# Patient Record
Sex: FEMALE | Race: WHITE | NOT HISPANIC OR LATINO | Employment: OTHER | URBAN - METROPOLITAN AREA
[De-identification: names, ages, dates, MRNs, and addresses within clinical notes are randomized per-mention and may not be internally consistent; named-entity substitution may affect disease eponyms.]

---

## 2017-01-04 ENCOUNTER — GENERIC CONVERSION - ENCOUNTER (OUTPATIENT)
Dept: OTHER | Facility: OTHER | Age: 82
End: 2017-01-04

## 2017-02-27 ENCOUNTER — TRANSCRIBE ORDERS (OUTPATIENT)
Dept: LAB | Facility: CLINIC | Age: 82
End: 2017-02-27

## 2017-02-27 ENCOUNTER — APPOINTMENT (OUTPATIENT)
Dept: LAB | Facility: CLINIC | Age: 82
End: 2017-02-27
Payer: MEDICARE

## 2017-02-27 DIAGNOSIS — D47.2 MONOCLONAL PARAPROTEINEMIA: Primary | ICD-10-CM

## 2017-02-27 LAB
ALBUMIN SERPL BCP-MCNC: 3.4 G/DL (ref 3.5–5)
ALP SERPL-CCNC: 64 U/L (ref 46–116)
ALT SERPL W P-5'-P-CCNC: 24 U/L (ref 12–78)
ANION GAP SERPL CALCULATED.3IONS-SCNC: 3 MMOL/L (ref 4–13)
AST SERPL W P-5'-P-CCNC: 20 U/L (ref 5–45)
BASOPHILS # BLD AUTO: 0.01 THOUSANDS/ΜL (ref 0–0.1)
BASOPHILS NFR BLD AUTO: 0 % (ref 0–1)
BILIRUB SERPL-MCNC: 0.27 MG/DL (ref 0.2–1)
BUN SERPL-MCNC: 44 MG/DL (ref 5–25)
CALCIUM SERPL-MCNC: 8.9 MG/DL (ref 8.3–10.1)
CHLORIDE SERPL-SCNC: 109 MMOL/L (ref 100–108)
CO2 SERPL-SCNC: 29 MMOL/L (ref 21–32)
CREAT SERPL-MCNC: 1.86 MG/DL (ref 0.6–1.3)
EOSINOPHIL # BLD AUTO: 0.14 THOUSAND/ΜL (ref 0–0.61)
EOSINOPHIL NFR BLD AUTO: 2 % (ref 0–6)
ERYTHROCYTE [DISTWIDTH] IN BLOOD BY AUTOMATED COUNT: 13.7 % (ref 11.6–15.1)
GFR SERPL CREATININE-BSD FRML MDRD: 25.6 ML/MIN/1.73SQ M
GLUCOSE SERPL-MCNC: 103 MG/DL (ref 65–140)
HCT VFR BLD AUTO: 42.3 % (ref 34.8–46.1)
HGB BLD-MCNC: 13 G/DL (ref 11.5–15.4)
LYMPHOCYTES # BLD AUTO: 1.21 THOUSANDS/ΜL (ref 0.6–4.47)
LYMPHOCYTES NFR BLD AUTO: 20 % (ref 14–44)
MCH RBC QN AUTO: 30.3 PG (ref 26.8–34.3)
MCHC RBC AUTO-ENTMCNC: 30.7 G/DL (ref 31.4–37.4)
MCV RBC AUTO: 99 FL (ref 82–98)
MONOCYTES # BLD AUTO: 0.42 THOUSAND/ΜL (ref 0.17–1.22)
MONOCYTES NFR BLD AUTO: 7 % (ref 4–12)
NEUTROPHILS # BLD AUTO: 4.24 THOUSANDS/ΜL (ref 1.85–7.62)
NEUTS SEG NFR BLD AUTO: 71 % (ref 43–75)
NRBC BLD AUTO-RTO: 4 /100 WBCS
PLATELET # BLD AUTO: 178 THOUSANDS/UL (ref 149–390)
PMV BLD AUTO: 11.8 FL (ref 8.9–12.7)
POTASSIUM SERPL-SCNC: 4.3 MMOL/L (ref 3.5–5.3)
PROT SERPL-MCNC: 8.4 G/DL (ref 6.4–8.2)
RBC # BLD AUTO: 4.29 MILLION/UL (ref 3.81–5.12)
SODIUM SERPL-SCNC: 141 MMOL/L (ref 136–145)
WBC # BLD AUTO: 6.03 THOUSAND/UL (ref 4.31–10.16)

## 2017-02-27 PROCEDURE — 85025 COMPLETE CBC W/AUTO DIFF WBC: CPT

## 2017-02-27 PROCEDURE — 83883 ASSAY NEPHELOMETRY NOT SPEC: CPT

## 2017-02-27 PROCEDURE — 36415 COLL VENOUS BLD VENIPUNCTURE: CPT

## 2017-02-27 PROCEDURE — 80053 COMPREHEN METABOLIC PANEL: CPT

## 2017-02-28 LAB — TOTAL IGE SMQN RAST: 9.98 KU/L (ref 0–113)

## 2017-03-01 LAB
KAPPA LC FREE SER-MCNC: 26.55 MG/L (ref 3.3–19.4)
KAPPA LC FREE/LAMBDA FREE SER: 0.43 {RATIO} (ref 0.26–1.65)
LAMBDA LC FREE SERPL-MCNC: 61.55 MG/L (ref 5.71–26.3)

## 2017-03-23 ENCOUNTER — LAB (OUTPATIENT)
Dept: LAB | Facility: CLINIC | Age: 82
End: 2017-03-23
Payer: MEDICARE

## 2017-03-23 DIAGNOSIS — Z86.718 PERSONAL HISTORY OF OTHER VENOUS THROMBOSIS AND EMBOLISM: ICD-10-CM

## 2017-03-23 DIAGNOSIS — I10 ESSENTIAL (PRIMARY) HYPERTENSION: ICD-10-CM

## 2017-03-23 LAB
CHOLEST SERPL-MCNC: 167 MG/DL (ref 50–200)
HDLC SERPL-MCNC: 67 MG/DL (ref 40–60)
LDLC SERPL CALC-MCNC: 77 MG/DL (ref 0–100)
TRIGL SERPL-MCNC: 116 MG/DL

## 2017-03-23 PROCEDURE — 36415 COLL VENOUS BLD VENIPUNCTURE: CPT

## 2017-03-23 PROCEDURE — 82784 ASSAY IGA/IGD/IGG/IGM EACH: CPT

## 2017-03-23 PROCEDURE — 80061 LIPID PANEL: CPT

## 2017-03-24 LAB
IGA SERPL-MCNC: 82 MG/DL (ref 70–400)
IGG SERPL-MCNC: 2870 MG/DL (ref 700–1600)
IGM SERPL-MCNC: 20 MG/DL (ref 40–230)

## 2017-04-07 ENCOUNTER — ALLSCRIPTS OFFICE VISIT (OUTPATIENT)
Dept: OTHER | Facility: OTHER | Age: 82
End: 2017-04-07

## 2017-04-24 DIAGNOSIS — N18.30 CHRONIC KIDNEY DISEASE, STAGE III (MODERATE) (HCC): ICD-10-CM

## 2017-04-24 DIAGNOSIS — I12.9 HYPERTENSIVE CHRONIC KIDNEY DISEASE WITH STAGE 1 THROUGH STAGE 4 CHRONIC KIDNEY DISEASE, OR UNSPECIFIED CHRONIC KIDNEY DISEASE: ICD-10-CM

## 2017-04-24 DIAGNOSIS — R80.9 PROTEINURIA: ICD-10-CM

## 2017-04-24 DIAGNOSIS — E87.1 HYPO-OSMOLALITY AND HYPONATREMIA: ICD-10-CM

## 2017-04-24 DIAGNOSIS — E55.9 VITAMIN D DEFICIENCY: ICD-10-CM

## 2017-04-28 ENCOUNTER — ALLSCRIPTS OFFICE VISIT (OUTPATIENT)
Dept: OTHER | Facility: OTHER | Age: 82
End: 2017-04-28

## 2017-05-09 ENCOUNTER — HOSPITAL ENCOUNTER (OUTPATIENT)
Facility: HOSPITAL | Age: 82
Setting detail: OBSERVATION
Discharge: HOME | End: 2017-05-10
Attending: EMERGENCY MEDICINE | Admitting: INTERNAL MEDICINE
Payer: MEDICARE

## 2017-05-09 ENCOUNTER — APPOINTMENT (OUTPATIENT)
Dept: RADIOLOGY | Facility: HOSPITAL | Age: 82
End: 2017-05-09
Payer: MEDICARE

## 2017-05-09 ENCOUNTER — GENERIC CONVERSION - ENCOUNTER (OUTPATIENT)
Dept: OTHER | Facility: OTHER | Age: 82
End: 2017-05-09

## 2017-05-09 ENCOUNTER — APPOINTMENT (EMERGENCY)
Dept: RADIOLOGY | Facility: HOSPITAL | Age: 82
End: 2017-05-09
Payer: MEDICARE

## 2017-05-09 DIAGNOSIS — I16.0 HYPERTENSIVE URGENCY: ICD-10-CM

## 2017-05-09 DIAGNOSIS — S42.292A: ICD-10-CM

## 2017-05-09 DIAGNOSIS — G45.9 TIA (TRANSIENT ISCHEMIC ATTACK): Primary | ICD-10-CM

## 2017-05-09 DIAGNOSIS — R20.2 PARESTHESIA: ICD-10-CM

## 2017-05-09 DIAGNOSIS — Z86.73 HISTORY OF CVA (CEREBROVASCULAR ACCIDENT): ICD-10-CM

## 2017-05-09 DIAGNOSIS — R20.0 NUMBNESS AND TINGLING IN LEFT ARM: ICD-10-CM

## 2017-05-09 DIAGNOSIS — R20.2 NUMBNESS AND TINGLING IN LEFT ARM: ICD-10-CM

## 2017-05-09 DIAGNOSIS — I10 ESSENTIAL HYPERTENSION: ICD-10-CM

## 2017-05-09 PROBLEM — R82.71 BACTERIURIA: Status: ACTIVE | Noted: 2017-05-09

## 2017-05-09 PROBLEM — E66.9 OBESITY: Status: ACTIVE | Noted: 2017-05-09

## 2017-05-09 PROBLEM — N18.30 CKD (CHRONIC KIDNEY DISEASE) STAGE 3, GFR 30-59 ML/MIN (HCC): Status: ACTIVE | Noted: 2017-05-09

## 2017-05-09 LAB
ALBUMIN SERPL BCP-MCNC: 3.1 G/DL (ref 3.5–5)
ALP SERPL-CCNC: 53 U/L (ref 46–116)
ALT SERPL W P-5'-P-CCNC: 21 U/L (ref 12–78)
ANION GAP SERPL CALCULATED.3IONS-SCNC: 7 MMOL/L (ref 4–13)
APTT PPP: 23 SECONDS (ref 23–35)
AST SERPL W P-5'-P-CCNC: 22 U/L (ref 5–45)
BACTERIA UR QL AUTO: ABNORMAL /HPF
BASOPHILS # BLD AUTO: 0 THOUSANDS/ΜL (ref 0–0.1)
BASOPHILS NFR BLD AUTO: 1 % (ref 0–1)
BILIRUB SERPL-MCNC: 0.4 MG/DL (ref 0.2–1)
BILIRUB UR QL STRIP: NEGATIVE
BUN SERPL-MCNC: 39 MG/DL (ref 5–25)
CALCIUM SERPL-MCNC: 8.7 MG/DL (ref 8.3–10.1)
CHLORIDE SERPL-SCNC: 107 MMOL/L (ref 100–108)
CHOLEST SERPL-MCNC: 162 MG/DL (ref 50–200)
CK MB SERPL-MCNC: 2.1 % (ref 0–2.5)
CK MB SERPL-MCNC: 3.9 NG/ML (ref 0–5)
CK SERPL-CCNC: 190 U/L (ref 26–192)
CLARITY UR: ABNORMAL
CO2 SERPL-SCNC: 26 MMOL/L (ref 21–32)
COLOR UR: ABNORMAL
CREAT SERPL-MCNC: 1.54 MG/DL (ref 0.6–1.3)
EOSINOPHIL # BLD AUTO: 0.2 THOUSAND/ΜL (ref 0–0.61)
EOSINOPHIL NFR BLD AUTO: 4 % (ref 0–6)
ERYTHROCYTE [DISTWIDTH] IN BLOOD BY AUTOMATED COUNT: 13.9 % (ref 11.6–15.1)
GFR SERPL CREATININE-BSD FRML MDRD: 31.8 ML/MIN/1.73SQ M
GLUCOSE SERPL-MCNC: 103 MG/DL (ref 65–140)
GLUCOSE UR STRIP-MCNC: NEGATIVE MG/DL
HCT VFR BLD AUTO: 40.5 % (ref 37–47)
HDLC SERPL-MCNC: 67 MG/DL (ref 40–60)
HGB BLD-MCNC: 13.1 G/DL (ref 12–16)
HGB UR QL STRIP.AUTO: ABNORMAL
INR PPP: 1.04 (ref 0.86–1.16)
KETONES UR STRIP-MCNC: NEGATIVE MG/DL
LDLC SERPL CALC-MCNC: 74 MG/DL (ref 0–100)
LEUKOCYTE ESTERASE UR QL STRIP: ABNORMAL
LYMPHOCYTES # BLD AUTO: 1.2 THOUSANDS/ΜL (ref 0.6–4.47)
LYMPHOCYTES NFR BLD AUTO: 22 % (ref 14–44)
MAGNESIUM SERPL-MCNC: 2 MG/DL (ref 1.6–2.6)
MCH RBC QN AUTO: 29.6 PG (ref 27–31)
MCHC RBC AUTO-ENTMCNC: 32.4 G/DL (ref 31.4–37.4)
MCV RBC AUTO: 91 FL (ref 82–98)
MONOCYTES # BLD AUTO: 0.4 THOUSAND/ΜL (ref 0.17–1.22)
MONOCYTES NFR BLD AUTO: 8 % (ref 4–12)
NEUTROPHILS # BLD AUTO: 3.6 THOUSANDS/ΜL (ref 1.85–7.62)
NEUTS SEG NFR BLD AUTO: 66 % (ref 43–75)
NITRITE UR QL STRIP: NEGATIVE
NON-SQ EPI CELLS URNS QL MICRO: ABNORMAL /HPF
NRBC BLD AUTO-RTO: 0 /100 WBCS
NT-PROBNP SERPL-MCNC: 961 PG/ML
PH UR STRIP.AUTO: 6 [PH] (ref 5–9)
PLATELET # BLD AUTO: 162 THOUSANDS/UL (ref 130–400)
PMV BLD AUTO: 9.1 FL (ref 8.9–12.7)
POTASSIUM SERPL-SCNC: 4.1 MMOL/L (ref 3.5–5.3)
PROT SERPL-MCNC: 7.6 G/DL (ref 6.4–8.2)
PROT UR STRIP-MCNC: ABNORMAL MG/DL
PROTHROMBIN TIME: 10.9 SECONDS (ref 9.4–11.7)
RBC # BLD AUTO: 4.43 MILLION/UL (ref 4.2–5.4)
RBC #/AREA URNS AUTO: ABNORMAL /HPF
SODIUM SERPL-SCNC: 140 MMOL/L (ref 136–145)
SP GR UR STRIP.AUTO: 1.01 (ref 1–1.03)
TRIGL SERPL-MCNC: 105 MG/DL
TROPONIN I SERPL-MCNC: <0.02 NG/ML
TROPONIN I SERPL-MCNC: <0.02 NG/ML
TSH SERPL DL<=0.05 MIU/L-ACNC: 2.7 UIU/ML (ref 0.36–3.74)
UROBILINOGEN UR QL STRIP.AUTO: 0.2 E.U./DL
WBC # BLD AUTO: 5.4 THOUSAND/UL (ref 4.8–10.8)
WBC #/AREA URNS AUTO: ABNORMAL /HPF

## 2017-05-09 PROCEDURE — 97166 OT EVAL MOD COMPLEX 45 MIN: CPT

## 2017-05-09 PROCEDURE — 85025 COMPLETE CBC W/AUTO DIFF WBC: CPT | Performed by: EMERGENCY MEDICINE

## 2017-05-09 PROCEDURE — 81001 URINALYSIS AUTO W/SCOPE: CPT | Performed by: EMERGENCY MEDICINE

## 2017-05-09 PROCEDURE — 83735 ASSAY OF MAGNESIUM: CPT | Performed by: NURSE PRACTITIONER

## 2017-05-09 PROCEDURE — 70450 CT HEAD/BRAIN W/O DYE: CPT

## 2017-05-09 PROCEDURE — G8979 MOBILITY GOAL STATUS: HCPCS

## 2017-05-09 PROCEDURE — 87081 CULTURE SCREEN ONLY: CPT | Performed by: FAMILY MEDICINE

## 2017-05-09 PROCEDURE — 94760 N-INVAS EAR/PLS OXIMETRY 1: CPT

## 2017-05-09 PROCEDURE — 96374 THER/PROPH/DIAG INJ IV PUSH: CPT

## 2017-05-09 PROCEDURE — 99285 EMERGENCY DEPT VISIT HI MDM: CPT

## 2017-05-09 PROCEDURE — 82553 CREATINE MB FRACTION: CPT | Performed by: EMERGENCY MEDICINE

## 2017-05-09 PROCEDURE — 84443 ASSAY THYROID STIM HORMONE: CPT | Performed by: EMERGENCY MEDICINE

## 2017-05-09 PROCEDURE — G8978 MOBILITY CURRENT STATUS: HCPCS

## 2017-05-09 PROCEDURE — 84484 ASSAY OF TROPONIN QUANT: CPT | Performed by: NURSE PRACTITIONER

## 2017-05-09 PROCEDURE — 97110 THERAPEUTIC EXERCISES: CPT

## 2017-05-09 PROCEDURE — 71020 HB CHEST X-RAY 2VW FRONTAL&LATL: CPT

## 2017-05-09 PROCEDURE — 80061 LIPID PANEL: CPT | Performed by: NURSE PRACTITIONER

## 2017-05-09 PROCEDURE — 93005 ELECTROCARDIOGRAM TRACING: CPT | Performed by: EMERGENCY MEDICINE

## 2017-05-09 PROCEDURE — 97163 PT EVAL HIGH COMPLEX 45 MIN: CPT

## 2017-05-09 PROCEDURE — 73030 X-RAY EXAM OF SHOULDER: CPT

## 2017-05-09 PROCEDURE — 36415 COLL VENOUS BLD VENIPUNCTURE: CPT | Performed by: EMERGENCY MEDICINE

## 2017-05-09 PROCEDURE — 85730 THROMBOPLASTIN TIME PARTIAL: CPT | Performed by: EMERGENCY MEDICINE

## 2017-05-09 PROCEDURE — 82550 ASSAY OF CK (CPK): CPT | Performed by: EMERGENCY MEDICINE

## 2017-05-09 PROCEDURE — G8988 SELF CARE GOAL STATUS: HCPCS

## 2017-05-09 PROCEDURE — 96361 HYDRATE IV INFUSION ADD-ON: CPT

## 2017-05-09 PROCEDURE — 85610 PROTHROMBIN TIME: CPT | Performed by: EMERGENCY MEDICINE

## 2017-05-09 PROCEDURE — 83880 ASSAY OF NATRIURETIC PEPTIDE: CPT | Performed by: EMERGENCY MEDICINE

## 2017-05-09 PROCEDURE — G8987 SELF CARE CURRENT STATUS: HCPCS

## 2017-05-09 PROCEDURE — 80053 COMPREHEN METABOLIC PANEL: CPT | Performed by: EMERGENCY MEDICINE

## 2017-05-09 PROCEDURE — 87086 URINE CULTURE/COLONY COUNT: CPT | Performed by: EMERGENCY MEDICINE

## 2017-05-09 PROCEDURE — 93880 EXTRACRANIAL BILAT STUDY: CPT

## 2017-05-09 PROCEDURE — 84484 ASSAY OF TROPONIN QUANT: CPT | Performed by: EMERGENCY MEDICINE

## 2017-05-09 RX ORDER — ACETAMINOPHEN 325 MG/1
650 TABLET ORAL EVERY 6 HOURS PRN
Status: DISCONTINUED | OUTPATIENT
Start: 2017-05-09 | End: 2017-05-10 | Stop reason: HOSPADM

## 2017-05-09 RX ORDER — ACETAMINOPHEN 325 MG/1
650 TABLET ORAL ONCE
Status: COMPLETED | OUTPATIENT
Start: 2017-05-09 | End: 2017-05-09

## 2017-05-09 RX ORDER — CHOLESTYRAMINE LIGHT 4 G/5.7G
4 POWDER, FOR SUSPENSION ORAL 2 TIMES DAILY
Status: DISCONTINUED | OUTPATIENT
Start: 2017-05-09 | End: 2017-05-09

## 2017-05-09 RX ORDER — NEBIVOLOL 10 MG/1
10 TABLET ORAL DAILY
COMMUNITY
End: 2017-09-02 | Stop reason: HOSPADM

## 2017-05-09 RX ORDER — CLOPIDOGREL BISULFATE 75 MG/1
75 TABLET ORAL DAILY
Status: DISCONTINUED | OUTPATIENT
Start: 2017-05-09 | End: 2017-05-10 | Stop reason: HOSPADM

## 2017-05-09 RX ORDER — CHOLECALCIFEROL (VITAMIN D3) 125 MCG
100 CAPSULE ORAL DAILY
Status: DISCONTINUED | OUTPATIENT
Start: 2017-05-09 | End: 2017-05-10 | Stop reason: HOSPADM

## 2017-05-09 RX ORDER — AMLODIPINE BESYLATE 2.5 MG/1
2.5 TABLET ORAL DAILY
COMMUNITY
End: 2017-05-10 | Stop reason: HOSPADM

## 2017-05-09 RX ORDER — AMLODIPINE BESYLATE 2.5 MG/1
2.5 TABLET ORAL 2 TIMES DAILY
Status: DISCONTINUED | OUTPATIENT
Start: 2017-05-09 | End: 2017-05-10

## 2017-05-09 RX ORDER — TORSEMIDE 5 MG/1
5 TABLET ORAL DAILY
COMMUNITY
End: 2017-09-02 | Stop reason: HOSPADM

## 2017-05-09 RX ORDER — CLOPIDOGREL BISULFATE 75 MG/1
75 TABLET ORAL DAILY
COMMUNITY
End: 2018-05-17 | Stop reason: ALTCHOICE

## 2017-05-09 RX ORDER — AMLODIPINE BESYLATE 2.5 MG/1
2.5 TABLET ORAL DAILY
Status: DISCONTINUED | OUTPATIENT
Start: 2017-05-09 | End: 2017-05-09

## 2017-05-09 RX ORDER — COLESEVELAM 180 1/1
1875 TABLET ORAL 2 TIMES DAILY
Status: DISCONTINUED | OUTPATIENT
Start: 2017-05-09 | End: 2017-05-10 | Stop reason: HOSPADM

## 2017-05-09 RX ORDER — PANTOPRAZOLE SODIUM 40 MG/1
40 TABLET, DELAYED RELEASE ORAL
Status: DISCONTINUED | OUTPATIENT
Start: 2017-05-09 | End: 2017-05-10 | Stop reason: HOSPADM

## 2017-05-09 RX ORDER — ONDANSETRON 2 MG/ML
4 INJECTION INTRAMUSCULAR; INTRAVENOUS EVERY 6 HOURS PRN
Status: DISCONTINUED | OUTPATIENT
Start: 2017-05-09 | End: 2017-05-10 | Stop reason: HOSPADM

## 2017-05-09 RX ORDER — TORSEMIDE 10 MG/1
5 TABLET ORAL DAILY
Status: DISCONTINUED | OUTPATIENT
Start: 2017-05-09 | End: 2017-05-10 | Stop reason: HOSPADM

## 2017-05-09 RX ORDER — PANTOPRAZOLE SODIUM 40 MG/1
40 TABLET, DELAYED RELEASE ORAL DAILY
COMMUNITY
End: 2018-03-06

## 2017-05-09 RX ORDER — SODIUM CHLORIDE 9 MG/ML
125 INJECTION, SOLUTION INTRAVENOUS CONTINUOUS
Status: DISCONTINUED | OUTPATIENT
Start: 2017-05-09 | End: 2017-05-09

## 2017-05-09 RX ORDER — NEBIVOLOL 10 MG/1
10 TABLET ORAL DAILY
Status: DISCONTINUED | OUTPATIENT
Start: 2017-05-09 | End: 2017-05-10 | Stop reason: HOSPADM

## 2017-05-09 RX ORDER — LABETALOL HYDROCHLORIDE 5 MG/ML
10 INJECTION, SOLUTION INTRAVENOUS ONCE
Status: COMPLETED | OUTPATIENT
Start: 2017-05-09 | End: 2017-05-09

## 2017-05-09 RX ORDER — B-COMPLEX WITH VITAMIN C
1 TABLET ORAL 2 TIMES DAILY
Status: DISCONTINUED | OUTPATIENT
Start: 2017-05-09 | End: 2017-05-10 | Stop reason: HOSPADM

## 2017-05-09 RX ORDER — IPRATROPIUM BROMIDE AND ALBUTEROL SULFATE 2.5; .5 MG/3ML; MG/3ML
3 SOLUTION RESPIRATORY (INHALATION) EVERY 6 HOURS PRN
Status: DISCONTINUED | OUTPATIENT
Start: 2017-05-09 | End: 2017-05-10 | Stop reason: HOSPADM

## 2017-05-09 RX ADMIN — TORSEMIDE 5 MG: 10 TABLET ORAL at 11:32

## 2017-05-09 RX ADMIN — CALCIUM CARBONATE 500 MG (1,250 MG)-VITAMIN D3 200 UNIT TABLET 1 TABLET: at 11:32

## 2017-05-09 RX ADMIN — ACETAMINOPHEN 650 MG: 325 TABLET ORAL at 04:20

## 2017-05-09 RX ADMIN — PANTOPRAZOLE SODIUM 40 MG: 40 TABLET, DELAYED RELEASE ORAL at 11:50

## 2017-05-09 RX ADMIN — CEFEPIME HYDROCHLORIDE 2000 MG: 2 INJECTION, POWDER, FOR SOLUTION INTRAVENOUS at 11:48

## 2017-05-09 RX ADMIN — AMLODIPINE BESYLATE 2.5 MG: 2.5 TABLET ORAL at 11:32

## 2017-05-09 RX ADMIN — SODIUM CHLORIDE 125 ML/HR: 0.9 INJECTION, SOLUTION INTRAVENOUS at 04:22

## 2017-05-09 RX ADMIN — AMLODIPINE BESYLATE 2.5 MG: 2.5 TABLET ORAL at 17:28

## 2017-05-09 RX ADMIN — Medication 100 MG: at 11:48

## 2017-05-09 RX ADMIN — CALCIUM CARBONATE 500 MG (1,250 MG)-VITAMIN D3 200 UNIT TABLET 1 TABLET: at 17:28

## 2017-05-09 RX ADMIN — Medication 1 TABLET: at 11:32

## 2017-05-09 RX ADMIN — ENOXAPARIN SODIUM 30 MG: 30 INJECTION SUBCUTANEOUS at 11:31

## 2017-05-09 RX ADMIN — NEBIVOLOL HYDROCHLORIDE 10 MG: 10 TABLET ORAL at 11:48

## 2017-05-09 RX ADMIN — CLOPIDOGREL BISULFATE 75 MG: 75 TABLET, FILM COATED ORAL at 11:32

## 2017-05-09 RX ADMIN — LABETALOL HYDROCHLORIDE 10 MG: 5 INJECTION, SOLUTION INTRAVENOUS at 04:23

## 2017-05-09 RX ADMIN — COLESEVELAM 1875 MG: 180 TABLET ORAL at 17:28

## 2017-05-10 ENCOUNTER — GENERIC CONVERSION - ENCOUNTER (OUTPATIENT)
Dept: OTHER | Facility: OTHER | Age: 82
End: 2017-05-10

## 2017-05-10 ENCOUNTER — APPOINTMENT (OUTPATIENT)
Dept: NON INVASIVE DIAGNOSTICS | Facility: HOSPITAL | Age: 82
End: 2017-05-10
Payer: MEDICARE

## 2017-05-10 VITALS
SYSTOLIC BLOOD PRESSURE: 148 MMHG | DIASTOLIC BLOOD PRESSURE: 67 MMHG | TEMPERATURE: 98.1 F | BODY MASS INDEX: 29.58 KG/M2 | HEIGHT: 64 IN | WEIGHT: 173.28 LBS | OXYGEN SATURATION: 92 % | HEART RATE: 65 BPM | RESPIRATION RATE: 20 BRPM

## 2017-05-10 PROBLEM — R82.71 BACTERIURIA: Status: RESOLVED | Noted: 2017-05-09 | Resolved: 2017-05-10

## 2017-05-10 LAB
ANION GAP SERPL CALCULATED.3IONS-SCNC: 10 MMOL/L (ref 4–13)
ATRIAL RATE: 63 BPM
BACTERIA UR CULT: NORMAL
BUN SERPL-MCNC: 39 MG/DL (ref 5–25)
CALCIUM SERPL-MCNC: 9 MG/DL (ref 8.3–10.1)
CHLORIDE SERPL-SCNC: 107 MMOL/L (ref 100–108)
CHOLEST SERPL-MCNC: 150 MG/DL (ref 50–200)
CO2 SERPL-SCNC: 24 MMOL/L (ref 21–32)
CREAT SERPL-MCNC: 1.61 MG/DL (ref 0.6–1.3)
ERYTHROCYTE [DISTWIDTH] IN BLOOD BY AUTOMATED COUNT: 13.9 % (ref 11.6–15.1)
EST. AVERAGE GLUCOSE BLD GHB EST-MCNC: 126 MG/DL
GFR SERPL CREATININE-BSD FRML MDRD: 30.2 ML/MIN/1.73SQ M
GLUCOSE P FAST SERPL-MCNC: 101 MG/DL (ref 65–99)
GLUCOSE SERPL-MCNC: 101 MG/DL (ref 65–140)
HBA1C MFR BLD: 6 % (ref 4.2–6.3)
HCT VFR BLD AUTO: 37.4 % (ref 37–47)
HDLC SERPL-MCNC: 67 MG/DL (ref 40–60)
HGB BLD-MCNC: 12 G/DL (ref 12–16)
LDLC SERPL CALC-MCNC: 67 MG/DL (ref 0–100)
MCH RBC QN AUTO: 29.7 PG (ref 27–31)
MCHC RBC AUTO-ENTMCNC: 32.2 G/DL (ref 31.4–37.4)
MCV RBC AUTO: 92 FL (ref 82–98)
MRSA NOSE QL CULT: NORMAL
P AXIS: 62 DEGREES
PLATELET # BLD AUTO: 161 THOUSANDS/UL (ref 130–400)
PMV BLD AUTO: 9.2 FL (ref 8.9–12.7)
POTASSIUM SERPL-SCNC: 4 MMOL/L (ref 3.5–5.3)
PR INTERVAL: 168 MS
QRS AXIS: -13 DEGREES
QRSD INTERVAL: 140 MS
QT INTERVAL: 464 MS
QTC INTERVAL: 474 MS
RBC # BLD AUTO: 4.05 MILLION/UL (ref 4.2–5.4)
SODIUM SERPL-SCNC: 141 MMOL/L (ref 136–145)
T WAVE AXIS: -17 DEGREES
TRIGL SERPL-MCNC: 78 MG/DL
VENTRICULAR RATE: 63 BPM
WBC # BLD AUTO: 5.4 THOUSAND/UL (ref 4.8–10.8)

## 2017-05-10 PROCEDURE — 97110 THERAPEUTIC EXERCISES: CPT

## 2017-05-10 PROCEDURE — 94760 N-INVAS EAR/PLS OXIMETRY 1: CPT

## 2017-05-10 PROCEDURE — 83036 HEMOGLOBIN GLYCOSYLATED A1C: CPT | Performed by: NURSE PRACTITIONER

## 2017-05-10 PROCEDURE — 93306 TTE W/DOPPLER COMPLETE: CPT

## 2017-05-10 PROCEDURE — 80048 BASIC METABOLIC PNL TOTAL CA: CPT | Performed by: NURSE PRACTITIONER

## 2017-05-10 PROCEDURE — 80061 LIPID PANEL: CPT | Performed by: FAMILY MEDICINE

## 2017-05-10 PROCEDURE — 85027 COMPLETE CBC AUTOMATED: CPT | Performed by: NURSE PRACTITIONER

## 2017-05-10 RX ORDER — AMLODIPINE BESYLATE 5 MG/1
5 TABLET ORAL 2 TIMES DAILY
Qty: 60 TABLET | Refills: 0 | Status: SHIPPED | OUTPATIENT
Start: 2017-05-11 | End: 2017-07-15

## 2017-05-10 RX ORDER — AMLODIPINE BESYLATE 5 MG/1
5 TABLET ORAL 2 TIMES DAILY
Status: DISCONTINUED | OUTPATIENT
Start: 2017-05-11 | End: 2017-05-10 | Stop reason: HOSPADM

## 2017-05-10 RX ORDER — AMLODIPINE BESYLATE 10 MG/1
10 TABLET ORAL DAILY
Status: DISCONTINUED | OUTPATIENT
Start: 2017-05-10 | End: 2017-05-10

## 2017-05-10 RX ADMIN — CLOPIDOGREL BISULFATE 75 MG: 75 TABLET, FILM COATED ORAL at 09:06

## 2017-05-10 RX ADMIN — COLESEVELAM 1875 MG: 180 TABLET ORAL at 09:06

## 2017-05-10 RX ADMIN — ACETAMINOPHEN 650 MG: 325 TABLET ORAL at 17:09

## 2017-05-10 RX ADMIN — CALCIUM CARBONATE 500 MG (1,250 MG)-VITAMIN D3 200 UNIT TABLET 1 TABLET: at 17:09

## 2017-05-10 RX ADMIN — Medication 1 TABLET: at 09:06

## 2017-05-10 RX ADMIN — CALCIUM CARBONATE 500 MG (1,250 MG)-VITAMIN D3 200 UNIT TABLET 1 TABLET: at 09:06

## 2017-05-10 RX ADMIN — COLESEVELAM 1875 MG: 180 TABLET ORAL at 17:09

## 2017-05-10 RX ADMIN — AMLODIPINE BESYLATE 10 MG: 10 TABLET ORAL at 09:08

## 2017-05-10 RX ADMIN — TORSEMIDE 5 MG: 10 TABLET ORAL at 09:06

## 2017-05-10 RX ADMIN — Medication 100 MG: at 09:07

## 2017-05-10 RX ADMIN — ENOXAPARIN SODIUM 30 MG: 30 INJECTION SUBCUTANEOUS at 09:06

## 2017-05-10 RX ADMIN — PANTOPRAZOLE SODIUM 40 MG: 40 TABLET, DELAYED RELEASE ORAL at 05:16

## 2017-05-10 RX ADMIN — NEBIVOLOL HYDROCHLORIDE 10 MG: 10 TABLET ORAL at 09:08

## 2017-06-06 ENCOUNTER — ALLSCRIPTS OFFICE VISIT (OUTPATIENT)
Dept: OTHER | Facility: OTHER | Age: 82
End: 2017-06-06

## 2017-07-15 ENCOUNTER — HOSPITAL ENCOUNTER (EMERGENCY)
Facility: HOSPITAL | Age: 82
Discharge: HOME | End: 2017-07-15
Attending: EMERGENCY MEDICINE | Admitting: EMERGENCY MEDICINE
Payer: MEDICARE

## 2017-07-15 VITALS
SYSTOLIC BLOOD PRESSURE: 175 MMHG | OXYGEN SATURATION: 93 % | HEART RATE: 72 BPM | TEMPERATURE: 97.7 F | RESPIRATION RATE: 28 BRPM | DIASTOLIC BLOOD PRESSURE: 79 MMHG

## 2017-07-15 DIAGNOSIS — R04.0 BLEEDING FROM THE NOSE: Primary | ICD-10-CM

## 2017-07-15 PROCEDURE — 99283 EMERGENCY DEPT VISIT LOW MDM: CPT

## 2017-07-15 RX ORDER — AMLODIPINE BESYLATE 2.5 MG/1
5 TABLET ORAL DAILY
COMMUNITY
End: 2018-08-30

## 2017-07-25 ENCOUNTER — APPOINTMENT (OUTPATIENT)
Dept: LAB | Facility: CLINIC | Age: 82
End: 2017-07-25
Payer: MEDICARE

## 2017-07-25 ENCOUNTER — TRANSCRIBE ORDERS (OUTPATIENT)
Dept: LAB | Facility: CLINIC | Age: 82
End: 2017-07-25

## 2017-07-25 DIAGNOSIS — D47.2 MONOCLONAL PARAPROTEINEMIA: Primary | ICD-10-CM

## 2017-07-25 LAB
ALBUMIN SERPL BCP-MCNC: 3.4 G/DL (ref 3.5–5)
ALP SERPL-CCNC: 69 U/L (ref 46–116)
ALT SERPL W P-5'-P-CCNC: 25 U/L (ref 12–78)
ANION GAP SERPL CALCULATED.3IONS-SCNC: 3 MMOL/L (ref 4–13)
AST SERPL W P-5'-P-CCNC: 34 U/L (ref 5–45)
BASOPHILS # BLD AUTO: 0.01 THOUSANDS/ΜL (ref 0–0.1)
BASOPHILS NFR BLD AUTO: 0 % (ref 0–1)
BILIRUB SERPL-MCNC: 0.51 MG/DL (ref 0.2–1)
BUN SERPL-MCNC: 39 MG/DL (ref 5–25)
CALCIUM SERPL-MCNC: 9.5 MG/DL (ref 8.3–10.1)
CHLORIDE SERPL-SCNC: 106 MMOL/L (ref 100–108)
CO2 SERPL-SCNC: 28 MMOL/L (ref 21–32)
CREAT SERPL-MCNC: 1.66 MG/DL (ref 0.6–1.3)
EOSINOPHIL # BLD AUTO: 0.16 THOUSAND/ΜL (ref 0–0.61)
EOSINOPHIL NFR BLD AUTO: 3 % (ref 0–6)
ERYTHROCYTE [DISTWIDTH] IN BLOOD BY AUTOMATED COUNT: 13.3 % (ref 11.6–15.1)
GFR SERPL CREATININE-BSD FRML MDRD: 27 ML/MIN/1.73SQ M
GLUCOSE P FAST SERPL-MCNC: 99 MG/DL (ref 65–99)
HCT VFR BLD AUTO: 41.8 % (ref 34.8–46.1)
HGB BLD-MCNC: 13 G/DL (ref 11.5–15.4)
IGG SERPL-MCNC: 2780 MG/DL (ref 700–1600)
LYMPHOCYTES # BLD AUTO: 1.3 THOUSANDS/ΜL (ref 0.6–4.47)
LYMPHOCYTES NFR BLD AUTO: 21 % (ref 14–44)
MCH RBC QN AUTO: 30.1 PG (ref 26.8–34.3)
MCHC RBC AUTO-ENTMCNC: 31.1 G/DL (ref 31.4–37.4)
MCV RBC AUTO: 97 FL (ref 82–98)
MONOCYTES # BLD AUTO: 0.44 THOUSAND/ΜL (ref 0.17–1.22)
MONOCYTES NFR BLD AUTO: 7 % (ref 4–12)
NEUTROPHILS # BLD AUTO: 4.17 THOUSANDS/ΜL (ref 1.85–7.62)
NEUTS SEG NFR BLD AUTO: 69 % (ref 43–75)
NRBC BLD AUTO-RTO: 0 /100 WBCS
PLATELET # BLD AUTO: 213 THOUSANDS/UL (ref 149–390)
PMV BLD AUTO: 11.4 FL (ref 8.9–12.7)
POTASSIUM SERPL-SCNC: 5 MMOL/L (ref 3.5–5.3)
PROT SERPL-MCNC: 9.4 G/DL (ref 6.4–8.2)
RBC # BLD AUTO: 4.32 MILLION/UL (ref 3.81–5.12)
SODIUM SERPL-SCNC: 137 MMOL/L (ref 136–145)
WBC # BLD AUTO: 6.1 THOUSAND/UL (ref 4.31–10.16)

## 2017-07-25 PROCEDURE — 80053 COMPREHEN METABOLIC PANEL: CPT

## 2017-07-25 PROCEDURE — 83883 ASSAY NEPHELOMETRY NOT SPEC: CPT

## 2017-07-25 PROCEDURE — 85025 COMPLETE CBC W/AUTO DIFF WBC: CPT

## 2017-07-25 PROCEDURE — 36415 COLL VENOUS BLD VENIPUNCTURE: CPT

## 2017-07-25 PROCEDURE — 82784 ASSAY IGA/IGD/IGG/IGM EACH: CPT

## 2017-07-26 LAB
KAPPA LC FREE SER-MCNC: 22.9 MG/L (ref 3.3–19.4)
KAPPA LC FREE/LAMBDA FREE SER: 0.35 {RATIO} (ref 0.26–1.65)
LAMBDA LC FREE SERPL-MCNC: 65 MG/L (ref 5.7–26.3)

## 2017-08-01 ENCOUNTER — APPOINTMENT (OUTPATIENT)
Dept: LAB | Facility: CLINIC | Age: 82
End: 2017-08-01
Payer: MEDICARE

## 2017-08-01 ENCOUNTER — TRANSCRIBE ORDERS (OUTPATIENT)
Dept: LAB | Facility: CLINIC | Age: 82
End: 2017-08-01

## 2017-08-01 DIAGNOSIS — E87.1 HYPO-OSMOLALITY AND HYPONATREMIA: ICD-10-CM

## 2017-08-01 DIAGNOSIS — R80.9 PROTEINURIA: ICD-10-CM

## 2017-08-01 DIAGNOSIS — E78.5 HYPERLIPIDEMIA: ICD-10-CM

## 2017-08-01 DIAGNOSIS — E55.9 VITAMIN D DEFICIENCY: ICD-10-CM

## 2017-08-01 DIAGNOSIS — N18.30 CHRONIC KIDNEY DISEASE, STAGE III (MODERATE) (HCC): ICD-10-CM

## 2017-08-01 DIAGNOSIS — I12.9 HYPERTENSIVE CHRONIC KIDNEY DISEASE WITH STAGE 1 THROUGH STAGE 4 CHRONIC KIDNEY DISEASE, OR UNSPECIFIED CHRONIC KIDNEY DISEASE: ICD-10-CM

## 2017-08-01 LAB
25(OH)D3 SERPL-MCNC: 54.9 NG/ML (ref 30–100)
ALBUMIN SERPL BCP-MCNC: 3.4 G/DL (ref 3.5–5)
ALP SERPL-CCNC: 64 U/L (ref 46–116)
ALT SERPL W P-5'-P-CCNC: 22 U/L (ref 12–78)
ANION GAP SERPL CALCULATED.3IONS-SCNC: 3 MMOL/L (ref 4–13)
AST SERPL W P-5'-P-CCNC: 20 U/L (ref 5–45)
BASOPHILS # BLD AUTO: 0.01 THOUSANDS/ΜL (ref 0–0.1)
BASOPHILS NFR BLD AUTO: 0 % (ref 0–1)
BILIRUB SERPL-MCNC: 0.54 MG/DL (ref 0.2–1)
BUN SERPL-MCNC: 39 MG/DL (ref 5–25)
CALCIUM SERPL-MCNC: 9.4 MG/DL (ref 8.3–10.1)
CHLORIDE SERPL-SCNC: 106 MMOL/L (ref 100–108)
CO2 SERPL-SCNC: 29 MMOL/L (ref 21–32)
CREAT SERPL-MCNC: 1.91 MG/DL (ref 0.6–1.3)
CREAT UR-MCNC: 111 MG/DL
EOSINOPHIL # BLD AUTO: 0.12 THOUSAND/ΜL (ref 0–0.61)
EOSINOPHIL NFR BLD AUTO: 2 % (ref 0–6)
ERYTHROCYTE [DISTWIDTH] IN BLOOD BY AUTOMATED COUNT: 13.2 % (ref 11.6–15.1)
GFR SERPL CREATININE-BSD FRML MDRD: 23 ML/MIN/1.73SQ M
GLUCOSE P FAST SERPL-MCNC: 97 MG/DL (ref 65–99)
HCT VFR BLD AUTO: 41.9 % (ref 34.8–46.1)
HGB BLD-MCNC: 12.8 G/DL (ref 11.5–15.4)
LYMPHOCYTES # BLD AUTO: 0.93 THOUSANDS/ΜL (ref 0.6–4.47)
LYMPHOCYTES NFR BLD AUTO: 18 % (ref 14–44)
MAGNESIUM SERPL-MCNC: 2.3 MG/DL (ref 1.6–2.6)
MCH RBC QN AUTO: 29.4 PG (ref 26.8–34.3)
MCHC RBC AUTO-ENTMCNC: 30.5 G/DL (ref 31.4–37.4)
MCV RBC AUTO: 96 FL (ref 82–98)
MONOCYTES # BLD AUTO: 0.43 THOUSAND/ΜL (ref 0.17–1.22)
MONOCYTES NFR BLD AUTO: 8 % (ref 4–12)
NEUTROPHILS # BLD AUTO: 3.75 THOUSANDS/ΜL (ref 1.85–7.62)
NEUTS SEG NFR BLD AUTO: 72 % (ref 43–75)
NRBC BLD AUTO-RTO: 0 /100 WBCS
PHOSPHATE SERPL-MCNC: 3.8 MG/DL (ref 2.3–4.1)
PLATELET # BLD AUTO: 204 THOUSANDS/UL (ref 149–390)
PMV BLD AUTO: 10.8 FL (ref 8.9–12.7)
POTASSIUM SERPL-SCNC: 4.4 MMOL/L (ref 3.5–5.3)
PROT SERPL-MCNC: 9.1 G/DL (ref 6.4–8.2)
PROT UR-MCNC: 112 MG/DL
PROT/CREAT UR: 1.01 MG/G{CREAT} (ref 0–0.1)
PTH-INTACT SERPL-MCNC: 69.9 PG/ML (ref 14–72)
RBC # BLD AUTO: 4.35 MILLION/UL (ref 3.81–5.12)
SODIUM SERPL-SCNC: 138 MMOL/L (ref 136–145)
WBC # BLD AUTO: 5.25 THOUSAND/UL (ref 4.31–10.16)

## 2017-08-01 PROCEDURE — 83735 ASSAY OF MAGNESIUM: CPT

## 2017-08-01 PROCEDURE — 84100 ASSAY OF PHOSPHORUS: CPT

## 2017-08-01 PROCEDURE — 85025 COMPLETE CBC W/AUTO DIFF WBC: CPT

## 2017-08-01 PROCEDURE — 82306 VITAMIN D 25 HYDROXY: CPT

## 2017-08-01 PROCEDURE — 80053 COMPREHEN METABOLIC PANEL: CPT

## 2017-08-01 PROCEDURE — 83970 ASSAY OF PARATHORMONE: CPT

## 2017-08-01 PROCEDURE — 36415 COLL VENOUS BLD VENIPUNCTURE: CPT

## 2017-08-01 PROCEDURE — 84156 ASSAY OF PROTEIN URINE: CPT

## 2017-08-01 PROCEDURE — 82570 ASSAY OF URINE CREATININE: CPT

## 2017-08-07 ENCOUNTER — ALLSCRIPTS OFFICE VISIT (OUTPATIENT)
Dept: OTHER | Facility: OTHER | Age: 82
End: 2017-08-07

## 2017-08-27 ENCOUNTER — HOSPITAL ENCOUNTER (INPATIENT)
Facility: HOSPITAL | Age: 82
LOS: 6 days | Discharge: HOME | DRG: 291 | End: 2017-09-02
Attending: EMERGENCY MEDICINE | Admitting: ANESTHESIOLOGY
Payer: MEDICARE

## 2017-08-27 ENCOUNTER — APPOINTMENT (EMERGENCY)
Dept: RADIOLOGY | Facility: HOSPITAL | Age: 82
DRG: 291 | End: 2017-08-27
Payer: MEDICARE

## 2017-08-27 DIAGNOSIS — I82.419: ICD-10-CM

## 2017-08-27 DIAGNOSIS — I50.9 ACUTE CONGESTIVE HEART FAILURE, UNSPECIFIED CONGESTIVE HEART FAILURE TYPE: ICD-10-CM

## 2017-08-27 DIAGNOSIS — I50.9 CHF, ACUTE (HCC): Primary | ICD-10-CM

## 2017-08-27 DIAGNOSIS — I63.9 CEREBROVASCULAR ACCIDENT (CVA) (HCC): ICD-10-CM

## 2017-08-27 DIAGNOSIS — N17.9 AKI (ACUTE KIDNEY INJURY) (HCC): ICD-10-CM

## 2017-08-27 DIAGNOSIS — M10.9 GOUTY ARTHROPATHY: ICD-10-CM

## 2017-08-27 DIAGNOSIS — S81.802A LEG WOUND, LEFT: ICD-10-CM

## 2017-08-27 DIAGNOSIS — N18.30 CKD (CHRONIC KIDNEY DISEASE) STAGE 3, GFR 30-59 ML/MIN (HCC): Chronic | ICD-10-CM

## 2017-08-27 PROBLEM — E66.9 OBESITY: Chronic | Status: ACTIVE | Noted: 2017-05-09

## 2017-08-27 PROBLEM — I50.32 DIASTOLIC CHF, CHRONIC (HCC): Chronic | Status: ACTIVE | Noted: 2017-08-27

## 2017-08-27 PROBLEM — I16.0 HYPERTENSIVE URGENCY: Status: RESOLVED | Noted: 2017-05-09 | Resolved: 2017-08-27

## 2017-08-27 PROBLEM — Z86.73 HISTORY OF CVA (CEREBROVASCULAR ACCIDENT): Chronic | Status: ACTIVE | Noted: 2017-05-09

## 2017-08-27 PROBLEM — I50.31 ACUTE DIASTOLIC CHF (CONGESTIVE HEART FAILURE) (HCC): Status: ACTIVE | Noted: 2017-08-27

## 2017-08-27 PROBLEM — E55.9 VITAMIN D DEFICIENCY: Chronic | Status: ACTIVE | Noted: 2017-08-27

## 2017-08-27 PROBLEM — L03.116 CELLULITIS OF LEFT ANTERIOR LOWER LEG: Status: ACTIVE | Noted: 2017-08-27

## 2017-08-27 PROBLEM — E78.5 DYSLIPIDEMIA: Status: ACTIVE | Noted: 2017-08-27

## 2017-08-27 PROBLEM — E55.9 VITAMIN D DEFICIENCY: Status: ACTIVE | Noted: 2017-08-27

## 2017-08-27 LAB
ALBUMIN SERPL BCP-MCNC: 3.1 G/DL (ref 3.5–5)
ALP SERPL-CCNC: 70 U/L (ref 46–116)
ALT SERPL W P-5'-P-CCNC: 25 U/L (ref 12–78)
AMORPH URATE CRY URNS QL MICRO: ABNORMAL /HPF
ANION GAP SERPL CALCULATED.3IONS-SCNC: 6 MMOL/L (ref 4–13)
APTT PPP: 23 SECONDS (ref 24–33)
ARTERIAL PATENCY WRIST A: YES
AST SERPL W P-5'-P-CCNC: 32 U/L (ref 5–45)
BACTERIA UR QL AUTO: ABNORMAL /HPF
BASE EXCESS BLDA CALC-SCNC: -2.3 MMOL/L
BASOPHILS # BLD AUTO: 0 THOUSANDS/ΜL (ref 0–0.1)
BASOPHILS NFR BLD AUTO: 0 % (ref 0–1)
BILIRUB SERPL-MCNC: 0.7 MG/DL (ref 0.2–1)
BILIRUB UR QL STRIP: ABNORMAL
BODY TEMPERATURE: 95.6 DEGREES FEHRENHEIT
BUN SERPL-MCNC: 39 MG/DL (ref 5–25)
CALCIUM SERPL-MCNC: 9.1 MG/DL (ref 8.3–10.1)
CHLORIDE SERPL-SCNC: 100 MMOL/L (ref 100–108)
CLARITY UR: ABNORMAL
CO2 SERPL-SCNC: 28 MMOL/L (ref 21–32)
COLOR UR: YELLOW
CREAT SERPL-MCNC: 1.65 MG/DL (ref 0.6–1.3)
EOSINOPHIL # BLD AUTO: 0 THOUSAND/ΜL (ref 0–0.61)
EOSINOPHIL NFR BLD AUTO: 0 % (ref 0–6)
ERYTHROCYTE [DISTWIDTH] IN BLOOD BY AUTOMATED COUNT: 14.4 % (ref 11.6–15.1)
GFR SERPL CREATININE-BSD FRML MDRD: 27 ML/MIN/1.73SQ M
GLUCOSE SERPL-MCNC: 123 MG/DL (ref 65–140)
GLUCOSE UR STRIP-MCNC: NEGATIVE MG/DL
HCO3 BLDA-SCNC: 25.3 MMOL/L (ref 22–28)
HCT VFR BLD AUTO: 41.8 % (ref 37–47)
HGB BLD-MCNC: 13.5 G/DL (ref 12–16)
HGB UR QL STRIP.AUTO: ABNORMAL
INR PPP: 1.1 (ref 0.86–1.16)
IPAP: 12
KETONES UR STRIP-MCNC: NEGATIVE MG/DL
LACTATE SERPL-SCNC: 0.9 MMOL/L (ref 0.5–2)
LEUKOCYTE ESTERASE UR QL STRIP: NEGATIVE
LYMPHOCYTES # BLD AUTO: 0.6 THOUSANDS/ΜL (ref 0.6–4.47)
LYMPHOCYTES NFR BLD AUTO: 8 % (ref 14–44)
MAGNESIUM SERPL-MCNC: 2.2 MG/DL (ref 1.6–2.6)
MCH RBC QN AUTO: 30 PG (ref 27–31)
MCHC RBC AUTO-ENTMCNC: 32.3 G/DL (ref 31.4–37.4)
MCV RBC AUTO: 93 FL (ref 82–98)
MONOCYTES # BLD AUTO: 0.5 THOUSAND/ΜL (ref 0.17–1.22)
MONOCYTES NFR BLD AUTO: 6 % (ref 4–12)
NEUTROPHILS # BLD AUTO: 6.9 THOUSANDS/ΜL (ref 1.85–7.62)
NEUTS SEG NFR BLD AUTO: 86 % (ref 43–75)
NITRITE UR QL STRIP: NEGATIVE
NON VENT- BIPAP: ABNORMAL
NON-SQ EPI CELLS URNS QL MICRO: ABNORMAL /HPF
NRBC BLD AUTO-RTO: 0 /100 WBCS
NT-PROBNP SERPL-MCNC: 3571 PG/ML
O2 CT BLDA-SCNC: 17.3 ML/DL (ref 16–23)
OXYHGB MFR BLDA: 92.9 % (ref 94–97)
PCO2 BLDA: 55.5 MM HG (ref 36–44)
PCO2 TEMP ADJ BLDA: 51.5 MM HG (ref 36–44)
PEEP MAX SETTING VENT: 5 CM[H2O]
PH BLD: 7.3 [PH] (ref 7.35–7.45)
PH BLDA: 7.28 [PH] (ref 7.35–7.45)
PH UR STRIP.AUTO: 6 [PH] (ref 5–9)
PLATELET # BLD AUTO: 179 THOUSANDS/UL (ref 130–400)
PLATELET BLD QL SMEAR: ADEQUATE
PMV BLD AUTO: 9.3 FL (ref 8.9–12.7)
PO2 BLD: 73.2 MM HG (ref 75–129)
PO2 BLDA: 81.8 MM HG (ref 75–129)
POTASSIUM SERPL-SCNC: 5.1 MMOL/L (ref 3.5–5.3)
PROT SERPL-MCNC: 9.5 G/DL (ref 6.4–8.2)
PROT UR STRIP-MCNC: >=300 MG/DL
PROTHROMBIN TIME: 11.6 SECONDS (ref 9.4–11.7)
RBC # BLD AUTO: 4.5 MILLION/UL (ref 4.2–5.4)
RBC #/AREA URNS AUTO: ABNORMAL /HPF
SODIUM SERPL-SCNC: 134 MMOL/L (ref 136–145)
SP GR UR STRIP.AUTO: 1.02 (ref 1–1.03)
SPECIMEN SOURCE: ABNORMAL
TROPONIN I SERPL-MCNC: 0.02 NG/ML
TROPONIN I SERPL-MCNC: <0.02 NG/ML
UROBILINOGEN UR QL STRIP.AUTO: 0.2 E.U./DL
VENT BIPAP FIO2: 40 %
WBC # BLD AUTO: 8.1 THOUSAND/UL (ref 4.8–10.8)
WBC #/AREA URNS AUTO: ABNORMAL /HPF

## 2017-08-27 PROCEDURE — 85025 COMPLETE CBC W/AUTO DIFF WBC: CPT | Performed by: EMERGENCY MEDICINE

## 2017-08-27 PROCEDURE — 81001 URINALYSIS AUTO W/SCOPE: CPT | Performed by: EMERGENCY MEDICINE

## 2017-08-27 PROCEDURE — 83735 ASSAY OF MAGNESIUM: CPT | Performed by: EMERGENCY MEDICINE

## 2017-08-27 PROCEDURE — 94660 CPAP INITIATION&MGMT: CPT

## 2017-08-27 PROCEDURE — 84484 ASSAY OF TROPONIN QUANT: CPT | Performed by: EMERGENCY MEDICINE

## 2017-08-27 PROCEDURE — 06HM33Z INSERTION OF INFUSION DEVICE INTO RIGHT FEMORAL VEIN, PERCUTANEOUS APPROACH: ICD-10-PCS | Performed by: EMERGENCY MEDICINE

## 2017-08-27 PROCEDURE — 87081 CULTURE SCREEN ONLY: CPT | Performed by: INTERNAL MEDICINE

## 2017-08-27 PROCEDURE — 85610 PROTHROMBIN TIME: CPT | Performed by: NURSE PRACTITIONER

## 2017-08-27 PROCEDURE — 71010 HB CHEST X-RAY 1 VIEW FRONTAL (PORTABLE): CPT

## 2017-08-27 PROCEDURE — 94760 N-INVAS EAR/PLS OXIMETRY 1: CPT

## 2017-08-27 PROCEDURE — 36415 COLL VENOUS BLD VENIPUNCTURE: CPT | Performed by: EMERGENCY MEDICINE

## 2017-08-27 PROCEDURE — 80053 COMPREHEN METABOLIC PANEL: CPT | Performed by: EMERGENCY MEDICINE

## 2017-08-27 PROCEDURE — 83605 ASSAY OF LACTIC ACID: CPT | Performed by: EMERGENCY MEDICINE

## 2017-08-27 PROCEDURE — 85730 THROMBOPLASTIN TIME PARTIAL: CPT | Performed by: NURSE PRACTITIONER

## 2017-08-27 PROCEDURE — 36600 WITHDRAWAL OF ARTERIAL BLOOD: CPT

## 2017-08-27 PROCEDURE — 87040 BLOOD CULTURE FOR BACTERIA: CPT | Performed by: EMERGENCY MEDICINE

## 2017-08-27 PROCEDURE — 83880 ASSAY OF NATRIURETIC PEPTIDE: CPT | Performed by: EMERGENCY MEDICINE

## 2017-08-27 PROCEDURE — 99285 EMERGENCY DEPT VISIT HI MDM: CPT

## 2017-08-27 PROCEDURE — 84484 ASSAY OF TROPONIN QUANT: CPT | Performed by: NURSE PRACTITIONER

## 2017-08-27 PROCEDURE — 82805 BLOOD GASES W/O2 SATURATION: CPT | Performed by: NURSE PRACTITIONER

## 2017-08-27 PROCEDURE — 93005 ELECTROCARDIOGRAM TRACING: CPT | Performed by: EMERGENCY MEDICINE

## 2017-08-27 RX ORDER — LIDOCAINE HYDROCHLORIDE AND EPINEPHRINE 10; 10 MG/ML; UG/ML
INJECTION, SOLUTION INFILTRATION; PERINEURAL
Status: COMPLETED
Start: 2017-08-27 | End: 2017-08-27

## 2017-08-27 RX ORDER — METHYLPREDNISOLONE SODIUM SUCCINATE 125 MG/2ML
125 INJECTION, POWDER, LYOPHILIZED, FOR SOLUTION INTRAMUSCULAR; INTRAVENOUS ONCE
Status: COMPLETED | OUTPATIENT
Start: 2017-08-27 | End: 2017-08-27

## 2017-08-27 RX ORDER — NITROGLYCERIN 0.4 MG/1
0.4 TABLET SUBLINGUAL ONCE
Status: DISCONTINUED | OUTPATIENT
Start: 2017-08-27 | End: 2017-08-27

## 2017-08-27 RX ORDER — B-COMPLEX WITH VITAMIN C
1 TABLET ORAL 2 TIMES DAILY
Status: DISCONTINUED | OUTPATIENT
Start: 2017-08-27 | End: 2017-09-02 | Stop reason: HOSPADM

## 2017-08-27 RX ORDER — HEPARIN SODIUM 1000 [USP'U]/ML
6400 INJECTION, SOLUTION INTRAVENOUS; SUBCUTANEOUS ONCE
Status: COMPLETED | OUTPATIENT
Start: 2017-08-27 | End: 2017-08-27

## 2017-08-27 RX ORDER — AMLODIPINE BESYLATE 5 MG/1
5 TABLET ORAL 2 TIMES DAILY
Status: DISCONTINUED | OUTPATIENT
Start: 2017-08-27 | End: 2017-09-02 | Stop reason: HOSPADM

## 2017-08-27 RX ORDER — NEBIVOLOL 10 MG/1
10 TABLET ORAL DAILY
Status: DISCONTINUED | OUTPATIENT
Start: 2017-08-28 | End: 2017-08-29

## 2017-08-27 RX ORDER — TORSEMIDE 10 MG/1
5 TABLET ORAL DAILY
Status: DISCONTINUED | OUTPATIENT
Start: 2017-08-28 | End: 2017-08-30

## 2017-08-27 RX ORDER — MELATONIN
2000 DAILY
Status: DISCONTINUED | OUTPATIENT
Start: 2017-08-28 | End: 2017-09-02 | Stop reason: HOSPADM

## 2017-08-27 RX ORDER — DOCUSATE SODIUM 100 MG/1
100 CAPSULE, LIQUID FILLED ORAL 2 TIMES DAILY
COMMUNITY
End: 2020-09-09

## 2017-08-27 RX ORDER — HEPARIN SODIUM 10000 [USP'U]/100ML
3-30 INJECTION, SOLUTION INTRAVENOUS
Status: DISCONTINUED | OUTPATIENT
Start: 2017-08-27 | End: 2017-08-28

## 2017-08-27 RX ORDER — FUROSEMIDE 10 MG/ML
40 INJECTION INTRAMUSCULAR; INTRAVENOUS ONCE
Status: COMPLETED | OUTPATIENT
Start: 2017-08-27 | End: 2017-08-27

## 2017-08-27 RX ORDER — CLOPIDOGREL BISULFATE 75 MG/1
75 TABLET ORAL DAILY
Status: DISCONTINUED | OUTPATIENT
Start: 2017-08-28 | End: 2017-09-02 | Stop reason: HOSPADM

## 2017-08-27 RX ORDER — POLYETHYLENE GLYCOL 3350 17 G/17G
17 POWDER, FOR SOLUTION ORAL DAILY
Status: DISCONTINUED | OUTPATIENT
Start: 2017-08-28 | End: 2017-09-02 | Stop reason: HOSPADM

## 2017-08-27 RX ORDER — PANTOPRAZOLE SODIUM 40 MG/1
40 TABLET, DELAYED RELEASE ORAL DAILY
Status: DISCONTINUED | OUTPATIENT
Start: 2017-08-28 | End: 2017-09-02 | Stop reason: HOSPADM

## 2017-08-27 RX ORDER — LIDOCAINE HYDROCHLORIDE AND EPINEPHRINE 10; 10 MG/ML; UG/ML
1 INJECTION, SOLUTION INFILTRATION; PERINEURAL ONCE
Status: COMPLETED | OUTPATIENT
Start: 2017-08-27 | End: 2017-08-27

## 2017-08-27 RX ORDER — CHOLECALCIFEROL (VITAMIN D3) 125 MCG
200 CAPSULE ORAL DAILY
Status: DISCONTINUED | OUTPATIENT
Start: 2017-08-28 | End: 2017-09-02 | Stop reason: HOSPADM

## 2017-08-27 RX ADMIN — METHYLPREDNISOLONE SODIUM SUCCINATE 125 MG: 125 INJECTION, POWDER, FOR SOLUTION INTRAMUSCULAR; INTRAVENOUS at 18:46

## 2017-08-27 RX ADMIN — LIDOCAINE HYDROCHLORIDE,EPINEPHRINE BITARTRATE 1 ML: 10; .01 INJECTION, SOLUTION INFILTRATION; PERINEURAL at 18:02

## 2017-08-27 RX ADMIN — CEFTRIAXONE 1000 MG: 1 INJECTION, SOLUTION INTRAVENOUS at 18:55

## 2017-08-27 RX ADMIN — AMLODIPINE BESYLATE 5 MG: 5 TABLET ORAL at 20:50

## 2017-08-27 RX ADMIN — CALCIUM CARBONATE 500 MG (1,250 MG)-VITAMIN D3 200 UNIT TABLET 1 TABLET: at 20:50

## 2017-08-27 RX ADMIN — HEPARIN SODIUM AND DEXTROSE 18 UNITS/KG/HR: 10000; 5 INJECTION INTRAVENOUS at 20:56

## 2017-08-27 RX ADMIN — HEPARIN SODIUM 6400 UNITS: 1000 INJECTION, SOLUTION INTRAVENOUS; SUBCUTANEOUS at 20:50

## 2017-08-27 RX ADMIN — FUROSEMIDE 40 MG: 10 INJECTION, SOLUTION INTRAMUSCULAR; INTRAVENOUS at 18:50

## 2017-08-27 RX ADMIN — LIDOCAINE HYDROCHLORIDE AND EPINEPHRINE 1 ML: 10; 10 INJECTION, SOLUTION INFILTRATION; PERINEURAL at 18:02

## 2017-08-28 ENCOUNTER — APPOINTMENT (INPATIENT)
Dept: RADIOLOGY | Facility: HOSPITAL | Age: 82
DRG: 291 | End: 2017-08-28
Payer: MEDICARE

## 2017-08-28 ENCOUNTER — APPOINTMENT (INPATIENT)
Dept: NON INVASIVE DIAGNOSTICS | Facility: HOSPITAL | Age: 82
DRG: 291 | End: 2017-08-28
Payer: MEDICARE

## 2017-08-28 LAB
ANION GAP SERPL CALCULATED.3IONS-SCNC: 5 MMOL/L (ref 4–13)
ANION GAP SERPL CALCULATED.3IONS-SCNC: 7 MMOL/L (ref 4–13)
APTT PPP: 136 SECONDS (ref 24–33)
APTT PPP: 146 SECONDS (ref 23–35)
ARTERIAL PATENCY WRIST A: YES
BACTERIA UR QL AUTO: ABNORMAL /HPF
BASE EXCESS BLDA CALC-SCNC: -2.1 MMOL/L
BASE EXCESS BLDA CALC-SCNC: -2.6 MMOL/L
BASE EXCESS BLDA CALC-SCNC: -4.8 MMOL/L
BASOPHILS # BLD AUTO: 0 THOUSANDS/ΜL (ref 0–0.1)
BASOPHILS NFR BLD AUTO: 0 % (ref 0–1)
BILIRUB UR QL STRIP: NEGATIVE
BODY TEMPERATURE: 97.3 DEGREES FEHRENHEIT
BODY TEMPERATURE: 97.7 DEGREES FEHRENHEIT
BODY TEMPERATURE: 97.8 DEGREES FEHRENHEIT
BUN SERPL-MCNC: 44 MG/DL (ref 5–25)
BUN SERPL-MCNC: 50 MG/DL (ref 5–25)
CALCIUM SERPL-MCNC: 8.1 MG/DL (ref 8.3–10.1)
CALCIUM SERPL-MCNC: 8.4 MG/DL (ref 8.3–10.1)
CHLORIDE SERPL-SCNC: 103 MMOL/L (ref 100–108)
CHLORIDE SERPL-SCNC: 105 MMOL/L (ref 100–108)
CLARITY UR: CLEAR
CO2 SERPL-SCNC: 28 MMOL/L (ref 21–32)
CO2 SERPL-SCNC: 28 MMOL/L (ref 21–32)
COLOR UR: YELLOW
CREAT SERPL-MCNC: 2.25 MG/DL (ref 0.6–1.3)
CREAT SERPL-MCNC: 2.34 MG/DL (ref 0.6–1.3)
CREAT UR-MCNC: 127 MG/DL
EOSINOPHIL # BLD AUTO: 0 THOUSAND/ΜL (ref 0–0.61)
EOSINOPHIL NFR BLD AUTO: 0 % (ref 0–6)
ERYTHROCYTE [DISTWIDTH] IN BLOOD BY AUTOMATED COUNT: 14.6 % (ref 11.6–15.1)
GFR SERPL CREATININE-BSD FRML MDRD: 18 ML/MIN/1.73SQ M
GFR SERPL CREATININE-BSD FRML MDRD: 19 ML/MIN/1.73SQ M
GLUCOSE SERPL-MCNC: 112 MG/DL (ref 65–140)
GLUCOSE SERPL-MCNC: 116 MG/DL (ref 65–140)
GLUCOSE SERPL-MCNC: 126 MG/DL (ref 65–140)
GLUCOSE SERPL-MCNC: 147 MG/DL (ref 65–140)
GLUCOSE SERPL-MCNC: 208 MG/DL (ref 65–140)
GLUCOSE SERPL-MCNC: 214 MG/DL (ref 65–140)
GLUCOSE UR STRIP-MCNC: NEGATIVE MG/DL
HCO3 BLDA-SCNC: 24.1 MMOL/L (ref 22–28)
HCO3 BLDA-SCNC: 24.5 MMOL/L (ref 22–28)
HCO3 BLDA-SCNC: 26.3 MMOL/L (ref 22–28)
HCT VFR BLD AUTO: 38.4 % (ref 37–47)
HGB BLD-MCNC: 12.1 G/DL (ref 12–16)
HGB UR QL STRIP.AUTO: ABNORMAL
HYALINE CASTS #/AREA URNS LPF: ABNORMAL /LPF
IPAP: 14
KETONES UR STRIP-MCNC: NEGATIVE MG/DL
LEUKOCYTE ESTERASE UR QL STRIP: NEGATIVE
LG PLATELETS BLD QL SMEAR: PRESENT
LYMPHOCYTES # BLD AUTO: 0.3 THOUSANDS/ΜL (ref 0.6–4.47)
LYMPHOCYTES NFR BLD AUTO: 5 % (ref 14–44)
MAGNESIUM SERPL-MCNC: 2 MG/DL (ref 1.6–2.6)
MAGNESIUM SERPL-MCNC: 2.1 MG/DL (ref 1.6–2.6)
MCH RBC QN AUTO: 30 PG (ref 27–31)
MCHC RBC AUTO-ENTMCNC: 31.6 G/DL (ref 31.4–37.4)
MCV RBC AUTO: 95 FL (ref 82–98)
MONOCYTES # BLD AUTO: 0.1 THOUSAND/ΜL (ref 0.17–1.22)
MONOCYTES NFR BLD AUTO: 1 % (ref 4–12)
NEUTROPHILS # BLD AUTO: 6.4 THOUSANDS/ΜL (ref 1.85–7.62)
NEUTS SEG NFR BLD AUTO: 94 % (ref 43–75)
NITRITE UR QL STRIP: NEGATIVE
NON VENT- BIPAP: ABNORMAL
NON-SQ EPI CELLS URNS QL MICRO: ABNORMAL /HPF
NRBC BLD AUTO-RTO: 0 /100 WBCS
O2 CT BLDA-SCNC: 16.6 ML/DL (ref 16–23)
O2 CT BLDA-SCNC: 16.8 ML/DL (ref 16–23)
O2 CT BLDA-SCNC: 17.6 ML/DL (ref 16–23)
OXYHGB MFR BLDA: 93.2 % (ref 94–97)
OXYHGB MFR BLDA: 94.7 % (ref 94–97)
OXYHGB MFR BLDA: 95.2 % (ref 94–97)
PCO2 BLDA: 52.3 MM HG (ref 36–44)
PCO2 BLDA: 62 MM HG (ref 36–44)
PCO2 BLDA: 62.9 MM HG (ref 36–44)
PCO2 TEMP ADJ BLDA: 51.4 MM HG (ref 36–44)
PCO2 TEMP ADJ BLDA: 60.7 MM HG (ref 36–44)
PCO2 TEMP ADJ BLDA: 61 MM HG (ref 36–44)
PEEP MAX SETTING VENT: 6 CM[H2O]
PH BLD: 7.21 [PH] (ref 7.35–7.45)
PH BLD: 7.25 [PH] (ref 7.35–7.45)
PH BLD: 7.29 [PH] (ref 7.35–7.45)
PH BLDA: 7.2 [PH] (ref 7.35–7.45)
PH BLDA: 7.25 [PH] (ref 7.35–7.45)
PH BLDA: 7.29 [PH] (ref 7.35–7.45)
PH UR STRIP.AUTO: 5 [PH] (ref 5–9)
PHOSPHATE SERPL-MCNC: 6.4 MG/DL (ref 2.3–4.1)
PHOSPHATE SERPL-MCNC: 6.7 MG/DL (ref 2.3–4.1)
PLATELET # BLD AUTO: 147 THOUSANDS/UL (ref 130–400)
PLATELET BLD QL SMEAR: ADEQUATE
PMV BLD AUTO: 9.1 FL (ref 8.9–12.7)
PO2 BLD: 72.4 MM HG (ref 75–129)
PO2 BLD: 92.7 MM HG (ref 75–129)
PO2 BLD: 95 MM HG (ref 75–129)
PO2 BLDA: 74.4 MM HG (ref 75–129)
PO2 BLDA: 95.6 MM HG (ref 75–129)
PO2 BLDA: 99.1 MM HG (ref 75–129)
POTASSIUM SERPL-SCNC: 4 MMOL/L (ref 3.5–5.3)
POTASSIUM SERPL-SCNC: 4.8 MMOL/L (ref 3.5–5.3)
PROT UR STRIP-MCNC: ABNORMAL MG/DL
RBC # BLD AUTO: 4.05 MILLION/UL (ref 4.2–5.4)
RBC #/AREA URNS AUTO: ABNORMAL /HPF
SODIUM 24H UR-SCNC: 42 MOL/L
SODIUM SERPL-SCNC: 136 MMOL/L (ref 136–145)
SODIUM SERPL-SCNC: 140 MMOL/L (ref 136–145)
SP GR UR STRIP.AUTO: 1.02 (ref 1–1.03)
SPECIMEN SOURCE: ABNORMAL
UROBILINOGEN UR QL STRIP.AUTO: 0.2 E.U./DL
UUN 24H UR-MCNC: 322 MG/DL
VENT BIPAP FIO2: 40 %
WBC # BLD AUTO: 6.8 THOUSAND/UL (ref 4.8–10.8)
WBC #/AREA URNS AUTO: ABNORMAL /HPF

## 2017-08-28 PROCEDURE — 80048 BASIC METABOLIC PNL TOTAL CA: CPT | Performed by: PHYSICIAN ASSISTANT

## 2017-08-28 PROCEDURE — A9539 TC99M PENTETATE: HCPCS

## 2017-08-28 PROCEDURE — A9540 TC99M MAA: HCPCS

## 2017-08-28 PROCEDURE — 85025 COMPLETE CBC W/AUTO DIFF WBC: CPT | Performed by: NURSE PRACTITIONER

## 2017-08-28 PROCEDURE — 84100 ASSAY OF PHOSPHORUS: CPT | Performed by: PHYSICIAN ASSISTANT

## 2017-08-28 PROCEDURE — 82948 REAGENT STRIP/BLOOD GLUCOSE: CPT

## 2017-08-28 PROCEDURE — 84100 ASSAY OF PHOSPHORUS: CPT | Performed by: NURSE PRACTITIONER

## 2017-08-28 PROCEDURE — 94760 N-INVAS EAR/PLS OXIMETRY 1: CPT

## 2017-08-28 PROCEDURE — 93970 EXTREMITY STUDY: CPT

## 2017-08-28 PROCEDURE — 83735 ASSAY OF MAGNESIUM: CPT | Performed by: NURSE PRACTITIONER

## 2017-08-28 PROCEDURE — 84300 ASSAY OF URINE SODIUM: CPT | Performed by: PHYSICIAN ASSISTANT

## 2017-08-28 PROCEDURE — 82570 ASSAY OF URINE CREATININE: CPT | Performed by: PHYSICIAN ASSISTANT

## 2017-08-28 PROCEDURE — 36600 WITHDRAWAL OF ARTERIAL BLOOD: CPT

## 2017-08-28 PROCEDURE — 76770 US EXAM ABDO BACK WALL COMP: CPT

## 2017-08-28 PROCEDURE — 82805 BLOOD GASES W/O2 SATURATION: CPT | Performed by: PHYSICIAN ASSISTANT

## 2017-08-28 PROCEDURE — 78582 LUNG VENTILAT&PERFUS IMAGING: CPT

## 2017-08-28 PROCEDURE — 85730 THROMBOPLASTIN TIME PARTIAL: CPT | Performed by: INTERNAL MEDICINE

## 2017-08-28 PROCEDURE — 82805 BLOOD GASES W/O2 SATURATION: CPT | Performed by: FAMILY MEDICINE

## 2017-08-28 PROCEDURE — 83735 ASSAY OF MAGNESIUM: CPT | Performed by: PHYSICIAN ASSISTANT

## 2017-08-28 PROCEDURE — 80048 BASIC METABOLIC PNL TOTAL CA: CPT | Performed by: NURSE PRACTITIONER

## 2017-08-28 PROCEDURE — 81001 URINALYSIS AUTO W/SCOPE: CPT | Performed by: PHYSICIAN ASSISTANT

## 2017-08-28 PROCEDURE — 84540 ASSAY OF URINE/UREA-N: CPT | Performed by: PHYSICIAN ASSISTANT

## 2017-08-28 PROCEDURE — 71010 HB CHEST X-RAY 1 VIEW FRONTAL (PORTABLE): CPT

## 2017-08-28 PROCEDURE — 94660 CPAP INITIATION&MGMT: CPT

## 2017-08-28 PROCEDURE — 94640 AIRWAY INHALATION TREATMENT: CPT

## 2017-08-28 PROCEDURE — 93306 TTE W/DOPPLER COMPLETE: CPT

## 2017-08-28 RX ORDER — FUROSEMIDE 10 MG/ML
60 INJECTION INTRAMUSCULAR; INTRAVENOUS ONCE
Status: COMPLETED | OUTPATIENT
Start: 2017-08-28 | End: 2017-08-28

## 2017-08-28 RX ORDER — NYSTATIN 100000 [USP'U]/G
1 POWDER TOPICAL 2 TIMES DAILY
Status: DISCONTINUED | OUTPATIENT
Start: 2017-08-28 | End: 2017-09-02 | Stop reason: HOSPADM

## 2017-08-28 RX ORDER — METOLAZONE 5 MG/1
2.5 TABLET ORAL DAILY
Status: DISCONTINUED | OUTPATIENT
Start: 2017-08-28 | End: 2017-08-29

## 2017-08-28 RX ORDER — ALBUTEROL SULFATE 2.5 MG/3ML
2.5 SOLUTION RESPIRATORY (INHALATION) EVERY 4 HOURS PRN
Status: DISCONTINUED | OUTPATIENT
Start: 2017-08-28 | End: 2017-09-02 | Stop reason: HOSPADM

## 2017-08-28 RX ORDER — FUROSEMIDE 10 MG/ML
10 SYRINGE (ML) INJECTION CONTINUOUS
Status: DISCONTINUED | OUTPATIENT
Start: 2017-08-28 | End: 2017-08-29

## 2017-08-28 RX ORDER — HEPARIN SODIUM 5000 [USP'U]/ML
5000 INJECTION, SOLUTION INTRAVENOUS; SUBCUTANEOUS EVERY 8 HOURS SCHEDULED
Status: DISCONTINUED | OUTPATIENT
Start: 2017-08-28 | End: 2017-09-02 | Stop reason: HOSPADM

## 2017-08-28 RX ORDER — FUROSEMIDE 10 MG/ML
10 SYRINGE (ML) INJECTION CONTINUOUS
Status: DISCONTINUED | OUTPATIENT
Start: 2017-08-28 | End: 2017-08-28

## 2017-08-28 RX ADMIN — ALBUTEROL SULFATE 2.5 MG: 2.5 SOLUTION RESPIRATORY (INHALATION) at 09:34

## 2017-08-28 RX ADMIN — CEFAZOLIN SODIUM 1000 MG: 1 SOLUTION INTRAVENOUS at 14:03

## 2017-08-28 RX ADMIN — NYSTATIN 1 APPLICATION: 100000 POWDER TOPICAL at 23:00

## 2017-08-28 RX ADMIN — FUROSEMIDE 60 MG: 10 INJECTION, SOLUTION INTRAMUSCULAR; INTRAVENOUS at 05:08

## 2017-08-28 RX ADMIN — CEFAZOLIN SODIUM 1000 MG: 1 SOLUTION INTRAVENOUS at 23:44

## 2017-08-28 RX ADMIN — Medication 10 MG/HR: at 11:17

## 2017-08-28 RX ADMIN — CEFAZOLIN SODIUM 1000 MG: 1 SOLUTION INTRAVENOUS at 00:04

## 2017-08-28 RX ADMIN — HEPARIN SODIUM AND DEXTROSE 16 UNITS/KG/HR: 10000; 5 INJECTION INTRAVENOUS at 15:55

## 2017-08-28 RX ADMIN — HEPARIN SODIUM AND DEXTROSE 19 UNITS/KG/HR: 10000; 5 INJECTION INTRAVENOUS at 11:31

## 2017-08-28 RX ADMIN — HEPARIN SODIUM 5000 UNITS: 5000 INJECTION, SOLUTION INTRAVENOUS; SUBCUTANEOUS at 21:36

## 2017-08-29 ENCOUNTER — APPOINTMENT (INPATIENT)
Dept: RADIOLOGY | Facility: HOSPITAL | Age: 82
DRG: 291 | End: 2017-08-29
Payer: MEDICARE

## 2017-08-29 LAB
ANION GAP SERPL CALCULATED.3IONS-SCNC: 7 MMOL/L (ref 4–13)
ARTERIAL PATENCY WRIST A: YES
ATRIAL RATE: 59 BPM
BASE EXCESS BLDA CALC-SCNC: -1.5 MMOL/L
BASOPHILS # BLD AUTO: 0 THOUSANDS/ΜL (ref 0–0.1)
BASOPHILS NFR BLD AUTO: 0 % (ref 0–1)
BODY TEMPERATURE: 98.5 DEGREES FEHRENHEIT
BUN SERPL-MCNC: 58 MG/DL (ref 5–25)
CALCIUM SERPL-MCNC: 7.9 MG/DL (ref 8.3–10.1)
CHLORIDE SERPL-SCNC: 104 MMOL/L (ref 100–108)
CO2 SERPL-SCNC: 29 MMOL/L (ref 21–32)
CREAT SERPL-MCNC: 2.69 MG/DL (ref 0.6–1.3)
EOSINOPHIL # BLD AUTO: 0 THOUSAND/ΜL (ref 0–0.61)
EOSINOPHIL NFR BLD AUTO: 0 % (ref 0–6)
ERYTHROCYTE [DISTWIDTH] IN BLOOD BY AUTOMATED COUNT: 14.3 % (ref 11.6–15.1)
GFR SERPL CREATININE-BSD FRML MDRD: 15 ML/MIN/1.73SQ M
GLUCOSE SERPL-MCNC: 92 MG/DL (ref 65–140)
HCO3 BLDA-SCNC: 24 MMOL/L (ref 22–28)
HCT VFR BLD AUTO: 34.5 % (ref 37–47)
HGB BLD-MCNC: 11 G/DL (ref 12–16)
IPAP: 14
LYMPHOCYTES # BLD AUTO: 0.6 THOUSANDS/ΜL (ref 0.6–4.47)
LYMPHOCYTES NFR BLD AUTO: 9 % (ref 14–44)
MAGNESIUM SERPL-MCNC: 2.1 MG/DL (ref 1.6–2.6)
MCH RBC QN AUTO: 30 PG (ref 27–31)
MCHC RBC AUTO-ENTMCNC: 31.9 G/DL (ref 31.4–37.4)
MCV RBC AUTO: 94 FL (ref 82–98)
MONOCYTES # BLD AUTO: 0.7 THOUSAND/ΜL (ref 0.17–1.22)
MONOCYTES NFR BLD AUTO: 10 % (ref 4–12)
MRSA NOSE QL CULT: NORMAL
NEUTROPHILS # BLD AUTO: 5.4 THOUSANDS/ΜL (ref 1.85–7.62)
NEUTS SEG NFR BLD AUTO: 81 % (ref 43–75)
NON VENT- BIPAP: NORMAL
O2 CT BLDA-SCNC: 16 ML/DL (ref 16–23)
OXYHGB MFR BLDA: 96.9 % (ref 94–97)
P AXIS: 24 DEGREES
PCO2 BLDA: 43.8 MM HG (ref 36–44)
PCO2 TEMP ADJ BLDA: 43.6 MM HG (ref 36–44)
PEEP MAX SETTING VENT: 6 CM[H2O]
PH BLD: 7.36 [PH] (ref 7.35–7.45)
PH BLDA: 7.36 [PH] (ref 7.35–7.45)
PHOSPHATE SERPL-MCNC: 6.4 MG/DL (ref 2.3–4.1)
PLATELET # BLD AUTO: 155 THOUSANDS/UL (ref 130–400)
PMV BLD AUTO: 9.3 FL (ref 8.9–12.7)
PO2 BLD: 110.3 MM HG (ref 75–129)
PO2 BLDA: 110.9 MM HG (ref 75–129)
POTASSIUM SERPL-SCNC: 4 MMOL/L (ref 3.5–5.3)
PR INTERVAL: 160 MS
QRS AXIS: -17 DEGREES
QRSD INTERVAL: 142 MS
QT INTERVAL: 504 MS
QTC INTERVAL: 498 MS
RBC # BLD AUTO: 3.67 MILLION/UL (ref 4.2–5.4)
SODIUM SERPL-SCNC: 140 MMOL/L (ref 136–145)
SPECIMEN SOURCE: NORMAL
T WAVE AXIS: -17 DEGREES
VENT BIPAP FIO2: 40 %
VENTRICULAR RATE: 59 BPM
WBC # BLD AUTO: 6.7 THOUSAND/UL (ref 4.8–10.8)

## 2017-08-29 PROCEDURE — 84100 ASSAY OF PHOSPHORUS: CPT | Performed by: PHYSICIAN ASSISTANT

## 2017-08-29 PROCEDURE — 94760 N-INVAS EAR/PLS OXIMETRY 1: CPT

## 2017-08-29 PROCEDURE — 36600 WITHDRAWAL OF ARTERIAL BLOOD: CPT

## 2017-08-29 PROCEDURE — 85025 COMPLETE CBC W/AUTO DIFF WBC: CPT | Performed by: PHYSICIAN ASSISTANT

## 2017-08-29 PROCEDURE — 82805 BLOOD GASES W/O2 SATURATION: CPT | Performed by: PHYSICIAN ASSISTANT

## 2017-08-29 PROCEDURE — 83735 ASSAY OF MAGNESIUM: CPT | Performed by: PHYSICIAN ASSISTANT

## 2017-08-29 PROCEDURE — 71010 HB CHEST X-RAY 1 VIEW FRONTAL (PORTABLE): CPT

## 2017-08-29 PROCEDURE — 80048 BASIC METABOLIC PNL TOTAL CA: CPT | Performed by: PHYSICIAN ASSISTANT

## 2017-08-29 RX ORDER — NEBIVOLOL 5 MG/1
5 TABLET ORAL DAILY
Status: DISCONTINUED | OUTPATIENT
Start: 2017-08-30 | End: 2017-09-02 | Stop reason: HOSPADM

## 2017-08-29 RX ORDER — CEPHALEXIN 250 MG/1
500 CAPSULE ORAL EVERY 6 HOURS SCHEDULED
Status: DISCONTINUED | OUTPATIENT
Start: 2017-08-29 | End: 2017-08-29 | Stop reason: DRUGHIGH

## 2017-08-29 RX ORDER — CEPHALEXIN 250 MG/1
250 CAPSULE ORAL EVERY 24 HOURS
Status: DISCONTINUED | OUTPATIENT
Start: 2017-08-29 | End: 2017-09-01

## 2017-08-29 RX ADMIN — HEPARIN SODIUM 5000 UNITS: 5000 INJECTION, SOLUTION INTRAVENOUS; SUBCUTANEOUS at 21:07

## 2017-08-29 RX ADMIN — NEBIVOLOL HYDROCHLORIDE 10 MG: 10 TABLET ORAL at 08:40

## 2017-08-29 RX ADMIN — NYSTATIN 1 APPLICATION: 100000 POWDER TOPICAL at 08:41

## 2017-08-29 RX ADMIN — POLYETHYLENE GLYCOL 3350 17 G: 17 POWDER, FOR SOLUTION ORAL at 08:40

## 2017-08-29 RX ADMIN — CALCIUM CARBONATE 500 MG (1,250 MG)-VITAMIN D3 200 UNIT TABLET 1 TABLET: at 08:39

## 2017-08-29 RX ADMIN — NYSTATIN 1 APPLICATION: 100000 POWDER TOPICAL at 21:06

## 2017-08-29 RX ADMIN — CALCIUM CARBONATE 500 MG (1,250 MG)-VITAMIN D3 200 UNIT TABLET 1 TABLET: at 18:00

## 2017-08-29 RX ADMIN — CLOPIDOGREL BISULFATE 75 MG: 75 TABLET ORAL at 08:38

## 2017-08-29 RX ADMIN — HEPARIN SODIUM 5000 UNITS: 5000 INJECTION, SOLUTION INTRAVENOUS; SUBCUTANEOUS at 14:45

## 2017-08-29 RX ADMIN — Medication 200 MG: at 08:40

## 2017-08-29 RX ADMIN — CEPHALEXIN 250 MG: 250 CAPSULE ORAL at 14:00

## 2017-08-29 RX ADMIN — AMLODIPINE BESYLATE 5 MG: 5 TABLET ORAL at 18:00

## 2017-08-29 RX ADMIN — Medication 10 MG/HR: at 05:23

## 2017-08-29 RX ADMIN — TORSEMIDE 5 MG: 10 TABLET ORAL at 08:38

## 2017-08-29 RX ADMIN — METOLAZONE 2.5 MG: 5 TABLET ORAL at 08:39

## 2017-08-29 RX ADMIN — CHOLECALCIFEROL TAB 25 MCG (1000 UNIT) 2000 UNITS: 25 TAB at 08:39

## 2017-08-29 RX ADMIN — HEPARIN SODIUM 5000 UNITS: 5000 INJECTION, SOLUTION INTRAVENOUS; SUBCUTANEOUS at 05:21

## 2017-08-29 RX ADMIN — PANTOPRAZOLE SODIUM 40 MG: 40 TABLET, DELAYED RELEASE ORAL at 08:39

## 2017-08-29 RX ADMIN — AMLODIPINE BESYLATE 5 MG: 5 TABLET ORAL at 08:38

## 2017-08-30 ENCOUNTER — APPOINTMENT (INPATIENT)
Dept: RADIOLOGY | Facility: HOSPITAL | Age: 82
DRG: 291 | End: 2017-08-30
Payer: MEDICARE

## 2017-08-30 LAB
ANION GAP SERPL CALCULATED.3IONS-SCNC: 5 MMOL/L (ref 4–13)
BASOPHILS # BLD AUTO: 0 THOUSANDS/ΜL (ref 0–0.1)
BASOPHILS NFR BLD AUTO: 0 % (ref 0–1)
BUN SERPL-MCNC: 70 MG/DL (ref 5–25)
CALCIUM SERPL-MCNC: 7.7 MG/DL (ref 8.3–10.1)
CHLORIDE SERPL-SCNC: 101 MMOL/L (ref 100–108)
CO2 SERPL-SCNC: 33 MMOL/L (ref 21–32)
CREAT SERPL-MCNC: 2.46 MG/DL (ref 0.6–1.3)
EOSINOPHIL # BLD AUTO: 0.1 THOUSAND/ΜL (ref 0–0.61)
EOSINOPHIL NFR BLD AUTO: 2 % (ref 0–6)
ERYTHROCYTE [DISTWIDTH] IN BLOOD BY AUTOMATED COUNT: 14.1 % (ref 11.6–15.1)
GFR SERPL CREATININE-BSD FRML MDRD: 17 ML/MIN/1.73SQ M
GLUCOSE SERPL-MCNC: 115 MG/DL (ref 65–140)
HCT VFR BLD AUTO: 34.2 % (ref 37–47)
HGB BLD-MCNC: 10.9 G/DL (ref 12–16)
LYMPHOCYTES # BLD AUTO: 0.8 THOUSANDS/ΜL (ref 0.6–4.47)
LYMPHOCYTES NFR BLD AUTO: 15 % (ref 14–44)
MAGNESIUM SERPL-MCNC: 2.2 MG/DL (ref 1.6–2.6)
MCH RBC QN AUTO: 29.9 PG (ref 27–31)
MCHC RBC AUTO-ENTMCNC: 32 G/DL (ref 31.4–37.4)
MCV RBC AUTO: 93 FL (ref 82–98)
MONOCYTES # BLD AUTO: 0.5 THOUSAND/ΜL (ref 0.17–1.22)
MONOCYTES NFR BLD AUTO: 10 % (ref 4–12)
NEUTROPHILS # BLD AUTO: 4 THOUSANDS/ΜL (ref 1.85–7.62)
NEUTS SEG NFR BLD AUTO: 73 % (ref 43–75)
NRBC BLD AUTO-RTO: 0 /100 WBCS
PHOSPHATE SERPL-MCNC: 5.8 MG/DL (ref 2.3–4.1)
PLATELET # BLD AUTO: 149 THOUSANDS/UL (ref 130–400)
PMV BLD AUTO: 9.3 FL (ref 8.9–12.7)
POTASSIUM SERPL-SCNC: 3.7 MMOL/L (ref 3.5–5.3)
RBC # BLD AUTO: 3.67 MILLION/UL (ref 4.2–5.4)
SODIUM SERPL-SCNC: 139 MMOL/L (ref 136–145)
WBC # BLD AUTO: 5.5 THOUSAND/UL (ref 4.8–10.8)

## 2017-08-30 PROCEDURE — 80048 BASIC METABOLIC PNL TOTAL CA: CPT | Performed by: PHYSICIAN ASSISTANT

## 2017-08-30 PROCEDURE — 94660 CPAP INITIATION&MGMT: CPT

## 2017-08-30 PROCEDURE — G8979 MOBILITY GOAL STATUS: HCPCS

## 2017-08-30 PROCEDURE — 71010 HB CHEST X-RAY 1 VIEW FRONTAL (PORTABLE): CPT

## 2017-08-30 PROCEDURE — 94760 N-INVAS EAR/PLS OXIMETRY 1: CPT

## 2017-08-30 PROCEDURE — 84100 ASSAY OF PHOSPHORUS: CPT | Performed by: PHYSICIAN ASSISTANT

## 2017-08-30 PROCEDURE — 83735 ASSAY OF MAGNESIUM: CPT | Performed by: PHYSICIAN ASSISTANT

## 2017-08-30 PROCEDURE — 85025 COMPLETE CBC W/AUTO DIFF WBC: CPT | Performed by: PHYSICIAN ASSISTANT

## 2017-08-30 PROCEDURE — 97163 PT EVAL HIGH COMPLEX 45 MIN: CPT

## 2017-08-30 PROCEDURE — G8978 MOBILITY CURRENT STATUS: HCPCS

## 2017-08-30 RX ORDER — TORSEMIDE 10 MG/1
10 TABLET ORAL DAILY
Status: DISCONTINUED | OUTPATIENT
Start: 2017-08-30 | End: 2017-09-02 | Stop reason: HOSPADM

## 2017-08-30 RX ADMIN — CLOPIDOGREL BISULFATE 75 MG: 75 TABLET ORAL at 09:12

## 2017-08-30 RX ADMIN — HEPARIN SODIUM 5000 UNITS: 5000 INJECTION, SOLUTION INTRAVENOUS; SUBCUTANEOUS at 14:03

## 2017-08-30 RX ADMIN — PANTOPRAZOLE SODIUM 40 MG: 40 TABLET, DELAYED RELEASE ORAL at 09:12

## 2017-08-30 RX ADMIN — NYSTATIN 1 APPLICATION: 100000 POWDER TOPICAL at 09:14

## 2017-08-30 RX ADMIN — TORSEMIDE 10 MG: 10 TABLET ORAL at 11:07

## 2017-08-30 RX ADMIN — CHOLECALCIFEROL TAB 25 MCG (1000 UNIT) 2000 UNITS: 25 TAB at 09:14

## 2017-08-30 RX ADMIN — Medication 200 MG: at 09:13

## 2017-08-30 RX ADMIN — NEBIVOLOL HYDROCHLORIDE 5 MG: 5 TABLET ORAL at 09:13

## 2017-08-30 RX ADMIN — AMLODIPINE BESYLATE 5 MG: 5 TABLET ORAL at 09:13

## 2017-08-30 RX ADMIN — HEPARIN SODIUM 5000 UNITS: 5000 INJECTION, SOLUTION INTRAVENOUS; SUBCUTANEOUS at 21:40

## 2017-08-30 RX ADMIN — CEPHALEXIN 250 MG: 250 CAPSULE ORAL at 14:00

## 2017-08-30 RX ADMIN — CALCIUM CARBONATE 500 MG (1,250 MG)-VITAMIN D3 200 UNIT TABLET 1 TABLET: at 09:12

## 2017-08-30 RX ADMIN — POLYETHYLENE GLYCOL 3350 17 G: 17 POWDER, FOR SOLUTION ORAL at 09:15

## 2017-08-30 RX ADMIN — CALCIUM CARBONATE 500 MG (1,250 MG)-VITAMIN D3 200 UNIT TABLET 1 TABLET: at 18:47

## 2017-08-30 RX ADMIN — AMLODIPINE BESYLATE 5 MG: 5 TABLET ORAL at 18:47

## 2017-08-30 RX ADMIN — HEPARIN SODIUM 5000 UNITS: 5000 INJECTION, SOLUTION INTRAVENOUS; SUBCUTANEOUS at 05:07

## 2017-08-31 ENCOUNTER — APPOINTMENT (INPATIENT)
Dept: RADIOLOGY | Facility: HOSPITAL | Age: 82
DRG: 291 | End: 2017-08-31
Payer: MEDICARE

## 2017-08-31 LAB
ANION GAP SERPL CALCULATED.3IONS-SCNC: 5 MMOL/L (ref 4–13)
BASOPHILS # BLD AUTO: 0 THOUSANDS/ΜL (ref 0–0.1)
BASOPHILS NFR BLD AUTO: 0 % (ref 0–1)
BUN SERPL-MCNC: 76 MG/DL (ref 5–25)
CALCIUM SERPL-MCNC: 8.8 MG/DL (ref 8.3–10.1)
CHLORIDE SERPL-SCNC: 101 MMOL/L (ref 100–108)
CO2 SERPL-SCNC: 36 MMOL/L (ref 21–32)
CREAT SERPL-MCNC: 2.3 MG/DL (ref 0.6–1.3)
EOSINOPHIL # BLD AUTO: 0.1 THOUSAND/ΜL (ref 0–0.61)
EOSINOPHIL NFR BLD AUTO: 2 % (ref 0–6)
ERYTHROCYTE [DISTWIDTH] IN BLOOD BY AUTOMATED COUNT: 14.1 % (ref 11.6–15.1)
GFR SERPL CREATININE-BSD FRML MDRD: 18 ML/MIN/1.73SQ M
GLUCOSE SERPL-MCNC: 125 MG/DL (ref 65–140)
HCT VFR BLD AUTO: 38.9 % (ref 37–47)
HGB BLD-MCNC: 12.8 G/DL (ref 12–16)
LYMPHOCYTES # BLD AUTO: 0.9 THOUSANDS/ΜL (ref 0.6–4.47)
LYMPHOCYTES NFR BLD AUTO: 15 % (ref 14–44)
MAGNESIUM SERPL-MCNC: 2.4 MG/DL (ref 1.6–2.6)
MCH RBC QN AUTO: 30.4 PG (ref 27–31)
MCHC RBC AUTO-ENTMCNC: 32.8 G/DL (ref 31.4–37.4)
MCV RBC AUTO: 93 FL (ref 82–98)
MONOCYTES # BLD AUTO: 0.6 THOUSAND/ΜL (ref 0.17–1.22)
MONOCYTES NFR BLD AUTO: 10 % (ref 4–12)
NEUTROPHILS # BLD AUTO: 4.3 THOUSANDS/ΜL (ref 1.85–7.62)
NEUTS SEG NFR BLD AUTO: 73 % (ref 43–75)
NRBC BLD AUTO-RTO: 0 /100 WBCS
PHOSPHATE SERPL-MCNC: 4.5 MG/DL (ref 2.3–4.1)
PLATELET # BLD AUTO: 163 THOUSANDS/UL (ref 130–400)
PMV BLD AUTO: 9.1 FL (ref 8.9–12.7)
POTASSIUM SERPL-SCNC: 3.6 MMOL/L (ref 3.5–5.3)
RBC # BLD AUTO: 4.2 MILLION/UL (ref 4.2–5.4)
SODIUM SERPL-SCNC: 142 MMOL/L (ref 136–145)
WBC # BLD AUTO: 6 THOUSAND/UL (ref 4.8–10.8)

## 2017-08-31 PROCEDURE — 84100 ASSAY OF PHOSPHORUS: CPT | Performed by: PHYSICIAN ASSISTANT

## 2017-08-31 PROCEDURE — 80048 BASIC METABOLIC PNL TOTAL CA: CPT | Performed by: PHYSICIAN ASSISTANT

## 2017-08-31 PROCEDURE — 71010 HB CHEST X-RAY 1 VIEW FRONTAL (PORTABLE): CPT

## 2017-08-31 PROCEDURE — 94760 N-INVAS EAR/PLS OXIMETRY 1: CPT

## 2017-08-31 PROCEDURE — 83735 ASSAY OF MAGNESIUM: CPT | Performed by: PHYSICIAN ASSISTANT

## 2017-08-31 PROCEDURE — 85025 COMPLETE CBC W/AUTO DIFF WBC: CPT | Performed by: PHYSICIAN ASSISTANT

## 2017-08-31 PROCEDURE — 94660 CPAP INITIATION&MGMT: CPT

## 2017-08-31 RX ADMIN — CEPHALEXIN 250 MG: 250 CAPSULE ORAL at 14:49

## 2017-08-31 RX ADMIN — TORSEMIDE 10 MG: 10 TABLET ORAL at 08:49

## 2017-08-31 RX ADMIN — CLOPIDOGREL BISULFATE 75 MG: 75 TABLET ORAL at 08:49

## 2017-08-31 RX ADMIN — CALCIUM CARBONATE 500 MG (1,250 MG)-VITAMIN D3 200 UNIT TABLET 1 TABLET: at 08:49

## 2017-08-31 RX ADMIN — NYSTATIN 1 APPLICATION: 100000 POWDER TOPICAL at 22:24

## 2017-08-31 RX ADMIN — Medication 200 MG: at 08:50

## 2017-08-31 RX ADMIN — PANTOPRAZOLE SODIUM 40 MG: 40 TABLET, DELAYED RELEASE ORAL at 05:46

## 2017-08-31 RX ADMIN — AMLODIPINE BESYLATE 5 MG: 5 TABLET ORAL at 18:36

## 2017-08-31 RX ADMIN — NEBIVOLOL HYDROCHLORIDE 5 MG: 5 TABLET ORAL at 08:50

## 2017-08-31 RX ADMIN — HEPARIN SODIUM 5000 UNITS: 5000 INJECTION, SOLUTION INTRAVENOUS; SUBCUTANEOUS at 14:49

## 2017-08-31 RX ADMIN — CHOLECALCIFEROL TAB 25 MCG (1000 UNIT) 2000 UNITS: 25 TAB at 08:49

## 2017-08-31 RX ADMIN — HEPARIN SODIUM 5000 UNITS: 5000 INJECTION, SOLUTION INTRAVENOUS; SUBCUTANEOUS at 05:46

## 2017-08-31 RX ADMIN — AMLODIPINE BESYLATE 5 MG: 5 TABLET ORAL at 08:49

## 2017-08-31 RX ADMIN — NYSTATIN 1 APPLICATION: 100000 POWDER TOPICAL at 08:50

## 2017-08-31 RX ADMIN — HEPARIN SODIUM 5000 UNITS: 5000 INJECTION, SOLUTION INTRAVENOUS; SUBCUTANEOUS at 22:24

## 2017-08-31 RX ADMIN — CALCIUM CARBONATE 500 MG (1,250 MG)-VITAMIN D3 200 UNIT TABLET 1 TABLET: at 18:37

## 2017-09-01 ENCOUNTER — APPOINTMENT (INPATIENT)
Dept: OCCUPATIONAL THERAPY | Facility: HOSPITAL | Age: 82
DRG: 291 | End: 2017-09-01
Payer: MEDICARE

## 2017-09-01 PROBLEM — E83.39 HYPERPHOSPHATEMIA: Status: ACTIVE | Noted: 2017-09-01

## 2017-09-01 PROBLEM — E87.2 RESPIRATORY ACIDOSIS: Status: ACTIVE | Noted: 2017-09-01

## 2017-09-01 LAB
ALBUMIN SERPL BCP-MCNC: 2.6 G/DL (ref 3.5–5)
ALP SERPL-CCNC: 56 U/L (ref 46–116)
ALT SERPL W P-5'-P-CCNC: 15 U/L (ref 12–78)
ANION GAP SERPL CALCULATED.3IONS-SCNC: 2 MMOL/L (ref 4–13)
AST SERPL W P-5'-P-CCNC: 21 U/L (ref 5–45)
BACTERIA BLD CULT: NORMAL
BACTERIA BLD CULT: NORMAL
BASOPHILS # BLD AUTO: 0 THOUSANDS/ΜL (ref 0–0.1)
BASOPHILS NFR BLD AUTO: 0 % (ref 0–1)
BILIRUB SERPL-MCNC: 0.3 MG/DL (ref 0.2–1)
BUN SERPL-MCNC: 75 MG/DL (ref 5–25)
CALCIUM SERPL-MCNC: 8.7 MG/DL (ref 8.3–10.1)
CHLORIDE SERPL-SCNC: 101 MMOL/L (ref 100–108)
CO2 SERPL-SCNC: 39 MMOL/L (ref 21–32)
CREAT SERPL-MCNC: 2.07 MG/DL (ref 0.6–1.3)
CREAT UR-MCNC: 64.7 MG/DL
EOSINOPHIL # BLD AUTO: 0.1 THOUSAND/ΜL (ref 0–0.61)
EOSINOPHIL NFR BLD AUTO: 3 % (ref 0–6)
ERYTHROCYTE [DISTWIDTH] IN BLOOD BY AUTOMATED COUNT: 13.6 % (ref 11.6–15.1)
GFR SERPL CREATININE-BSD FRML MDRD: 21 ML/MIN/1.73SQ M
GLUCOSE SERPL-MCNC: 114 MG/DL (ref 65–140)
HCT VFR BLD AUTO: 36.6 % (ref 37–47)
HGB BLD-MCNC: 12 G/DL (ref 12–16)
LYMPHOCYTES # BLD AUTO: 0.8 THOUSANDS/ΜL (ref 0.6–4.47)
LYMPHOCYTES NFR BLD AUTO: 17 % (ref 14–44)
MAGNESIUM SERPL-MCNC: 2.3 MG/DL (ref 1.6–2.6)
MCH RBC QN AUTO: 30.6 PG (ref 27–31)
MCHC RBC AUTO-ENTMCNC: 32.7 G/DL (ref 31.4–37.4)
MCV RBC AUTO: 94 FL (ref 82–98)
MONOCYTES # BLD AUTO: 0.5 THOUSAND/ΜL (ref 0.17–1.22)
MONOCYTES NFR BLD AUTO: 9 % (ref 4–12)
NEUTROPHILS # BLD AUTO: 3.5 THOUSANDS/ΜL (ref 1.85–7.62)
NEUTS SEG NFR BLD AUTO: 71 % (ref 43–75)
NRBC BLD AUTO-RTO: 0 /100 WBCS
PLATELET # BLD AUTO: 144 THOUSANDS/UL (ref 130–400)
PMV BLD AUTO: 9.2 FL (ref 8.9–12.7)
POTASSIUM SERPL-SCNC: 3.4 MMOL/L (ref 3.5–5.3)
PROT SERPL-MCNC: 7.4 G/DL (ref 6.4–8.2)
PROT UR-MCNC: 25 MG/DL
PROT/CREAT UR: 0.39 MG/G{CREAT} (ref 0–0.1)
RBC # BLD AUTO: 3.91 MILLION/UL (ref 4.2–5.4)
SODIUM SERPL-SCNC: 142 MMOL/L (ref 136–145)
WBC # BLD AUTO: 5 THOUSAND/UL (ref 4.8–10.8)

## 2017-09-01 PROCEDURE — G8988 SELF CARE GOAL STATUS: HCPCS

## 2017-09-01 PROCEDURE — 80053 COMPREHEN METABOLIC PANEL: CPT | Performed by: INTERNAL MEDICINE

## 2017-09-01 PROCEDURE — 84156 ASSAY OF PROTEIN URINE: CPT | Performed by: INTERNAL MEDICINE

## 2017-09-01 PROCEDURE — 85025 COMPLETE CBC W/AUTO DIFF WBC: CPT | Performed by: INTERNAL MEDICINE

## 2017-09-01 PROCEDURE — 94660 CPAP INITIATION&MGMT: CPT

## 2017-09-01 PROCEDURE — 97167 OT EVAL HIGH COMPLEX 60 MIN: CPT

## 2017-09-01 PROCEDURE — G8987 SELF CARE CURRENT STATUS: HCPCS

## 2017-09-01 PROCEDURE — 94760 N-INVAS EAR/PLS OXIMETRY 1: CPT

## 2017-09-01 PROCEDURE — 83735 ASSAY OF MAGNESIUM: CPT | Performed by: INTERNAL MEDICINE

## 2017-09-01 PROCEDURE — 82570 ASSAY OF URINE CREATININE: CPT | Performed by: INTERNAL MEDICINE

## 2017-09-01 RX ORDER — POTASSIUM CHLORIDE 20 MEQ/1
40 TABLET, EXTENDED RELEASE ORAL EVERY 8 HOURS
Status: COMPLETED | OUTPATIENT
Start: 2017-09-01 | End: 2017-09-01

## 2017-09-01 RX ORDER — POTASSIUM CHLORIDE 20 MEQ/1
20 TABLET, EXTENDED RELEASE ORAL ONCE
Status: DISCONTINUED | OUTPATIENT
Start: 2017-09-01 | End: 2017-09-01

## 2017-09-01 RX ORDER — CEPHALEXIN 250 MG/1
250 CAPSULE ORAL EVERY 12 HOURS SCHEDULED
Status: DISCONTINUED | OUTPATIENT
Start: 2017-09-02 | End: 2017-09-02 | Stop reason: HOSPADM

## 2017-09-01 RX ADMIN — HEPARIN SODIUM 5000 UNITS: 5000 INJECTION, SOLUTION INTRAVENOUS; SUBCUTANEOUS at 21:41

## 2017-09-01 RX ADMIN — POLYETHYLENE GLYCOL 3350 17 G: 17 POWDER, FOR SOLUTION ORAL at 10:33

## 2017-09-01 RX ADMIN — NYSTATIN 1 APPLICATION: 100000 POWDER TOPICAL at 10:32

## 2017-09-01 RX ADMIN — NEBIVOLOL HYDROCHLORIDE 5 MG: 5 TABLET ORAL at 10:30

## 2017-09-01 RX ADMIN — TORSEMIDE 10 MG: 10 TABLET ORAL at 10:33

## 2017-09-01 RX ADMIN — HEPARIN SODIUM 5000 UNITS: 5000 INJECTION, SOLUTION INTRAVENOUS; SUBCUTANEOUS at 06:25

## 2017-09-01 RX ADMIN — CEPHALEXIN 250 MG: 250 CAPSULE ORAL at 15:28

## 2017-09-01 RX ADMIN — Medication 200 MG: at 10:30

## 2017-09-01 RX ADMIN — AMLODIPINE BESYLATE 5 MG: 5 TABLET ORAL at 10:27

## 2017-09-01 RX ADMIN — NYSTATIN 1 APPLICATION: 100000 POWDER TOPICAL at 21:04

## 2017-09-01 RX ADMIN — PANTOPRAZOLE SODIUM 40 MG: 40 TABLET, DELAYED RELEASE ORAL at 06:25

## 2017-09-01 RX ADMIN — CALCIUM CARBONATE 500 MG (1,250 MG)-VITAMIN D3 200 UNIT TABLET 1 TABLET: at 10:28

## 2017-09-01 RX ADMIN — AMLODIPINE BESYLATE 5 MG: 5 TABLET ORAL at 17:58

## 2017-09-01 RX ADMIN — HEPARIN SODIUM 5000 UNITS: 5000 INJECTION, SOLUTION INTRAVENOUS; SUBCUTANEOUS at 15:29

## 2017-09-01 RX ADMIN — CLOPIDOGREL BISULFATE 75 MG: 75 TABLET ORAL at 10:30

## 2017-09-01 RX ADMIN — POTASSIUM CHLORIDE 40 MEQ: 1500 TABLET, EXTENDED RELEASE ORAL at 15:28

## 2017-09-01 RX ADMIN — CALCIUM CARBONATE 500 MG (1,250 MG)-VITAMIN D3 200 UNIT TABLET 1 TABLET: at 17:57

## 2017-09-01 RX ADMIN — CHOLECALCIFEROL TAB 25 MCG (1000 UNIT) 2000 UNITS: 25 TAB at 10:28

## 2017-09-01 RX ADMIN — POTASSIUM CHLORIDE 40 MEQ: 1500 TABLET, EXTENDED RELEASE ORAL at 21:41

## 2017-09-02 VITALS
HEART RATE: 69 BPM | RESPIRATION RATE: 20 BRPM | DIASTOLIC BLOOD PRESSURE: 73 MMHG | BODY MASS INDEX: 30.73 KG/M2 | WEIGHT: 156.53 LBS | TEMPERATURE: 95.6 F | HEIGHT: 60 IN | SYSTOLIC BLOOD PRESSURE: 151 MMHG | OXYGEN SATURATION: 96 %

## 2017-09-02 LAB
ANION GAP SERPL CALCULATED.3IONS-SCNC: 2 MMOL/L (ref 4–13)
BUN SERPL-MCNC: 80 MG/DL (ref 5–25)
CALCIUM SERPL-MCNC: 9.2 MG/DL (ref 8.3–10.1)
CHLORIDE SERPL-SCNC: 102 MMOL/L (ref 100–108)
CO2 SERPL-SCNC: 34 MMOL/L (ref 21–32)
CREAT SERPL-MCNC: 2.13 MG/DL (ref 0.6–1.3)
GFR SERPL CREATININE-BSD FRML MDRD: 20 ML/MIN/1.73SQ M
GLUCOSE SERPL-MCNC: 126 MG/DL (ref 65–140)
MAGNESIUM SERPL-MCNC: 2.6 MG/DL (ref 1.6–2.6)
PHOSPHATE SERPL-MCNC: 4.1 MG/DL (ref 2.3–4.1)
POTASSIUM SERPL-SCNC: 5.2 MMOL/L (ref 3.5–5.3)
SODIUM SERPL-SCNC: 138 MMOL/L (ref 136–145)

## 2017-09-02 PROCEDURE — 80048 BASIC METABOLIC PNL TOTAL CA: CPT | Performed by: INTERNAL MEDICINE

## 2017-09-02 PROCEDURE — G0009 ADMIN PNEUMOCOCCAL VACCINE: HCPCS | Performed by: INTERNAL MEDICINE

## 2017-09-02 PROCEDURE — 94760 N-INVAS EAR/PLS OXIMETRY 1: CPT

## 2017-09-02 PROCEDURE — 94762 N-INVAS EAR/PLS OXIMTRY CONT: CPT

## 2017-09-02 PROCEDURE — 83735 ASSAY OF MAGNESIUM: CPT | Performed by: INTERNAL MEDICINE

## 2017-09-02 PROCEDURE — 90670 PCV13 VACCINE IM: CPT | Performed by: INTERNAL MEDICINE

## 2017-09-02 PROCEDURE — 84100 ASSAY OF PHOSPHORUS: CPT | Performed by: INTERNAL MEDICINE

## 2017-09-02 RX ORDER — TORSEMIDE 10 MG/1
10 TABLET ORAL DAILY
Qty: 14 TABLET | Refills: 0 | Status: SHIPPED | OUTPATIENT
Start: 2017-09-02 | End: 2018-03-06

## 2017-09-02 RX ORDER — NEBIVOLOL 5 MG/1
5 TABLET ORAL DAILY
Qty: 30 TABLET | Refills: 0 | Status: SHIPPED | OUTPATIENT
Start: 2017-09-02 | End: 2018-03-06

## 2017-09-02 RX ADMIN — CEPHALEXIN 250 MG: 250 CAPSULE ORAL at 02:44

## 2017-09-02 RX ADMIN — AMLODIPINE BESYLATE 5 MG: 5 TABLET ORAL at 09:14

## 2017-09-02 RX ADMIN — CALCIUM CARBONATE 500 MG (1,250 MG)-VITAMIN D3 200 UNIT TABLET 1 TABLET: at 09:14

## 2017-09-02 RX ADMIN — PNEUMOCOCCAL 13-VALENT CONJUGATE VACCINE 0.5 ML: 2.2; 2.2; 2.2; 2.2; 2.2; 4.4; 2.2; 2.2; 2.2; 2.2; 2.2; 2.2; 2.2 INJECTION, SUSPENSION INTRAMUSCULAR at 15:16

## 2017-09-02 RX ADMIN — CLOPIDOGREL BISULFATE 75 MG: 75 TABLET ORAL at 09:15

## 2017-09-02 RX ADMIN — NEBIVOLOL HYDROCHLORIDE 5 MG: 5 TABLET ORAL at 09:15

## 2017-09-02 RX ADMIN — NYSTATIN 1 APPLICATION: 100000 POWDER TOPICAL at 09:15

## 2017-09-02 RX ADMIN — HEPARIN SODIUM 5000 UNITS: 5000 INJECTION, SOLUTION INTRAVENOUS; SUBCUTANEOUS at 06:56

## 2017-09-02 RX ADMIN — CHOLECALCIFEROL TAB 25 MCG (1000 UNIT) 2000 UNITS: 25 TAB at 09:14

## 2017-09-02 RX ADMIN — TORSEMIDE 10 MG: 10 TABLET ORAL at 09:14

## 2017-09-02 RX ADMIN — PANTOPRAZOLE SODIUM 40 MG: 40 TABLET, DELAYED RELEASE ORAL at 06:56

## 2017-09-02 RX ADMIN — Medication 200 MG: at 09:15

## 2017-09-11 ENCOUNTER — APPOINTMENT (OUTPATIENT)
Dept: LAB | Facility: CLINIC | Age: 82
End: 2017-09-11
Payer: MEDICARE

## 2017-09-11 DIAGNOSIS — N18.30 CHRONIC KIDNEY DISEASE, STAGE III (MODERATE) (HCC): ICD-10-CM

## 2017-09-11 DIAGNOSIS — I12.9 HYPERTENSIVE CHRONIC KIDNEY DISEASE WITH STAGE 1 THROUGH STAGE 4 CHRONIC KIDNEY DISEASE, OR UNSPECIFIED CHRONIC KIDNEY DISEASE: ICD-10-CM

## 2017-09-11 DIAGNOSIS — R80.9 PROTEINURIA: ICD-10-CM

## 2017-09-11 DIAGNOSIS — E78.5 HYPERLIPIDEMIA: ICD-10-CM

## 2017-09-11 DIAGNOSIS — E55.9 VITAMIN D DEFICIENCY: ICD-10-CM

## 2017-09-11 LAB
ANION GAP SERPL CALCULATED.3IONS-SCNC: 4 MMOL/L (ref 4–13)
BUN SERPL-MCNC: 41 MG/DL (ref 5–25)
CALCIUM SERPL-MCNC: 9 MG/DL (ref 8.3–10.1)
CHLORIDE SERPL-SCNC: 102 MMOL/L (ref 100–108)
CO2 SERPL-SCNC: 32 MMOL/L (ref 21–32)
CREAT SERPL-MCNC: 2.43 MG/DL (ref 0.6–1.3)
CREAT UR-MCNC: 42.3 MG/DL
GFR SERPL CREATININE-BSD FRML MDRD: 17 ML/MIN/1.73SQ M
GLUCOSE SERPL-MCNC: 106 MG/DL (ref 65–140)
POTASSIUM SERPL-SCNC: 4.6 MMOL/L (ref 3.5–5.3)
PROT UR-MCNC: 21 MG/DL
PROT/CREAT UR: 0.5 MG/G{CREAT} (ref 0–0.1)
SODIUM SERPL-SCNC: 138 MMOL/L (ref 136–145)

## 2017-09-11 PROCEDURE — 80048 BASIC METABOLIC PNL TOTAL CA: CPT

## 2017-09-11 PROCEDURE — 84156 ASSAY OF PROTEIN URINE: CPT

## 2017-09-11 PROCEDURE — 36415 COLL VENOUS BLD VENIPUNCTURE: CPT

## 2017-09-11 PROCEDURE — 82570 ASSAY OF URINE CREATININE: CPT

## 2017-09-15 ENCOUNTER — ALLSCRIPTS OFFICE VISIT (OUTPATIENT)
Dept: OTHER | Facility: OTHER | Age: 82
End: 2017-09-15

## 2017-09-25 DIAGNOSIS — I12.9 HYPERTENSIVE CHRONIC KIDNEY DISEASE WITH STAGE 1 THROUGH STAGE 4 CHRONIC KIDNEY DISEASE, OR UNSPECIFIED CHRONIC KIDNEY DISEASE: ICD-10-CM

## 2017-09-26 ENCOUNTER — APPOINTMENT (OUTPATIENT)
Dept: LAB | Facility: CLINIC | Age: 82
End: 2017-09-26
Payer: MEDICARE

## 2017-09-26 DIAGNOSIS — I12.9 HYPERTENSIVE CHRONIC KIDNEY DISEASE WITH STAGE 1 THROUGH STAGE 4 CHRONIC KIDNEY DISEASE, OR UNSPECIFIED CHRONIC KIDNEY DISEASE: ICD-10-CM

## 2017-09-26 LAB
ANION GAP SERPL CALCULATED.3IONS-SCNC: 5 MMOL/L (ref 4–13)
BUN SERPL-MCNC: 41 MG/DL (ref 5–25)
CALCIUM SERPL-MCNC: 9.4 MG/DL (ref 8.3–10.1)
CHLORIDE SERPL-SCNC: 106 MMOL/L (ref 100–108)
CO2 SERPL-SCNC: 29 MMOL/L (ref 21–32)
CREAT SERPL-MCNC: 1.84 MG/DL (ref 0.6–1.3)
GFR SERPL CREATININE-BSD FRML MDRD: 24 ML/MIN/1.73SQ M
GLUCOSE P FAST SERPL-MCNC: 96 MG/DL (ref 65–99)
POTASSIUM SERPL-SCNC: 4.2 MMOL/L (ref 3.5–5.3)
SODIUM SERPL-SCNC: 140 MMOL/L (ref 136–145)

## 2017-09-26 PROCEDURE — 80048 BASIC METABOLIC PNL TOTAL CA: CPT

## 2017-09-26 PROCEDURE — 36415 COLL VENOUS BLD VENIPUNCTURE: CPT

## 2017-10-26 NOTE — PROGRESS NOTES
Assessment  1  Benign hypertension with chronic kidney disease, stage III (403 10,585 3) (I12 9,N18 3)   2  Chronic kidney disease, stage 3 (585 3) (N18 3)   3  Volume overload (276 69) (E87 70)    Plan  Benign hypertension with chronic kidney disease, stage III    · Torsemide 10 MG Oral Tablet; TAKE 1 TABLET DAILY   Rx By: Colton Grissom; Dispense: 30 Days ; #:30 Tablet; Refill: 3;For: Benign hypertension with chronic kidney disease, stage III; ELÍAS = N; Sent To: [address] [place] [address]; Msg to Pharmacy: Please deliver to patient   · (1Fany Aleman; Status:Active; Requested AXK:[id number];    Perform:[facility]; Due:38Ehw7217; Ordered; For:Benign hypertension with chronic kidney disease, stage III; Ordered By:Zulema Rome;   · Restrict the salt in your diet by avoiding highly salted foods ; Status:Complete;   Done:  [id number]   Ordered; For:Benign hypertension with chronic kidney disease, stage III; Ordered By:Zulema Rome; Chronic kidney disease, stage 3, Hypertension    · Torsemide 5 MG Oral Tablet   Rx By: Caleb Treadwell; Dispense: 0 Days ; #: Sufficient Tablet; Refill: 0;For: Chronic kidney disease, stage 3, Hypertension; ELÍAS = N; Record; Last Updated By: Colton Grissom; 9/15/2017 1:22:00 PM    Discussion/Summary    Chronic Kidney Disease stage III- Baseline Creatinine 1 6-1 9 prior to hospitalization but we may need to tolerate a higher baseline for euvolemia  creatinine- Her creatinine is elevated to 2 43  This may be due to diuresis  Recheck BMP in a couple weeks  status- She is now taking torsemide 10mg daily  antihypertensive regimen includes amlodipine 5mg twice a day, bystolic 84JX daily, and torsemide 10mg daily  Please eat a low salt diet  Avoid NSAIDs (Advil, Aleve, Ibuprofen, Motrin, Celebrex, Naproxen, Indomethacin)  Mineral Disorder- Will check studies before your next appointment  up with Dr Claudia Martinez in December   Please call the office with any questions or concerns between now and then  The patient was counseled regarding diagnostic results,-- instructions for management  Patient is able to Self-Care  Possible side effects of new medications were reviewed with the patient/guardian today  The treatment plan was reviewed with the patient/guardian  The patient/guardian understands and agrees with the treatment plan      Reason For Visit  follow up      History of Present Illness  Delfina Brizuela presents to the office for follow up after hospitalization at [address] [place] [address] for worsening shortness of breath and lower extremity swelling  She was diuresed with a lasix drip and then transitioned back to oral torsemide, a higher dose than her home dose previously  is feeling much better  She is not eating a lot currently  Her breathing is improved as well as her lower extremity edema  Her weight is improved  Review of Systems    Constitutional: anorexia-- and-- recent weight loss, but-- no fever,-- no chills,-- no fatigue-- and-- no recent weight gain  Integumentary: no rashes  Gastrointestinal: no abdominal pain,-- no nausea,-- no diarrhea-- and-- no vomiting  Respiratory: no shortness of breath  Cardiovascular: no chest pain-- and-- no lower extremity edema  Musculoskeletal: no joint pain-- and-- no joint swelling  Neurological: no headache,-- no lightheadedness-- and-- no dizziness  Genitourinary: no dysuria,-- no hematuria,-- no incomplete emptying of bladder-- and-- no foamy urine  Eyes: no eyesight problems  ENT: no hearing loss  Past Medical History    The active problems and past medical history were reviewed and updated today  Surgical History    The surgical history was reviewed and updated today  Family History    The family history was reviewed and updated today  Social History  The social history was reviewed and updated today  Current Meds   1   AmLODIPine Besylate 5 MG Oral Tablet; TAKE 1 TABLET TWICE DAILY; Therapy: 77Uec3060 to (Renew:08Jan2018)  Requested for: 56Vxp1590; Last   Rx:[id number] Ordered   2  Bystolic 10 MG Oral Tablet; Take 1 tablet daily; Therapy: 94Kjw7485 to (Evaluate:24Nov2017)  Requested for: 36NFF8655; Last   Rx:29Nov2016 Ordered   3  CoQ10 200 MG Oral Capsule; TAKE 1 CAPSULE Daily Recorded   4  Ocuvite TABS; Take 1 tablet daily Recorded   5  Oscal 500/200 D-3 TABS; TAKE 2 TABLETS DAILY Recorded   6  Pantoprazole Sodium 40 MG Oral Tablet Delayed Release; TAKE 1 TABLET DAILY; Therapy: (Recorded:19Pgr0900) to Recorded   7  Plavix 75 MG Oral Tablet; TAKE 1 TABLET DAILY; Therapy: (Recorded:23Uhc8395) to Recorded   8  Stool Softener TABS; Therapy: (Recorded:06Iau8684) to Recorded   9  Torsemide 5 MG Oral Tablet; TAKE 1 TABLET ONCE DAILY; Therapy: 48TCV6480 to  Requested for: 90Sik0331 Recorded   10  Welchol 625 MG Oral Tablet; TAKE 3 TABLETS TWICE DAILY  Requested for: 38XNC4885;    Last Rx:91Por5920 Ordered    Allergies  1  Lyrica SOLN    Vitals  Vital Signs    Recorded: 15Sep2017 01:17PM Recorded: 15Sep2017 01:08PM   Heart Rate 68    Systolic 932, RUE, Sitting    Diastolic 68, RUE, Sitting    Height 5 ft 4 in 5 ft 4 in   Weight 165 lb  165 lb    BMI Calculated 28 32 28 32   BSA Calculated 1 8 1 8     Physical Exam    Constitutional: General appearance: No acute distress, well appearing and well nourished  ENT: External ears and nose appear normal      Eyes: Anicteric sclerae  Neck: No bruit heard over either carotid  JVD:  No JVD present  Pulmonary: Respiratory effort: No increased work of breathing or signs of respiratory distress  -- Auscultation of lungs: Clear to auscultation  Cardiovascular: Auscultation of heart: Normal rate and rhythm, normal S1 and S2, without murmurs  Abdomen: Non-tender, no masses  Extremities:  LLE edema  Rash: No rash present     Neurologic: Non Focal      Psychiatric: Orientation to person, place, and time: Normal  -- and-- Mood and affect: Normal        Results/Data  (1) BASIC METABOLIC PROFILE [id number] 01:50PM Malissa Collet Order Number: [id number]     Test Name Result Flag Reference   GLUCOSE,RANDM 106 mg/dL     If the patient is fasting, the ADA then defines impaired fasting glucose as > 100 mg/dL and diabetes as > or equal to 123 mg/dL  Specimen collection should occur prior to Sulfasalazine administration due to the potential for falsely depressed results  Specimen collection should occur prior to Sulfapyridine administration due to the potential for falsely elevated results  SODIUM 138 mmol/L  136-145   POTASSIUM 4 6 mmol/L  3 5-5 3   CHLORIDE 102 mmol/L  100-108   CARBON DIOXIDE 32 mmol/L  21-32   ANION GAP (CALC) 4 mmol/L  4-13   BLOOD UREA NITROGEN 41 mg/dL H 5-25   CREATININE 2 43 mg/dL H 0 60-1 30   Standardized to IDMS reference method   CALCIUM 9 0 mg/dL  8 3-10 1   eGFR 17 ml/min/1 73sq [facility] Disease Education Program recommendations are as follows:  GFR calculation is accurate only with a steady state creatinine  Chronic Kidney disease less than 60 ml/min/1 73 sq  meters  Kidney failure less than 15 ml/min/1 73 sq  meters  (1) URINE PROTEIN CREATININE RATIO 30Jpx3113 01:50PM Malissa Collet Order Number: [id number]     Test Name Result Flag Reference   CREATININE URINE 42 3 mg/dL     URINE PROTEIN:CREATININE RATIO 0 50 H 0 00-0 10   URINE PROTEIN 21 mg/dL       Nephrology Flowsheet 74Nzs6284 10:34AM Lonza Barbara     Test Name Result Flag Reference   WBC 5 00     Hemoglobin 12 0     Hematocrit 36 6     Platelets 878     Sodium 138     Potassium 5 2     Chloride 102     Carbon Dioxide 34     Calcium 9 2     GLUCOSE 126     BUN 80     Serum Creatinine 2 13     GFR, NON-AFRICAN AMERICAN 20     Magnesium 2 6     Phosphorus 4 1     Urine Protein Creatinine Ratio 0 39         Thank you very much for allowing me to participate in the care of this patient   If you have any questions, please do not hesitate to contact me        Signatures   Electronically signed by : Lobo Zhou, [facility]; Sep 15 2017  1:25PM EST                       (Author)    Electronically signed by : Barbra Rodrigues MD; Sep 18 2017 11:02AM EST

## 2017-11-02 ENCOUNTER — APPOINTMENT (OUTPATIENT)
Dept: AUDIOLOGY | Facility: CLINIC | Age: 82
End: 2017-11-02
Payer: MEDICARE

## 2017-11-02 PROCEDURE — V5265 EAR MOLD/INSERT, DISP: HCPCS | Performed by: AUDIOLOGIST

## 2017-12-01 DIAGNOSIS — R06.2 WHEEZING: ICD-10-CM

## 2017-12-01 DIAGNOSIS — N18.30 CHRONIC KIDNEY DISEASE, STAGE III (MODERATE) (HCC): ICD-10-CM

## 2017-12-01 DIAGNOSIS — E55.9 VITAMIN D DEFICIENCY: ICD-10-CM

## 2017-12-01 DIAGNOSIS — R80.9 PROTEINURIA: ICD-10-CM

## 2017-12-01 DIAGNOSIS — I12.9 HYPERTENSIVE CHRONIC KIDNEY DISEASE WITH STAGE 1 THROUGH STAGE 4 CHRONIC KIDNEY DISEASE, OR UNSPECIFIED CHRONIC KIDNEY DISEASE: ICD-10-CM

## 2017-12-01 DIAGNOSIS — E78.5 HYPERLIPIDEMIA: ICD-10-CM

## 2017-12-04 ENCOUNTER — APPOINTMENT (OUTPATIENT)
Dept: LAB | Facility: CLINIC | Age: 82
End: 2017-12-04
Payer: MEDICARE

## 2017-12-04 DIAGNOSIS — R80.9 PROTEINURIA: ICD-10-CM

## 2017-12-04 DIAGNOSIS — E55.9 VITAMIN D DEFICIENCY: ICD-10-CM

## 2017-12-04 DIAGNOSIS — N18.30 CHRONIC KIDNEY DISEASE, STAGE III (MODERATE) (HCC): ICD-10-CM

## 2017-12-04 DIAGNOSIS — I12.9 HYPERTENSIVE CHRONIC KIDNEY DISEASE WITH STAGE 1 THROUGH STAGE 4 CHRONIC KIDNEY DISEASE, OR UNSPECIFIED CHRONIC KIDNEY DISEASE: ICD-10-CM

## 2017-12-04 DIAGNOSIS — E78.5 HYPERLIPIDEMIA: ICD-10-CM

## 2017-12-04 LAB
ALBUMIN SERPL BCP-MCNC: 3.2 G/DL (ref 3.5–5)
ALP SERPL-CCNC: 62 U/L (ref 46–116)
ALT SERPL W P-5'-P-CCNC: 18 U/L (ref 12–78)
ANION GAP SERPL CALCULATED.3IONS-SCNC: 0 MMOL/L (ref 4–13)
AST SERPL W P-5'-P-CCNC: 16 U/L (ref 5–45)
BASOPHILS # BLD AUTO: 0.01 THOUSANDS/ΜL (ref 0–0.1)
BASOPHILS NFR BLD AUTO: 0 % (ref 0–1)
BILIRUB SERPL-MCNC: 0.44 MG/DL (ref 0.2–1)
BUN SERPL-MCNC: 45 MG/DL (ref 5–25)
CALCIUM SERPL-MCNC: 9.3 MG/DL (ref 8.3–10.1)
CHLORIDE SERPL-SCNC: 108 MMOL/L (ref 100–108)
CHOLEST SERPL-MCNC: 167 MG/DL (ref 50–200)
CO2 SERPL-SCNC: 34 MMOL/L (ref 21–32)
CREAT SERPL-MCNC: 1.78 MG/DL (ref 0.6–1.3)
CREAT UR-MCNC: 114 MG/DL
EOSINOPHIL # BLD AUTO: 0.16 THOUSAND/ΜL (ref 0–0.61)
EOSINOPHIL NFR BLD AUTO: 3 % (ref 0–6)
ERYTHROCYTE [DISTWIDTH] IN BLOOD BY AUTOMATED COUNT: 14.2 % (ref 11.6–15.1)
GFR SERPL CREATININE-BSD FRML MDRD: 25 ML/MIN/1.73SQ M
GLUCOSE P FAST SERPL-MCNC: 108 MG/DL (ref 65–99)
HCT VFR BLD AUTO: 45.1 % (ref 34.8–46.1)
HDLC SERPL-MCNC: 76 MG/DL (ref 40–60)
HGB BLD-MCNC: 14 G/DL (ref 11.5–15.4)
LDLC SERPL CALC-MCNC: 72 MG/DL (ref 0–100)
LYMPHOCYTES # BLD AUTO: 1.5 THOUSANDS/ΜL (ref 0.6–4.47)
LYMPHOCYTES NFR BLD AUTO: 23 % (ref 14–44)
MAGNESIUM SERPL-MCNC: 2.5 MG/DL (ref 1.6–2.6)
MCH RBC QN AUTO: 30.2 PG (ref 26.8–34.3)
MCHC RBC AUTO-ENTMCNC: 31 G/DL (ref 31.4–37.4)
MCV RBC AUTO: 97 FL (ref 82–98)
MONOCYTES # BLD AUTO: 0.43 THOUSAND/ΜL (ref 0.17–1.22)
MONOCYTES NFR BLD AUTO: 7 % (ref 4–12)
NEUTROPHILS # BLD AUTO: 4.34 THOUSANDS/ΜL (ref 1.85–7.62)
NEUTS SEG NFR BLD AUTO: 67 % (ref 43–75)
NRBC BLD AUTO-RTO: 0 /100 WBCS
PHOSPHATE SERPL-MCNC: 4.1 MG/DL (ref 2.3–4.1)
PLATELET # BLD AUTO: 183 THOUSANDS/UL (ref 149–390)
PMV BLD AUTO: 10.8 FL (ref 8.9–12.7)
POTASSIUM SERPL-SCNC: 4.4 MMOL/L (ref 3.5–5.3)
PROT SERPL-MCNC: 9 G/DL (ref 6.4–8.2)
PROT UR-MCNC: 99 MG/DL
PROT/CREAT UR: 0.87 MG/G{CREAT} (ref 0–0.1)
PTH-INTACT SERPL-MCNC: 53.4 PG/ML (ref 14–72)
RBC # BLD AUTO: 4.63 MILLION/UL (ref 3.81–5.12)
SODIUM SERPL-SCNC: 142 MMOL/L (ref 136–145)
TRIGL SERPL-MCNC: 94 MG/DL
WBC # BLD AUTO: 6.45 THOUSAND/UL (ref 4.31–10.16)

## 2017-12-04 PROCEDURE — 84156 ASSAY OF PROTEIN URINE: CPT

## 2017-12-04 PROCEDURE — 84100 ASSAY OF PHOSPHORUS: CPT

## 2017-12-04 PROCEDURE — 80053 COMPREHEN METABOLIC PANEL: CPT

## 2017-12-04 PROCEDURE — 36415 COLL VENOUS BLD VENIPUNCTURE: CPT

## 2017-12-04 PROCEDURE — 82570 ASSAY OF URINE CREATININE: CPT

## 2017-12-04 PROCEDURE — 85025 COMPLETE CBC W/AUTO DIFF WBC: CPT

## 2017-12-04 PROCEDURE — 83970 ASSAY OF PARATHORMONE: CPT

## 2017-12-04 PROCEDURE — 83735 ASSAY OF MAGNESIUM: CPT

## 2017-12-04 PROCEDURE — 80061 LIPID PANEL: CPT

## 2017-12-06 ENCOUNTER — APPOINTMENT (OUTPATIENT)
Dept: LAB | Facility: CLINIC | Age: 82
End: 2017-12-06
Payer: MEDICARE

## 2017-12-06 ENCOUNTER — TRANSCRIBE ORDERS (OUTPATIENT)
Dept: LAB | Facility: CLINIC | Age: 82
End: 2017-12-06

## 2017-12-06 DIAGNOSIS — I10 ESSENTIAL HYPERTENSION, BENIGN: ICD-10-CM

## 2017-12-06 DIAGNOSIS — N18.30 CHRONIC KIDNEY DISEASE, STAGE III (MODERATE) (HCC): Primary | ICD-10-CM

## 2017-12-06 DIAGNOSIS — E78.5 HYPERLIPIDEMIA, UNSPECIFIED HYPERLIPIDEMIA TYPE: ICD-10-CM

## 2017-12-06 LAB
ALBUMIN SERPL BCP-MCNC: 3.3 G/DL (ref 3.5–5)
ALP SERPL-CCNC: 65 U/L (ref 46–116)
ALT SERPL W P-5'-P-CCNC: 18 U/L (ref 12–78)
ANION GAP SERPL CALCULATED.3IONS-SCNC: 0 MMOL/L (ref 4–13)
AST SERPL W P-5'-P-CCNC: 16 U/L (ref 5–45)
BILIRUB SERPL-MCNC: 0.48 MG/DL (ref 0.2–1)
BUN SERPL-MCNC: 45 MG/DL (ref 5–25)
CALCIUM SERPL-MCNC: 9 MG/DL (ref 8.3–10.1)
CHLORIDE SERPL-SCNC: 107 MMOL/L (ref 100–108)
CHOLEST SERPL-MCNC: 151 MG/DL (ref 50–200)
CO2 SERPL-SCNC: 33 MMOL/L (ref 21–32)
CREAT SERPL-MCNC: 1.75 MG/DL (ref 0.6–1.3)
GFR SERPL CREATININE-BSD FRML MDRD: 25 ML/MIN/1.73SQ M
GLUCOSE P FAST SERPL-MCNC: 96 MG/DL (ref 65–99)
HDLC SERPL-MCNC: 69 MG/DL (ref 40–60)
LDLC SERPL CALC-MCNC: 62 MG/DL (ref 0–100)
POTASSIUM SERPL-SCNC: 4 MMOL/L (ref 3.5–5.3)
PROT SERPL-MCNC: 8.8 G/DL (ref 6.4–8.2)
SODIUM SERPL-SCNC: 140 MMOL/L (ref 136–145)
TRIGL SERPL-MCNC: 102 MG/DL

## 2017-12-06 PROCEDURE — 36415 COLL VENOUS BLD VENIPUNCTURE: CPT

## 2017-12-06 PROCEDURE — 80061 LIPID PANEL: CPT

## 2017-12-06 PROCEDURE — 80053 COMPREHEN METABOLIC PANEL: CPT

## 2017-12-07 ENCOUNTER — ALLSCRIPTS OFFICE VISIT (OUTPATIENT)
Dept: OTHER | Facility: OTHER | Age: 82
End: 2017-12-07

## 2017-12-08 NOTE — PROGRESS NOTES
Assessment    1  Chronic kidney disease, stage 3 (585 3) (N18 3)   2  Benign hypertension with chronic kidney disease, stage III (403 10,585 3) (I12 9,N18 3)   3  Anemia (285 9) (D64 9)   4  Proteinuria (791 0) (R80 9)   5  Vitamin D deficiency (268 9) (E55 9)   6  Dyslipidemia (272 4) (E78 5)   7  Volume overload (276 69) (E87 70)    Plan  Anemia, Benign hypertension with chronic kidney disease, stage III, Chronic kidneydisease, stage 3, Dyslipidemia, Proteinuria, Vitamin D deficiency, Volume overload    · (1) CBC/PLT/DIFF; Status:Active; Requested FQS:[id number]; Perform:[facility]; CXX:[id number];QRPCKZY; For:Anemia, Benign hypertension with chronic kidney disease, stage III, Chronic kidney disease, stage 3, Dyslipidemia, Proteinuria, Vitamin D deficiency, Volume overload; Ordered By:Hayden Dawn;   · (1) COMPREHENSIVE METABOLIC PANEL; Status:Active; Requested NEN:84KSE5256; Perform:[facility]; RLD:[id number];BYSCROH; For:Anemia, Benign hypertension with chronic kidney disease, stage III, Chronic kidney disease, stage 3, Dyslipidemia, Proteinuria, Vitamin D deficiency, Volume overload; Ordered By:Hayden Dawn;   · (1) LIPID PANEL, FASTING; Status:Active; Requested TOE:97TPG1082; Perform:[facility]; CMK:[id number];RZRDHEQ; For:Anemia, Benign hypertension with chronic kidney disease, stage III, Chronic kidney disease, stage 3, Dyslipidemia, Proteinuria, Vitamin D deficiency, Volume overload; Ordered By:Hayden Dawn;   · (1) PHOSPHORUS; Status:Active; Requested AHP:[id number]; Perform:[facility]; YOS:81PNH0663;PFKXBYE; For:Anemia, Benign hypertension with chronic kidney disease, stage III, Chronic kidney disease, stage 3, Dyslipidemia, Proteinuria, Vitamin D deficiency, Volume overload; Ordered By:Hayden Dawn;   · (1) PTH N-TERMINAL (INTACT); Status:Active; Requested EEH:54POD0514; Perform:[facility]; AVW:[id number];LNROJRM; For:Anemia, Benign hypertension with chronic kidney disease, stage III, Chronic kidney disease, stage 3, Dyslipidemia, Proteinuria, Vitamin D deficiency, Volume overload; Ordered By:Hayden Dawn;   · (1) URINE PROTEIN CREATININE RATIO; Status:Active; Requested OOD:24HQW6708; Perform:[facility]; HEQ:82YZC3564;TXYCJIF; For:Anemia, Benign hypertension with chronic kidney disease, stage III, Chronic kidney disease, stage 3, Dyslipidemia, Proteinuria, Vitamin D deficiency, Volume overload; Ordered By:Hayden Dawn;   · (1) VITAMIN D 25-HYDROXY; Status:Active; Requested REP:[id number]; Perform:[facility]; WOM:72BBT2327;WGSXHLS;UHYSYC hypertension with chronic kidney disease, stage III, Chronic kidney disease, stage 3, Dyslipidemia, Proteinuria, Vitamin D deficiency, Volume overload; Ordered By:Hayden Dawn;   · Follow-up visit in 3 months Evaluation and Treatment  Follow-up  Status: Hold For -Scheduling  Requested for: 34DAM8547   Ordered; Anemia, Benign hypertension with chronic kidney disease, stage III, Chronic kidney disease, stage 3, Dyslipidemia, Proteinuria, Vitamin D deficiency, Volume overload; Ordered By: David Montoya Performed:  Due: 07PGK8357   · 1 - John FROST, Scooby Desouza (Nurse Practitioner) Co-Management  * BLOOD PRESSURE LOWPLEASE REASSESSPATIENT IS GOING TO BE SEEING CARDIOLOGY FOR IRREGULAR RHYTHM  Status:Hold For - Scheduling  Requested for: 96DKI5622   Ordered; Anemia, Benign hypertension with chronic kidney disease, stage III, Chronic kidney disease, stage 3, Dyslipidemia, Proteinuria, Vitamin D deficiency, Volume overload; Ordered By: David Montoya Performed:  Due: 12MUP5222  Care Summary provided  : Yes  Anemia, Chronic kidney disease, stage 3, Dyslipidemia, Hyponatremia, Proteinuria,Vitamin D deficiency    · From  AmLODIPine Besylate 5 MG Oral Tablet TAKE 1 TABLET TWICE DAILYTo AmLODIPine Besylate 5 MG Oral Tablet TAKE 1 TABLET ONCE  DAILY    IN [facility]   Rx By: David Montoya; at 10 milligrams daily  Again, as always avoid salt keep legs elevated stableHypertension/orthostasis: Her blood pressure is always higher in the evening and low in the morning  Today she is very low  She is asymptomatic however  I would make the following recommendations:amlodipine to 5 milligrams in the morning onlyblood pressures at home in the next 1-2 weeks bring in the blood pressure machine for correlation with the office machine along with her new readings  Avoid hypoperfusion given age  is less than 135/85 but not much lower than 120/70 saltElectrolytes are normal Mineral bone disease: All within normal limitsAnemia: Hemoglobin is normal Status post recent CVA  History of MGUS: She follows with the hematologist Dr Art heart rhythm slightly higher heart rate: Concerned about possible atrial fibrillation or another arrhythmia  I did discuss this with the cardiologist Yenni Cortés who will see the patient in the next couple of days  He did not want urgent EKG, and did not want to adjust any medications at this time  The patient and her daughter are aware of the appointment in the next couple of days  spent an additional 20 minutes plus usual 20 minutes appointment time reviewing and coordinating care for the regular heart rhythm, discussing with the patient and her daughter regarding fluid overload and had a prevented and what to watch for; and also answering any questions  The patient, patient's family was counseled regarding diagnostic results,-- instructions for management,-- risk factor reductions,-- patient and family education,-- impressions,-- importance of compliance with treatment  total time of encounter was 40 minutes-- and-- 20 minutes was spent counseling  The patient has the current Goals: Monitor blood pressure closely as outlined and instructions after adjusting medicationtorsemide and monitor leg swelling  Avoid salt  weights, swelling, shortness of Breathfor chest x-rayup with Cardiology  None  Patient is able to Self-Care  Patient agrees and allows to involve family/caregiver in development of care plan:   Educational resources provided:   Possible side effects of new medications were reviewed with the patient/guardian today  The treatment plan was reviewed with the patient/guardian  The patient/guardian understands and agrees with the treatment plan   CKD Teaching includes hypertension management, Avoid nephrotoxic medication and sodium restriction  Reason For Visit  ckd3      History of Present Illness  Patient was in the hospital from August to early September with diastolic congestive heart failure  She was diuresed at that time developed TIANA in top of typical chronic CKD  Her creatinine had increased to as high believe is 2 43 milligrams/deciliter even as an outpatient  She had received a furosemide drip in the hospital  Eventually she was placed back on torsemide  She also had cellulitis of her left lower extremity treated with Ancef and switched over to Keflex  also had bradycardia and her nebivolol dose was adjusted  Feeling much better on the current dose of torsemide 10 milligrams versus the 5 milligram she had been on the past  No shortness of breath  Leg swelling is stable in minimal  No chest pain  fevers chills cough or colds  Reasonably good appetite energy  active urinary symptomsGI symptomsheadaches, dizziness or lightheadednesspressure medications:  Bystolic 5 milligrams daily5 milligrams twice a day10 milligrams dailypressures at home:118/48 without orthostatic iystxle771/69 without orthostatic changesrate 50-60 now and mostly in the 60s      Review of Systems  Please see HPI, otherwise the review of systems is completely reviewed the patient are negative      ROS reviewed  Past Medical History    The active problems and past medical history were reviewed and updated today  Surgical History    The surgical history was reviewed and updated today  Family History    The family history was reviewed and updated today  Social History  The social history was reviewed and is unchanged  Current Meds   1  AmLODIPine Besylate 5 MG Oral Tablet; TAKE 1 TABLET TWICE DAILY; Therapy: 67Wir1014 to (Renew:2018)  Requested for: 97Ozb9285; Last Rx:29Way4895 Ordered   2  Bystolic 5 MG Oral Tablet; TAKE 1 TABLET DAILY; Therapy: 40Uun2469 to (Evaluate:98Bad6673)  Requested for: 2017; Last Rx:2017 Ordered   3  CoQ10 200 MG Oral Capsule; TAKE 1 CAPSULE Daily Recorded   4  Ocuvite TABS; Take 1 tablet daily Recorded   5  Oscal 500/200 D-3 TABS; TAKE 2 TABLETS DAILY Recorded   6  Pantoprazole Sodium 40 MG Oral Tablet Delayed Release; TAKE 1 TABLET DAILY; Therapy: (Recorded:14Sps0863) to Recorded   7  Plavix 75 MG Oral Tablet; TAKE 1 TABLET DAILY; Therapy: (Recorded:13Ypq3718) to Recorded   8  Stool Softener TABS; Therapy: (Recorded:31Ool9829) to Recorded   9  Torsemide 10 MG Oral Tablet; TAKE 1 TABLET DAILY; Therapy: 79Oys5598 to (Evaluate:2018)  Requested for: 44Auz0244; Last Rx:48Twm7180 Ordered   10  Welchol 625 MG Oral Tablet; TAKE 3 TABLETS TWICE DAILY  Requested for: 2017;  Last Rx:2017 Ordered    The medication list was reviewed and updated today  Allergies  1  Lyrica SOLN    Vitals  Vital Signs    Recorded: [id number] 02:33PM Recorded: 44NSL2267 02:15PM   Heart Rate 96    Pulse Quality Irregular    Systolic 933, RUE, Sitting    Diastolic 64, RUE, Sitting    BP Comments Blood pressure standin/54 with a heart rate of ninety-six regular    Height  5 ft 4 in   Weight  169 lb    BMI Calculated  29 01   BSA Calculated  1 82       Physical Exam   Constitutional: General appearance: No acute distress, well appearing and well nourished  -- nad  ENT: External ears and nose appear normal     Eyes: Anicteric sclerae  Neck: No bruit heard over either carotid  JVD:  No JVD present    Pulmonary: Respiratory effort: No increased work of breathing or signs of respiratory distress  -- Auscultation of lungs: Abnormal  -- Very minimal and expiratory wheezes but no rales or dullness to percussion  Cardiovascular: Auscultation of heart: Abnormal  -- Slightly irregular rhythm, no rub or gallops appreciated  Abdomen: Non-tender, no masses  -- Soft and nontender with normal bowel sounds  Extremities: Extremities are abnormal--   Trace-1+ lower extremity edema of the left lower extremity and only trace edema on the right lower extremity  Rash: No rash present  Neurologic: Non Focal     Psychiatric: Orientation to person, place, and time: Normal  -- and-- Mood and affect: Normal    Back: No CVA tenderness  Results/Data  (1) COMPREHENSIVE METABOLIC PANEL 69ELF8793 16:96MI Herbert Walden     Test Name Result Flag Reference   SODIUM 140 mmol/L  136-145   POTASSIUM 4 0 mmol/L  3 5-5 3   CHLORIDE 107 mmol/L  100-108   CARBON DIOXIDE 33 mmol/L H 21-32   ANION GAP (CALC) 0 mmol/L L 4-13   BLOOD UREA NITROGEN 45 mg/dL H 5-25   CREATININE 1 75 mg/dL H 0 60-1 30   Standardized to IDMS reference method   CALCIUM 9 0 mg/dL  8 3-10 1   BILI, TOTAL 0 48 mg/dL  0 20-1 00   ALK PHOSPHATAS 65 U/L     ALT (SGPT) 18 U/L  12-78   Specimen collection should occur prior to Sulfasalazine and/or Sulfapyridine administration due to the potential for falsely depressed results  AST(SGOT) 16 U/L  5-45   Specimen collection should occur prior to Sulfasalazine administration due to the potential for falsely depressed results  ALBUMIN 3 3 g/dL L 3 5-5 0   TOTAL PROTEIN 8 8 g/dL H 6 4-8 2   eGFR 25 ml/min/1 73sq m       This is a patient instruction: Patient fasting for 8 hours or longer recommended  National Kidney Disease Education Program recommendations are as follows: GFR calculation is accurate only with a steady state creatinine Chronic Kidney disease less than 60 ml/min/1 73 sq  meters Kidney failure less than 15 ml/min/1 73 sq  meters     GLUCOSE FASTING 96 mg/dL  65-99   Specimen collection should occur prior to Sulfasalazine administration due to the potential for falsely depressed results  Specimen collection should occur prior to Sulfapyridine administration due to the potential for falsely elevated results  (1) LIPID PANEL, FASTING 02KZK1434 07:52AM Joie Villalba     Test Name Result Flag Reference   CHOLESTEROL 151 mg/dL     HDL,DIRECT 69 mg/dL H 40-60   Specimen collection should occur prior to Metamizole administration due to the potential for falsley depressed results  LDL CHOLESTEROL CALCULATED 62 mg/dL  0-100     This is a patient instruction: This test requires patient fasting for 10-12 hours or longer  Drinking of black coffee or black tea is acceptable  Triglyceride:       Normal <150 mg/dl  Borderline High 150-199 mg/dl  High 200-499 mg/dl  Very High >499 mg/dl   Cholesterol:      Desirable <200 mg/dl   Borderline High 200-239 mg/dl   High >239 mg/dl   HDL Cholesterol:      High>59 mg/dL   Low <41 mg/dL   This screening LDL is a calculated result  It does not have the accuracy of the Direct Measured LDL in the monitoring of patients with hyperlipidemia and/or statin therapy  Direct Measure LDL (AWS079) must be ordered separately in these patients  TRIGLYCERIDES 102 mg/dL  <=150   Specimen collection should occur prior to N-Acetylcysteine or Metamizole administration due to the potential for falsely depressed results  (1) CBC/PLT/DIFF 99OXH0241 08:06AM [facility] Order Number: [id number]     Test Name Result Flag Reference   WBC COUNT 6 45 Thousand/uL  4 31-10 16   RBC COUNT 4 63 Million/uL  3 81-5 12   HEMOGLOBIN 14 0 g/dL  11 5-15 4   HEMATOCRIT 45 1 %  34 8-46  1   MCV 97 fL  82-98   MCH 30 2 pg  26 8-34 3   MCHC 31 0 g/dL L 31 4-37 4   RDW 14 2 %  11 6-15 1   MPV 10 8 fL  8 9-12 7   PLATELET COUNT 313 Thousands/uL  149-390   nRBC AUTOMATED 0 /100 WBCs     NEUTROPHILS RELATIVE PERCENT 67 %  43-75   LYMPHOCYTES RELATIVE PERCENT 23 %  14-44   MONOCYTES RELATIVE PERCENT 7 %  4-12   EOSINOPHILS RELATIVE PERCENT 3 %  0-6   BASOPHILS RELATIVE PERCENT 0 %  0-1   NEUTROPHILS ABSOLUTE COUNT 4 34 Thousands/? ??L  1 85-7 62   LYMPHOCYTES ABSOLUTE COUNT 1 50 Thousands/? ??L  0 60-4 47   MONOCYTES ABSOLUTE COUNT 0 43 Thousand/? ??L  0 17-1 22   EOSINOPHILS ABSOLUTE COUNT 0 16 Thousand/? ??L  0 00-0 61   BASOPHILS ABSOLUTE COUNT 0 01 Thousands/? ??L  0 00-0 10     (1) COMPREHENSIVE METABOLIC PANEL [id number] 80:71DN [facility] Number: [id number]     Test Name Result Flag Reference   SODIUM 142 mmol/L  136-145   POTASSIUM 4 4 mmol/L  3 5-5 3   CHLORIDE 108 mmol/L  100-108   CARBON DIOXIDE 34 mmol/L H 21-32   ANION GAP (CALC) 0 mmol/L L 4-13   BLOOD UREA NITROGEN 45 mg/dL H 5-25   CREATININE 1 78 mg/dL H 0 60-1 30   Standardized to IDMS reference method   CALCIUM 9 3 mg/dL  8 3-10 1   BILI, TOTAL 0 44 mg/dL  0 20-1 00   ALK PHOSPHATAS 62 U/L     ALT (SGPT) 18 U/L  12-78   Specimen collection should occur prior to Sulfasalazine and/or Sulfapyridine administration due to the potential for falsely depressed results  AST(SGOT) 16 U/L  5-45   Specimen collection should occur prior to Sulfasalazine administration due to the potential for falsely depressed results  ALBUMIN 3 2 g/dL L 3 5-5 0   TOTAL PROTEIN 9 0 g/dL H 6 4-8 2   eGFR 25 ml/min/1 73sq [facility] Disease Education Program recommendations are as follows: GFR calculation is accurate only with a steady state creatinine Chronic Kidney disease less than 60 ml/min/1 73 sq  meters Kidney failure less than 15 ml/min/1 73 sq  meters  GLUCOSE FASTING 108 mg/dL H 65-99   Specimen collection should occur prior to Sulfasalazine administration due to the potential for falsely depressed results  Specimen collection should occur prior to Sulfapyridine administration due to the potential for falsely elevated results       (1) MAGNESIUM [id number] 08:06AM [facility] Order Number: [id number]     Test Name Result Flag Reference   MAGNESIUM 2 5 mg/dL  1 6-2 6     (1) LIPID PANEL, FASTING 53MBH8026 08:06AM [facility] Order Number: [id number]     Test Name Result Flag Reference   CHOLESTEROL 167 mg/dL     HDL,DIRECT 76 mg/dL H 40-60   Specimen collection should occur prior to Metamizole administration due to the potential for falsley depressed results  LDL CHOLESTEROL CALCULATED 72 mg/dL  0-100     Triglyceride:       Normal <150 mg/dl  Borderline High 150-199 mg/dl  High 200-499 mg/dl  Very High >499 mg/dl   Cholesterol:      Desirable <200 mg/dl   Borderline High 200-239 mg/dl   High >239 mg/dl   HDL Cholesterol:      High>59 mg/dL   Low <41 mg/dL   This screening LDL is a calculated result  It does not have the accuracy of the Direct Measured LDL in the monitoring of patients with hyperlipidemia and/or statin therapy  Direct Measure LDL (NOT076) must be ordered separately in these patients  TRIGLYCERIDES 94 mg/dL  <=150   Specimen collection should occur prior to N-Acetylcysteine or Metamizole administration due to the potential for falsely depressed results       (1) PHOSPHORUS [id number] 08:06AM [facility] Order Number: [id number]     Test Name Result Flag Reference   PHOSPHORUS 4 1 mg/dL  2 3-4 1     (1) PTH N-TERMINAL (INTACT) [id number] 08:06AM [facility] Order Number: [id number]     Test Name Result Flag Reference   PARATHYROID HORMONE INTACT 53 4 pg/mL  14 0-72 0     (1) URINE PROTEIN CREATININE RATIO [id number] 08:06AM [facility] Order Number: [id number]     Test Name Result Flag Reference   CREATININE URINE 114 0 mg/dL     URINE PROTEIN:CREATININE RATIO 0 87 H 0 00-0 10   URINE PROTEIN 99 mg/dL       (1) COMPREHENSIVE METABOLIC PANEL [id number] 56:78ZJ Jeffery Da Silva     Test Name Result Flag Reference   SODIUM 140 mmol/L  136-145       Thank you very much for allowing me to participate in the care of this patient  If you have any questions, please do not hesitate to contact me  Future Appointments    Date/Time Provider Specialty Site   12/21/2017 01:20 PM NICOL Hicks   Cardiology [address]       Signatures   Electronically signed by : NICOL Moran ; Dec  7 2017  3:01PM EST                       (Author)

## 2017-12-11 ENCOUNTER — HOSPITAL ENCOUNTER (OUTPATIENT)
Dept: RADIOLOGY | Facility: HOSPITAL | Age: 82
Discharge: HOME | End: 2017-12-11
Attending: INTERNAL MEDICINE
Payer: MEDICARE

## 2017-12-11 ENCOUNTER — GENERIC CONVERSION - ENCOUNTER (OUTPATIENT)
Dept: OTHER | Facility: OTHER | Age: 82
End: 2017-12-11

## 2017-12-11 ENCOUNTER — TRANSCRIBE ORDERS (OUTPATIENT)
Dept: ADMINISTRATIVE | Facility: HOSPITAL | Age: 82
End: 2017-12-11

## 2017-12-11 DIAGNOSIS — R06.2 WHEEZING: Primary | ICD-10-CM

## 2017-12-11 PROCEDURE — 71020 HB CHEST X-RAY 2VW FRONTAL&LATL: CPT

## 2017-12-12 ENCOUNTER — ALLSCRIPTS OFFICE VISIT (OUTPATIENT)
Dept: OTHER | Facility: OTHER | Age: 82
End: 2017-12-12

## 2017-12-28 ENCOUNTER — ALLSCRIPTS OFFICE VISIT (OUTPATIENT)
Dept: OTHER | Facility: OTHER | Age: 82
End: 2017-12-28

## 2018-01-02 NOTE — PROGRESS NOTES
Assessment   1  Benign hypertension with chronic kidney disease, stage III (403 10,585 3) (I12 9,N18 3)   2  Orthostatic hypotension (458 0) (I95 1)    Plan   Continue home blood pressure monitoring  Position cuff as instructed to obtain accurate readings  The cuff needs to be positioned slightly differently when used on the right arm  Bring in readings to next appointment with Dr Jenni Crum     Please call if any change in condition such as dizziness or lightheadedness with standing      Discussion/Summary      #1  Chronic kidney disease stage III: Renal function stable  Baseline creatinine appears to be between 1 7-1 9  Creatinine remains at baseline excretion well-controlled  UPCR 0 87   Hypertension: Home device correlated fairly well  Systolic reading was 8 points lower than office reading but diastolic reading correlated well  instructed the patient to make sure she positions the cuff properly for use on the right arm reviewed the proper technique of obtaining home blood pressure readings blood pressure readings were averaged: A m  sitting 136/71, a m  standing 123/65  P m  sitting 139/73, p m  131/71 pressure adequately controlled  Mild, asymptomatic orthostasis  No changes made to medications  Irregular heart rhythm, moderate aortic stenosis, history of diastolic CHF: status adequate  diuretic dose appears adequate seen by cardiology  Irregular heart rhythm due to sinus arrhythmia, PACs further cardiac workup indicated   Electrolytes: No abnormalities noted on recent labs  Potassium 4 4-4   CVAâatheroembolic/hypertension, MGUSâfollows with hematology Dr Too Abel, history of DVT lower extremity + gastrocnemius vein  The patient, patient's family was counseled regarding instructions for management,-- risk factor reductions,-- patient and family education,-- importance of compliance with treatment  total time of encounter was 25 minutes-- and-- 10 minutes was spent counseling      The patient has the current Goals: Remain independent  The patent has the current Barriers: Advanced age, multiple comorbidities  Patient is unable to Self-Care: Patient agrees and allows to involve family/caregiver in development of care plan:    Educational resources provided: Proper method of obtaining blood pressure readings  Possible side effects of new medications were reviewed with the patient/guardian today  The treatment plan was reviewed with the patient/guardian  The patient/guardian understands and agrees with the treatment plan    She has no barriers to learning  CKD Teaching includes hypertension management, Avoid nephrotoxic medication and sodium restriction  Current Patient Status: no worsening of renal function  Self Referrals: Yes Primary care physician, cardiology, ophthalmology      Reason For Visit   Follow-up      History of Present Illness   The patient is an 70-year-old female who is followed by Dr Luis Zepeda for management of chronic kidney disease and hypertension  her last office visit in December Dr Jh Couch decreased amlodipine to 5 mg daily  She was referred to cardiology for irregular heart rhythm  She had follow-up with Dr Trice Mccollum who did not feel any further cardiac workup was necessary  He noted that the irregular heart rhythm was related to sinus arrhythmia and PACs  Wheezing was chronic with no clinical significance  patient was seen with her daughter in attendance  She brought her home device in for correlation  I did not detect any wheezing either audibly or with the stethoscope on physical exam  She has persistent left lower extremity edema which she states is chronic  She has had no recent chest pain or unusual shortness of breath  Her only complaint was eye floaters which were evaluated by her eye doctor-according to Enzo Brunson he found no abnormality  She denies dizziness or lightheadedness with standing   is looking forward to going to [place] for five weeks to spend time with her other daughter  Review of Systems        Constitutional: No complaints of fever, no chills, no anorexia, no tiredness, no recent weight gain or weight loss  Integumentary: No complaints of skin rash  Gastrointestinal: No complains of abdominal pain, no constipation or diarrhea, no nausea or vomiting  Respiratory: No complaints of shortness of breath, no cough, no productive sputum  Cardiovascular: lower extremity edema, but-- no orthopnea,-- as noted in HPI,-- no chest pain-- and-- no palpitations  Musculoskeletal: joint pain, but-- no joint swelling  Neurological: No complaints of headache, no lightheadedness or dizziness  Genitourinary: No dysuria, no hematuria, no nocturia, no urinary frequency, no incomplete emptying of bladder, no foamy urine  Eyes: as noted in HPI       ENT: no complaints of hearing loss, no nasal discharge  Psychiatric: Not suicidal, no sleep disturbance, no anxiety or depression, no change in personality, no emotional problems  Past Medical History      The active problems and past medical history were reviewed and updated today  Surgical History      The surgical history was reviewed and updated today  Family History      The family history was reviewed and updated today  Social History   The social history was reviewed and updated today  The social history was reviewed and is unchanged  Current Meds    1  AmLODIPine Besylate 5 MG Oral Tablet; TAKE 1 TABLET ONCE  DAILY  IN THE     MORNING; Therapy: 67Ksc7791 to ([facility])  Requested for: 29VCQ7830; Last     Rx:23Mvj3212 Ordered   2  Bystolic 5 MG Oral Tablet; TAKE 1 TABLET DAILY; Therapy: 67Bsc7088 to (Evaluate:73Hjz8762)  Requested for: 85Jlg5241; Last     Rx:52Mom0856 Ordered   3  CoQ10 200 MG Oral Capsule; TAKE 1 CAPSULE Daily Recorded   4  Ocuvite TABS; Take 1 tablet daily Recorded   5  Oscal 500/200 D-3 TABS; TAKE 2 TABLETS DAILY Recorded   6  Pantoprazole Sodium 40 MG Oral Tablet Delayed Release; TAKE 1 TABLET DAILY; Therapy: (Recorded:40Vhb0998) to Recorded   7  Plavix 75 MG Oral Tablet; TAKE 1 TABLET DAILY; Therapy: (Recorded:01Zmo9615) to Recorded   8  Stool Softener TABS; Therapy: (Recorded:08Tss4989) to Recorded   9  Torsemide 10 MG Oral Tablet; TAKE 1 TABLET DAILY; Therapy: 49Ucd2989 to (Evaluate:13Jan2018)  Requested for: 56Yhd8377; Last     Rx:66Qlc4681 Ordered   10  Welchol 625 MG Oral Tablet; TAKE 3 TABLETS TWICE DAILY  Requested for: 61Yjl4395;      Last Rx:17Xls5143 Ordered     The medication list was reviewed and updated today  Allergies   1  Lyrica SOLN    Vitals   Vital Signs    ** Printed in Appendix #1 below  Physical Exam        Constitutional: General appearance: No acute distress, well appearing and well nourished  ENT: External ears and nose appear normal          Eyes: Anicteric sclerae  Neck: No bruit heard over either carotid  JVD:  No JVD present  Pulmonary: Respiratory effort: No increased work of breathing or signs of respiratory distress  -- Auscultation of lungs: Clear to auscultation  Cardiovascular: Auscultation of heart: Abnormal   The heart rate was normal  The rhythm was regular with premature beats  Heart sounds: normal S1,-- normal S2-- and-- no S3  Abdomen: Non-tender, no masses  Extremities: Extremities are abnormal      Extremities: right extremity has trace pitting edema-- and-- left extremity has 1+ pitting edema  Pulses: Dorsalis Pedis and Posterior Tibial pulses normal       Rash: No rash present  Neurologic: Non Focal          Psychiatric: Orientation to person, place, and time: Normal  -- and-- Mood and affect: Normal        Back: No CVA tenderness        Signatures    Electronically signed by : Jose Carlos Miranda 28 2017  4:56PM EST                       (Author)     Electronically signed by : NICOL Robles ; Jan 1 2018  1:16PM EST                           Appendix #1          Vital Signs   Patient: Lorine Osler; : 1928; MRN: [id number]           Recorded: 65OXK7573 04:29PM Recorded: 34JAS5561 04:27PM Recorded: 44KZI8642 04:01PM   Heart Rate  62, Apical    Respiration Quality  Normal    Systolic 393, RUE, Standing 138, RUE, Sitting    Diastolic 64, RUE, Standing 62, RUE, Sitting    BP Comments  Home device 130/69    Height   5 ft 4 in   Weight   164 lb    BMI Calculated   28 15   BSA Calculated   1 8

## 2018-01-10 NOTE — RESULT NOTES
Message   Lung scan was low likelihood for blood clots  Notify patient  Verified Results  NM LUNG VENTILATION / PERFUSION 24Xrf2481 03:04PM Renato Valerio Order Number: [id number]    - Patient Instructions: To schedule this appointment, please contact Central Scheduling at [phone]  Test Name Result Flag Reference   NM PULMONARY VENTILATION / PERFUSION (Report)     LUNG SCAN (AEROSOL, VENTILATION AND PERFUSION)     INDICATION: Shortness of breath     COMPARISON: 10/20/2008     TECHNIQUE:  The aerosol study was performed with the patient breathing 30 4 mCi of nebulized Tc-99m DTPA with deposition of less than 1 mCi of activity within the lungs  The perfusion study was next performed following intravenous administration of 4 1   mCi of Tc-99m MAA  FINDINGS: Ventilation images demonstrate markedly heterogeneous ventilation, with multiple large defects bilaterally  Central deposition of the radiotracer is also noted  Perfusion images demonstrate markedly heterogeneous perfusion bilaterally as    well, matching the pattern of ventilation  The recent chest radiograph is unremarkable  IMPRESSION:   1  Low probability for pulmonary emboli  However given the markedly heterogeneous radiotracer uptake, if there is a strong clinical concern for pulmonary emboli, CTA chest evaluation may be more sensitive         Workstation performed: [id number]     Signed by:   Malika Rock MD   12/30/16

## 2018-01-13 VITALS
HEART RATE: 68 BPM | BODY MASS INDEX: 28.17 KG/M2 | DIASTOLIC BLOOD PRESSURE: 68 MMHG | SYSTOLIC BLOOD PRESSURE: 132 MMHG | HEIGHT: 64 IN | WEIGHT: 165 LBS

## 2018-01-13 VITALS
HEIGHT: 64 IN | DIASTOLIC BLOOD PRESSURE: 70 MMHG | WEIGHT: 174 LBS | HEART RATE: 64 BPM | SYSTOLIC BLOOD PRESSURE: 140 MMHG | BODY MASS INDEX: 29.71 KG/M2

## 2018-01-13 NOTE — MISCELLANEOUS
Message   Recorded as Task   Date: 08/16/2016 02:16 PM, Created By: Alcon Fitzgerald   Task Name: Follow Up   Assigned To: Patrick Yuan   Regarding Patient: Sara Canales, Status: Active   CommentLarissa Connersville - 16 Aug 2016 2:16 PM     TASK CREATED  increase the amlodipine to 5mg am and 2 5mg pm  bp for 1 week am and pm and sitting and standing in 2 weeks   Spoke to pt and informed about the above  PL      Active Problems    1  Anemia (285 9) (D64 9)   2  Arthritis, gouty (274 00) (M10 00)   3  Chronic kidney disease, stage 3 (585 3) (N18 3)   4  Dyslipidemia (272 4) (E78 5)   5  Hypertension (401 9) (I10)   6  Hyponatremia (276 1) (E87 1)   7  Proteinuria (791 0) (R80 9)   8  Vitamin D deficiency (268 9) (E55 9)    Current Meds   1  Acetaminophen 500 MG Oral Tablet; TAKE 1 TABLET EVERY 4 TO 6 HOURS AS   NEEDED Recorded   2  AmLODIPine Besylate 2 5 MG Oral Tablet; TAKE 1 TABLET TWICE DAILY; Therapy: 05Sfd4179 to (Evaluate:29Jan2017)  Requested for: 50Jka1422; Last   Rx:02Aug2016 Ordered   3  Bystolic 10 MG Oral Tablet; Take 1 tablet daily; Therapy: 14Zcl5450 to (Evaluate:29Jan2017); Last Rx:95Rmf3047 Ordered   4  CoQ10 200 MG Oral Capsule; TAKE 1 CAPSULE Daily Recorded   5  Ecotrin Low Strength 81 MG Oral Tablet Delayed Release; Take 1 tablet daily Recorded   6  Ocuvite TABS; Take 1 tablet daily Recorded   7  Oscal 500/200 D-3 TABS; TAKE 2 TABLETS DAILY Recorded   8  Torsemide 5 MG Oral Tablet; Take 1 tablet daily; Therapy: 15ZQQ2661 to (CRKAEFBV:53JRF3946)  Requested for: 27WOY8175; Last   Rx:19Jan2016 Ordered   9  Vitamin D3 2000 UNIT Oral Capsule; Therapy: (Recorded:07Oxs0355) to Recorded   10  Welchol 625 MG Oral Tablet; TAKE 3 TABLETS TWICE DAILY  Requested for:    79TQN5285; Last Rx:14Jan2016 Ordered    Allergies    1   Lyrica SOLN    Signatures   Electronically signed by : NICOL Jasmine ; Aug 16 2016  2:23PM EST

## 2018-01-14 VITALS
BODY MASS INDEX: 29.55 KG/M2 | WEIGHT: 173.1 LBS | OXYGEN SATURATION: 93 % | HEART RATE: 60 BPM | SYSTOLIC BLOOD PRESSURE: 140 MMHG | DIASTOLIC BLOOD PRESSURE: 80 MMHG | HEIGHT: 64 IN

## 2018-01-14 VITALS
HEIGHT: 64 IN | BODY MASS INDEX: 29.41 KG/M2 | DIASTOLIC BLOOD PRESSURE: 90 MMHG | HEART RATE: 60 BPM | WEIGHT: 172.25 LBS | SYSTOLIC BLOOD PRESSURE: 200 MMHG

## 2018-01-15 VITALS
WEIGHT: 174 LBS | BODY MASS INDEX: 29.71 KG/M2 | HEART RATE: 64 BPM | HEIGHT: 64 IN | SYSTOLIC BLOOD PRESSURE: 156 MMHG | DIASTOLIC BLOOD PRESSURE: 87 MMHG

## 2018-01-15 NOTE — MISCELLANEOUS
Message   Recorded as Task   Date: 10/10/2016 09:20 AM, Created By: Enrique Back   Task Name: Follow Up   Assigned To: Enrique Back   Regarding Patient: Oneyda Ferro, Status: Active   Comment:    Enriquevamsi Back - 10 Oct 2016 9:20 AM     TASK CREATED  Pt called with her BP readings: (All AM readings)  Sittin/47   P62  Standin/48     P67  Sittin/59   P57  Sittin/60  P63  Standin/58  P65  Sittin/51  P66  Standin/56  P69    AVG: Sittin/54         Standin/54   Hayden Dawn - 11 Oct 2016 10:39 AM     TASK REPLIED TO: Previously Assigned To Hayden Dawn  Reduce the amlodipine at 2 5 mg in the evening only  Repeat blood pressures sitting and standing in about 4 weeks   I spoke to pt and informed about the above  PL      Active Problems    1  Anemia (285 9) (D64 9)   2  Arthritis, gouty (274 00) (M10 9)   3  Chronic kidney disease, stage 3 (585 3) (N18 3)   4  Dyslipidemia (272 4) (E78 5)   5  Hypertension (401 9) (I10)   6  Hyponatremia (276 1) (E87 1)   7  Proteinuria (791 0) (R80 9)   8  Vitamin D deficiency (268 9) (E55 9)    Current Meds   1  Acetaminophen 500 MG Oral Tablet; TAKE 1 TABLET EVERY 4 TO 6 HOURS AS   NEEDED Recorded   2  AmLODIPine Besylate 2 5 MG Oral Tablet; Take 1 Tablet in the Evening; Therapy: 27Vuz2092 to (Evaluate:2017)  Requested for: 93TTM0020 Recorded   3  Bystolic 10 MG Oral Tablet; Take 1 tablet daily; Therapy: 78Qvz0537 to (Evaluate:30Bfw9493)  Requested for: 33Kga0769; Last   Rx:74Cgj4908 Ordered   4  CoQ10 200 MG Oral Capsule; TAKE 1 CAPSULE Daily Recorded   5  Ecotrin Low Strength 81 MG Oral Tablet Delayed Release; Take 1 tablet daily Recorded   6  Ocuvite TABS; Take 1 tablet daily Recorded   7  Oscal 500/200 D-3 TABS; TAKE 2 TABLETS DAILY Recorded   8  Torsemide 5 MG Oral Tablet; Take 1 tablet daily;    Therapy: 23UNZ2665 to (DPKEJSBV:35TUV3568)  Requested for: 66PJW8868; Last Rx:19Jan2016 Ordered   9  Vitamin D3 2000 UNIT Oral Capsule; Therapy: (Recorded:23Xps0475) to Recorded   10  Welchol 625 MG Oral Tablet; TAKE 3 TABLETS TWICE DAILY  Requested for:    73GNT5830; Last Rx:14Jan2016 Ordered    Allergies    1   Lyrica SOLN    Signatures   Electronically signed by : NICOL oRjo ; Oct 12 2016  2:44PM EST

## 2018-01-22 VITALS
BODY MASS INDEX: 28.17 KG/M2 | OXYGEN SATURATION: 97 % | DIASTOLIC BLOOD PRESSURE: 79 MMHG | HEART RATE: 58 BPM | WEIGHT: 165 LBS | HEIGHT: 64 IN | SYSTOLIC BLOOD PRESSURE: 110 MMHG

## 2018-01-23 VITALS
BODY MASS INDEX: 28.85 KG/M2 | WEIGHT: 169 LBS | HEART RATE: 96 BPM | SYSTOLIC BLOOD PRESSURE: 102 MMHG | HEIGHT: 64 IN | DIASTOLIC BLOOD PRESSURE: 64 MMHG

## 2018-01-23 VITALS
SYSTOLIC BLOOD PRESSURE: 118 MMHG | DIASTOLIC BLOOD PRESSURE: 64 MMHG | HEIGHT: 64 IN | WEIGHT: 164 LBS | HEART RATE: 62 BPM | BODY MASS INDEX: 28 KG/M2

## 2018-01-29 ENCOUNTER — TELEPHONE (OUTPATIENT)
Dept: NEPHROLOGY | Facility: CLINIC | Age: 83
End: 2018-01-29

## 2018-01-29 NOTE — TELEPHONE ENCOUNTER
Enzo Brunson called complaining of her cholesterol medication binding her up and wants to know if she can take miralax? Please call her back

## 2018-03-01 DIAGNOSIS — D64.9 ANEMIA: ICD-10-CM

## 2018-03-01 DIAGNOSIS — R80.9 PROTEINURIA: ICD-10-CM

## 2018-03-01 DIAGNOSIS — I12.9 HYPERTENSIVE CHRONIC KIDNEY DISEASE WITH STAGE 1 THROUGH STAGE 4 CHRONIC KIDNEY DISEASE, OR UNSPECIFIED CHRONIC KIDNEY DISEASE: ICD-10-CM

## 2018-03-01 DIAGNOSIS — N18.30 CHRONIC KIDNEY DISEASE, STAGE III (MODERATE) (HCC): ICD-10-CM

## 2018-03-01 DIAGNOSIS — E87.70 FLUID OVERLOAD: ICD-10-CM

## 2018-03-01 DIAGNOSIS — E55.9 VITAMIN D DEFICIENCY: ICD-10-CM

## 2018-03-01 DIAGNOSIS — E78.5 HYPERLIPIDEMIA: ICD-10-CM

## 2018-03-06 ENCOUNTER — APPOINTMENT (INPATIENT)
Dept: RADIOLOGY | Facility: HOSPITAL | Age: 83
DRG: 208 | End: 2018-03-06
Payer: MEDICARE

## 2018-03-06 ENCOUNTER — HOSPITAL ENCOUNTER (INPATIENT)
Facility: HOSPITAL | Age: 83
LOS: 6 days | Discharge: NURSING, REHAB OR LONG-TERM CARE | DRG: 208 | End: 2018-03-12
Attending: EMERGENCY MEDICINE | Admitting: INTERNAL MEDICINE
Payer: MEDICARE

## 2018-03-06 ENCOUNTER — APPOINTMENT (EMERGENCY)
Dept: RADIOLOGY | Facility: HOSPITAL | Age: 83
DRG: 208 | End: 2018-03-06
Payer: MEDICARE

## 2018-03-06 DIAGNOSIS — J96.90 RESPIRATORY FAILURE (HCC): ICD-10-CM

## 2018-03-06 DIAGNOSIS — I50.31 ACUTE DIASTOLIC CHF (CONGESTIVE HEART FAILURE) (HCC): ICD-10-CM

## 2018-03-06 DIAGNOSIS — I50.9 CONGESTIVE HEART FAILURE, UNSPECIFIED CONGESTIVE HEART FAILURE CHRONICITY, UNSPECIFIED CONGESTIVE HEART FAILURE TYPE: ICD-10-CM

## 2018-03-06 DIAGNOSIS — N17.9 AKI (ACUTE KIDNEY INJURY) (HCC): ICD-10-CM

## 2018-03-06 DIAGNOSIS — I50.9 CONGESTIVE HEART FAILURE (CHF) (HCC): Primary | ICD-10-CM

## 2018-03-06 DIAGNOSIS — I50.33 ACUTE ON CHRONIC DIASTOLIC CHF (CONGESTIVE HEART FAILURE) (HCC): ICD-10-CM

## 2018-03-06 DIAGNOSIS — K59.00 CONSTIPATION: ICD-10-CM

## 2018-03-06 LAB
ALBUMIN SERPL BCP-MCNC: 2.6 G/DL (ref 3.5–5)
ALP SERPL-CCNC: 56 U/L (ref 46–116)
ALT SERPL W P-5'-P-CCNC: 18 U/L (ref 12–78)
ANION GAP SERPL CALCULATED.3IONS-SCNC: 5 MMOL/L (ref 4–13)
APTT PPP: 22 SECONDS (ref 23–35)
AST SERPL W P-5'-P-CCNC: 14 U/L (ref 5–45)
BILIRUB SERPL-MCNC: 0.3 MG/DL (ref 0.2–1)
BILIRUB UR QL STRIP: NEGATIVE
BUN SERPL-MCNC: 58 MG/DL (ref 5–25)
CALCIUM SERPL-MCNC: 9.3 MG/DL (ref 8.3–10.1)
CHLORIDE SERPL-SCNC: 108 MMOL/L (ref 100–108)
CLARITY UR: CLEAR
CO2 SERPL-SCNC: 29 MMOL/L (ref 21–32)
COLOR UR: NORMAL
CREAT SERPL-MCNC: 2.1 MG/DL (ref 0.6–1.3)
GFR SERPL CREATININE-BSD FRML MDRD: 20 ML/MIN/1.73SQ M
GLUCOSE SERPL-MCNC: 139 MG/DL (ref 65–140)
GLUCOSE SERPL-MCNC: 152 MG/DL (ref 65–140)
GLUCOSE UR STRIP-MCNC: NEGATIVE MG/DL
HGB UR QL STRIP.AUTO: NEGATIVE
INR PPP: 1.1 (ref 0.86–1.16)
KETONES UR STRIP-MCNC: NEGATIVE MG/DL
LEUKOCYTE ESTERASE UR QL STRIP: NEGATIVE
NITRITE UR QL STRIP: NEGATIVE
NT-PROBNP SERPL-MCNC: 2867 PG/ML
PH UR STRIP.AUTO: 5.5 [PH] (ref 5–9)
POTASSIUM SERPL-SCNC: 4.6 MMOL/L (ref 3.5–5.3)
PROT SERPL-MCNC: 7.1 G/DL (ref 6.4–8.2)
PROT UR STRIP-MCNC: NEGATIVE MG/DL
PROTHROMBIN TIME: 11.6 SECONDS (ref 9.4–11.7)
SODIUM SERPL-SCNC: 142 MMOL/L (ref 136–145)
SP GR UR STRIP.AUTO: 1.01 (ref 1–1.03)
TROPONIN I SERPL-MCNC: <0.02 NG/ML
UROBILINOGEN UR QL STRIP.AUTO: 0.2 E.U./DL

## 2018-03-06 PROCEDURE — 85730 THROMBOPLASTIN TIME PARTIAL: CPT | Performed by: EMERGENCY MEDICINE

## 2018-03-06 PROCEDURE — 93005 ELECTROCARDIOGRAM TRACING: CPT

## 2018-03-06 PROCEDURE — 71045 X-RAY EXAM CHEST 1 VIEW: CPT

## 2018-03-06 PROCEDURE — 36415 COLL VENOUS BLD VENIPUNCTURE: CPT | Performed by: EMERGENCY MEDICINE

## 2018-03-06 PROCEDURE — 70450 CT HEAD/BRAIN W/O DYE: CPT

## 2018-03-06 PROCEDURE — 82805 BLOOD GASES W/O2 SATURATION: CPT | Performed by: EMERGENCY MEDICINE

## 2018-03-06 PROCEDURE — 85610 PROTHROMBIN TIME: CPT | Performed by: EMERGENCY MEDICINE

## 2018-03-06 PROCEDURE — 80053 COMPREHEN METABOLIC PANEL: CPT | Performed by: EMERGENCY MEDICINE

## 2018-03-06 PROCEDURE — 96374 THER/PROPH/DIAG INJ IV PUSH: CPT

## 2018-03-06 PROCEDURE — 94760 N-INVAS EAR/PLS OXIMETRY 1: CPT

## 2018-03-06 PROCEDURE — 99292 CRITICAL CARE ADDL 30 MIN: CPT

## 2018-03-06 PROCEDURE — 83880 ASSAY OF NATRIURETIC PEPTIDE: CPT | Performed by: EMERGENCY MEDICINE

## 2018-03-06 PROCEDURE — 84484 ASSAY OF TROPONIN QUANT: CPT | Performed by: EMERGENCY MEDICINE

## 2018-03-06 PROCEDURE — 82948 REAGENT STRIP/BLOOD GLUCOSE: CPT

## 2018-03-06 PROCEDURE — 81003 URINALYSIS AUTO W/O SCOPE: CPT | Performed by: EMERGENCY MEDICINE

## 2018-03-06 PROCEDURE — 94002 VENT MGMT INPAT INIT DAY: CPT

## 2018-03-06 RX ORDER — ETOMIDATE 2 MG/ML
20 INJECTION INTRAVENOUS ONCE
Status: COMPLETED | OUTPATIENT
Start: 2018-03-06 | End: 2018-03-06

## 2018-03-06 RX ORDER — CARVEDILOL 25 MG/1
25 TABLET ORAL 2 TIMES DAILY WITH MEALS
COMMUNITY
End: 2018-04-04 | Stop reason: HOSPADM

## 2018-03-06 RX ORDER — SUCCINYLCHOLINE CHLORIDE 20 MG/ML
100 INJECTION INTRAMUSCULAR; INTRAVENOUS ONCE
Status: COMPLETED | OUTPATIENT
Start: 2018-03-06 | End: 2018-03-06

## 2018-03-06 RX ORDER — BUDESONIDE AND FORMOTEROL FUMARATE DIHYDRATE 160; 4.5 UG/1; UG/1
2 AEROSOL RESPIRATORY (INHALATION) 2 TIMES DAILY
COMMUNITY
End: 2018-05-17 | Stop reason: ALTCHOICE

## 2018-03-06 RX ORDER — FUROSEMIDE 10 MG/ML
40 INJECTION INTRAMUSCULAR; INTRAVENOUS ONCE
Status: COMPLETED | OUTPATIENT
Start: 2018-03-06 | End: 2018-03-06

## 2018-03-06 RX ORDER — PROPOFOL 10 MG/ML
20 INJECTION, EMULSION INTRAVENOUS ONCE
Status: COMPLETED | OUTPATIENT
Start: 2018-03-07 | End: 2018-03-07

## 2018-03-06 RX ORDER — PROPOFOL 10 MG/ML
INJECTION, EMULSION INTRAVENOUS
Status: COMPLETED
Start: 2018-03-06 | End: 2018-03-06

## 2018-03-06 RX ORDER — PROPOFOL 10 MG/ML
5-50 INJECTION, EMULSION INTRAVENOUS
Status: DISCONTINUED | OUTPATIENT
Start: 2018-03-06 | End: 2018-03-07

## 2018-03-06 RX ORDER — TORSEMIDE 10 MG/1
10 TABLET ORAL
COMMUNITY
End: 2018-03-12 | Stop reason: HOSPADM

## 2018-03-06 RX ADMIN — Medication 100 MG: at 23:33

## 2018-03-06 RX ADMIN — PROPOFOL 5 MCG/KG/MIN: 10 INJECTION, EMULSION INTRAVENOUS at 23:40

## 2018-03-06 RX ADMIN — FUROSEMIDE 40 MG: 10 INJECTION, SOLUTION INTRAMUSCULAR; INTRAVENOUS at 21:44

## 2018-03-06 RX ADMIN — ETOMIDATE 20 MG: 20 INJECTION, SOLUTION INTRAVENOUS at 23:33

## 2018-03-07 PROBLEM — J96.22 ACUTE ON CHRONIC RESPIRATORY FAILURE WITH HYPOXIA AND HYPERCAPNIA (HCC): Status: ACTIVE | Noted: 2018-03-07

## 2018-03-07 PROBLEM — I82.402 ACUTE DEEP VEIN THROMBOSIS (DVT) OF LEFT LOWER EXTREMITY (HCC): Status: ACTIVE | Noted: 2018-03-07

## 2018-03-07 PROBLEM — J96.21 ACUTE ON CHRONIC RESPIRATORY FAILURE WITH HYPOXIA AND HYPERCAPNIA (HCC): Status: ACTIVE | Noted: 2018-03-07

## 2018-03-07 PROBLEM — J96.02 ACUTE RESPIRATORY FAILURE WITH HYPOXIA AND HYPERCAPNIA (HCC): Status: ACTIVE | Noted: 2018-03-07

## 2018-03-07 PROBLEM — L03.116 CELLULITIS OF LEFT ANTERIOR LOWER LEG: Status: RESOLVED | Noted: 2017-08-27 | Resolved: 2018-03-07

## 2018-03-07 PROBLEM — G92.8 TOXIC METABOLIC ENCEPHALOPATHY: Status: ACTIVE | Noted: 2018-03-07

## 2018-03-07 PROBLEM — I50.33 ACUTE ON CHRONIC DIASTOLIC CHF (CONGESTIVE HEART FAILURE) (HCC): Status: ACTIVE | Noted: 2018-03-07

## 2018-03-07 PROBLEM — J96.01 ACUTE RESPIRATORY FAILURE WITH HYPOXIA AND HYPERCAPNIA (HCC): Status: ACTIVE | Noted: 2018-03-07

## 2018-03-07 LAB
ANION GAP SERPL CALCULATED.3IONS-SCNC: 6 MMOL/L (ref 4–13)
ANION GAP SERPL CALCULATED.3IONS-SCNC: 7 MMOL/L (ref 4–13)
ATRIAL RATE: 67 BPM
BASE EXCESS BLDA CALC-SCNC: -0.7 MMOL/L
BASOPHILS # BLD AUTO: 0 THOUSANDS/ΜL (ref 0–0.1)
BASOPHILS # BLD AUTO: 0 THOUSANDS/ΜL (ref 0–0.1)
BASOPHILS NFR BLD AUTO: 0 % (ref 0–1)
BASOPHILS NFR BLD AUTO: 1 % (ref 0–1)
BODY TEMPERATURE: 98 DEGREES FEHRENHEIT
BUN SERPL-MCNC: 52 MG/DL (ref 5–25)
BUN SERPL-MCNC: 54 MG/DL (ref 5–25)
CALCIUM SERPL-MCNC: 8.4 MG/DL (ref 8.3–10.1)
CALCIUM SERPL-MCNC: 8.5 MG/DL (ref 8.3–10.1)
CHLORIDE SERPL-SCNC: 106 MMOL/L (ref 100–108)
CHLORIDE SERPL-SCNC: 108 MMOL/L (ref 100–108)
CO2 SERPL-SCNC: 25 MMOL/L (ref 21–32)
CO2 SERPL-SCNC: 31 MMOL/L (ref 21–32)
CREAT SERPL-MCNC: 1.95 MG/DL (ref 0.6–1.3)
CREAT SERPL-MCNC: 1.97 MG/DL (ref 0.6–1.3)
EOSINOPHIL # BLD AUTO: 0.1 THOUSAND/ΜL (ref 0–0.61)
EOSINOPHIL # BLD AUTO: 0.1 THOUSAND/ΜL (ref 0–0.61)
EOSINOPHIL NFR BLD AUTO: 2 % (ref 0–6)
EOSINOPHIL NFR BLD AUTO: 3 % (ref 0–6)
ERYTHROCYTE [DISTWIDTH] IN BLOOD BY AUTOMATED COUNT: 14.4 % (ref 11.6–15.1)
ERYTHROCYTE [DISTWIDTH] IN BLOOD BY AUTOMATED COUNT: 14.9 % (ref 11.6–15.1)
GFR SERPL CREATININE-BSD FRML MDRD: 22 ML/MIN/1.73SQ M
GFR SERPL CREATININE-BSD FRML MDRD: 22 ML/MIN/1.73SQ M
GLUCOSE SERPL-MCNC: 128 MG/DL (ref 65–140)
GLUCOSE SERPL-MCNC: 163 MG/DL (ref 65–140)
HCO3 BLDA-SCNC: 28.3 MMOL/L (ref 22–28)
HCT VFR BLD AUTO: 35.6 % (ref 37–47)
HCT VFR BLD AUTO: 36.5 % (ref 37–47)
HGB BLD-MCNC: 11.5 G/DL (ref 12–16)
HGB BLD-MCNC: 11.6 G/DL (ref 12–16)
LG PLATELETS BLD QL SMEAR: PRESENT
LYMPHOCYTES # BLD AUTO: 0.7 THOUSANDS/ΜL (ref 0.6–4.47)
LYMPHOCYTES # BLD AUTO: 0.9 THOUSANDS/ΜL (ref 0.6–4.47)
LYMPHOCYTES NFR BLD AUTO: 13 % (ref 14–44)
LYMPHOCYTES NFR BLD AUTO: 20 % (ref 14–44)
MAGNESIUM SERPL-MCNC: 2.2 MG/DL (ref 1.6–2.6)
MAGNESIUM SERPL-MCNC: 2.3 MG/DL (ref 1.6–2.6)
MCH RBC QN AUTO: 30 PG (ref 27–31)
MCH RBC QN AUTO: 30.7 PG (ref 27–31)
MCHC RBC AUTO-ENTMCNC: 31.6 G/DL (ref 31.4–37.4)
MCHC RBC AUTO-ENTMCNC: 32.6 G/DL (ref 31.4–37.4)
MCV RBC AUTO: 94 FL (ref 82–98)
MCV RBC AUTO: 95 FL (ref 82–98)
MONOCYTES # BLD AUTO: 0.4 THOUSAND/ΜL (ref 0.17–1.22)
MONOCYTES # BLD AUTO: 0.4 THOUSAND/ΜL (ref 0.17–1.22)
MONOCYTES NFR BLD AUTO: 6 % (ref 4–12)
MONOCYTES NFR BLD AUTO: 9 % (ref 4–12)
NEUTROPHILS # BLD AUTO: 3.1 THOUSANDS/ΜL (ref 1.85–7.62)
NEUTROPHILS # BLD AUTO: 4.6 THOUSANDS/ΜL (ref 1.85–7.62)
NEUTS SEG NFR BLD AUTO: 68 % (ref 43–75)
NEUTS SEG NFR BLD AUTO: 79 % (ref 43–75)
NRBC BLD AUTO-RTO: 0 /100 WBCS
O2 CT BLDA-SCNC: 17.5 ML/DL (ref 16–23)
OXYHGB MFR BLDA: 98.4 % (ref 94–97)
P AXIS: 31 DEGREES
PCO2 BLDA: 69.5 MM HG (ref 36–44)
PCO2 TEMP ADJ BLDA: 68.6 MM HG (ref 36–44)
PH BLD: 7.23 [PH] (ref 7.35–7.45)
PH BLDA: 7.23 [PH] (ref 7.35–7.45)
PHOSPHATE SERPL-MCNC: 3.7 MG/DL (ref 2.3–4.1)
PLATELET # BLD AUTO: 114 THOUSANDS/UL (ref 130–400)
PLATELET # BLD AUTO: 90 THOUSANDS/UL (ref 130–400)
PLATELET BLD QL SMEAR: ABNORMAL
PMV BLD AUTO: 9.4 FL (ref 8.9–12.7)
PMV BLD AUTO: 9.8 FL (ref 8.9–12.7)
PO2 BLD: 263.8 MM HG (ref 75–129)
PO2 BLDA: 265.2 MM HG (ref 75–129)
POTASSIUM SERPL-SCNC: 3.7 MMOL/L (ref 3.5–5.3)
POTASSIUM SERPL-SCNC: 3.7 MMOL/L (ref 3.5–5.3)
PR INTERVAL: 166 MS
QRS AXIS: -19 DEGREES
QRSD INTERVAL: 146 MS
QT INTERVAL: 424 MS
QTC INTERVAL: 448 MS
RBC # BLD AUTO: 3.78 MILLION/UL (ref 4.2–5.4)
RBC # BLD AUTO: 3.85 MILLION/UL (ref 4.2–5.4)
SMUDGE CELLS BLD QL SMEAR: PRESENT
SODIUM SERPL-SCNC: 138 MMOL/L (ref 136–145)
SODIUM SERPL-SCNC: 145 MMOL/L (ref 136–145)
SPECIMEN SOURCE: ABNORMAL
T WAVE AXIS: -19 DEGREES
VENTRICULAR RATE: 67 BPM
WBC # BLD AUTO: 4.5 THOUSAND/UL (ref 4.8–10.8)
WBC # BLD AUTO: 5.8 THOUSAND/UL (ref 4.8–10.8)

## 2018-03-07 PROCEDURE — 80048 BASIC METABOLIC PNL TOTAL CA: CPT | Performed by: PHYSICIAN ASSISTANT

## 2018-03-07 PROCEDURE — 99291 CRITICAL CARE FIRST HOUR: CPT | Performed by: INTERNAL MEDICINE

## 2018-03-07 PROCEDURE — 0BH18EZ INSERTION OF ENDOTRACHEAL AIRWAY INTO TRACHEA, VIA NATURAL OR ARTIFICIAL OPENING ENDOSCOPIC: ICD-10-PCS | Performed by: INTERNAL MEDICINE

## 2018-03-07 PROCEDURE — 94640 AIRWAY INHALATION TREATMENT: CPT

## 2018-03-07 PROCEDURE — 85025 COMPLETE CBC W/AUTO DIFF WBC: CPT | Performed by: PHYSICIAN ASSISTANT

## 2018-03-07 PROCEDURE — 36415 COLL VENOUS BLD VENIPUNCTURE: CPT | Performed by: PHYSICIAN ASSISTANT

## 2018-03-07 PROCEDURE — 84100 ASSAY OF PHOSPHORUS: CPT | Performed by: PHYSICIAN ASSISTANT

## 2018-03-07 PROCEDURE — 83735 ASSAY OF MAGNESIUM: CPT | Performed by: PHYSICIAN ASSISTANT

## 2018-03-07 PROCEDURE — 5A1935Z RESPIRATORY VENTILATION, LESS THAN 24 CONSECUTIVE HOURS: ICD-10-PCS | Performed by: INTERNAL MEDICINE

## 2018-03-07 PROCEDURE — 93010 ELECTROCARDIOGRAM REPORT: CPT | Performed by: INTERNAL MEDICINE

## 2018-03-07 PROCEDURE — 87081 CULTURE SCREEN ONLY: CPT | Performed by: INTERNAL MEDICINE

## 2018-03-07 PROCEDURE — 94760 N-INVAS EAR/PLS OXIMETRY 1: CPT

## 2018-03-07 PROCEDURE — 99291 CRITICAL CARE FIRST HOUR: CPT

## 2018-03-07 RX ORDER — IPRATROPIUM BROMIDE AND ALBUTEROL SULFATE 2.5; .5 MG/3ML; MG/3ML
3 SOLUTION RESPIRATORY (INHALATION)
Status: DISCONTINUED | OUTPATIENT
Start: 2018-03-07 | End: 2018-03-12 | Stop reason: HOSPADM

## 2018-03-07 RX ORDER — VECURONIUM BROMIDE 1 MG/ML
7 INJECTION, POWDER, LYOPHILIZED, FOR SOLUTION INTRAVENOUS ONCE
Status: COMPLETED | OUTPATIENT
Start: 2018-03-07 | End: 2018-03-07

## 2018-03-07 RX ORDER — FUROSEMIDE 10 MG/ML
40 INJECTION INTRAMUSCULAR; INTRAVENOUS ONCE
Status: COMPLETED | OUTPATIENT
Start: 2018-03-07 | End: 2018-03-07

## 2018-03-07 RX ORDER — CHLORHEXIDINE GLUCONATE 0.12 MG/ML
15 RINSE ORAL EVERY 12 HOURS SCHEDULED
Status: DISCONTINUED | OUTPATIENT
Start: 2018-03-07 | End: 2018-03-08

## 2018-03-07 RX ORDER — CLOPIDOGREL BISULFATE 75 MG/1
75 TABLET ORAL DAILY
Status: DISCONTINUED | OUTPATIENT
Start: 2018-03-07 | End: 2018-03-12 | Stop reason: HOSPADM

## 2018-03-07 RX ORDER — POTASSIUM CHLORIDE 20 MEQ/1
40 TABLET, EXTENDED RELEASE ORAL ONCE
Status: COMPLETED | OUTPATIENT
Start: 2018-03-07 | End: 2018-03-07

## 2018-03-07 RX ADMIN — CHLORHEXIDINE GLUCONATE 15 ML: 1.2 RINSE ORAL at 20:45

## 2018-03-07 RX ADMIN — Medication 72 MG: at 00:34

## 2018-03-07 RX ADMIN — POTASSIUM CHLORIDE 40 MEQ: 1500 TABLET, EXTENDED RELEASE ORAL at 20:45

## 2018-03-07 RX ADMIN — Medication 20 MG: at 08:24

## 2018-03-07 RX ADMIN — CHLORHEXIDINE GLUCONATE 15 ML: 1.2 RINSE ORAL at 02:26

## 2018-03-07 RX ADMIN — CHLORHEXIDINE GLUCONATE 15 ML: 1.2 RINSE ORAL at 08:21

## 2018-03-07 RX ADMIN — VECURONIUM BROMIDE 7 MG: 1 INJECTION, POWDER, LYOPHILIZED, FOR SOLUTION INTRAVENOUS at 00:54

## 2018-03-07 RX ADMIN — APIXABAN 5 MG: 5 TABLET, FILM COATED ORAL at 17:23

## 2018-03-07 RX ADMIN — PROPOFOL 20 MG: 10 INJECTION, EMULSION INTRAVENOUS at 00:00

## 2018-03-07 RX ADMIN — APIXABAN 5 MG: 5 TABLET, FILM COATED ORAL at 08:21

## 2018-03-07 RX ADMIN — IPRATROPIUM BROMIDE AND ALBUTEROL SULFATE 3 ML: .5; 3 SOLUTION RESPIRATORY (INHALATION) at 13:28

## 2018-03-07 RX ADMIN — FUROSEMIDE 40 MG: 10 INJECTION, SOLUTION INTRAMUSCULAR; INTRAVENOUS at 14:36

## 2018-03-07 RX ADMIN — SODIUM CHLORIDE 500 ML: 0.9 INJECTION, SOLUTION INTRAVENOUS at 00:30

## 2018-03-07 RX ADMIN — CLOPIDOGREL BISULFATE 75 MG: 75 TABLET ORAL at 08:21

## 2018-03-07 RX ADMIN — SODIUM CHLORIDE 1000 ML: 0.9 INJECTION, SOLUTION INTRAVENOUS at 01:01

## 2018-03-07 RX ADMIN — IPRATROPIUM BROMIDE AND ALBUTEROL SULFATE 3 ML: .5; 3 SOLUTION RESPIRATORY (INHALATION) at 21:11

## 2018-03-07 RX ADMIN — FENTANYL CITRATE 50 MCG/HR: at 00:39

## 2018-03-07 NOTE — ED NOTES
Patient is tolerating the ET tube  Patient no longer biting the tube  Vitals are stable  Family at bedside  Will continue to monitor        Danilo Funez RN  03/07/18 [id number]

## 2018-03-07 NOTE — ASSESSMENT & PLAN NOTE
Hx of chronic left DVT however reportedly diagnosed during admission in [place] with acute DVT and started on Eliquis, continue for now

## 2018-03-07 NOTE — ED NOTES
ICU admitting PA-C in room assessing patient and talking with patient's daughter at this time        Libra Velez RN  03/07/18 1793

## 2018-03-07 NOTE — ASSESSMENT & PLAN NOTE
Normally not on BiPAP or oxygen at baseline  ABG with respiratory acidosis post intubation, likely secondary to acute CHF  Repeat ABG

## 2018-03-07 NOTE — ED NOTES
Patient tolerating ET tube well  Patient's daughter at bedside and updated to patient's status  Will continue to monitor        Alysia Maradiaga RN  03/07/18 [id number]

## 2018-03-07 NOTE — ED NOTES
Patient is biting the Et tube and fighting the Intubation  Patient's BP and HR not tolerating Diprivan  Po2 is dropping  Dr Patricia Hamilton at bedside        Soila Carrington RN  03/07/18 0166

## 2018-03-07 NOTE — CASE MANAGEMENT
Initial Clinical Review    Admission: Date/Time/Statement: 3/6/18 @ 2227     Orders Placed This Encounter   Procedures    Inpatient Admission (expected length of stay for this patient is greater than two midnights)     Standing Status:   Standing     Number of Occurrences:   1     Order Specific Question:   Admitting Physician     Answer:   Bismark Bond     Order Specific Question:   Level of Care     Answer:   Med Surg [16]     Order Specific Question:   Estimated length of stay     Answer:   More than 2 Midnights     Order Specific Question:   Certification     Answer:   I certify that inpatient services are medically necessary for this patient for a duration of greater than two midnights  See H&P and MD Progress Notes for additional information about the patient's course of treatment   Inpatient Admission (expected length of stay for this patient is greater than two midnights)     Standing Status:   Standing     Number of Occurrences:   1     Order Specific Question:   Admitting Physician     Answer:   Letitia Harp     Order Specific Question:   Level of Care     Answer:   Critical Care [15]     Order Specific Question:   Estimated length of stay     Answer:   More than 2 Midnights     Order Specific Question:   Certification     Answer:   I certify that inpatient services are medically necessary for this patient for a duration of greater than two midnights  See H&P and MD Progress Notes for additional information about the patient's course of treatment  ED: Date/Time/Mode of Arrival:   ED Arrival Information     Expected Arrival Acuity Means of Arrival Escorted By Service Admission Type    - 3/6/2018 20:58 Emergent Ambulance [facility] Pulmonology Emergency    Arrival Complaint    shortness of breath          Chief Complaint:   Chief Complaint   Patient presents with    Shortness of Breath     Patient states that she has felt SOB all day  PO2 79% on RA   Patient states that she was in [place] visiting friends a month ago and was hospitalized for CHF  +2 pitting edema LLE, +1 pitting edema RLE       History of Illness: Dylan Siddiqui is a 80 y o  female with PMHx of chronic diastolic CHF, HTN, HLD, right basal ganglia CVA in 2016, chronic DVT of LLE with recently diagnosed acute DVT, admission in 8/2017 for CHF and recent admission in [place] in 2/2018 for CHF who presents to [facility] ER with shortness of breath  Family states she got back from [place] and seemed to be doing well, although noticeably more deconditioned than previously  She required intubation while in [place] for hypercarbia and A/C CHF and was on a ventilator for 3 days  Her daughter states she saw her earlier today and felt she seemed a bit off  Returned to check on her and took her oxygen saturations which were only reading in the mid 70's, however the patient refused to go to the emergency department  Later in the day, she called her daughter and stated she did not feel well, and eventually agreed to come to ER for evaluation  Initially alert and oriented, being treated with lasix for CHF in ED  Daughter thought she had fallen asleep however when she tried to wake her up, she was unable to  CTH did not show any acute disease  Required intubation for unresponsiveness  ABG post-intubation was consistent with hypercarbia    Patient was admitted to ICU for further monitoring         ED Vital Signs:   ED Triage Vitals   Temperature Pulse Respirations Blood Pressure SpO2   03/06/18 2103 03/06/18 2103 03/06/18 2103 03/06/18 2111 03/06/18 2103   97 9 °F (36 6 °C) 68 (!) 24 138/66 90 %      Temp Source Heart Rate Source Patient Position - Orthostatic VS BP Location FiO2 (%)   03/06/18 2103 03/06/18 2103 03/06/18 2111 03/06/18 2111 03/07/18 0600   Tympanic Monitor Lying Left arm 60      Pain Score       03/06/18 2103       5        Wt Readings from Last 1 Encounters:   03/07/18 79 1 kg (174 lb 6 1 oz)       Vital Signs (abnormal):   03/07/18 0115  --   54  20  121/61  --  96 %  None (Room air)  Lying   03/07/18 0100  --   54   25  96/54  --   84 %  --  Lying   O2 Device: ET Tube at 03/07/18 0100   03/07/18 0045  --   48   26  91/52  --  93 %  --  Lying   O2 Device: ET Tube at 03/07/18 0045   03/07/18 0030  --  58   24  93/52  --  96 %  --  Lying   O2 Device: ET Tube at 03/07/18 0030   03/07/18 0018  98 3 °F (36 8 °C)  57  20  101/58  --  98 %  --  Lying   O2 Device: ET tube at 03/07/18 0018   03/07/18 0015  --   52   25  108/56  --  100 %  Other (comment)  Lying   O2 Device: ET at 03/07/18 0015   03/07/18 0000  --  58   24  170/77  --  100 %  None (Room air)  Lying   03/06/18 2345  --  68   31   201/95  --  98 %  None (Room air)  Lying   03/06/18 2330  --  62   24  142/66  --  98 %  None (Room air)  Lying   03/06/18 2300  --  62   48  137/62  --  93 %  Nasal cannula  Lying   03/06/18 2251  98 °F (36 7 °C)  --  --  --  --  --  --  --   03/06/18 2215  --  60  22  117/57  --  96 %  None (Room air)  Lying   03/06/18 2114  --  --  --  --  --  --  Nasal cannula  --   03/06/18 2111  --  --  --  138/66  --  --  --  Lying   03/06/18 2103  97 9 °F (36 6 °C)  68   24  --  --  90 %  Nasal cannula           Abnormal Labs/Diagnostic Test Results:   Labs:    Results from last 7 days  Lab Units 03/07/18  0011   WBC Thousand/uL 5 80   HEMOGLOBIN g/dL 11 5*   HEMATOCRIT % 36 5*   PLATELETS Thousands/uL 114*   NEUTROS PCT % 79*   MONOS PCT % 6      Results from last 7 days  Lab Units 03/06/18  2120   SODIUM mmol/L 142   POTASSIUM mmol/L 4 6   CHLORIDE mmol/L 108   CO2 mmol/L 29   BUN mg/dL 58*   CREATININE mg/dL 2 10*   CALCIUM mg/dL 9 3   TOTAL PROTEIN g/dL 7 1   BILIRUBIN TOTAL mg/dL 0 30   ALK PHOS U/L 56   ALT U/L 18   AST U/L 14   GLUCOSE RANDOM mg/dL 139                  Results from last 7 days  Lab Units 03/06/18 2120   INR   1 10   PTT seconds 22*             0  Lab Value Date/Time   TROPONINI <0 02 03/06/2018 2120   TROPONINI 0 02 08/27/2017 2049   TROPONINI <0 02 08/27/2017 1719   TROPONINI <0 02 05/09/2017 1855   TROPONINI <0 02 05/09/2017 0537         Imaging: CXR I have personally reviewed pertinent reports  EKG: This was personally reviewed by myself  Micro:        Lab Results   Component Value Date     BLOODCX No Growth After 5 Days  08/27/2017     BLOODCX No Growth After 5 Days  08/27/2017     URINECX >100,000 cfu/ml Mixed Contaminants X6 05/09/2017     CT HEAD:Microangiopathic disease  No acute intracranial hemorrhage, mass effect or extra-axial collection  [facility] #1:Pulmonary vascular congestion without evidence of interstitial edema  CXR #2:Cardiomegaly and mild central pulmonary vascular congestion      Bibasilar subsegmental atelectasis      Endotracheal tube is present, in satisfactory position with its tip above the level of the jerilyn  Enteric tube is present with its tip excluded by collimation below the level of the left hemidiaphragm  ED Treatment:   Medication Administration from 03/06/2018 2058 to 03/07/2018 0206       Date/Time Order Dose Route Action Action by Comments     03/06/2018 2144 furosemide (LASIX) injection 40 mg 40 mg Intravenous Given Soila Carrington RN      03/06/2018 2333 etomidate (AMIDATE) 2 mg/mL injection 20 mg 20 mg Intravenous Given Vandana Garcia RN      03/06/2018 2333 succinylcholine (ANECTINE) 20 mg/mL injection 100 mg 100 mg Intravenous Given Vandana Garcia RN      03/07/2018 0126 propofol (DIPRIVAN) 1000 mg in 100 mL infusion (premix) 5 mcg/kg/min Intravenous Rate/Dose Change Soila Carrington RN As per Dr Patricia Hamilton  03/07/2018 0115 propofol (DIPRIVAN) 1000 mg in 100 mL infusion (premix) 2 5 mcg/kg/min Intravenous Rate/Dose Change Soila Carrington RN As per Dr forrest at bedside       03/07/2018 0045 propofol (DIPRIVAN) 1000 mg in 100 mL infusion (premix) 0 mcg/kg/min Intravenous Stopped Soila Carrington RN      03/07/2018 0025 propofol (DIPRIVAN) 1000 mg in 100 mL infusion (premix) 5 mcg/kg/min Intravenous Rate/Dose Change Juan Swenson RN as per Dr Arguello  03/07/2018 0024 propofol (DIPRIVAN) 1000 mg in 100 mL infusion (premix) 15 mcg/kg/min Intravenous Rate/Dose Change Juan Swenson RN as per Dr Arguello  Patient BP not tolerating dose  03/06/2018 2354 propofol (DIPRIVAN) 1000 mg in 100 mL infusion (premix) 30 mcg/kg/min Intravenous Rate/Dose Change Juan Swenson RN      03/06/2018 2353 propofol (DIPRIVAN) 1000 mg in 100 mL infusion (premix) 10 mcg/kg/min Intravenous Rate/Dose Change Juan Swenson RN Patient not tolerating original dose  MD notified  03/06/2018 2351 propofol (DIPRIVAN) 1000 mg in 100 mL infusion (premix) 6 mcg/kg/min Intravenous Rate/Dose Change Juan Swenson RN      03/06/2018 2340 propofol (DIPRIVAN) 1000 mg in 100 mL infusion (premix) 5 mcg/kg/min Intravenous New Bag Vicky Torres RN      03/07/2018 0000 propofol (DIPRIVAN) 200 MG/20ML bolus injection 20 mg 20 mg Intravenous Given Juan Swenson RN Given by Dr Ellen Melo  Patient not tolerating original dose  03/07/2018 0114 fentaNYL 1250 mcg in sodium chloride 0 9% 125mL drip 75 mcg/hr Intravenous Rate/Dose Change Juan Swenson RN As Per Dr Ellen Melo at bedside       03/07/2018 0039 fentaNYL 1250 mcg in sodium chloride 0 9% 125mL drip 50 mcg/hr Intravenous [address] Juan Swenson RN      03/07/2018 0034 ketamine (KETALAR) 10 mg/mL IV use 72 mg 72 mg Intravenous Given Juan Swenson RN      03/07/2018 0104 sodium chloride 0 9 % bolus 500 mL 0 mL Intravenous Stopped Juan Swenson RN      03/07/2018 0030 sodium chloride 0 9 % bolus 500 mL 500 mL Intravenous [address] Juan Swenson RN      03/07/2018 0054 vecuronium (NORCURON) injection 7 mg 7 mg Intravenous Given Juan Swenson RN      03/07/2018 0115 sodium chloride 0 9 % bolus 1,000 mL 0 mL Intravenous Stopped Juan Swenson RN      03/07/2018 0101 sodium chloride 0 9 % bolus 1,000 mL 1,000 mL Intravenous New Bag Maria De Jesus T Giovanni Wong RN           Past Medical/Surgical History:    Active Ambulatory Problems     Diagnosis Date Noted    [facility] 06[facility]30/2016    Essential hypertension 06/30/2016    TIANA (acute kidney injury) ([facility] ) 06/30/2016    Left leg swelling 06/30/2016    Hyperlipidemia 06/30/2016    CKD (chronic kidney disease) stage 3, GFR 30-59 ml/min 05/09/2017    Numbness and tingling in left arm 05/09/2017    History of CVA (cerebrovascular accident) 05/09/2017    Obesity 05/09/2017    Gouty arthropathy 08/27/2017    Dyslipidemia 08/27/2017    Vitamin D deficiency 08/27/2017    Leg wound, left 43/17/3281    Diastolic CHF, chronic (HCC) 08/27/2017    Acute diastolic CHF (congestive heart failure) ([facility] ) 08/27/2017    Hyperphosphatemia 09/01/2017    Respiratory acidosis 09/01/2017     Resolved Ambulatory Problems     Diagnosis Date Noted    Hypertensive urgency 05/09/2017    Bacteriuria 05/09/2017    Cellulitis of left anterior lower leg 08/27/2017     Past Medical History:   Diagnosis Date    CHF (congestive heart failure) ([facility] ) 08/2017    DVT (deep vein thrombosis) in pregnancy ([place] [address] )     Hyperlipidemia     Hypertension     Pulmonary embolism ([place] [address] ) 02/06/2018    Renal disorder     Stroke [facility]) 2016       Admitting Diagnosis: Shortness of breath [R06 02]  Respiratory failure (HCC) [J96 90]  Congestive heart failure (CHF) ([facility] ) [I50 9]    Age/Sex: 80 y o  female    Assessment/Plan:   * Acute on chronic diastolic CHF (congestive heart failure) ([facility] )   Assessment & Plan     Recent admission in 8/2017 for A/C CHF, recent admission in [place] in earlier February for A/C CHF and hypercarbic respiratory failure requiring intubation  Echo 8/2017 EF 55 % to 60 % with grade 1 diastolic dysfunction  Normally on Torsemide 10mg BID however family member states she told them recently she had only been taking it once a day  Lasix 40mg IV given in ER  Monitor UOP  CXR in AM          Acute respiratory failure with hypoxia and hypercapnia (HCC)   Assessment & Plan     Normally not on BiPAP or oxygen at baseline  ABG with respiratory acidosis post intubation, likely secondary to acute CHF  Repeat ABG           Respiratory acidosis   Assessment & Plan     Likely secondary to CHF exacerbation, repeat ABG now on mechanical ventilation          Toxic metabolic encephalopathy   Assessment & Plan     Secondary to acute hypercarbia  Minimize sedation           Acute deep vein thrombosis (DVT) of left lower extremity (HCC)   Assessment & Plan     Hx of chronic left DVT however reportedly diagnosed during admission in [place] with acute DVT and started on Eliquis, continue for now          Essential hypertension   Assessment & Plan     Recently changed from Bystolic to Coreg 35LO BID in addition to Norvasc for HTN  Continue if BP tolerates          CKD (chronic kidney disease) stage 3, GFR 30-59 ml/min   Assessment & Plan     Cr baseline around 2, follows with Dr Jh Couch          History of CVA (cerebrovascular accident)   Assessment & Plan     Hx of right basal ganglia CVA in 2016, received tPA  Baseline deficits mild tingling in hand  Continue Plavix     Given critical illness, patient length of stay will require greater than two midnights        Admission Orders:  [email or web address]  TELE  DAILY WEIGHTS  NPO  SEQ COMP DEVICE    Scheduled Meds:   Current Facility-Administered Medications:  apixaban 5 mg Oral BID Nery Proctor PA-C   chlorhexidine 15 mL [employer] Q12H [address] Nery Proctor PA-C   clopidogrel 75 mg Oral Daily Nery Proctor PA-C   furosemide 40 mg Intravenous Once Tommy Carver PA-C   influenza vaccine 0 5 mL Intramuscular Prior to discharge Sebastian Steinberg MD   ipratropium-albuterol 3 mL Nebulization Q6H Tommy Carver PA-C   omeprazole (PRILOSEC) suspension 2 mg/mL 20 mg Oral Daily Nery Proctor PA-C     PROPOFOL GTT  FENTANYL GTT    Continuous Infusions:    PRN Meds: influenza vaccine

## 2018-03-07 NOTE — ASSESSMENT & PLAN NOTE
Recently changed from Bystolic to Coreg 99RU BID in addition to Norvasc for HTN  Continue if BP tolerates

## 2018-03-07 NOTE — H&P
H&P- Gaurang Card 5/12/1928, 80 y o  female MRN: [id number]    Unit/Bed#: ED CT1 Encounter: [id number]    Primary Care Provider: Armand Longoria DO   Date and time admitted to hospital: 3/6/2018  9:00 PM         * Acute on chronic diastolic CHF (congestive heart failure) ([facility] [address] )   Assessment & Plan    Recent admission in 8/2017 for A/C CHF, recent admission in [place] in earlier February for A/C CHF and hypercarbic respiratory failure requiring intubation  Echo 8/2017 EF 55 % to 60 % with grade 1 diastolic dysfunction  Normally on Torsemide 10mg BID however family member states she told them recently she had only been taking it once a day  Lasix 40mg IV given in ER  Monitor UOP  CXR in AM        Acute respiratory failure with hypoxia and hypercapnia ([facility] [address] )   Assessment & Plan    Normally not on BiPAP or oxygen at baseline  ABG with respiratory acidosis post intubation, likely secondary to acute CHF  Repeat ABG         Respiratory acidosis   Assessment & Plan    Likely secondary to CHF exacerbation, repeat ABG now on mechanical ventilation        Toxic metabolic encephalopathy   Assessment & Plan    Secondary to acute hypercarbia  Minimize sedation         Acute deep vein thrombosis (DVT) of left lower extremity ([facility] [address] )   Assessment & Plan    Hx of chronic left DVT however reportedly diagnosed during admission in [place] with acute DVT and started on Eliquis, continue for now        Essential hypertension   Assessment & Plan    Recently changed from Bystolic to Coreg 48TY BID in addition to Norvasc for HTN  Continue if BP tolerates        CKD (chronic kidney disease) stage 3, GFR 30-59 ml/min   Assessment & Plan    Cr baseline around 2, follows with Dr Nayan Braxton        History of CVA (cerebrovascular accident)   Assessment & Plan    Hx of right basal ganglia CVA in 2016, received tPA  Baseline deficits mild tingling in hand  Continue Plavix           Reason for Admission / Chief Complaint: shortness of breath     History of Present Illness: Lavonne Blue is a 80 y o  female with PMHx of chronic diastolic CHF, HTN, HLD, right basal ganglia CVA in 2016, chronic DVT of LLE with recently diagnosed acute DVT, admission in 8/2017 for CHF and recent admission in [place] in 2/2018 for CHF who presents to [facility] ER with shortness of breath  Family states she got back from [place] and seemed to be doing well, although noticeably more deconditioned than previously  She required intubation while in [place] for hypercarbia and A/C CHF and was on a ventilator for 3 days  Her daughter states she saw her earlier today and felt she seemed a bit off  Returned to check on her and took her oxygen saturations which were only reading in the mid 70's, however the patient refused to go to the emergency department  Later in the day, she called her daughter and stated she did not feel well, and eventually agreed to come to ER for evaluation  Initially alert and oriented, being treated with lasix for CHF in ED  Daughter thought she had fallen asleep however when she tried to wake her up, she was unable to  CTH did not show any acute disease  Required intubation for unresponsiveness  ABG post-intubation was consistent with hypercarbia  Patient was admitted to ICU for further monitoring  History obtained from child and chart review  Past Medical History:  Past Medical History:   Diagnosis Date    CHF (congestive heart failure) ([facility] [address] ) 76/0408    diastolic     DVT (deep vein thrombosis) in pregnancy ([facility] [address] )     left LE in 2/2018 and 50 years ago    Hyperlipidemia     Hypertension     Pulmonary embolism ([facility] [address] ) 02/06/2018    Renal disorder     ckd    Stroke [facility]) 2016    left hand weakness,paresthesia  Past Surgical History:  Past Surgical History:   Procedure Laterality Date    ABDOMINAL SURGERY      ruputured bowel  had colostomy and ileostomy,reversed later on   BACK SURGERY      rods in back  done 30 years ago       CARPAL TUNNEL RELEASE Bilateral     CYST REMOVAL      back    HERNIA REPAIR      REPLACEMENT TOTAL KNEE BILATERAL          Past Family History:  History reviewed  No pertinent family history  Social History:  History   Smoking Status    Never Smoker   Smokeless Tobacco    Never Used     History   Alcohol Use    Yes     Comment: Occ  History   Drug Use No     Marital Status:     Medications:  Current Facility-Administered Medications   Medication Dose Route Frequency    fentaNYL 1250 mcg in sodium chloride 0 9% 125mL drip  50 mcg/hr Intravenous Continuous    propofol (DIPRIVAN) 1000 mg in 100 mL infusion (premix)  5-50 mcg/kg/min Intravenous Titrated     Home medications:  Prior to Admission medications    Medication Sig Start Date End Date Taking? Authorizing Provider   amLODIPine (NORVASC) 2 5 mg tablet Take 5 mg by mouth 2 (two) times a day   Yes Historical Provider, MD   apixaban (ELIQUIS) 5 mg Take 5 mg by mouth 2 (two) times a day   Yes Historical Provider, MD   budesonide-formoterol (SYMBICORT) 160-4 5 mcg/act inhaler Inhale 2 puffs 2 (two) times a day   Yes Historical Provider, MD   calcium-vitamin D (OSCAL 500 + D) 500 mg-200 units per tablet Take 1 tablet by mouth 2 (two) times a day  Yes Historical Provider, MD   carvedilol (COREG) 25 mg tablet Take 25 mg by mouth 2 (two) times a day with meals   Yes Historical Provider, MD   cholecalciferol (VITAMIN D3) 1,000 units tablet Take 2,000 Units by mouth daily   Yes Historical Provider, MD   clopidogrel (PLAVIX) 75 mg tablet Take 75 mg by mouth daily   Yes Historical Provider, MD   co-enzyme Q-10 50 MG capsule Take 200 mg by mouth daily     Yes Historical Provider, MD   colesevelam (WELCHOL) 625 mg tablet Take 1,875 mg by mouth 2 (two) times a day with meals Indications: 3 tabs twice a day     Yes Historical Provider, MD   docusate sodium (COLACE) 100 mg capsule Take 100 mg by mouth 2 (two) times a day   Yes Historical Provider, MD   Multiple Vitamins-Minerals (OCUVITE PO) Take by mouth   Yes Historical Provider, MD   Polyethylene Glycol 3350 (MIRALAX PO) Take by mouth daily as needed   Yes Historical Provider, MD   torsemide (DEMADEX) 10 mg tablet Take 10 mg by mouth 2 (two) times a day     Yes Historical Provider, MD     Allergies: Allergies   Allergen Reactions    Lyrica [Pregabalin] Other (See Comments)     Client says it made her "nutty"        ROS:   Review of Systems   Unable to perform ROS: Intubated        Vitals:  Vitals:    18 2345 18 0000 18 0015 18 0018   BP: (!) 201/95 170/77 108/56 101/58   BP Location: Right arm Right arm Right arm Right arm   Pulse: 68 58 (!) 52 57   Resp: (!) 31 (!) 24 (!) 25 20   Temp:    98 3 °F (36 8 °C)   TempSrc:    Tympanic   SpO2: 98% 100% 100% 98%   Weight:       Height:         Temperature:   Temp (24hrs), Av 1 °F (36 7 °C), Min:97 9 °F (36 6 °C), Max:98 3 °F (36 8 °C)    Current: Temperature: 98 3 °F (36 8 °C)     Weights:   IBW: 54 7 kg  Body mass index is 27 29 kg/m²       Hemodynamic Monitoring:  N/A     Non-Invasive/Invasive Ventilation Settings:  Respiratory    Lab Data (Last 4 hours)       2359            pH, Arterial       (!)7 227           pCO2, Arterial       (!)69 5           pO2, Arterial       (!)265 2           HCO3, Arterial       (!)28 3           Base Excess, Arterial       -0 7                O2/Vent Data        2345   Most Recent         Vent Mode AC/VC  AC/VC      Resp Rate (BPM) (BPM) 20  20      Vt (mL) (mL) 350  350      FIO2 (%) (%) 100  80      PEEP (cmH2O) (cmH2O) 5  5      MV 5  5                Lab Results   Component Value Date    PHART 7 227 (LL) 2018    JQH8GCV 69 5 (HH) 2018    PO2ART 265 2 (H) 2018    QKE3MFA 28 3 (H) 2018    BEART -0 7 2018    SOURCE Radial, Right 2018     SpO2: SpO2: 98 %, SpO2 Activity: SpO2 Activity: At Rest, SpO2 Device: O2 Device:  (ET tube), Capnography:       Physical Exam:  Physical Exam   Constitutional: She appears well-developed and well-nourished  No distress  HENT:   Head: Normocephalic and atraumatic  Neck: JVD present  Cardiovascular: Normal rate and regular rhythm  No murmur heard  Pulmonary/Chest: Effort normal and breath sounds normal  No respiratory distress  She has no wheezes  Abdominal: Soft  Bowel sounds are normal  She exhibits no distension  Musculoskeletal: She exhibits edema  Left greater than right 1+   Neurological:   Sedated, moving all extremities spontaneously   Skin: Skin is warm  Nursing note and vitals reviewed  Labs:    Results from last 7 days  Lab Units 03/07/18  0011   WBC Thousand/uL 5 80   HEMOGLOBIN g/dL 11 5*   HEMATOCRIT % 36 5*   PLATELETS Thousands/uL 114*   NEUTROS PCT % 79*   MONOS PCT % 6      Results from last 7 days  Lab Units 03/06/18  2120   SODIUM mmol/L 142   POTASSIUM mmol/L 4 6   CHLORIDE mmol/L 108   CO2 mmol/L 29   BUN mg/dL 58*   CREATININE mg/dL 2 10*   CALCIUM mg/dL 9 3   TOTAL PROTEIN g/dL 7 1   BILIRUBIN TOTAL mg/dL 0 30   ALK PHOS U/L 56   ALT U/L 18   AST U/L 14   GLUCOSE RANDOM mg/dL 139                Results from last 7 days  Lab Units 03/06/18  2120   INR  1 10   PTT seconds 22*           0  Lab Value Date/Time   TROPONINI <0 02 03/06/2018 2120   TROPONINI 0 02 08/27/2017 2049   TROPONINI <0 02 08/27/2017 1719   TROPONINI <0 02 05/09/2017 1855   TROPONINI <0 02 05/09/2017 0537        Imaging: CXR I have personally reviewed pertinent reports  EKG: This was personally reviewed by myself  Micro:  Lab Results   Component Value Date    BLOODCX No Growth After 5 Days  08/27/2017    BLOODCX No Growth After 5 Days  08/27/2017    URINECX >100,000 cfu/ml Mixed Contaminants X6 05/09/2017   VTE Pharmacologic Prophylaxis: Reason for no pharmacologic prophylaxis Eliquis  VTE Mechanical Prophylaxis: sequential compression device     Invasive lines and devices:   Invasive Devices     Drain NG/OG/Enteral Tube 16 Fr Left mouth less than 1 day    Urethral Catheter Double-lumen 16 Fr  less than 1 day          Airway            ETT  Cuffed; Hi-Lo 7 5 mm less than 1 day                 Code Status: Prior  POA: Yes  POLST:       Given critical illness, patient length of stay will require greater than two midnights  Counseling / Coordination of Care  Total Critical Care time spent at bedside with patient and reviewing chart and previous admissions/notes, outpatient notes/labs, managing ventilator settings, from 1499-8119 minutes excluding procedures, teaching and family updates  Portions of the record may have been created with voice recognition software  Occasional wrong word or "sound a like" substitutions may have occurred due to the inherent limitations of voice recognition software  Read the chart carefully and recognize, using context, where substitutions have occurred          Светлана Cameron PA-C

## 2018-03-07 NOTE — ED PROCEDURE NOTE
PROCEDURE  ECG 12 Lead Documentation  Date/Time: 3/6/2018 9:14 PM  Performed by: Pepe Martin by: Danny Bernheim     ECG reviewed by me, the ED Provider: yes    Patient location:  ED  Previous ECG:     Previous ECG:  Compared to current    Similarity:  No change  Interpretation:     Interpretation: abnormal    Rate:     ECG rate:  67    ECG rate assessment: normal    Rhythm:     Rhythm: sinus rhythm    Ectopy:     Ectopy: none    Conduction:     Conduction: abnormal      Abnormal conduction: complete RBBB    ST segments:     ST segments:  Normal  Q waves:     Q waves:  III, V2, V1 and aVF         Yifan Max DO  03/06/18 2115

## 2018-03-07 NOTE — RESPIRATORY THERAPY NOTE
RT Ventilator Management Note  [place] Falter 80 y o  female MRN: [id number]  Unit/Bed#: ED CT1 Encounter: [id number]      Daily Screen     No data found  Physical Exam:     pt intubated 7 5 ett  23cm at the lip first attemp bilateral B/s confirmed with xray

## 2018-03-07 NOTE — ED PROVIDER NOTES
History  Chief Complaint   Patient presents with    Shortness of Breath     Patient states that she has felt SOB all day  PO2 79% on RA  Patient states that she was in [place] visiting friends a month ago and was hospitalized for CHF  +2 pitting edema LLE, +1 pitting edema RLE     Patient presents for increasing SOB since yesterday  History of CHF  Recently admitted in [place] for the same and also diagnosed with PE/DVT as well and is on Eliquis  Reports cutting her diuretic dose in half because she did not like peeing so much  History provided by:  Patient and relative   used: No    Shortness of Breath   Associated symptoms: no chest pain        Prior to Admission Medications   Prescriptions Last Dose Informant Patient Reported? Taking? Multiple Vitamins-Minerals (OCUVITE PO) 3/6/2018 at Unknown time  Yes Yes   Sig: Take by mouth   Polyethylene Glycol 3350 (MIRALAX PO) 3/6/2018 at Unknown time  Yes Yes   Sig: Take by mouth daily as needed   amLODIPine (NORVASC) 2 5 mg tablet 3/6/2018 at Unknown time  Yes Yes   Sig: Take 5 mg by mouth 2 (two) times a day   apixaban (ELIQUIS) 5 mg 3/6/2018 at Unknown time  Yes Yes   Sig: Take 5 mg by mouth 2 (two) times a day   budesonide-formoterol (SYMBICORT) 160-4 5 mcg/act inhaler 3/6/2018 at Unknown time  Yes Yes   Sig: Inhale 2 puffs 2 (two) times a day   calcium-vitamin D (OSCAL 500 + D) 500 mg-200 units per tablet 3/6/2018 at Unknown time  Yes Yes   Sig: Take 1 tablet by mouth 2 (two) times a day     carvedilol (COREG) 25 mg tablet 3/6/2018 at Unknown time  Yes Yes   Sig: Take 25 mg by mouth 2 (two) times a day with meals   cholecalciferol (VITAMIN D3) 1,000 units tablet 3/6/2018 at Unknown time  Yes Yes   Sig: Take 2,000 Units by mouth daily   clopidogrel (PLAVIX) 75 mg tablet 3/6/2018 at Unknown time  Yes Yes   Sig: Take 75 mg by mouth daily   co-enzyme Q-10 50 MG capsule 3/6/2018 at Unknown time  Yes Yes   Sig: Take 200 mg by mouth daily [facility] 625 mg tablet 3/6/2018 at Unknown time  Yes Yes   Sig: Take 1,875 mg by mouth 2 (two) times a day with meals Indications: 3 tabs twice a day  docusate sodium (COLACE) 100 mg capsule 3/6/2018 at Unknown time  Yes Yes   Sig: Take 100 mg by mouth 2 (two) times a day   torsemide (DEMADEX) 10 mg tablet 3/6/2018 at Unknown time  Yes Yes   Sig: Take 10 mg by mouth 2 (two) times a day        Facility-Administered Medications: None       Past Medical History:   Diagnosis Date    CHF [facility]congestive heart failure) ([facility] ) 69/3889    diastolic     [facility]     left LE in 2/2018 and 50 years ago    Hyperlipidemia     Hypertension     [facility] 02/06/2018    Renal disorder     ckd    Stroke ([facility] ) 2016    left hand weakness,paresthesia  Past Surgical History:   Procedure Laterality Date    ABDOMINAL SURGERY      ruputured bowel  had colostomy and ileostomy,reversed later on   BACK SURGERY      rods in back  done 30 years ago   CARPAL TUNNEL RELEASE Bilateral     CYST REMOVAL      back    HERNIA REPAIR      REPLACEMENT TOTAL KNEE BILATERAL         History reviewed  No pertinent family history  I have reviewed and agree with the history as documented  Social History   Substance Use Topics    Smoking status: Never Smoker    Smokeless tobacco: Never Used    Alcohol use Yes      Comment: Occ  Review of Systems   Respiratory: Positive for shortness of breath  Cardiovascular: Negative for chest pain  All other systems reviewed and are negative        Physical Exam  ED Triage Vitals   Temperature Pulse Respirations Blood Pressure SpO2   03/06/18 2103 03/06/18 2103 03/06/18 2103 03/06/18 2111 03/06/18 2103   97 9 °F (36 6 °C) 68 (!) 24 138/66 90 %      Temp Source Heart Rate Source Patient Position - Orthostatic VS BP Location FiO2 (%)   03/06/18 2103 03/06/18 2103 03/06/18 2111 03/06/18 2111 --   Tympanic Monitor Lying Left arm       Pain Score       03/06/18 2103       5           Orthostatic Vital Signs  Vitals:    03/06/18 2300 03/06/18 2330 03/06/18 2345 03/07/18 0000   BP: 137/62 142/66 (!) 201/95 170/77   Pulse: 62 62 68 58   Patient Position - Orthostatic VS: Lying Lying Lying Lying       Physical Exam   Constitutional: She is oriented to person, place, and time  No distress  HENT:   Mouth/Throat: Oropharynx is clear and moist    Eyes: Pupils are equal, round, and reactive to light  Neck: Normal range of motion  Cardiovascular: Normal rate, regular rhythm and intact distal pulses  Pulmonary/Chest: She is in respiratory distress  She has rales  Mild respiratory distress speaking in full sentences rales bilaterally  Musculoskeletal: She exhibits edema  Neurological: She is alert and oriented to person, place, and time  Skin: Capillary refill takes less than 2 seconds  She is not diaphoretic  Nursing note and vitals reviewed        ED Medications  Medications   propofol (DIPRIVAN) 1000 mg in 100 mL infusion (premix) (30 mcg/kg/min × 72 1 kg Intravenous Rate/Dose Change 3/6/18 2354)   furosemide (LASIX) injection 40 mg (40 mg Intravenous Given 3/6/18 2144)   etomidate (AMIDATE) 2 mg/mL injection 20 mg (20 mg Intravenous Given 3/6/18 2333)   succinylcholine (ANECTINE) 20 mg/mL injection 100 mg (100 mg Intravenous Given 3/6/18 2333)   propofol (DIPRIVAN) 200 MG/20ML bolus injection 20 mg (20 mg Intravenous Given 3/7/18 0000)       Diagnostic Studies  Results Reviewed     Procedure Component Value Units Date/Time    CBC and differential [[id number]] Collected:  03/07/18 0011    Lab Status:  No result Specimen:  Blood from Arm, Right     Blood gas, arterial [[id number]]  (Abnormal) Collected:  03/06/18 2359    Lab Status:  Final result Specimen:  Blood, Arterial from Radial, Right Updated:  03/07/18 0010     pH, Arterial 7 227 (LL)     PH ART TC 7 231 (LL)     pCO2, Arterial 69 5 (HH) mm Hg      PCO2 (TC) Arterial 68 6 (HH) mm Hg      pO2, Arterial 265 2 (H) mm Hg      PO2 (TC) Arterial 263 8 (H) mm Hg      HCO3, Arterial 28 3 (H) mmol/L      Base Excess, Arterial -0 7 mmol/L      O2 Content, Arterial 17 5 mL/dL      O2 HGB,Arterial  98 4 (H) %      SOURCE Radial, Right     Temperature 98 0 Degrees Fehrenheit     UA w Reflex to Microscopic [[id number]]  (Normal) Collected:  03/06/18 2310    Lab Status:  Final result Specimen:  Urine from Urine, Clean Catch Updated:  03/06/18 2315     Color, UA Light Yellow     Clarity, UA Clear     Specific Gravity, UA 1 010     pH, UA 5 5     Leukocytes, UA Negative     Nitrite, UA Negative     Protein, UA Negative mg/dl      Glucose, UA Negative mg/dl      Ketones, UA Negative mg/dl      Urobilinogen, UA 0 2 E U /dl      Bilirubin, UA Negative     Blood, UA Negative    Fingerstick Glucose (POCT) [[id number]]  (Abnormal) Collected:  03/06/18 2254    Lab Status:  Final result Updated:  03/06/18 2256     POC Glucose 152 (H) mg/dl     Comprehensive metabolic panel [[id number]]  (Abnormal) Collected:  03/06/18 2120    Lab Status:  Final result Specimen:  Blood from Arm, Right Updated:  03/06/18 2153     Sodium 142 mmol/L      Potassium 4 6 mmol/L      Chloride 108 mmol/L      CO2 29 mmol/L      Anion Gap 5 mmol/L      BUN 58 (H) mg/dL      Creatinine 2 10 (H) mg/dL      Glucose 139 mg/dL      Calcium 9 3 mg/dL      AST 14 U/L      ALT 18 U/L      Alkaline Phosphatase 56 U/L      Total Protein 7 1 g/dL      Albumin 2 6 (L) g/dL      Total Bilirubin 0 30 mg/dL      eGFR 20 ml/min/1 73sq m     Narrative:         National Kidney Disease Education Program recommendations are as follows:  GFR calculation is accurate only with a steady state creatinine  Chronic Kidney disease less than 60 ml/min/1 73 sq  meters  Kidney failure less than 15 ml/min/1 73 sq  meters      B-type natriuretic peptide [[id number]]  (Abnormal) Collected:  03/06/18 2120    Lab Status:  Final result Specimen:  Blood from Arm, Right Updated: 03/06/18 2153     NT-proBNP 2,867 (H) pg/mL     Troponin I [[id number]]  (Normal) Collected:  03/06/18 2120    Lab Status:  Final result Specimen:  Blood from Arm, Right Updated:  03/06/18 2145     Troponin I <0 02 ng/mL     Narrative:         [employer] Chemistry analyzer 99% cutoff is > 0 04 ng/mL in network labs    o cTnI 99% cutoff is useful only when applied to patients in the clinical setting of myocardial ischemia  o cTnI 99% cutoff should be interpreted in the context of clinical history, ECG findings and possibly cardiac imaging to establish correct diagnosis  o cTnI 99% cutoff may be suggestive but clearly not indicative of a coronary event without the clinical setting of myocardial ischemia  Cloretta Reddish [[id number]]  (Normal) Collected:  03/06/18 2120    Lab Status:  Final result Specimen:  Blood from Arm, Right Updated:  03/06/18 2142     Protime 11 6 seconds      INR 1 10    APTT [[id number]]  (Abnormal) Collected:  03/06/18 2120    Lab Status:  Final result Specimen:  Blood from Arm, Right Updated:  03/06/18 2142     PTT 22 (L) seconds     Narrative: Therapeutic Heparin Range = 60-90 seconds                 XR chest 1 view portable   Final Result by Liliya Agarwal MD (03/06 2227)      Pulmonary vascular congestion without evidence of interstitial edema              Workstation performed: [id number]         CT head without contrast    (Results Pending)   XR chest 1 view portable    (Results Pending)              Procedures  Intubation  Date/Time: 3/7/2018 12:19 AM  Performed by: Harman Fields  Authorized by: Harman Fields     Patient location:  ED  Other Assisting Provider: Yes (comment)    Consent:     Consent obtained:  Emergent situation    Consent given by:  Healthcare agent  Universal protocol:     Patient identity confirmed:  Verbally with patient and arm band  Pre-procedure details:     Patient status:  Altered mental status    Pretreatment medications:  Etomidate    Paralytics: Succinylcholine  Indications:     Indications for intubation: respiratory failure and airway protection    Procedure details:     Preoxygenation:  Bag valve mask    CPR in progress: no      Intubation method:  Oral    Oral intubation technique:  Glidescope    Laryngoscope blade: Mac 4    Tube size (mm):  7 5    Tube type:  Cuffed and hi-lo    Number of attempts:  1    Tube visualized through cords: yes    Placement assessment:     ETT to lip:  23    Tube secured with:  ETT harrison    Breath sounds:  Equal    Placement verification: chest rise, condensation, CXR verification, direct visualization, equal breath sounds, ETCO2 detector and tube exhalation      CXR findings:  ETT in proper place  Post-procedure details:     Patient tolerance of procedure: Tolerated well, no immediate complications           Phone Contacts  ED Phone Contact    ED Course  ED Course                                MDM  Number of Diagnoses or Management Options  Congestive heart failure (CHF) ([facility] [address] ):   Respiratory failure [facility]):   Diagnosis management comments: Pulse ox 79% on RA indicating inadequate oxygenation  Pulse ox 93% on NC indicating adequate oxygenation  CXR: CHF as read by me    Started the patient on IV lasix and admitted to the floor  Called to bedside later while being evaluated by PA for admission  Patient could not be woken up  She was difficult to arouse and lethargic  Strength bilaterally was symmetric  Called respiratory to set up BiPAP and took patient for state head CT  After returning from CT patient's breathing became more shallow and she was not responding  Decision made to intubate rather than try BiPAP  Patient intubated without difficulty  Further discussion with family reports she had the same problem in [place] and her blood gas was off  She failed BiPAP there as well and was placed on a ventilator  Admission upgraded to ICU          Amount and/or Complexity of Data Reviewed  Clinical lab tests: ordered and reviewed  Tests in the radiology section of CPT®: ordered and reviewed  Decide to obtain previous medical records or to obtain history from someone other than the patient: yes  Obtain history from someone other than the patient: yes  Review and summarize past medical records: yes  Discuss the patient with other providers: yes  Independent visualization of images, tracings, or specimens: yes    Patient Progress  Patient progress: stable    The patient presented with a condition in which there was a high probability of imminent or life-threatening deterioration, and critical care services (excluding separately billable procedures) totalled 30-74 minutes  Disposition  Final diagnoses:   Congestive heart failure (CHF) ([facility] )   Respiratory failure ([facility] )     Time reflects when diagnosis was documented in both MDM as applicable and the Disposition within this note     Time User Action Codes Description Comment    3/6/2018 10:27 PM Leslie Arguellotataromy [I50 9] [facility]     3/6/2018 11:49 PM Akira Arguello [J96 [id number]] Respiratory failure [facility])       ED Disposition     ED Disposition Condition Comment    Discharge  [place] discharge to home/self care  Condition at discharge: stable        Follow-up Information    None       Patient's Medications   Discharge Prescriptions    No medications on file     No discharge procedures on file      ED Provider  Electronically Signed by           Kevin Mcdermott DO  03/07/18 0021

## 2018-03-07 NOTE — ED NOTES
Respiratory distress noted  Patient's lung sounds are very diminished Dr Tommy Campo at bedside preparing patient for intubation        Libra Velez RN  03/06/18 [id number]

## 2018-03-07 NOTE — PLAN OF CARE

## 2018-03-07 NOTE — ASSESSMENT & PLAN NOTE
Recent admission in 8/2017 for A/C CHF, recent admission in [place] in earlier February for A/C CHF and hypercarbic respiratory failure requiring intubation  Echo 8/2017 EF 55 % to 60 % with grade 1 diastolic dysfunction  Normally on Torsemide 10mg BID however family member states she told them recently she had only been taking it once a day  Lasix 40mg IV given in ER  Monitor UOP  CXR in AM

## 2018-03-07 NOTE — ASSESSMENT & PLAN NOTE
Hx of right basal ganglia CVA in 2016, received tPA  Baseline deficits mild tingling in hand  Continue Plavix

## 2018-03-07 NOTE — PLAN OF CARE
Problem: PAIN - ADULT  Goal: Verbalizes/displays adequate comfort level or baseline comfort level  Interventions:  - Encourage patient to monitor pain and request assistance  - Assess pain using appropriate pain scale  - Administer analgesics based on type and severity of pain and evaluate response  - Implement non-pharmacological measures as appropriate and evaluate response  - Consider cultural and social influences on pain and pain management  - Notify physician/advanced practitioner if interventions unsuccessful or patient reports new pain  Outcome: Progressing      Problem: INFECTION - ADULT  Goal: Absence or prevention of progression during hospitalization  INTERVENTIONS:  - Assess and monitor for signs and symptoms of infection  - Monitor lab/diagnostic results  - Monitor all insertion sites, i e  indwelling lines, tubes, and drains  - Monitor endotracheal (as able) and nasal secretions for changes in amount and color  - [place] appropriate cooling/warming therapies per order  - Administer medications as ordered  - Instruct and encourage patient and family to use good hand hygiene technique  - Identify and instruct in appropriate isolation precautions for identified infection/condition  Outcome: Progressing      Problem: SAFETY ADULT  Goal: Patient will remain free of falls  INTERVENTIONS:  - Assess patient frequently for physical needs  -  Identify cognitive and physical deficits and behaviors that affect risk of falls    -  [place] fall precautions as indicated by assessment   - Educate patient/family on patient safety including physical limitations  - Instruct patient to call for assistance with activity based on assessment  - Modify environment to reduce risk of injury  - Consider OT/PT consult to assist with strengthening/mobility  Outcome: Progressing    Goal: Maintain or return to baseline ADL function  INTERVENTIONS:  -  Assess patient's ability to carry out ADLs; assess patient's baseline for ADL function and identify physical deficits which impact ability to perform ADLs (bathing, care of mouth/teeth, toileting, grooming, dressing, etc )  - Assess/evaluate cause of self-care deficits   - Assess range of motion  - Assess patient's mobility; develop plan if impaired  - Assess patient's need for assistive devices and provide as appropriate  - Encourage maximum independence but intervene and supervise when necessary  ¯ Involve family in performance of ADLs  ¯ Assess for home care needs following discharge   ¯ Request OT consult to assist with ADL evaluation and planning for discharge  ¯ Provide patient education as appropriate  Outcome: Progressing    Goal: Maintain or return mobility status to optimal level  INTERVENTIONS:  - Assess patient's baseline mobility status (ambulation, transfers, stairs, etc )    - Identify cognitive and physical deficits and behaviors that affect mobility  - Identify mobility aids required to assist with transfers and/or ambulation (gait belt, sit-to-stand, lift, walker, cane, etc )  - [place] fall precautions as indicated by assessment  - Record patient progress and toleration of activity level on Mobility SBAR; progress patient to next Phase/Stage  - Instruct patient to call for assistance with activity based on assessment  - Request Rehabilitation consult to assist with strengthening/weightbearing, etc   Outcome: Progressing      Problem: DISCHARGE PLANNING  Goal: Discharge to home or other facility with appropriate resources  INTERVENTIONS:  - Identify barriers to discharge w/patient and caregiver  - Arrange for needed discharge resources and transportation as appropriate  - Identify discharge learning needs (meds, wound care, etc )  - Arrange for interpretive services to assist at discharge as needed  - Refer to Case Management Department for coordinating discharge planning if the patient needs post-hospital services based on physician/advanced practitioner order or complex needs related to functional status, cognitive ability, or social support system  Outcome: Progressing      Problem: Knowledge Deficit  Goal: Patient/family/caregiver demonstrates understanding of disease process, treatment plan, medications, and discharge instructions  Complete learning assessment and assess knowledge base    Interventions:  - Provide teaching at level of understanding  - Provide teaching via preferred learning methods  Outcome: Progressing      Problem: RESPIRATORY - ADULT  Goal: Achieves optimal ventilation and oxygenation  INTERVENTIONS:  - Assess for changes in respiratory status  - Assess for changes in mentation and behavior  - Position to facilitate oxygenation and minimize respiratory effort  - Oxygen administration by appropriate delivery method based on oxygen saturation (per order) or ABGs  - Initiate smoking cessation education as indicated  - Encourage broncho-pulmonary hygiene including cough, deep breathe, Incentive Spirometry  - Assess the need for suctioning and aspirate as needed  - Assess and instruct to report SOB or any respiratory difficulty  - Respiratory Therapy support as indicated  Outcome: Progressing      Problem: GENITOURINARY - ADULT  Goal: Maintains or returns to baseline urinary function  INTERVENTIONS:  - Assess urinary function  - Encourage oral fluids to ensure adequate hydration  - Administer IV fluids as ordered to ensure adequate hydration  - Administer ordered medications as needed  - Offer frequent toileting  - Follow urinary retention protocol if ordered  Outcome: Progressing    Goal: Absence of urinary retention  INTERVENTIONS:  - Assess patient's ability to void and empty bladder  - Monitor I/O  - Bladder scan as needed  - Discuss with physician/AP medications to alleviate retention as needed  - Discuss catheterization for long term situations as appropriate  Outcome: Progressing    Goal: Urinary catheter remains patent  INTERVENTIONS:  - Assess patency of urinary catheter  - If patient has a chronic kuhn, consider changing catheter if non-functioning  - Follow guidelines for intermittent irrigation of non-functioning urinary catheter  Outcome: Progressing      Problem: SKIN/TISSUE INTEGRITY - ADULT  Goal: Skin integrity remains intact  INTERVENTIONS  - Identify patients at risk for skin breakdown  - Assess and monitor skin integrity  - Assess and monitor nutrition and hydration status  - Monitor labs (i e  albumin)  - Assess for incontinence   - Turn and reposition patient  - Assist with mobility/ambulation  - Relieve pressure over bony prominences  - Avoid friction and shearing  - Provide appropriate hygiene as needed including keeping skin clean and dry  - Evaluate need for skin moisturizer/barrier cream  - Collaborate with interdisciplinary team (i e  Nutrition, Rehabilitation, etc )   - Patient/family teaching  Outcome: Progressing    Goal: Incision(s), wounds(s) or drain site(s) healing without S/S of infection  INTERVENTIONS  - Assess and document risk factors for skin impairment   --  Outcome: Progressing    Goal: Oral mucous membranes remain intact  INTERVENTIONS  - Assess oral mucosa and hygiene practices  - Implement preventative oral hygiene regimen  - Implement oral medicated treatments as ordered  - Initiate Nutrition services referral as needed  Outcome: Progressing

## 2018-03-07 NOTE — PROGRESS NOTES
Fentanyl drip stopped as ordered at 0815 for extubation, wasted 15 cc of Fentanyl( which was initially hanged on the ED) as witnessed by JUSTYNA Sanon RN,  [place] Pharmacist aware

## 2018-03-07 NOTE — ED NOTES
Patient non-responsive  Vitals stable Dr Amarilys Espino at bed side        Trent Caban RN  03/06/18 2250

## 2018-03-07 NOTE — ED NOTES
Patient intubated by Dr Vinay Blackburn  Nurses Lu, Corina Parham, and Kira at bedside  Patient's daughter  bedside  Will continue to monitor        Flor Rodríguez RN  03/07/18 [id number]

## 2018-03-08 ENCOUNTER — APPOINTMENT (INPATIENT)
Dept: RADIOLOGY | Facility: HOSPITAL | Age: 83
DRG: 208 | End: 2018-03-08
Payer: MEDICARE

## 2018-03-08 PROBLEM — G92.8 TOXIC METABOLIC ENCEPHALOPATHY: Status: RESOLVED | Noted: 2018-03-07 | Resolved: 2018-03-08

## 2018-03-08 LAB
ANION GAP SERPL CALCULATED.3IONS-SCNC: 3 MMOL/L (ref 4–13)
ARTERIAL PATENCY WRIST A: YES
BASE EXCESS BLDA CALC-SCNC: 2.5 MMOL/L
BASE EXCESS BLDA CALC-SCNC: 3.7 MMOL/L
BASE EXCESS BLDA CALC-SCNC: 4.1 MMOL/L
BASE EXCESS BLDA CALC-SCNC: 5.5 MMOL/L
BASOPHILS # BLD AUTO: 0 THOUSANDS/ΜL (ref 0–0.1)
BASOPHILS NFR BLD AUTO: 1 % (ref 0–1)
BODY TEMPERATURE: 97.8 DEGREES FEHRENHEIT
BODY TEMPERATURE: 98.7 DEGREES FEHRENHEIT
BUN SERPL-MCNC: 49 MG/DL (ref 5–25)
CALCIUM SERPL-MCNC: 8.7 MG/DL (ref 8.3–10.1)
CHLORIDE SERPL-SCNC: 111 MMOL/L (ref 100–108)
CO2 SERPL-SCNC: 31 MMOL/L (ref 21–32)
CREAT SERPL-MCNC: 1.89 MG/DL (ref 0.6–1.3)
EOSINOPHIL # BLD AUTO: 0.1 THOUSAND/ΜL (ref 0–0.61)
EOSINOPHIL NFR BLD AUTO: 3 % (ref 0–6)
ERYTHROCYTE [DISTWIDTH] IN BLOOD BY AUTOMATED COUNT: 14.5 % (ref 11.6–15.1)
GFR SERPL CREATININE-BSD FRML MDRD: 23 ML/MIN/1.73SQ M
GLUCOSE SERPL-MCNC: 110 MG/DL (ref 65–140)
HCO3 BLDA-SCNC: 30 MMOL/L (ref 22–28)
HCO3 BLDA-SCNC: 32.3 MMOL/L (ref 22–28)
HCO3 BLDA-SCNC: 32.8 MMOL/L (ref 22–28)
HCO3 BLDA-SCNC: 33.8 MMOL/L (ref 22–28)
HCT VFR BLD AUTO: 34.1 % (ref 37–47)
HGB BLD-MCNC: 10.7 G/DL (ref 12–16)
IPAP: 12
IPAP: 15
LYMPHOCYTES # BLD AUTO: 0.6 THOUSANDS/ΜL (ref 0.6–4.47)
LYMPHOCYTES NFR BLD AUTO: 14 % (ref 14–44)
MAGNESIUM SERPL-MCNC: 2.4 MG/DL (ref 1.6–2.6)
MCH RBC QN AUTO: 30 PG (ref 27–31)
MCHC RBC AUTO-ENTMCNC: 31.3 G/DL (ref 31.4–37.4)
MCV RBC AUTO: 96 FL (ref 82–98)
MONOCYTES # BLD AUTO: 0.3 THOUSAND/ΜL (ref 0.17–1.22)
MONOCYTES NFR BLD AUTO: 8 % (ref 4–12)
MRSA NOSE QL CULT: NORMAL
NASAL CANNULA: ABNORMAL
NEUTROPHILS # BLD AUTO: 3.4 THOUSANDS/ΜL (ref 1.85–7.62)
NEUTS SEG NFR BLD AUTO: 75 % (ref 43–75)
NON VENT- BIPAP: ABNORMAL
NON VENT- BIPAP: ABNORMAL
O2 CT BLDA-SCNC: 14 ML/DL (ref 16–23)
O2 CT BLDA-SCNC: 14.5 ML/DL (ref 16–23)
O2 CT BLDA-SCNC: 14.7 ML/DL (ref 16–23)
O2 CT BLDA-SCNC: 14.8 ML/DL (ref 16–23)
OXYHGB MFR BLDA: 86.9 % (ref 94–97)
OXYHGB MFR BLDA: 87.1 % (ref 94–97)
OXYHGB MFR BLDA: 92.2 % (ref 94–97)
OXYHGB MFR BLDA: 92.3 % (ref 94–97)
PCO2 BLDA: 50.8 MM HG (ref 36–44)
PCO2 BLDA: 70.5 MM HG (ref 36–44)
PCO2 BLDA: 76.3 MM HG (ref 36–44)
PCO2 BLDA: 81.1 MM HG (ref 36–44)
PCO2 TEMP ADJ BLDA: 49.9 MM HG (ref 36–44)
PCO2 TEMP ADJ BLDA: 70.8 MM HG (ref 36–44)
PCO2 TEMP ADJ BLDA: 76.6 MM HG (ref 36–44)
PCO2 TEMP ADJ BLDA: 81.5 MM HG (ref 36–44)
PEEP MAX SETTING VENT: 5 CM[H2O]
PEEP MAX SETTING VENT: 5 CM[H2O]
PH BLD: 7.22 [PH] (ref 7.35–7.45)
PH BLD: 7.25 [PH] (ref 7.35–7.45)
PH BLD: 7.3 [PH] (ref 7.35–7.45)
PH BLD: 7.39 [PH] (ref 7.35–7.45)
PH BLDA: 7.22 [PH] (ref 7.35–7.45)
PH BLDA: 7.25 [PH] (ref 7.35–7.45)
PH BLDA: 7.3 [PH] (ref 7.35–7.45)
PH BLDA: 7.39 [PH] (ref 7.35–7.45)
PHOSPHATE SERPL-MCNC: 5.8 MG/DL (ref 2.3–4.1)
PLATELET # BLD AUTO: 93 THOUSANDS/UL (ref 130–400)
PLATELET BLD QL SMEAR: ABNORMAL
PMV BLD AUTO: 9.1 FL (ref 8.9–12.7)
PO2 BLD: 55.2 MM HG (ref 75–129)
PO2 BLD: 57.9 MM HG (ref 75–129)
PO2 BLD: 62.3 MM HG (ref 75–129)
PO2 BLD: 67.4 MM HG (ref 75–129)
PO2 BLDA: 54.8 MM HG (ref 75–129)
PO2 BLDA: 57.5 MM HG (ref 75–129)
PO2 BLDA: 64 MM HG (ref 75–129)
PO2 BLDA: 66.9 MM HG (ref 75–129)
POTASSIUM SERPL-SCNC: 4.1 MMOL/L (ref 3.5–5.3)
RBC # BLD AUTO: 3.56 MILLION/UL (ref 4.2–5.4)
SODIUM SERPL-SCNC: 145 MMOL/L (ref 136–145)
SPECIMEN SOURCE: ABNORMAL
VENT BIPAP FIO2: 30 %
VENT BIPAP FIO2: 30 %
WBC # BLD AUTO: 4.4 THOUSAND/UL (ref 4.8–10.8)

## 2018-03-08 PROCEDURE — 94660 CPAP INITIATION&MGMT: CPT

## 2018-03-08 PROCEDURE — 82805 BLOOD GASES W/O2 SATURATION: CPT | Performed by: FAMILY MEDICINE

## 2018-03-08 PROCEDURE — 80048 BASIC METABOLIC PNL TOTAL CA: CPT | Performed by: PHYSICIAN ASSISTANT

## 2018-03-08 PROCEDURE — 99233 SBSQ HOSP IP/OBS HIGH 50: CPT | Performed by: PHYSICIAN ASSISTANT

## 2018-03-08 PROCEDURE — 82805 BLOOD GASES W/O2 SATURATION: CPT | Performed by: PHYSICIAN ASSISTANT

## 2018-03-08 PROCEDURE — 84100 ASSAY OF PHOSPHORUS: CPT | Performed by: PHYSICIAN ASSISTANT

## 2018-03-08 PROCEDURE — 94760 N-INVAS EAR/PLS OXIMETRY 1: CPT

## 2018-03-08 PROCEDURE — 85025 COMPLETE CBC W/AUTO DIFF WBC: CPT | Performed by: PHYSICIAN ASSISTANT

## 2018-03-08 PROCEDURE — 99223 1ST HOSP IP/OBS HIGH 75: CPT | Performed by: INTERNAL MEDICINE

## 2018-03-08 PROCEDURE — 71045 X-RAY EXAM CHEST 1 VIEW: CPT

## 2018-03-08 PROCEDURE — 94640 AIRWAY INHALATION TREATMENT: CPT

## 2018-03-08 PROCEDURE — 94668 MNPJ CHEST WALL SBSQ: CPT

## 2018-03-08 PROCEDURE — 83735 ASSAY OF MAGNESIUM: CPT | Performed by: PHYSICIAN ASSISTANT

## 2018-03-08 PROCEDURE — 36600 WITHDRAWAL OF ARTERIAL BLOOD: CPT

## 2018-03-08 RX ORDER — OYSTER SHELL CALCIUM WITH VITAMIN D 500; 200 MG/1; [IU]/1
1 TABLET, FILM COATED ORAL 2 TIMES DAILY
Status: DISCONTINUED | OUTPATIENT
Start: 2018-03-08 | End: 2018-03-09 | Stop reason: CLARIF

## 2018-03-08 RX ORDER — CARVEDILOL 25 MG/1
25 TABLET ORAL 2 TIMES DAILY WITH MEALS
Status: DISCONTINUED | OUTPATIENT
Start: 2018-03-08 | End: 2018-03-12 | Stop reason: HOSPADM

## 2018-03-08 RX ORDER — AMLODIPINE BESYLATE 5 MG/1
5 TABLET ORAL 2 TIMES DAILY
Status: DISCONTINUED | OUTPATIENT
Start: 2018-03-08 | End: 2018-03-12 | Stop reason: HOSPADM

## 2018-03-08 RX ORDER — TORSEMIDE 20 MG/1
20 TABLET ORAL DAILY
Status: DISCONTINUED | OUTPATIENT
Start: 2018-03-09 | End: 2018-03-11

## 2018-03-08 RX ORDER — BUDESONIDE AND FORMOTEROL FUMARATE DIHYDRATE 160; 4.5 UG/1; UG/1
2 AEROSOL RESPIRATORY (INHALATION) 2 TIMES DAILY
Status: DISCONTINUED | OUTPATIENT
Start: 2018-03-08 | End: 2018-03-12 | Stop reason: HOSPADM

## 2018-03-08 RX ORDER — TORSEMIDE 10 MG/1
10 TABLET ORAL
Status: DISCONTINUED | OUTPATIENT
Start: 2018-03-08 | End: 2018-03-08

## 2018-03-08 RX ADMIN — CLOPIDOGREL BISULFATE 75 MG: 75 TABLET ORAL at 16:12

## 2018-03-08 RX ADMIN — Medication 1 APPLICATION: at 10:30

## 2018-03-08 RX ADMIN — BUDESONIDE AND FORMOTEROL FUMARATE DIHYDRATE 2 PUFF: 160; 4.5 AEROSOL RESPIRATORY (INHALATION) at 16:20

## 2018-03-08 RX ADMIN — CARVEDILOL 25 MG: 25 TABLET, FILM COATED ORAL at 16:08

## 2018-03-08 RX ADMIN — Medication 400 MG: at 16:14

## 2018-03-08 RX ADMIN — Medication 1 APPLICATION: at 20:31

## 2018-03-08 RX ADMIN — AMLODIPINE BESYLATE 5 MG: 5 TABLET ORAL at 16:13

## 2018-03-08 RX ADMIN — IPRATROPIUM BROMIDE AND ALBUTEROL SULFATE 3 ML: .5; 3 SOLUTION RESPIRATORY (INHALATION) at 21:00

## 2018-03-08 RX ADMIN — TORSEMIDE 10 MG: 10 TABLET ORAL at 16:14

## 2018-03-08 RX ADMIN — IPRATROPIUM BROMIDE AND ALBUTEROL SULFATE 3 ML: .5; 3 SOLUTION RESPIRATORY (INHALATION) at 01:44

## 2018-03-08 RX ADMIN — APIXABAN 5 MG: 5 TABLET, FILM COATED ORAL at 16:15

## 2018-03-08 RX ADMIN — IPRATROPIUM BROMIDE AND ALBUTEROL SULFATE 3 ML: .5; 3 SOLUTION RESPIRATORY (INHALATION) at 08:23

## 2018-03-08 RX ADMIN — IPRATROPIUM BROMIDE AND ALBUTEROL SULFATE 3 ML: .5; 3 SOLUTION RESPIRATORY (INHALATION) at 14:07

## 2018-03-08 RX ADMIN — SILVER NITRATE APPLICATORS 1 APPLICATOR: 25; 75 STICK TOPICAL at 08:30

## 2018-03-08 NOTE — PROGRESS NOTES
Progress Note - Critical Care   Havana Jesus 80 y o  female MRN: [id number]  Unit/Bed#: ICU 12 Encounter: [id number]    Impression:  Principal Problem:    Acute on chronic diastolic CHF (congestive heart failure) (HCC)  Active Problems:    Essential hypertension    Hyperlipidemia    CKD (chronic kidney disease) stage 3, GFR 30-59 ml/min    History of CVA (cerebrovascular accident)    Obesity    Respiratory acidosis    Acute respiratory failure with hypoxia and hypercapnia (HCC)    Acute deep vein thrombosis (DVT) of left lower extremity (HCC)    Acute deep vein thrombosis (DVT) of left lower extremity (HCC)   Assessment & Plan    Hx of chronic left DVT however reportedly diagnosed during admission in [place] with acute DVT and started on Eliquis, continue for now        Acute respiratory failure with hypoxia and hypercapnia ([facility] [address] )   Assessment & Plan    -trial for extubation today        Respiratory acidosis   Assessment & Plan    Likely secondary to CHF exacerbation, repeat ABG now on mechanical ventilation  -BiPAP as necessary        History of CVA (cerebrovascular accident)   Assessment & Plan    Hx of right basal ganglia CVA in 2016, received tPA  Baseline deficits mild tingling in hand  Continue Plavix        CKD (chronic kidney disease) stage 3, GFR 30-59 ml/min   Assessment & Plan    Cr baseline around 2, follows with Dr Ty Ruiz        Hyperlipidemia   Assessment & Plan    Continue statin therapy        Essential hypertension   Assessment & Plan    Recently changed from Bystolic to Coreg 22YM BID in addition to Norvasc for HTN  Continue if BP tolerates        * Acute on chronic diastolic CHF (congestive heart failure) ([facility] [address] )   Assessment & Plan    Recent admission in 8/2017 for A/C CHF, recent admission in [place] in earlier February for A/C CHF and hypercarbic respiratory failure requiring intubation  Echo 8/2017 EF 55 % to 60 % with grade 1 diastolic dysfunction  Normally on Torsemide 10mg BID however family member states she told them recently she had only been taking it once a day  -repeat diuresis b i d  today, restart home torsemide        Toxic metabolic encephalopathyresolved as of 3/8/2018   Assessment & Plan    -resolved            Assessment / Plan :  Neuro:  -CAM ICU negative:  Delirium Precautions   -Neuro Checks:  Routine  CV/Heme/VTE PPx:  -Hemoglobin :  Stable / Platelets: / Coagulation: / Coagulation:  stable  - Chemical VTE PPX :  eliquis / SCDs  Pulm:  -I/S v  Flutter Valve / Pulm Toilet  GI/Endo:    -Glycemic Control:  Accuchecks :  SSI Alg  If  needed   -Bowel Reg:  Senna  / PRN Laxatives if required  -Stress Ulcer PPx :  None required   -Nutrition:  Cardiac diet  /FEN:  -IVF :  Net Bal:  - / Goal :  even  -Electrolytes:  goal K/M/PH :  4, 2, 3 :  Replete as necessary   -Hernandez:  yes /  day 2  ID:  -Trend Fever and WBC curve  -Current role for abx:   None current   -Cultures:  none  MSK/Mobility/ PT OT/ Lines:  -MSK:  Routine Turning, Offloading, Skin care  -OOB / Ambulatory  as early as possible  -PTOT / CM and Rehab Assessment   -Access:  PIV:  yes /  Central Access:  no /  day 0  /  arterial catheter:  Day 0  Consultants:  None current  Dispo/Family:   ICU care / Patient and Family Updated:  Pt updated at bedside    Treatment Team: [facility]    Reason for Admission: was off of bipap last night, hypercarbic and acidodic this AM    Physical Exam:    General Appearance:  Elderly, frail, chronically ill    HENT: Atraumatic without obvious abnormality  Neck: Supple    Eyes: PERRL, EOMI    Cardiac: rrr 0 mrg     Pulmonary: diminished b/l, 0 wrr    Gastrointestinal: Soft, NT/ND     : Hernandez present: yes   Musculoskeletal: Trace edema bilaterally  Neuro:  AAO x 3, CORDOVA, no deficits  Psych: Mood and affect appropriate      Skin: intact, no rashes or jaundicde    Vitals:   Vitals:    03/08/18 1700 03/08/18 1800 03/08/18 1900 03/08/18 2000   BP: 150/88 139/62 155/70 110/53   BP Location: Right arm   Pulse: 59 58 81 80   Resp: (!) 30 (!) 32 (!) 23 (!) 39   Temp:    97 9 °F (36 6 °C)   TempSrc:    Temporal   SpO2: 93% 92% 91% (!) 88%   Weight:       Height:                 Temperature: Temp (24hrs), Av 2 °F (36 8 °C), Min:97 6 °F (36 4 °C), Max:98 7 °F (37 1 °C)  Current: Temperature: 97 9 °F (36 6 °C)    Weights: IBW: 54 7 kg  Body mass index is 30 27 kg/m²  Respiratory:  O2 Flow Rate (L/min): 4 L/min    Intake and Outputs:    Intake/Output Summary (Last 24 hours) at 18  Last data filed at 18   Gross per 24 hour   Intake              280 ml   Output             2575 ml   Net            -2295 ml     I/O last 24 hours: In: 26 [P O :280]  Out: 2725 [Urine:2725]    Stool:      Nutrition:        Diet Orders            Start     Ordered    18 0831  Dietary nutrition supplements  Once     Question Answer Comment   Select Supplement: Ensure Enlive-Strawberry    Select Supplement: Ensure Enlive-Chocolate    Select Supplement: Ensure Enlive-Vanilla    Frequency Breakfast, Lunch, Dinner, HS        18 0830    18 1352  Diet Regular; Regular House; Hi Pro/Hi Ramírez  Diet effective now     Question Answer Comment   Diet Type Regular    Regular Regular House    Other Restriction(s): Hi Pro/Hi Ramírez    RD to adjust diet per protocol?  No        18 1351    18 0747  Room Service  Once     Question:  Type of Service  Answer:  Room Service - Appropriate with Assistance    18 0747          Labs:     Results from last 7 days  Lab Units 18  0507 18  0504 18  0011   WBC Thousand/uL 4 40* 4 50* 5 80   HEMOGLOBIN g/dL 10 7* 11 6* 11 5*   HEMATOCRIT % 34 1* 35 6* 36 5*   PLATELETS Thousands/uL 93* 90* 114*   NEUTROS PCT % 75 68 79*   MONOS PCT % 8 9 6       Results from last 7 days  Lab Units 18  0507 18  1708 18  0504 03/06/18  2120   SODIUM mmol/L 145 145 138 142   POTASSIUM mmol/L 4 1 3 7 3 7 4 6   CHLORIDE mmol/L 111* 108 106 108   CO2 mmol/L 31 31 25 29   BUN mg/dL 49* 52* 54* 58*   CREATININE mg/dL 1 89* 1 97* 1 95* 2 10*   CALCIUM mg/dL 8 7 8 5 8 4 9 3   TOTAL PROTEIN g/dL  --   --   --  7 1   BILIRUBIN TOTAL mg/dL  --   --   --  0 30   ALK PHOS U/L  --   --   --  56   ALT U/L  --   --   --  18   AST U/L  --   --   --  14   GLUCOSE RANDOM mg/dL 110 163* 128 139       Results from last 7 days  Lab Units 03/08/18  0507 03/07/18  1708 03/07/18  0504   MAGNESIUM mg/dL 2 4 2 2 2 3       Results from last 7 days  Lab Units 03/08/18  0507 03/07/18  0504   PHOSPHORUS mg/dL 5 8* 3 7        Results from last 7 days  Lab Units 03/06/18  2120   INR  1 10   PTT seconds 22*           Results from last 7 days  Lab Units 03/06/18  2120   TROPONIN I ng/mL <0 02       Results from last 7 days  Lab Units 03/08/18  1816 03/08/18  1040  03/08/18  0551 03/06/18  2359   PH ART  7 389 7 298*  < > 7 218* 7 227*   PCO2 ART mm Hg 50 8* 70 5*  < > 81 1* 69 5*   PO2 ART mm Hg 64 0* 66 9*  < > 57 5* 265 2*   HCO3 ART mmol/L 30 0* 33 8*  < > 32 3* 28 3*   BASE EXC ART mmol/L 4 1 5 5  < > 2 5 -0 7   ABG SOURCE  Radial, Left Radial, Left  --  Radial, Left Radial, Right   < > = values in this interval not displayed  Imaging:   3/8 improved CXR: improved CHF I have personally reviewed pertinent reports  and I have personally reviewed pertinent films in PACS    Micro:   Blood Culture:   Lab Results   Component Value Date    BLOODCX No Growth After 5 Days  08/27/2017    BLOODCX No Growth After 5 Days  08/27/2017     Urine Culture:   Lab Results   Component Value Date    URINECX >100,000 cfu/ml Mixed Contaminants X6 05/09/2017     Sputum Culture: No components found for: SPUTUMCX  Wound Culure: No results found for: Steffanie Parra    Results from last 7 days  Lab Units 03/07/18  0407   MRSA CULTURE ONLY  No Methicillin Resistant Staphlyococcus aureus (MRSA) isolated       Allergies:    Allergies   Allergen Reactions    Lyrica [Pregabalin] Other (See Comments) Client says it made her "nutty"       Medications:   Scheduled Meds:    Current Facility-Administered Medications:  amLODIPine 5 mg Oral BID Geraldo Lee PA-C   apixaban 5 mg Oral BID Angeli Khan PA-C   budesonide-formoterol 2 puff Inhalation BID Geraldo Lee PA-C   calcium-vitamin D 1 tablet Oral BID Geraldo Lee PA-C   carvedilol 25 mg Oral BID With Meals Geraldo Lee PA-C   clopidogrel 75 mg Oral Daily Nery Proctor PA-C   ipratropium-albuterol 3 mL Nebulization Q6H Geraldo Lee PA-C   magnesium oxide 400 mg Oral BID Geraldo Lee PA-C   multivitamin stress formula 1 tablet Oral Daily Geraldo Lee PA-C   mupirocin  Nasal Q12H [facility]   omeprazole (PRILOSEC) suspension 2 mg/mL 20 mg Oral Daily Nery Proctor PA-C   [START ON 3/9/2018] torsemide 20 mg Oral Daily Chelsea Mccurdy MD     Continuous Infusions:   PRN Meds:       Invasive lines and devices: Invasive Devices     Peripheral Intravenous Line            Peripheral IV 03/06/18 Right Forearm 2 days          Drain            Urethral Catheter Double-lumen 16 Fr  1 day                Code Status: Level 2 - DNAR: but accepts endotracheal intubation    Counseling / Coordination of Care  Total time spent today 30 minutes  Greater than 50% of total time was spent with the patient and / or family counseling and / or coordination of care  A description of the counseling / coordination of care: exam / data/ plan / coordination    Portions of the record may have been created with voice recognition software  Occasional wrong word or "sound a like" substitutions may have occurred due to the inherent limitations of voice recognition software  Read the chart carefully and recognize, using context, where substitutions have occurred      SIGNATURE: Geraldo Lee PA-C  DATE: March 8, 2018  TIME: 10:08 PM

## 2018-03-08 NOTE — PLAN OF CARE
DISCHARGE PLANNING     Discharge to home or other facility with appropriate resources Progressing        GENITOURINARY - ADULT     Maintains or returns to baseline urinary function Progressing     Absence of urinary retention Progressing     Urinary catheter remains patent Progressing        INFECTION - ADULT     Absence or prevention of progression during hospitalization Progressing        Knowledge Deficit     Patient/family/caregiver demonstrates understanding of disease process, treatment plan, medications, and discharge instructions Progressing        Nutrition/Hydration-ADULT     Nutrient/Hydration intake appropriate for improving, restoring or maintaining nutritional needs Progressing        PAIN - ADULT     Verbalizes/displays adequate comfort level or baseline comfort level Progressing        Potential for Falls     Patient will remain free of falls Progressing        Prexisting or High Potential for Compromised Skin Integrity     Skin integrity is maintained or improved Progressing        RESPIRATORY - ADULT     Achieves optimal ventilation and oxygenation Progressing        SAFETY ADULT     Patient will remain free of falls Progressing     Maintain or return to baseline ADL function Progressing     Maintain or return mobility status to optimal level Progressing        SAFETY,RESTRAINT: NV/NON-SELF DESTRUCTIVE BEHAVIOR     Remains free of harm/injury (restraint for non violent/non self-detsructive behavior) Progressing     Returns to optimal restraint-free functioning Progressing        SKIN/TISSUE INTEGRITY - ADULT     Skin integrity remains intact Progressing     Incision(s), wounds(s) or drain site(s) healing without S/S of infection Progressing     Oral mucous membranes remain intact Progressing

## 2018-03-08 NOTE — SOCIAL WORK
DASH discussion completed  Discussed goals of making sure pt's needs are met upon discharge, pt's preferences are taken into account, pt understands her health condition, medications and symptoms to watch for after returning home and pt is aware of any follow up appointments recommended by hospital physician  AMBROSE ATTEMPTED TO SPEAK WITH PT, CURRENTLY SLEEPING ON BIPAP IN ICU  CALL MADE TO THE PT DTR, NOTED THAT THE PT LIVES ALONE IN HER OWN APARTMENT AND HAS A CANE, WALKER, BSC, AND SHOWER CHAIR IN THE HOME  PT IS A PART OF THE [facility] PROGRAM THROUGH THE [facility] WITH A HHA 3X A WEEK FOR 2HRS A DAY AND A [facility] NURSE ON OCCASION  PT DTR INDICATED THAT SHE WAS RECENTLY IN [place] AND WAS HOSPITALIZED DOWN THERE FOR SOME TIME IN THE ICU AND NOTED THAT SHE BROUGHT HER HOME TO [place] ON SATURDAY  PT DTR STAYED WITH HER FOR A FEW DAYS BUT SHE CONTINUED TO WORSEN SO SHE CAME INTO THE HOSPITAL  PT DTR FEELS THAT SHE IS SIGNIFICANTLY WEAKENED AND FRAIL AT THE MOMENT AND MAY NEED STR PLACEMENT UNTIL SHE IS STRONGER AND ABLE TO RESUME HOME  SHE DOES NOT WANT ANYONE BUT HER AND HER SISTER TO BROACH THAT SUBJECT, SHE WILL ASKED FOR CM/SW WHEN THE DISCUSSION IS HAD TO GO OVER CHOICES FOR THIS  CM WILL CONTINUE TO FOLLOW FOR NEEDS     PER DTR, SHE IS NOT HOME DURING THE DAY BUT CAN BE REACHED AT HER WORK -[phone]

## 2018-03-08 NOTE — CONSULTS
ICU CARDIOLOGY CONSULTATION  Nicolás Singleton 80 y o  female MRN: [id number]  Unit/Bed#: ICU 12 Encounter: [id number]      History of Present Illness   Physician Requesting Consult: Krysten Montiel MD  Reason for Consult / Principal Problem:  Shortness of breath  Assessment/Plan   Acute hypoxic and hypercapnic respiratory failure secondary to acute on chronic diastolic heart failure- current decompensated heart failure secondary to medical noncompliance? Patient's family reports the patient was not taking her torsemide after prolonged travel until day prior to arrival?  Possible under dosing upper torsemide in the setting of advanced kidney disease  Repeat echo 2d to check her pulmonary pressure in the setting of recent DVT  Daily standing weights  Dose torsemide based on creatinine in a [facility] Possible up titration  Acute on chronic kidney disease- improving with torsemide diuresis  Recent DVT left lower extremity- continue eliquis  Previous CVA  History of orthostatic hypotension  Moderate aortic stenosis  Chronic right bundle branch block    HPI: Nicolás Singleton is a 80y o  year old female h with recent admission aorta for CHF presented to [address] [place] [address] ER with recurrent shortness of breath 1 week after returning from a long trip  As per her daughter patient had stopped her diuretic for a couple days and did not restart until day prior to admission  She had worsening lower extremity edema  She was complaining of orthopnea  Denied any salt indiscretion  The deny the patient complaining of chest heaviness  There was no syncope  There is no falls  There is no fevers or chills      Historical Information   Past Medical History:   Diagnosis Date    CHF (congestive heart failure) ([facility] [address] ) 38/0591    diastolic     DVT (deep vein thrombosis) in pregnancy ([facility] [address] )     left LE in 2/2018 and 50 years ago    Hyperlipidemia     Hypertension     Pulmonary embolism ([facility] [address] ) 02/06/2018    Renal disorder     ckd    Stroke [facility]) 2016 left hand weakness,paresthesia  Past Surgical History:   Procedure Laterality Date    ABDOMINAL SURGERY      ruputured bowel  had colostomy and ileostomy,reversed later on   BACK SURGERY      rods in back  done 30 years ago   CARPAL TUNNEL RELEASE Bilateral     CYST REMOVAL      back    HERNIA REPAIR      REPLACEMENT TOTAL KNEE BILATERAL       History   Alcohol Use    Yes     Comment: Occ  History   Drug Use No     History   Smoking Status    Never Smoker   Smokeless Tobacco    Never Used     History reviewed  No pertinent family history  Meds/Allergies   Prior to Admission medications    Medication Sig Start Date End Date Taking? Authorizing Provider   amLODIPine (NORVASC) 2 5 mg tablet Take 5 mg by mouth 2 (two) times a day   Yes Historical Provider, MD   apixaban (ELIQUIS) 5 mg Take 5 mg by mouth 2 (two) times a day   Yes Historical Provider, MD   budesonide-formoterol (SYMBICORT) 160-4 5 mcg/act inhaler Inhale 2 puffs 2 (two) times a day   Yes Historical Provider, MD   calcium-vitamin D (OSCAL 500 + D) 500 mg-200 units per tablet Take 1 tablet by mouth 2 (two) times a day  Yes Historical Provider, MD   carvedilol (COREG) 25 mg tablet Take 25 mg by mouth 2 (two) times a day with meals   Yes Historical Provider, MD   cholecalciferol (VITAMIN D3) 1,000 units tablet Take 2,000 Units by mouth daily   Yes Historical Provider, MD   clopidogrel (PLAVIX) 75 mg tablet Take 75 mg by mouth daily   Yes Historical Provider, MD   co-enzyme Q-10 50 MG capsule Take 200 mg by mouth daily     Yes Historical Provider, MD   colesevelam (WELCHOL) 625 mg tablet Take 1,875 mg by mouth 2 (two) times a day with meals Indications: 3 tabs twice a day     Yes Historical Provider, MD   docusate sodium (COLACE) 100 mg capsule Take 100 mg by mouth 2 (two) times a day   Yes Historical Provider, MD   Multiple Vitamins-Minerals (OCUVITE PO) Take by mouth   Yes Historical Provider, MD   Polyethylene Glycol 3350 ([place]oneida Barragan PO) Take by mouth daily as needed   Yes Historical Provider, MD   torsemide (DEMADEX) 10 mg tablet Take 10 mg by mouth 2 (two) times a day     Yes Historical Provider, MD     Current Facility-Administered Medications   Medication Dose Route Frequency Provider Last Rate Last Dose    amLODIPine (NORVASC) tablet 5 mg  5 mg Oral BID Wyatt Fontana PA-C   5 mg at 03/08/18 1613    apixaban (ELIQUIS) tablet 5 mg  5 mg Oral BID Nery Proctor PA-C   5 mg at 03/08/18 1615    budesonide-formoterol (SYMBICORT) 160-4 5 mcg/act inhaler 2 puff  2 puff Inhalation BID Wyatt Fontana PA-C   2 puff at 03/08/18 1620    calcium-vitamin D (OSCAL 500 + D) 500 mg-200 units per tablet 1 tablet  1 tablet Oral BID Wyatt Fontana PA-C        carvedilol (COREG) tablet 25 mg  25 mg Oral BID With Meals Wyatt Fontana PA-C   25 mg at 03/08/18 1608    clopidogrel (PLAVIX) tablet 75 mg  75 mg Oral Daily Nery Proctor PA-C   75 mg at 03/08/18 1612    ipratropium-albuterol (DUO-NEB) 0 5-2 5 mg/3 mL inhalation solution 3 mL  3 mL Nebulization Q6H Wyatt Fontana PA-C   3 mL at 03/08/18 1407    magnesium oxide (MAG-OX) tablet 400 mg  400 mg Oral BID Wyatt Fontana PA-C   400 mg at 03/08/18 1614    multivitamin stress formula tablet 1 tablet  1 tablet Oral Daily Wyatt Fontana PA-C        mupirocin (BACTROBAN) 2 % nasal ointment   Nasal Q12H [address] [place]   1 application at 63/88/70 1030    omeprazole (PRILOSEC) suspension 2 mg/mL  20 mg Oral Daily Nery Proctor PA-C   20 mg at 03/07/18 9906    torsemide (DEMADEX) tablet 10 mg  10 mg Oral BID (diuretic) Wyatt Fontana PA-C   10 mg at 03/08/18 1614     Allergies   Allergen Reactions    Lyrica [Pregabalin] Other (See Comments)     Client says it made her "nutty"       Review of systems  CONSTITUTIONAL:  No weight loss, fever, chills, weakness or fatigue  HEENT:  Eyes:  No visual loss, blurred vision, double vision or yellow sclerae   Ears, Nose, Throat:  No hearing loss, sneezing, congestion, runny nose or sore throat  SKIN:  No rash or itching  CARDIOVASCULAR:  As per HPI  RESPIRATORY:  As per HPI  GASTROINTESTINAL:  No anorexia, nausea, vomiting or diarrhea  No abdominal pain or blood  GENITOURINARY:  Burning on urination  No flank pain  NEUROLOGICAL:  No headache, dizziness, syncope, paralysis, ataxia, numbness or tingling in the extremities  No change in bowel or bladder control  MUSCULOSKELETAL:  No muscle, back pain, joint pain or stiffness  HEMATOLOGIC:  No anemia, bleeding or bruising  LYMPHATICS:  No enlarged nodes  No history of splenectomy  PSYCHIATRIC:  No active suicidal or homicidal ideation  ENDOCRINOLOGIC:  No reports of sweating, cold or heat intolerance  No polyuria or polydipsia  ALLERGIES:  No history of asthma, hives, eczema or rhinitis  More than 10 systems reviewed and otherwise noncontributory  Objective   Vitals: Blood pressure 150/88, pulse 59, temperature 97 6 °F (36 4 °C), temperature source Temporal, resp  rate (!) 30, height 5' 4" (1 626 m), weight 80 kg (176 lb 5 9 oz), SpO2 93 %, not currently breastfeeding  Physical Exam   Constitutional: She is oriented to person, place, and time  No distress  HENT:   Head: Normocephalic and atraumatic  Right Ear: External ear normal    Left Ear: External ear normal    Nose: Nose normal    Mouth/Throat: No oropharyngeal exudate  Eyes: Conjunctivae are normal  Pupils are equal, round, and reactive to light  Right eye exhibits no discharge  Left eye exhibits no discharge  No scleral icterus  Neck: Normal range of motion  JVD present  No tracheal deviation present  No thyromegaly present  Cardiovascular: Normal rate, regular rhythm and intact distal pulses  Exam reveals gallop  Exam reveals no friction rub  Murmur heard  Pulmonary/Chest: Effort normal and breath sounds normal  No stridor  No respiratory distress  She has no wheezes  She has no rales  She exhibits no tenderness  Abdominal: Soft  Bowel sounds are normal  She exhibits distension  She exhibits no mass  There is no tenderness  There is no rebound and no guarding  Genitourinary:   Genitourinary Comments: No CVA tenderness   Musculoskeletal: She exhibits edema and deformity  She exhibits no tenderness  Neurological: She is alert and oriented to person, place, and time  She displays normal reflexes  She exhibits normal muscle tone  Skin: Skin is warm and dry  No rash noted  She is not diaphoretic  No erythema  Psychiatric: She has a normal mood and affect  Her behavior is normal  Judgment and thought content normal    Nursing note and vitals reviewed        Recent Results (from the past 24 hour(s))   Basic metabolic panel    Collection Time: 03/08/18  5:07 AM   Result Value Ref Range    Sodium 145 136 - 145 mmol/L    Potassium 4 1 3 5 - 5 3 mmol/L    Chloride 111 (H) 100 - 108 mmol/L    CO2 31 21 - 32 mmol/L    Anion Gap 3 (L) 4 - 13 mmol/L    BUN 49 (H) 5 - 25 mg/dL    Creatinine 1 89 (H) 0 60 - 1 30 mg/dL    Glucose 110 65 - 140 mg/dL    Calcium 8 7 8 3 - 10 1 mg/dL    eGFR 23 ml/min/1 73sq m   CBC and differential    Collection Time: 03/08/18  5:07 AM   Result Value Ref Range    WBC 4 40 (L) 4 80 - 10 80 Thousand/uL    RBC 3 56 (L) 4 20 - 5 40 Million/uL    Hemoglobin 10 7 (L) 12 0 - 16 0 g/dL    Hematocrit 34 1 (L) 37 0 - 47 0 %    MCV 96 82 - 98 fL    MCH 30 0 27 0 - 31 0 pg    MCHC 31 3 (L) 31 4 - 37 4 g/dL    RDW 14 5 11 6 - 15 1 %    MPV 9 1 8 9 - 12 7 fL    Platelets 93 (L) 330 - 400 Thousands/uL    Neutrophils Relative 75 43 - 75 %    Lymphocytes Relative 14 14 - 44 %    Monocytes Relative 8 4 - 12 %    Eosinophils Relative 3 0 - 6 %    Basophils Relative 1 0 - 1 %    Neutrophils Absolute 3 40 1 85 - 7 62 Thousands/µL    Lymphocytes Absolute 0 60 0 60 - 4 47 Thousands/µL    Monocytes Absolute 0 30 0 17 - 1 22 Thousand/µL    Eosinophils Absolute 0 10 0 00 - 0 61 Thousand/µL    Basophils Absolute 0 00 0 00 - 0 10 Thousands/µL Magnesium    Collection Time: 03/08/18  5:07 AM   Result Value Ref Range    Magnesium 2 4 1 6 - 2 6 mg/dL   Phosphorus    Collection Time: 03/08/18  5:07 AM   Result Value Ref Range    Phosphorus 5 8 (H) 2 3 - 4 1 mg/dL   Smear Review(Phlebs Do Not Order)    Collection Time: 03/08/18  5:07 AM   Result Value Ref Range    Platelet Estimate Decreased (A) Adequate   Blood gas, arterial    Collection Time: 03/08/18  5:51 AM   Result Value Ref Range    pH, Arterial 7 218 (LL) 7 350 - 7 450    PH ART TC 7 217 (LL) 7 350 - 7 450    pCO2, Arterial 81 1 (HH) 36 0 - 44 0 mm Hg    PCO2 (TC) Arterial 81 5 (HH) 36 0 - 44 0 mm Hg    pO2, Arterial 57 5 (LL) 75 0 - 129 0 mm Hg    PO2 (TC) Arterial 57 9 (LL) 75 0 - 129 0 mm Hg    HCO3, Arterial 32 3 (H) 22 0 - 28 0 mmol/L    Base Excess, Arterial 2 5 mmol/L    O2 Content, Arterial 14 7 (L) 16 0 - 23 0 mL/dL    O2 HGB,Arterial  86 9 (L) 94 0 - 97 0 %    SOURCE Radial, Left     MICHELLE TEST Yes     Temperature 98 7 Degrees Fehrenheit    Nasal Cannula 5l/m nc    Blood gas, arterial    Collection Time: 03/08/18  6:40 AM   Result Value Ref Range    pH, Arterial 7 251 (L) 7 350 - 7 450    PH ART TC 7 250 (L) 7 350 - 7 450    pCO2, Arterial 76 3 (HH) 36 0 - 44 0 mm Hg    PCO2 (TC) Arterial 76 6 (HH) 36 0 - 44 0 mm Hg    pO2, Arterial 54 8 (LL) 75 0 - 129 0 mm Hg    PO2 (TC) Arterial 55 2 (LL) 75 0 - 129 0 mm Hg    HCO3, Arterial 32 8 (H) 22 0 - 28 0 mmol/L    Base Excess, Arterial 3 7 mmol/L    O2 Content, Arterial 14 0 (L) 16 0 - 23 0 mL/dL    O2 HGB,Arterial  87 1 (L) 94 0 - 97 0 %    Temperature 98 7 Degrees Fehrenheit   Blood gas, arterial    Collection Time: 03/08/18 10:40 AM   Result Value Ref Range    pH, Arterial 7 298 (L) 7 350 - 7 450    PH ART TC 7 297 (L) 7 350 - 7 450    pCO2, Arterial 70 5 (HH) 36 0 - 44 0 mm Hg    PCO2 (TC) Arterial 70 8 (HH) 36 0 - 44 0 mm Hg    pO2, Arterial 66 9 (L) 75 0 - 129 0 mm Hg    PO2 (TC) Arterial 67 4 (L) 75 0 - 129 0 mm Hg    HCO3, Arterial 33 8 (H) 22 0 - 28 0 mmol/L    Base Excess, Arterial 5 5 mmol/L    O2 Content, Arterial 14 5 (L) 16 0 - 23 0 mL/dL    O2 HGB,Arterial  92 3 (L) 94 0 - 97 0 %    SOURCE Radial, Left     MICHELLE TEST Yes     Temperature 98 7 Degrees Fehrenheit    Non Vent type BIPAP BIPAP     IPAP 12     EPAP 5     BIPAP fio2 30 %   Blood gas, arterial    Collection Time: 18  6:16 PM   Result Value Ref Range    pH, Arterial 7 389 7 350 - 7 450    PH ART TC 7 395 7 350 - 7 450    pCO2, Arterial 50 8 (H) 36 0 - 44 0 mm Hg    PCO2 (TC) Arterial 49 9 (H) 36 0 - 44 0 mm Hg    pO2, Arterial 64 0 (L) 75 0 - 129 0 mm Hg    PO2 (TC) Arterial 62 3 (L) 75 0 - 129 0 mm Hg    HCO3, Arterial 30 0 (H) 22 0 - 28 0 mmol/L    Base Excess, Arterial 4 1 mmol/L    O2 Content, Arterial 14 8 (L) 16 0 - 23 0 mL/dL    O2 HGB,Arterial  92 2 (L) 94 0 - 97 0 %    SOURCE Radial, Left     MICHELLE TEST Yes     Temperature 97 8 Degrees Fehrenheit    Non Vent type BIPAP BIPAP     IPAP 15     EPAP 5     BIPAP fio2 30 %     Imaging: I have personally reviewed pertinent films in PACS    Cardiac testing:   Results for orders placed during the hospital encounter of 17   Echo complete with contrast if indicated    Narrative [address]  [address] [facility] [address]  [phone]    Transthoracic Echocardiogram  2D, M-mode, Doppler, and Color Doppler    Study date:  28-Aug-2017    Patient: Fina Hagen  MR number: [id number]  Account number: [de-identified]  : 12-May-1928  Age: 80 years  Gender: Female  Status: Routine  Location: Bedside  Height: 60 in  Weight: 176 7 lb  BP: 113/ 56 mmHg    Indications: SOB    Diagnoses: 428 0 - CONGESTIVE HEART FAILURE    Sonographer:  YADIRA Moseley  Primary Physician:  Robert Martini DO  Group:  [facility]  Interpreting Physician:  Shilo Castro DO    SUMMARY    LEFT VENTRICLE:  Systolic function was normal by visual assessment   Ejection fraction was estimated in the range of 55 % to 60 %   There were no regional wall motion abnormalities  There was mild concentric hypertrophy  Doppler parameters were consistent with abnormal left ventricular relaxation (grade 1 diastolic dysfunction)  Doppler parameters were consistent with elevated mean left atrial filling pressure  MITRAL VALVE:  There was trace regurgitation  AORTIC VALVE:  There was mild stenosis  There was mild regurgitation  Aortic valve area was 1 5 cm squared by the continuity equation  TRICUSPID VALVE:  There was mild regurgitation  HISTORY: PRIOR HISTORY: HTN,Chronic kidney Disease,CVA,CHF,Hyperlipidemia,Cellulitis of left anterior lower leg    PROCEDURE: The procedure was performed at the bedside  This was a routine study  The transthoracic approach was used  The study included complete 2D imaging, M-mode, complete spectral Doppler, and color Doppler  The heart rate was 51 bpm,  at the start of the study  Images were obtained from the parasternal, apical, subcostal, and suprasternal notch acoustic windows  Echocardiographic views were limited due to restricted patient mobility, poor patient compliance, decreased  penetration, and lung interference  This was a technically difficult study  LEFT VENTRICLE: Size was normal  Systolic function was normal by visual assessment  Ejection fraction was estimated in the range of 55 % to 60 %  There were no regional wall motion abnormalities  There was mild concentric hypertrophy  DOPPLER: Doppler parameters were consistent with abnormal left ventricular relaxation (grade 1 diastolic dysfunction)  Doppler parameters were consistent with elevated mean left atrial filling pressure  RIGHT VENTRICLE: The size was normal  Systolic function was normal  DOPPLER: Systolic pressure was within the normal range  LEFT ATRIUM: The atrium was mildly dilated  RIGHT ATRIUM: Size was normal     MITRAL VALVE: Valve structure was normal  There was normal leaflet separation   No echocardiographic evidence for prolapse  DOPPLER: The transmitral velocity was within the normal range  There was no evidence for stenosis  There was trace  regurgitation  AORTIC VALVE: The valve was trileaflet  Leaflets exhibited normal cuspal separation and sclerosis  DOPPLER: Transaortic velocity was within the normal range  There was mild stenosis  There was mild regurgitation  TRICUSPID VALVE: The valve structure was normal  There was normal leaflet separation  DOPPLER: The transtricuspid velocity was within the normal range  There was mild regurgitation  Pulmonary artery systolic pressure was moderately  increased  Estimated peak PA pressure was 67 mmHg  PULMONIC VALVE: Leaflets exhibited normal thickness, no calcification, and normal cuspal separation  DOPPLER: The transpulmonic velocity was within the normal range  There was no regurgitation  PERICARDIUM: There was no thickening  There was no pericardial effusion  AORTA: The root exhibited normal size      PULMONARY ARTERY: The size was normal  The morphology appeared normal     MEASUREMENT TABLES    DOPPLER MEASUREMENTS  Aortic valve   (Reference normals)  Peak gradient   27 mmHg   (--)  Valve area, cont   1 5 cm squared   (--)    SYSTEM MEASUREMENT TABLES    2D mode  AoR Diam 2D: 3 4 cm  LA Diam (2D): 4 1 cm  LA/Ao (2D): 1 21  FS (2D Teich): 23 %  IVSd (2D): 1 32 cm  LVDEV: 64 7 cm³  LVEDV MOD BP: 128 cm³  LVESV: 34 4 cm³  LVIDd(2D): 3 87 cm  LVISd (2D): 2 98 cm  LVOT Area 2D: 2 84 cm squared  LVPWd (2D): 1 27 cm  SV (Teich): 30 3 cm³    Apical four chamber  LVEF A4C: 50 %    Apical two chamber  LVEF A2C: 51 %    Unspecified Scan Mode  PATRICIA Cont Eq (Peak Jim): 1 52 cm squared  Dec  [place]; Regurgitant Flow: 1560 mm/s2  Max P mm[Hg]  V Max: 3390 mm/s  Vmax: 3390 mm/s  LVOT (VTI): 33 1 cm  LVOT Diam : 1 9 cm  LVOT Vmax: 1350 mm/s  LVOT Vmax; Mean: 1340 mm/s  Peak Grad ; Mean: 7 mm[Hg]  SV (LVOT): 94 cm³  VTI;Mean: 3 mm[Hg]  MV Peak A Jim: 1130 mm/s  MV Peak E Jim  Mean: 1020 mm/s  MVA (PHT): 4 15 cm squared  PHT: 53 ms  Max P mm[Hg]  V Max: 3270 mm/s  Vmax: 3180 mm/s  RA Area: 16 3 cm squared  RA Volume: 42 4 cm³  TAPSE: 2 cm    [facility] Accredited Echocardiography Laboratory    Prepared and electronically signed by    Gus Castillo DO  Signed 29-Aug-2017 14:51:23       No results found for this or any previous visit  EKG:  Normal sinus rhythm nonspecific intraventricular conduction delay    Counseling / Coordination of Care  Total time spent today 75 minutes  Greater than 50% of total time was spent with the patient and / or family counseling and / or coordination of care  Heart failure, respiratory distress  Fiorella Rios MD [facility]      "This note has been constructed using a voice recognition system  Therefore there may be syntax, spelling, and/or grammatical errors   Please call if you have any questions  "

## 2018-03-08 NOTE — PLAN OF CARE
Problem: DISCHARGE PLANNING - CARE MANAGEMENT  Goal: Discharge to post-acute care or home with appropriate resources  INTERVENTIONS:  - Conduct assessment to determine patient/family and health care team treatment goals, and need for post-acute services based on payer coverage, community resources, and patient preferences, and barriers to discharge  - Address psychosocial, clinical, and financial barriers to discharge as identified in assessment in conjunction with the patient/family and health care team  - Arrange appropriate level of post-acute services according to patient's   needs and preference and payer coverage in collaboration with the physician and health care team  - Communicate with and update the patient/family, physician, and health care team regarding progress on the discharge plan  - Arrange appropriate transportation to post-acute venues  [facility]    Outcome: Progressing

## 2018-03-09 ENCOUNTER — APPOINTMENT (INPATIENT)
Dept: RADIOLOGY | Facility: HOSPITAL | Age: 83
DRG: 208 | End: 2018-03-09
Payer: MEDICARE

## 2018-03-09 ENCOUNTER — APPOINTMENT (INPATIENT)
Dept: NON INVASIVE DIAGNOSTICS | Facility: HOSPITAL | Age: 83
DRG: 208 | End: 2018-03-09
Payer: MEDICARE

## 2018-03-09 PROBLEM — I35.0 AORTIC STENOSIS, MODERATE: Status: ACTIVE | Noted: 2017-06-06

## 2018-03-09 PROBLEM — E87.2 RESPIRATORY ACIDOSIS: Status: RESOLVED | Noted: 2017-09-01 | Resolved: 2018-03-09

## 2018-03-09 PROBLEM — E78.5 DYSLIPIDEMIA: Chronic | Status: ACTIVE | Noted: 2017-08-27

## 2018-03-09 LAB
ANION GAP SERPL CALCULATED.3IONS-SCNC: 2 MMOL/L (ref 4–13)
BASOPHILS # BLD AUTO: 0 THOUSANDS/ΜL (ref 0–0.1)
BASOPHILS NFR BLD AUTO: 0 % (ref 0–1)
BUN SERPL-MCNC: 46 MG/DL (ref 5–25)
CALCIUM SERPL-MCNC: 8.9 MG/DL (ref 8.3–10.1)
CHLORIDE SERPL-SCNC: 111 MMOL/L (ref 100–108)
CO2 SERPL-SCNC: 32 MMOL/L (ref 21–32)
CREAT SERPL-MCNC: 1.53 MG/DL (ref 0.6–1.3)
EOSINOPHIL # BLD AUTO: 0.2 THOUSAND/ΜL (ref 0–0.61)
EOSINOPHIL NFR BLD AUTO: 5 % (ref 0–6)
ERYTHROCYTE [DISTWIDTH] IN BLOOD BY AUTOMATED COUNT: 13.5 % (ref 11.6–15.1)
GFR SERPL CREATININE-BSD FRML MDRD: 30 ML/MIN/1.73SQ M
GLUCOSE SERPL-MCNC: 100 MG/DL (ref 65–140)
HCT VFR BLD AUTO: 33.6 % (ref 37–47)
HGB BLD-MCNC: 10.8 G/DL (ref 12–16)
LG PLATELETS BLD QL SMEAR: PRESENT
LYMPHOCYTES # BLD AUTO: 0.6 THOUSANDS/ΜL (ref 0.6–4.47)
LYMPHOCYTES NFR BLD AUTO: 16 % (ref 14–44)
MAGNESIUM SERPL-MCNC: 2.5 MG/DL (ref 1.6–2.6)
MCH RBC QN AUTO: 30 PG (ref 27–31)
MCHC RBC AUTO-ENTMCNC: 32.1 G/DL (ref 31.4–37.4)
MCV RBC AUTO: 94 FL (ref 82–98)
MONOCYTES # BLD AUTO: 0.4 THOUSAND/ΜL (ref 0.17–1.22)
MONOCYTES NFR BLD AUTO: 11 % (ref 4–12)
NEUTROPHILS # BLD AUTO: 2.6 THOUSANDS/ΜL (ref 1.85–7.62)
NEUTS SEG NFR BLD AUTO: 68 % (ref 43–75)
PHOSPHATE SERPL-MCNC: 3.3 MG/DL (ref 2.3–4.1)
PLATELET # BLD AUTO: 95 THOUSANDS/UL (ref 130–400)
PLATELET BLD QL SMEAR: ABNORMAL
PMV BLD AUTO: 8.9 FL (ref 8.9–12.7)
POTASSIUM SERPL-SCNC: 3.9 MMOL/L (ref 3.5–5.3)
RBC # BLD AUTO: 3.6 MILLION/UL (ref 4.2–5.4)
SODIUM SERPL-SCNC: 145 MMOL/L (ref 136–145)
WBC # BLD AUTO: 3.8 THOUSAND/UL (ref 4.8–10.8)

## 2018-03-09 PROCEDURE — 94760 N-INVAS EAR/PLS OXIMETRY 1: CPT

## 2018-03-09 PROCEDURE — 94660 CPAP INITIATION&MGMT: CPT

## 2018-03-09 PROCEDURE — 94668 MNPJ CHEST WALL SBSQ: CPT

## 2018-03-09 PROCEDURE — 80048 BASIC METABOLIC PNL TOTAL CA: CPT | Performed by: PHYSICIAN ASSISTANT

## 2018-03-09 PROCEDURE — 93306 TTE W/DOPPLER COMPLETE: CPT

## 2018-03-09 PROCEDURE — 85025 COMPLETE CBC W/AUTO DIFF WBC: CPT | Performed by: PHYSICIAN ASSISTANT

## 2018-03-09 PROCEDURE — 94640 AIRWAY INHALATION TREATMENT: CPT

## 2018-03-09 PROCEDURE — 99232 SBSQ HOSP IP/OBS MODERATE 35: CPT | Performed by: INTERNAL MEDICINE

## 2018-03-09 PROCEDURE — 71045 X-RAY EXAM CHEST 1 VIEW: CPT

## 2018-03-09 PROCEDURE — 93306 TTE W/DOPPLER COMPLETE: CPT | Performed by: INTERNAL MEDICINE

## 2018-03-09 PROCEDURE — 83735 ASSAY OF MAGNESIUM: CPT | Performed by: PHYSICIAN ASSISTANT

## 2018-03-09 PROCEDURE — 84100 ASSAY OF PHOSPHORUS: CPT | Performed by: PHYSICIAN ASSISTANT

## 2018-03-09 PROCEDURE — 99233 SBSQ HOSP IP/OBS HIGH 50: CPT | Performed by: INTERNAL MEDICINE

## 2018-03-09 RX ORDER — B-COMPLEX WITH VITAMIN C
1 TABLET ORAL 2 TIMES DAILY WITH MEALS
Status: DISCONTINUED | OUTPATIENT
Start: 2018-03-09 | End: 2018-03-12 | Stop reason: HOSPADM

## 2018-03-09 RX ORDER — AMOXICILLIN 250 MG
1 CAPSULE ORAL 2 TIMES DAILY
Status: DISCONTINUED | OUTPATIENT
Start: 2018-03-09 | End: 2018-03-12 | Stop reason: HOSPADM

## 2018-03-09 RX ADMIN — IPRATROPIUM BROMIDE AND ALBUTEROL SULFATE 3 ML: .5; 3 SOLUTION RESPIRATORY (INHALATION) at 07:42

## 2018-03-09 RX ADMIN — AMLODIPINE BESYLATE 5 MG: 5 TABLET ORAL at 17:23

## 2018-03-09 RX ADMIN — IPRATROPIUM BROMIDE AND ALBUTEROL SULFATE 3 ML: .5; 3 SOLUTION RESPIRATORY (INHALATION) at 02:10

## 2018-03-09 RX ADMIN — IPRATROPIUM BROMIDE AND ALBUTEROL SULFATE 3 ML: .5; 3 SOLUTION RESPIRATORY (INHALATION) at 14:08

## 2018-03-09 RX ADMIN — Medication 400 MG: at 17:23

## 2018-03-09 RX ADMIN — IPRATROPIUM BROMIDE AND ALBUTEROL SULFATE 3 ML: .5; 3 SOLUTION RESPIRATORY (INHALATION) at 20:56

## 2018-03-09 RX ADMIN — Medication 20 MG: at 10:20

## 2018-03-09 RX ADMIN — BUDESONIDE AND FORMOTEROL FUMARATE DIHYDRATE 2 PUFF: 160; 4.5 AEROSOL RESPIRATORY (INHALATION) at 17:23

## 2018-03-09 RX ADMIN — AMLODIPINE BESYLATE 5 MG: 5 TABLET ORAL at 10:19

## 2018-03-09 RX ADMIN — Medication: at 10:21

## 2018-03-09 RX ADMIN — CARVEDILOL 25 MG: 25 TABLET, FILM COATED ORAL at 17:23

## 2018-03-09 RX ADMIN — CALCIUM CARBONATE 500 MG (1,250 MG)-VITAMIN D3 200 UNIT TABLET 1 TABLET: at 17:23

## 2018-03-09 RX ADMIN — CARVEDILOL 25 MG: 25 TABLET, FILM COATED ORAL at 10:19

## 2018-03-09 RX ADMIN — Medication 1 APPLICATION: at 20:48

## 2018-03-09 RX ADMIN — Medication 1 TABLET: at 17:24

## 2018-03-09 RX ADMIN — APIXABAN 5 MG: 5 TABLET, FILM COATED ORAL at 17:23

## 2018-03-09 RX ADMIN — TORSEMIDE 20 MG: 20 TABLET ORAL at 10:18

## 2018-03-09 RX ADMIN — CLOPIDOGREL BISULFATE 75 MG: 75 TABLET ORAL at 10:18

## 2018-03-09 RX ADMIN — APIXABAN 5 MG: 5 TABLET, FILM COATED ORAL at 10:19

## 2018-03-09 RX ADMIN — BUDESONIDE AND FORMOTEROL FUMARATE DIHYDRATE 2 PUFF: 160; 4.5 AEROSOL RESPIRATORY (INHALATION) at 10:22

## 2018-03-09 RX ADMIN — B-COMPLEX W/ C & FOLIC ACID TAB 1 TABLET: TAB at 10:18

## 2018-03-09 RX ADMIN — Medication 400 MG: at 10:18

## 2018-03-09 NOTE — SOCIAL WORK
SPOKE WITH PT AND HER DTR AT THE BEDSIDE  PER DTR, THEY NEED TO SEE WHAT HER NEEDS ARE FOR DISCHARGE BEFORE SHE HAS ANY STR CONVERSATION WITH HER  WILL CONTINUE TO FOLLOW FOR THERAPY PROGRESS AND NOTATIONS  IMM DISCUSSED WITH THEM AS WELL AS MBP  LETTER PROVIDED

## 2018-03-09 NOTE — ASSESSMENT & PLAN NOTE
Hx of chronic left DVT however reportedly diagnosed during admission in [place] with acute DVT and started on Eliquis, continue Eliquis no other intervention

## 2018-03-09 NOTE — NUTRITION
03/09/18 1300   Assessment   Timepoint Nutrition Review   Labs   List Completed Labs (BUN 46, creat 1 53; meds-reviewed)   Adequacy of Intake   Nutrition Modality PO  (regular high calorie/high protein)   Intake Meals 25-50%;50-75%   Intake Supplements 50-75%   Estimated Calorie Intake 50-75%   Estimated Protein Intake  50-75%   Estimated Fluid Intake 50-75%   Estimated calorie intake compared to estimated need pt states she is trying to eat, had 2 meals thus far with fair intake  Pt is drinking the ensure supplements however would only like the vanilla flavor because the chocolate flavor is high in potassium  PES Statement   Problem (adjusted)   Oral or Nutritional Support Intake (2) Inadequate oral intake NI-2 1   Related to Decreased Appetite   As evidenced by: Intake < estimated needs   Patient Nutrition Goals   Goal meet PO needs;increase intake   Goal Status new goal made   Timeframe to complete goal by next f/u   Recommendations/Interventions   Summary Spoke with pt, she states her appetite is fair, is trying to eat at mealtimes, is drinking Ensure supplements however wishes to only receive vanilla flavor as the chocolate flavor is higher in potassium  She relayed to me that she follows a pre-renal diet at home, monitoring mainly her sodium and potassium levels at home  She wishes to be on that diet during her hospital stay as well  Currently RD protocol is selected as NO, therefore RD cannot adjust diet or supplement order   Please change RD protocol to YES, and/or adjust diet to pre-renal and adjust Ensure supplement to vanilla flavor TID per patient request     Interventions Diet: order adjustment   Nutrition Recommendations Other (specify)  (see summary above)   Nutrition Complexity Risk   Nutrition complexity level High risk   Nutrition review: 03/14/18   Follow up date 03/12/18

## 2018-03-09 NOTE — PROGRESS NOTES
Transfer Note - ICU/Stepdown Transfer to [facility]/MS roselyn Jacob 80 y o  female MRN: [id number]  [facility]   Unit/Bed#: ICU 12 Encounter: [id number]    Code Status: Level 2 - DNAR: but accepts endotracheal intubation    Reason for ICU/Stepdown admission: Resp failure s/p intubation    Active problems: Principal Problem:    Acute on chronic diastolic CHF (congestive heart failure) ([facility] )  Active Problems:    Acute respiratory failure with hypoxia and hypercapnia (HCC)    Deep vein thrombosis (DVT) of left lower extremity ([facility] )    Essential hypertension    Hyperlipidemia    CKD (chronic kidney disease) stage 3, GFR 30-59 ml/min    History of CVA (cerebrovascular accident)    Obesity  Resolved Problems:    Respiratory acidosis    Toxic metabolic encephalopathy      * Acute on chronic diastolic CHF (congestive heart failure) [facility] )   Assessment & Plan    Recent admission in 8/2017 for A/C CHF, recent admission in [place] in earlier February for A/C CHF and hypercarbic respiratory failure requiring intubation; Echo 8/2017 EF 55 % to 60 % with grade 1 diastolic dysfunction;  Restarted on home Torsemide 10mg BID; CXR improved        Acute respiratory failure with hypoxia and hypercapnia (HCC)   Assessment & Plan    Extubated 3/7, tolerating nasal cannula; no resp distress today        Respiratory acidosisresolved as of 3/9/2018   Assessment & Plan    resolved        Toxic metabolic encephalopathyresolved as of 3/8/2018   Assessment & Plan    -resolved        Deep vein thrombosis (DVT) of left lower extremity (HCC)   Assessment & Plan    Hx of chronic left DVT however reportedly diagnosed during admission in [place] with acute DVT and started on Eliquis, continue Eliquis no other intervention        Essential hypertension   Assessment & Plan    Recently changed from Bystolic to Coreg 48HG BID in addition to Norvasc for HTN; Resumed all home meds        Hyperlipidemia   Assessment & Plan Continue statin therapy        CKD (chronic kidney disease) stage 3, GFR 30-59 ml/min   Assessment & Plan    Cr baseline around 1 7-2 0, today is 1 5, follows with Dr Ayesha Mares        History of CVA (cerebrovascular accident)   Assessment & Plan    Hx of right basal ganglia CVA in 2016, Baseline deficits mild tingling in hand; Continue Plavix            Consultants:   · Cardiology    History of Present Illness/Summary of clinical course: 80 y o  female with PMHx of chronic diastolic CHF, HTN, HLD, right basal ganglia CVA in 2016, chronic DVT of LLE with recently diagnosed acute DVT, admission in 8/2017 for CHF and recent admission in [place] in 2/2018 for CHF who presents to [facility] ER with shortness of breath  Family states she got back from [place] and seemed to be doing well, although noticeably more deconditioned than previously  She required intubation while in [place] for hypercarbia and A/C CHF and was on a ventilator for 3 days  Her daughter states she saw her earlier today and felt she seemed a bit off  Returned to check on her and took her oxygen saturations which were only reading in the mid 70's, however the patient refused to go to the emergency department  Later in the day, she called her daughter and stated she did not feel well, and eventually agreed to come to ER for evaluation  Initially alert and oriented, being treated with lasix for CHF in ED  Daughter thought she had fallen asleep however when she tried to wake her up, she was unable to  CTH did not show any acute disease  Required intubation for unresponsiveness  ABG post-intubation was consistent with hypercarbia  Patient was admitted to ICU for further monitoring  In day #2, resp much improved with good diuresis  Extubated and has been doing well on nasal cannula  Tolerating diet, no c/o CP and SOB much improved     Please refer to today's progress note for further clinical details       Recent or scheduled procedures: None    Outstanding/pending diagnostics: None       Mobilization Plan: OOB as tolerated    Nutrition Plan: Regular    Discharge Plan: Home     Specific Diagnosis Plan:    Heart Failure:  Cardiology consult placed  Yes already seeing    Diuresis plan: back on home Torsemide    [  ] Family aware of transfer out of critical care: no     Spoke with Dr Thalia Lux regarding transfer @ [phone]  Patient accepted to their service      MARGOT Heredia

## 2018-03-09 NOTE — PROGRESS NOTES
ICU Progress Note - Cardiology   Gaurang Card 80 y o  female MRN: [id number]  Unit/Bed#: ICU 12 Encounter: [id number]    Assessment/Plan:  Acute hypoxic and hypercapnic respiratory failure secondary to acute on chronic diastolic heart failure- current decompensated heart failure secondary to medical noncompliance? Improving volume status  Check response to p o  torsemide 20 mg   Given her pre-existing kidney disease possible need for up titration  Acute on chronic kidney disease- improving with torsemide diuresis  Malignant labile hypertension- continue carvedilol 25 mg twice a day plus amlodipine 5 mg twice a day  Recent DVT left lower extremity- continue eliquis  Previous CVA  History of orthostatic hypotension- compression socks  Recommend documenting orthostatics  Behavior modification  Moderate aortic stenosis  Chronic right bundle branch block    Subjective/Objective   She is feeling less short of breath  Denies having chest pain  Objective:     Vitals: /63   Pulse 76   Temp 97 7 °F (36 5 °C) (Temporal)   Resp (!) 33   Ht 5' 4" (1 626 m)   Wt 75 8 kg (167 lb 1 7 oz)   SpO2 94%   Breastfeeding?  No   BMI 28 68 kg/m²   Vitals:    03/08/18 0550 03/09/18 0600   Weight: 80 kg (176 lb 5 9 oz) 75 8 kg (167 lb 1 7 oz)     Orthostatic Blood Pressures    Flowsheet Row Most Recent Value   Blood Pressure  135/63 filed at 03/09/2018 1200   Patient Position - Orthostatic VS  Lying filed at 03/09/2018 0800            Intake/Output Summary (Last 24 hours) at 03/09/18 1508  Last data filed at 03/09/18 0800   Gross per 24 hour   Intake              300 ml   Output             1650 ml   Net            -1350 ml       Invasive Devices     Peripheral Intravenous Line            Peripheral IV 03/06/18 Right Forearm 2 days          Drain            Urethral Catheter Double-lumen 16 Fr  2 days                Review of Systems: reviewed    Physical Exam   Constitutional: She is oriented to person, place, and time  No distress  HENT:   Head: Normocephalic and atraumatic  Right Ear: External ear normal    Left Ear: External ear normal    Eyes: Conjunctivae are normal  Pupils are equal, round, and reactive to light  Right eye exhibits no discharge  Left eye exhibits no discharge  No scleral icterus  Neck: Normal range of motion  Neck supple  JVD present  No tracheal deviation present  No thyromegaly present  Cardiovascular: Normal rate and regular rhythm  Exam reveals gallop  Exam reveals no friction rub  Murmur heard  Pulmonary/Chest: Effort normal and breath sounds normal  No stridor  No respiratory distress  She has no wheezes  She has no rales  She exhibits no tenderness  Abdominal: Soft  Bowel sounds are normal  She exhibits distension  She exhibits no mass  There is no tenderness  There is no rebound and no guarding  Musculoskeletal: Normal range of motion  She exhibits edema  She exhibits no tenderness or deformity  Neurological: She is alert and oriented to person, place, and time  She has normal reflexes  No cranial nerve deficit  She exhibits normal muscle tone  Coordination normal    Skin: Skin is warm and dry  No rash noted  She is not diaphoretic  No erythema  No pallor  Psychiatric: She has a normal mood and affect  Her behavior is normal  Judgment and thought content normal    Nursing note and vitals reviewed  Lab Results: I have personally reviewed pertinent lab results  Imaging: I have personally reviewed pertinent reports  EKG: reviewed  VTE Pharmacologic Prophylaxis: Sequential compression device (Venodyne)   VTE Mechanical Prophylaxis: sequential compression device    Counseling / Coordination of Care  Total time spent today 40 minutes  Greater than 50% of total time was spent with the patient and / or family counseling and / or coordination of care   A description of the counseling / coordination of care: hf, family discussion, labile bp

## 2018-03-09 NOTE — ASSESSMENT & PLAN NOTE
Blood pressure is stable on Coreg 25 milligram p o  b i d , Norvasc 10 milligram p o  daily and torsemide 20 milligram p o  daily

## 2018-03-09 NOTE — ASSESSMENT & PLAN NOTE
Recent admission in 8/2017 for A/C CHF, recent admission in [place] in earlier February for A/C CHF and hypercarbic respiratory failure requiring intubation    Echo 8/2017 EF 55 % to 60 % with grade 1 diastolic dysfunction  Good response to Demadex 20 milligram p o  daily

## 2018-03-09 NOTE — ASSESSMENT & PLAN NOTE
Cr baseline around 1 7-2 0  Creatinine  slightly worse at 1 6 today  Continue to monitor with diuretics

## 2018-03-09 NOTE — ASSESSMENT & PLAN NOTE
Status post ventilator-dependent respiratory failure  Patient is currently stable on 2 liters oxygen

## 2018-03-09 NOTE — PROGRESS NOTES
Daily Progress Note - Critical Care/ Veto Primrose 80 y o  female MRN: [id number]  Unit/Bed#: ICU 12 Encounter: [id number]    ______________________________________________________________________  Assessment:   Principal Problem:    Acute on chronic diastolic CHF (congestive heart failure) (HCC)  Active Problems:    Acute respiratory failure with hypoxia and hypercapnia (HCC)    Deep vein thrombosis (DVT) of left lower extremity (HCC)    Essential hypertension    Hyperlipidemia    CKD (chronic kidney disease) stage 3, GFR 30-59 ml/min    History of CVA (cerebrovascular accident)    Obesity  Resolved Problems:    Respiratory acidosis    Toxic metabolic encephalopathy      * Acute on chronic diastolic CHF (congestive heart failure) ([facility] [address] )   Assessment & Plan    Recent admission in 8/2017 for A/C CHF, recent admission in [place] in earlier February for A/C CHF and hypercarbic respiratory failure requiring intubation; Echo 8/2017 EF 55 % to 60 % with grade 1 diastolic dysfunction;  Restarted on home Torsemide 10mg BID; CXR improved        Acute respiratory failure with hypoxia and hypercapnia (HCC)   Assessment & Plan    Extubated 3/7, tolerating nasal cannula; no resp distress today        Respiratory acidosisresolved as of 3/9/2018   Assessment & Plan    resolved        Toxic metabolic encephalopathyresolved as of 3/8/2018   Assessment & Plan    -resolved        Deep vein thrombosis (DVT) of left lower extremity (HCC)   Assessment & Plan    Hx of chronic left DVT however reportedly diagnosed during admission in [place] with acute DVT and started on Eliquis, continue Eliquis no other intervention        Essential hypertension   Assessment & Plan    Recently changed from Bystolic to Coreg 11HK BID in addition to Norvasc for HTN; Resumed all home meds        Hyperlipidemia   Assessment & Plan    Continue statin therapy        CKD (chronic kidney disease) stage 3, GFR 30-59 ml/min   Assessment & Plan    Cr baseline around 1 7-2 0, today is 1 5, follows with Dr Lyndon Roberts        History of CVA (cerebrovascular accident)   Assessment & Plan    Hx of right basal ganglia CVA in 2016, Baseline deficits mild tingling in hand; Continue Plavix            Plan:    Neuro:   · Delirium: CAM ICU positive no   · Regulate sleep/wake cycle    CV:   · Rhythm: NSR  · Follow rhythm on telemetry    Pulm:   · Tolerating nasal cannula  · Encourage I/S    GI:   · Nutrition/diet plan: Regular  · Stress ulcer prophylaxis: No prophylaxis needed  · Bowel regimen: Pericolace  · Last BM: prior to admission    FEN:   · Cr improved; lytes stable  · Fluid/Diuretic plan: Needs diuresis torsemide PO  · Goal 24 hour fluid balance: even to negative  · Electrolytes repleted: no  · Goal: K >4 0, Mag >2 0, and Phos >3 0    :   · Indwelling Hernandez present: yes   · Urinary catheter no longer needed  Will place order to D/C  ID:   · No active infectious source  · Trend temps and WBC count as needed    Heme:   · Hgb stable  · Trend hgb and plts    Endo:   · BS stable, no hx DM    Msk/Skin:  · Mobility goal: OOB as tolerated  · PT consult: yes  · OT consult: yes  · Frequent turning and off-loading    Family:  · Family updated within 24 hours: yes   · Family meeting planned today: no     Lines:  · PIV    VTE Prophylaxis:  · Pharmacologic Prophylaxis: Eliquis  · Mechanical Prophylaxis: sequential compression device    Disposition: Transfer to general medical floor    Code Status: Level 2 - DNAR: but accepts endotracheal intubation    Counseling / Coordination of Care  Total time spent today 36 minutes  Greater than 50% of total time was spent with the patient and / or family counseling and / or coordination of care   A description of the counseling / coordination of care: evaluating chart, pt exam, note preparation,   ______________________________________________________________________    HPI/24hr events: no issues overnight; denies any c/o CP or SOB; tolerating nasal cannula; tolerating regular diet    ______________________________________________________________________    Physical Exam:   Physical Exam   Constitutional: She is oriented to person, place, and time  She appears well-developed and well-nourished  No distress  HENT:   Head: Normocephalic  Eyes: Pupils are equal, round, and reactive to light  Neck: Normal range of motion  Cardiovascular: Normal rate and regular rhythm  Pulmonary/Chest: Effort normal  She has decreased breath sounds  Abdominal: Soft  Bowel sounds are normal  She exhibits no distension  There is no tenderness  Musculoskeletal: Normal range of motion  Neurological: She is alert and oriented to person, place, and time  Skin: Skin is warm and dry  Capillary refill takes less than 2 seconds  She is not diaphoretic  Psychiatric: She has a normal mood and affect  Her behavior is normal    Nursing note and vitals reviewed  ______________________________________________________________________  Vitals:    18 1019 18 1100 18 1200 18 1409   BP: 119/56 134/65 135/63    BP Location:       Pulse:  73 76    Resp:  (!) 24 (!) 33    Temp:  97 7 °F (36 5 °C)     TempSrc:  Temporal     SpO2:  93% 92% 94%   Weight:       Height:                  Temperature:   Temp (24hrs), Av 6 °F (36 4 °C), Min:96 8 °F (36 °C), Max:98 2 °F (36 8 °C)    Current Temperature: 97 7 °F (36 5 °C)    Weights:   IBW: 54 7 kg    Body mass index is 28 68 kg/m²    Weight (last 2 days)     Date/Time   Weight    18 0600  75 8 (167 11)    18 0550  80 (176 37)    18 0939  79 1 (174 38)    18 0539  79 1 (174 38)              Hemodynamic Monitoring:  N/A       Non-Invasive/Invasive Ventilation Settings:  Respiratory    Lab Data (Last 4 hours)    None         O2/Vent Data (Last 4 hours)    None              Lab Results   Component Value Date    PHART 7 389 2018    GYH4AVH 50 8 (H) 2018 PO2ART 64 0 (L) 03/08/2018    EAZ0JJV 30 0 (H) 03/08/2018    BEART 4 1 03/08/2018    SOURCE Radial, Left 03/08/2018     SpO2: SpO2: 94 %, SpO2 Device: O2 Device: Nasal cannula    Intake and Outputs:  I/O       03/07 0701 - 03/08 0700 03/08 0701 - 03/09 0700 03/09 0701 - 03/10 0700    P  O  560 300     I V  (mL/kg) 11 5 (0 1)      IV Piggyback       Total Intake(mL/kg) 571 5 (7 1) 300 (4)     Urine (mL/kg/hr) 2300 (1 2) 1875 (1) 250 (0 5)    Total Output 2300 1875 250    Net -1728 5 -1575 -250                 Nutrition:        Diet Orders            Start     Ordered    03/08/18 0831  Dietary nutrition supplements  Once     Question Answer Comment   Select Supplement: Ensure Enlive-Strawberry    Select Supplement: Ensure Enlive-Chocolate    Select Supplement: Ensure Enlive-Vanilla    Frequency Breakfast, Lunch, Dinner, HS        03/08/18 0830    03/07/18 1352  Diet Regular; Regular House; Hi Pro/Hi Ramírez  Diet effective now     Question Answer Comment   Diet Type Regular    Regular Regular House    Other Restriction(s): Hi Pro/Hi Ramírez    RD to adjust diet per protocol?  No        03/07/18 1351    03/07/18 0747  Room Service  Once     Question:  Type of Service  Answer:  Room Service - Appropriate with Assistance    03/07/18 0747          Labs:     Results from last 7 days  Lab Units 03/09/18  0455 03/08/18  0507 03/07/18  0504   WBC Thousand/uL 3 80* 4 40* 4 50*   HEMOGLOBIN g/dL 10 8* 10 7* 11 6*   HEMATOCRIT % 33 6* 34 1* 35 6*   PLATELETS Thousands/uL 95* 93* 90*   NEUTROS PCT % 68 75 68   MONOS PCT % 11 8 9       Results from last 7 days  Lab Units 03/09/18  0455 03/08/18  0507 03/07/18  1708  03/06/18  2120   SODIUM mmol/L 145 145 145  < > 142   POTASSIUM mmol/L 3 9 4 1 3 7  < > 4 6   CHLORIDE mmol/L 111* 111* 108  < > 108   CO2 mmol/L 32 31 31  < > 29   BUN mg/dL 46* 49* 52*  < > 58*   CREATININE mg/dL 1 53* 1 89* 1 97*  < > 2 10*   CALCIUM mg/dL 8 9 8 7 8 5  < > 9 3   TOTAL PROTEIN g/dL  --   --   --   --  7 1 BILIRUBIN TOTAL mg/dL  --   --   --   --  0 30   ALK PHOS U/L  --   --   --   --  56   ALT U/L  --   --   --   --  18   AST U/L  --   --   --   --  14   GLUCOSE RANDOM mg/dL 100 110 163*  < > 139   < > = values in this interval not displayed  Results from last 7 days  Lab Units 03/09/18  0455 03/08/18  0507 03/07/18  1708   MAGNESIUM mg/dL 2 5 2 4 2 2     Lab Results   Component Value Date    PHOS 3 3 03/09/2018    PHOS 5 8 (H) 03/08/2018    PHOS 3 7 03/07/2018    PHOS 4 6 01/05/2016        Results from last 7 days  Lab Units 03/06/18  2120   INR  1 10   PTT seconds 22*       0  Lab Value Date/Time   TROPONINI <0 02 03/06/2018 2120   TROPONINI 0 02 08/27/2017 2049   TROPONINI <0 02 08/27/2017 1719   TROPONINI <0 02 05/09/2017 1855   TROPONINI <0 02 05/09/2017 0537         ABG:  Lab Results   Component Value Date    PHART 7 389 03/08/2018    LQU3TMX 50 8 (H) 03/08/2018    PO2ART 64 0 (L) 03/08/2018    GGB4HOL 30 0 (H) 03/08/2018    BEART 4 1 03/08/2018    SOURCE Radial, Left 03/08/2018       Imaging: CXR: Bilateral pleural effusions appear partially diminished in size  Degree of pulmonary congestion appear similar I have personally reviewed pertinent reports  and I have personally reviewed pertinent films in PACS    EKG: NSR    Micro:  Lab Results   Component Value Date    BLOODCX No Growth After 5 Days  08/27/2017    BLOODCX No Growth After 5 Days  08/27/2017    URINECX >100,000 cfu/ml Mixed Contaminants X6 05/09/2017       Allergies:    Allergies   Allergen Reactions    Lyrica [Pregabalin] Other (See Comments)     Client says it made her "nutty"     Medications:   Scheduled Meds:  Current Facility-Administered Medications:  amLODIPine 5 mg Oral BID Prudence Kandis, PA-C   apixaban 5 mg Oral BID Nery Proctor, PA-C   budesonide-formoterol 2 puff Inhalation BID Prudence Juncos, PA-C   calcium carbonate-vitamin D 1 tablet Oral BID With Meals Prudeayla Marquis PA-C   carvedilol 25 mg Oral BID With Meals Clarnce Hero Krzysztof ELIZA Carrera   clopidogrel 75 mg Oral Daily Mirna Moyer PA-C   ipratropium-albuterol 3 mL Nebulization Q6H Ruben Rodriguez PA-C   magnesium oxide 400 mg Oral BID Ruben Rodriguez PA-C   multivitamin stress formula 1 tablet Oral Daily Ruben Rodriguez PA-C   mupirocin  Nasal Q12H [facility]   omeprazole (PRILOSEC) suspension 2 mg/mL 20 mg Oral Daily Nery Proctor PA-C   torsemide 20 mg Oral Daily Darcie Sepulveda MD     Continuous Infusions:   PRN Meds:       Invasive lines and devices:   Invasive Devices     Peripheral Intravenous Line            Peripheral IV 03/06/18 Right Forearm 2 days          Drain            Urethral Catheter Double-lumen 16 Fr  2 days                   SIGNATURE: MARGOT Gordon  DATE: March 9, 2018   TIME: Patient was seen and evaluated at 0750 this am but I was unable to complete the note until later due to ICU acuity

## 2018-03-10 LAB
ANION GAP SERPL CALCULATED.3IONS-SCNC: 2 MMOL/L (ref 4–13)
BUN SERPL-MCNC: 48 MG/DL (ref 5–25)
CALCIUM SERPL-MCNC: 9.3 MG/DL (ref 8.3–10.1)
CHLORIDE SERPL-SCNC: 105 MMOL/L (ref 100–108)
CO2 SERPL-SCNC: 39 MMOL/L (ref 21–32)
CREAT SERPL-MCNC: 1.68 MG/DL (ref 0.6–1.3)
GFR SERPL CREATININE-BSD FRML MDRD: 27 ML/MIN/1.73SQ M
GLUCOSE SERPL-MCNC: 128 MG/DL (ref 65–140)
POTASSIUM SERPL-SCNC: 3.8 MMOL/L (ref 3.5–5.3)
SODIUM SERPL-SCNC: 146 MMOL/L (ref 136–145)

## 2018-03-10 PROCEDURE — 97530 THERAPEUTIC ACTIVITIES: CPT

## 2018-03-10 PROCEDURE — G8978 MOBILITY CURRENT STATUS: HCPCS

## 2018-03-10 PROCEDURE — G8979 MOBILITY GOAL STATUS: HCPCS

## 2018-03-10 PROCEDURE — 99233 SBSQ HOSP IP/OBS HIGH 50: CPT | Performed by: INTERNAL MEDICINE

## 2018-03-10 PROCEDURE — G8988 SELF CARE GOAL STATUS: HCPCS

## 2018-03-10 PROCEDURE — 97163 PT EVAL HIGH COMPLEX 45 MIN: CPT

## 2018-03-10 PROCEDURE — 94660 CPAP INITIATION&MGMT: CPT

## 2018-03-10 PROCEDURE — 80048 BASIC METABOLIC PNL TOTAL CA: CPT | Performed by: INTERNAL MEDICINE

## 2018-03-10 PROCEDURE — G8987 SELF CARE CURRENT STATUS: HCPCS

## 2018-03-10 PROCEDURE — 97167 OT EVAL HIGH COMPLEX 60 MIN: CPT

## 2018-03-10 PROCEDURE — 94640 AIRWAY INHALATION TREATMENT: CPT

## 2018-03-10 PROCEDURE — 94760 N-INVAS EAR/PLS OXIMETRY 1: CPT

## 2018-03-10 RX ADMIN — CARVEDILOL 25 MG: 25 TABLET, FILM COATED ORAL at 17:34

## 2018-03-10 RX ADMIN — CALCIUM CARBONATE 500 MG (1,250 MG)-VITAMIN D3 200 UNIT TABLET 1 TABLET: at 09:06

## 2018-03-10 RX ADMIN — IPRATROPIUM BROMIDE AND ALBUTEROL SULFATE 3 ML: .5; 3 SOLUTION RESPIRATORY (INHALATION) at 02:56

## 2018-03-10 RX ADMIN — Medication 400 MG: at 09:06

## 2018-03-10 RX ADMIN — IPRATROPIUM BROMIDE AND ALBUTEROL SULFATE 3 ML: .5; 3 SOLUTION RESPIRATORY (INHALATION) at 07:12

## 2018-03-10 RX ADMIN — BUDESONIDE AND FORMOTEROL FUMARATE DIHYDRATE 2 PUFF: 160; 4.5 AEROSOL RESPIRATORY (INHALATION) at 17:35

## 2018-03-10 RX ADMIN — Medication 400 MG: at 17:34

## 2018-03-10 RX ADMIN — Medication 1 TABLET: at 17:34

## 2018-03-10 RX ADMIN — Medication 1 APPLICATION: at 09:07

## 2018-03-10 RX ADMIN — APIXABAN 5 MG: 5 TABLET, FILM COATED ORAL at 17:34

## 2018-03-10 RX ADMIN — CLOPIDOGREL BISULFATE 75 MG: 75 TABLET ORAL at 09:06

## 2018-03-10 RX ADMIN — AMLODIPINE BESYLATE 5 MG: 5 TABLET ORAL at 09:06

## 2018-03-10 RX ADMIN — BUDESONIDE AND FORMOTEROL FUMARATE DIHYDRATE 2 PUFF: 160; 4.5 AEROSOL RESPIRATORY (INHALATION) at 09:07

## 2018-03-10 RX ADMIN — APIXABAN 5 MG: 5 TABLET, FILM COATED ORAL at 09:06

## 2018-03-10 RX ADMIN — Medication 1 TABLET: at 09:06

## 2018-03-10 RX ADMIN — IPRATROPIUM BROMIDE AND ALBUTEROL SULFATE 3 ML: .5; 3 SOLUTION RESPIRATORY (INHALATION) at 20:35

## 2018-03-10 RX ADMIN — AMLODIPINE BESYLATE 5 MG: 5 TABLET ORAL at 17:34

## 2018-03-10 RX ADMIN — CARVEDILOL 25 MG: 25 TABLET, FILM COATED ORAL at 09:06

## 2018-03-10 RX ADMIN — IPRATROPIUM BROMIDE AND ALBUTEROL SULFATE 3 ML: .5; 3 SOLUTION RESPIRATORY (INHALATION) at 13:57

## 2018-03-10 RX ADMIN — TORSEMIDE 20 MG: 20 TABLET ORAL at 09:06

## 2018-03-10 RX ADMIN — CALCIUM CARBONATE 500 MG (1,250 MG)-VITAMIN D3 200 UNIT TABLET 1 TABLET: at 17:34

## 2018-03-10 RX ADMIN — B-COMPLEX W/ C & FOLIC ACID TAB 1 TABLET: TAB at 09:06

## 2018-03-10 NOTE — PHYSICAL THERAPY NOTE
PT EVALUATION       03/10/18 1350   Note Type   Note type Eval/Treat   Pain Assessment   Pain Assessment No/denies pain   Home Living   Type of Home Apartment   Home Layout One level   Bathroom Shower/Tub Tub/shower unit   [employer] Grab bars in shower; Shower chair   Home Equipment Cane  (rollator)   Prior Function   Level of [place] (was ambulatory with cane up until 1 month ago)   Lives With Alone   Receives Help From Home health; Family   Restrictions/Precautions   Other Precautions Hard of hearing; Fall Risk;O2   General   Additional Pertinent History Pt admitted with SOB/respiratory failure/CHF now improving   Cognition   Overall Cognitive Status WFL   Attention Within functional limits   Orientation Level Oriented X4   Following Commands Follows one step commands without difficulty   RLE Assessment   RLE Assessment (ROM WFL, MMT 4-/5)   LLE Assessment   LLE Assessment (ROM WFL, MMT 4-/5)   Transfers   Sit to Stand 4  Minimal assistance   Stand to Sit 4  Minimal assistance   Stand pivot 4  Minimal assistance   Toilet transfer 4  Minimal assistance   Additional items (min assist to wipe)   Ambulation/Elevation   Gait pattern (flexed posture, wide IRWIN, antalgic)   Gait Assistance 4  Minimal assist   Assistive Device Rolling walker  (4L02)   Distance 2x10 feet to and from bathroom   Balance   Static Sitting Fair +   Dynamic Sitting Fair   Static Standing Fair   Dynamic Standing Fair -   Endurance Deficit   Endurance Deficit (Sp02 92% at rest, 86% after walking on 4L02)   Activity Tolerance   Nurse Made Aware assist of one with walker and 02 to ambulate to bathroom   Assessment   Prognosis Good   Problem List Decreased strength;Decreased endurance; Impaired balance;Decreased mobility   Assessment Patient seen for Physical Therapy evaluation  Patient admitted with Acute on chronic diastolic CHF (congestive heart failure) ([facility] [address] )    Comorbidities affecting patient's physical performance include: CVA, htn, CKD, CHF,   Personal factors affecting patient at time of initial evaluation include: ambulating with assistive device, inability to ambulate household distances and limited home support  Prior to admission, patient was requiring assist for functional mobility with cane  Please find objective findings from Physical Therapy assessment regarding body systems outlined above with impairments and limitations including weakness, impaired balance, decreased endurance, decreased activity tolerance, decreased functional mobility tolerance, fall risk and SOB upon exertion  The Barthel Index was used as a functional outcome tool presenting with a score of 40 today indicating marked limitations of functional mobility and ADLS  Patient's clinical presentation is currently unstable/unpredictable as seen in patient's presentation of vital sign response, increased fall risk, new onset of impairment of functional mobility, decreased endurance and new onset of weakness  Pt would benefit from continued Physical Therapy treatment to address deficits as defined above and maximize level of functional mobility  As demonstrated by objective findings, the assigned level of complexity for this evaluation is high  Goals   Patient Goals "go home"   STG Expiration Date (1-7 days)   Short Term Goal #1 supervision bed mobility, supervision transfers, supervision ambulation with walker 50 feet   Short Term Goal #2 pt will tolerate 15 minutes of functional activity without SOB   LTG Expiration Date (1-2 weeks)   Long Term Goal #1 independent bed mobility, independent transfers, independent ambulation with walker 100 feet with Sp02 >88%  (improve standing static balance to good,)   Plan   Treatment/Interventions ADL retraining;Functional transfer training;LE strengthening/ROM; Therapeutic exercise; Endurance training;Patient/family training;Equipment eval/education; Bed mobility;Gait training   PT Frequency 5x/wk   Recommendation   Recommendation (STR)   Barthel Index   Feeding 5   Bathing 0   Grooming Score 0   Dressing Score 5   Bladder Score 5   Bowels Score 10   Toilet Use Score 5   Transfers (Bed/Chair) Score 10   Mobility (Level Surface) Score 0   Stairs Score 5   Barthel Index Score 45       Time In:1340  Time Out:1350  Total Time: 10      S:  I want to get better  O:  Min assist ambulation with walker and 02 60 feet  Verbal cues for pursed lip breathing  A:  Pt with good activity tolerance and motivation  P:  Continue PT, recommend STR as pt lives alone      Michael Khan, PT

## 2018-03-10 NOTE — PLAN OF CARE
DISCHARGE PLANNING     Discharge to home or other facility with appropriate resources Progressing        DISCHARGE PLANNING - CARE MANAGEMENT     Discharge to post-acute care or home with appropriate resources Progressing        GENITOURINARY - ADULT     Maintains or returns to baseline urinary function Progressing     Absence of urinary retention Progressing     Urinary catheter remains patent Progressing        INFECTION - ADULT     Absence or prevention of progression during hospitalization Progressing        Knowledge Deficit     Patient/family/caregiver demonstrates understanding of disease process, treatment plan, medications, and discharge instructions Progressing        Nutrition/Hydration-ADULT     Nutrient/Hydration intake appropriate for improving, restoring or maintaining nutritional needs Progressing        PAIN - ADULT     Verbalizes/displays adequate comfort level or baseline comfort level Progressing        Potential for Falls     Patient will remain free of falls Progressing        Prexisting or High Potential for Compromised Skin Integrity     Skin integrity is maintained or improved Progressing        RESPIRATORY - ADULT     Achieves optimal ventilation and oxygenation Progressing        SAFETY ADULT     Patient will remain free of falls Progressing     Maintain or return to baseline ADL function Progressing     Maintain or return mobility status to optimal level Progressing        SAFETY,RESTRAINT: NV/NON-SELF DESTRUCTIVE BEHAVIOR     Remains free of harm/injury (restraint for non violent/non self-detsructive behavior) Progressing     Returns to optimal restraint-free functioning Progressing        SKIN/TISSUE INTEGRITY - ADULT     Skin integrity remains intact Progressing     Incision(s), wounds(s) or drain site(s) healing without S/S of infection Progressing     Oral mucous membranes remain intact Progressing

## 2018-03-10 NOTE — PROGRESS NOTES
Progress Note Darling Grimm 5/12/1928, 80 y o  female MRN: [id number]    Unit/Bed#: [address] [address] Encounter: [id number]    Primary Care Provider: Rafael Marie DO   Date and time admitted to hospital: 3/6/2018  9:00 PM        Acute respiratory failure with hypoxia and hypercapnia ([facility] [address] )   Assessment & Plan    Status post ventilator-dependent respiratory failure  Patient is currently stable on 2 liters oxygen        * Acute on chronic diastolic CHF (congestive heart failure) ([facility] [address] )   Assessment & Plan    Recent admission in 8/2017 for A/C CHF, recent admission in [place] in earlier February for A/C CHF and hypercarbic respiratory failure requiring intubation    Echo 8/2017 EF 55 % to 60 % with grade 1 diastolic dysfunction  Good response to Demadex 20 milligram p o  daily        CKD (chronic kidney disease) stage 3, GFR 30-59 ml/min   Assessment & Plan    Cr baseline around 1 7-2 0  Creatinine  slightly worse at 1 6 today  Continue to monitor with diuretics        History of CVA (cerebrovascular accident)   Assessment & Plan    Hx of right basal ganglia CVA in 2016, Baseline deficits mild tingling in hand; Continue Plavix        Hyperlipidemia   Assessment & Plan    Continue statin therapy        Deep vein thrombosis (DVT) of left lower extremity (HCC)   Assessment & Plan    Hx of chronic left DVT however reportedly diagnosed during admission in [place] with acute DVT and started on Eliquis, continue Eliquis no other intervention        Essential hypertension   Assessment & Plan    Blood pressure is stable on Coreg 25 milligram p o  b i d , Norvasc 10 milligram p o  daily and torsemide 20 milligram p o  daily        Obesity   Assessment & Plan    Dietary modification        Respiratory acidosisresolved as of 3/9/2018   Assessment & Plan    resolved        Toxic metabolic encephalopathyresolved as of 3/8/2018   Assessment & Plan    -resolved              VTE Pharmacologic Prophylaxis:   Pharmacologic: Apixaban (Eliquis)  Mechanical VTE Prophylaxis in Place: No    Patient Centered Rounds: I have performed bedside rounds with nursing staff today  Discussions with Specialists or Other Care Team Provider: Yes    Education and Discussions with Family / Patient:Yes    Time Spent for Care: 30 minutes  More than 50% of total time spent on counseling and coordination of care as described above  Current Length of Stay: 4 day(s)    Current Patient Status: Inpatient     Discharge Plan: pending    Code Status: Level 2 - DNAR: but accepts endotracheal intubation      Subjective:   Patient is feeling much better  Denies any chest pain, shortness of breath, abdominal pain, nausea or vomiting  Objective:       Vitals:   Temp (24hrs), Av °F (36 7 °C), Min:97 8 °F (36 6 °C), Max:98 2 °F (36 8 °C)    HR:  [] 72  Resp:  [18-37] 20  BP: (104-150)/(51-77) 118/55  SpO2:  [90 %-96 %] 93 %  Body mass index is 27 51 kg/m²  Input and Output Summary (last 24 hours): Intake/Output Summary (Last 24 hours) at 03/10/18 1410  Last data filed at 03/10/18 1101   Gross per 24 hour   Intake                0 ml   Output             2175 ml   Net            -2175 ml       Physical Exam:     Physical Exam   Constitutional: No distress  HENT:   Head: Normocephalic and atraumatic  Nose: Nose normal    Eyes: Conjunctivae and EOM are normal  Pupils are equal, round, and reactive to light  Neck: Normal range of motion  Neck supple  No JVD present  Cardiovascular: Normal rate and regular rhythm  Exam reveals no gallop and no friction rub  Murmur heard  Pulmonary/Chest: Effort normal and breath sounds normal  No respiratory distress  She has no wheezes  She has no rales  She exhibits no tenderness  Abdominal: Soft  Bowel sounds are normal  She exhibits no distension  There is no tenderness  There is no rebound and no guarding  Musculoskeletal: She exhibits edema  Neurological: She is alert  No cranial nerve deficit  Skin: Skin is warm and dry  No rash noted  Psychiatric: She has a normal mood and affect  Additional Data:     Labs:      Results from last 7 days  Lab Units 03/09/18  0455   WBC Thousand/uL 3 80*   HEMOGLOBIN g/dL 10 8*   HEMATOCRIT % 33 6*   PLATELETS Thousands/uL 95*   NEUTROS PCT % 68   LYMPHS PCT % 16   MONOS PCT % 11   EOS PCT % 5       Results from last 7 days  Lab Units 03/10/18  1214  03/06/18  2120   SODIUM mmol/L 146*  < > 142   POTASSIUM mmol/L 3 8  < > 4 6   CHLORIDE mmol/L 105  < > 108   CO2 mmol/L 39*  < > 29   BUN mg/dL 48*  < > 58*   CREATININE mg/dL 1 68*  < > 2 10*   CALCIUM mg/dL 9 3  < > 9 3   TOTAL PROTEIN g/dL  --   --  7 1   BILIRUBIN TOTAL mg/dL  --   --  0 30   ALK PHOS U/L  --   --  56   ALT U/L  --   --  18   AST U/L  --   --  14   GLUCOSE RANDOM mg/dL 128  < > 139   < > = values in this interval not displayed  Results from last 7 days  Lab Units 03/06/18  2120   INR  1 10       * I Have Reviewed All Lab Data Listed Above  * Additional Pertinent Lab Tests Reviewed: All Labs For Current Hospital Admission Reviewed    Imaging:  Xr Chest Portable    Result Date: 3/9/2018  Narrative: CHEST INDICATION: Congestive heart failure COMPARISON:  3/8/2018 EXAM PERFORMED/VIEWS:  XR CHEST PORTABLE FINDINGS: Heart shadow is enlarged but unchanged from prior exam  There is continued evidence of pulmonary congestion, similar to the prior study  Bilateral pleural effusions do appear smaller in size  No pneumothorax is seen Osseous structures appear within normal limits for patient age  Impression: 1  Bilateral pleural effusions appear partially diminished in size 2  Degree of pulmonary congestion appear similar  Workstation performed: [id number]     Xr Chest Portable    Result Date: 3/8/2018  Narrative: CHEST INDICATION:  68-year-old female with  CHF  COMPARISON:  3/6/2018   EXAM PERFORMED/VIEWS:  XR CHEST PORTABLE Images: 1 FINDINGS:  The patient has been extubated during the interim with the enteric feeding tube removed  Thoracic Duarte rods again noted  Heart shadow is enlarged but unchanged from prior exam  Improving aeration with persistent scattered bibasilar atelectasis with diminished central vascular engorgement  Tiny pleural effusions suspected  No pneumothorax  Osseous structures appear within normal limits for patient age  Impression: Improving aeration with resolving central vascular congestion  Persistent bibasilar atelectasis and tiny pleural effusions  Stable cardiomegaly Workstation performed: [id number]     Xr Chest 1 View Portable    Result Date: 3/7/2018  Narrative: CHEST INDICATION: intubation COMPARISON:  One view chest 3/6/2018 EXAM PERFORMED/VIEWS:  XR CHEST PORTABLE FINDINGS:  Endotracheal tube is present, in satisfactory position with its tip above the level of the jerilyn  Enteric tube is present with its tip excluded by collimation below the level of the left hemidiaphragm  Cardiac silhouette is enlarged  Aortic calcification is present  Lung markings are crowded secondary to low lung volumes  Bibasilar subsegmental atelectasis  Prominent central pulmonary vasculature  No pneumothorax or large pleural effusion  Partially visualized thoracolumbar fusion hardware  Impression: Cardiomegaly and mild central pulmonary vascular congestion  Bibasilar subsegmental atelectasis  Endotracheal tube is present, in satisfactory position with its tip above the level of the jerilyn  Enteric tube is present with its tip excluded by collimation below the level of the left hemidiaphragm  Workstation performed: [id number]     Xr Chest 1 View Portable    Result Date: 3/6/2018  Narrative: CHEST INDICATION: Shortness of breath  COMPARISON:  12/11/2017 EXAM PERFORMED/VIEWS:  XR CHEST PORTABLE FINDINGS: Stable cardiomegaly  Mild elevation of the right hemidiaphragm  No visualized pleural effusion  Pulmonary vascular congestion, primarily in the right lung    No airspace consolidation, pulmonary edema, atelectasis or pneumothorax  Raquel lumbar fixation hardware  Impression: Pulmonary vascular congestion without evidence of interstitial edema  Workstation performed: [id number]     Ct Head Without Contrast    Result Date: 3/7/2018  Narrative: CT BRAIN - WITHOUT CONTRAST INDICATION: Altered mental status  COMPARISON:  5/9/2017, and  TECHNIQUE:  CT examination of the brain was performed  In addition to axial images, coronal 2D reformatted images were created and submitted for interpretation  Radiation dose length product (DLP) for this visit:  [id number] 08 mGy-cm   This examination, like all CT scans performed in the [facility], was performed utilizing techniques to minimize radiation dose exposure, including the use of iterative reconstruction and automated exposure control  IMAGE QUALITY:  Diagnostic  FINDINGS: PARENCHYMA: Partially confluent periventricular and subcortical white matter lesions, consistent with microangiopathic disease  Calcification noted in the globus pallidus  No intracranial mass, mass effect or midline shift  No CT signs of acute infarction  No acute intracranial hemorrhage  VENTRICLES AND EXTRA-AXIAL SPACES:  Normal for the patient's age  VISUALIZED ORBITS AND PARANASAL SINUSES:  Unremarkable  CALVARIUM AND EXTRACRANIAL SOFT TISSUES:  Normal      Impression: Microangiopathic disease  No acute intracranial hemorrhage, mass effect or extra-axial collection  Workstation performed: [id number]     Imaging Reports Reviewed by myself    Cultures:   Blood Culture:   Lab Results   Component Value Date    BLOODCX No Growth After 5 Days  08/27/2017    BLOODCX No Growth After 5 Days   08/27/2017     Urine Culture:   Lab Results   Component Value Date    URINECX >100,000 cfu/ml Mixed Contaminants X6 05/09/2017     Sputum Culture: No components found for: SPUTUMCX  Wound Culture: No results found for: WOUNDCULT    Last 24 Hours Medication List: Current Facility-Administered Medications:  amLODIPine 5 mg Oral BID Noam Balbuena PA-C   apixaban 5 mg Oral BID Serenity Calzada PA-C   budesonide-formoterol 2 puff Inhalation BID Noam Balbuena PA-C   calcium carbonate-vitamin D 1 tablet Oral BID With Meals Noam Balbuena PA-C   carvedilol 25 mg Oral BID With Meals Noam Balbuena PA-C   clopidogrel 75 mg Oral Daily Nery Proctor PA-C   ipratropium-albuterol 3 mL Nebulization Q6H Noam Balbuena PA-C   magnesium oxide 400 mg Oral BID Noam Balbuena PA-C   multivitamin stress formula 1 tablet Oral Daily Noam Balbuena PA-C   senna-docusate sodium 1 tablet Oral BID MARGOT Garces   torsemide 20 mg Oral Daily Magi Velasco MD        Today, Patient Was Seen By: Kalen Gomez MD    ** Please Note: Dragon 360 Dictation voice to text software may have been used in the creation of this document   **

## 2018-03-10 NOTE — PROGRESS NOTES
Received report from [facility]  Patient was received from ICU to room 404 and placed on telemetry   A set of vitals were also taken upon arrival

## 2018-03-11 LAB
ANION GAP SERPL CALCULATED.3IONS-SCNC: 3 MMOL/L (ref 4–13)
BUN SERPL-MCNC: 46 MG/DL (ref 5–25)
CALCIUM SERPL-MCNC: 9.3 MG/DL (ref 8.3–10.1)
CHLORIDE SERPL-SCNC: 102 MMOL/L (ref 100–108)
CO2 SERPL-SCNC: 37 MMOL/L (ref 21–32)
CREAT SERPL-MCNC: 1.84 MG/DL (ref 0.6–1.3)
ERYTHROCYTE [DISTWIDTH] IN BLOOD BY AUTOMATED COUNT: 14.8 % (ref 11.6–15.1)
GFR SERPL CREATININE-BSD FRML MDRD: 24 ML/MIN/1.73SQ M
GLUCOSE SERPL-MCNC: 248 MG/DL (ref 65–140)
HCT VFR BLD AUTO: 36.9 % (ref 37–47)
HGB BLD-MCNC: 11.6 G/DL (ref 12–16)
MCH RBC QN AUTO: 29.9 PG (ref 27–31)
MCHC RBC AUTO-ENTMCNC: 31.4 G/DL (ref 31.4–37.4)
MCV RBC AUTO: 95 FL (ref 82–98)
PLATELET # BLD AUTO: 116 THOUSANDS/UL (ref 130–400)
PMV BLD AUTO: 9.3 FL (ref 8.9–12.7)
POTASSIUM SERPL-SCNC: 3.9 MMOL/L (ref 3.5–5.3)
RBC # BLD AUTO: 3.89 MILLION/UL (ref 4.2–5.4)
SODIUM SERPL-SCNC: 142 MMOL/L (ref 136–145)
WBC # BLD AUTO: 3.5 THOUSAND/UL (ref 4.8–10.8)

## 2018-03-11 PROCEDURE — 85027 COMPLETE CBC AUTOMATED: CPT | Performed by: INTERNAL MEDICINE

## 2018-03-11 PROCEDURE — 94760 N-INVAS EAR/PLS OXIMETRY 1: CPT

## 2018-03-11 PROCEDURE — 99232 SBSQ HOSP IP/OBS MODERATE 35: CPT | Performed by: INTERNAL MEDICINE

## 2018-03-11 PROCEDURE — 80048 BASIC METABOLIC PNL TOTAL CA: CPT | Performed by: INTERNAL MEDICINE

## 2018-03-11 PROCEDURE — 94660 CPAP INITIATION&MGMT: CPT

## 2018-03-11 PROCEDURE — 94640 AIRWAY INHALATION TREATMENT: CPT

## 2018-03-11 PROCEDURE — 99222 1ST HOSP IP/OBS MODERATE 55: CPT | Performed by: INTERNAL MEDICINE

## 2018-03-11 RX ORDER — TORSEMIDE 20 MG/1
20 TABLET ORAL
Status: DISCONTINUED | OUTPATIENT
Start: 2018-03-11 | End: 2018-03-12 | Stop reason: HOSPADM

## 2018-03-11 RX ADMIN — CLOPIDOGREL BISULFATE 75 MG: 75 TABLET ORAL at 09:46

## 2018-03-11 RX ADMIN — IPRATROPIUM BROMIDE AND ALBUTEROL SULFATE 3 ML: .5; 3 SOLUTION RESPIRATORY (INHALATION) at 19:49

## 2018-03-11 RX ADMIN — BUDESONIDE AND FORMOTEROL FUMARATE DIHYDRATE 2 PUFF: 160; 4.5 AEROSOL RESPIRATORY (INHALATION) at 09:47

## 2018-03-11 RX ADMIN — CALCIUM CARBONATE 500 MG (1,250 MG)-VITAMIN D3 200 UNIT TABLET 1 TABLET: at 07:51

## 2018-03-11 RX ADMIN — CARVEDILOL 25 MG: 25 TABLET, FILM COATED ORAL at 15:42

## 2018-03-11 RX ADMIN — APIXABAN 5 MG: 5 TABLET, FILM COATED ORAL at 09:47

## 2018-03-11 RX ADMIN — B-COMPLEX W/ C & FOLIC ACID TAB 1 TABLET: TAB at 09:46

## 2018-03-11 RX ADMIN — AMLODIPINE BESYLATE 5 MG: 5 TABLET ORAL at 17:38

## 2018-03-11 RX ADMIN — TORSEMIDE 20 MG: 20 TABLET ORAL at 15:42

## 2018-03-11 RX ADMIN — APIXABAN 5 MG: 5 TABLET, FILM COATED ORAL at 17:39

## 2018-03-11 RX ADMIN — IPRATROPIUM BROMIDE AND ALBUTEROL SULFATE 3 ML: .5; 3 SOLUTION RESPIRATORY (INHALATION) at 07:18

## 2018-03-11 RX ADMIN — Medication 1 TABLET: at 09:46

## 2018-03-11 RX ADMIN — IPRATROPIUM BROMIDE AND ALBUTEROL SULFATE 3 ML: .5; 3 SOLUTION RESPIRATORY (INHALATION) at 03:39

## 2018-03-11 RX ADMIN — Medication 400 MG: at 09:47

## 2018-03-11 RX ADMIN — CARVEDILOL 25 MG: 25 TABLET, FILM COATED ORAL at 07:51

## 2018-03-11 RX ADMIN — Medication 400 MG: at 17:39

## 2018-03-11 RX ADMIN — Medication 1 TABLET: at 17:39

## 2018-03-11 RX ADMIN — AMLODIPINE BESYLATE 5 MG: 5 TABLET ORAL at 09:47

## 2018-03-11 RX ADMIN — IPRATROPIUM BROMIDE AND ALBUTEROL SULFATE 3 ML: .5; 3 SOLUTION RESPIRATORY (INHALATION) at 14:21

## 2018-03-11 RX ADMIN — BUDESONIDE AND FORMOTEROL FUMARATE DIHYDRATE 2 PUFF: 160; 4.5 AEROSOL RESPIRATORY (INHALATION) at 17:40

## 2018-03-11 RX ADMIN — TORSEMIDE 20 MG: 20 TABLET ORAL at 09:46

## 2018-03-11 RX ADMIN — CALCIUM CARBONATE 500 MG (1,250 MG)-VITAMIN D3 200 UNIT TABLET 1 TABLET: at 15:42

## 2018-03-11 NOTE — ASSESSMENT & PLAN NOTE
Patient's baseline creatinine around 1 7-2  Creatinine remains close to baseline  Continue Demadex 20 milligram p o  B i d   Follow-up BMP in 4 days

## 2018-03-11 NOTE — CONSULTS
2           Consultation - Nephrology   Carlos Salinas 80 y o  female MRN: [id number]  Unit/Bed#: 85[address] [facility] 404-01 Encounter: [id number]      Assessment/Plan     Assessment / Plan:  1  Renal     Patient has chronic kidney disease and she follows 1 of my patients in the office  Sounds as if she has had a couple bouts of volume overload with heart failure 1st occurring in her visit to [place] when she was hospitalized for a week and then returning home and readmitted to the hospital again  She states her breathing is improved over the last couple days  Renal function is relatively stable  Increase torsemide to 20 mg b i d  Optimize blood pressure control as if it is not controlled you can of worsening diastolic function  Monitor labs and volume status    2  Cardiac  Last echocardiogram ejection fraction 55-60% cardiology following  3   Status post respiratory failure  History of Present Illness   Physician Requesting Consult: Rita Larry MD  Reason for Consult / Principal Problem:  Chronic kidney disease  Hx and PE limited by:   HPI: Carlos Salinas is a 80y o  year old female who presents with shortness of breath  She apparently was in [place] states she was got admitted to the hospital there for congestive heart failure was in for 1 week  She then returned home after period of time and was readmitted with worsening shortness of breath  We were asked to see her regarding her chronic kidney disease  Upon presentation patient was actually intubated  History obtained from chart review and the patient  Consults    Review of Systems   Constitutional: Negative  HENT: Negative  Eyes: Negative  Respiratory: Negative for cough, chest tightness and shortness of breath  Cardiovascular: Positive for leg swelling  Negative for chest pain  Little short of breath with activity  Gastrointestinal: Negative  Negative for abdominal distention, abdominal pain, diarrhea and vomiting  Genitourinary: Negative  Skin: Negative  Neurological: Negative  Historical Information   Patient Active Problem List   Diagnosis    C[facility]brovascular accident (CVA) ([facility] )    Essential hypertension    [facility]    Left leg swelling    Hyperlipidemia    CKD (chronic kidney disease) stage 3, GFR 30-59 ml/min    Numbness and tingling in left arm    History of CVA (cerebrovascular accident)    Obesity    Gouty arthropathy    Dyslipidemia    Vitamin D deficiency    Leg wound, left    Diastolic CHF, chronic (HCC)    [facility]est[facility] failure[facility]    Hyperphosphatemia    Acute respiratory failure with hypoxia and hypercapnia ([facility])    Acute on chronic diastolic CHF (congestive heart failure) ([facility] )    Deep vein thrombosis (DVT) of left lower extremity ([facility] )    Aortic stenosis, moderate     Past Medical History:   Diagnosis Date    CHF (congestive heart failure) ([facility] ) 51/4770    diastolic     DVT (deep vein thrombosis) in pregnancy ([facility] )     left LE in 2/2018 and 50 years ago    Hyperlipidemia     Hypertension     Pulmonary embolism ([facility] ) 02/06/2018    Renal disorder     ckd    Stroke ([facility] ) 2016    left hand weakness,paresthesia  Past Surgical History:   Procedure Laterality Date    ABDOMINAL SURGERY      ruputured bowel  had colostomy and ileostomy,reversed later on   BACK SURGERY      rods in back  done 30 years ago   CARPAL TUNNEL RELEASE Bilateral     CYST REMOVAL      back    HERNIA REPAIR      REPLACEMENT TOTAL KNEE BILATERAL       Social History   History   Alcohol Use    Yes     Comment: Occ  History   Drug Use No     History   Smoking Status    Never Smoker   Smokeless Tobacco    Never Used     History reviewed  No pertinent family history      Meds/Allergies   current meds:   Current Facility-Administered Medications   Medication Dose Route Frequency    amLODIPine (NORVASC) tablet 5 mg  5 mg Oral BID    apixaban (ELIQUIS) tablet 5 mg  5 mg Oral BID    budesonide-formoterol (SYMBICORT) 160-4 5 mcg/act inhaler 2 puff  2 puff Inhalation BID    calcium carbonate-vitamin D (OSCAL-D) 500 mg-200 units per tablet 1 tablet  1 tablet Oral BID With Meals    carvedilol (COREG) tablet 25 mg  25 mg Oral BID With Meals    clopidogrel (PLAVIX) tablet 75 mg  75 mg Oral Daily    ipratropium-albuterol (DUO-NEB) 0 5-2 5 mg/3 mL inhalation solution 3 mL  3 mL Nebulization Q6H    magnesium oxide (MAG-OX) tablet 400 mg  400 mg Oral BID    multivitamin stress formula tablet 1 tablet  1 tablet Oral Daily    senna-docusate sodium (SENOKOT S) 8 6-50 mg per tablet 1 tablet  1 tablet Oral BID    torsemide (DEMADEX) tablet 20 mg  20 mg Oral BID (diuretic)     Allergies   Allergen Reactions    Lyrica [Pregabalin] Other (See Comments)     Client says it made her "nutty"       Objective     Intake/Output Summary (Last 24 hours) at 03/11/18 1051  Last data filed at 03/11/18 0947   Gross per 24 hour   Intake                0 ml   Output             1100 ml   Net            -1100 ml     Body mass index is 24 9 kg/m²  Invasive Devices:        PHYSICAL EXAM:  /88 (BP Location: Right arm)   Pulse 68   Temp 97 9 °F (36 6 °C) (Oral)   Resp 20   Ht 5' 4" (1 626 m)   Wt 65 8 kg (145 lb 1 oz)   SpO2 92%   Breastfeeding? No   BMI 24 90 kg/m²     Physical Exam   Constitutional: She is oriented to person, place, and time  No distress  HENT:   Mouth/Throat: No oropharyngeal exudate  Eyes: No scleral icterus  Neck: Neck supple  No JVD present  Cardiovascular: Normal rate  Exam reveals no friction rub  Soft sounds and some edema present  Pulmonary/Chest: Effort normal  No respiratory distress  She has no wheezes  Few crackles at bases   Abdominal: Soft  Bowel sounds are normal  She exhibits no distension  There is no tenderness  There is no rebound  Neurological: She is alert and oriented to person, place, and time  Current Weight: Weight - Scale: 65 8 kg (145 lb 1 oz)  First Weight: Weight - Scale: 72 1 kg (159 lb)    Lab Results:      Results from last 7 days  Lab Units 03/11/18  0934   WBC Thousand/uL 3 50*   HEMOGLOBIN g/dL 11 6*   HEMATOCRIT % 36 9*   PLATELETS Thousands/uL 116*       Results from last 7 days  Lab Units 03/11/18  0934   SODIUM mmol/L 142   POTASSIUM mmol/L 3 9   CHLORIDE mmol/L 102   CO2 mmol/L 37*   BUN mg/dL 46*   CREATININE mg/dL 1 84*   GLUCOSE RANDOM mg/dL 248*   CALCIUM mg/dL 9 3       Results from last 7 days  Lab Units 03/11/18  0934  03/06/18  2120   SODIUM mmol/L 142  < > 142   POTASSIUM mmol/L 3 9  < > 4 6   CHLORIDE mmol/L 102  < > 108   CO2 mmol/L 37*  < > 29   BUN mg/dL 46*  < > 58*   CREATININE mg/dL 1 84*  < > 2 10*   CALCIUM mg/dL 9 3  < > 9 3   TOTAL PROTEIN g/dL  --   --  7 1   BILIRUBIN TOTAL mg/dL  --   --  0 30   ALK PHOS U/L  --   --  56   ALT U/L  --   --  18   AST U/L  --   --  14   GLUCOSE RANDOM mg/dL 248*  < > 139   < > = values in this interval not displayed

## 2018-03-11 NOTE — PROGRESS NOTES
Progress Note - Cardiology   Twyla Villareal 80 y o  female MRN: [id number]  Unit/Bed#: [address] [place] [address] Encounter: [id number]  03/11/18  2:49 PM        Assessment:  1  Acute on chronic diastolic congestive heart failure - significantly improved  2  Malignant hypertension - BP significantly improved with current medication regimen  3  Recent DVT - on Eliquis  4  PVD with H/o CVA  5  Moderate AS  6  Chronic RBBB  7  H/o orthostatic hypotension - compression stockings  Check tomorrow  8  Respiratory failure  9  Dyslipidemia - on statin      Plan:  - continue PO torsemide will defer dosing to nephrology  - continue current BP medication regimen may reduce amlodipine to 5 mg daily if BP low  Subjective: Twyla Villareal denies any chest pain  Shortness of breath improving    Ambulated hallway with PT     Meds/Allergies   current meds:   Current Facility-Administered Medications   Medication Dose Route Frequency    amLODIPine (NORVASC) tablet 5 mg  5 mg Oral BID    apixaban (ELIQUIS) tablet 5 mg  5 mg Oral BID    budesonide-formoterol (SYMBICORT) 160-4 5 mcg/act inhaler 2 puff  2 puff Inhalation BID    calcium carbonate-vitamin D (OSCAL-D) 500 mg-200 units per tablet 1 tablet  1 tablet Oral BID With Meals    carvedilol (COREG) tablet 25 mg  25 mg Oral BID With Meals    clopidogrel (PLAVIX) tablet 75 mg  75 mg Oral Daily    ipratropium-albuterol (DUO-NEB) 0 5-2 5 mg/3 mL inhalation solution 3 mL  3 mL Nebulization Q6H    magnesium oxide (MAG-OX) tablet 400 mg  400 mg Oral BID    multivitamin stress formula tablet 1 tablet  1 tablet Oral Daily    senna-docusate sodium (SENOKOT S) 8 6-50 mg per tablet 1 tablet  1 tablet Oral BID    torsemide (DEMADEX) tablet 20 mg  20 mg Oral BID (diuretic)     Allergies   Allergen Reactions    Lyrica [Pregabalin] Other (See Comments)     Client says it made her "nutty"       Objective   Vitals: Blood pressure 106/53, pulse 66, temperature 97 8 °F (36 6 °C), temperature source Oral, resp  rate 20, height 5' 4" (1 626 m), weight 65 8 kg (145 lb 1 oz), SpO2 96 %, not currently breastfeeding , Body mass index is 24 9 kg/m²  Intake/Output Summary (Last 24 hours) at 03/11/18 1449  Last data filed at 03/11/18 1115   Gross per 24 hour   Intake                0 ml   Output              900 ml   Net             -900 ml       Physical Exam   Constitutional: She appears healthy  No distress  HENT:   Nose: Nose normal    Mouth/Throat: Oropharynx is clear  Eyes: Conjunctivae are normal  Pupils are equal, round, and reactive to light  Neck: Neck supple  No JVD present  Cardiovascular: Normal rate and regular rhythm  Exam reveals no distant heart sounds and no friction rub  No murmur heard  Pulmonary/Chest: Effort normal  She has no rales  She has scattered wheezes  She exhibits no tenderness  Abdominal: Soft  She exhibits no distension  There is no tenderness  Musculoskeletal: She exhibits no edema  Neurological: She is alert and oriented to person, place, and time  Skin: Skin is warm and dry  No rash noted         Lab Results:     Troponins:     Results from last 7 days  Lab Units 03/06/18 2120   TROPONIN I ng/mL <0 02       Recent Results (from the past 24 hour(s))   Basic metabolic panel    Collection Time: 03/11/18  9:34 AM   Result Value Ref Range    Sodium 142 136 - 145 mmol/L    Potassium 3 9 3 5 - 5 3 mmol/L    Chloride 102 100 - 108 mmol/L    CO2 37 (H) 21 - 32 mmol/L    Anion Gap 3 (L) 4 - 13 mmol/L    BUN 46 (H) 5 - 25 mg/dL    Creatinine 1 84 (H) 0 60 - 1 30 mg/dL    Glucose 248 (H) 65 - 140 mg/dL    Calcium 9 3 8 3 - 10 1 mg/dL    eGFR 24 ml/min/1 73sq m   CBC    Collection Time: 03/11/18  9:34 AM   Result Value Ref Range    WBC 3 50 (L) 4 80 - 10 80 Thousand/uL    RBC 3 89 (L) 4 20 - 5 40 Million/uL    Hemoglobin 11 6 (L) 12 0 - 16 0 g/dL    Hematocrit 36 9 (L) 37 0 - 47 0 %    MCV 95 82 - 98 fL    MCH 29 9 27 0 - 31 0 pg    MCHC 31 4 31 4 - 37 4 g/dL    RDW 14 8 11 6 - 15 1 %    Platelets 146 (L) 679 - 400 Thousands/uL    MPV 9 3 8 9 - 12 7 fL          Cardiac testing:   Results for orders placed during the hospital encounter of 18   Echo complete with contrast if indicated    Narrative [address]  [address] [facility]  [place] [address]  [phone]    Transthoracic Echocardiogram  2D, M-mode, Doppler, and Color Doppler    Study date:  09-Mar-2018    Patient: Oneal Olea  MR number: [id number]  Account number: [de-identified]  : 12-May-1928  Age: 80 years  Gender: Female  Status: Routine  Location: Bedside  Height: 64 in  Weight: 166 8 lb  BP: 161/ 67 mmHg    Indications: SOB    Diagnoses: R06 00 - Dyspnea, unspecified    Sonographer:  YADIRA Vieyra  Primary Physician:  Claudia Song DO  Group:  [facility]  Interpreting Physician:  Megan Knox MD    SUMMARY    SUMMARY:  No significant change in LVEF compared to prior echo  Remains with similar elevated filling pressors  LEFT VENTRICLE:  Systolic function was normal  Ejection fraction was estimated in the range of 55 % to 60 %  There were no regional wall motion abnormalities  Wall thickness was mildly increased  Features were consistent with a pseudonormal left ventricular filling pattern, with concomitant abnormal relaxation and increased filling pressure (grade 2 diastolic dysfunction)  Doppler parameters were consistent with elevated mean left atrial filling pressure  AORTIC VALVE:  LVOT velocity were inaccurate- unable to adequately measure estimate aortic valve area  Transaortic velocity was increased due to valvular stenosis  HISTORY: PRIOR HISTORY: DVT, Stroke, Hyperlipidemia, Pulmonary Embolism  PROCEDURE: The procedure was performed at the bedside  This was a routine study  The transthoracic approach was used  The study included complete 2D imaging, M-mode, complete spectral Doppler, and color Doppler   The heart rate was 73 bpm,  at the start of the study  Images were obtained from the parasternal, apical, subcostal, and suprasternal notch acoustic windows  Image quality was adequate  LEFT VENTRICLE: Size was normal  Systolic function was normal  Ejection fraction was estimated in the range of 55 % to 60 %  There were no regional wall motion abnormalities  Wall thickness was mildly increased  No evidence of apical  thrombus  DOPPLER: Features were consistent with a pseudonormal left ventricular filling pattern, with concomitant abnormal relaxation and increased filling pressure (grade 2 diastolic dysfunction)  Doppler parameters were consistent with  elevated mean left atrial filling pressure  RIGHT VENTRICLE: The size was normal  Systolic function was normal  Wall thickness was normal     LEFT ATRIUM: The atrium was mildly dilated  RIGHT ATRIUM: The atrium was mildly dilated  MITRAL VALVE: There was mild annular calcification  Valve structure was normal  There was normal leaflet separation  DOPPLER: The transmitral velocity was within the normal range  There was no evidence for stenosis  There was trace  regurgitation  AORTIC VALVE: LVOT velocity were inaccurate- unable to adequately measure estimate aortic valve area  The valve was trileaflet  Leaflets exhibited mildly increased thickness, mild calcification, mildly reduced cuspal separation, and  sclerosis  DOPPLER: Transaortic velocity was increased due to valvular stenosis  There was mild stenosis  There was trace regurgitation  TRICUSPID VALVE: The valve structure was normal  There was normal leaflet separation  DOPPLER: The transtricuspid velocity was within the normal range  There was no evidence for stenosis  There was trace regurgitation  The tricuspid jet  envelope definition was inadequate for estimation of RV systolic pressure   There are no indirect findings (abnormal RV volume or geometry, altered pulmonary flow velocity profile, or leftward septal displacement) which would suggest  moderate or severe pulmonary hypertension  PULMONIC VALVE: Leaflets exhibited normal thickness, no calcification, and normal cuspal separation  DOPPLER: The transpulmonic velocity was within the normal range  There was mild regurgitation  PERICARDIUM: There was no pericardial effusion  The pericardium was normal in appearance  AORTA: The root exhibited normal size  SYSTEMIC VEINS: IVC: The inferior vena cava was mildly dilated  MEASUREMENT TABLES    DOPPLER MEASUREMENTS  Aortic valve   (Reference normals)  Peak jim   270 cm/s   (--)  Peak gradient   29 mmHg   (--)    SYSTEM MEASUREMENT TABLES    2D mode  AoR Diam 2D: 2 9 cm  LA Diam (2D): 4 cm  LA/Ao (2D): 1 38  FS (2D Teich): 28 %  IVSd (2D): 1 28 cm  LVDEV: 87 2 cm³  LVESV: 39 7 cm³  LVIDd(2D): 4 39 cm  LVISd (2D): 3 16 cm  LVOT Area 2D: 2 54 cm squared  LVPWd (2D): 1 1 cm  SV (Teich): 47 5 cm³    Apical four chamber  LVEF A4C: 61 %    Unspecified Scan Mode  PATRICIA Cont Eq (Peak Jim): 1 93 cm squared  PATRICIA Cont Eq (VTI): 1 95 cm squared  LVOT (VTI): 46 5 cm  LVOT Diam : 1 9 cm  LVOT Vmax: 2040 mm/s  LVOT Vmax; Mean: 2040 mm/s  Peak Grad ; Mean: 17 mm[Hg]  SV (LVOT): 118 cm³  VTI;Mean: 8 mm[Hg]  MV Peak A Jim: 1200 mm/s  MV Peak E Jim   Mean: 1160 mm/s  MVA (PHT): 3 28 cm squared  PHT: 67 ms  Max P mm[Hg]  V Max: 2900 mm/s  Vmax: 3060 mm/s  RA Area: 18 cm squared  RA Volume: 46 9 cm³  TAPSE: 2 1 cm    [employer] Accredited [facility]    Prepared and electronically signed by    Ced Feliciano MD  Signed 09-Mar-2018 11:52:39         EKG/TELE: Personally reviewed      Imaging: I have personally reviewed pertinent films in PACS

## 2018-03-11 NOTE — ASSESSMENT & PLAN NOTE
Blood pressure better controlled with Coreg 25 milligram p o  b i d , Demadex 20 milligram p o  b i d  and amlodipine 5 milligram p o   BID

## 2018-03-11 NOTE — PROGRESS NOTES
Progress Note Gifty Sheffield 5/12/1928, 80 y o  female MRN: [id number]    Unit/Bed#: [address] [address] Encounter: [id number]    Primary Care Provider: Cruzito Wright DO   Date and time admitted to hospital: 3/6/2018  9:00 PM        Acute respiratory failure with hypoxia and hypercapnia ([facility] [address] )   Assessment & Plan    Resolved  Continue oxygen supplementation during day and BiPAP at night        * Acute on chronic diastolic CHF (congestive heart failure) ([facility] [address] )   Assessment & Plan    Patient is having good diuresis with Demadex 20 milligram p o  Daily  Due to her recurrent episodes of CHF patient's Demadex dose was increased to 20 milligram p o  b i d  CKD (chronic kidney disease) stage 3, GFR 30-59 ml/min   Assessment & Plan    Patient's baseline creatinine around 1 7-2  Creatinine remains close to baseline  Demadex dose will be increased to 20 milligram p o  b i d  History of CVA (cerebrovascular accident)   Assessment & Plan    History of right basal ganglia CVA in 2016  Continue Plavix and welcol        Hyperlipidemia   Assessment & Plan    Continue Welchol        Deep vein thrombosis (DVT) of left lower extremity (HCC)   Assessment & Plan    Patient is on Eliquis        Essential hypertension   Assessment & Plan    Blood pressure better controlled with Coreg 25 milligram p o  b i d , Demadex 20 milligram p o  b i d  and amlodipine 5 milligram p o  daily        Obesity   Assessment & Plan    Dietary modification              VTE Pharmacologic Prophylaxis:   Pharmacologic: Apixaban (Eliquis)  Mechanical VTE Prophylaxis in Place: No    Patient Centered Rounds: I have performed bedside rounds with nursing staff today  Discussions with Specialists or Other Care Team Provider: [facility]    Education and Discussions with Family / Patient:Yes    Time Spent for Care: 30 minutes  More than 50% of total time spent on counseling and coordination of care as described above      Current Length of Stay: 5 day(s)    Current Patient Status: Inpatient     Discharge Plan: pending    Code Status: Level 2 - DNAR: but accepts endotracheal intubation      Subjective:   Patient is feeling good  Denies any chest pain, shortness of breath, abdominal pain, nausea, vomiting      Objective:       Vitals:   Temp (24hrs), Av 8 °F (36 6 °C), Min:97 5 °F (36 4 °C), Max:98 5 °F (36 9 °C)    HR:  [52-68] 67  Resp:  [20] 20  BP: (106-136)/(53-88) 132/59  SpO2:  [92 %-97 %] 94 %  Body mass index is 24 9 kg/m²  Input and Output Summary (last 24 hours): Intake/Output Summary (Last 24 hours) at 18 1554  Last data filed at 18 1115   Gross per 24 hour   Intake                0 ml   Output              800 ml   Net             -800 ml       Physical Exam:     Physical Exam   Constitutional: No distress  HENT:   Head: Normocephalic and atraumatic  Nose: Nose normal    Eyes: Conjunctivae and EOM are normal  Pupils are equal, round, and reactive to light  Neck: Normal range of motion  Neck supple  No JVD present  Cardiovascular: Normal rate and regular rhythm  Exam reveals no gallop and no friction rub  Murmur heard  Pulmonary/Chest: Effort normal and breath sounds normal  No respiratory distress  She has no wheezes  She has no rales  She exhibits no tenderness  Abdominal: Soft  Bowel sounds are normal  She exhibits no distension  There is no tenderness  There is no rebound and no guarding  Musculoskeletal: She exhibits edema  Trace edema   Neurological: She is alert  No cranial nerve deficit  Skin: Skin is warm and dry  No rash noted  Psychiatric: She has a normal mood and affect         Additional Data:     Labs:      Results from last 7 days  Lab Units 18  0934 18  0455   WBC Thousand/uL 3 50* 3 80*   HEMOGLOBIN g/dL 11 6* 10 8*   HEMATOCRIT % 36 9* 33 6*   PLATELETS Thousands/uL 116* 95*   NEUTROS PCT %  --  68   LYMPHS PCT %  --  16   MONOS PCT %  --  11   EOS PCT %  --  5 Results from last 7 days  Lab Units 03/11/18  0934  03/06/18  2120   SODIUM mmol/L 142  < > 142   POTASSIUM mmol/L 3 9  < > 4 6   CHLORIDE mmol/L 102  < > 108   CO2 mmol/L 37*  < > 29   BUN mg/dL 46*  < > 58*   CREATININE mg/dL 1 84*  < > 2 10*   CALCIUM mg/dL 9 3  < > 9 3   TOTAL PROTEIN g/dL  --   --  7 1   BILIRUBIN TOTAL mg/dL  --   --  0 30   ALK PHOS U/L  --   --  56   ALT U/L  --   --  18   AST U/L  --   --  14   GLUCOSE RANDOM mg/dL 248*  < > 139   < > = values in this interval not displayed  Results from last 7 days  Lab Units 03/06/18 2120   INR  1 10       * I Have Reviewed All Lab Data Listed Above  * Additional Pertinent Lab Tests Reviewed: All Labs For Current Hospital Admission Reviewed    Imaging:  Xr Chest Portable    Result Date: 3/9/2018  Narrative: CHEST INDICATION: Congestive heart failure COMPARISON:  3/8/2018 EXAM PERFORMED/VIEWS:  XR CHEST PORTABLE FINDINGS: Heart shadow is enlarged but unchanged from prior exam  There is continued evidence of pulmonary congestion, similar to the prior study  Bilateral pleural effusions do appear smaller in size  No pneumothorax is seen Osseous structures appear within normal limits for patient age  Impression: 1  Bilateral pleural effusions appear partially diminished in size 2  Degree of pulmonary congestion appear similar  Workstation performed: [id number]     Xr Chest Portable    Result Date: 3/8/2018  Narrative: CHEST INDICATION:  55-year-old female with  CHF  COMPARISON:  3/6/2018  EXAM PERFORMED/VIEWS:  XR CHEST PORTABLE Images: 1 FINDINGS:  The patient has been extubated during the interim with the enteric feeding tube removed  Thoracic Duarte rods again noted  Heart shadow is enlarged but unchanged from prior exam  Improving aeration with persistent scattered bibasilar atelectasis with diminished central vascular engorgement  Tiny pleural effusions suspected  No pneumothorax   Osseous structures appear within normal limits for patient age  Impression: Improving aeration with resolving central vascular congestion  Persistent bibasilar atelectasis and tiny pleural effusions  Stable cardiomegaly Workstation performed: [id number]     Xr Chest 1 View Portable    Result Date: 3/7/2018  Narrative: CHEST INDICATION: intubation COMPARISON:  One view chest 3/6/2018 EXAM PERFORMED/VIEWS:  XR CHEST PORTABLE FINDINGS:  Endotracheal tube is present, in satisfactory position with its tip above the level of the jerilyn  Enteric tube is present with its tip excluded by collimation below the level of the left hemidiaphragm  Cardiac silhouette is enlarged  Aortic calcification is present  Lung markings are crowded secondary to low lung volumes  Bibasilar subsegmental atelectasis  Prominent central pulmonary vasculature  No pneumothorax or large pleural effusion  Partially visualized thoracolumbar fusion hardware  Impression: Cardiomegaly and mild central pulmonary vascular congestion  Bibasilar subsegmental atelectasis  Endotracheal tube is present, in satisfactory position with its tip above the level of the jerilyn  Enteric tube is present with its tip excluded by collimation below the level of the left hemidiaphragm  Workstation performed: [id number]     Xr Chest 1 View Portable    Result Date: 3/6/2018  Narrative: CHEST INDICATION: Shortness of breath  COMPARISON:  12/11/2017 EXAM PERFORMED/VIEWS:  XR CHEST PORTABLE FINDINGS: Stable cardiomegaly  Mild elevation of the right hemidiaphragm  No visualized pleural effusion  Pulmonary vascular congestion, primarily in the right lung  No airspace consolidation, pulmonary edema, atelectasis or pneumothorax  Raquel lumbar fixation hardware  Impression: Pulmonary vascular congestion without evidence of interstitial edema  Workstation performed: [id number]     Ct Head Without Contrast    Result Date: 3/7/2018  Narrative: CT BRAIN - WITHOUT CONTRAST INDICATION: Altered mental status  COMPARISON:  5/9/2017, and  TECHNIQUE:  CT examination of the brain was performed  In addition to axial images, coronal 2D reformatted images were created and submitted for interpretation  Radiation dose length product (DLP) for this visit:  1238 08 mGy-cm   This examination, like all CT scans performed in the [facility], was performed utilizing techniques to minimize radiation dose exposure, including the use of iterative reconstruction and automated exposure control  IMAGE QUALITY:  Diagnostic  FINDINGS: PARENCHYMA: Partially confluent periventricular and subcortical white matter lesions, consistent with microangiopathic disease  Calcification noted in the globus pallidus  No intracranial mass, mass effect or midline shift  No CT signs of acute infarction  No acute intracranial hemorrhage  VENTRICLES AND EXTRA-AXIAL SPACES:  Normal for the patient's age  VISUALIZED ORBITS AND PARANASAL SINUSES:  Unremarkable  CALVARIUM AND EXTRACRANIAL SOFT TISSUES:  Normal      Impression: Microangiopathic disease  No acute intracranial hemorrhage, mass effect or extra-axial collection  Workstation performed: [id number]     Imaging Reports Reviewed by myself    Cultures:   Blood Culture:   Lab Results   Component Value Date    BLOODCX No Growth After 5 Days  08/27/2017    BLOODCX No Growth After 5 Days   08/27/2017     Urine Culture:   Lab Results   Component Value Date    URINECX >100,000 cfu/ml Mixed Contaminants X6 05/09/2017     Sputum Culture: No components found for: SPUTUMCX  Wound Culture: No results found for: WOUNDCULT    Last 24 Hours Medication List:     Current Facility-Administered Medications:  amLODIPine 5 mg Oral BID Debby Bunn PA-C   apixaban 5 mg Oral BID Nery Proctor PA-C   budesonide-formoterol 2 puff Inhalation BID Debby Bunn PA-C   calcium carbonate-vitamin D 1 tablet Oral BID With Meals Debby Bunn PA-C   carvedilol 25 mg Oral BID With Meals Debby Bunn PA-C clopidogrel 75 mg Oral Daily Nery Proctor PA-C   ipratropium-albuterol 3 mL Nebulization Q6H Barbie Vaz PA-C   magnesium oxide 400 mg Oral BID Barbie Vaz PA-C   multivitamin stress formula 1 tablet Oral Daily Barbie Vaz PA-C   senna-docusate sodium 1 tablet Oral BID Allyssa Ripa, CRNP   torsemide 20 mg Oral BID (diuretic) Anthony Cole MD        Today, Patient Was Seen By: Felicia Schumacher MD    ** Please Note: Dragon 360 Dictation voice to text software may have been used in the creation of this document   **

## 2018-03-11 NOTE — PLAN OF CARE
DISCHARGE PLANNING     Discharge to home or other facility with appropriate resources Progressing        DISCHARGE PLANNING - CARE MANAGEMENT     Discharge to post-acute care or home with appropriate resources Progressing        GENITOURINARY - ADULT     Maintains or returns to baseline urinary function Progressing     Absence of urinary retention Progressing     Urinary catheter remains patent Progressing        INFECTION - ADULT     Absence or prevention of progression during hospitalization Progressing        Knowledge Deficit     Patient/family/caregiver demonstrates understanding of disease process, treatment plan, medications, and discharge instructions Progressing        Nutrition/Hydration-ADULT     Nutrient/Hydration intake appropriate for improving, restoring or maintaining nutritional needs Progressing        PAIN - ADULT     Verbalizes/displays adequate comfort level or baseline comfort level Progressing        Potential for Falls     Patient will remain free of falls Progressing        Prexisting or High Potential for Compromised Skin Integrity     Skin integrity is maintained or improved Progressing        RESPIRATORY - ADULT     Achieves optimal ventilation and oxygenation Progressing        SAFETY ADULT     Patient will remain free of falls Progressing     Maintain or return to baseline ADL function Progressing     Maintain or return mobility status to optimal level Progressing        SKIN/TISSUE INTEGRITY - ADULT     Skin integrity remains intact Progressing     Incision(s), wounds(s) or drain site(s) healing without S/S of infection Progressing     Oral mucous membranes remain intact Progressing

## 2018-03-11 NOTE — PROGRESS NOTES
Progress Note - Cardiology   Baron Westfall 80 y o  female MRN: [id number]  Unit/Bed#: [address] [place] [address] Encounter: [id number]  03/10/18  8:36 PM        Assessment:  1  Acute on chronic diastolic congestive heart failure - significantly improved  2  Malignant hypertension - BP significantly improved with current medication regimen  3  Recent DVT - on Eliquis  4  PVD with H/o CVA  5  Moderate AS  6  Chronic RBBB  7  H/o orthostatic hypotension - compression stockings  Check tomorrow  8  Respiratory failure  9  Dyslipidemia - on statin      Plan:  - continue PO torsemide  - continue current BP medication regimen  Subjective: Baron Westfall denies any chest pain  Shortness of breath improving  Meds/Allergies   current meds:   Current Facility-Administered Medications   Medication Dose Route Frequency    amLODIPine (NORVASC) tablet 5 mg  5 mg Oral BID    apixaban (ELIQUIS) tablet 5 mg  5 mg Oral BID    budesonide-formoterol (SYMBICORT) 160-4 5 mcg/act inhaler 2 puff  2 puff Inhalation BID    calcium carbonate-vitamin D (OSCAL-D) 500 mg-200 units per tablet 1 tablet  1 tablet Oral BID With Meals    carvedilol (COREG) tablet 25 mg  25 mg Oral BID With Meals    clopidogrel (PLAVIX) tablet 75 mg  75 mg Oral Daily    ipratropium-albuterol (DUO-NEB) 0 5-2 5 mg/3 mL inhalation solution 3 mL  3 mL Nebulization Q6H    magnesium oxide (MAG-OX) tablet 400 mg  400 mg Oral BID    multivitamin stress formula tablet 1 tablet  1 tablet Oral Daily    senna-docusate sodium (SENOKOT S) 8 6-50 mg per tablet 1 tablet  1 tablet Oral BID    torsemide (DEMADEX) tablet 20 mg  20 mg Oral Daily     Allergies   Allergen Reactions    Lyrica [Pregabalin] Other (See Comments)     Client says it made her "nutty"       Objective   Vitals: Blood pressure 113/54, pulse 64, temperature 97 5 °F (36 4 °C), temperature source Oral, resp   rate 20, height 5' 4" (1 626 m), weight 72 7 kg (160 lb 4 4 oz), SpO2 95 %, not currently breastfeeding , Body mass index is 27 51 kg/m²  Intake/Output Summary (Last 24 hours) at 03/10/18 2036  Last data filed at 03/10/18 1700   Gross per 24 hour   Intake                0 ml   Output             2000 ml   Net            -2000 ml       Physical Exam   Constitutional: She appears healthy  No distress  HENT:   Nose: Nose normal    Mouth/Throat: Oropharynx is clear  Eyes: Conjunctivae are normal  Pupils are equal, round, and reactive to light  Neck: Neck supple  No JVD present  Cardiovascular: Normal rate and regular rhythm  Exam reveals no distant heart sounds and no friction rub  Murmur heard  Systolic murmur is present with a grade of 3/6  at the upper left sternal border  Pulmonary/Chest: Effort normal  She has no rales  She has diffuse wheezes  Abdominal: Soft  She exhibits no distension  There is no tenderness  Musculoskeletal: She exhibits no edema  Neurological: She is alert and oriented to person, place, and time  Skin: Skin is warm and dry  No rash noted         Lab Results:     Troponins:   Results from last 7 days  Lab Units 03/06/18  2120   TROPONIN I ng/mL <0 02       Recent Results (from the past 24 hour(s))   Basic metabolic panel    Collection Time: 03/10/18 12:14 PM   Result Value Ref Range    Sodium 146 (H) 136 - 145 mmol/L    Potassium 3 8 3 5 - 5 3 mmol/L    Chloride 105 100 - 108 mmol/L    CO2 39 (H) 21 - 32 mmol/L    Anion Gap 2 (L) 4 - 13 mmol/L    BUN 48 (H) 5 - 25 mg/dL    Creatinine 1 68 (H) 0 60 - 1 30 mg/dL    Glucose 128 65 - 140 mg/dL    Calcium 9 3 8 3 - 10 1 mg/dL    eGFR 27 ml/min/1 73sq m          Cardiac testing:   Results for orders placed during the hospital encounter of 03/06/18   Echo complete with contrast if indicated    Narrative [address]  [address] [facility] [address] [phone]    Transthoracic Echocardiogram  2D, M-mode, Doppler, and Color Doppler    Study date:  09-Mar-2018    Patient: [facility] Yann Stoddard  MR number: [id number]  Account number: [de-identified]  : 12-May-1928  Age: 80 years  Gender: Female  Status: Routine  Location: Bedside  Height: 64 in  Weight: 166 8 lb  BP: 161/ 67 mmHg    Indications: SOB    Diagnoses: R06 00 - Dyspnea, unspecified    Sonographer:  YADIRA Pickering  Primary Physician:  Cathy Granados DO  Group:  [facility]  Interpreting Physician:  Nafisa Ibarra MD    SUMMARY    SUMMARY:  No significant change in LVEF compared to prior echo  Remains with similar elevated filling pressors  LEFT VENTRICLE:  Systolic function was normal  Ejection fraction was estimated in the range of 55 % to 60 %  There were no regional wall motion abnormalities  Wall thickness was mildly increased  Features were consistent with a pseudonormal left ventricular filling pattern, with concomitant abnormal relaxation and increased filling pressure (grade 2 diastolic dysfunction)  Doppler parameters were consistent with elevated mean left atrial filling pressure  AORTIC VALVE:  LVOT velocity were inaccurate- unable to adequately measure estimate aortic valve area  Transaortic velocity was increased due to valvular stenosis  HISTORY: PRIOR HISTORY: DVT, Stroke, Hyperlipidemia, Pulmonary Embolism  PROCEDURE: The procedure was performed at the bedside  This was a routine study  The transthoracic approach was used  The study included complete 2D imaging, M-mode, complete spectral Doppler, and color Doppler  The heart rate was 73 bpm,  at the start of the study  Images were obtained from the parasternal, apical, subcostal, and suprasternal notch acoustic windows  Image quality was adequate  LEFT VENTRICLE: Size was normal  Systolic function was normal  Ejection fraction was estimated in the range of 55 % to 60 %  There were no regional wall motion abnormalities  Wall thickness was mildly increased  No evidence of apical  thrombus   DOPPLER: Features were consistent with a pseudonormal left ventricular filling pattern, with concomitant abnormal relaxation and increased filling pressure (grade 2 diastolic dysfunction)  Doppler parameters were consistent with  elevated mean left atrial filling pressure  RIGHT VENTRICLE: The size was normal  Systolic function was normal  Wall thickness was normal     LEFT ATRIUM: The atrium was mildly dilated  RIGHT ATRIUM: The atrium was mildly dilated  MITRAL VALVE: There was mild annular calcification  Valve structure was normal  There was normal leaflet separation  DOPPLER: The transmitral velocity was within the normal range  There was no evidence for stenosis  There was trace  regurgitation  AORTIC VALVE: LVOT velocity were inaccurate- unable to adequately measure estimate aortic valve area  The valve was trileaflet  Leaflets exhibited mildly increased thickness, mild calcification, mildly reduced cuspal separation, and  sclerosis  DOPPLER: Transaortic velocity was increased due to valvular stenosis  There was mild stenosis  There was trace regurgitation  TRICUSPID VALVE: The valve structure was normal  There was normal leaflet separation  DOPPLER: The transtricuspid velocity was within the normal range  There was no evidence for stenosis  There was trace regurgitation  The tricuspid jet  envelope definition was inadequate for estimation of RV systolic pressure  There are no indirect findings (abnormal RV volume or geometry, altered pulmonary flow velocity profile, or leftward septal displacement) which would suggest  moderate or severe pulmonary hypertension  PULMONIC VALVE: Leaflets exhibited normal thickness, no calcification, and normal cuspal separation  DOPPLER: The transpulmonic velocity was within the normal range  There was mild regurgitation  PERICARDIUM: There was no pericardial effusion  The pericardium was normal in appearance  AORTA: The root exhibited normal size      SYSTEMIC VEINS: IVC: The inferior vena cava was mildly dilated  MEASUREMENT TABLES    DOPPLER MEASUREMENTS  Aortic valve   (Reference normals)  Peak jim   270 cm/s   (--)  Peak gradient   29 mmHg   (--)    SYSTEM MEASUREMENT TABLES    2D mode  AoR Diam 2D: 2 9 cm  LA Diam (2D): 4 cm  LA/Ao (2D): 1 38  FS (2D Teich): 28 %  IVSd (2D): 1 28 cm  LVDEV: 87 2 cm³  LVESV: 39 7 cm³  LVIDd(2D): 4 39 cm  LVISd (2D): 3 16 cm  LVOT Area 2D: 2 54 cm squared  LVPWd (2D): 1 1 cm  SV (Teich): 47 5 cm³    Apical four chamber  LVEF A4C: 61 %    Unspecified Scan Mode  PATRICIA Cont Eq (Peak Jim): 1 93 cm squared  PATRICIA Cont Eq (VTI): 1 95 cm squared  LVOT (VTI): 46 5 cm  LVOT Diam : 1 9 cm  LVOT Vmax: 2040 mm/s  LVOT Vmax; Mean: 2040 mm/s  Peak Grad ; Mean: 17 mm[Hg]  SV (LVOT): 118 cm³  VTI;Mean: 8 mm[Hg]  MV Peak A Jim: 1200 mm/s  MV Peak E Jim   Mean: 1160 mm/s  MVA (PHT): 3 28 cm squared  PHT: 67 ms  Max P mm[Hg]  V Max: 2900 mm/s  Vmax: 3060 mm/s  RA Area: 18 cm squared  RA Volume: 46 9 cm³  TAPSE: 2 1 cm    [facility] Accredited Echocardiography Laboratory    Prepared and electronically signed by    Dylan Duke MD  Signed 09-Mar-2018 11:52:39         EKG/TELE: Personally reviewed      Imaging: I have personally reviewed pertinent films in PACS

## 2018-03-11 NOTE — ASSESSMENT & PLAN NOTE
Patient is having good diuresis with Demadex 20 milligram p o b i d    Patient's sodium level is slightly elevated today  Follow-up BMP in 4 days

## 2018-03-11 NOTE — SOCIAL WORK
CM SPOKE WITH PT AND HER DTR LAST EVENING  PT IS AGREEABLE TO SOME STR PRIOR TO HER RETURN HOME  D/W PT AND DTR MBP AND PREFERRED PROVIDER, FACILITY LIST PROVIDED  PT CHOICES ARE [facility], [facility] AND [facility]  DTR KNOWS A 3RD CHOICE IS RECOMMENDED BUT WOULD LIKE TO START THERE FIRST  REFERRALS PLACED THROUGH [facility]

## 2018-03-12 VITALS
HEIGHT: 64 IN | TEMPERATURE: 98.2 F | SYSTOLIC BLOOD PRESSURE: 119 MMHG | WEIGHT: 160.27 LBS | BODY MASS INDEX: 27.36 KG/M2 | RESPIRATION RATE: 18 BRPM | HEART RATE: 75 BPM | DIASTOLIC BLOOD PRESSURE: 57 MMHG | OXYGEN SATURATION: 96 %

## 2018-03-12 LAB
ANION GAP SERPL CALCULATED.3IONS-SCNC: 5 MMOL/L (ref 4–13)
BUN SERPL-MCNC: 53 MG/DL (ref 5–25)
CALCIUM SERPL-MCNC: 9.6 MG/DL (ref 8.3–10.1)
CHLORIDE SERPL-SCNC: 103 MMOL/L (ref 100–108)
CO2 SERPL-SCNC: 38 MMOL/L (ref 21–32)
CREAT SERPL-MCNC: 1.77 MG/DL (ref 0.6–1.3)
ERYTHROCYTE [DISTWIDTH] IN BLOOD BY AUTOMATED COUNT: 13.9 % (ref 11.6–15.1)
GFR SERPL CREATININE-BSD FRML MDRD: 25 ML/MIN/1.73SQ M
GLUCOSE SERPL-MCNC: 114 MG/DL (ref 65–140)
HCT VFR BLD AUTO: 35.1 % (ref 37–47)
HGB BLD-MCNC: 11.2 G/DL (ref 12–16)
MCH RBC QN AUTO: 29.9 PG (ref 27–31)
MCHC RBC AUTO-ENTMCNC: 31.9 G/DL (ref 31.4–37.4)
MCV RBC AUTO: 94 FL (ref 82–98)
PLATELET # BLD AUTO: 132 THOUSANDS/UL (ref 130–400)
PMV BLD AUTO: 9 FL (ref 8.9–12.7)
POTASSIUM SERPL-SCNC: 3.8 MMOL/L (ref 3.5–5.3)
RBC # BLD AUTO: 3.75 MILLION/UL (ref 4.2–5.4)
SODIUM SERPL-SCNC: 146 MMOL/L (ref 136–145)
WBC # BLD AUTO: 3.3 THOUSAND/UL (ref 4.8–10.8)

## 2018-03-12 PROCEDURE — 85027 COMPLETE CBC AUTOMATED: CPT | Performed by: INTERNAL MEDICINE

## 2018-03-12 PROCEDURE — 94668 MNPJ CHEST WALL SBSQ: CPT

## 2018-03-12 PROCEDURE — 94760 N-INVAS EAR/PLS OXIMETRY 1: CPT

## 2018-03-12 PROCEDURE — 80048 BASIC METABOLIC PNL TOTAL CA: CPT | Performed by: INTERNAL MEDICINE

## 2018-03-12 PROCEDURE — 99232 SBSQ HOSP IP/OBS MODERATE 35: CPT | Performed by: INTERNAL MEDICINE

## 2018-03-12 PROCEDURE — 97110 THERAPEUTIC EXERCISES: CPT

## 2018-03-12 PROCEDURE — 99239 HOSP IP/OBS DSCHRG MGMT >30: CPT | Performed by: INTERNAL MEDICINE

## 2018-03-12 PROCEDURE — 94640 AIRWAY INHALATION TREATMENT: CPT

## 2018-03-12 RX ORDER — TORSEMIDE 20 MG/1
20 TABLET ORAL
Refills: 0
Start: 2018-03-12 | End: 2018-08-06

## 2018-03-12 RX ORDER — AMOXICILLIN 250 MG
1 CAPSULE ORAL 2 TIMES DAILY
Refills: 0
Start: 2018-03-12 | End: 2018-05-17 | Stop reason: ALTCHOICE

## 2018-03-12 RX ORDER — IPRATROPIUM BROMIDE AND ALBUTEROL SULFATE 2.5; .5 MG/3ML; MG/3ML
3 SOLUTION RESPIRATORY (INHALATION)
Refills: 0
Start: 2018-03-12 | End: 2018-05-08 | Stop reason: SDUPTHER

## 2018-03-12 RX ADMIN — CARVEDILOL 25 MG: 25 TABLET, FILM COATED ORAL at 09:03

## 2018-03-12 RX ADMIN — CALCIUM CARBONATE 500 MG (1,250 MG)-VITAMIN D3 200 UNIT TABLET 1 TABLET: at 09:03

## 2018-03-12 RX ADMIN — Medication 1 TABLET: at 17:34

## 2018-03-12 RX ADMIN — APIXABAN 5 MG: 5 TABLET, FILM COATED ORAL at 09:03

## 2018-03-12 RX ADMIN — IPRATROPIUM BROMIDE AND ALBUTEROL SULFATE 3 ML: .5; 3 SOLUTION RESPIRATORY (INHALATION) at 13:33

## 2018-03-12 RX ADMIN — IPRATROPIUM BROMIDE AND ALBUTEROL SULFATE 3 ML: .5; 3 SOLUTION RESPIRATORY (INHALATION) at 01:29

## 2018-03-12 RX ADMIN — B-COMPLEX W/ C & FOLIC ACID TAB 1 TABLET: TAB at 09:04

## 2018-03-12 RX ADMIN — CALCIUM CARBONATE 500 MG (1,250 MG)-VITAMIN D3 200 UNIT TABLET 1 TABLET: at 16:27

## 2018-03-12 RX ADMIN — TORSEMIDE 20 MG: 20 TABLET ORAL at 16:27

## 2018-03-12 RX ADMIN — TORSEMIDE 20 MG: 20 TABLET ORAL at 09:03

## 2018-03-12 RX ADMIN — BUDESONIDE AND FORMOTEROL FUMARATE DIHYDRATE 2 PUFF: 160; 4.5 AEROSOL RESPIRATORY (INHALATION) at 09:04

## 2018-03-12 RX ADMIN — Medication 400 MG: at 17:34

## 2018-03-12 RX ADMIN — BUDESONIDE AND FORMOTEROL FUMARATE DIHYDRATE 2 PUFF: 160; 4.5 AEROSOL RESPIRATORY (INHALATION) at 17:34

## 2018-03-12 RX ADMIN — IPRATROPIUM BROMIDE AND ALBUTEROL SULFATE 3 ML: .5; 3 SOLUTION RESPIRATORY (INHALATION) at 07:08

## 2018-03-12 RX ADMIN — AMLODIPINE BESYLATE 5 MG: 5 TABLET ORAL at 17:34

## 2018-03-12 RX ADMIN — CARVEDILOL 25 MG: 25 TABLET, FILM COATED ORAL at 16:27

## 2018-03-12 RX ADMIN — Medication 400 MG: at 09:03

## 2018-03-12 RX ADMIN — CLOPIDOGREL BISULFATE 75 MG: 75 TABLET ORAL at 09:03

## 2018-03-12 RX ADMIN — Medication 1 TABLET: at 09:03

## 2018-03-12 RX ADMIN — APIXABAN 5 MG: 5 TABLET, FILM COATED ORAL at 17:34

## 2018-03-12 RX ADMIN — AMLODIPINE BESYLATE 5 MG: 5 TABLET ORAL at 09:03

## 2018-03-12 NOTE — PROGRESS NOTES
Progress Note - Nephrology   Carlos Salinas 80 y o  female MRN: [id number]  Unit/Bed#: [address] [facility] 404- Encounter: [id number]    Assessment:  1  Stage 3 chronic kidney disease:  Baseline creatinine seems to be 1 5-1 8  The patient her creatinine is 1 7 today was 1 8 yesterday  CO2 level is 38  She seems to be doing okay from a kidney perspective with the increased diuretic dose    2  Fluid overload:  Patient seems to be diuresing well  I would maintain his diuretic dose when the patient is at rehab    Plan:  From a renal perspective patient is stable for discharge      Subjective:   Patient is seen in follow-up for acute renal failure on chronic kidney disease  Patient resting comfortably denies any chest pressure shortness of breath  Her dose of Demadex was increased and she is on twice daily dosing  She states she is diuresing well  Objective:     Vitals: Blood pressure 122/56, pulse 74, temperature 98 1 °F (36 7 °C), temperature source Oral, resp  rate 18, height 5' 4" (1 626 m), weight 72 7 kg (160 lb 4 4 oz), SpO2 97 %, not currently breastfeeding  ,Body mass index is 27 51 kg/m²      Weight (last 2 days)     Date/Time   Weight    03/12/18 0600  72 7 (160 27)    03/11/18 0600  65 8 (145 06)    03/10/18 0600  72 7 (160 27)    Weight: Patient was weak at 03/10/18 0600                Intake/Output Summary (Last 24 hours) at 03/12/18 1440  Last data filed at 03/12/18 1037   Gross per 24 hour   Intake                0 ml   Output             1400 ml   Net            -1400 ml            Physical Exam: General: patient is in NAD  Skin:  No new rash  Eyes:  No scleral icterus  Neck:  Supple no adenopathy  Chest:  Coarse breath sounds decreased at bases  CVS:  S1-S2  Abdomen:  Positive bowel sounds  Extremities:  No significant edema  Neuro:  Nonfocal                Prescriptions Prior to Admission   Medication    amLODIPine (NORVASC) 2 5 mg tablet    apixaban (ELIQUIS) 5 mg    budesonide-formoterol (SYMBICORT) 160-4 5 mcg/act inhaler    calcium-vitamin D (OSCAL 500 + D) 500 mg-200 units per tablet    carvedilol (COREG) 25 mg tablet    cholecalciferol (VITAMIN D3) 1,000 units tablet    clopidogrel (PLAVIX) 75 mg tablet    co-enzyme Q-10 50 MG capsule    colesevelam (WELCHOL) 625 mg tablet    docusate sodium (COLACE) 100 mg capsule    Multiple Vitamins-Minerals (OCUVITE PO)    Polyethylene Glycol 3350 (MIRALAX PO)    torsemide (DEMADEX) 10 mg tablet       Current Facility-Administered Medications   Medication Dose Route Frequency    amLODIPine (NORVASC) tablet 5 mg  5 mg Oral BID    apixaban (ELIQUIS) tablet 5 mg  5 mg Oral BID    budesonide-formoterol (SYMBICORT) 160-4 5 mcg/act inhaler 2 puff  2 puff Inhalation BID    calcium carbonate-vitamin D (OSCAL-D) 500 mg-200 units per tablet 1 tablet  1 tablet Oral BID With Meals    carvedilol (COREG) tablet 25 mg  25 mg Oral BID With Meals    clopidogrel (PLAVIX) tablet 75 mg  75 mg Oral Daily    ipratropium-albuterol (DUO-NEB) 0 5-2 5 mg/3 mL inhalation solution 3 mL  3 mL Nebulization Q6H    magnesium oxide (MAG-OX) tablet 400 mg  400 mg Oral BID    multivitamin stress formula tablet 1 tablet  1 tablet Oral Daily    senna-docusate sodium (SENOKOT S) 8 6-50 mg per tablet 1 tablet  1 tablet Oral BID    torsemide (DEMADEX) tablet 20 mg  20 mg Oral BID (diuretic)        Lab, Imaging and other studies: I have personally reviewed pertinent labs    CBC: Lab Results   Component Value Date    WBC 3 30 (L) 03/12/2018    WBC 5 3 01/05/2016    RBC 3 75 (L) 03/12/2018    RBC 4 49 01/05/2016     CMP: Lab Results   Component Value Date     (H) 03/12/2018     01/05/2016     03/12/2018     01/05/2016    CO2 38 (H) 03/12/2018    CO2 27 01/05/2016    ANIONGAP 5 03/12/2018    ANIONGAP 12 4 01/05/2016    BUN 53 (H) 03/12/2018    BUN 45 (H) 01/05/2016    CREATININE 1 77 (H) 03/12/2018    CREATININE 1 8 (H) 01/05/2016    GLUCOSE 114 03/12/2018 GLUCOSE 92 01/05/2016    CALCIUM 9 6 03/12/2018    CALCIUM 9 4 01/05/2016    AST 14 03/06/2018    AST 21 01/05/2016    ALT 18 03/06/2018    ALT 30 01/05/2016    ALKPHOS 56 03/06/2018    ALKPHOS 64 01/05/2016    PROT 7 1 03/06/2018    PROT 8 5 (H) 01/05/2016    BILITOT 0 30 03/06/2018    BILITOT 0 5 01/05/2016    EGFR 25 03/12/2018     Phosphorus:   Lab Results   Component Value Date    PHOS 3 3 03/09/2018    PHOS 4 6 01/05/2016     Magnesium:   Lab Results   Component Value Date    MG 2 5 03/09/2018    MG 2 4 01/05/2016     Urinalysis: Lab Results   Component Value Date    COLORU Light Yellow 03/06/2018    CLARITYU Clear 03/06/2018    SPECGRAV 1 010 03/06/2018    PHUR 5 5 03/06/2018    LEUKOCYTESUR Negative 03/06/2018    NITRITE Negative 03/06/2018    PROTEINUA Negative 03/06/2018    GLUCOSEU Negative 03/06/2018    KETONESU Negative 03/06/2018    BILIRUBINUR Negative 03/06/2018    BLOODU Negative 03/06/2018     BMP: Lab Results   Component Value Date    GLUCOSE 114 03/12/2018    GLUCOSE 92 01/05/2016    CO2 38 (H) 03/12/2018    CO2 27 01/05/2016    BUN 53 (H) 03/12/2018    BUN 45 (H) 01/05/2016    CREATININE 1 77 (H) 03/12/2018    CREATININE 1 8 (H) 01/05/2016    CALCIUM 9 6 03/12/2018    CALCIUM 9 4 01/05/2016

## 2018-03-12 NOTE — CASE MANAGEMENT
Continued Stay Review    Date: 3/10/18    Vital Signs: /56 (BP Location: Right arm)   Pulse 74   Temp 98 1 °F (36 7 °C) (Oral)   Resp 18   Ht 5' 4" (1 626 m)   Wt 72 7 kg (160 lb 4 4 oz)   SpO2 97%   Breastfeeding? No   BMI 27 51 kg/m²     Medications:   Scheduled Meds:   Current Facility-Administered Medications:  amLODIPine 5 mg Oral BID Delynn Hearing, PA-C   apixaban 5 mg Oral BID Emeka Arm, PA-C   budesonide-formoterol 2 puff Inhalation BID Delynn Hearing, PA-C   calcium carbonate-vitamin D 1 tablet Oral BID With Meals Delynn Hearing, PA-C   carvedilol 25 mg Oral BID With Meals Delynn Hearing, PA-C   clopidogrel 75 mg Oral Daily Nery Proctor, PA-C   ipratropium-albuterol 3 mL Nebulization Q6H Delynn Hearing, PA-C   magnesium oxide 400 mg Oral BID Delynn Hearing, PA-C   multivitamin stress formula 1 tablet Oral Daily Delynn Hearing, PA-C   senna-docusate sodium 1 tablet Oral BID MARGOT Zheng   torsemide 20 mg Oral BID (diuretic) Antoine Giang MD     Continuous Infusions:    PRN Meds:     Abnormal Labs/Diagnostic Results:     Age/Sex: 80 y o  female     ATTENDING  Acute respiratory failure with hypoxia and hypercapnia (HCC)   Assessment & Plan     Status post ventilator-dependent respiratory failure  Patient is currently stable on 2 liters oxygen          * Acute on chronic diastolic CHF (congestive heart failure) ([facility] [address] )   Assessment & Plan     Recent admission in 8/2017 for A/C CHF, recent admission in [place] in earlier February for A/C CHF and hypercarbic respiratory failure requiring intubation    Echo 8/2017 EF 55 % to 60 % with grade 1 diastolic dysfunction  Good response to Demadex 20 milligram p o  daily          CKD (chronic kidney disease) stage 3, GFR 30-59 ml/min   Assessment & Plan     Cr baseline around 1 7-2 0  Creatinine  slightly worse at 1 6 today  Continue to monitor with diuretics          History of CVA (cerebrovascular accident)   Assessment & Plan     Hx of right basal ganglia CVA in 2016, Baseline deficits mild tingling in hand; Continue Plavix          Hyperlipidemia   Assessment & Plan     Continue statin therapy          Deep vein thrombosis (DVT) of left lower extremity (HCC)   Assessment & Plan     Hx of chronic left DVT however reportedly diagnosed during admission in [place] with acute DVT and started on Eliquis, continue Eliquis no other intervention          Essential hypertension   Assessment & Plan     Blood pressure is stable on Coreg 25 milligram p o  b i d , Norvasc 10 milligram p o  daily and torsemide 20 milligram p o  daily          Obesity   Assessment & Plan     Dietary modification          Respiratory acidosisresolved as of 3/9/2018   Assessment & Plan     resolved          Toxic metabolic encephalopathyresolved as of 3/8/2018   Assessment & Plan     -resolved          CARDIO     Assessment:  1  Acute on chronic diastolic congestive heart failure - significantly improved  2  Malignant hypertension - BP significantly improved with current medication regimen  3  Recent DVT - on Eliquis  4  PVD with H/o CVA  5  Moderate AS  6  Chronic RBBB  7  H/o orthostatic hypotension - compression stockings  Check tomorrow  8  Respiratory failure  9   Dyslipidemia - on statin        Plan:  - continue PO torsemide  - continue current BP medication regimen

## 2018-03-12 NOTE — NURSING NOTE
Patient left via stretcher in stable condition accompanied by transport team  Report given to receiving facility

## 2018-03-12 NOTE — DISCHARGE INSTRUCTIONS
Put the patient on BiPAP 10/5 every night  Continue oxygen supplementation 2-3 liters during the day

## 2018-03-12 NOTE — SOCIAL WORK
Discharge ordered  Pt will be going to [facility] at a skilled level of care  [facility] scheduled to transport via stretcher at 1830  Pt, unit and [facility] aware of plan

## 2018-03-12 NOTE — PLAN OF CARE
Problem: PHYSICAL THERAPY ADULT  Goal: Performs mobility at highest level of function for planned discharge setting  See evaluation for individualized goals  {PT CARE PLAN CAANP:[id number]   Outcome: Progressing  Prognosis: Good  Problem List: Decreased strength, Decreased endurance, Impaired balance, Decreased mobility  Assessment: Pt is noted with improved amb distance and endurance  Pt SOB with amb but recovers well with rest  Pt will cont to benefit from skilled PT services  Recommendation:  (STR)          See flowsheet documentation for full assessment

## 2018-03-12 NOTE — DISCHARGE SUMMARY
Discharge- [facility] 5/12/1928, 80 y o  female MRN: [id number]    Unit/Bed#: [address] [place] [address] Encounter: [id number]    Primary Care Provider: Jakob Rowan DO   Date and time admitted to hospital: 3/6/2018  9:00 PM        Acute respiratory failure with hypoxia and hypercapnia ([facility] [address] )   Assessment & Plan    Resolved  Continue oxygen supplementation during day and BiPAP 10/5 at night        * Acute on chronic diastolic CHF (congestive heart failure) ([facility] [address] )   Assessment & Plan    Patient is having good diuresis with Demadex 20 milligram p o b i d  Patient's sodium level is slightly elevated today  Follow-up BMP in 4 days        CKD (chronic kidney disease) stage 3, GFR 30-59 ml/min   Assessment & Plan    Patient's baseline creatinine around 1 7-2  Creatinine remains close to baseline  Continue Demadex 20 milligram p o  B i d   Follow-up BMP in 4 days        History of CVA (cerebrovascular accident)   Assessment & Plan    History of right basal ganglia CVA in 2016  Continue Plavix and welcol        Hyperlipidemia   Assessment & Plan    Continue Welchol        Deep vein thrombosis (DVT) of left lower extremity ([facility] [address] )   Assessment & Plan    Patient is on Eliquis        Essential hypertension   Assessment & Plan    Blood pressure better controlled with Coreg 25 milligram p o  b i d , Demadex 20 milligram p o  b i d  and amlodipine 5 milligram p o  BID        Obesity   Assessment & Plan    Dietary modification              Discharging Physician / Practitioner: Micheline Haskins MD  PCP: Jakob Rowan DO  Admission Date: 3/6/2018  Discharge Date: 03/12/18    Reason for Admission: Shortness of Breath (Patient states that she has felt SOB all day  PO2 79% on RA  Patient states that she was in [place] visiting friends a month ago and was hospitalized for CHF   +2 pitting edema LLE, +1 pitting edema RLE)        Resolved Problems  Date Reviewed: 3/12/2018          Resolved    Respiratory acidosis 3/9/2018     Resolved by Kayla Belcher MD    Toxic metabolic encephalopathy 6/5/7971     Resolved by  Kayla Belcher MD          Consultations During Hospital Stay:  Octavio Ro  Procedures Performed:     · Echo showed EF of 50 for 60%, no regional wall motion abnormalities, grade 2 diastolic dysfunction    Significant Findings / Test Results:   Xr Chest Portable    Result Date: 3/9/2018    Impression: 1  Bilateral pleural effusions appear partially diminished in size 2  Degree of pulmonary congestion appear similar  Workstation performed: [id number]     Xr Chest Portable    Result Date: 3/8/2018    Impression: Improving aeration with resolving central vascular congestion  Persistent bibasilar atelectasis and tiny pleural effusions  Stable cardiomegaly Workstation performed: [id number]     Xr Chest 1 View Portable    Result Date: 3/7/2018    Impression: Cardiomegaly and mild central pulmonary vascular congestion  Bibasilar subsegmental atelectasis  Endotracheal tube is present, in satisfactory position with its tip above the level of the jerilyn  Enteric tube is present with its tip excluded by collimation below the level of the left hemidiaphragm  Workstation performed: [id number]     Xr Chest 1 View Portable    Result Date: 3/6/2018      Impression: Pulmonary vascular congestion without evidence of interstitial edema  Workstation performed: [id number]     Ct Head Without Contrast    Result Date: 3/7/2018    Impression: Microangiopathic disease  No acute intracranial hemorrhage, mass effect or extra-axial collection  Workstation performed: [id number]          Outpatient Tests Requested:  · BMP in 4 weeks  Follow up with PCP in 1 week, Dr Coy Valdez in 1 week, Dr Bertha Tay in 2 weeks    Complications:  None    Reason for Admission:  Shortness of breath    Hospital Course:      Dipika Jones is a 80 y o  female patient with a PMH of chronic CHF, hypertension, hyperlipidemia, right basal ganglia CVA, left lower extremity DVT who originally presented to the hospital on 3/6/2018 due to shortness of breath, generalized weakness  Patient was also little bit lethargic and O2 saturations were in mid 70s at home  Patient became unresponsive in the deep and patient was emergently intubated  Patient's ABG showed hypoxia and hypercarbia patient was initially admitted to ICU for acute exacerbation of CHF and was treated with IV Lasix  Patient had good diuresis and patient was later extubated  Patient was seen in consultation with Cardiology and repeat echo was done the results of which are noted above  Later patient was transitioned to p o  Demadex with good diuresis  Repeat chest x-ray showed improving pleural effusions  Patient was maintained on 2 liters oxygen during the day and BiPAP 10/5 at night  Patient continued to improve and patient will be discharged to short-term rehabilitation for continued physical therapy  Please see above list of diagnoses and related plan for additional information  Condition at Discharge: stable     Discharge Day Visit / Exam:     Subjective:  Patient denies any shortness of breath, chest pain, abdominal pain, nausea, vomiting  Vitals: Blood Pressure: 119/57 (03/12/18 1500)  Pulse: 75 (03/12/18 1500)  Temperature: 98 2 °F (36 8 °C) (03/12/18 1500)  Temp Source: Oral (03/12/18 1500)  Respirations: 18 (03/12/18 1500)  Height: 5' 4" (162 6 cm) (03/09/18 2341)  Weight - Scale: 72 7 kg (160 lb 4 4 oz) (03/12/18 0600)  SpO2: 96 % (03/12/18 1500)  Exam:   Physical Exam   Constitutional: No distress  HENT:   Head: Normocephalic and atraumatic  Nose: Nose normal    Eyes: Conjunctivae and EOM are normal  Pupils are equal, round, and reactive to light  Neck: Normal range of motion  Neck supple  No JVD present  Cardiovascular: Normal rate and regular rhythm  Exam reveals no gallop and no friction rub  Murmur heard    Pulmonary/Chest: Effort normal and breath sounds normal  No respiratory distress  She has no wheezes  She has no rales  She exhibits no tenderness  Abdominal: Soft  Bowel sounds are normal  She exhibits no distension  There is no tenderness  There is no rebound and no guarding  Musculoskeletal: She exhibits edema  Trace edema   Neurological: She is alert  No cranial nerve deficit  Skin: Skin is warm and dry  No rash noted  Psychiatric: She has a normal mood and affect  Discharge instructions/Information to patient and family:   See after visit summary for information provided to patient and family  Provisions for Follow-Up Care:  See after visit summary for information related to follow-up care and any pertinent home health orders  Disposition:     [facility]    Planned Readmission: No     Discharge Statement:  I spent 40 minutes discharging the patient  This time was spent on the day of discharge  I had direct contact with the patient on the day of discharge  Greater than 50% of the total time was spent examining patient, answering all patient questions, arranging and discussing plan of care with patient as well as directly providing post-discharge instructions  Additional time then spent on discharge activities  Discharge Medications:  See after visit summary for reconciled discharge medications provided to patient and family        ** Please Note: This note has been constructed using a voice recognition system **

## 2018-03-12 NOTE — PLAN OF CARE
DISCHARGE PLANNING     Discharge to home or other facility with appropriate resources Completed        DISCHARGE PLANNING - CARE MANAGEMENT     Discharge to post-acute care or home with appropriate resources Completed        GENITOURINARY - ADULT     Maintains or returns to baseline urinary function Completed     Absence of urinary retention Completed     Urinary catheter remains patent Completed        INFECTION - ADULT     Absence or prevention of progression during hospitalization Completed        Knowledge Deficit     Patient/family/caregiver demonstrates understanding of disease process, treatment plan, medications, and discharge instructions Completed        Nutrition/Hydration-ADULT     Nutrient/Hydration intake appropriate for improving, restoring or maintaining nutritional needs Completed        PAIN - ADULT     Verbalizes/displays adequate comfort level or baseline comfort level Completed        Potential for Falls     Patient will remain free of falls Completed        Prexisting or High Potential for Compromised Skin Integrity     Skin integrity is maintained or improved Completed        RESPIRATORY - ADULT     Achieves optimal ventilation and oxygenation Completed        SAFETY ADULT     Patient will remain free of falls Completed     Maintain or return to baseline ADL function Completed     Maintain or return mobility status to optimal level Completed        SKIN/TISSUE INTEGRITY - ADULT     Skin integrity remains intact Completed     Incision(s), wounds(s) or drain site(s) healing without S/S of infection Completed     Oral mucous membranes remain intact Completed

## 2018-03-12 NOTE — [place] UNIVERSAL TRANSFER FORM
[place] UNIVERSAL TRANSFER FORM  (ALL ITEMS MUST BE COMPLETED)    1  TRANSFER FROM: [address]      TRANSFER TO: [facility]    2  DATE OF TRANSFER: 3/12/2018                        TIME OF TRANSFER: 1830    3  PATIENT NAME: Michaela Sarah,        YOB: 1928                             GENDER: female    4  LANGUAGE:   English    5  PHYSICIAN NAME:  Kalen Gomez MD                   PHONE: [phone]    6  CODE STATUS: Level 2 - DNAR: but accepts endotracheal intubation        Out of Hospital DNR Attached: No    7  :                                      :  Extended Emergency Contact Information  Primary Emergency Contact: [address] Phone: [phone]  Work Phone: [phone]  Relation: Child  Secondary Emergency Contact: [address] [facility]  Mobile Phone: [phone]  Relation: Daughter           Raoul Au Representative/Proxy:  No           Legal Guardian:  No             NAME OF:           HEALTH CARE REPRESENTATIVE/PROXY:                                         OR           LEGAL GUARDIAN, IF NOT :                                               PHONE:  (Day)           (Night)                        (Cell)    8  REASON FOR TRANSFER: (Must include brief medical history and recent changes in physical function or cognition ) STR            V/S: /56 (BP Location: Right arm)   Pulse 74   Temp 98 1 °F (36 7 °C) (Oral)   Resp 18   Ht 5' 4" (1 626 m)   Wt 72 7 kg (160 lb 4 4 oz)   SpO2 97%   Breastfeeding? No   BMI 27 51 kg/m²           PAIN: None    9  PRIMARY DIAGNOSIS: Acute on chronic diastolic CHF (congestive heart failure) ([facility] [address] )      Secondary Diagnosis:         Pacemaker: No      Internal Defib: No          Mental Health Diagnosis (if Applicable):    10  RESTRAINTS: No     11  RESPIRATORY NEEDS: None    12  ISOLATION/PRECAUTION: None    13  ALLERGY: Lyrica [pregabalin]    14  SENSORY:       Vision Good    15  SKIN CONDITION: No Wounds    16  DIET: Regular    17  IV ACCESS: None    18  PERSONAL ITEMS SENT WITH PATIENT: None    19  ATTACHED DOCUMENTS: MUST ATTACH CURRENT MEDICATION INFORMATION Face Sheet, MAR, Medication Reconciliation, Diagnostic Studies, Labs, Respiratory Care, Discharge Summary, PT Note, OT Note, ST Note and HX/PE    20  AT RISK ALERTS:Falls        HARM TO: N/A    21  WEIGHT BEARING STATUS:         Left Leg: Limited        Right Leg: Limited    22  MENTAL STATUS:Alert and Oriented    23  FUNCTION:        Walk: With Help        Transfer: With Help        Toilet: With Help        Feed: Self    24  IMMUNIZATIONS/SCREENING:     Immunization History   Administered Date(s) Administered    Pneumococcal Conjugate 13-Valent 09/02/2017       25  BOWEL: Continent    26  BLADDER: Continent    27   SENDING FACILITY CONTACT: Ruben Damico                   Title: RN        Unit: [address] [facility]        Phone: [phone] [address] (if known):        Title:        Unit:         Phone:         FORM PREFILLED BY (if applicable)       Title:       Unit:        Phone:         FORM COMPLETED BY John Brantley RN      Title: [facility]      Phone: [phone]

## 2018-03-12 NOTE — SOCIAL WORK
SW received responses from SNF facilities  Pt accepted at [facility] ([facility])  SW provided facility with a clinical update of pt's respiratory progress notes/order and bipap settings  Awaiting discharge order

## 2018-03-12 NOTE — PHYSICAL THERAPY NOTE
PT TREATMENT     03/12/18 1101   Pain Assessment   Pain Assessment No/denies pain   Restrictions/Precautions   Other Precautions O2;Fall Risk;Hard of hearing   General   Chart Reviewed Yes   Family/Caregiver Present No   Subjective   Subjective "I think I'm leaving for rehab today"   Transfers   Sit to Stand 4  Minimal assistance   Stand to Sit 4  Minimal assistance   Ambulation/Elevation   Gait pattern Forward Flexion; Wide IRWIN   Gait Assistance 4  Minimal assist   Assistive Device Rolling walker   Distance 60 feet;3 5L O2;SAO2 at rest 96% and with amb 92% and pt moderately SOB  Balance   Static Sitting Good   Static Standing Fair  (with RW)   Dynamic Standing Fair  (with RW)   Ambulatory Fair  (with RW)   Activity Tolerance   Activity Tolerance Patient limited by fatigue  (and SOB)   Exercises   Hip Flexion 10 reps;Bilateral   Knee AROM Long Arc Quad 10 reps;Bilateral   Ankle Pumps 10 reps;Bilateral   Assessment   Assessment Pt is noted with improved amb distance and endurance  Pt SOB with amb but recovers well with rest  Pt will cont to benefit from skilled PT services  Plan   Treatment/Interventions ADL retraining;Functional transfer training;LE strengthening/ROM; Therapeutic exercise; Endurance training;Patient/family training;Bed mobility;Gait training   PT Frequency 5x/wk   Recommendation   Recommendation (STR)   Equipment Recommended (cont amb with RW)

## 2018-03-13 NOTE — PROGRESS NOTES
Progress Note - Cardiology   Edu Fitch 80 y o  female MRN: [id number]  Unit/Bed#: [address] [place] [address] Encounter: [id number]  03/13/18  12:04 AM        Assessment:  1  Acute on chronic diastolic congestive heart failure - significantly improved  2  Malignant hypertension - BP significantly improved with current medication regimen  3  Recent DVT - on Eliquis  4  PVD with H/o CVA  5  Moderate AS  6  Chronic RBBB  7  H/o orthostatic hypotension - compression stockings  Check tomorrow  8  Respiratory failure  9  Dyslipidemia - on statin      Plan:  - continue PO torsemide will defer dosing to nephrology  - continue current BP medication regimen may reduce amlodipine to 5 mg daily if BP low  - okay for transfer to rehab      Subjective: Edu Fitch denies any chest pain  Shortness of breath improving  Ambulated hallway with PT     Meds/Allergies   current meds:   No current facility-administered medications for this encounter  Allergies   Allergen Reactions    Lyrica [Pregabalin] Other (See Comments)     Client says it made her "nutty"       Objective   Vitals: Blood pressure 119/57, pulse 75, temperature 98 2 °F (36 8 °C), temperature source Oral, resp  rate 18, height 5' 4" (1 626 m), weight 72 7 kg (160 lb 4 4 oz), SpO2 96 %, not currently breastfeeding , Body mass index is 27 51 kg/m²  Intake/Output Summary (Last 24 hours) at 03/13/18 0004  Last data filed at 03/12/18 1831   Gross per 24 hour   Intake                0 ml   Output             1400 ml   Net            -1400 ml       Physical Exam   Constitutional: She appears healthy  No distress  HENT:   Nose: Nose normal    Mouth/Throat: Oropharynx is clear  Neck: Neck supple  No JVD present  Cardiovascular: Normal rate and regular rhythm  Exam reveals no distant heart sounds and no friction rub  No murmur heard  Pulmonary/Chest: Effort normal and breath sounds normal  She has no wheezes  She has no rales  She exhibits no tenderness  Abdominal: Soft  She exhibits no distension  There is no tenderness  Musculoskeletal: She exhibits no edema  Neurological: She is alert and oriented to person, place, and time  Skin: Skin is warm and dry  No rash noted         Lab Results:     Troponins:     Results from last 7 days  Lab Units 18  2120   TROPONIN I ng/mL <0 02       Recent Results (from the past 24 hour(s))   Basic metabolic panel    Collection Time: 18  5:57 AM   Result Value Ref Range    Sodium 146 (H) 136 - 145 mmol/L    Potassium 3 8 3 5 - 5 3 mmol/L    Chloride 103 100 - 108 mmol/L    CO2 38 (H) 21 - 32 mmol/L    Anion Gap 5 4 - 13 mmol/L    BUN 53 (H) 5 - 25 mg/dL    Creatinine 1 77 (H) 0 60 - 1 30 mg/dL    Glucose 114 65 - 140 mg/dL    Calcium 9 6 8 3 - 10 1 mg/dL    eGFR 25 ml/min/1 73sq m   CBC    Collection Time: 18  5:57 AM   Result Value Ref Range    WBC 3 30 (L) 4 80 - 10 80 Thousand/uL    RBC 3 75 (L) 4 20 - 5 40 Million/uL    Hemoglobin 11 2 (L) 12 0 - 16 0 g/dL    Hematocrit 35 1 (L) 37 0 - 47 0 %    MCV 94 82 - 98 fL    MCH 29 9 27 0 - 31 0 pg    MCHC 31 9 31 4 - 37 4 g/dL    RDW 13 9 11 6 - 15 1 %    Platelets 740 958 - 034 Thousands/uL    MPV 9 0 8 9 - 12 7 fL          Cardiac testing:   Results for orders placed during the hospital encounter of 18   Echo complete with contrast if indicated    Narrative [address]  [address] [address]  [phone]    Transthoracic Echocardiogram  2D, M-mode, Doppler, and Color Doppler    Study date:  09-Mar-2018    Patient: Shante Osei  MR number: [id number]  Account number: [de-identified]  : 12-May-1928  Age: 80 years  Gender: Female  Status: Routine  Location: Bedside  Height: 64 in  Weight: 166 8 lb  BP: 161/ 67 mmHg    Indications: SOB    Diagnoses: R06 00 - Dyspnea, unspecified    Sonographer:  YADIRA Lujan  Primary Physician:  Michelle Bell DO  Group:  [facility]  Interpreting Physician:  Megan Knox MD    SUMMARY    SUMMARY:  No significant change in LVEF compared to prior echo  Remains with similar elevated filling pressors  LEFT VENTRICLE:  Systolic function was normal  Ejection fraction was estimated in the range of 55 % to 60 %  There were no regional wall motion abnormalities  Wall thickness was mildly increased  Features were consistent with a pseudonormal left ventricular filling pattern, with concomitant abnormal relaxation and increased filling pressure (grade 2 diastolic dysfunction)  Doppler parameters were consistent with elevated mean left atrial filling pressure  AORTIC VALVE:  LVOT velocity were inaccurate- unable to adequately measure estimate aortic valve area  Transaortic velocity was increased due to valvular stenosis  HISTORY: PRIOR HISTORY: DVT, Stroke, Hyperlipidemia, Pulmonary Embolism  PROCEDURE: The procedure was performed at the bedside  This was a routine study  The transthoracic approach was used  The study included complete 2D imaging, M-mode, complete spectral Doppler, and color Doppler  The heart rate was 73 bpm,  at the start of the study  Images were obtained from the parasternal, apical, subcostal, and suprasternal notch acoustic windows  Image quality was adequate  LEFT VENTRICLE: Size was normal  Systolic function was normal  Ejection fraction was estimated in the range of 55 % to 60 %  There were no regional wall motion abnormalities  Wall thickness was mildly increased  No evidence of apical  thrombus  DOPPLER: Features were consistent with a pseudonormal left ventricular filling pattern, with concomitant abnormal relaxation and increased filling pressure (grade 2 diastolic dysfunction)  Doppler parameters were consistent with  elevated mean left atrial filling pressure  RIGHT VENTRICLE: The size was normal  Systolic function was normal  Wall thickness was normal     LEFT ATRIUM: The atrium was mildly dilated      RIGHT ATRIUM: The atrium was mildly dilated  MITRAL VALVE: There was mild annular calcification  Valve structure was normal  There was normal leaflet separation  DOPPLER: The transmitral velocity was within the normal range  There was no evidence for stenosis  There was trace  regurgitation  AORTIC VALVE: LVOT velocity were inaccurate- unable to adequately measure estimate aortic valve area  The valve was trileaflet  Leaflets exhibited mildly increased thickness, mild calcification, mildly reduced cuspal separation, and  sclerosis  DOPPLER: Transaortic velocity was increased due to valvular stenosis  There was mild stenosis  There was trace regurgitation  TRICUSPID VALVE: The valve structure was normal  There was normal leaflet separation  DOPPLER: The transtricuspid velocity was within the normal range  There was no evidence for stenosis  There was trace regurgitation  The tricuspid jet  envelope definition was inadequate for estimation of RV systolic pressure  There are no indirect findings (abnormal RV volume or geometry, altered pulmonary flow velocity profile, or leftward septal displacement) which would suggest  moderate or severe pulmonary hypertension  PULMONIC VALVE: Leaflets exhibited normal thickness, no calcification, and normal cuspal separation  DOPPLER: The transpulmonic velocity was within the normal range  There was mild regurgitation  PERICARDIUM: There was no pericardial effusion  The pericardium was normal in appearance  AORTA: The root exhibited normal size  SYSTEMIC VEINS: IVC: The inferior vena cava was mildly dilated      MEASUREMENT TABLES    DOPPLER MEASUREMENTS  Aortic valve   (Reference normals)  Peak krista   270 cm/s   (--)  Peak gradient   29 mmHg   (--)    SYSTEM MEASUREMENT TABLES    2D mode  AoR Diam 2D: 2 9 cm  LA Diam (2D): 4 cm  LA/Ao (2D): 1 38  FS (2D Teich): 28 %  IVSd (2D): 1 28 cm  LVDEV: 87 2 cm³  LVESV: 39 7 cm³  LVIDd(2D): 4 39 cm  LVISd (2D): 3 16 cm  LVOT Area 2D: 2 54 cm squared  LVPWd (2D): 1 1 cm  SV (Teich): 47 5 cm³    Apical four chamber  LVEF A4C: 61 %    Unspecified Scan Mode  PATRICIA Cont Eq (Peak Jim): 1 93 cm squared  PATRICIA Cont Eq (VTI): 1 95 cm squared  LVOT (VTI): 46 5 cm  LVOT Diam : 1 9 cm  LVOT Vmax: 2040 mm/s  LVOT Vmax; Mean: 2040 mm/s  Peak Grad ; Mean: 17 mm[Hg]  SV (LVOT): 118 cm³  VTI;Mean: 8 mm[Hg]  MV Peak A Jim: 1200 mm/s  MV Peak E Jim   Mean: 1160 mm/s  MVA (PHT): 3 28 cm squared  PHT: 67 ms  Max P mm[Hg]  V Max: 2900 mm/s  Vmax: 3060 mm/s  RA Area: 18 cm squared  RA Volume: 46 9 cm³  TAPSE: 2 1 cm    [facility] Accredited Echocardiography Laboratory    Prepared and electronically signed by    Ced Feliciano MD  Signed 09-Mar-2018 11:52:39         EKG/TELE: Personally reviewed      Imaging: I have personally reviewed pertinent films in PACS

## 2018-03-29 ENCOUNTER — APPOINTMENT (EMERGENCY)
Dept: RADIOLOGY | Facility: HOSPITAL | Age: 83
DRG: 189 | End: 2018-03-29
Payer: MEDICARE

## 2018-03-29 ENCOUNTER — HOSPITAL ENCOUNTER (INPATIENT)
Facility: HOSPITAL | Age: 83
LOS: 6 days | Discharge: NURSING, REHAB OR LONG-TERM CARE | DRG: 189 | End: 2018-04-04
Attending: EMERGENCY MEDICINE | Admitting: ANESTHESIOLOGY
Payer: MEDICARE

## 2018-03-29 DIAGNOSIS — R06.89 HYPERCAPNIA: Primary | ICD-10-CM

## 2018-03-29 DIAGNOSIS — J96.01 ACUTE RESPIRATORY FAILURE WITH HYPOXIA AND HYPERCAPNIA (HCC): ICD-10-CM

## 2018-03-29 DIAGNOSIS — I10 ESSENTIAL HYPERTENSION: Chronic | ICD-10-CM

## 2018-03-29 DIAGNOSIS — J96.02 ACUTE RESPIRATORY FAILURE WITH HYPOXIA AND HYPERCAPNIA (HCC): ICD-10-CM

## 2018-03-29 PROBLEM — R00.1 BRADYCARDIA: Status: ACTIVE | Noted: 2018-03-29

## 2018-03-29 LAB
ANION GAP SERPL CALCULATED.3IONS-SCNC: 0 MMOL/L (ref 4–13)
APTT PPP: 25 SECONDS (ref 24–33)
ARTERIAL PATENCY WRIST A: YES
ARTERIAL PATENCY WRIST A: YES
BASE EXCESS BLDA CALC-SCNC: 15.1 MMOL/L
BASE EXCESS BLDA CALC-SCNC: 17.4 MMOL/L
BASOPHILS # BLD AUTO: 0 THOUSANDS/ΜL (ref 0–0.1)
BASOPHILS NFR BLD AUTO: 0 % (ref 0–1)
BILIRUB UR QL STRIP: NEGATIVE
BODY TEMPERATURE: 95.9 DEGREES FEHRENHEIT
BODY TEMPERATURE: 96.5 DEGREES FEHRENHEIT
BUN SERPL-MCNC: 48 MG/DL (ref 5–25)
CALCIUM SERPL-MCNC: 9.2 MG/DL (ref 8.3–10.1)
CHLORIDE SERPL-SCNC: 97 MMOL/L (ref 100–108)
CLARITY UR: CLEAR
CO2 SERPL-SCNC: 43 MMOL/L (ref 21–32)
COLOR UR: YELLOW
CREAT SERPL-MCNC: 1.92 MG/DL (ref 0.6–1.3)
DEPRECATED D DIMER PPP: 210 NG/ML (FEU) (ref 190–520)
EOSINOPHIL # BLD AUTO: 0 THOUSAND/ΜL (ref 0–0.61)
EOSINOPHIL NFR BLD AUTO: 1 % (ref 0–6)
ERYTHROCYTE [DISTWIDTH] IN BLOOD BY AUTOMATED COUNT: 14.2 % (ref 11.6–15.1)
GFR SERPL CREATININE-BSD FRML MDRD: 23 ML/MIN/1.73SQ M
GLUCOSE SERPL-MCNC: 140 MG/DL (ref 65–140)
GLUCOSE UR STRIP-MCNC: NEGATIVE MG/DL
HCO3 BLDA-SCNC: 43.5 MMOL/L (ref 22–28)
HCO3 BLDA-SCNC: 47.4 MMOL/L (ref 22–28)
HCT VFR BLD AUTO: 36.6 % (ref 37–47)
HGB BLD-MCNC: 11.5 G/DL (ref 12–16)
HGB UR QL STRIP.AUTO: NEGATIVE
INR PPP: 1.04 (ref 0.86–1.16)
IPAP: 15
KETONES UR STRIP-MCNC: NEGATIVE MG/DL
LACTATE SERPL-SCNC: 0.8 MMOL/L (ref 0.5–2)
LEUKOCYTE ESTERASE UR QL STRIP: NEGATIVE
LYMPHOCYTES # BLD AUTO: 0.8 THOUSANDS/ΜL (ref 0.6–4.47)
LYMPHOCYTES NFR BLD AUTO: 14 % (ref 14–44)
MCH RBC QN AUTO: 29.9 PG (ref 27–31)
MCHC RBC AUTO-ENTMCNC: 31.4 G/DL (ref 31.4–37.4)
MCV RBC AUTO: 96 FL (ref 82–98)
MONOCYTES # BLD AUTO: 0.4 THOUSAND/ΜL (ref 0.17–1.22)
MONOCYTES NFR BLD AUTO: 8 % (ref 4–12)
NEUTROPHILS # BLD AUTO: 4.1 THOUSANDS/ΜL (ref 1.85–7.62)
NEUTS SEG NFR BLD AUTO: 77 % (ref 43–75)
NITRITE UR QL STRIP: NEGATIVE
NON VENT- BIPAP: ABNORMAL
NRBC BLD AUTO-RTO: 0 /100 WBCS
NT-PROBNP SERPL-MCNC: 1188 PG/ML
O2 CT BLDA-SCNC: 15 ML/DL (ref 16–23)
O2 CT BLDA-SCNC: 15.5 ML/DL (ref 16–23)
OXYHGB MFR BLDA: 89.6 % (ref 94–97)
OXYHGB MFR BLDA: 92.6 % (ref 94–97)
PCO2 BLDA: 76.1 MM HG (ref 36–44)
PCO2 BLDA: 92.7 MM HG (ref 36–44)
PCO2 TEMP ADJ BLDA: 72.2 MM HG (ref 36–44)
PCO2 TEMP ADJ BLDA: 86.8 MM HG (ref 36–44)
PEEP MAX SETTING VENT: 5 CM[H2O]
PH BLD: 7.35 [PH] (ref 7.35–7.45)
PH BLD: 7.39 [PH] (ref 7.35–7.45)
PH BLDA: 7.33 [PH] (ref 7.35–7.45)
PH BLDA: 7.38 [PH] (ref 7.35–7.45)
PH UR STRIP.AUTO: 5.5 [PH] (ref 5–9)
PLATELET # BLD AUTO: 178 THOUSANDS/UL (ref 130–400)
PMV BLD AUTO: 8.8 FL (ref 8.9–12.7)
PO2 BLD: 55 MM HG (ref 75–129)
PO2 BLD: 65.2 MM HG (ref 75–129)
PO2 BLDA: 61 MM HG (ref 75–129)
PO2 BLDA: 70.7 MM HG (ref 75–129)
POTASSIUM SERPL-SCNC: 3.7 MMOL/L (ref 3.5–5.3)
PROT UR STRIP-MCNC: NEGATIVE MG/DL
PROTHROMBIN TIME: 10.9 SECONDS (ref 9.4–11.7)
RBC # BLD AUTO: 3.83 MILLION/UL (ref 4.2–5.4)
SODIUM SERPL-SCNC: 140 MMOL/L (ref 136–145)
SP GR UR STRIP.AUTO: <=1.005 (ref 1–1.03)
SPECIMEN SOURCE: ABNORMAL
SPECIMEN SOURCE: ABNORMAL
TROPONIN I SERPL-MCNC: 0.02 NG/ML
UROBILINOGEN UR QL STRIP.AUTO: 0.2 E.U./DL
VENT BIPAP FIO2: 35 %
WBC # BLD AUTO: 5.3 THOUSAND/UL (ref 4.8–10.8)

## 2018-03-29 PROCEDURE — 82805 BLOOD GASES W/O2 SATURATION: CPT | Performed by: PHYSICIAN ASSISTANT

## 2018-03-29 PROCEDURE — 85610 PROTHROMBIN TIME: CPT | Performed by: EMERGENCY MEDICINE

## 2018-03-29 PROCEDURE — 87081 CULTURE SCREEN ONLY: CPT | Performed by: ANESTHESIOLOGY

## 2018-03-29 PROCEDURE — 80048 BASIC METABOLIC PNL TOTAL CA: CPT | Performed by: EMERGENCY MEDICINE

## 2018-03-29 PROCEDURE — 84484 ASSAY OF TROPONIN QUANT: CPT | Performed by: EMERGENCY MEDICINE

## 2018-03-29 PROCEDURE — 94664 DEMO&/EVAL PT USE INHALER: CPT

## 2018-03-29 PROCEDURE — 94640 AIRWAY INHALATION TREATMENT: CPT

## 2018-03-29 PROCEDURE — 96361 HYDRATE IV INFUSION ADD-ON: CPT

## 2018-03-29 PROCEDURE — 82805 BLOOD GASES W/O2 SATURATION: CPT | Performed by: EMERGENCY MEDICINE

## 2018-03-29 PROCEDURE — 87086 URINE CULTURE/COLONY COUNT: CPT | Performed by: EMERGENCY MEDICINE

## 2018-03-29 PROCEDURE — 36415 COLL VENOUS BLD VENIPUNCTURE: CPT | Performed by: EMERGENCY MEDICINE

## 2018-03-29 PROCEDURE — 71046 X-RAY EXAM CHEST 2 VIEWS: CPT

## 2018-03-29 PROCEDURE — 83605 ASSAY OF LACTIC ACID: CPT | Performed by: EMERGENCY MEDICINE

## 2018-03-29 PROCEDURE — 99285 EMERGENCY DEPT VISIT HI MDM: CPT

## 2018-03-29 PROCEDURE — 94660 CPAP INITIATION&MGMT: CPT

## 2018-03-29 PROCEDURE — 93005 ELECTROCARDIOGRAM TRACING: CPT

## 2018-03-29 PROCEDURE — 87040 BLOOD CULTURE FOR BACTERIA: CPT | Performed by: EMERGENCY MEDICINE

## 2018-03-29 PROCEDURE — 36600 WITHDRAWAL OF ARTERIAL BLOOD: CPT

## 2018-03-29 PROCEDURE — 81003 URINALYSIS AUTO W/O SCOPE: CPT | Performed by: EMERGENCY MEDICINE

## 2018-03-29 PROCEDURE — 85025 COMPLETE CBC W/AUTO DIFF WBC: CPT | Performed by: EMERGENCY MEDICINE

## 2018-03-29 PROCEDURE — 94760 N-INVAS EAR/PLS OXIMETRY 1: CPT

## 2018-03-29 PROCEDURE — 99223 1ST HOSP IP/OBS HIGH 75: CPT | Performed by: ANESTHESIOLOGY

## 2018-03-29 PROCEDURE — 83880 ASSAY OF NATRIURETIC PEPTIDE: CPT | Performed by: NURSE PRACTITIONER

## 2018-03-29 PROCEDURE — 85379 FIBRIN DEGRADATION QUANT: CPT | Performed by: EMERGENCY MEDICINE

## 2018-03-29 PROCEDURE — 96360 HYDRATION IV INFUSION INIT: CPT

## 2018-03-29 PROCEDURE — 85730 THROMBOPLASTIN TIME PARTIAL: CPT | Performed by: EMERGENCY MEDICINE

## 2018-03-29 RX ORDER — B-COMPLEX WITH VITAMIN C
1 TABLET ORAL 2 TIMES DAILY
Status: DISCONTINUED | OUTPATIENT
Start: 2018-03-29 | End: 2018-04-04 | Stop reason: HOSPADM

## 2018-03-29 RX ORDER — TORSEMIDE 20 MG/1
20 TABLET ORAL
Status: DISCONTINUED | OUTPATIENT
Start: 2018-03-29 | End: 2018-03-30

## 2018-03-29 RX ORDER — BUDESONIDE AND FORMOTEROL FUMARATE DIHYDRATE 160; 4.5 UG/1; UG/1
2 AEROSOL RESPIRATORY (INHALATION)
Status: DISCONTINUED | OUTPATIENT
Start: 2018-03-29 | End: 2018-04-04 | Stop reason: HOSPADM

## 2018-03-29 RX ORDER — DOCUSATE SODIUM 100 MG/1
100 CAPSULE, LIQUID FILLED ORAL 2 TIMES DAILY
Status: DISCONTINUED | OUTPATIENT
Start: 2018-03-29 | End: 2018-04-04 | Stop reason: HOSPADM

## 2018-03-29 RX ORDER — CLOPIDOGREL BISULFATE 75 MG/1
75 TABLET ORAL DAILY
Status: DISCONTINUED | OUTPATIENT
Start: 2018-03-29 | End: 2018-04-04 | Stop reason: HOSPADM

## 2018-03-29 RX ORDER — AMLODIPINE BESYLATE 5 MG/1
5 TABLET ORAL DAILY
Status: DISCONTINUED | OUTPATIENT
Start: 2018-03-29 | End: 2018-04-04 | Stop reason: HOSPADM

## 2018-03-29 RX ORDER — ACETYLCYSTEINE 200 MG/ML
3 SOLUTION ORAL; RESPIRATORY (INHALATION)
Status: DISCONTINUED | OUTPATIENT
Start: 2018-03-29 | End: 2018-03-30

## 2018-03-29 RX ORDER — MELATONIN
2000 DAILY
Status: DISCONTINUED | OUTPATIENT
Start: 2018-03-29 | End: 2018-04-04 | Stop reason: HOSPADM

## 2018-03-29 RX ORDER — IPRATROPIUM BROMIDE AND ALBUTEROL SULFATE 2.5; .5 MG/3ML; MG/3ML
3 SOLUTION RESPIRATORY (INHALATION)
Status: DISCONTINUED | OUTPATIENT
Start: 2018-03-29 | End: 2018-04-04 | Stop reason: HOSPADM

## 2018-03-29 RX ORDER — AMOXICILLIN 250 MG
1 CAPSULE ORAL 2 TIMES DAILY
Status: DISCONTINUED | OUTPATIENT
Start: 2018-03-29 | End: 2018-04-04 | Stop reason: HOSPADM

## 2018-03-29 RX ORDER — IPRATROPIUM BROMIDE AND ALBUTEROL SULFATE 2.5; .5 MG/3ML; MG/3ML
3 SOLUTION RESPIRATORY (INHALATION) ONCE
Status: COMPLETED | OUTPATIENT
Start: 2018-03-29 | End: 2018-03-29

## 2018-03-29 RX ORDER — CARVEDILOL 25 MG/1
25 TABLET ORAL 2 TIMES DAILY WITH MEALS
Status: DISCONTINUED | OUTPATIENT
Start: 2018-03-29 | End: 2018-03-29

## 2018-03-29 RX ORDER — SODIUM CHLORIDE 9 MG/ML
125 INJECTION, SOLUTION INTRAVENOUS CONTINUOUS
Status: DISCONTINUED | OUTPATIENT
Start: 2018-03-29 | End: 2018-03-29

## 2018-03-29 RX ADMIN — CALCIUM CARBONATE 500 MG (1,250 MG)-VITAMIN D3 200 UNIT TABLET 1 TABLET: at 17:08

## 2018-03-29 RX ADMIN — Medication 1 TABLET: at 12:33

## 2018-03-29 RX ADMIN — IPRATROPIUM BROMIDE AND ALBUTEROL SULFATE 3 ML: .5; 3 SOLUTION RESPIRATORY (INHALATION) at 14:07

## 2018-03-29 RX ADMIN — DOCUSATE SODIUM 100 MG: 100 CAPSULE, LIQUID FILLED ORAL at 12:32

## 2018-03-29 RX ADMIN — CALCIUM CARBONATE 500 MG (1,250 MG)-VITAMIN D3 200 UNIT TABLET 1 TABLET: at 12:34

## 2018-03-29 RX ADMIN — SODIUM CHLORIDE 125 ML/HR: 0.9 INJECTION, SOLUTION INTRAVENOUS at 07:55

## 2018-03-29 RX ADMIN — IPRATROPIUM BROMIDE AND ALBUTEROL SULFATE 3 ML: .5; 3 SOLUTION RESPIRATORY (INHALATION) at 19:54

## 2018-03-29 RX ADMIN — Medication 1 TABLET: at 17:08

## 2018-03-29 RX ADMIN — APIXABAN 5 MG: 5 TABLET, FILM COATED ORAL at 12:33

## 2018-03-29 RX ADMIN — TORSEMIDE 20 MG: 20 TABLET ORAL at 12:33

## 2018-03-29 RX ADMIN — Medication 400 MG: at 17:08

## 2018-03-29 RX ADMIN — Medication 1 TABLET: at 12:34

## 2018-03-29 RX ADMIN — CLOPIDOGREL 75 MG: 75 TABLET, FILM COATED ORAL at 12:33

## 2018-03-29 RX ADMIN — ACETYLCYSTEINE 600 MG: 200 SOLUTION ORAL; RESPIRATORY (INHALATION) at 19:54

## 2018-03-29 RX ADMIN — DOCUSATE SODIUM 100 MG: 100 CAPSULE, LIQUID FILLED ORAL at 17:08

## 2018-03-29 RX ADMIN — BUDESONIDE AND FORMOTEROL FUMARATE DIHYDRATE 2 PUFF: 160; 4.5 AEROSOL RESPIRATORY (INHALATION) at 12:34

## 2018-03-29 RX ADMIN — Medication 400 MG: at 12:33

## 2018-03-29 RX ADMIN — IPRATROPIUM BROMIDE AND ALBUTEROL SULFATE 3 ML: .5; 3 SOLUTION RESPIRATORY (INHALATION) at 07:49

## 2018-03-29 RX ADMIN — VITAMIN D, TAB 1000IU (100/BT) 2000 UNITS: 25 TAB at 12:33

## 2018-03-29 RX ADMIN — TORSEMIDE 20 MG: 20 TABLET ORAL at 17:08

## 2018-03-29 RX ADMIN — AMLODIPINE BESYLATE 5 MG: 5 TABLET ORAL at 12:34

## 2018-03-29 RX ADMIN — APIXABAN 5 MG: 5 TABLET, FILM COATED ORAL at 17:08

## 2018-03-29 NOTE — ASSESSMENT & PLAN NOTE
Likely secondary to non-compliance with BiPAP  Patient states she had the BiPAP at the rehab center but was not placed on it  No evidence of COPD exacerbation or CHF exacerbation at this time on either exam or imaging  Recently treated with Azithromycin x 5days on 3/16-3/20 and steroid taper ending 3/27 for ? COPD exac  Mental status has improved significantly on BiPAP 15/5   Repeat ABG and take off BiPAP if improved  Needs BiPAP QHS

## 2018-03-29 NOTE — H&P
H&P- Acosta Zhang 5/12/1928, 80 y o  female MRN: [id number]    Unit/Bed#: ICU 01 Encounter: [id number]    Primary Care Provider: Luisito Atkins DO   Date and time admitted to hospital: 3/29/2018  7:05 AM        * Acute respiratory failure with hypoxia and hypercapnia (HCC)   Assessment & Plan    Likely secondary to non-compliance with BiPAP  Patient states she had the BiPAP at the rehab center but was not placed on it  No evidence of COPD exacerbation or CHF exacerbation at this time on either exam or imaging  Recently treated with Azithromycin x 5days on 3/16-3/20 and steroid taper ending 3/27 for ? COPD exac  Mental status has improved significantly on BiPAP 15/5  Repeat ABG and take off BiPAP if improved  Needs BiPAP QHS          Chronic diastolic CHF (congestive heart failure) ([facility] [address] )   Assessment & Plan    Currently seems euvolemic  Continue home Demadex        Bradycardia   Assessment & Plan    Asymptomatic  HR at lowest mid 40's  Hold Home Coreg for now  Deep vein thrombosis (DVT) of left lower extremity [facility])   Assessment & Plan    Diagnosed with acute DVT in [place] in 2/2018  Continue Eliquis        Essential hypertension   Assessment & Plan    Continue Norvasc        CKD (chronic kidney disease) stage 3, GFR 30-59 ml/min   Assessment & Plan    Baseline Cr 1 5-1 8  Monitor        Aortic stenosis, moderate   Assessment & Plan    Appears euvolemic on exam, monitor volume status  Continue home oral diuretics  Reason for Admission / Chief Complaint: altered mental status  History of Present Illness: Acosta Zhang is a 80 y o  female with PMHx of chronic diastolic CHF, HTN, HLD, right basal ganglia CVA in 2016, chronic DVT of LLE with recently diagnosed acute DVT in 2/2018 in [place], recent admissions in 8/2017 at [facility] for CHF, in [place] in 2/2018, and 3/2018 for CHF who presents to ER from rehab center with altered mental status    Was discharged on 3/12 to Rehab, and per family, was doing relatively well  She was treated for possible COPD exacerbation from 3/16-3/20 with Azithromycin and a steroid taper that ended on 3/27  She was discharged with BiPAP 10/5 at night however had not been wearing it  This morning, she did not recognize Dr Saleh and he sent her in for further evaluation  History obtained from chart review and the patient  Past Medical History:  Past Medical History:   Diagnosis Date    CHF (congestive heart failure) ([facility] ) 10/0836    diastolic     DVT (deep vein thrombosis) in pregnancy ([facility] )     left LE in 2/2018 and 50 years ago    Hyperlipidemia     Hypertension     Pulmonary embolism ([place] [address] ) 02/06/2018    Renal disorder     ckd    Stroke [facility]) 2016    left hand weakness,paresthesia  Past Surgical History:  Past Surgical History:   Procedure Laterality Date    ABDOMINAL SURGERY      ruputured bowel  had colostomy and ileostomy,reversed later on   BACK SURGERY      rods in back  done 30 years ago   CARPAL TUNNEL RELEASE Bilateral     CYST REMOVAL      back    HERNIA REPAIR      REPLACEMENT TOTAL KNEE BILATERAL          Past Family History:  History reviewed  No pertinent family history  Social History:  History   Smoking Status    Never Smoker   Smokeless Tobacco    Never Used     History   Alcohol Use    Yes     Comment: Occ  History   Drug Use No     Marital Status:         Medications:  Current Facility-Administered Medications   Medication Dose Route Frequency    amLODIPine (NORVASC) tablet 5 mg  5 mg Oral Daily    apixaban (ELIQUIS) tablet 5 mg  5 mg Oral BID    budesonide-formoterol (SYMBICORT) 160-4 5 mcg/act inhaler 2 puff  2 puff Inhalation Q12H    calcium carbonate-vitamin D (OSCAL-D) 500 mg-200 units per tablet 1 tablet  1 tablet Oral BID    cholecalciferol (VITAMIN D3) tablet 2,000 Units  2,000 Units Oral Daily    clopidogrel (PLAVIX) tablet 75 mg  75 mg Oral Daily    docusate sodium (COLACE) capsule 100 mg 100 mg Oral BID    ipratropium-albuterol (DUO-NEB) 0 5-2 5 mg/3 mL inhalation solution 3 mL  3 mL Nebulization Q6H    magnesium oxide (MAG-OX) tablet 400 mg  400 mg Oral BID    multivitamin-minerals (CENTRUM) tablet 1 tablet  1 tablet Oral Daily    senna-docusate sodium (SENOKOT S) 8 6-50 mg per tablet 1 tablet  1 tablet Oral BID    torsemide (DEMADEX) tablet 20 mg  20 mg Oral BID (diuretic)     Home medications:  Prior to Admission medications    Medication Sig Start Date End Date Taking? Authorizing Provider   amLODIPine (NORVASC) 2 5 mg tablet Take 5 mg by mouth 2 (two) times a day   Yes Historical Provider, MD   apixaban (ELIQUIS) 5 mg Take 5 mg by mouth 2 (two) times a day   Yes Historical Provider, MD   budesonide-formoterol (SYMBICORT) 160-4 5 mcg/act inhaler Inhale 2 puffs 2 (two) times a day   Yes Historical Provider, MD   calcium-vitamin D (OSCAL 500 + D) 500 mg-200 units per tablet Take 1 tablet by mouth 2 (two) times a day  Yes Historical Provider, MD   carvedilol (COREG) 25 mg tablet Take 25 mg by mouth 2 (two) times a day with meals   Yes Historical Provider, MD   cholecalciferol (VITAMIN D3) 1,000 units tablet Take 2,000 Units by mouth daily   Yes Historical Provider, MD   clopidogrel (PLAVIX) 75 mg tablet Take 75 mg by mouth daily   Yes Historical Provider, MD   co-enzyme Q-10 50 MG capsule Take 200 mg by mouth daily     Yes Historical Provider, MD   colesevelam (WELCHOL) 625 mg tablet Take 1,875 mg by mouth 2 (two) times a day with meals Indications: 3 tabs twice a day     Yes Historical Provider, MD   docusate sodium (COLACE) 100 mg capsule Take 100 mg by mouth 2 (two) times a day   Yes Historical Provider, MD   ipratropium-albuterol (DUO-NEB) 0 5-2 5 mg/3 mL Take 3 mL by nebulization every 6 (six) hours 3/12/18  Yes Eva Maciel MD   magnesium oxide (MAG-OX) 400 mg Take 1 tablet (400 mg total) by mouth 2 (two) times a day 3/12/18  Yes Eva Maciel MD   Multiple Vitamins-Minerals (OCUVITE PO) Take 1 tablet by mouth daily     Yes Historical Provider, MD   senna-docusate sodium (SENOKOT S) 8 6-50 mg per tablet Take 1 tablet by mouth 2 (two) times a day 3/12/18  Yes Eva Maciel MD   torsemide (DEMADEX) 20 mg tablet Take 1 tablet (20 mg total) by mouth 2 (two) times a day 3/12/18  Yes Eva Maciel MD     Allergies: Allergies   Allergen Reactions    Lyrica [Pregabalin] Other (See Comments)     Client says it made her "nutty"        ROS:   Review of Systems   Unable to perform ROS: Mental status change        Vitals:  Vitals:    18 0715 18 0816 18 0930 18 1100   BP: 149/71 165/74  147/66   BP Location: Right arm Right arm  Right arm   Pulse: 57 61  55   Resp: 20 20  (!) 25   Temp: (!) 96 4 °F (35 8 °C) (!) 96 5 °F (35 8 °C)     TempSrc: Tympanic Tympanic     SpO2: (!) 84% 94% 94% 100%   Weight: 73 5 kg (162 lb)   77 5 kg (170 lb 13 7 oz)   Height:    5' 4" (1 626 m)     Temperature:   Temp (24hrs), Av 5 °F (35 8 °C), Min:96 4 °F (35 8 °C), Max:96 5 °F (35 8 °C)    Current: Temperature: (!) 96 5 °F (35 8 °C)     Weights:   IBW: 54 7 kg  Body mass index is 29 33 kg/m²  Hemodynamic Monitoring:  N/A     Non-Invasive/Invasive Ventilation Settings:  Respiratory    Lab Data (Last 4 hours)    None         O2/Vent Data (Last 4 hours)       09 1145        Non-Invasive Ventilation Mode BiPAP BiPAP                Lab Results   Component Value Date    PHART 7 327 (L) 2018    DRJ0SNE 92 7 (HH) 2018    PO2ART 61 0 (L) 2018    [id number] 47 4 (H) 2018    BEART 17 4 2018    SOURCE Radial, Right 2018     SpO2: SpO2: 100 %, SpO2 Activity: SpO2 Activity: At Rest, SpO2 Device: O2 Device: Other (comment) (bipap), Capnography:       Physical Exam:  Physical Exam   Constitutional: She appears well-developed and well-nourished  No distress  HENT:   Head: Normocephalic and atraumatic  Neck: Neck supple     Cardiovascular: Normal rate and regular rhythm  No murmur heard  Pulmonary/Chest: Effort normal and breath sounds normal  No respiratory distress  Mild faint expiratory wheezes   Abdominal: Soft  Bowel sounds are normal  She exhibits no distension  Musculoskeletal: She exhibits no edema  Neurological: She is alert  Oriented to person but not place or time   Skin: Skin is warm  Nursing note and vitals reviewed  Labs:    Results from last 7 days  Lab Units 03/29/18  0755   WBC Thousand/uL 5 30   HEMOGLOBIN g/dL 11 5*   HEMATOCRIT % 36 6*   PLATELETS Thousands/uL 178   NEUTROS PCT % 77*   MONOS PCT % 8      Results from last 7 days  Lab Units 03/29/18  0745   SODIUM mmol/L 140   POTASSIUM mmol/L 3 7   CHLORIDE mmol/L 97*   CO2 mmol/L 43*   BUN mg/dL 48*   CREATININE mg/dL 1 92*   CALCIUM mg/dL 9 2   GLUCOSE RANDOM mg/dL 140                Results from last 7 days  Lab Units 03/29/18  0755   INR  1 04   PTT seconds 25       Results from last 7 days  Lab Units 03/29/18  0755   LACTIC ACID mmol/L 0 8       0  Lab Value Date/Time   TROPONINI 0 02 03/29/2018 0755   TROPONINI <0 02 03/06/2018 2120   TROPONINI 0 02 08/27/2017 2049   TROPONINI <0 02 08/27/2017 1719   TROPONINI <0 02 05/09/2017 1855   TROPONINI <0 02 05/09/2017 0537        Imaging: CXR I have personally reviewed pertinent reports  and I have personally reviewed pertinent films in PACS  EKG: RBBB This was personally reviewed by myself  Micro:  Lab Results   Component Value Date    BLOODCX No Growth After 5 Days  08/27/2017    BLOODCX No Growth After 5 Days  08/27/2017    URINECX >100,000 cfu/ml Mixed Contaminants X6 05/09/2017          VTE Pharmacologic Prophylaxis: RX contraindicated due to: Eliquis  VTE Mechanical Prophylaxis: sequential compression device     Invasive lines and devices:   Invasive Devices     Peripheral Intravenous Line            Peripheral IV 03/29/18 Left Arm less than 1 day                 Code Status: Level 1 - Full Code  POA: Yes  POLST:       Given critical illness, patient length of stay will require greater than two midnights  Counseling / Coordination of Care  Total time spent today 36 minutes  Greater than 50% of total time was spent with the patient and / or family counseling and / or coordination of care  A description of the counseling / coordination of care: reviewing labs and previous records, adjusting bipap, discussing care with family members     Portions of the record may have been created with voice recognition software  Occasional wrong word or "sound a like" substitutions may have occurred due to the inherent limitations of voice recognition software  Read the chart carefully and recognize, using context, where substitutions have occurred          Erik Guzman PA-C

## 2018-03-29 NOTE — ED PROVIDER NOTES
History  Chief Complaint   Patient presents with    Shortness of Breath     pt [facility] from [facility] cc r/o pneumonia, pt had a cough for about 1 1/2 week  pt sob, low o2 sat, hx of chf     27-year-old female presents with shortness of breath over the last 1 week in the nursing home  Daughter states that she has been getting albuterol treatments and has had steroids this past week  No known fevers no chills respiratory was 20 here with a O2 sat of 84% on room air  Patient is receiving 2 L of oxygen and satting 91% right now  History provided by:  Patient   used: No        Prior to Admission Medications   Prescriptions Last Dose Informant Patient Reported? Taking? Multiple Vitamins-Minerals (OCUVITE PO)   Yes Yes   Sig: Take 1 tablet by mouth daily     amLODIPine (NORVASC) 2 5 mg tablet   Yes Yes   Sig: Take 5 mg by mouth 2 (two) times a day   apixaban (ELIQUIS) 5 mg   Yes Yes   Sig: Take 5 mg by mouth 2 (two) times a day   budesonide-formoterol (SYMBICORT) 160-4 5 mcg/act inhaler   Yes Yes   Sig: Inhale 2 puffs 2 (two) times a day   calcium-vitamin D (OSCAL 500 + D) 500 mg-200 units per tablet   Yes Yes   Sig: Take 1 tablet by mouth 2 (two) times a day  carvedilol (COREG) 25 mg tablet   Yes Yes   Sig: Take 25 mg by mouth 2 (two) times a day with meals   cholecalciferol (VITAMIN D3) 1,000 units tablet   Yes Yes   Sig: Take 2,000 Units by mouth daily   clopidogrel (PLAVIX) 75 mg tablet   Yes Yes   Sig: Take 75 mg by mouth daily   co-enzyme Q-10 50 MG capsule   Yes Yes   Sig: Take 200 mg by mouth daily     colesevelam (WELCHOL) 625 mg tablet   Yes Yes   Sig: Take 1,875 mg by mouth 2 (two) times a day with meals Indications: 3 tabs twice a day     docusate sodium (COLACE) 100 mg capsule   Yes Yes   Sig: Take 100 mg by mouth 2 (two) times a day   ipratropium-albuterol (DUO-NEB) 0 5-2 5 mg/3 mL   No Yes   Sig: Take 3 mL by nebulization every 6 (six) hours   magnesium oxide (MAG-OX) 400 mg   No Yes   Sig: Take 1 tablet (400 mg total) by mouth 2 (two) times a day   senna-docusate sodium (SENOKOT S) 8 6-50 mg per tablet   No Yes   Sig: Take 1 tablet by mouth 2 (two) times a day   torsemide (DEMADEX) 20 mg tablet   No Yes   Sig: Take 1 tablet (20 mg total) by mouth 2 (two) times a day      Facility-Administered Medications: None       Past Medical History:   Diagnosis Date    CHF (congestive heart failure) ([place] [address] ) 20/3087    diastolic     DVT (deep vein thrombosis) in pregnancy ([facility] )     left LE in 2/2018 and 50 years ago    Hyperlipidemia     Hypertension     Pulmonary embolism ([place] [address] ) 02/06/2018    Renal disorder     ckd    Stroke ([place] [address] ) 2016    left hand weakness,paresthesia  Past Surgical History:   Procedure Laterality Date    ABDOMINAL SURGERY      ruputured bowel  had colostomy and ileostomy,reversed later on   BACK SURGERY      rods in back  done 30 years ago   CARPAL TUNNEL RELEASE Bilateral     CYST REMOVAL      back    HERNIA REPAIR      REPLACEMENT TOTAL KNEE BILATERAL         History reviewed  No pertinent family history  I have reviewed and agree with the history as documented  Social History   Substance Use Topics    Smoking status: Never Smoker    Smokeless tobacco: Never Used    Alcohol use Yes      Comment: Occ  Review of Systems   Constitutional: Negative for activity change, chills, diaphoresis and fever  HENT: Negative for congestion, ear pain, nosebleeds, sore throat, trouble swallowing and voice change  Eyes: Negative for pain, discharge and redness  Respiratory: Positive for shortness of breath and wheezing  Negative for apnea, cough, choking and stridor  Cardiovascular: Negative for chest pain and palpitations  Gastrointestinal: Negative for abdominal distention, abdominal pain, constipation, diarrhea, nausea and vomiting  Endocrine: Negative for polydipsia     Genitourinary: Negative for difficulty urinating, dysuria, flank pain, frequency, hematuria and urgency  Musculoskeletal: Negative for back pain, gait problem, joint swelling, myalgias, neck pain and neck stiffness  Skin: Negative for pallor and rash  Neurological: Negative for dizziness, tremors, syncope, speech difficulty, weakness, numbness and headaches  Hematological: Negative for adenopathy  Psychiatric/Behavioral: Negative for confusion, hallucinations, self-injury and suicidal ideas  The patient is not nervous/anxious  Physical Exam  ED Triage Vitals   Temperature Pulse Respirations Blood Pressure SpO2   03/29/18 0715 03/29/18 0715 03/29/18 0715 03/29/18 0715 03/29/18 0715   (!) 96 4 °F (35 8 °C) 57 20 149/71 (!) 84 %      Temp Source Heart Rate Source Patient Position - Orthostatic VS BP Location FiO2 (%)   03/29/18 0715 03/29/18 0715 03/29/18 0715 03/29/18 0715 --   Tympanic Monitor Lying Right arm       Pain Score       03/29/18 1100       No Pain           Orthostatic Vital Signs  Vitals:    03/29/18 0715 03/29/18 0816 03/29/18 1100   BP: 149/71 165/74 147/66   Pulse: 57 61 55   Patient Position - Orthostatic VS: Lying Lying Lying       Physical Exam   Constitutional: She is oriented to person, place, and time  Vital signs are normal  She appears well-developed and well-nourished  HENT:   Head: Normocephalic and atraumatic  Right Ear: External ear normal    Left Ear: External ear normal    Nose: Nose normal    Mouth/Throat: Oropharynx is clear and moist    Eyes: Conjunctivae and EOM are normal  Pupils are equal, round, and reactive to light  Neck: Normal range of motion  Neck supple  Cardiovascular: Regular rhythm, normal heart sounds and intact distal pulses  Pulmonary/Chest: She is in respiratory distress  She has wheezes  She has rales  Abdominal: Soft  Bowel sounds are normal    Musculoskeletal: Normal range of motion  Neurological: She is alert and oriented to person, place, and time  Skin: Skin is warm  Psychiatric: She has a normal mood and affect  Nursing note and vitals reviewed        ED Medications  Medications   clopidogrel (PLAVIX) tablet 75 mg (75 mg Oral Given 3/29/18 1233)   cholecalciferol (VITAMIN D3) tablet 2,000 Units (2,000 Units Oral Given 3/29/18 1233)   docusate sodium (COLACE) capsule 100 mg (100 mg Oral Given 3/29/18 1232)   apixaban (ELIQUIS) tablet 5 mg (5 mg Oral Given 3/29/18 1233)   budesonide-formoterol (SYMBICORT) 160-4 5 mcg/act inhaler 2 puff (2 puffs Inhalation Given 3/29/18 1234)   ipratropium-albuterol (DUO-NEB) 0 5-2 5 mg/3 mL inhalation solution 3 mL (not administered)   magnesium oxide (MAG-OX) tablet 400 mg (400 mg Oral Given 3/29/18 1233)   senna-docusate sodium (SENOKOT S) 8 6-50 mg per tablet 1 tablet (1 tablet Oral Given 3/29/18 1234)   calcium carbonate-vitamin D (OSCAL-D) 500 mg-200 units per tablet 1 tablet (1 tablet Oral Given 3/29/18 1234)   amLODIPine (NORVASC) tablet 5 mg (5 mg Oral Given 3/29/18 1234)   multivitamin-minerals (CENTRUM) tablet 1 tablet (1 tablet Oral Given 3/29/18 1233)   torsemide (DEMADEX) tablet 20 mg (20 mg Oral Given 3/29/18 1233)   ipratropium-albuterol (DUO-NEB) 0 5-2 5 mg/3 mL inhalation solution 3 mL (3 mL Nebulization Given 3/29/18 0749)       Diagnostic Studies  Results Reviewed     Procedure Component Value Units Date/Time    NT-BNP PRO [[id number]]  (Abnormal) Collected:  03/29/18 0745    Lab Status:  Final result Specimen:  Blood from Arm, Left Updated:  03/29/18 1253     NT-proBNP 1,188 (H) pg/mL     Blood gas, arterial [[id number]]  (Abnormal) Collected:  03/29/18 0906    Lab Status:  Final result Specimen:  Blood, Arterial from Radial, Right Updated:  03/29/18 0924     pH, Arterial 7 327 (L)     PH ART TC 7 348 (L)     pCO2, Arterial 92 7 (HH) mm Hg      PCO2 (TC) Arterial 86 8 (HH) mm Hg      pO2, Arterial 61 0 (L) mm Hg      PO2 (TC) Arterial 55 0 (LL) mm Hg      HCO3, Arterial 47 4 (H) mmol/L      Base Excess, Arterial 17 4 mmol/L O2 Content, Arterial 15 0 (L) mL/dL      O2 HGB,Arterial  89 6 (L) %      SOURCE Radial, Right     MICHELLE TEST Yes     Temperature 95 9 Degrees Fehrenheit     Basic metabolic panel [[id number]]  (Abnormal) Collected:  03/29/18 0745    Lab Status:  Final result Specimen:  Blood from Arm, Left Updated:  03/29/18 0845     Sodium 140 mmol/L      Potassium 3 7 mmol/L      Chloride 97 (L) mmol/L      CO2 43 (H) mmol/L      Anion Gap 0 (L) mmol/L      BUN 48 (H) mg/dL      Creatinine 1 92 (H) mg/dL      Glucose 140 mg/dL      Calcium 9 2 mg/dL      eGFR 23 ml/min/1 73sq m     Narrative:         National Kidney Disease Education Program recommendations are as follows:  GFR calculation is accurate only with a steady state creatinine  Chronic Kidney disease less than 60 ml/min/1 73 sq  meters  Kidney failure less than 15 ml/min/1 73 sq  meters  Lactic acid, plasma [[id number]]  (Normal) Collected:  03/29/18 0755    Lab Status:  Final result Specimen:  Blood from Arm, Left Updated:  03/29/18 5459     LACTIC ACID 0 8 mmol/L     Narrative:         Result may be elevated if tourniquet was used during collection  Troponin I [[id number]]  (Normal) Collected:  03/29/18 0755    Lab Status:  Final result Specimen:  Blood from Arm, Left Updated:  03/29/18 0827     Troponin I 0 02 ng/mL     Narrative:         [employer] Chemistry analyzer 99% cutoff is > 0 04 ng/mL in network labs    o cTnI 99% cutoff is useful only when applied to patients in the clinical setting of myocardial ischemia  o cTnI 99% cutoff should be interpreted in the context of clinical history, ECG findings and possibly cardiac imaging to establish correct diagnosis  o cTnI 99% cutoff may be suggestive but clearly not indicative of a coronary event without the clinical setting of myocardial ischemia      D-Dimer [[id number]]  (Normal) Collected:  03/29/18 0755    Lab Status:  Final result Specimen:  Blood from Arm, Left Updated:  03/29/18 0512     D-Dimer, Quant 210 ng/ml (FEU)     Saran Silverio [[id number]]  (Normal) Collected:  03/29/18 0755    Lab Status:  Final result Specimen:  Blood from Arm, Left Updated:  03/29/18 0823     Protime 10 9 seconds      INR 1 04    APTT [[id number]]  (Normal) Collected:  03/29/18 0755    Lab Status:  Final result Specimen:  Blood from Arm, Left Updated:  03/29/18 0823     PTT 25 seconds     Narrative: Therapeutic Heparin Range = 60-90 seconds    UA w Reflex to Microscopic [[id number]]  (Normal) Collected:  03/29/18 0814    Lab Status:  Final result Specimen:  Urine from Urine, Clean Catch Updated:  03/29/18 0823     Color, UA Yellow     Clarity, UA Clear     Specific Gravity, UA <=1 005     pH, UA 5 5     Leukocytes, UA Negative     Nitrite, UA Negative     Protein, UA Negative mg/dl      Glucose, UA Negative mg/dl      Ketones, UA Negative mg/dl      Urobilinogen, UA 0 2 E U /dl      Bilirubin, UA Negative     Blood, UA Negative    Urine culture [[id number]] Collected:  03/29/18 0814    Lab Status: In process Specimen:  Urine from Urine, Clean Catch Updated:  03/29/18 0817    Blood culture #2 [[id number]] Collected:  03/29/18 0745    Lab Status: In process Specimen:  Blood from Arm, Right Updated:  03/29/18 0808    Blood culture #1 [[id number]] Collected:  03/29/18 0745    Lab Status:   In process Specimen:  Blood from Arm, Right Updated:  03/29/18 0807    CBC and differential [[id number]]  (Abnormal) Collected:  03/29/18 0755    Lab Status:  Final result Specimen:  Blood from Arm, Left Updated:  03/29/18 0805     WBC 5 30 Thousand/uL      RBC 3 83 (L) Million/uL      Hemoglobin 11 5 (L) g/dL      Hematocrit 36 6 (L) %      MCV 96 fL      MCH 29 9 pg      MCHC 31 4 g/dL      RDW 14 2 %      MPV 8 8 (L) fL      Platelets 961 Thousands/uL      nRBC 0 /100 WBCs      Neutrophils Relative 77 (H) %      Lymphocytes Relative 14 %      Monocytes Relative 8 %      Eosinophils Relative 1 %      Basophils Relative 0 %      Neutrophils Absolute 4 10 Thousands/µL Lymphocytes Absolute 0 80 Thousands/µL      Monocytes Absolute 0 40 Thousand/µL      Eosinophils Absolute 0 00 Thousand/µL      Basophils Absolute 0 00 Thousands/µL                  XR chest 2 views   Final Result by Katherine Chavez MD (03/29 1894)      No acute cardiopulmonary disease  Cardiomegaly  Workstation performed: [id number]                    Procedures  Procedures       Phone Contacts  ED Phone Contact    ED Course  ED Course as of Mar 29 1348   Thu Mar 29, 2018   9169 Bilirubin, UA: Negative   0923 Protein, UA: Negative   0932 O2 HGB,Arterial: (!) 89 6   0935 IDiscussed the case with Dr Zapata who will come and evaluate the patient for step-down ICU    [phone] Patient evaluated by Eunice Chiang from ICU and advises level 1 step-down  MDM  CritCare Time    Disposition  Final diagnoses:   Hypercapnia     Time reflects when diagnosis was documented in both MDM as applicable and the Disposition within this note     Time User Action Codes Description Comment    3/29/2018  9:58 AM Berna Olmedo [[id number]] Hypercapnia       ED Disposition     ED Disposition Condition Comment    Admit  Case was discussed with  Dr Zapata and the patient's admission status was agreed to be Admission Status: inpatient status to the service of Dr Sonal Mendoza   Follow-up Information    None       Current Discharge Medication List      CONTINUE these medications which have NOT CHANGED    Details   amLODIPine (NORVASC) 2 5 mg tablet Take 5 mg by mouth 2 (two) times a day      apixaban (ELIQUIS) 5 mg Take 5 mg by mouth 2 (two) times a day      budesonide-formoterol (SYMBICORT) 160-4 5 mcg/act inhaler Inhale 2 puffs 2 (two) times a day      calcium-vitamin D (OSCAL 500 + D) 500 mg-200 units per tablet Take 1 tablet by mouth 2 (two) times a day        carvedilol (COREG) 25 mg tablet Take 25 mg by mouth 2 (two) times a day with meals      cholecalciferol (VITAMIN D3) 1,000 units tablet Take 2,000 Units by mouth daily      clopidogrel (PLAVIX) 75 mg tablet Take 75 mg by mouth daily      co-enzyme Q-10 50 MG capsule Take 200 mg by mouth daily        colesevelam (WELCHOL) 625 mg tablet Take 1,875 mg by mouth 2 (two) times a day with meals Indications: 3 tabs twice a day  docusate sodium (COLACE) 100 mg capsule Take 100 mg by mouth 2 (two) times a day      ipratropium-albuterol (DUO-NEB) 0 5-2 5 mg/3 mL Take 3 mL by nebulization every 6 (six) hours  Refills: 0    Associated Diagnoses: Acute on chronic diastolic CHF (congestive heart failure) ([facility])      magnesium oxide (MAG-OX) 400 mg Take 1 tablet (400 mg total) by mouth 2 (two) times a day  Refills: 0    Associated Diagnoses: TIANA (acute kidney injury) ([facility] )      Multiple Vitamins-Minerals (OCUVITE PO) Take 1 tablet by mouth daily        senna-docusate sodium (SENOKOT S) 8 6-50 mg per tablet Take 1 tablet by mouth 2 (two) times a day  Refills: 0    Associated Diagnoses: Constipation      torsemide (DEMADEX) 20 mg tablet Take 1 tablet (20 mg total) by mouth 2 (two) times a day  Refills: 0    Associated Diagnoses: Acute diastolic CHF (congestive heart failure) ([facility])           No discharge procedures on file      ED Provider  Electronically Signed by           Tammi De Souza DO  03/29/18 3127

## 2018-03-30 ENCOUNTER — APPOINTMENT (INPATIENT)
Dept: RADIOLOGY | Facility: HOSPITAL | Age: 83
DRG: 189 | End: 2018-03-30
Payer: MEDICARE

## 2018-03-30 LAB
ANION GAP SERPL CALCULATED.3IONS-SCNC: 1 MMOL/L (ref 4–13)
ARTERIAL PATENCY WRIST A: YES
ATRIAL RATE: 312 BPM
ATRIAL RATE: 63 BPM
BACTERIA UR CULT: NORMAL
BASE EXCESS BLDA CALC-SCNC: 20 MMOL/L
BASE EXCESS BLDA CALC-SCNC: 20.5 MMOL/L
BASOPHILS # BLD AUTO: 0 THOUSANDS/ΜL (ref 0–0.1)
BASOPHILS NFR BLD AUTO: 0 % (ref 0–1)
BODY TEMPERATURE: 96.9 DEGREES FEHRENHEIT
BODY TEMPERATURE: 98 DEGREES FEHRENHEIT
BUN SERPL-MCNC: 41 MG/DL (ref 5–25)
CALCIUM SERPL-MCNC: 10.1 MG/DL (ref 8.3–10.1)
CHLORIDE SERPL-SCNC: 101 MMOL/L (ref 100–108)
CO2 SERPL-SCNC: 43 MMOL/L (ref 21–32)
CREAT SERPL-MCNC: 1.51 MG/DL (ref 0.6–1.3)
EOSINOPHIL # BLD AUTO: 0.1 THOUSAND/ΜL (ref 0–0.61)
EOSINOPHIL NFR BLD AUTO: 1 % (ref 0–6)
ERYTHROCYTE [DISTWIDTH] IN BLOOD BY AUTOMATED COUNT: 13.8 % (ref 11.6–15.1)
GFR SERPL CREATININE-BSD FRML MDRD: 30 ML/MIN/1.73SQ M
GLUCOSE SERPL-MCNC: 105 MG/DL (ref 65–140)
HCO3 BLDA-SCNC: 48.7 MMOL/L (ref 22–28)
HCO3 BLDA-SCNC: 49.5 MMOL/L (ref 22–28)
HCT VFR BLD AUTO: 34.2 % (ref 37–47)
HGB BLD-MCNC: 10.6 G/DL (ref 12–16)
IPAP: 15
LYMPHOCYTES # BLD AUTO: 0.7 THOUSANDS/ΜL (ref 0.6–4.47)
LYMPHOCYTES NFR BLD AUTO: 14 % (ref 14–44)
MAGNESIUM SERPL-MCNC: 2.8 MG/DL (ref 1.6–2.6)
MCH RBC QN AUTO: 29.8 PG (ref 27–31)
MCHC RBC AUTO-ENTMCNC: 31.1 G/DL (ref 31.4–37.4)
MCV RBC AUTO: 96 FL (ref 82–98)
MONOCYTES # BLD AUTO: 0.4 THOUSAND/ΜL (ref 0.17–1.22)
MONOCYTES NFR BLD AUTO: 8 % (ref 4–12)
MRSA NOSE QL CULT: NORMAL
NASAL CANNULA: 3
NEUTROPHILS # BLD AUTO: 4.1 THOUSANDS/ΜL (ref 1.85–7.62)
NEUTS SEG NFR BLD AUTO: 77 % (ref 43–75)
NON VENT- BIPAP: ABNORMAL
NRBC BLD AUTO-RTO: 0 /100 WBCS
O2 CT BLDA-SCNC: 12.7 ML/DL (ref 16–23)
O2 CT BLDA-SCNC: 15.4 ML/DL (ref 16–23)
OXYHGB MFR BLDA: 74.2 % (ref 94–97)
OXYHGB MFR BLDA: 93.1 % (ref 94–97)
P AXIS: -53 DEGREES
P AXIS: 52 DEGREES
PCO2 BLDA: 79.2 MM HG (ref 36–44)
PCO2 BLDA: 83.9 MM HG (ref 36–44)
PCO2 TEMP ADJ BLDA: 78.2 MM HG (ref 36–44)
PCO2 TEMP ADJ BLDA: 80.7 MM HG (ref 36–44)
PEEP MAX SETTING VENT: 5 CM[H2O]
PH BLD: 7.4 [PH] (ref 7.35–7.45)
PH BLD: 7.41 [PH] (ref 7.35–7.45)
PH BLDA: 7.39 [PH] (ref 7.35–7.45)
PH BLDA: 7.41 [PH] (ref 7.35–7.45)
PHOSPHATE SERPL-MCNC: 4.3 MG/DL (ref 2.3–4.1)
PLATELET # BLD AUTO: 174 THOUSANDS/UL (ref 130–400)
PMV BLD AUTO: 8.9 FL (ref 8.9–12.7)
PO2 BLD: 35.7 MM HG (ref 75–129)
PO2 BLD: 68 MM HG (ref 75–129)
PO2 BLDA: 36.5 MM HG (ref 75–129)
PO2 BLDA: 72.3 MM HG (ref 75–129)
POTASSIUM SERPL-SCNC: 3.8 MMOL/L (ref 3.5–5.3)
PR INTERVAL: 176 MS
QRS AXIS: -27 DEGREES
QRS AXIS: -9 DEGREES
QRSD INTERVAL: 136 MS
QRSD INTERVAL: 144 MS
QT INTERVAL: 436 MS
QT INTERVAL: 476 MS
QTC INTERVAL: 424 MS
QTC INTERVAL: 487 MS
RBC # BLD AUTO: 3.58 MILLION/UL (ref 4.2–5.4)
SODIUM SERPL-SCNC: 145 MMOL/L (ref 136–145)
SPECIMEN SOURCE: ABNORMAL
SPECIMEN SOURCE: ABNORMAL
T WAVE AXIS: -13 DEGREES
T WAVE AXIS: -19 DEGREES
VENT BIPAP FIO2: 35 %
VENTRICULAR RATE: 57 BPM
VENTRICULAR RATE: 63 BPM
WBC # BLD AUTO: 5.3 THOUSAND/UL (ref 4.8–10.8)

## 2018-03-30 PROCEDURE — 94640 AIRWAY INHALATION TREATMENT: CPT

## 2018-03-30 PROCEDURE — 94660 CPAP INITIATION&MGMT: CPT

## 2018-03-30 PROCEDURE — 94760 N-INVAS EAR/PLS OXIMETRY 1: CPT

## 2018-03-30 PROCEDURE — 36600 WITHDRAWAL OF ARTERIAL BLOOD: CPT

## 2018-03-30 PROCEDURE — 85025 COMPLETE CBC W/AUTO DIFF WBC: CPT | Performed by: PHYSICIAN ASSISTANT

## 2018-03-30 PROCEDURE — 97163 PT EVAL HIGH COMPLEX 45 MIN: CPT

## 2018-03-30 PROCEDURE — 99233 SBSQ HOSP IP/OBS HIGH 50: CPT | Performed by: ANESTHESIOLOGY

## 2018-03-30 PROCEDURE — 93010 ELECTROCARDIOGRAM REPORT: CPT | Performed by: INTERNAL MEDICINE

## 2018-03-30 PROCEDURE — G8987 SELF CARE CURRENT STATUS: HCPCS

## 2018-03-30 PROCEDURE — 80048 BASIC METABOLIC PNL TOTAL CA: CPT | Performed by: PHYSICIAN ASSISTANT

## 2018-03-30 PROCEDURE — 71045 X-RAY EXAM CHEST 1 VIEW: CPT

## 2018-03-30 PROCEDURE — 97167 OT EVAL HIGH COMPLEX 60 MIN: CPT

## 2018-03-30 PROCEDURE — 84100 ASSAY OF PHOSPHORUS: CPT | Performed by: PHYSICIAN ASSISTANT

## 2018-03-30 PROCEDURE — G8979 MOBILITY GOAL STATUS: HCPCS

## 2018-03-30 PROCEDURE — G8988 SELF CARE GOAL STATUS: HCPCS

## 2018-03-30 PROCEDURE — 99222 1ST HOSP IP/OBS MODERATE 55: CPT | Performed by: INTERNAL MEDICINE

## 2018-03-30 PROCEDURE — G8978 MOBILITY CURRENT STATUS: HCPCS

## 2018-03-30 PROCEDURE — 82805 BLOOD GASES W/O2 SATURATION: CPT | Performed by: PHYSICIAN ASSISTANT

## 2018-03-30 PROCEDURE — 83735 ASSAY OF MAGNESIUM: CPT | Performed by: PHYSICIAN ASSISTANT

## 2018-03-30 RX ORDER — TORSEMIDE 20 MG/1
20 TABLET ORAL
Status: DISCONTINUED | OUTPATIENT
Start: 2018-03-31 | End: 2018-04-04 | Stop reason: HOSPADM

## 2018-03-30 RX ORDER — METHYLPREDNISOLONE SODIUM SUCCINATE 40 MG/ML
20 INJECTION, POWDER, LYOPHILIZED, FOR SOLUTION INTRAMUSCULAR; INTRAVENOUS EVERY 8 HOURS SCHEDULED
Status: DISCONTINUED | OUTPATIENT
Start: 2018-03-30 | End: 2018-04-01

## 2018-03-30 RX ORDER — CARVEDILOL 6.25 MG/1
6.25 TABLET ORAL 2 TIMES DAILY WITH MEALS
Status: DISCONTINUED | OUTPATIENT
Start: 2018-03-30 | End: 2018-04-04 | Stop reason: HOSPADM

## 2018-03-30 RX ORDER — FUROSEMIDE 10 MG/ML
40 INJECTION INTRAMUSCULAR; INTRAVENOUS ONCE
Status: COMPLETED | OUTPATIENT
Start: 2018-03-30 | End: 2018-03-30

## 2018-03-30 RX ORDER — PREDNISONE 20 MG/1
40 TABLET ORAL DAILY
Status: DISCONTINUED | OUTPATIENT
Start: 2018-03-30 | End: 2018-03-30

## 2018-03-30 RX ADMIN — CARVEDILOL 6.25 MG: 6.25 TABLET, FILM COATED ORAL at 17:35

## 2018-03-30 RX ADMIN — APIXABAN 5 MG: 5 TABLET, FILM COATED ORAL at 17:34

## 2018-03-30 RX ADMIN — Medication 400 MG: at 17:35

## 2018-03-30 RX ADMIN — ACETYLCYSTEINE 600 MG: 200 SOLUTION ORAL; RESPIRATORY (INHALATION) at 08:04

## 2018-03-30 RX ADMIN — CLOPIDOGREL 75 MG: 75 TABLET, FILM COATED ORAL at 08:21

## 2018-03-30 RX ADMIN — IPRATROPIUM BROMIDE AND ALBUTEROL SULFATE 3 ML: .5; 3 SOLUTION RESPIRATORY (INHALATION) at 01:50

## 2018-03-30 RX ADMIN — Medication 1 TABLET: at 08:21

## 2018-03-30 RX ADMIN — BUDESONIDE AND FORMOTEROL FUMARATE DIHYDRATE 2 PUFF: 160; 4.5 AEROSOL RESPIRATORY (INHALATION) at 08:23

## 2018-03-30 RX ADMIN — APIXABAN 5 MG: 5 TABLET, FILM COATED ORAL at 08:21

## 2018-03-30 RX ADMIN — CALCIUM CARBONATE 500 MG (1,250 MG)-VITAMIN D3 200 UNIT TABLET 1 TABLET: at 17:34

## 2018-03-30 RX ADMIN — IPRATROPIUM BROMIDE AND ALBUTEROL SULFATE 3 ML: .5; 3 SOLUTION RESPIRATORY (INHALATION) at 19:36

## 2018-03-30 RX ADMIN — METHYLPREDNISOLONE SODIUM SUCCINATE 20 MG: 40 INJECTION, POWDER, FOR SOLUTION INTRAMUSCULAR; INTRAVENOUS at 21:00

## 2018-03-30 RX ADMIN — VITAMIN D, TAB 1000IU (100/BT) 2000 UNITS: 25 TAB at 08:21

## 2018-03-30 RX ADMIN — CALCIUM CARBONATE 500 MG (1,250 MG)-VITAMIN D3 200 UNIT TABLET 1 TABLET: at 08:21

## 2018-03-30 RX ADMIN — Medication 400 MG: at 08:22

## 2018-03-30 RX ADMIN — IPRATROPIUM BROMIDE AND ALBUTEROL SULFATE 3 ML: .5; 3 SOLUTION RESPIRATORY (INHALATION) at 08:04

## 2018-03-30 RX ADMIN — TORSEMIDE 20 MG: 20 TABLET ORAL at 08:21

## 2018-03-30 RX ADMIN — AMLODIPINE BESYLATE 5 MG: 5 TABLET ORAL at 08:22

## 2018-03-30 RX ADMIN — Medication 1 TABLET: at 17:46

## 2018-03-30 RX ADMIN — IPRATROPIUM BROMIDE AND ALBUTEROL SULFATE 3 ML: .5; 3 SOLUTION RESPIRATORY (INHALATION) at 13:57

## 2018-03-30 RX ADMIN — DOCUSATE SODIUM 100 MG: 100 CAPSULE, LIQUID FILLED ORAL at 17:35

## 2018-03-30 RX ADMIN — FUROSEMIDE 40 MG: 10 INJECTION, SOLUTION INTRAMUSCULAR; INTRAVENOUS at 10:26

## 2018-03-30 RX ADMIN — PREDNISONE 40 MG: 20 TABLET ORAL at 10:22

## 2018-03-30 RX ADMIN — DOCUSATE SODIUM 100 MG: 100 CAPSULE, LIQUID FILLED ORAL at 08:21

## 2018-03-30 NOTE — PHYSICAL THERAPY NOTE
PT EVALUATION     03/30/18 1350   Pain Assessment   Pain Assessment Hoang-Baker FACES   Hoang-Baker FACES Pain Rating 2  (B feet and R shoulder area/chronic pains)   Home Living   Type of Home Apartment   Home Layout One level;Stairs to enter with rails  (6 stairs to enter)   Bathroom Toilet Raised   Bathroom Equipment Shower chair   Home Equipment Walker;Cane   Additional Comments patient reports most recently using roller walker in STR but using cane and living alone prior to couple months ago   Prior Function   Level of [place] Needs assistance with ADLs and functional mobility   Lives With Alone   Receives Help From Family;Home health   ADL Assistance Needs assistance   IADLs Needs assistance   Comments patient admitted from STR due to SOB   Restrictions/Precautions   Other Precautions Chair Alarm; Bed Alarm; Fall Risk;O2;Multiple lines  (in ICU)   General   Additional Pertinent History chart reviewed, patient admitted with SOB now seen in ICU and requiring min assist of 1 for transfers and gait with roller walker   Family/Caregiver Present Yes   Cognition   Overall Cognitive Status WFL   Arousal/Participation Cooperative   Attention Within functional limits   Orientation Level Oriented X4   Following Commands Follows all commands and directions without difficulty   Comments [place]   RLE Assessment   RLE Assessment (ROM WFL, strength 3+/4-)   LLE Assessment   LLE Assessment (ROM WFL, strength 3+/4-)   Coordination   Movements are Fluid and Coordinated 0   Transfers   Sit to Stand 4  Minimal assistance   Additional items Assist x 1   Stand to Sit 4  Minimal assistance   Additional items Assist x 1   Ambulation/Elevation   Gait Assistance 4  Minimal assist   Additional items Assist x 1   Assistive Device Rolling walker   Distance 15 feet with change in direction several times due to constraints of ICU   Balance   Static Sitting Fair +   Dynamic Sitting Fair   Static Standing Fair -   Dynamic Standing Fair - Ambulatory Fair -   Activity Tolerance   Activity Tolerance Patient limited by fatigue  (limited by SOB)   Nurse Made Aware yes   Assessment   Prognosis Good   Problem List Decreased strength;Decreased range of motion;Decreased endurance; Impaired balance;Decreased mobility; Decreased coordination;Pain; Impaired hearing   Assessment Patient seen for Physical Therapy evaluation  Patient admitted with Acute respiratory failure with hypoxia and hypercapnia ([facility] [address] )  Comorbidities affecting patient's physical performance include chronic diastolic CHF, HTN, HLD, right basal ganglia CVA in 2016, chronic DVT of LLE with recently diagnosed acute DVT in 2/2018 in [place], recent admissions in 8/2017 at [facility] for CHF, in [place] in 2/2018, and 3/2018 for CHF  Personal factors affecting patient at time of initial evaluation include: stairs to enter home, inability to ambulate household distances, inability to navigate community distances, inability to navigate level surfaces without external assistance, inability to perform dynamic tasks in community, limited home support, inability to perform physical activity, inability to perform ADLS and inability to perform IADLS   Prior to admission, patient was requiring assist for functional mobility with walker, requiring assist for ADLS, requiring assist for IADLS, living in a multi-level home, ambulating household distance and in short term rehab  Please find objective findings from Physical Therapy assessment regarding body systems outlined above with impairments and limitations including weakness, decreased ROM, impaired balance, decreased endurance, impaired coordination, gait deviations, pain, decreased activity tolerance, decreased functional mobility tolerance, fall risk and SOB upon exertion  The Barthel Index was used as a functional outcome tool presenting with a score of 45 today indicating marked limitations of functional mobility and ADLS    Patient's clinical presentation is currently unstable/unpredictable as seen in patient's presentation of vital sign response, changing level of pain, increased fall risk, new onset of impairment of functional mobility, decreased endurance and new onset of weakness  Pt would benefit from continued Physical Therapy treatment to address deficits as defined above and maximize level of functional mobility  As demonstrated by objective findings, the assigned level of complexity for this evaluation is high  Goals   Patient Goals go home   STG Expiration Date (1 to 7 days)   Short Term Goal #1 transfers and gait with roller walker with supervision   Short Term Goal #2 gait endurance to 120 feet, strength 4-/5   LTG Expiration Date (8 to14 days)   Long Term Goal #1 transfers and gait with roller walker independently   Long Term Goal #2 gait endurance to 200 feet, strength BLEs 4/5   Plan   Treatment/Interventions ADL retraining;Functional transfer training;LE strengthening/ROM; Therapeutic exercise;Elevations; Endurance training;Patient/family training;Equipment eval/education; Bed mobility;Gait training   PT Frequency 5x/wk   Recommendation   Recommendation (STR)   Barthel Index   Feeding 5   Bathing 0   Grooming Score 0   Dressing Score 5   Bladder Score 10   Bowels Score 10   Toilet Use Score 5   Transfers (Bed/Chair) Score 10   Mobility (Level Surface) Score 0   Stairs Score 0   Barthel Index Score 45   Licensure   [place] License Number  [id number]

## 2018-03-30 NOTE — OCCUPATIONAL THERAPY NOTE
OT EVALUATION     03/30/18 1345   Restrictions/Precautions   Other Precautions Chair Alarm; Bed Alarm; Fall Risk;O2;Multiple lines   Pain Assessment   Pain Assessment Hoang-Baker FACES   Hoang-Baker FACES Pain Rating 2   Pain Location ("2 big toes" and right shoulder )   Home Living   Type of [address] [place] [address] One level  (7 BELLA)   Bathroom Shower/Tub Walk-in shower   Bathroom Equipment Shower chair; Toilet raiser   [address]   Prior Function   Level of [place] Needs assistance with ADLs and functional mobility; Needs assistance with IADLs   Lives With Alone   Receives Help From Family   ADL Assistance Needs assistance   IADLs Needs assistance   Comments pt admitted from [facility] s/p LE DVT   ADL   Eating Assistance 5  Supervision/Setup   Grooming Assistance 5  Supervision/Setup   UB Bathing Assistance 5  Supervision/Setup   LB Bathing Assistance 4  Minimal Assistance   UB Dressing Assistance 5  Supervision/Setup   LB Dressing Assistance 4  Minimal Assistance   Toileting Assistance  4  Minimal Assistance   Transfers   Sit to Stand 4  Minimal assistance   Stand to Sit 4  Minimal assistance   Functional Mobility   Functional Mobility 4  Minimal assistance   Additional Comments 10 feet; SOB with activity   Additional items Rolling walker   Balance   Static Sitting Fair +   Dynamic Sitting Fair   Static Standing Fair -   Dynamic Standing Fair -   Activity Tolerance   Activity Tolerance Patient limited by fatigue  (SOB)   RUE Assessment   RUE Assessment (shoulder 3+/5 flexion to 90, elbow and  4-/5)   LUE Assessment   LUE Assessment WFL  (4-/5)   Cognition   Overall Cognitive Status WFL   Arousal/Participation Cooperative   Attention Within functional limits   Orientation Level Oriented X4   Following Commands Follows all commands and directions without difficulty   Comments pt is motivated to return home, was supposed to be discharged to home tomorrow   Assessment   Limitation Decreased ADL status; Decreased UE ROM; Decreased UE strength;Decreased Safe judgement during ADL;Decreased endurance;Decreased self-care trans;Decreased high-level ADLs  (decreased balance and mobility )   Prognosis Good   Assessment Patient evaluated by Occupational Therapy  Patient admitted with Acute respiratory failure with hypoxia and hypercapnia ([facility] [address] )  The patients occupational profile, medical and therapy history includes a extensive additional review of physical, cognitive, or psychosocial history related to current functional performance  Comorbidities affecting functional mobility and ADLS include: CHF, CVA, DVT, hypertension and PE  Prior to admission, patient was requiring assist for functional mobility with walker, requiring assist for ADLS, requiring assist for IADLS and in short term rehab  The evaluation identifies the following performance deficits: weakness, decreased ROM, impaired balance, decreased endurance, increased fall risk, new onset of impairment of functional mobility, decreased ADLS, decreased IADLS, pain, decreased activity tolerance, decreased safety awareness, impaired judgement, SOB upon exertion and decreased strength, that result in activity limitations and/or participation restrictions  This evaluation requires clinical decision making of high complexity, because the patient presents with comorbidites that affect occupational performance and required significant modification of tasks or assistance with consideration of multiple treatment options  The Barthel Index was used as a functional outcome tool presenting with a score of 45, indicating marked limitations of functional mobility and ADLS  Patient will benefit from skilled Occupational Therapy services to address above deficits and facilitate a safe return to prior level of function  Goals   Patient Goals go home!    STG Time Frame (1-7 days)   Short Term Goal  Patient will increase standing tolerance to 3 minutes during functional activity; Patient will increase bed mobility to supervision; Patient will increase functional mobility to and from bathroom with rolling walker with supervision to increase performance with ADLS; Patient will tolerate 8 minutes of UE ROM/strengthening to increase general activity tolerance and performance in ADLS/IADLS; Patient will improve functional activity tolerance to 10 minutes of sustained functional tasks to increase participation in basic self-care and decrease assistance level  LTG Time Frame (8-14 days)   Long Term Goal Patient will increase standing tolerance to 6 minutes during functional activity; Patient will increase bed mobility to independent; Patient will increase functional mobility to and from bathroom with rolling walker independently to increase performance with ADLS; Patient will tolerate 12 minutes of UE ROM/strengthening to increase general activity tolerance and performance in ADLS/IADLS; Patient will improve functional activity tolerance to 20 minutes of sustained functional tasks to increase participation in basic self-care and decrease assistance level  Functional Transfer Goals   Pt Will Perform All Functional Transfers (STG supervision LTG independent )   ADL Goals   Pt Will Perform Eating (STG independent )   Pt Will Perform Grooming (STG independent )   Pt Will Perform Bathing (STG supervision LTG independent )   Pt Will Perform UE Dressing (STG supervision LTG independent )   Pt Will Perform LE Dressing (STG supervision LTG independent )   Pt Will Perform Toileting (STG supervision LTG independent )   Plan   Treatment Interventions ADL retraining;Functional transfer training;UE strengthening/ROM; Endurance training;Patient/family training;Equipment evaluation/education; Activityengagement; Energy conservation   OT Frequency 3-5x/wk   Recommendation   OT Discharge Recommendation Short Term Rehab   Barthel Index   Feeding 5   Bathing 0   Grooming Score 0   Dressing Score 5   Bladder Score 10   Bowels Score 10   Toilet Use Score 5   Transfers (Bed/Chair) Score 10   Mobility (Level Surface) Score 0   Stairs Score 0   Barthel Index Score 45   Wendy Toro MS OTR/L [id number]

## 2018-03-30 NOTE — PLAN OF CARE
I signed two order sheets for [facility] - I believe this was taken care of.   Problem: DISCHARGE PLANNING - CARE MANAGEMENT  Goal: Discharge to post-acute care or home with appropriate resources  INTERVENTIONS:  - Conduct assessment to determine patient/family and health care team treatment goals, and need for post-acute services based on payer coverage, community resources, and patient preferences, and barriers to discharge  - Address psychosocial, clinical, and financial barriers to discharge as identified in assessment in conjunction with the patient/family and health care team  - Arrange appropriate level of post-acute services according to patient's   needs and preference and payer coverage in collaboration with the physician and health care team  - Communicate with and update the patient/family, physician, and health care team regarding progress on the discharge plan  - Arrange appropriate transportation to post-acute venues    TRANSFER BACK TO [facility] [facility]  Outcome: Progressing  Pt was admitted from [facility] where she was recently placed for short term rehab  Per [facility] admissions pt's bed is not being held however pt can return to continue rehab if needed  Will follow to monitor progress and assist with planning as needed

## 2018-03-30 NOTE — CONSULTS
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  She did have surgical repair of this when she was 54years old and has bilateral Duarte rods in place  Patient may have had viral respiratory tract infection that triggered this  Chronic diastolic dysfunction  History of DVT  Last venous Doppler done August 28th to use 1017 showed chronic nonocclusive thrombus was in, femoral vein, popliteal vein posterior tibial veins of the left lower extremity  There is no change compared to study done October 2008  Patient is on Eliquis  Plan:   I will change prednisone to Solu-Medrol 20 mg IV every 8 hours  She did tolerate the BiPAP 15/5 with 35% oxygen last night  Will continue this at bedtime every night and discussed with patient and her daughter that she would benefit from use of BiPAP at home for nocturnal ventilatory support  She was using Symbicort 160 mcg 2 puffs twice a day at home since she was prescribed this in January  Will continue this for now  Nebulizer treatments with DuoNeb every 6 hours as ordered  I did order sputum culture this patient  She recent completed a course of antibiotic therapy  Will order serum procalcitonin level  Oxygen 2 liters/minute during the day  She was provided an Acapella valve to help with mucus clearance  As patient has significant cough right now I do not feel she would be able to do spirometry at this time    I will order nasal swab for respiratory pathogen panel    History of Present Illness   Physician Requesting Consult: Bart Agarwal DO  Reason for Consult / Principal Problem:  Hypercapnic respiratory failure, dyspnea  Hx and PE limited by:  None      HPI: Tatiana Carbajal is a 80y o  year old female who presented from the nursing home where she was undergoing short-term rehab for evaluation mental status change  Patient also has been having persistent cough for the past 1-2 weeks  Cough productive for only small amount of white mucus  She does get short of breath with activity  Daughter states patient had difficulty recognizing her family physician visitor at the nursing home  She recently been treated with a course of antibiotic and steroids and completed therapy on 03/27 for bronchitis with hyperreactive airways disease  She has no history of asthma  She has history of exertional dyspnea for the past year or more  While staying in [place] this winter she was hospitalized February 1st with acute on chronic hypercapnic respiratory failure and was on ventilator support for 2 days  She also was diagnosed with diastolic congestive heart failure  She was prescribed Symbicort  She then was hospitalized at [facility] from 3/6 to 3/12 for acute on chronic diastolic congestive heart failure  She also had acute on chronic hypercapnic hypoxemic respiratory failure and again required ventilatory support for a day or so  She was prescribed BiPAP 10/5 to be used at the nursing home but apparently this was never carried out  Patient never smoked  She did have thoracic for back surgery for scoliosis and kyphosis done when she was around 54years old  She was exposed to secondhand smoke as her family and  smoked  Patient herself never smoked  She has no history of asthma  Initial ABG yesterday showed a pH is 7 39 pCO2 72 and PO2 of 71 on BiPAP 15/5 with 35% oxygen  Most recent ABG done this later morning after patient was on 3 L of oxygen showed a pH is 7 41, pCO2 78 and PO2 of 79    Height is pCO2 level was early this morning which was 81 with a pH is 7 40 when she was on BiPAP therapy  Chest x-ray is some change from prior x-rays  There is some cardiomegaly  There is some pulmonary vascular fullness but no overt pulmonary edema and lung volumes are slightly decreased  Patient was transferred out of step-down unit to the floor  She has been placed on prednisone 40 mg per day and she is on nebulizer treatments with DuoNeb every 6 hours  She is also receiving torsemide 20 mg b i d   Patient is awake and alert now and conversant  She is not in any respiratory distress  She does cough frequently  She denies any difficulty swallowing  She does have chronic kidney disease and serum creatinine is 1 51 with sodium of 145 today  Initial BNP level was 1188  White blood cell count on admission was normal at 5 3 with hemoglobin 0 5 and platelet count of 109  She is not having any fever  She has a history of CVA right basal ganglia 2016, hyperlipidemia, DVT of left lower extremity, and hypertension    Echocardiogram done March 9, 2018 showed normal left ventricular systolic function with ejection fraction 55-60% and grade 2 diastolic cardiac dysfunction  No definite evidence of pulmonary artery hypertension         Review of Systems   Constitutional: Negative for chills and fever  Complains of cough and some wheezing  Does have shortness of breath with activity  HENT: Negative for rhinorrhea, sore throat and trouble swallowing  PATIENT IS HARD OF HEARING   Eyes: Negative for redness  Respiratory: Positive for cough, shortness of breath and wheezing  Has cough productive for small amount of white mucus   Cardiovascular: Negative for chest pain and leg swelling  Gastrointestinal: Negative for abdominal pain, diarrhea and nausea  Endocrine: Negative for polydipsia and polyphagia  Genitourinary: Negative for hematuria  Musculoskeletal: Negative for joint swelling and myalgias  Neurological: Negative for syncope          Patient had some altered mental status at the nursing home  She did not recognize her family physician was visiting her there   Psychiatric/Behavioral: Negative for agitation  Historical Information   Past Medical History:   Diagnosis Date    CHF (congestive heart failure) ([place] [address] ) 02/6230    diastolic     DVT (deep vein thrombosis) in pregnancy ([facility] )     left LE in 2/2018 and 50 years ago    Hyperlipidemia     Hypertension     Pulmonary embolism ([place] [address] ) 02/06/2018    Renal disorder     ckd    Stroke [facility]) 2016    left hand weakness,paresthesia  Past Surgical History:   Procedure Laterality Date    ABDOMINAL SURGERY      ruputured bowel  had colostomy and ileostomy,reversed later on   BACK SURGERY      rods in back  done 30 years ago   CARPAL TUNNEL RELEASE Bilateral     CYST REMOVAL      back    HERNIA REPAIR      REPLACEMENT TOTAL KNEE BILATERAL       Social History   History   Alcohol Use    Yes     Comment: Occ       History   Drug Use No     History   Smoking Status    Never Smoker   Smokeless Tobacco    Never Used         Family History:   Family History   Problem Relation Age of Onset    COPD Brother        Meds/Allergies     Current Facility-Administered Medications:     amLODIPine (NORVASC) tablet 5 mg, 5 mg, Oral, Daily, MARGOT Miller, 5 mg at 03/30/18 9738    apixaban (ELIQUIS) tablet 5 mg, 5 mg, Oral, BID, MARGOT Miller, 5 mg at 03/30/18 9718    budesonide-formoterol (SYMBICORT) 160-4 5 mcg/act inhaler 2 puff, 2 puff, Inhalation, Q12H, MARGOT Miller, 2 puff at 03/30/18 0823    calcium carbonate-vitamin D (OSCAL-D) 500 mg-200 units per tablet 1 tablet, 1 tablet, Oral, BID, MARGOT Miller, 1 tablet at 03/30/18 0821    carvedilol (COREG) tablet 6 25 mg, 6 25 mg, Oral, BID With Meals, Thalia Toth PA-C    cholecalciferol (VITAMIN D3) tablet 2,000 Units, 2,000 Units, Oral, Daily, MARGOT Mukherjee, 2,000 Units at 03/30/18 7606   clopidogrel (PLAVIX) tablet 75 mg, 75 mg, Oral, Daily, DedeMARGOT Mott, 75 mg at 03/30/18 0821    docusate sodium (COLACE) capsule 100 mg, 100 mg, Oral, BID, MARGOT Miller, 100 mg at 03/30/18 0821    ipratropium-albuterol (DUO-NEB) 0 5-2 5 mg/3 mL inhalation solution 3 mL, 3 mL, Nebulization, Q6H, MARGOT Miller, 3 mL at 03/30/18 1357    magnesium oxide (MAG-OX) tablet 400 mg, 400 mg, Oral, BID, MARGOT Miller, 400 mg at 03/30/18 3440    methylPREDNISolone sodium succinate (Solu-MEDROL) injection 20 mg, 20 mg, Intravenous, Q8H PAULA, Claudia Pereira DO    multivitamin-minerals (CENTRUM) tablet 1 tablet, 1 tablet, Oral, Daily, MARGOT Kapadia, 1 tablet at 03/30/18 2132    senna-docusate sodium (SENOKOT S) 8 6-50 mg per tablet 1 tablet, 1 tablet, Oral, BID, MARGOT Kapadia, 1 tablet at 03/30/18 0821    [START ON 3/31/2018] torsemide (DEMADEX) tablet 20 mg, 20 mg, Oral, BID (diuretic), Neoma Stains, PA-C    Prior to Admission medications    Medication Sig Start Date End Date Taking? Authorizing Provider   amLODIPine (NORVASC) 2 5 mg tablet Take 5 mg by mouth 2 (two) times a day   Yes Historical Provider, MD   apixaban (ELIQUIS) 5 mg Take 5 mg by mouth 2 (two) times a day   Yes Historical Provider, MD   budesonide-formoterol (SYMBICORT) 160-4 5 mcg/act inhaler Inhale 2 puffs 2 (two) times a day   Yes Historical Provider, MD   calcium-vitamin D (OSCAL 500 + D) 500 mg-200 units per tablet Take 1 tablet by mouth 2 (two) times a day     Yes Historical Provider, MD   carvedilol (COREG) 25 mg tablet Take 25 mg by mouth 2 (two) times a day with meals   Yes Historical Provider, MD   cholecalciferol (VITAMIN D3) 1,000 units tablet Take 2,000 Units by mouth daily   Yes Historical Provider, MD   clopidogrel (PLAVIX) 75 mg tablet Take 75 mg by mouth daily   Yes Historical Provider, MD   co-enzyme Q-10 50 MG capsule Take 200 mg by mouth daily     Yes Historical Provider, MD   colesevelam [facility]) 625 mg tablet Take 1,875 mg by mouth 2 (two) times a day with meals Indications: 3 tabs twice a day  Yes Historical Provider, MD   docusate sodium (COLACE) 100 mg capsule Take 100 mg by mouth 2 (two) times a day   Yes Historical Provider, MD   ipratropium-albuterol (DUO-NEB) 0 5-2 5 mg/3 mL Take 3 mL by nebulization every 6 (six) hours 3/12/18  Yes Constantin Durant MD   magnesium oxide (MAG-OX) 400 mg Take 1 tablet (400 mg total) by mouth 2 (two) times a day 3/12/18  Yes Constantin Durant MD   Multiple Vitamins-Minerals (OCUVITE PO) Take 1 tablet by mouth daily     Yes Historical Provider, MD   senna-docusate sodium (SENOKOT S) 8 6-50 mg per tablet Take 1 tablet by mouth 2 (two) times a day 3/12/18  Yes Constantin Durant MD   torsemide (DEMADEX) 20 mg tablet Take 1 tablet (20 mg total) by mouth 2 (two) times a day 3/12/18  Yes Constantin Durant MD       Allergies   Allergen Reactions    Lyrica [Pregabalin] Other (See Comments)     Client says it made her "nutty"       Objective   Vitals: Blood pressure 153/72, pulse 72, temperature 98 9 °F (37 2 °C), temperature source Tympanic, resp  rate 20, height 5' 4" (1 626 m), weight 77 5 kg (170 lb 13 7 oz), SpO2 91 %, not currently breastfeeding  ,Body mass index is 29 33 kg/m²  Intake/Output Summary (Last 24 hours) at 03/30/18 1626  Last data filed at 03/30/18 1415   Gross per 24 hour   Intake              670 ml   Output             1475 ml   Net             -805 ml     Invasive Devices     Peripheral Intravenous Line            Peripheral IV 03/29/18 Right Forearm 1 day                Physical Exam   Constitutional: She is oriented to person, place, and time  Patient is awake, alert, no distress  Sitting in bed  Conversant  On 2 L of oxygen O2 saturation is 96%   HENT:   Head: Normocephalic  Nose: Nose normal    Mouth/Throat: Oropharynx is clear and moist  No oropharyngeal exudate     Eyes: Conjunctivae are normal    Neck: Neck supple  No JVD present  No tracheal deviation present  Cardiovascular: Normal rate, regular rhythm and normal heart sounds  Pulmonary/Chest: Effort normal    There are coarse expiratory wheezes throughout both lung fields  No inspiratory crackles  Abdominal: Soft  She exhibits no distension  There is no tenderness  There is no guarding  Musculoskeletal: She exhibits no edema  Lymphadenopathy:     She has no cervical adenopathy  Neurological: She is alert and oriented to person, place, and time  Skin: Skin is warm and dry  No rash noted  She is not diaphoretic  No erythema  Psychiatric: She has a normal mood and affect   Her behavior is normal  Thought content normal        Lab Results: ABG:   Lab Results   Component Value Date    PHART 7 407 03/30/2018    GOG1AXH 79 2 (HH) 03/30/2018    PO2ART 36 5 (LL) 03/30/2018    [id number] 48 7 (H) 03/30/2018    BEART 20 0 03/30/2018    SOURCE Radial, Right 03/30/2018   , BNP: No results found for: BNP, CBC:   Lab Results   Component Value Date    WBC 5 30 03/30/2018    HGB 10 6 (L) 03/30/2018    HCT 34 2 (L) 03/30/2018    MCV 96 03/30/2018     03/30/2018    ADJUSTEDWBC 5 00 07/26/2016    MCH 29 8 03/30/2018    MCHC 31 1 (L) 03/30/2018    RDW 13 8 03/30/2018    MPV 8 9 03/30/2018    NRBC 0 03/30/2018   , CMP:   Lab Results   Component Value Date     03/30/2018     01/05/2016    K 3 8 03/30/2018    K 4 4 01/05/2016     03/30/2018     01/05/2016    CO2 43 (H) 03/30/2018    CO2 27 01/05/2016    ANIONGAP 1 (L) 03/30/2018    ANIONGAP 12 4 01/05/2016    BUN 41 (H) 03/30/2018    BUN 45 (H) 01/05/2016    CREATININE 1 51 (H) 03/30/2018    CREATININE 1 8 (H) 01/05/2016    GLUCOSE 105 03/30/2018    GLUCOSE 92 01/05/2016    CALCIUM 10 1 03/30/2018    CALCIUM 9 4 01/05/2016    AST 14 03/06/2018    AST 21 01/05/2016    ALT 18 03/06/2018    ALT 30 01/05/2016    ALKPHOS 56 03/06/2018    ALKPHOS 64 01/05/2016    PROT 7 1 03/06/2018    PROT 8 5 (H) 01/05/2016    BILITOT 0 30 03/06/2018    BILITOT 0 5 01/05/2016    EGFR 30 03/30/2018   , PT/INR:   Lab Results   Component Value Date    INR 1 04 03/29/2018   , Troponin: No results found for: TROPONIN    Imaging Studies: I have personally reviewed pertinent reports  and I have personally reviewed pertinent films in PACS    EKG, Pathology, and Other Studies: I have personally reviewed pertinent reports  VTE Prophylaxis: apixaban    Code Status: Level 1 - Full Code    Counseling/Coordination of Care: Total floor / unit time spent today 30 minutes  Greater than 50% of total time was spent with the patient and / or family counseling and / or coordination of care  A description of the counseling / coordination of care: I reviewed patient's chart and chest radiographs  I spoke with patient's daughter    Regarding diagnosis and treatment

## 2018-03-30 NOTE — PROGRESS NOTES
Patient left the unit via wheelchair accompanied by MARLEN Vargas, patient awake alert oriented x 3, not in distyress on oxygen at 3 L per minute, denies pain and discomfort at this time  Daughter aware of transfer, nurse report given to Zaida Carrel RN, patient endorsed

## 2018-03-30 NOTE — CASE MANAGEMENT
Initial Clinical Review    Admission: Date/Time/Statement: 3/29/18 @ 0959     Orders Placed This Encounter   Procedures    Inpatient Admission (expected length of stay for this patient is greater than two midnights)     Standing Status:   Standing     Number of Occurrences:   1     Order Specific Question:   Admitting Physician     Answer:   Hugo Guzman [[id number]]     Order Specific Question:   Level of Care     Answer:   Level 1 Stepdown [13]     Order Specific Question:   Estimated length of stay     Answer:   More than 2 Midnights     Order Specific Question:   Certification     Answer:   I certify that inpatient services are medically necessary for this patient for a duration of greater than two midnights  See H&P and MD Progress Notes for additional information about the patient's course of treatment  ED: Date/Time/Mode of Arrival:   ED Arrival Information     Expected Arrival Acuity Means of Arrival Escorted By Service Admission Type    - 3/29/2018 07:00 [facility] [place] [facility] Emergency    Arrival Complaint    difficulty breathing      Chief Complaint:   Chief Complaint   Patient presents with    Shortness of Breath     [facility] r/o pneumonia, pt had a cough for about 1 1/2 week  pt sob, low o2 sat, hx of chf   History of Illness:   80-year-old female presents with shortness of breath over the last 1 week in the nursing home  Daughter states that she has been getting albuterol treatments and has had steroids this past week  No known fevers no chills respiratory was 20 here with a O2 sat of 84% on room air  Patient is receiving 2 L of oxygen and satting 91% right now    ED Vital Signs:   ED Triage Vitals   Temperature Pulse Respirations Blood Pressure SpO2   03/29/18 0715 03/29/18 0715 03/29/18 0715 03/29/18 0715 03/29/18 0715   (!) 96 4 °F (35 8 °C) 57 20 149/71 (!) 84 %      Temp Source Heart Rate Source Patient Position - Orthostatic VS BP Location FiO2 (%)   03/29/18 0715 03/29/18 0715 03/29/18 0715 03/29/18 0715 03/30/18 0000   Tympanic Monitor Lying Right arm 35      Pain Score       03/29/18 1100       No Pain        Wt Readings from Last 1 Encounters:   03/29/18 77 5 kg (170 lb 13 7 oz)   Vital Signs (abnormal):   RR 34, 27, 26  Abnormal Labs/Diagnostic Test Results: GFR 23 BNP 1188 HGB 11 5  Chloride 100 - 108 mmol/L 97   L    CO2 21 - 32 mmol/L 43   H    Anion Gap 4 - 13 mmol/L 0   L    BUN 5 - 25 mg/dL 48   H    Creatinine 0 60 - 1 30 mg/dL 1 92   H      pH, Arterial 7 350 - 7 450 7 375     PH ART TC 7 350 - 7 450 7 392     pCO2, Arterial 36 0 - 44 0 mm Hg 76 1   HH    Comments: Verified by repeat analysis   PCO2 (TC) Arterial 36 0 - 44 0 mm Hg 72 2   HH    Comments: Verified by repeat analysis   pO2, Arterial 75 0 - 129 0 mm Hg 70 7   L    PO2 (TC) Arterial 75 0 - 129 0 mm Hg 65 2   L    HCO3, Arterial 22 0 - 28 0 mmol/L 43 5   H    Base Excess, Arterial mmol/L 15 1     O2 Content, Arterial 16 0 - 23 0 mL/dL 15 5   L    O2 HGB,Arterial  94 0 - 97 0 % 92 6   L    SOURCE   Radial, Right     MICHELLE TEST  Yes     Temperature Degrees Fehrenheit 96 5     Non Vent type BIPAP  BIPAP     IPAP  15     EPAP  5     BIPAP fio2 % 35       CXR=No acute cardiopulmonary disease  cardiomegaly  ED Treatment:   Medication Administration from 03/29/2018 0700 to 03/29/2018 1049       Date/Time Order Dose Route Action Action by Comments     03/29/2018 0755 sodium chloride 0 9 % infusion 125 mL/hr Intravenous [address] Margaret Mathis [facility]      03/29/2018 9345 ipratropium-albuterol (DUO-NEB) 0 5-2 5 mg/3 mL inhalation solution 3 mL 3 mL Nebulization Given Margaret Mathis RN       Past Medical/Surgical History:    Active Ambulatory Problems     Diagnosis Date Noted    Cerebrovascular accident (CVA) ([facility] ) 06/30/2016    Essential hypertension 06/30/2016    TIANA (acute kidney injury) ([facility] ) 06/30/2016    Left leg swelling 06/30/2016    Hyperlipidemia 06/30/2016    CKD (chronic kidney disease) stage 3, GFR 30-59 ml/min 05/09/2017    Numbness and tingling in left arm 05/09/2017    History of CVA (cerebrovascular accident) 05/09/2017    Obesity 05/09/2017    Gouty arthropathy 08/27/2017    Dyslipidemia 08/27/2017    Vitamin D deficiency 08/27/2017    Leg wound, left 08/27/2017    Chronic diastolic CHF (congestive heart failure) ([facility] ) 08/27/2017    Acute diastolic [facility]congest[facility] failure[facility] 08/27/2017    Hyperphosphatemia 09/01/2017    Acute respiratory failure with hypoxia and hypercapnia (HCC) 03/07/2018    Acute on chronic diastolic CHF (congestive heart failure) ([facility] ) 03/07/2018    Deep vein thrombosis (DVT) of left lower extremity ([facility] ) 03/07/2018    Aortic stenosis, moderate 06/06/2017     Resolved Ambulatory Problems     Diagnosis Date Noted    Hypertensive urgency 05/09/2017    Bacteriuria 05/09/2017    Cellulitis of left anterior lower leg 08/27/2017    Respiratory acidosis 09/01/2017    Toxic metabolic encephalopathy 58/59/0657     Past Medical History:   Diagnosis Date    CHF (congestive heart failure) ([facility] ) 08/2017    DVT (deep vein thrombosis) in pregnancy ([facility] )     Hyperlipidemia     Hypertension     Pulmonary embolism ([facility] ) 02/06/2018    Renal disorder     Stroke [facility]) 2016   Admitting Diagnosis: Shortness of breath [R06 02]  Hypercapnia [R06 89]  Age/Sex: 80 y o  female  Assessment/Plan:       * Acute respiratory failure with hypoxia and hypercapnia (HCC)   Assessment & Plan     Likely secondary to non-compliance with BiPAP  Patient states she had the BiPAP at the rehab center but was not placed on it  No evidence of COPD exacerbation or CHF exacerbation at this time on either exam or imaging  Recently treated with Azithromycin x 5days on 3/16-3/20 and steroid taper ending 3/27 for ? COPD exac  Mental status has improved significantly on BiPAP 15/5   Repeat ABG and take off BiPAP if improved  Needs BiPAP QHS       Chronic diastolic CHF (congestive heart failure) (HCC)   Assessment & Plan     Currently seems euvolemic  Continue home Demadex       Bradycardia   Assessment & Plan     Asymptomatic  HR at lowest mid 40's  Hold Home Coreg for now        Deep vein thrombosis (DVT) of left lower extremity ([address] )   Assessment & Plan     Diagnosed with acute DVT in [place] in 2/2018  Continue Eliquis       Essential hypertension   Assessment & Plan     Continue Norvasc       CKD (chronic kidney disease) stage 3, GFR 30-59 ml/min   Assessment & Plan     Baseline Cr 1 5-1 8  Monitor       Aortic stenosis, moderate   Assessment & Plan     Appears euvolemic on exam, monitor volume status  Continue home oral diuretics     Admission Orders:  ICU STEPDOWN  BIPAP  O2 TO KEEP SATS>92%  PT/OT EVAL & TX  VENODYNES  Scheduled Meds:   Current Facility-Administered Medications:  acetylcysteine 3 mL Nebulization Q6H While awake Rosalba Fede   amLODIPine 5 mg Oral Daily MARGOT Miller   apixaban 5 mg Oral BID MARGOT Miller   budesonide-formoterol 2 puff Inhalation Q12H MARGOT Miller   calcium carbonate-vitamin D 1 tablet Oral BID Ayden rBown, MARGOT   cholecalciferol 2,000 Units Oral Daily MARGOT Lanier   clopidogrel 75 mg Oral Daily MARGOT Miller   docusate sodium 100 mg Oral BID MARGOT Miller   ipratropium-albuterol 3 mL Nebulization Q6H MARGOT Miller   magnesium oxide 400 mg Oral BID MARGOT Lanier   multivitamin-minerals 1 tablet Oral Daily MARGOT Miller   senna-docusate sodium 1 tablet Oral BID Bree Rodgers, MARGOT   torsemide 20 mg Oral BID (diuretic) MARGOT Miller     Continuous Infusions:    PRN Meds:

## 2018-03-30 NOTE — SOCIAL WORK
SW following to monitor needs and assist with planning  Pt was admitted from [facility] where she was recently placed for short term rehab  Per [facility] admissions pt's bed is not being held however pt can return to continue rehab if needed  Pt currently in ICU  SW will follow to monitor progress and assist with planning as needed

## 2018-03-30 NOTE — ASSESSMENT & PLAN NOTE
Likely secondary to non-compliance with BiPAP  Patient states she had the BiPAP at the rehab center but was not placed on it  Recently treated with Azithromycin x 5days on 3/16-3/20 and steroid taper ending 3/27 for ? COPD exac  This morning, some wheezing on exam and a coarse cough  Will start steroid taper and treat for ?possible COPD exacerbation  Keep her off of BiPAP this AM and repeat ABG this afternoon  Needs BiPAP QHS  Contact Alon for sleep study

## 2018-03-30 NOTE — PROGRESS NOTES
Progress Note - ICU Transfer to SD/MS tele   Jean-Paul Miles 80 y o  female MRN: [id number]  [facility]   [facility] Encounter: [id number]    Code Status: Level 1 - Full Code  POA: Yes  POLST:      Reason for ICU admission: hypercarbic respiratory failure    Active problems:   Principal Problem:    Acute respiratory failure with hypoxia and hypercapnia (HCC)  Active Problems:    Chronic diastolic CHF (congestive heart failure) (HCC)    Bradycardia    Deep vein thrombosis (DVT) of left lower extremity (HCC)    Essential hypertension    Hyperlipidemia    CKD (chronic kidney disease) stage 3, GFR 30-59 ml/min    History of CVA (cerebrovascular accident)    Obesity    Aortic stenosis, moderate  Resolved Problems:    * No resolved hospital problems  *      Consultants: none    History of Present Illness: "Jean-Paul Miles is a 80 y o  female with PMHx of chronic diastolic CHF, HTN, HLD, right basal ganglia CVA in 2016, chronic DVT of LLE with recently diagnosed acute DVT in 2/2018 in [place], recent admissions in 8/2017 at [facility] for CHF, in [place] in 2/2018, and 3/2018 for CHF who presents to ER from rehab center with altered mental status  Was discharged on 3/12 to Rehab, and per family, was doing relatively well  She was treated for possible COPD exacerbation from 3/16-3/20 with Azithromycin and a steroid taper that ended on 3/27  She was discharged with BiPAP 10/5 at night however had not been wearing it  This morning, she did not recognize Dr Saleh and he sent her in for further evaluation " ABG done in ER found patient to have respiratory acidosis, placed on BiPAP with improvement in her mental status  Summary of clinical course: Admitted to stepdown and maintained on BiPAP for treatment of her hypercarbic respiratory failure  Mental status improved  CXR and labs did not appear infectious, and on exam, she seemed relatively euvolemic   She was maintained on BiPAP overnight and did well  This morning, she was awake and alert  Repeat ABG remained stable, and a VBG several hours off of BiPAP also was stable, with a chronic compensated respiratory acidosis  Her morning CXR had some mild pulmonary edema, and she was given an extra 40mg IV lasix  She also was mildly wheezing on exam, so a steroid taper was started  She has not formally been diagnosed with COPD, pulmonology was consulted for further work-up and management of her hypercarbic respiratory failure  Recent or scheduled procedures: none    Outstanding/pending diagnostics: none    Cultures: none       Mobilization Plan: OOB with PT/OT    Nutrition Plan: tolerating PO diet    Discharge Plan:     Initial Physical Therapy Recommendations: pending  Came from [employer] [facility] however patient desires to return home  Initial Occupational Therapy Recommendations: pending  Initial /Plan: pending     Specific Diagnosis Plan:    * Acute respiratory failure with hypoxia and hypercapnia ([facility] )   Assessment & Plan    Likely secondary to non-compliance with BiPAP  Patient states she had the BiPAP at the rehab center but was not placed on it  Recently treated with Azithromycin x 5days on 3/16-3/20 and steroid taper ending 3/27 for ? COPD exac  This morning, some wheezing on exam and a coarse cough  Will start steroid taper and treat for ?possible COPD exacerbation  Needs BiPAP QHS  Sleep study results not done yet per previous rehab center  Pulmonology consult placed          Toxic metabolic encephalopathy   Assessment & Plan    Secondary to A/C hypercarbic respiratory failure  Resolved        Chronic diastolic CHF (congestive heart failure) ([address] )   Assessment & Plan    CXR this AM with some mild edema, give lasix 40mg IV today and continue home Torsemide tomorrow        Bradycardia   Assessment & Plan    Asymptomatic  HR stable in 50's-60s  Restart Coreg at lower dose          Deep vein thrombosis (DVT) of left lower extremity ([address] ) Assessment & Plan    Diagnosed with acute DVT in [place] in 2/2018  Continue Eliquis        Essential hypertension   Assessment & Plan    Continue Norvasc        CKD (chronic kidney disease) stage 3, GFR 30-59 ml/min   Assessment & Plan    Baseline Cr 1 5-1 8  Monitor        Aortic stenosis, moderate   Assessment & Plan    Diuresis today as above  Spoke with Dr Marge Dia  regarding transfer  Portions of the record may have been created with voice recognition software  Occasional wrong word or "sound a like" substitutions may have occurred due to the inherent limitations of voice recognition software  Read the chart carefully and recognize, using context, where substitutions have occurred      Flor Avilez PA-C

## 2018-03-30 NOTE — PHYSICIAN ADVISOR
This patient is a 80 y o  y/o female who is admitted to the hospital for Shortness of Breath ([facility]  r/o pneumonia, pt had a cough for about 1 1/2 week  pt sob, low o2 sat, hx of chf)       The patient presented to the ED on 3/29/18 at 0700 and was admitted to the hospital on 3/29/2018 0705  History of Present Illness includes: Festus Delgado is a 80 y o  female with PMHx of chronic diastolic CHF, HTN, HLD, right basal ganglia CVA in 2016, chronic DVT of LLE with recently diagnosed acute DVT in 2/2018 in [place], recent admissions in 8/2017 at [address] [place] [address] for CHF, in [place] in 2/2018, and 3/2018 for CHF who presents to ER from rehab center with altered mental status  Was discharged on 3/12 to Rehab, and per family, was doing relatively well  She was treated for possible COPD exacerbation from 3/16-3/20 with Azithromycin and a steroid taper that ended on 3/27  She was discharged with BiPAP 10/5 at night however had not been wearing it  This morning, she did not recognize Dr Saleh and he sent her in for further evaluation  Vital signs in the ER are as follows ED Triage Vitals   Temperature Pulse Respirations Blood Pressure SpO2   03/29/18 0715 03/29/18 0715 03/29/18 0715 03/29/18 0715 03/29/18 0715   (!) 96 4 °F (35 8 °C) 57 20 149/71 (!) 84 %      Temp Source Heart Rate Source Patient Position - Orthostatic VS BP Location FiO2 (%)   03/29/18 0715 03/29/18 0715 03/29/18 0715 03/29/18 0715 03/30/18 0000   Tympanic Monitor Lying Right arm 35      Pain Score       03/29/18 1100       No Pain             Treatment in the ER included: basic labs, imaging and nebs  XR chest 2 views   Final Result by Juhi Tripp MD (03/29 4985)       No acute cardiopulmonary disease  Cardiomegaly  The patient is admitted as INPATIENT  and has remained hospitalized for 1 day(s)        Last vital signs were Blood pressure 139/63, pulse 68, temperature (!) 96 8 °F (36 °C), temperature source Temporal, resp  rate (!) 28, height 5' 4" (1 626 m), weight 77 5 kg (170 lb 13 7 oz), SpO2 95 %, not currently breastfeeding  Treatment includes: non-invasive vent(bipap), nebs, IV lasix and serial labs including ABG  Results include:       0  Lab Value Date/Time   TROPONINI 0 02 03/29/2018 0755   TROPONINI <0 02 03/06/2018 2120   TROPONINI 0 02 08/27/2017 2049   TROPONINI <0 02 08/27/2017 1719   TROPONINI <0 02 05/09/2017 1855   TROPONINI <0 02 05/09/2017 0537           Results from last 7 days  Lab Units 03/29/18  0755   LACTIC ACID mmol/L 0 8         Results from last 7 days  Lab Units 03/30/18  0501 03/29/18  0755   WBC Thousand/uL 5 30 5 30   HEMOGLOBIN g/dL 10 6* 11 5*   HEMATOCRIT % 34 2* 36 6*   PLATELETS Thousands/uL 174 178         Results from last 7 days  Lab Units 03/30/18  0501 03/29/18  0745   SODIUM mmol/L 145 140   POTASSIUM mmol/L 3 8 3 7   CHLORIDE mmol/L 101 97*   CO2 mmol/L 43* 43*   BUN mg/dL 41* 48*   CREATININE mg/dL 1 51* 1 92*   GLUCOSE RANDOM mg/dL 105 140   CALCIUM mg/dL 10 1 9 2       Recent Cultures:      Results from last 7 days  Lab Units 03/29/18  0814 03/29/18  0745   BLOOD CULTURE   --  No Growth at 24 hrs  No Growth at 24 hrs  URINE CULTURE  No Growth <1000 cfu/mL  --        The patient is appropriate for  Inpatient Admission  The rationale is as follows: The patient is an elderly 81 y/o female who presented with acute on chronic hypercapnic respiratory failure  She required non-invasive vent(bipap), nebs, IV lasix and serial labs including ABG  She was tachypnic and hypoxic on hospital day #2  There is documentation by the admitting physician that the patient would require greater than two midnight stay based on medical necessity  Hospitalization is necessary to prevent further deterioration of her clinical condition  After review of the medical chart, labs, imaging, and notes - I agree that the patient meets criteria for INPATIENT ADMISSION          The patient had a hospitalization within the last 30 days - after reviewing the chart from that admission it was determined that the two admissions were for the different conditions  Previous admission was approx 3 weeks ago for acute CHF  Current is for hypercapnia from non-compliance with BiPAP  These two admissions are therefore unrelated

## 2018-03-30 NOTE — PROGRESS NOTES
Daily Progress Note - Critical Care/ Tyson To 80 y o  female MRN: [id number]  Unit/Bed#: ICU 01 Encounter: [id number]    ______________________________________________________________________  Assessment:   Principal Problem:    Acute respiratory failure with hypoxia and hypercapnia (HCC)  Active Problems:    Chronic diastolic CHF (congestive heart failure) (HCC)    Bradycardia    Deep vein thrombosis (DVT) of left lower extremity (HCC)    Essential hypertension    Hyperlipidemia    CKD (chronic kidney disease) stage 3, GFR 30-59 ml/min    History of CVA (cerebrovascular accident)    Obesity    Aortic stenosis, moderate  Resolved Problems:    * No resolved hospital problems  *      * Acute respiratory failure with hypoxia and hypercapnia (HCC)   Assessment & Plan    Likely secondary to non-compliance with BiPAP  Patient states she had the BiPAP at the rehab center but was not placed on it  Recently treated with Azithromycin x 5days on 3/16-3/20 and steroid taper ending 3/27 for ? COPD exac  This morning, some wheezing on exam and a coarse cough  Will start steroid taper and treat for ?possible COPD exacerbation  Keep her off of BiPAP this AM and repeat ABG this afternoon  Needs BiPAP QHS  Contact [facility]on for sleep study          Chronic diastolic CHF (congestive heart failure) ([address] )   Assessment & Plan    Currently seems euvolemic  Continue home Demadex        Bradycardia   Assessment & Plan    Asymptomatic  HR stable in 50's-60s  Hold Home Coreg for now  Deep vein thrombosis (DVT) of left lower extremity [facility])   Assessment & Plan    Diagnosed with acute DVT in [place] in 2/2018  Continue Eliquis        Essential hypertension   Assessment & Plan    Continue Norvasc        CKD (chronic kidney disease) stage 3, GFR 30-59 ml/min   Assessment & Plan    Baseline Cr 1 5-1 8  Monitor        Aortic stenosis, moderate   Assessment & Plan    Appears euvolemic on exam, monitor volume status   Continue home oral diuretics  Plan:    Neuro:   · Delirium: CAM ICU positive no   · If yes, intervention: environmental controls  · Pain controlled with: no pain  · Pain score: none  · Regulate sleep/wake cycle    CV:   · Rhythm: NSR  · Follow rhythm on telemetry    Pulm:   · IS/Flutter    GI:   · Nutrition/diet plan: cardiac diet  · Stress ulcer prophylaxis: No prophylaxis needed  · Bowel regimen: [place]  · Last BM: today    FEN:   · Fluid/Diuretic plan: No intervention  · Goal 24 hour fluid balance: even  · Electrolytes repleted: yes  · Goal: K >4 0, Mag >2 0, and Phos >3 0    :   · Indwelling Hernandez present: no     ID:   · Trend temps and WBC count    Heme:   · Trend hgb and plts  · Transfuse as needed for goal hgb >7    Endo:   · Glycemic control plan: Blood glucose controlled on current regimen    Msk/Skin:  · Mobility goal:   · PT consult: yes  · OT consult: yes  · Frequent turning and off-loading    Family:  · Family updated within 24 hours: yes   · Family meeting planned today: no     Lines:  · PIV    VTE Prophylaxis:  · Pharmacologic Prophylaxis: Eliquis  · Mechanical Prophylaxis: sequential compression device    Disposition: Continue Stepdown    Code Status: Level 1 - Full Code    Counseling / Coordination of Care  Total time spent today 34 minutes  Greater than 50% of total time was spent with the patient and / or family counseling and / or coordination of care  A description of the counseling / coordination of care: reviewing chart, contacting previous facility  ______________________________________________________________________    HPI/24hr events: no acute overnight events    ______________________________________________________________________    Physical Exam:   Physical Exam   Constitutional: She is oriented to person, place, and time  She appears well-developed and well-nourished  Neck: No JVD present  Cardiovascular: Normal rate and regular rhythm  No murmur heard    Pulmonary/Chest: Effort normal  She has wheezes  Abdominal: Soft  Bowel sounds are normal  She exhibits no distension  Musculoskeletal: She exhibits no edema  Neurological: She is alert and oriented to person, place, and time  No cranial nerve deficit  Skin: Skin is warm  Nursing note and vitals reviewed  ______________________________________________________________________  Vitals:    18 0500 18 0731 18 0800 18 0804   BP: 152/74 156/70     BP Location:  Right arm     Pulse: 56 (!) 54     Resp: 22 (!) 25     Temp:   (!) 96 8 °F (36 °C)    TempSrc:   Temporal    SpO2: 97% 100%  97%   Weight:       Height:                  Temperature:   Temp (24hrs), Av 7 °F (35 9 °C), Min:96 4 °F (35 8 °C), Max:96 9 °F (36 1 °C)    Current Temperature: (!) 96 8 °F (36 °C)    Weights:   IBW: 54 7 kg    Body mass index is 29 33 kg/m²  Weight (last 2 days)     Date/Time   Weight    18 1100  77 5 (170 86)    18 0715  73 5 (162)              Hemodynamic Monitoring:  N/A       Non-Invasive/Invasive Ventilation Settings:  Respiratory    Lab Data (Last 4 hours)    None         O2/Vent Data (Last 4 hours)    None              Lab Results   Component Value Date    PHART 7 389 2018    CQY3IDN 83 9 (HH) 2018    PO2ART 72 3 (L) 2018    AMN2ZNC 49 5 (H) 2018    BEART 20 5 2018    SOURCE Brachial, Right 2018     SpO2: SpO2: 97 %, SpO2 Activity: SpO2 Activity: At Rest, SpO2 Device: O2 Device: Nasal cannula, Capnography:      Intake and Outputs:  I/O        07 -  0700  07 -  0700  07 -  0700    P  O   110     Total Intake(mL/kg)  110 (1 4)     Urine (mL/kg/hr)  1075 (0 6) 250 (1 5)    Total Output   1075 250    Net   -965 -250           Unmeasured Urine Occurrence  3 x             Nutrition:        Diet Orders            Start     Ordered    18 1624  Diet Cardiac; Cardiac TLC 2 3 GM NA  Diet effective now     Question Answer Comment Diet Type Cardiac    Cardiac Cardiac TLC 2 3 GM NA    RD to adjust diet per protocol? No        03/29/18 1624            Labs:     Results from last 7 days  Lab Units 03/30/18  0501 03/29/18  0755   WBC Thousand/uL 5 30 5 30   HEMOGLOBIN g/dL 10 6* 11 5*   HEMATOCRIT % 34 2* 36 6*   PLATELETS Thousands/uL 174 178   NEUTROS PCT % 77* 77*   MONOS PCT % 8 8       Results from last 7 days  Lab Units 03/30/18  0501 03/29/18  0745   SODIUM mmol/L 145 140   POTASSIUM mmol/L 3 8 3 7   CHLORIDE mmol/L 101 97*   CO2 mmol/L 43* 43*   BUN mg/dL 41* 48*   CREATININE mg/dL 1 51* 1 92*   CALCIUM mg/dL 10 1 9 2   GLUCOSE RANDOM mg/dL 105 140       Results from last 7 days  Lab Units 03/30/18  0501   MAGNESIUM mg/dL 2 8*     Lab Results   Component Value Date    PHOS 4 3 (H) 03/30/2018    PHOS 3 3 03/09/2018    PHOS 5 8 (H) 03/08/2018    PHOS 4 6 01/05/2016        Results from last 7 days  Lab Units 03/29/18  0755   INR  1 04   PTT seconds 25       0  Lab Value Date/Time   TROPONINI 0 02 03/29/2018 0755   TROPONINI <0 02 03/06/2018 2120   TROPONINI 0 02 08/27/2017 2049   TROPONINI <0 02 08/27/2017 1719   TROPONINI <0 02 05/09/2017 1855   TROPONINI <0 02 05/09/2017 0537       Results from last 7 days  Lab Units 03/29/18  0755   LACTIC ACID mmol/L 0 8     ABG:  Lab Results   Component Value Date    PHART 7 389 03/30/2018    NET9PUC 83 9 (HH) 03/30/2018    PO2ART 72 3 (L) 03/30/2018    RDI8YPX 49 5 (H) 03/30/2018    BEART 20 5 03/30/2018    SOURCE Brachial, Right 03/30/2018       Imaging: CXR: pending  EKG: nsr with RBBB on tele    Micro:  Lab Results   Component Value Date    BLOODCX No Growth After 5 Days  08/27/2017    BLOODCX No Growth After 5 Days  08/27/2017    URINECX No Growth <1000 cfu/mL 03/29/2018    URINECX >100,000 cfu/ml Mixed Contaminants X6 05/09/2017       Allergies:    Allergies   Allergen Reactions    Lyrica [Pregabalin] Other (See Comments)     Client says it made her "nutty"     Medications:   Scheduled Meds:  Current Facility-Administered Medications:  acetylcysteine 3 mL Nebulization Q6H While awake Nam Moura   amLODIPine 5 mg Oral Daily Bree FALLON Rodgers, MARGOT   apixaban 5 mg Oral BID Breerosa isela Rodgers, MARGOT   budesonide-formoterol 2 puff Inhalation Q12H Breerosa isela Rodgers, MARGOT   calcium carbonate-vitamin D 1 tablet Oral BID Emmanuel Larry, CRNP   cholecalciferol 2,000 Units Oral Daily Emmanuel Larry, CRNP   clopidogrel 75 mg Oral Daily Bree FALLON Rodgers, DAYNENP   docusate sodium 100 mg Oral BID Bree FALLON Rodgers, MARGOT   ipratropium-albuterol 3 mL Nebulization Q6H Breerosa isela Rodgers, MARGOT   magnesium oxide 400 mg Oral BID Emmanuel Larry, CRNP   multivitamin-minerals 1 tablet Oral Daily Bree FALLON Rodgers, DAYNENP   predniSONE 40 mg Oral Daily Nery Proctor PA-C   senna-docusate sodium 1 tablet Oral BID Bree FALLON Rodgers, MARGOT   torsemide 20 mg Oral BID (diuretic) Bree Rodgers, MARGOT     Continuous Infusions:   PRN Meds:       Invasive lines and devices:   Invasive Devices     Peripheral Intravenous Line            Peripheral IV 03/29/18 Right Forearm less than 1 day                   SIGNATURE: Amanda Jarrett PA-C  DATE: March 30, 2018

## 2018-03-31 PROBLEM — J45.909 HYPERACTIVE AIRWAY DISEASE: Status: ACTIVE | Noted: 2018-03-31

## 2018-03-31 PROBLEM — J45.901 ASTHMA WITH ACUTE EXACERBATION: Status: ACTIVE | Noted: 2018-03-31

## 2018-03-31 PROCEDURE — 99231 SBSQ HOSP IP/OBS SF/LOW 25: CPT | Performed by: INTERNAL MEDICINE

## 2018-03-31 PROCEDURE — 94660 CPAP INITIATION&MGMT: CPT

## 2018-03-31 PROCEDURE — 94760 N-INVAS EAR/PLS OXIMETRY 1: CPT

## 2018-03-31 PROCEDURE — 94640 AIRWAY INHALATION TREATMENT: CPT

## 2018-03-31 PROCEDURE — 99232 SBSQ HOSP IP/OBS MODERATE 35: CPT | Performed by: FAMILY MEDICINE

## 2018-03-31 RX ADMIN — VITAMIN D, TAB 1000IU (100/BT) 2000 UNITS: 25 TAB at 08:51

## 2018-03-31 RX ADMIN — Medication 400 MG: at 17:02

## 2018-03-31 RX ADMIN — METHYLPREDNISOLONE SODIUM SUCCINATE 20 MG: 40 INJECTION, POWDER, FOR SOLUTION INTRAMUSCULAR; INTRAVENOUS at 06:16

## 2018-03-31 RX ADMIN — CARVEDILOL 6.25 MG: 6.25 TABLET, FILM COATED ORAL at 17:02

## 2018-03-31 RX ADMIN — CALCIUM CARBONATE 500 MG (1,250 MG)-VITAMIN D3 200 UNIT TABLET 1 TABLET: at 17:02

## 2018-03-31 RX ADMIN — TORSEMIDE 20 MG: 20 TABLET ORAL at 08:51

## 2018-03-31 RX ADMIN — TORSEMIDE 20 MG: 20 TABLET ORAL at 17:00

## 2018-03-31 RX ADMIN — IPRATROPIUM BROMIDE AND ALBUTEROL SULFATE 3 ML: .5; 3 SOLUTION RESPIRATORY (INHALATION) at 01:58

## 2018-03-31 RX ADMIN — AMLODIPINE BESYLATE 5 MG: 5 TABLET ORAL at 08:51

## 2018-03-31 RX ADMIN — DOCUSATE SODIUM 100 MG: 100 CAPSULE, LIQUID FILLED ORAL at 08:51

## 2018-03-31 RX ADMIN — Medication 1 TABLET: at 08:51

## 2018-03-31 RX ADMIN — DOCUSATE SODIUM 100 MG: 100 CAPSULE, LIQUID FILLED ORAL at 17:02

## 2018-03-31 RX ADMIN — APIXABAN 5 MG: 5 TABLET, FILM COATED ORAL at 17:02

## 2018-03-31 RX ADMIN — IPRATROPIUM BROMIDE AND ALBUTEROL SULFATE 3 ML: .5; 3 SOLUTION RESPIRATORY (INHALATION) at 15:15

## 2018-03-31 RX ADMIN — METHYLPREDNISOLONE SODIUM SUCCINATE 20 MG: 40 INJECTION, POWDER, FOR SOLUTION INTRAMUSCULAR; INTRAVENOUS at 14:20

## 2018-03-31 RX ADMIN — APIXABAN 5 MG: 5 TABLET, FILM COATED ORAL at 08:52

## 2018-03-31 RX ADMIN — METHYLPREDNISOLONE SODIUM SUCCINATE 20 MG: 40 INJECTION, POWDER, FOR SOLUTION INTRAMUSCULAR; INTRAVENOUS at 21:10

## 2018-03-31 RX ADMIN — IPRATROPIUM BROMIDE AND ALBUTEROL SULFATE 3 ML: .5; 3 SOLUTION RESPIRATORY (INHALATION) at 20:43

## 2018-03-31 RX ADMIN — Medication 1 TABLET: at 17:02

## 2018-03-31 RX ADMIN — CARVEDILOL 6.25 MG: 6.25 TABLET, FILM COATED ORAL at 08:52

## 2018-03-31 RX ADMIN — IPRATROPIUM BROMIDE AND ALBUTEROL SULFATE 3 ML: .5; 3 SOLUTION RESPIRATORY (INHALATION) at 08:31

## 2018-03-31 RX ADMIN — CLOPIDOGREL 75 MG: 75 TABLET, FILM COATED ORAL at 08:51

## 2018-03-31 RX ADMIN — CALCIUM CARBONATE 500 MG (1,250 MG)-VITAMIN D3 200 UNIT TABLET 1 TABLET: at 08:52

## 2018-03-31 RX ADMIN — Medication 400 MG: at 08:52

## 2018-03-31 RX ADMIN — BUDESONIDE AND FORMOTEROL FUMARATE DIHYDRATE 2 PUFF: 160; 4.5 AEROSOL RESPIRATORY (INHALATION) at 20:45

## 2018-03-31 NOTE — RESPIRATORY THERAPY NOTE
Placed patient on BIPAP per documented settings  No dermal abrasion noticed on patients face at this time

## 2018-03-31 NOTE — ASSESSMENT & PLAN NOTE
Creatinine close to baseline  Patient also noted to have slightly higher sodium level today  Will encourage p o  fluid intake

## 2018-03-31 NOTE — PROGRESS NOTES
[facility] Internal Medicine Progress Note  Patient: Gloria Pa 80 y o  female   MRN: [id number]  PCP: Mita Varela DO  Unit/Bed#: [address] [place] [address] Encounter: [id number]  Date Of Visit: 03/31/18    Problem List:    Principal Problem:    Acute respiratory failure with hypoxia and hypercapnia (HCC)  Active Problems:    Toxic metabolic encephalopathy    Asthma with acute exacerbation    CKD (chronic kidney disease) stage 3, GFR 30-59 ml/min    Chronic diastolic CHF (congestive heart failure) (HCC)    Deep vein thrombosis (DVT) of left lower extremity (HCC)    Bradycardia    Essential hypertension    Hyperlipidemia    Aortic stenosis, moderate      Assessment & Plan:    * Acute respiratory failure with hypoxia and hypercapnia (HCC)   Assessment & Plan    Likely due to hyperreactive airways disease, possibly element of COPD as well  Patient has never smoked but was exposed to smokers and also possibly due to 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  Continue supplemental oxygen by nasal cannula  Initially required BiPAP support while in the ICU  hypercarbic respiratory failure may be due to noncompliance with BiPAP at bedtime  Continue BiPAP QHS           Asthma with acute exacerbation   Assessment & Plan    Patient's symptoms likely due to hyperactive airway disease, possible COPD component  Breathing is improving  Continue Symbicort, nebulizer treatments  Continue IV Solu-Medrol 20 mg q 8 hours        Toxic metabolic encephalopathy   Assessment & Plan    Likely due to hypercapnia  Mental status is now back at baseline        Bradycardia   Assessment & Plan    Asymptomatic  Continue Coreg        Deep vein thrombosis (DVT) of left lower extremity ([facility] [address] )   Assessment & Plan    Diagnosed with acute DVT in [place] in 2/2018   Continue Eliquis        Chronic diastolic CHF (congestive heart failure) ([facility] [address] )   Assessment & Plan    Did receive a dose of IV Lasix yesterday  Continue torsemide, Coreg        CKD (chronic kidney disease) stage 3, GFR 30-59 ml/min   Assessment & Plan    Creatinine trended down and appears at baseline  Repeat level in the a m  Aortic stenosis, moderate   Assessment & Plan    Echo from 2018 showed EF of 55 % to 60 % with grade 2 diastolic dysfunction        Hyperlipidemia   Assessment & Plan    LDL from 2017 was 58        Essential hypertension   Assessment & Plan    Continue Norvasc and Coreg              VTE Pharmacologic Prophylaxis:   Pharmacologic: Apixaban (Eliquis)  Mechanical VTE Prophylaxis in Place: Yes    Patient Centered Rounds: I have performed bedside rounds with nursing staff today  Discussions with Specialists or Other Care Team Provider: Yes    Education and Discussions with Family / Patient:Yes    Time Spent for Care: 30 minutes  More than 50% of total time spent on counseling and coordination of care as described above  Current Length of Stay: 2 day(s)    Current Patient Status: Inpatient     Discharge Plan: home vs rehab    Code Status: Level 1 - Full Code    Certification Statement: The patient will continue to require additional inpatient hospital stay due to Acute on chronic hypercapnic respiratory failure and possible COPD requiring IV steroids      Subjective:   States breathing is better  States that "nothing is coming out" while pointing at her chest    Objective:     Vitals:   Temp (24hrs), Av 2 °F (36 8 °C), Min:96 7 °F (35 9 °C), Max:99 1 °F (37 3 °C)    HR:  [58-83] 79  Resp:  [18-24] 19  BP: (138-166)/(67-77) 166/72  SpO2:  [91 %-98 %] 91 %  Body mass index is 29 33 kg/m²  Input and Output Summary (last 24 hours): Intake/Output Summary (Last 24 hours) at 18  Last data filed at 18 9040   Gross per 24 hour   Intake               10 ml   Output              250 ml   Net             -240 ml       Physical Exam:     Physical Exam   Constitutional: She is oriented to person, place, and time  No distress     Hard of hearing   HENT:   Head: Normocephalic and atraumatic  Eyes: EOM are normal  Right eye exhibits no discharge  Left eye exhibits no discharge  No scleral icterus  Neck: Neck supple  No tracheal deviation present  Cardiovascular: Normal rate and regular rhythm  Pulmonary/Chest: Effort normal  No respiratory distress  She has wheezes  She has no rales  Decreased breath sounds   Abdominal: Soft  Bowel sounds are normal  She exhibits no distension  There is no tenderness  Musculoskeletal: She exhibits no edema  Neurological: She is alert and oriented to person, place, and time  No cranial nerve deficit  Skin: Skin is dry  She is not diaphoretic  Psychiatric: She has a normal mood and affect  Additional Data:     Labs:      Results from last 7 days  Lab Units 03/30/18  0501   WBC Thousand/uL 5 30   HEMOGLOBIN g/dL 10 6*   HEMATOCRIT % 34 2*   PLATELETS Thousands/uL 174   NEUTROS PCT % 77*   LYMPHS PCT % 14   MONOS PCT % 8   EOS PCT % 1       Results from last 7 days  Lab Units 03/30/18  0501   SODIUM mmol/L 145   POTASSIUM mmol/L 3 8   CHLORIDE mmol/L 101   CO2 mmol/L 43*   BUN mg/dL 41*   CREATININE mg/dL 1 51*   CALCIUM mg/dL 10 1   GLUCOSE RANDOM mg/dL 105       Results from last 7 days  Lab Units 03/29/18  0755   INR  1 04       * I Have Reviewed All Lab Data Listed Above  * Additional Pertinent Lab Tests Reviewed: All Labs For Current Hospital Admission Reviewed    Imaging:  Xr Chest Portable    Result Date: 3/9/2018  Narrative: CHEST INDICATION: Congestive heart failure COMPARISON:  3/8/2018 EXAM PERFORMED/VIEWS:  XR CHEST PORTABLE FINDINGS: Heart shadow is enlarged but unchanged from prior exam  There is continued evidence of pulmonary congestion, similar to the prior study  Bilateral pleural effusions do appear smaller in size  No pneumothorax is seen Osseous structures appear within normal limits for patient age  Impression: 1    Bilateral pleural effusions appear partially diminished in size 2  Degree of pulmonary congestion appear similar  Workstation performed: [id number]     Xr Chest Portable    Result Date: 3/8/2018  Narrative: CHEST INDICATION:  80-year-old female with  CHF  COMPARISON:  3/6/2018  EXAM PERFORMED/VIEWS:  XR CHEST PORTABLE Images: 1 FINDINGS:  The patient has been extubated during the interim with the enteric feeding tube removed  Thoracic Duarte rods again noted  Heart shadow is enlarged but unchanged from prior exam  Improving aeration with persistent scattered bibasilar atelectasis with diminished central vascular engorgement  Tiny pleural effusions suspected  No pneumothorax  Osseous structures appear within normal limits for patient age  Impression: Improving aeration with resolving central vascular congestion  Persistent bibasilar atelectasis and tiny pleural effusions  Stable cardiomegaly Workstation performed: [id number]     Xr Chest 1 View Portable    Result Date: 3/7/2018  Narrative: CHEST INDICATION: intubation COMPARISON:  One view chest 3/6/2018 EXAM PERFORMED/VIEWS:  XR CHEST PORTABLE FINDINGS:  Endotracheal tube is present, in satisfactory position with its tip above the level of the jerilyn  Enteric tube is present with its tip excluded by collimation below the level of the left hemidiaphragm  Cardiac silhouette is enlarged  Aortic calcification is present  Lung markings are crowded secondary to low lung volumes  Bibasilar subsegmental atelectasis  Prominent central pulmonary vasculature  No pneumothorax or large pleural effusion  Partially visualized thoracolumbar fusion hardware  Impression: Cardiomegaly and mild central pulmonary vascular congestion  Bibasilar subsegmental atelectasis  Endotracheal tube is present, in satisfactory position with its tip above the level of the jerilyn  Enteric tube is present with its tip excluded by collimation below the level of the left hemidiaphragm   Workstation performed: [id number]     [employer] Chest 1 View Portable    Result Date: 3/6/2018  Narrative: CHEST INDICATION: Shortness of breath  COMPARISON:  12/11/2017 EXAM PERFORMED/VIEWS:  XR CHEST PORTABLE FINDINGS: Stable cardiomegaly  Mild elevation of the right hemidiaphragm  No visualized pleural effusion  Pulmonary vascular congestion, primarily in the right lung  No airspace consolidation, pulmonary edema, atelectasis or pneumothorax  Raquel lumbar fixation hardware  Impression: Pulmonary vascular congestion without evidence of interstitial edema  Workstation performed: [id number]     Xr Chest 2 Views    Result Date: 3/29/2018  Narrative: CHEST INDICATION:   sob  COMPARISON:  03/09/2018 EXAM PERFORMED/VIEWS:  XR CHEST PA & LATERAL Images: 3 FINDINGS: Cardiomediastinal silhouette appears enlarged  The lungs are clear  No pneumothorax or pleural effusion  Spinal fusion hardware  Impression: No acute cardiopulmonary disease  Cardiomegaly  Workstation performed: [id number]     Ct Head Without Contrast    Result Date: 3/7/2018  Narrative: CT BRAIN - WITHOUT CONTRAST INDICATION: Altered mental status  COMPARISON:  5/9/2017, and  TECHNIQUE:  CT examination of the brain was performed  In addition to axial images, coronal 2D reformatted images were created and submitted for interpretation  Radiation dose length product (DLP) for this visit:  1238 08 mGy-cm   This examination, like all CT scans performed in the [facility], was performed utilizing techniques to minimize radiation dose exposure, including the use of iterative reconstruction and automated exposure control  IMAGE QUALITY:  Diagnostic  FINDINGS: PARENCHYMA: Partially confluent periventricular and subcortical white matter lesions, consistent with microangiopathic disease  Calcification noted in the globus pallidus  No intracranial mass, mass effect or midline shift  No CT signs of acute infarction  No acute intracranial hemorrhage   VENTRICLES AND EXTRA-AXIAL SPACES: Normal for the patient's age  VISUALIZED ORBITS AND PARANASAL SINUSES:  Unremarkable  CALVARIUM AND EXTRACRANIAL SOFT TISSUES:  Normal      Impression: Microangiopathic disease  No acute intracranial hemorrhage, mass effect or extra-axial collection  Workstation performed: [id number]     Xr Chest Portable Icu    Result Date: 3/30/2018  Narrative: CHEST INDICATION:   dyspnea  COMPARISON:  3/29/2018 EXAM PERFORMED/VIEWS:  XR CHEST PORTABLE ICU FINDINGS: Cardiomediastinal silhouette appears unremarkable  Mild pulmonary edema  Low lung volumes present  Thoracolumbar fusion rods noted  Impression: Stable chest with mild pulmonary edema noted  Workstation performed: [id number]     Imaging Reports Reviewed by myself    Cultures:   Blood Culture:   Lab Results   Component Value Date    BLOODCX No Growth at 48 hrs  03/29/2018    BLOODCX No Growth at 48 hrs  03/29/2018    BLOODCX No Growth After 5 Days  08/27/2017    BLOODCX No Growth After 5 Days   08/27/2017     Urine Culture:   Lab Results   Component Value Date    URINECX No Growth <1000 cfu/mL 03/29/2018    URINECX >100,000 cfu/ml Mixed Contaminants X6 05/09/2017     Sputum Culture: No components found for: SPUTUMCX  Wound Culture: No results found for: WOUNDCULT    Last 24 Hours Medication List:     Current Facility-Administered Medications:  amLODIPine 5 mg Oral Daily MARGOT Miller   apixaban 5 mg Oral BID MARGOT Miller   budesonide-formoterol 2 puff Inhalation Q12H MARGOT Miller   calcium carbonate-vitamin D 1 tablet Oral BID MARGOT Miller   carvedilol 6 25 mg Oral BID With Meals Gabriela Busby PA-C   cholecalciferol 2,000 Units Oral Daily Estel Roosevelt, CRMITZI   clopidogrel 75 mg Oral Daily MARGOT Miller   docusate sodium 100 mg Oral BID MARGOT Miller   ipratropium-albuterol 3 mL Nebulization Q6H MARGOT Miller   magnesium oxide 400 mg Oral BID Magdiel LEHMAN MARGOT Rodgers   methylPREDNISolone sodium succinate 20 mg Intravenous [facility] Iirs Garcia DO   multivitamin-minerals 1 tablet Oral Daily MARGOT Miller   senna-docusate sodium 1 tablet Oral BID MARGOT Miller   torsemide 20 mg Oral BID (diuretic) Neda Gan PA-C        Today, Patient Was Seen By: Jael Carver DO    ** Please Note: Dragon 360 Dictation voice to text software may have been used in the creation of this document   **

## 2018-03-31 NOTE — PLAN OF CARE
DISCHARGE PLANNING     Discharge to home or other facility with appropriate resources Progressing        DISCHARGE PLANNING - CARE MANAGEMENT     Discharge to post-acute care or home with appropriate resources Progressing        INFECTION - ADULT     Absence or prevention of progression during hospitalization Progressing        Knowledge Deficit     Patient/family/caregiver demonstrates understanding of disease process, treatment plan, medications, and discharge instructions Progressing        PAIN - ADULT     Verbalizes/displays adequate comfort level or baseline comfort level Progressing        Potential for Falls     Patient will remain free of falls Progressing        Prexisting or High Potential for Compromised Skin Integrity     Skin integrity is maintained or improved Progressing        RESPIRATORY - ADULT     Achieves optimal ventilation and oxygenation Progressing        SAFETY ADULT     Maintain or return to baseline ADL function Progressing     Maintain or return mobility status to optimal level Progressing

## 2018-03-31 NOTE — PROGRESS NOTES
Progress Note - Pulmonary   Civista 80 y o  female MRN: [id number]  Unit/Bed#: [address] [place] [address] Encounter: [id number]      Assessment/Plan:     1  Acute on Chronic Hypercapnic Respiratory Failure:  Currently tolerating BIPAP 15/5 with 35% O2  Continue to use it QHS    2  ?COPD vs Hyperactive airway disease:  Currently much improved  Will further taper steroids to 20 mg iv q12  Switch to prednisone in am  Viral panel / procalcitonin still pending  Continue with acapella to aid in pulmonary toilet         ______________________________________________________________________    Subjective: Pt seen and examined at bedside  Tele Events:     Vitals:   Temp:  [96 7 °F (35 9 °C)-98 9 °F (37 2 °C)] 96 7 °F (35 9 °C)  HR:  [58-83] 58  Resp:  [18-35] 18  BP: (116-174)/(67-81) 138/77  FiO2 (%):  [35] 35  Weight (last 2 days)     Date/Time   Weight    03/29/18 1100  77 5 (170 86)    03/29/18 0715  73 5 (162)            Oxygen Therapy  SpO2: 98 %  O2 Flow Rate (L/min): 2 L/min    IV Infusions:       Nutrition:        Diet Orders            Start     Ordered    03/30/18 1728  Room Service  Once     Question:  Type of Service  Answer:  Room Service - Appropriate with Assistance    03/30/18 1728    03/29/18 1624  Diet Cardiac; Cardiac TLC 2 3 GM NA  Diet effective now     Question Answer Comment   Diet Type Cardiac    Cardiac Cardiac TLC 2 3 GM NA    RD to adjust diet per protocol? No        03/29/18 1624          Ins/Outs:   I/O       03/29 0701 - 03/30 0700 03/30 0701 - 03/31 0700 03/31 0701 - 04/01 0700    P  O  110 560     I V  (mL/kg)  10 (0 1)     Total Intake(mL/kg) 110 (1 4) 570 (7 4)     Urine (mL/kg/hr) 1075 (0 6) 900 (0 5)     Total Output 1075 900      Net -965 -330             Unmeasured Urine Occurrence 3 x            Lines/Drains:  Invasive Devices     Peripheral Intravenous Line            Peripheral IV 03/30/18 Right Hand less than 1 day                 Active medications:  Scheduled Meds:  Current Facility-Administered Medications:  amLODIPine 5 mg Oral Daily Bree Rodgers, DAYNENP   apixaban 5 mg Oral BID Bree Rodgers, MARGOT   budesonide-formoterol 2 puff Inhalation Q12H Bree Rodgers, DAYNENP   calcium carbonate-vitamin D 1 tablet Oral BID Bree Rodgers, CRNP   carvedilol 6 25 mg Oral BID With Meals Norma Mosley PA-C   cholecalciferol 2,000 Units Oral Daily Anthony No, CRNP   clopidogrel 75 mg Oral Daily Breerosa isela Rodgers, CRNP   docusate sodium 100 mg Oral BID Bree Rodgers, CRNP   ipratropium-albuterol 3 mL Nebulization Q6H Bree Rodgers, CRNP   magnesium oxide 400 mg Oral BID Bree Rodgers, CRNP   methylPREDNISolone sodium succinate 20 mg Intravenous [facility] Maty Blanks, DO   multivitamin-minerals 1 tablet Oral Daily Bree Rodgers, DAYNENP   senna-docusate sodium 1 tablet Oral BID Bree Rodgers, DAYNENP   torsemide 20 mg Oral BID (diuretic) Norma Mosley PA-C     PRN Meds:   ____________________________________________________________________      Physical Exam        ____________________________________________________________________    Labs:   CBC:   Results from last 7 days  Lab Units 03/30/18  0501 03/29/18  0755   WBC Thousand/uL 5 30 5 30   HEMOGLOBIN g/dL 10 6* 11 5*   HEMATOCRIT % 34 2* 36 6*   MCV fL 96 96   PLATELETS Thousands/uL 174 178     CMP:   Results from last 7 days  Lab Units 03/30/18  0501 03/29/18  0745   SODIUM mmol/L 145 140   POTASSIUM mmol/L 3 8 3 7   CHLORIDE mmol/L 101 97*   CO2 mmol/L 43* 43*   BUN mg/dL 41* 48*   CREATININE mg/dL 1 51* 1 92*   GLUCOSE RANDOM mg/dL 105 140   CALCIUM mg/dL 10 1 9 2   EGFR ml/min/1 73sq m 30 23     Magnesium:   Results from last 7 days  Lab Units 03/30/18  0501   MAGNESIUM mg/dL 2 8*     Phosphorous:   Results from last 7 days  Lab Units 03/30/18  0501   PHOSPHORUS mg/dL 4 3*     Troponin:   Results from last 7 days  Lab Units 03/29/18  0755   TROPONIN I ng/mL 0 02 PT/INR:   Results from last 7 days  Lab Units 03/29/18  0755   PTT seconds 25   INR  1 04     Lactic Acid:   Results from last 7 days  Lab Units 03/29/18  0755   LACTIC ACID mmol/L 0 8     BNP:   Results from last 7 days  Lab Units 03/29/18  0745   NT-PRO BNP pg/mL 1,188*     TSH:     Procalcitonin: Invalid input(s): PROCALCITONIN          Micro: Lab Results   Component Value Date    BLOODCX No Growth at 48 hrs  03/29/2018    BLOODCX No Growth at 48 hrs  03/29/2018    BLOODCX No Growth After 5 Days  08/27/2017    BLOODCX No Growth After 5 Days  08/27/2017    URINECX No Growth <1000 cfu/mL 03/29/2018    URINECX >100,000 cfu/ml Mixed Contaminants X6 05/09/2017    MRSACULTURE  03/29/2018     No Methicillin Resistant Staphlyococcus aureus (MRSA) isolated      No results for input(s): INFLUAPCR, INFLUBPCR, RSVPCR in the last 72 hours      Invalid input(s): LEGIONELLAURINARYANTIGEN        Code Status: Level 1 - Full Code

## 2018-03-31 NOTE — ASSESSMENT & PLAN NOTE
Likely due to hyperactive airways disease, possibly element of COPD as well  Patient might also have a component of restrictive lung disease secondary to andrés placement for kyphoscoliosis  Continue supplemental oxygen by nasal cannula  Patient's home O2 assessment revealed oxygen saturation of 85% with exertion on room air which improved to 92% with 2 liters oxygen  Patient's room air oxygen saturation at rest is 92% which improved to 97% with 2 liters oxygen  Initially required BiPAP support while in the ICU  hypercarbic respiratory failure may be due to noncompliance with BiPAP at bedtime  Continue BiPAP 15/5 with 35% FiO2 QHS    Discussed the importance of using BiPAP when sleeping with the patient  Continue Solu-Medrol 20 milligram IV q 12 hours  Will get ABG to assess elevated CO2 on BMP

## 2018-04-01 LAB
ANION GAP SERPL CALCULATED.3IONS-SCNC: 3 MMOL/L (ref 4–13)
BUN SERPL-MCNC: 56 MG/DL (ref 5–25)
CALCIUM SERPL-MCNC: 10 MG/DL (ref 8.3–10.1)
CHLORIDE SERPL-SCNC: 92 MMOL/L (ref 100–108)
CO2 SERPL-SCNC: 44 MMOL/L (ref 21–32)
CREAT SERPL-MCNC: 1.6 MG/DL (ref 0.6–1.3)
ERYTHROCYTE [DISTWIDTH] IN BLOOD BY AUTOMATED COUNT: 13.9 % (ref 11.6–15.1)
GFR SERPL CREATININE-BSD FRML MDRD: 28 ML/MIN/1.73SQ M
GLUCOSE SERPL-MCNC: 143 MG/DL (ref 65–140)
HCT VFR BLD AUTO: 36.7 % (ref 37–47)
HGB BLD-MCNC: 11.5 G/DL (ref 12–16)
MAGNESIUM SERPL-MCNC: 2.7 MG/DL (ref 1.6–2.6)
MCH RBC QN AUTO: 29.9 PG (ref 27–31)
MCHC RBC AUTO-ENTMCNC: 31.3 G/DL (ref 31.4–37.4)
MCV RBC AUTO: 96 FL (ref 82–98)
PHOSPHATE SERPL-MCNC: 4.3 MG/DL (ref 2.3–4.1)
PLATELET # BLD AUTO: 167 THOUSANDS/UL (ref 130–400)
PMV BLD AUTO: 8.8 FL (ref 8.9–12.7)
POTASSIUM SERPL-SCNC: 4.5 MMOL/L (ref 3.5–5.3)
RBC # BLD AUTO: 3.84 MILLION/UL (ref 4.2–5.4)
SODIUM SERPL-SCNC: 139 MMOL/L (ref 136–145)
WBC # BLD AUTO: 9.8 THOUSAND/UL (ref 4.8–10.8)

## 2018-04-01 PROCEDURE — 94640 AIRWAY INHALATION TREATMENT: CPT

## 2018-04-01 PROCEDURE — 94660 CPAP INITIATION&MGMT: CPT

## 2018-04-01 PROCEDURE — 84100 ASSAY OF PHOSPHORUS: CPT | Performed by: FAMILY MEDICINE

## 2018-04-01 PROCEDURE — 83735 ASSAY OF MAGNESIUM: CPT | Performed by: FAMILY MEDICINE

## 2018-04-01 PROCEDURE — 99232 SBSQ HOSP IP/OBS MODERATE 35: CPT | Performed by: FAMILY MEDICINE

## 2018-04-01 PROCEDURE — 85027 COMPLETE CBC AUTOMATED: CPT | Performed by: FAMILY MEDICINE

## 2018-04-01 PROCEDURE — 94760 N-INVAS EAR/PLS OXIMETRY 1: CPT

## 2018-04-01 PROCEDURE — 80048 BASIC METABOLIC PNL TOTAL CA: CPT | Performed by: FAMILY MEDICINE

## 2018-04-01 PROCEDURE — 97110 THERAPEUTIC EXERCISES: CPT

## 2018-04-01 PROCEDURE — 99231 SBSQ HOSP IP/OBS SF/LOW 25: CPT | Performed by: INTERNAL MEDICINE

## 2018-04-01 RX ORDER — METHYLPREDNISOLONE SODIUM SUCCINATE 40 MG/ML
20 INJECTION, POWDER, LYOPHILIZED, FOR SOLUTION INTRAMUSCULAR; INTRAVENOUS EVERY 12 HOURS SCHEDULED
Status: DISCONTINUED | OUTPATIENT
Start: 2018-04-01 | End: 2018-04-04 | Stop reason: HOSPADM

## 2018-04-01 RX ADMIN — METHYLPREDNISOLONE SODIUM SUCCINATE 20 MG: 40 INJECTION, POWDER, FOR SOLUTION INTRAMUSCULAR; INTRAVENOUS at 21:08

## 2018-04-01 RX ADMIN — TORSEMIDE 20 MG: 20 TABLET ORAL at 09:51

## 2018-04-01 RX ADMIN — Medication 400 MG: at 17:24

## 2018-04-01 RX ADMIN — APIXABAN 5 MG: 5 TABLET, FILM COATED ORAL at 17:23

## 2018-04-01 RX ADMIN — Medication 400 MG: at 09:50

## 2018-04-01 RX ADMIN — CALCIUM CARBONATE 500 MG (1,250 MG)-VITAMIN D3 200 UNIT TABLET 1 TABLET: at 09:48

## 2018-04-01 RX ADMIN — Medication 1 TABLET: at 17:25

## 2018-04-01 RX ADMIN — CALCIUM CARBONATE 500 MG (1,250 MG)-VITAMIN D3 200 UNIT TABLET 1 TABLET: at 17:23

## 2018-04-01 RX ADMIN — IPRATROPIUM BROMIDE AND ALBUTEROL SULFATE 3 ML: .5; 3 SOLUTION RESPIRATORY (INHALATION) at 21:12

## 2018-04-01 RX ADMIN — CLOPIDOGREL 75 MG: 75 TABLET, FILM COATED ORAL at 09:49

## 2018-04-01 RX ADMIN — AMLODIPINE BESYLATE 5 MG: 5 TABLET ORAL at 09:44

## 2018-04-01 RX ADMIN — VITAMIN D, TAB 1000IU (100/BT) 2000 UNITS: 25 TAB at 09:49

## 2018-04-01 RX ADMIN — Medication 1 TABLET: at 09:51

## 2018-04-01 RX ADMIN — DOCUSATE SODIUM 100 MG: 100 CAPSULE, LIQUID FILLED ORAL at 09:50

## 2018-04-01 RX ADMIN — IPRATROPIUM BROMIDE AND ALBUTEROL SULFATE 3 ML: .5; 3 SOLUTION RESPIRATORY (INHALATION) at 14:37

## 2018-04-01 RX ADMIN — CARVEDILOL 6.25 MG: 6.25 TABLET, FILM COATED ORAL at 17:24

## 2018-04-01 RX ADMIN — CARVEDILOL 6.25 MG: 6.25 TABLET, FILM COATED ORAL at 09:48

## 2018-04-01 RX ADMIN — Medication 1 TABLET: at 09:50

## 2018-04-01 RX ADMIN — BUDESONIDE AND FORMOTEROL FUMARATE DIHYDRATE 2 PUFF: 160; 4.5 AEROSOL RESPIRATORY (INHALATION) at 09:45

## 2018-04-01 RX ADMIN — TORSEMIDE 20 MG: 20 TABLET ORAL at 17:24

## 2018-04-01 RX ADMIN — IPRATROPIUM BROMIDE AND ALBUTEROL SULFATE 3 ML: .5; 3 SOLUTION RESPIRATORY (INHALATION) at 08:12

## 2018-04-01 RX ADMIN — DOCUSATE SODIUM 100 MG: 100 CAPSULE, LIQUID FILLED ORAL at 17:24

## 2018-04-01 RX ADMIN — APIXABAN 5 MG: 5 TABLET, FILM COATED ORAL at 09:45

## 2018-04-01 RX ADMIN — METHYLPREDNISOLONE SODIUM SUCCINATE 20 MG: 40 INJECTION, POWDER, FOR SOLUTION INTRAMUSCULAR; INTRAVENOUS at 05:20

## 2018-04-01 NOTE — PHYSICAL THERAPY NOTE
PT TREATMENT     04/01/18 1540   Pain Assessment   Pain Assessment No/denies pain   Restrictions/Precautions   Other Precautions Chair Alarm; Bed Alarm; Fall Risk;O2   General   Chart Reviewed Yes   Family/Caregiver Present No   Cognition   Arousal/Participation Cooperative   Subjective   Subjective patient reports possible DC to rehab today   Transfers   Sit to Stand 4  Minimal assistance   Additional items Assist x 1   Stand to Sit 4  Minimal assistance   Additional items Assist x 1   Ambulation/Elevation   Gait Assistance 4  Minimal assist   Additional items Assist x 1   Assistive Device Rolling walker   Distance 50 feet with change in direction , as well as standing marching x20 reps for balance and endurance training   Exercises   Quad Sets Sitting;15 reps;Bilateral   Hip Flexion Sitting;20 reps;Bilateral   Hip Abduction Sitting;10 reps;Bilateral   Knee AROM Long Arc Quad Sitting;20 reps;Bilateral   Ankle Pumps Sitting;20 reps;Bilateral   Marching Standing;20 reps;Bilateral   Balance training  sidestepping and backward walking completed   Assessment   Assessment patient cooperative and motivated to return home  patient continuing with decreased SpO2 at 88% on 2 liters with gait and recovered to 94% rest  Patient will benefit from continued PT with progression as tolerated   Plan   Treatment/Interventions ADL retraining;Functional transfer training;LE strengthening/ROM; Elevations; Therapeutic exercise; Endurance training;Patient/family training;Equipment eval/education; Bed mobility;Gait training   PT Frequency 5x/wk   Recommendation   Recommendation (str)     Jabari Beckford [id number]

## 2018-04-01 NOTE — PLAN OF CARE

## 2018-04-01 NOTE — PROGRESS NOTES
Progress Note - Pulmonary   Jeanie Ray 80 y o  female MRN: [id number]  Unit/Bed#: [address] [place] [address] Encounter: [id number]      Assessment/Plan:     1  Acute on Chronic Hypercapnic Respiratory Failure:  Currently tolerating BIPAP 15/5 with 35% O2  Continue to use it QHS  Will try to setup Bipap for home on discharge     2  ?COPD vs Hyperactive airway disease:  Currently much improved  Will further taper steroids to 20 mg iv q12  Switch to prednisone heber in am  Viral panel / procalcitonin still pending  Continue with acapella to aid in pulmonary toilet         ______________________________________________________________________    Subjective: Pt seen and examined at bedside  Tele Events:     Vitals:   Temp:  [98 9 °F (37 2 °C)-99 1 °F (37 3 °C)] 98 9 °F (37 2 °C)  HR:  [69-79] 70  Resp:  [18-20] 18  BP: (145-166)/(68-72) 145/68  FiO2 (%):  [35] 35  Weight (last 2 days)     None        Oxygen Therapy  SpO2: 98 %  O2 Flow Rate (L/min): 2 L/min    IV Infusions:       Nutrition:        Diet Orders            Start     Ordered    03/30/18 1728  Room Service  Once     Question:  Type of Service  Answer:  Room Service - Appropriate with Assistance    03/30/18 1728    03/29/18 1624  Diet Cardiac; Cardiac TLC 2 3 GM NA  Diet effective now     Question Answer Comment   Diet Type Cardiac    Cardiac Cardiac TLC 2 3 GM NA    RD to adjust diet per protocol? No        03/29/18 1624          Ins/Outs:   I/O       03/30 0701 - 03/31 0700 03/31 0701 - 04/01 0700 04/01 0701 - 04/02 0700    P  O  560      I V  (mL/kg) 10 (0 1)      Total Intake(mL/kg) 570 (7 4)      Urine (mL/kg/hr) 900 (0 5) 250 (0 1)     Total Output 900 250      Net -330 -250                   Lines/Drains:  Invasive Devices     Peripheral Intravenous Line            Peripheral IV 03/31/18 Right Hand 1 day                 Active medications:  Scheduled Meds:  Current Facility-Administered Medications:  amLODIPine 5 mg Oral Daily MARGOT Mendoza apixaban 5 mg Oral BID Christie Rodgers, MARGOT   budesonide-formoterol 2 puff Inhalation Q12H Bree Rodgers, MARGOT   calcium carbonate-vitamin D 1 tablet Oral BID Bree Rodgers, MARGOT   carvedilol 6 25 mg Oral BID With Meals Renzo Pearson PA-C   cholecalciferol 2,000 Units Oral Daily Guerrero Richard, MARGOT   clopidogrel 75 mg Oral Daily Bree Rodgers, MARGOT   docusate sodium 100 mg Oral BID Bree Rodgers, MARGOT   ipratropium-albuterol 3 mL Nebulization Q6H Bree Rodgers, MARGOT   magnesium oxide 400 mg Oral BID Bree Rodgers, MARGOT   methylPREDNISolone sodium succinate 20 mg Intravenous Q12H [facility] John Feliciano MD   multivitamin-minerals 1 tablet Oral Daily Bree Rodgers, MARGOT   senna-docusate sodium 1 tablet Oral BID Bree Rodgers, MARGOT   torsemide 20 mg Oral BID (diuretic) Renzo Pearson PA-C     PRN Meds:   ____________________________________________________________________      Physical Exam        ____________________________________________________________________    Labs:   CBC:   Results from last 7 days  Lab Units 03/30/18  0501 03/29/18  0755   WBC Thousand/uL 5 30 5 30   HEMOGLOBIN g/dL 10 6* 11 5*   HEMATOCRIT % 34 2* 36 6*   MCV fL 96 96   PLATELETS Thousands/uL 174 178     CMP:   Results from last 7 days  Lab Units 04/01/18  0722 03/30/18  0501 03/29/18  0745   SODIUM mmol/L 139 145 140   POTASSIUM mmol/L 4 5 3 8 3 7   CHLORIDE mmol/L 92* 101 97*   CO2 mmol/L 44* 43* 43*   BUN mg/dL 56* 41* 48*   CREATININE mg/dL 1 60* 1 51* 1 92*   GLUCOSE RANDOM mg/dL 143* 105 140   CALCIUM mg/dL 10 0 10 1 9 2   EGFR ml/min/1 73sq m 28 30 23     Magnesium:   Results from last 7 days  Lab Units 04/01/18  0722   MAGNESIUM mg/dL 2 7*     Phosphorous:   Results from last 7 days  Lab Units 04/01/18  0722   PHOSPHORUS mg/dL 4 3*     Troponin:   Results from last 7 days  Lab Units 03/29/18  0755   TROPONIN I ng/mL 0 02     PT/INR:   Results from last 7 days  Lab Units 03/29/18  0755   PTT seconds 25   INR  1 04     Lactic Acid:   Results from last 7 days  Lab Units 03/29/18  0755   LACTIC ACID mmol/L 0 8     BNP:   Results from last 7 days  Lab Units 03/29/18  0745   NT-PRO BNP pg/mL 1,188*     TSH:     Procalcitonin: Invalid input(s): PROCALCITONIN            Micro: Lab Results   Component Value Date    BLOODCX No Growth at 48 hrs  03/29/2018    BLOODCX No Growth at 48 hrs  03/29/2018    BLOODCX No Growth After 5 Days  08/27/2017    BLOODCX No Growth After 5 Days  08/27/2017    URINECX No Growth <1000 cfu/mL 03/29/2018    URINECX >100,000 cfu/ml Mixed Contaminants X6 05/09/2017    MRSACULTURE  03/29/2018     No Methicillin Resistant Staphlyococcus aureus (MRSA) isolated      No results for input(s): INFLUAPCR, INFLUBPCR, RSVPCR in the last 72 hours      Invalid input(s): LEGIONELLAURINARYANTIGEN        Code Status: Level 1 - Full Code

## 2018-04-01 NOTE — ASSESSMENT & PLAN NOTE
Possible COPD component with acute exacerbation  Breathing is improving  Continue Symbicort, nebulizer treatments      Continue IV Solu-Medrol 20 mg q 12 hours

## 2018-04-01 NOTE — PROGRESS NOTES
[facility] Internal Medicine Progress Note  Patient: [facility] 80 y o  female   MRN: [id number]  PCP: Faith Alvarez DO  Unit/Bed#: [address] [place] [address] Encounter: [id number]  Date Of Visit: 04/01/18    Problem List:    Principal Problem:    Acute respiratory failure with hypoxia and hypercapnia (HCC)  Active Problems:    Toxic metabolic encephalopathy    Hyperactive airway disease    CKD (chronic kidney disease) stage 3, GFR 30-59 ml/min    Chronic diastolic CHF (congestive heart failure) (HCC)    Deep vein thrombosis (DVT) of left lower extremity (HCC)    Bradycardia    Essential hypertension    Hyperlipidemia    Aortic stenosis, moderate      Assessment & Plan:    * Acute respiratory failure with hypoxia and hypercapnia (HCC)   Assessment & Plan    Likely due to hyperactive airways disease, possibly element of COPD as well  Patient has never smoked but was exposed to smokers and also possibly due to 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  Continue supplemental oxygen by nasal cannula  Initially required BiPAP support while in the ICU  hypercarbic respiratory failure may be due to noncompliance with BiPAP at bedtime  Continue BiPAP QHS  Discussed the importance of using BiPAP when sleeping with the patient           Hyperactive airway disease   Assessment & Plan    Possible COPD component with acute exacerbation  Breathing is improving  Continue Symbicort, nebulizer treatments  Decreased IV Solu-Medrol to 20 mg q 12 hours        Toxic metabolic encephalopathy   Assessment & Plan    Likely due to hypercapnia  Mental status is now back at baseline        Bradycardia   Assessment & Plan    Asymptomatic  Bradycardia has improved  Continue Coreg        Deep vein thrombosis (DVT) of left lower extremity ([facility] [address] )   Assessment & Plan    Diagnosed with acute DVT in [place] in 2/2018     Continue Eliquis        Chronic diastolic CHF (congestive heart failure) ([facility] [address] )   Assessment & Plan    Did receive a dose of IV Lasix in ICU  Continue torsemide, Coreg        CKD (chronic kidney disease) stage 3, GFR 30-59 ml/min   Assessment & Plan    Creatinine trended down and appears at baseline  Repeat level in the a m        Aortic stenosis, moderate   Assessment & Plan    Echo from 2018 showed EF of 55 % to 60 % with grade 2 diastolic dysfunction        Hyperlipidemia   Assessment & Plan    LDL from 2017 was 58        Essential hypertension   Assessment & Plan    Continue Norvasc and Coreg              VTE Pharmacologic Prophylaxis:   Pharmacologic: Apixaban (Eliquis)  Mechanical VTE Prophylaxis in Place: Yes    Patient Centered Rounds: I have performed bedside rounds with nursing staff today  Discussions with Specialists or Other Care Team Provider: Yes    Education and Discussions with Family / Patient:Yes    Time Spent for Care: 30 min  More than 50% of total time spent on counseling and coordination of care as described above  Current Length of Stay: 3 day(s)    Current Patient Status: Inpatient     Discharge Plan: Rehab    Code Status: Level 1 - Full Code    Certification Statement: The patient will continue to require additional inpatient hospital stay due to Acute on chronic hypercapnic respiratory failure and possible COPD requiring IV steroids      Subjective:     Was not able to tolerate the BiPAP overnight for too long  Reports cough    Objective:     Vitals:   Temp (24hrs), Av 3 °F (36 8 °C), Min:97 2 °F (36 2 °C), Max:99 1 °F (37 3 °C)    HR:  [67-79] 68  Resp:  [18-20] 18  BP: (145-168)/(68-83) 151/68  SpO2:  [91 %-98 %] 98 %  Body mass index is 29 33 kg/m²  Input and Output Summary (last 24 hours):     No intake or output data in the 24 hours ending 18 1343    Physical Exam:     Physical Exam   Constitutional: She is oriented to person, place, and time  No distress  Hard of hearing   HENT:   Head: Normocephalic and atraumatic     Eyes: EOM are normal  Right eye exhibits no discharge  Left eye exhibits no discharge  No scleral icterus  Neck: Neck supple  No tracheal deviation present  Cardiovascular: Normal rate and regular rhythm  Pulmonary/Chest: Effort normal  No respiratory distress  She has wheezes  She has no rales  Decreased breath sounds   Abdominal: Soft  Bowel sounds are normal  She exhibits no distension  There is no tenderness  Musculoskeletal: She exhibits no edema  Neurological: She is alert and oriented to person, place, and time  No cranial nerve deficit  Skin: Skin is dry  She is not diaphoretic  Psychiatric: She has a normal mood and affect  Additional Data:     Labs:      Results from last 7 days  Lab Units 04/01/18  1146 03/30/18  0501   WBC Thousand/uL 9 80 5 30   HEMOGLOBIN g/dL 11 5* 10 6*   HEMATOCRIT % 36 7* 34 2*   PLATELETS Thousands/uL 167 174   NEUTROS PCT %  --  77*   LYMPHS PCT %  --  14   MONOS PCT %  --  8   EOS PCT %  --  1       Results from last 7 days  Lab Units 04/01/18  0722   SODIUM mmol/L 139   POTASSIUM mmol/L 4 5   CHLORIDE mmol/L 92*   CO2 mmol/L 44*   BUN mg/dL 56*   CREATININE mg/dL 1 60*   CALCIUM mg/dL 10 0   GLUCOSE RANDOM mg/dL 143*       Results from last 7 days  Lab Units 03/29/18  0755   INR  1 04       * I Have Reviewed All Lab Data Listed Above  * Additional Pertinent Lab Tests Reviewed: All Labs For Current Hospital Admission Reviewed    Imaging:  Xr Chest Portable    Result Date: 3/9/2018  Narrative: CHEST INDICATION: Congestive heart failure COMPARISON:  3/8/2018 EXAM PERFORMED/VIEWS:  XR CHEST PORTABLE FINDINGS: Heart shadow is enlarged but unchanged from prior exam  There is continued evidence of pulmonary congestion, similar to the prior study  Bilateral pleural effusions do appear smaller in size  No pneumothorax is seen Osseous structures appear within normal limits for patient age  Impression: 1  Bilateral pleural effusions appear partially diminished in size 2    Degree of pulmonary congestion appear similar  Workstation performed: [id number]     Xr Chest Portable    Result Date: 3/8/2018  Narrative: CHEST INDICATION:  70-year-old female with  CHF  COMPARISON:  3/6/2018  EXAM PERFORMED/VIEWS:  XR CHEST PORTABLE Images: 1 FINDINGS:  The patient has been extubated during the interim with the enteric feeding tube removed  Thoracic Duarte rods again noted  Heart shadow is enlarged but unchanged from prior exam  Improving aeration with persistent scattered bibasilar atelectasis with diminished central vascular engorgement  Tiny pleural effusions suspected  No pneumothorax  Osseous structures appear within normal limits for patient age  Impression: Improving aeration with resolving central vascular congestion  Persistent bibasilar atelectasis and tiny pleural effusions  Stable cardiomegaly Workstation performed: [id number]     Xr Chest 1 View Portable    Result Date: 3/7/2018  Narrative: CHEST INDICATION: intubation COMPARISON:  One view chest 3/6/2018 EXAM PERFORMED/VIEWS:  XR CHEST PORTABLE FINDINGS:  Endotracheal tube is present, in satisfactory position with its tip above the level of the jerilyn  Enteric tube is present with its tip excluded by collimation below the level of the left hemidiaphragm  Cardiac silhouette is enlarged  Aortic calcification is present  Lung markings are crowded secondary to low lung volumes  Bibasilar subsegmental atelectasis  Prominent central pulmonary vasculature  No pneumothorax or large pleural effusion  Partially visualized thoracolumbar fusion hardware  Impression: Cardiomegaly and mild central pulmonary vascular congestion  Bibasilar subsegmental atelectasis  Endotracheal tube is present, in satisfactory position with its tip above the level of the jerilyn  Enteric tube is present with its tip excluded by collimation below the level of the left hemidiaphragm   Workstation performed: [id number]     Xr Chest 1 View Portable    Result Date: 3/6/2018  Narrative: CHEST INDICATION: Shortness of breath  COMPARISON:  12/11/2017 EXAM PERFORMED/VIEWS:  XR CHEST PORTABLE FINDINGS: Stable cardiomegaly  Mild elevation of the right hemidiaphragm  No visualized pleural effusion  Pulmonary vascular congestion, primarily in the right lung  No airspace consolidation, pulmonary edema, atelectasis or pneumothorax  Raquel lumbar fixation hardware  Impression: Pulmonary vascular congestion without evidence of interstitial edema  Workstation performed: [id number]     Xr Chest 2 Views    Result Date: 3/29/2018  Narrative: CHEST INDICATION:   sob  COMPARISON:  03/09/2018 EXAM PERFORMED/VIEWS:  XR CHEST PA & LATERAL Images: 3 FINDINGS: Cardiomediastinal silhouette appears enlarged  The lungs are clear  No pneumothorax or pleural effusion  Spinal fusion hardware  Impression: No acute cardiopulmonary disease  Cardiomegaly  Workstation performed: [id number]     Ct Head Without Contrast    Result Date: 3/7/2018  Narrative: CT BRAIN - WITHOUT CONTRAST INDICATION: Altered mental status  COMPARISON:  5/9/2017, and  TECHNIQUE:  CT examination of the brain was performed  In addition to axial images, coronal 2D reformatted images were created and submitted for interpretation  Radiation dose length product (DLP) for this visit:  1238 08 mGy-cm   This examination, like all CT scans performed in the [facility], was performed utilizing techniques to minimize radiation dose exposure, including the use of iterative reconstruction and automated exposure control  IMAGE QUALITY:  Diagnostic  FINDINGS: PARENCHYMA: Partially confluent periventricular and subcortical white matter lesions, consistent with microangiopathic disease  Calcification noted in the globus pallidus  No intracranial mass, mass effect or midline shift  No CT signs of acute infarction  No acute intracranial hemorrhage  VENTRICLES AND EXTRA-AXIAL SPACES:  Normal for the patient's age  VISUALIZED ORBITS AND PARANASAL SINUSES:  Unremarkable  CALVARIUM AND EXTRACRANIAL SOFT TISSUES:  Normal      Impression: Microangiopathic disease  No acute intracranial hemorrhage, mass effect or extra-axial collection  Workstation performed: [id number]     Xr Chest Portable Icu    Result Date: 3/30/2018  Narrative: CHEST INDICATION:   dyspnea  COMPARISON:  3/29/2018 EXAM PERFORMED/VIEWS:  XR CHEST PORTABLE ICU FINDINGS: Cardiomediastinal silhouette appears unremarkable  Mild pulmonary edema  Low lung volumes present  Thoracolumbar fusion rods noted  Impression: Stable chest with mild pulmonary edema noted  Workstation performed: [id number]     Imaging Reports Reviewed by myself    Cultures:   Blood Culture:   Lab Results   Component Value Date    BLOODCX No Growth at 72 hrs  03/29/2018    BLOODCX No Growth at 72 hrs  03/29/2018    BLOODCX No Growth After 5 Days  08/27/2017    BLOODCX No Growth After 5 Days   08/27/2017     Urine Culture:   Lab Results   Component Value Date    URINECX No Growth <1000 cfu/mL 03/29/2018    URINECX >100,000 cfu/ml Mixed Contaminants X6 05/09/2017     Sputum Culture: No components found for: SPUTUMCX  Wound Culture: No results found for: WOUNDCULT    Last 24 Hours Medication List:     Current Facility-Administered Medications:  amLODIPine 5 mg Oral Daily MARGOT Miller   apixaban 5 mg Oral BID MARGOT Miller   budesonide-formoterol 2 puff Inhalation Q12H MARGOT Miller   calcium carbonate-vitamin D 1 tablet Oral BID MARGOT Miller   carvedilol 6 25 mg Oral BID With Meals Lenard Melton PA-C   cholecalciferol 2,000 Units Oral Daily MARGOT Kapadia   clopidogrel 75 mg Oral Daily BreeMARGOT Zuniga   docusate sodium 100 mg Oral BID MARGOT Miller   ipratropium-albuterol 3 mL Nebulization Q6H MARGOT Miller   magnesium oxide 400 mg Oral BID MARGOT Kapadia methylPREDNISolone sodium succinate 20 mg Intravenous Q12H [facility] Calixto Griffith MD   multivitamin-minerals 1 tablet Oral Daily MARGOT Miller   senna-docusate sodium 1 tablet Oral BID MARGOT Miller   torsemide 20 mg Oral BID (diuretic) Moe Russell PA-C        Today, Patient Was Seen By: Maureen Garcia DO    ** Please Note: Dragon 360 Dictation voice to text software may have been used in the creation of this document   **

## 2018-04-02 LAB
BUN SERPL-MCNC: 65 MG/DL (ref 5–25)
CALCIUM SERPL-MCNC: 9.8 MG/DL (ref 8.3–10.1)
CHLORIDE SERPL-SCNC: 95 MMOL/L (ref 100–108)
CO2 SERPL-SCNC: >45 MMOL/L (ref 21–32)
CREAT SERPL-MCNC: 1.89 MG/DL (ref 0.6–1.3)
GFR SERPL CREATININE-BSD FRML MDRD: 23 ML/MIN/1.73SQ M
GLUCOSE SERPL-MCNC: 180 MG/DL (ref 65–140)
MAGNESIUM SERPL-MCNC: 2.7 MG/DL (ref 1.6–2.6)
PHOSPHATE SERPL-MCNC: 3.6 MG/DL (ref 2.3–4.1)
POTASSIUM SERPL-SCNC: 3.3 MMOL/L (ref 3.5–5.3)
SODIUM SERPL-SCNC: 145 MMOL/L (ref 136–145)

## 2018-04-02 PROCEDURE — 94660 CPAP INITIATION&MGMT: CPT

## 2018-04-02 PROCEDURE — 97110 THERAPEUTIC EXERCISES: CPT

## 2018-04-02 PROCEDURE — 83735 ASSAY OF MAGNESIUM: CPT | Performed by: FAMILY MEDICINE

## 2018-04-02 PROCEDURE — 84100 ASSAY OF PHOSPHORUS: CPT | Performed by: FAMILY MEDICINE

## 2018-04-02 PROCEDURE — 99232 SBSQ HOSP IP/OBS MODERATE 35: CPT | Performed by: INTERNAL MEDICINE

## 2018-04-02 PROCEDURE — 94760 N-INVAS EAR/PLS OXIMETRY 1: CPT

## 2018-04-02 PROCEDURE — 99232 SBSQ HOSP IP/OBS MODERATE 35: CPT | Performed by: FAMILY MEDICINE

## 2018-04-02 PROCEDURE — 80048 BASIC METABOLIC PNL TOTAL CA: CPT | Performed by: FAMILY MEDICINE

## 2018-04-02 PROCEDURE — 94761 N-INVAS EAR/PLS OXIMETRY MLT: CPT

## 2018-04-02 PROCEDURE — 94640 AIRWAY INHALATION TREATMENT: CPT

## 2018-04-02 RX ORDER — POTASSIUM CHLORIDE 20 MEQ/1
40 TABLET, EXTENDED RELEASE ORAL ONCE
Status: COMPLETED | OUTPATIENT
Start: 2018-04-02 | End: 2018-04-02

## 2018-04-02 RX ADMIN — BUDESONIDE AND FORMOTEROL FUMARATE DIHYDRATE 2 PUFF: 160; 4.5 AEROSOL RESPIRATORY (INHALATION) at 09:04

## 2018-04-02 RX ADMIN — CARVEDILOL 6.25 MG: 6.25 TABLET, FILM COATED ORAL at 16:59

## 2018-04-02 RX ADMIN — APIXABAN 5 MG: 5 TABLET, FILM COATED ORAL at 17:30

## 2018-04-02 RX ADMIN — Medication 400 MG: at 08:54

## 2018-04-02 RX ADMIN — Medication 1 TABLET: at 08:54

## 2018-04-02 RX ADMIN — CLOPIDOGREL 75 MG: 75 TABLET, FILM COATED ORAL at 08:54

## 2018-04-02 RX ADMIN — IPRATROPIUM BROMIDE AND ALBUTEROL SULFATE 3 ML: .5; 3 SOLUTION RESPIRATORY (INHALATION) at 13:21

## 2018-04-02 RX ADMIN — TORSEMIDE 20 MG: 20 TABLET ORAL at 08:54

## 2018-04-02 RX ADMIN — APIXABAN 5 MG: 5 TABLET, FILM COATED ORAL at 08:54

## 2018-04-02 RX ADMIN — METHYLPREDNISOLONE SODIUM SUCCINATE 20 MG: 40 INJECTION, POWDER, FOR SOLUTION INTRAMUSCULAR; INTRAVENOUS at 21:24

## 2018-04-02 RX ADMIN — AMLODIPINE BESYLATE 5 MG: 5 TABLET ORAL at 08:53

## 2018-04-02 RX ADMIN — POTASSIUM CHLORIDE 40 MEQ: 1500 TABLET, EXTENDED RELEASE ORAL at 10:54

## 2018-04-02 RX ADMIN — CARVEDILOL 6.25 MG: 6.25 TABLET, FILM COATED ORAL at 08:53

## 2018-04-02 RX ADMIN — CALCIUM CARBONATE 500 MG (1,250 MG)-VITAMIN D3 200 UNIT TABLET 1 TABLET: at 17:30

## 2018-04-02 RX ADMIN — DOCUSATE SODIUM 100 MG: 100 CAPSULE, LIQUID FILLED ORAL at 17:30

## 2018-04-02 RX ADMIN — METHYLPREDNISOLONE SODIUM SUCCINATE 20 MG: 40 INJECTION, POWDER, FOR SOLUTION INTRAMUSCULAR; INTRAVENOUS at 08:54

## 2018-04-02 RX ADMIN — IPRATROPIUM BROMIDE AND ALBUTEROL SULFATE 3 ML: .5; 3 SOLUTION RESPIRATORY (INHALATION) at 20:59

## 2018-04-02 RX ADMIN — Medication 400 MG: at 17:30

## 2018-04-02 RX ADMIN — IPRATROPIUM BROMIDE AND ALBUTEROL SULFATE 3 ML: .5; 3 SOLUTION RESPIRATORY (INHALATION) at 07:36

## 2018-04-02 RX ADMIN — DOCUSATE SODIUM 100 MG: 100 CAPSULE, LIQUID FILLED ORAL at 08:54

## 2018-04-02 RX ADMIN — VITAMIN D, TAB 1000IU (100/BT) 2000 UNITS: 25 TAB at 08:53

## 2018-04-02 RX ADMIN — CALCIUM CARBONATE 500 MG (1,250 MG)-VITAMIN D3 200 UNIT TABLET 1 TABLET: at 08:54

## 2018-04-02 RX ADMIN — BUDESONIDE AND FORMOTEROL FUMARATE DIHYDRATE 2 PUFF: 160; 4.5 AEROSOL RESPIRATORY (INHALATION) at 21:24

## 2018-04-02 RX ADMIN — Medication 1 TABLET: at 17:30

## 2018-04-02 RX ADMIN — TORSEMIDE 20 MG: 20 TABLET ORAL at 16:59

## 2018-04-02 RX ADMIN — IPRATROPIUM BROMIDE AND ALBUTEROL SULFATE 3 ML: .5; 3 SOLUTION RESPIRATORY (INHALATION) at 02:11

## 2018-04-02 NOTE — PHYSICAL THERAPY NOTE
PT TREATMENT     04/02/18 1546   Pain Assessment   Pain Assessment No/denies pain   Restrictions/Precautions   Other Precautions O2;Fall Risk;Bed Alarm; Chair Alarm   General   Chart Reviewed Yes   Family/Caregiver Present Yes  (pt's dtr)   Subjective   Subjective "doing ok"   Transfers   Sit to Stand 5  Supervision   Stand to Sit 5  Supervision   Ambulation/Elevation   Gait pattern Forward Flexion   Gait Assistance 4  Minimal assist   Additional items Verbal cues; Tactile cues   Assistive Device Rolling walker   Distance 60 feet;2L O2;SAO2 at rest 95% and with amb 90%;pt mildly SOB   Balance   Static Sitting Good   Static Standing Fair  (with RW)   Dynamic Standing Fair  (with RW)   Ambulatory Fair -  (with RW)   Activity Tolerance   Activity Tolerance Patient limited by fatigue  (and mild SOB)   Assessment   Assessment Pt is making steady progress with trans, amb and mobility with the RW  Pt will cont to benefit from skilled PT services  Plan   Treatment/Interventions ADL retraining;Functional transfer training;LE strengthening/ROM; Therapeutic exercise; Endurance training;Patient/family training;Bed mobility;Gait training   PT Frequency 5x/wk   Recommendation   Recommendation (STR)   Equipment Recommended (cont amb with RW)   Licensure   [place] License Number  [id number]     [place], [place] [id number]

## 2018-04-02 NOTE — CASE MANAGEMENT
Continued Stay Review    Date: 4/2/18    Vital Signs: /63 (BP Location: Right arm)   Pulse 75   Temp 98 3 °F (36 8 °C) (Oral)   Resp 20   Ht 5' 4" (1 626 m)   Wt 77 5 kg (170 lb 13 7 oz)   SpO2 94%   BMI 29 33 kg/m²     Medications:   Scheduled Meds:   Current Facility-Administered Medications:  amLODIPine 5 mg Oral Daily Bree R Jean-Jareth, CRNP   apixaban 5 mg Oral BID Bree R Jean-Jareth, CRNP   budesonide-formoterol 2 puff Inhalation Q12H Bree R Jean-Jareth, CRNP   calcium carbonate-vitamin D 1 tablet Oral BID Bree R Ana Maria, CRNP   carvedilol 6 25 mg Oral BID With Meals Tammi Peralta PA-C   cholecalciferol 2,000 Units Oral Daily Fritz Crocker, MARGOT   clopidogrel 75 mg Oral Daily Bree R Jean-Jareth, CRNP   docusate sodium 100 mg Oral BID Bree R Jean-Jareth, CRNP   ipratropium-albuterol 3 mL Nebulization Q6H Bree R Jean-Jareth, CRNP   magnesium oxide 400 mg Oral BID Bree R Jean-Jareth, CRNP   methylPREDNISolone sodium succinate 20 mg Intravenous Q12H [address] Danielle Bullock MD   multivitamin-minerals 1 tablet Oral Daily Bree R Jean-Jareth, CRNP   senna-docusate sodium 1 tablet Oral BID Bree R Jean-Jareth, CRNP   torsemide 20 mg Oral BID (diuretic) Nery Proctor PA-C     Continuous Infusions:    PRN Meds:     Abnormal Labs/Diagnostic Results: GFR 23 MG 2 7   Potassium 3 5 - 5 3 mmol/L 3 3   L    Chloride 100 - 108 mmol/L 95   L    CO2 21 - 32 mmol/L >45   HH    Comments: 3Verified by repeat analysis   Anion Gap 4 - 13 mmol/L     Comments: Unable to calculate   BUN 5 - 25 mg/dL 65   H    Creatinine 0 60 - 1 30 mg/dL 1 89   H    Comments: Standardized to IDMS reference method   Glucose 65 - 140 mg/dL 180   H     Age/Sex: 80 y o  female     Assessment/Plan:   PER PULM  Pulmonary/Chest: Effort normal    Lung sounds are decreased  Few inspiratory crackles left base   Assessment:  Acute on chronic hypercapnic hypoxemic respiratory failure  Patient clinically improved  Appears 3rd exacerbation related to episode of hyperreactive airway airways disease versus COPD exacerbation  This was prior precipitated by a respiratory tract infection possibly viral  Patient likely restrictive impairment of chest wall due to 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  This was done when she is 54years old  Chronic diastolic cardiac dysfunction  History DVT in the past Redd  Patient is on Eliquis  Plan:  Patient is tolerating BiPAP 15/5 with 35% oxygen at nighttime and is willing to continue BiPAP use at night for her hypercapnic respiratory failure  Patient is known 2 L of oxygen now during the day and O2 saturation satisfactory at 97%  Continue neb treatments with DuoNeb every 6 hours  Will check room air O2 saturation at rest while awake  Could consider change to oral prednisone therapy soon    Patient presently on methylprednisone 20 mg IV every 12 hours  Discharge Plan: TBD

## 2018-04-02 NOTE — PROGRESS NOTES
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  This was done when she is 54years old  Chronic diastolic cardiac dysfunction  History DVT in the past Redd  Patient is on Eliquis    Plan:  Patient is tolerating BiPAP 15/5 with 35% oxygen at nighttime and is willing to continue BiPAP use at night for her hypercapnic respiratory failure  Patient is on 2 L of oxygen now during the day and O2 saturation satisfactory at 97%  Continue neb treatments with DuoNeb every 6 hours  Will check room air O2 saturation at rest while awake  Could consider change to oral prednisone therapy soon  Patient presently on methylprednisone 20 mg IV every 12 hours      Subjective:   Patient is sitting in chair at bedside  States her cough has improved and she is having less shortness of breath with activity    Objective:     Vitals: Blood pressure 157/65, pulse 79, temperature 97 7 °F (36 5 °C), temperature source Tympanic, resp  rate 20, height 5' 4" (1 626 m), weight 77 5 kg (170 lb 13 7 oz), SpO2 95 %, not currently breastfeeding  ,Body mass index is 29 33 kg/m²  No intake or output data in the 24 hours ending 04/02/18 0934    Physical Exam: Physical Exam   Constitutional: She is oriented to person, place, and time  Patient is awake, alert  On 2 L of oxygen O2 saturation is 97%  Patient is sitting in chair at bedside, no conversational dyspnea   HENT:   Head: Normocephalic     Nose: Nose normal    Mouth/Throat: Oropharynx is clear and moist  No oropharyngeal exudate  Eyes: Conjunctivae are normal    Neck: Neck supple  No JVD present  No tracheal deviation present  Cardiovascular: Normal rate, regular rhythm and normal heart sounds  Pulmonary/Chest: Effort normal    Lung sounds are decreased  Few inspiratory crackles left base   Abdominal: Soft  She exhibits no distension  There is no tenderness  There is no guarding  Musculoskeletal: She exhibits no edema  Lymphadenopathy:     She has no cervical adenopathy  Neurological: She is alert and oriented to person, place, and time  Skin: Skin is warm and dry  Psychiatric: She has a normal mood and affect  Her behavior is normal  Thought content normal         Labs: I have personally reviewed pertinent lab results  , ABG:   Lab Results   Component Value Date    PHART 7 407 03/30/2018    KEF0LQH 79 2 (HH) 03/30/2018    PO2ART 36 5 (LL) 03/30/2018    [id number] 48 7 (H) 03/30/2018    BEART 20 0 03/30/2018    SOURCE Radial, Right 03/30/2018   , BNP: No results found for: BNP, CBC:   Lab Results   Component Value Date    WBC 9 80 04/01/2018    HGB 11 5 (L) 04/01/2018    HCT 36 7 (L) 04/01/2018    MCV 96 04/01/2018     04/01/2018    ADJUSTEDWBC 5 00 07/26/2016    MCH 29 9 04/01/2018    MCHC 31 3 (L) 04/01/2018    RDW 13 9 04/01/2018    MPV 8 8 (L) 04/01/2018    NRBC 0 03/30/2018   , CMP:   Lab Results   Component Value Date     04/02/2018     01/05/2016    K 3 3 (L) 04/02/2018    K 4 4 01/05/2016    CL 95 (L) 04/02/2018     01/05/2016    CO2 >45 (HH) 04/02/2018    CO2 27 01/05/2016    ANIONGAP  04/02/2018      Comment:      Unable to calculate    ANIONGAP 12 4 01/05/2016    BUN 65 (H) 04/02/2018    BUN 45 (H) 01/05/2016    CREATININE 1 89 (H) 04/02/2018    CREATININE 1 8 (H) 01/05/2016    GLUCOSE 180 (H) 04/02/2018    GLUCOSE 92 01/05/2016    CALCIUM 9 8 04/02/2018    CALCIUM 9 4 01/05/2016    AST 14 03/06/2018    AST 21 01/05/2016    ALT 18 03/06/2018    ALT 30 01/05/2016 ALKPHOS 56 03/06/2018    ALKPHOS 64 01/05/2016    PROT 7 1 03/06/2018    PROT 8 5 (H) 01/05/2016    BILITOT 0 30 03/06/2018    BILITOT 0 5 01/05/2016    EGFR 23 04/02/2018   , PT/INR:   Lab Results   Component Value Date    INR 1 04 03/29/2018   , Troponin: No results found for: TROPONIN    Imaging and other studies: I have personally reviewed pertinent reports     and I have personally reviewed pertinent films in PACS

## 2018-04-02 NOTE — SOCIAL WORK
SPOKE WITH PT DTR AT THE BEDSIDE  PT DTR STATED THAT SHE DID NOT FEEL THAT THE [facility] WAS PREPARED FOR HER MOM'S NEEDS AND FEELS THAT SE WOULD DO BETTER IN A FACILITY THAT WAS PROBABLY CLOSER TO HER FAMILY IN [place]   CHOICES WERE GIVEN FOR REFERRALS TO [facility] AND [facility]  CALL MADE TO [facility]MA MORGAN USED ABOUT 17DAYS OF  DAYS THAT SHE HAD WITH REHAB THERAPIES  AWAITING ACCEPTING FACILITY

## 2018-04-02 NOTE — PROGRESS NOTES
[facility] Progress Note  Patient: Laurie Dose 80 y o  female   MRN: [id number]  PCP: Fide Rosado DO  Unit/Bed#: [address] [place] [address] Encounter: [id number]  Date Of Visit: 04/02/18    Problem List:    Principal Problem:    Acute respiratory failure with hypoxia and hypercapnia (HCC)  Active Problems:    Toxic metabolic encephalopathy    Hyperactive airway disease    CKD (chronic kidney disease) stage 3, GFR 30-59 ml/min    Chronic diastolic CHF (congestive heart failure) (HCC)    Deep vein thrombosis (DVT) of left lower extremity (HCC)    Bradycardia    Essential hypertension    Hyperlipidemia    Aortic stenosis, moderate      Assessment & Plan:    * Acute respiratory failure with hypoxia and hypercapnia (HCC)   Assessment & Plan    Likely due to hyperactive airways disease, possibly element of COPD as well  Patient has never smoked but was exposed to smokers and also possibly due to 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  Continue supplemental oxygen by nasal cannula  Initially required BiPAP support while in the ICU  hypercarbic respiratory failure may be due to noncompliance with BiPAP at bedtime  Continue BiPAP QHS  Discussed the importance of using BiPAP when sleeping with the patient           Hyperactive airway disease   Assessment & Plan    Possible COPD component with acute exacerbation  Breathing is improving  Continue Symbicort, nebulizer treatments  Continue IV Solu-Medrol 20 mg q 12 hours        Toxic metabolic encephalopathy   Assessment & Plan    Likely due to hypercapnia  Mental status is now back at baseline        Bradycardia   Assessment & Plan    Asymptomatic  Bradycardia has improved  Dose of Coreg was decreased        Deep vein thrombosis (DVT) of left lower extremity [facility])   Assessment & Plan    Diagnosed with acute DVT in [place] in 2/2018     Continue Eliquis        Chronic diastolic CHF (congestive heart failure) ([facility] [address] )   Assessment & Plan    Did receive a dose of IV Lasix in ICU  Continue torsemide, Coreg        CKD (chronic kidney disease) stage 3, GFR 30-59 ml/min   Assessment & Plan    Creatinine trending up but appears close to baseline  Repeat level in the a m        Aortic stenosis, moderate   Assessment & Plan    Echo from 2018 showed EF of 55 % to 60 % with grade 2 diastolic dysfunction        Hyperlipidemia   Assessment & Plan    LDL from 2017 was 58        Essential hypertension   Assessment & Plan    Continue Norvasc and Coreg              VTE Pharmacologic Prophylaxis:   Pharmacologic: Apixaban (Eliquis)  Mechanical VTE Prophylaxis in Place: Yes    Patient Centered Rounds: I have performed bedside rounds with nursing staff today  Discussions with Specialists or Other Care Team Provider: Yes    Education and Discussions with Family / Patient:Yes    Time Spent for Care: 30 min  More than 50% of total time spent on counseling and coordination of care as described above  Current Length of Stay: 4 day(s)    Current Patient Status: Inpatient     Discharge Plan: Rehab    Code Status: Level 1 - Full Code    Certification Statement: The patient will continue to require additional inpatient hospital stay due to acute on chronic hypercapnic respiratory failure and possible COPD exacerbation requiring IV steroids      Subjective:     Reports cough  States breathing is improving  States that she had her BiPAP on all night    Objective:     Vitals:   Temp (24hrs), Av 2 °F (36 8 °C), Min:97 7 °F (36 5 °C), Max:98 5 °F (36 9 °C)    HR:  [68-79] 75  Resp:  [18-20] 20  BP: (134-157)/(63-70) 134/63  SpO2:  [90 %-97 %] 94 %  Body mass index is 29 33 kg/m²  Input and Output Summary (last 24 hours):     No intake or output data in the 24 hours ending 18 1500    Physical Exam:     Physical Exam   Constitutional: She is oriented to person, place, and time  No distress  Hard of hearing   HENT:   Head: Normocephalic and atraumatic     Eyes: Conjunctivae are normal  Right eye exhibits no discharge  Left eye exhibits no discharge  Neck: Neck supple  No tracheal deviation present  Cardiovascular: Normal rate and regular rhythm  Pulmonary/Chest: Effort normal  No respiratory distress  She has wheezes (intermittent)  She has no rales  Decreased breath sounds bilaterally   Abdominal: Soft  Bowel sounds are normal  She exhibits no distension  There is no tenderness  Musculoskeletal: She exhibits no edema  Neurological: She is alert and oriented to person, place, and time  No cranial nerve deficit  Skin: Skin is dry  She is not diaphoretic  Psychiatric: She has a normal mood and affect  Additional Data:     Labs:      Results from last 7 days  Lab Units 04/01/18  1146 03/30/18  0501   WBC Thousand/uL 9 80 5 30   HEMOGLOBIN g/dL 11 5* 10 6*   HEMATOCRIT % 36 7* 34 2*   PLATELETS Thousands/uL 167 174   NEUTROS PCT %  --  77*   LYMPHS PCT %  --  14   MONOS PCT %  --  8   EOS PCT %  --  1       Results from last 7 days  Lab Units 04/02/18  0549   SODIUM mmol/L 145   POTASSIUM mmol/L 3 3*   CHLORIDE mmol/L 95*   CO2 mmol/L >45*   BUN mg/dL 65*   CREATININE mg/dL 1 89*   CALCIUM mg/dL 9 8   GLUCOSE RANDOM mg/dL 180*       Results from last 7 days  Lab Units 03/29/18  0755   INR  1 04       * I Have Reviewed All Lab Data Listed Above  * Additional Pertinent Lab Tests Reviewed: All Labs For Current Hospital Admission Reviewed    Imaging:  Xr Chest Portable    Result Date: 3/9/2018  Narrative: CHEST INDICATION: Congestive heart failure COMPARISON:  3/8/2018 EXAM PERFORMED/VIEWS:  XR CHEST PORTABLE FINDINGS: Heart shadow is enlarged but unchanged from prior exam  There is continued evidence of pulmonary congestion, similar to the prior study  Bilateral pleural effusions do appear smaller in size  No pneumothorax is seen Osseous structures appear within normal limits for patient age  Impression: 1    Bilateral pleural effusions appear partially diminished in size 2  Degree of pulmonary congestion appear similar  Workstation performed: [id number]     Xr Chest Portable    Result Date: 3/8/2018  Narrative: CHEST INDICATION:  72-year-old female with  CHF  COMPARISON:  3/6/2018  EXAM PERFORMED/VIEWS:  XR CHEST PORTABLE Images: 1 FINDINGS:  The patient has been extubated during the interim with the enteric feeding tube removed  Thoracic Duarte rods again noted  Heart shadow is enlarged but unchanged from prior exam  Improving aeration with persistent scattered bibasilar atelectasis with diminished central vascular engorgement  Tiny pleural effusions suspected  No pneumothorax  Osseous structures appear within normal limits for patient age  Impression: Improving aeration with resolving central vascular congestion  Persistent bibasilar atelectasis and tiny pleural effusions  Stable cardiomegaly Workstation performed: [id number]     Xr Chest 1 View Portable    Result Date: 3/7/2018  Narrative: CHEST INDICATION: intubation COMPARISON:  One view chest 3/6/2018 EXAM PERFORMED/VIEWS:  XR CHEST PORTABLE FINDINGS:  Endotracheal tube is present, in satisfactory position with its tip above the level of the jerilyn  Enteric tube is present with its tip excluded by collimation below the level of the left hemidiaphragm  Cardiac silhouette is enlarged  Aortic calcification is present  Lung markings are crowded secondary to low lung volumes  Bibasilar subsegmental atelectasis  Prominent central pulmonary vasculature  No pneumothorax or large pleural effusion  Partially visualized thoracolumbar fusion hardware  Impression: Cardiomegaly and mild central pulmonary vascular congestion  Bibasilar subsegmental atelectasis  Endotracheal tube is present, in satisfactory position with its tip above the level of the jerilyn  Enteric tube is present with its tip excluded by collimation below the level of the left hemidiaphragm   Workstation performed: [id number]     Xr Chest 1 View Portable    Result Date: 3/6/2018  Narrative: CHEST INDICATION: Shortness of breath  COMPARISON:  12/11/2017 EXAM PERFORMED/VIEWS:  XR CHEST PORTABLE FINDINGS: Stable cardiomegaly  Mild elevation of the right hemidiaphragm  No visualized pleural effusion  Pulmonary vascular congestion, primarily in the right lung  No airspace consolidation, pulmonary edema, atelectasis or pneumothorax  Raquel lumbar fixation hardware  Impression: Pulmonary vascular congestion without evidence of interstitial edema  Workstation performed: [id number]     Xr Chest 2 Views    Result Date: 3/29/2018  Narrative: CHEST INDICATION:   sob  COMPARISON:  03/09/2018 EXAM PERFORMED/VIEWS:  XR CHEST PA & LATERAL Images: 3 FINDINGS: Cardiomediastinal silhouette appears enlarged  The lungs are clear  No pneumothorax or pleural effusion  Spinal fusion hardware  Impression: No acute cardiopulmonary disease  Cardiomegaly  Workstation performed: [id number]     Ct Head Without Contrast    Result Date: 3/7/2018  Narrative: CT BRAIN - WITHOUT CONTRAST INDICATION: Altered mental status  COMPARISON:  5/9/2017, and  TECHNIQUE:  CT examination of the brain was performed  In addition to axial images, coronal 2D reformatted images were created and submitted for interpretation  Radiation dose length product (DLP) for this visit:  1238 08 mGy-cm   This examination, like all CT scans performed in the [facility], was performed utilizing techniques to minimize radiation dose exposure, including the use of iterative reconstruction and automated exposure control  IMAGE QUALITY:  Diagnostic  FINDINGS: PARENCHYMA: Partially confluent periventricular and subcortical white matter lesions, consistent with microangiopathic disease  Calcification noted in the globus pallidus  No intracranial mass, mass effect or midline shift  No CT signs of acute infarction  No acute intracranial hemorrhage   VENTRICLES AND EXTRA-AXIAL SPACES:  Normal for the patient's age  VISUALIZED ORBITS AND PARANASAL SINUSES:  Unremarkable  CALVARIUM AND EXTRACRANIAL SOFT TISSUES:  Normal      Impression: Microangiopathic disease  No acute intracranial hemorrhage, mass effect or extra-axial collection  Workstation performed: [id number]     Xr Chest Portable Icu    Result Date: 3/30/2018  Narrative: CHEST INDICATION:   dyspnea  COMPARISON:  3/29/2018 EXAM PERFORMED/VIEWS:  XR CHEST PORTABLE ICU FINDINGS: Cardiomediastinal silhouette appears unremarkable  Mild pulmonary edema  Low lung volumes present  Thoracolumbar fusion rods noted  Impression: Stable chest with mild pulmonary edema noted  Workstation performed: [id number]     Imaging Reports Reviewed by myself    Cultures:   Blood Culture:   Lab Results   Component Value Date    BLOODCX No Growth After 4 Days  03/29/2018    BLOODCX No Growth After 4 Days  03/29/2018    BLOODCX No Growth After 5 Days  08/27/2017    BLOODCX No Growth After 5 Days   08/27/2017     Urine Culture:   Lab Results   Component Value Date    URINECX No Growth <1000 cfu/mL 03/29/2018    URINECX >100,000 cfu/ml Mixed Contaminants X6 05/09/2017     Sputum Culture: No components found for: SPUTUMCX  Wound Culture: No results found for: WOUNDCULT    Last 24 Hours Medication List:     Current Facility-Administered Medications:  amLODIPine 5 mg Oral Daily MARGOT Miller   apixaban 5 mg Oral BID MARGOT Miller   budesonide-formoterol 2 puff Inhalation Q12H MARGOT Miller   calcium carbonate-vitamin D 1 tablet Oral BID MARGOT Miller   carvedilol 6 25 mg Oral BID With Meals Maki Fenton PA-C   cholecalciferol 2,000 Units Oral Daily MARGOT Rios   clopidogrel 75 mg Oral Daily Bree MARGOT Howell   docusate sodium 100 mg Oral BID MARGOT Miller   ipratropium-albuterol 3 mL Nebulization Q6H MARGOT Miller   magnesium oxide 400 mg Oral BID MARGOT Nixon   methylPREDNISolone sodium succinate 20 mg Intravenous Q12H [address] Shakira Mendoza MD   multivitamin-minerals 1 tablet Oral Daily MARGOT Miller   senna-docusate sodium 1 tablet Oral BID MARGOT Miller   torsemide 20 mg Oral BID (diuretic) Lesley Claire PA-C        Today, Patient Was Seen By: Veronica Gutierrez DO    ** Please Note: Dragon 360 Dictation voice to text software may have been used in the creation of this document   **

## 2018-04-02 NOTE — PLAN OF CARE
Problem: PHYSICAL THERAPY ADULT  Goal: Performs mobility at highest level of function for planned discharge setting  See evaluation for individualized goals  Outcome: Progressing  Prognosis: Good  Problem List: Decreased strength, Decreased range of motion, Decreased endurance, Impaired balance, Decreased mobility, Decreased coordination, Pain, Impaired hearing  Assessment: Pt is making steady progress with trans, amb and mobility with the RW  Pt will cont to benefit from skilled PT services  Recommendation:  (STR)          See flowsheet documentation for full assessment

## 2018-04-02 NOTE — RESPIRATORY THERAPY NOTE
Home Oxygen Qualifying Test       Patient name: Tiera Okeefe        : 1928   Date of Test:  2018  Diagnosis:      Home Oxygen Test:    **Medicare Guidelines require item(s) 1-5 on all ambulatory patients or 1 and 2 on on-ambulatory patients  1   Baseline SPO2 on Room Air at rest 92 %  If = or < 88% on room air add O2 via NC and titrate patient  Patient must be ambulated with O2 and titrated to > 88% with exertion  2   SPO2 on Oxygen at rest 97 %  3   SPO2 during exercise on Room Air 85 %  4   SPO2 during exercise on Oxygen  92% at a liter flow of 2 lpm     5   Exercise performed:    [x] Walking     [] Stairs     [] Duration 6 (min )     [] Distance 100 (ft )       [x]  Supplemental Home Oxygen is indicated  []  Client does not qualify for home oxygen        Svetlana Au

## 2018-04-03 LAB
ARTERIAL PATENCY WRIST A: YES
BACTERIA BLD CULT: NORMAL
BACTERIA BLD CULT: NORMAL
BASE EXCESS BLDA CALC-SCNC: 18.2 MMOL/L
BODY TEMPERATURE: 98 DEGREES FEHRENHEIT
BUN SERPL-MCNC: 64 MG/DL (ref 5–25)
CALCIUM SERPL-MCNC: 9.1 MG/DL (ref 8.3–10.1)
CHLORIDE SERPL-SCNC: 97 MMOL/L (ref 100–108)
CO2 SERPL-SCNC: >45 MMOL/L (ref 21–32)
CREAT SERPL-MCNC: 1.87 MG/DL (ref 0.6–1.3)
GFR SERPL CREATININE-BSD FRML MDRD: 23 ML/MIN/1.73SQ M
GLUCOSE SERPL-MCNC: 174 MG/DL (ref 65–140)
HCO3 BLDA-SCNC: 44.9 MMOL/L (ref 22–28)
MAGNESIUM SERPL-MCNC: 2.6 MG/DL (ref 1.6–2.6)
NASAL CANNULA: 2
O2 CT BLDA-SCNC: 16.1 ML/DL (ref 16–23)
OXYHGB MFR BLDA: 94.2 % (ref 94–97)
PCO2 BLDA: 63.3 MM HG (ref 36–44)
PCO2 TEMP ADJ BLDA: 62.5 MM HG (ref 36–44)
PH BLD: 7.47 [PH] (ref 7.35–7.45)
PH BLDA: 7.47 [PH] (ref 7.35–7.45)
PO2 BLD: 72 MM HG (ref 75–129)
PO2 BLDA: 73.5 MM HG (ref 75–129)
POTASSIUM SERPL-SCNC: 3.7 MMOL/L (ref 3.5–5.3)
SODIUM SERPL-SCNC: 146 MMOL/L (ref 136–145)
SPECIMEN SOURCE: ABNORMAL

## 2018-04-03 PROCEDURE — 94660 CPAP INITIATION&MGMT: CPT

## 2018-04-03 PROCEDURE — 99232 SBSQ HOSP IP/OBS MODERATE 35: CPT | Performed by: INTERNAL MEDICINE

## 2018-04-03 PROCEDURE — 36600 WITHDRAWAL OF ARTERIAL BLOOD: CPT

## 2018-04-03 PROCEDURE — 94760 N-INVAS EAR/PLS OXIMETRY 1: CPT

## 2018-04-03 PROCEDURE — 83735 ASSAY OF MAGNESIUM: CPT | Performed by: FAMILY MEDICINE

## 2018-04-03 PROCEDURE — 80048 BASIC METABOLIC PNL TOTAL CA: CPT | Performed by: FAMILY MEDICINE

## 2018-04-03 PROCEDURE — 94640 AIRWAY INHALATION TREATMENT: CPT

## 2018-04-03 PROCEDURE — 82805 BLOOD GASES W/O2 SATURATION: CPT | Performed by: INTERNAL MEDICINE

## 2018-04-03 RX ADMIN — TORSEMIDE 20 MG: 20 TABLET ORAL at 15:50

## 2018-04-03 RX ADMIN — Medication 400 MG: at 17:22

## 2018-04-03 RX ADMIN — TORSEMIDE 20 MG: 20 TABLET ORAL at 09:15

## 2018-04-03 RX ADMIN — DOCUSATE SODIUM 100 MG: 100 CAPSULE, LIQUID FILLED ORAL at 17:22

## 2018-04-03 RX ADMIN — CARVEDILOL 6.25 MG: 6.25 TABLET, FILM COATED ORAL at 08:38

## 2018-04-03 RX ADMIN — BUDESONIDE AND FORMOTEROL FUMARATE DIHYDRATE 2 PUFF: 160; 4.5 AEROSOL RESPIRATORY (INHALATION) at 09:16

## 2018-04-03 RX ADMIN — Medication 1 TABLET: at 17:22

## 2018-04-03 RX ADMIN — METHYLPREDNISOLONE SODIUM SUCCINATE 20 MG: 40 INJECTION, POWDER, FOR SOLUTION INTRAMUSCULAR; INTRAVENOUS at 21:43

## 2018-04-03 RX ADMIN — Medication 1 TABLET: at 09:15

## 2018-04-03 RX ADMIN — VITAMIN D, TAB 1000IU (100/BT) 2000 UNITS: 25 TAB at 09:15

## 2018-04-03 RX ADMIN — DOCUSATE SODIUM 100 MG: 100 CAPSULE, LIQUID FILLED ORAL at 09:16

## 2018-04-03 RX ADMIN — AMLODIPINE BESYLATE 5 MG: 5 TABLET ORAL at 09:16

## 2018-04-03 RX ADMIN — METHYLPREDNISOLONE SODIUM SUCCINATE 20 MG: 40 INJECTION, POWDER, FOR SOLUTION INTRAMUSCULAR; INTRAVENOUS at 09:14

## 2018-04-03 RX ADMIN — APIXABAN 5 MG: 5 TABLET, FILM COATED ORAL at 17:21

## 2018-04-03 RX ADMIN — Medication 400 MG: at 09:15

## 2018-04-03 RX ADMIN — IPRATROPIUM BROMIDE AND ALBUTEROL SULFATE 3 ML: .5; 3 SOLUTION RESPIRATORY (INHALATION) at 02:36

## 2018-04-03 RX ADMIN — BUDESONIDE AND FORMOTEROL FUMARATE DIHYDRATE 2 PUFF: 160; 4.5 AEROSOL RESPIRATORY (INHALATION) at 21:43

## 2018-04-03 RX ADMIN — CLOPIDOGREL 75 MG: 75 TABLET, FILM COATED ORAL at 09:15

## 2018-04-03 RX ADMIN — IPRATROPIUM BROMIDE AND ALBUTEROL SULFATE 3 ML: .5; 3 SOLUTION RESPIRATORY (INHALATION) at 14:09

## 2018-04-03 RX ADMIN — APIXABAN 5 MG: 5 TABLET, FILM COATED ORAL at 09:15

## 2018-04-03 RX ADMIN — CALCIUM CARBONATE 500 MG (1,250 MG)-VITAMIN D3 200 UNIT TABLET 1 TABLET: at 17:21

## 2018-04-03 RX ADMIN — IPRATROPIUM BROMIDE AND ALBUTEROL SULFATE 3 ML: .5; 3 SOLUTION RESPIRATORY (INHALATION) at 08:45

## 2018-04-03 RX ADMIN — CARVEDILOL 6.25 MG: 6.25 TABLET, FILM COATED ORAL at 15:50

## 2018-04-03 RX ADMIN — IPRATROPIUM BROMIDE AND ALBUTEROL SULFATE 3 ML: .5; 3 SOLUTION RESPIRATORY (INHALATION) at 19:47

## 2018-04-03 RX ADMIN — CALCIUM CARBONATE 500 MG (1,250 MG)-VITAMIN D3 200 UNIT TABLET 1 TABLET: at 09:15

## 2018-04-03 NOTE — PROGRESS NOTES
Progress Note Yun Lacey 5/12/1928, 80 y o  female MRN: [id number]    Unit/Bed#: [address] [place] [address] Encounter: [id number]    Primary Care Provider: Rafael Mattson DO   Date and time admitted to hospital: 3/29/2018  7:05 AM        * Acute respiratory failure with hypoxia and hypercapnia ([facility] [address] )   Assessment & Plan    Likely due to hyperactive airways disease, possibly element of COPD as well  Patient might also have a component of restrictive lung disease secondary to andrés placement for kyphoscoliosis  Continue supplemental oxygen by nasal cannula  Patient's home O2 assessment revealed oxygen saturation of 85% with exertion on room air which improved to 92% with 2 liters oxygen  Patient's room air oxygen saturation at rest is 92% which improved to 97% with 2 liters oxygen  Initially required BiPAP support while in the ICU  hypercarbic respiratory failure may be due to noncompliance with BiPAP at bedtime  Continue BiPAP 15/5 with 35% FiO2 QHS  Discussed the importance of using BiPAP when sleeping with the patientSolu-Medrol 20 milligram IV q 12 hours  Will get ABG to assess elevated CO2 on BMP           CKD (chronic kidney disease) stage 3, GFR 30-59 ml/min   Assessment & Plan    Creatinine close to baseline  Patient also noted to have slightly higher sodium level today  Will encourage p o  fluid intake        Hyperlipidemia   Assessment & Plan    LDL from December 2017 was 62        Deep vein thrombosis (DVT) of left lower extremity ([facility] [address] )   Assessment & Plan    Diagnosed with acute DVT in [place] in 2/2018  Continue Eliquis        Essential hypertension   Assessment & Plan    Continue Norvasc and Coreg        Toxic metabolic encephalopathy   Assessment & Plan    Likely due to hypercapnia  Mental status is now back at baseline        Hyperactive airway disease   Assessment & Plan    Possible COPD component with acute exacerbation  Breathing is improving  Continue Symbicort, nebulizer treatments      Continue IV Solu-Medrol 20 mg q 12 hours        Bradycardia   Assessment & Plan    Asymptomatic  Bradycardia has improved  Dose of Coreg was decreased        Aortic stenosis, moderate   Assessment & Plan    Echo from 2018 showed EF of 55 % to 60 % with grade 2 diastolic dysfunction        Chronic diastolic CHF (congestive heart failure) ([facility] [address] )   Assessment & Plan    Did receive a dose of IV Lasix in ICU  Continue torsemide, Coreg              VTE Pharmacologic Prophylaxis:   Pharmacologic: Apixaban (Eliquis)  Mechanical VTE Prophylaxis in Place: Yes    Patient Centered Rounds: I have performed bedside rounds with nursing staff today  Discussions with Specialists or Other Care Team Provider: Tawana Lowery    Education and Discussions with Family / Patient:Yes    Time Spent for Care: 30 minutes  More than 50% of total time spent on counseling and coordination of care as described above  Current Length of Stay: 5 day(s)    Current Patient Status: Inpatient     Discharge Plan: STR    Code Status: Level 1 - Full Code      Subjective:   Patient is feeling better  Improved shortness of breath  Denies any chest pain, abdominal pain  Objective:       Vitals:   Temp (24hrs), Av 3 °F (36 8 °C), Min:98 °F (36 7 °C), Max:98 6 °F (37 °C)    HR:  [65-73] 73  Resp:  [20] 20  BP: (129-182)/(61-83) 163/71  SpO2:  [96 %-99 %] 98 %  Body mass index is 29 33 kg/m²  Input and Output Summary (last 24 hours):     No intake or output data in the 24 hours ending 18 1710    Physical Exam:     Physical Exam   Constitutional: No distress  HENT:   Head: Normocephalic and atraumatic  Nose: Nose normal    Eyes: Conjunctivae and EOM are normal  Pupils are equal, round, and reactive to light  Neck: Normal range of motion  Neck supple  No JVD present  Cardiovascular: Normal rate and regular rhythm  Exam reveals no gallop and no friction rub  Murmur heard  Pulmonary/Chest: Effort normal  No respiratory distress  She has wheezes  She has no rales  She exhibits no tenderness  Decreased breath sounds bilaterally  Expiratory wheezing especially at the bases   Abdominal: Soft  Bowel sounds are normal  She exhibits no distension  There is no tenderness  There is no rebound and no guarding  Musculoskeletal: She exhibits no edema  Neurological: She is alert  No cranial nerve deficit  Skin: Skin is warm and dry  No rash noted  Psychiatric: She has a normal mood and affect  Additional Data:     Labs:      Results from last 7 days  Lab Units 04/01/18  1146 03/30/18  0501   WBC Thousand/uL 9 80 5 30   HEMOGLOBIN g/dL 11 5* 10 6*   HEMATOCRIT % 36 7* 34 2*   PLATELETS Thousands/uL 167 174   NEUTROS PCT %  --  77*   LYMPHS PCT %  --  14   MONOS PCT %  --  8   EOS PCT %  --  1       Results from last 7 days  Lab Units 04/03/18  0616   SODIUM mmol/L 146*   POTASSIUM mmol/L 3 7   CHLORIDE mmol/L 97*   CO2 mmol/L >45*   BUN mg/dL 64*   CREATININE mg/dL 1 87*   CALCIUM mg/dL 9 1   GLUCOSE RANDOM mg/dL 174*       Results from last 7 days  Lab Units 03/29/18  0755   INR  1 04       * I Have Reviewed All Lab Data Listed Above  * Additional Pertinent Lab Tests Reviewed: All Labs For Current Hospital Admission Reviewed    Imaging:  Xr Chest Portable    Result Date: 3/9/2018  Narrative: CHEST INDICATION: Congestive heart failure COMPARISON:  3/8/2018 EXAM PERFORMED/VIEWS:  XR CHEST PORTABLE FINDINGS: Heart shadow is enlarged but unchanged from prior exam  There is continued evidence of pulmonary congestion, similar to the prior study  Bilateral pleural effusions do appear smaller in size  No pneumothorax is seen Osseous structures appear within normal limits for patient age  Impression: 1  Bilateral pleural effusions appear partially diminished in size 2  Degree of pulmonary congestion appear similar   Workstation performed: [id number]     Xr Chest Portable    Result Date: 3/8/2018  Narrative: CHEST INDICATION:  80year-old female with  CHF  COMPARISON:  3/6/2018  EXAM PERFORMED/VIEWS:  XR CHEST PORTABLE Images: 1 FINDINGS:  The patient has been extubated during the interim with the enteric feeding tube removed  Thoracic Duarte rods again noted  Heart shadow is enlarged but unchanged from prior exam  Improving aeration with persistent scattered bibasilar atelectasis with diminished central vascular engorgement  Tiny pleural effusions suspected  No pneumothorax  Osseous structures appear within normal limits for patient age  Impression: Improving aeration with resolving central vascular congestion  Persistent bibasilar atelectasis and tiny pleural effusions  Stable cardiomegaly Workstation performed: [id number]     Xr Chest 1 View Portable    Result Date: 3/7/2018  Narrative: CHEST INDICATION: intubation COMPARISON:  One view chest 3/6/2018 EXAM PERFORMED/VIEWS:  XR CHEST PORTABLE FINDINGS:  Endotracheal tube is present, in satisfactory position with its tip above the level of the jerilyn  Enteric tube is present with its tip excluded by collimation below the level of the left hemidiaphragm  Cardiac silhouette is enlarged  Aortic calcification is present  Lung markings are crowded secondary to low lung volumes  Bibasilar subsegmental atelectasis  Prominent central pulmonary vasculature  No pneumothorax or large pleural effusion  Partially visualized thoracolumbar fusion hardware  Impression: Cardiomegaly and mild central pulmonary vascular congestion  Bibasilar subsegmental atelectasis  Endotracheal tube is present, in satisfactory position with its tip above the level of the jerilyn  Enteric tube is present with its tip excluded by collimation below the level of the left hemidiaphragm  Workstation performed: [id number]     Xr Chest 1 View Portable    Result Date: 3/6/2018  Narrative: CHEST INDICATION: Shortness of breath  COMPARISON:  12/11/2017 EXAM PERFORMED/VIEWS:  XR CHEST PORTABLE FINDINGS: Stable cardiomegaly  Mild elevation of the right hemidiaphragm  No visualized pleural effusion  Pulmonary vascular congestion, primarily in the right lung  No airspace consolidation, pulmonary edema, atelectasis or pneumothorax  Raquel lumbar fixation hardware  Impression: Pulmonary vascular congestion without evidence of interstitial edema  Workstation performed: [id number]     Xr Chest 2 Views    Result Date: 3/29/2018  Narrative: CHEST INDICATION:   sob  COMPARISON:  03/09/2018 EXAM PERFORMED/VIEWS:  XR CHEST PA & LATERAL Images: 3 FINDINGS: Cardiomediastinal silhouette appears enlarged  The lungs are clear  No pneumothorax or pleural effusion  Spinal fusion hardware  Impression: No acute cardiopulmonary disease  Cardiomegaly  Workstation performed: [id number]     Ct Head Without Contrast    Result Date: 3/7/2018  Narrative: CT BRAIN - WITHOUT CONTRAST INDICATION: Altered mental status  COMPARISON:  5/9/2017, and  TECHNIQUE:  CT examination of the brain was performed  In addition to axial images, coronal 2D reformatted images were created and submitted for interpretation  Radiation dose length product (DLP) for this visit:  1238 08 mGy-cm   This examination, like all CT scans performed in the [address] [place] [address], was performed utilizing techniques to minimize radiation dose exposure, including the use of iterative reconstruction and automated exposure control  IMAGE QUALITY:  Diagnostic  FINDINGS: PARENCHYMA: Partially confluent periventricular and subcortical white matter lesions, consistent with microangiopathic disease  Calcification noted in the globus pallidus  No intracranial mass, mass effect or midline shift  No CT signs of acute infarction  No acute intracranial hemorrhage  VENTRICLES AND EXTRA-AXIAL SPACES:  Normal for the patient's age  VISUALIZED ORBITS AND PARANASAL SINUSES:  Unremarkable  CALVARIUM AND EXTRACRANIAL SOFT TISSUES:  Normal      Impression: Microangiopathic disease    No acute intracranial hemorrhage, mass effect or extra-axial collection  Workstation performed: [id number]     Xr Chest Portable Icu    Result Date: 3/30/2018  Narrative: CHEST INDICATION:   dyspnea  COMPARISON:  3/29/2018 EXAM PERFORMED/VIEWS:  XR CHEST PORTABLE ICU FINDINGS: Cardiomediastinal silhouette appears unremarkable  Mild pulmonary edema  Low lung volumes present  Thoracolumbar fusion rods noted  Impression: Stable chest with mild pulmonary edema noted  Workstation performed: [id number]     Imaging Reports Reviewed by myself    Cultures:   Blood Culture:   Lab Results   Component Value Date    BLOODCX No Growth After 5 Days  03/29/2018    BLOODCX No Growth After 5 Days  03/29/2018    BLOODCX No Growth After 5 Days  08/27/2017    BLOODCX No Growth After 5 Days   08/27/2017     Urine Culture:   Lab Results   Component Value Date    URINECX No Growth <1000 cfu/mL 03/29/2018    URINECX >100,000 cfu/ml Mixed Contaminants X6 05/09/2017     Sputum Culture: No components found for: SPUTUMCX  Wound Culture: No results found for: WOUNDCULT    Last 24 Hours Medication List:     Current Facility-Administered Medications:  amLODIPine 5 mg Oral Daily MARGOT Miller   apixaban 5 mg Oral BID MARGOT Miller   budesonide-formoterol 2 puff Inhalation Q12H MARGOT Miller   calcium carbonate-vitamin D 1 tablet Oral BID MARGOT Miller   carvedilol 6 25 mg Oral BID With Meals Tammi Peralta PA-C   cholecalciferol 2,000 Units Oral Daily MARGOT Gutierrez   clopidogrel 75 mg Oral Daily MARGOT Miller   docusate sodium 100 mg Oral BID MARGOT Miller   ipratropium-albuterol 3 mL Nebulization Q6H MARGOT Miller   magnesium oxide 400 mg Oral BID MARGOT Miller   methylPREDNISolone sodium succinate 20 mg Intravenous Q12H [address] Danielle Bullock MD   multivitamin-minerals 1 tablet Oral Daily MARGOT Gutierrez   senna-docusate sodium 1 tablet Oral BID MARGOT Miller   torsemide 20 mg Oral BID (diuretic) Becka Peguero PA-C        Today, Patient Was Seen By: Jayson Hinton MD    ** Please Note: Dragon 360 Dictation voice to text software may have been used in the creation of this document   **

## 2018-04-03 NOTE — PROGRESS NOTES
Progress Note - Pulmonary   Jacob Hoyos 80 y o  female MRN: [id number]  Unit/Bed#: [address] [place] [address] Encounter: [id number]    Assessment:  Acute on chronic hypercapnic hypoxemic respiratory failure  She is tolerating use of BiPAP at nighttime  Present Bipap settings 15/5 with 35% oxygen  Patient clinically has improved  Acute exacerbation of hyper reactive airways disease versus COPD  This was precipitated by a respiratory tract infection possibly viral  Restrictive impairment due to prior orthopedic surgery with andrés placement for thoracic kyphosis  This was done at age 65 yo  Chronic diastolic cardiac dysfunction  History DVT past   Patient is on Eliquis    Plan:  Ambulatory oximetry noted  At rest on room air O2 saturation ranged from 90 to to 97% and with ambulation decreased 85%  Patient required 2 L of oxygen with exercise to maintain O2 saturation 92% or better  With ambulating on room air room air O2 saturation was 85%  Continue BiPAP 15/5 with 35% oxygen at bedtime  Continue nebulized treatments with DuoNeb every 6 hours  Symbicort 160 mcg 2 puffs b i d  Methylprednisone 20 mg IV every 12 hours and can consider change to p o  prednisone tomorrow if all stable  Arterial blood gas has been ordered today to assess pCO2 level    Subjective:   Feels better  Cough has improved  Objective:     Vitals: Blood pressure (!) 182/83, pulse 72, temperature 98 3 °F (36 8 °C), temperature source Tympanic, resp  rate 20, height 5' 4" (1 626 m), weight 77 5 kg (170 lb 13 7 oz), SpO2 99 %, not currently breastfeeding  ,Body mass index is 29 33 kg/m²  No intake or output data in the 24 hours ending 04/03/18 0843    Physical Exam: Physical Exam   Constitutional: She is oriented to person, place, and time  Patient is awake, alert coherent  Sitting at bedside  No conversational dyspnea  On 2 L O2 saturation is 97%   HENT:   Head: Normocephalic     Nose: Nose normal    Mouth/Throat: Oropharynx is clear and moist  No oropharyngeal exudate  Eyes: Conjunctivae are normal    Neck: Neck supple  No JVD present  Cardiovascular: Normal rate, regular rhythm and normal heart sounds  Pulmonary/Chest: Effort normal    Lung sounds reveal few coarse expiratory wheezes in the lower lobes  Breath sounds are decreased bilaterally   Abdominal: Soft  She exhibits no distension  There is no tenderness  Musculoskeletal: She exhibits no edema  Lymphadenopathy:     She has no cervical adenopathy  Neurological: She is alert and oriented to person, place, and time  Skin: Skin is warm  Psychiatric: She has a normal mood and affect  Her behavior is normal  Thought content normal         Labs: I have personally reviewed pertinent lab results  , ABG:   Lab Results   Component Value Date    PHART 7 407 03/30/2018    [id number] 79 2 (HH) 03/30/2018    PO2ART 36 5 (LL) 03/30/2018    [id number] 48 7 (H) 03/30/2018    BEART 20 0 03/30/2018    SOURCE Radial, Right 03/30/2018   , BNP: No results found for: BNP, CBC:   Lab Results   Component Value Date    WBC 9 80 04/01/2018    HGB 11 5 (L) 04/01/2018    HCT 36 7 (L) 04/01/2018    MCV 96 04/01/2018     04/01/2018    ADJUSTEDWBC 5 00 07/26/2016    MCH 29 9 04/01/2018    MCHC 31 3 (L) 04/01/2018    RDW 13 9 04/01/2018    MPV 8 8 (L) 04/01/2018    NRBC 0 03/30/2018   , CMP:   Lab Results   Component Value Date     (H) 04/03/2018     01/05/2016    K 3 7 04/03/2018    K 4 4 01/05/2016    CL 97 (L) 04/03/2018     01/05/2016    CO2 >45 (HH) 04/03/2018    CO2 27 01/05/2016    ANIONGAP  04/03/2018      Comment:      Unable to calculate    ANIONGAP 12 4 01/05/2016    BUN 64 (H) 04/03/2018    BUN 45 (H) 01/05/2016    CREATININE 1 87 (H) 04/03/2018    CREATININE 1 8 (H) 01/05/2016    GLUCOSE 174 (H) 04/03/2018    GLUCOSE 92 01/05/2016    CALCIUM 9 1 04/03/2018    CALCIUM 9 4 01/05/2016    AST 14 03/06/2018    AST 21 01/05/2016    ALT 18 03/06/2018    ALT 30 01/05/2016    ALKPHOS 56 03/06/2018    ALKPHOS 64 01/05/2016    PROT 7 1 03/06/2018    PROT 8 5 (H) 01/05/2016    BILITOT 0 30 03/06/2018    BILITOT 0 5 01/05/2016    EGFR 23 04/03/2018   , PT/INR:   Lab Results   Component Value Date    INR 1 04 03/29/2018   , Troponin: No results found for: TROPONIN    Imaging and other studies: I have personally reviewed pertinent reports     and I have personally reviewed pertinent films in PACS

## 2018-04-03 NOTE — SOCIAL WORK
AMBROSE SPOKE WITH PT AT THE BEDSIDE, SHE WAS VISITED BY [facility] THIS AM   THEY ARE ABLE TO ACCEPT THE PT AND WILL ORDER BIPAP WHEN THEY KNOW SHE IS AGREEABLE TO COME TO THEM  AMBROSE SPOKE WITH THE PT AND SHE WANTS HER DTR TO DECIDE AS HER POA  CM AWAITING DTR TO COME AND VISIT TO DETERMINE IF [facility] IS [facility]  WILL FOLLOW UP WITH HER WHEN SHE GETS HERE  AMBROSE SPOKE WITH THE PT DTR, SHE PREFERS THAT THE PT GO TO [facility] TO MAINTAIN CARE WITH DR Evangeline Bernheim THERE   DALE RAMÍREZ MADE AWARE AND WILL HAVE THEM ORDER BIPAP FOR HER WHEN SHE GOES

## 2018-04-04 ENCOUNTER — APPOINTMENT (INPATIENT)
Dept: PULMONOLOGY | Facility: HOSPITAL | Age: 83
DRG: 189 | End: 2018-04-04
Attending: INTERNAL MEDICINE
Payer: MEDICARE

## 2018-04-04 VITALS
HEART RATE: 71 BPM | DIASTOLIC BLOOD PRESSURE: 68 MMHG | BODY MASS INDEX: 29.17 KG/M2 | OXYGEN SATURATION: 98 % | WEIGHT: 170.86 LBS | SYSTOLIC BLOOD PRESSURE: 132 MMHG | RESPIRATION RATE: 20 BRPM | TEMPERATURE: 98 F | HEIGHT: 64 IN

## 2018-04-04 LAB
BUN SERPL-MCNC: 61 MG/DL (ref 5–25)
CALCIUM SERPL-MCNC: 10.1 MG/DL (ref 8.3–10.1)
CHLORIDE SERPL-SCNC: 94 MMOL/L (ref 100–108)
CO2 SERPL-SCNC: >45 MMOL/L (ref 21–32)
CREAT SERPL-MCNC: 1.68 MG/DL (ref 0.6–1.3)
GFR SERPL CREATININE-BSD FRML MDRD: 27 ML/MIN/1.73SQ M
GLUCOSE SERPL-MCNC: 169 MG/DL (ref 65–140)
POTASSIUM SERPL-SCNC: 4.1 MMOL/L (ref 3.5–5.3)
SODIUM SERPL-SCNC: 143 MMOL/L (ref 136–145)

## 2018-04-04 PROCEDURE — 94010 BREATHING CAPACITY TEST: CPT

## 2018-04-04 PROCEDURE — 94760 N-INVAS EAR/PLS OXIMETRY 1: CPT

## 2018-04-04 PROCEDURE — 99239 HOSP IP/OBS DSCHRG MGMT >30: CPT | Performed by: INTERNAL MEDICINE

## 2018-04-04 PROCEDURE — 99232 SBSQ HOSP IP/OBS MODERATE 35: CPT | Performed by: INTERNAL MEDICINE

## 2018-04-04 PROCEDURE — 80048 BASIC METABOLIC PNL TOTAL CA: CPT | Performed by: INTERNAL MEDICINE

## 2018-04-04 PROCEDURE — 94640 AIRWAY INHALATION TREATMENT: CPT

## 2018-04-04 PROCEDURE — 97110 THERAPEUTIC EXERCISES: CPT

## 2018-04-04 RX ORDER — CARVEDILOL 6.25 MG/1
6.25 TABLET ORAL 2 TIMES DAILY WITH MEALS
Refills: 0
Start: 2018-04-04 | End: 2018-12-11 | Stop reason: HOSPADM

## 2018-04-04 RX ORDER — PREDNISONE 20 MG/1
10 TABLET ORAL DAILY
Refills: 0
Start: 2018-04-04 | End: 2018-05-17 | Stop reason: ALTCHOICE

## 2018-04-04 RX ADMIN — Medication 400 MG: at 08:46

## 2018-04-04 RX ADMIN — METHYLPREDNISOLONE SODIUM SUCCINATE 20 MG: 40 INJECTION, POWDER, FOR SOLUTION INTRAMUSCULAR; INTRAVENOUS at 08:46

## 2018-04-04 RX ADMIN — Medication 1 TABLET: at 17:27

## 2018-04-04 RX ADMIN — CALCIUM CARBONATE 500 MG (1,250 MG)-VITAMIN D3 200 UNIT TABLET 1 TABLET: at 08:45

## 2018-04-04 RX ADMIN — CARVEDILOL 6.25 MG: 6.25 TABLET, FILM COATED ORAL at 08:46

## 2018-04-04 RX ADMIN — BUDESONIDE AND FORMOTEROL FUMARATE DIHYDRATE 2 PUFF: 160; 4.5 AEROSOL RESPIRATORY (INHALATION) at 08:46

## 2018-04-04 RX ADMIN — IPRATROPIUM BROMIDE AND ALBUTEROL SULFATE 3 ML: .5; 3 SOLUTION RESPIRATORY (INHALATION) at 13:25

## 2018-04-04 RX ADMIN — Medication 400 MG: at 17:27

## 2018-04-04 RX ADMIN — APIXABAN 5 MG: 5 TABLET, FILM COATED ORAL at 17:27

## 2018-04-04 RX ADMIN — CLOPIDOGREL 75 MG: 75 TABLET, FILM COATED ORAL at 08:46

## 2018-04-04 RX ADMIN — IPRATROPIUM BROMIDE AND ALBUTEROL SULFATE 3 ML: .5; 3 SOLUTION RESPIRATORY (INHALATION) at 07:28

## 2018-04-04 RX ADMIN — Medication 1 TABLET: at 08:46

## 2018-04-04 RX ADMIN — TORSEMIDE 20 MG: 20 TABLET ORAL at 08:46

## 2018-04-04 RX ADMIN — DOCUSATE SODIUM 100 MG: 100 CAPSULE, LIQUID FILLED ORAL at 08:46

## 2018-04-04 RX ADMIN — VITAMIN D, TAB 1000IU (100/BT) 2000 UNITS: 25 TAB at 08:45

## 2018-04-04 RX ADMIN — AMLODIPINE BESYLATE 5 MG: 5 TABLET ORAL at 08:46

## 2018-04-04 RX ADMIN — Medication 1 TABLET: at 08:45

## 2018-04-04 RX ADMIN — DOCUSATE SODIUM 100 MG: 100 CAPSULE, LIQUID FILLED ORAL at 17:27

## 2018-04-04 RX ADMIN — APIXABAN 5 MG: 5 TABLET, FILM COATED ORAL at 08:46

## 2018-04-04 RX ADMIN — TORSEMIDE 20 MG: 20 TABLET ORAL at 16:03

## 2018-04-04 RX ADMIN — CALCIUM CARBONATE 500 MG (1,250 MG)-VITAMIN D3 200 UNIT TABLET 1 TABLET: at 17:27

## 2018-04-04 RX ADMIN — CARVEDILOL 6.25 MG: 6.25 TABLET, FILM COATED ORAL at 16:03

## 2018-04-04 NOTE — PROGRESS NOTES
Progress Note - Pulmonary   Kaylee Raygoza 80 y o  female MRN: [id number]  Unit/Bed#: [address] [place] [address] Encounter: [id number]    Assessment:  Acute on chronic hypercapnic hypoxemic respiratory failure has improved  Arterial blood gas drawn yesterday on 2 L of oxygen showed pH is 7 47, pCO2 63 and PO2 of 74 mm Hg   1 patient had decompensated her pCO2 was as high as 87 mm Hg  Cause of this respiratory failure was secondary to acute exacerbation of COPD versus hyperreactive airways disease and also restrictive impairment due to prior scoliosis surgery  Patient had large Duarte rods to correct her scoliosis placed when she was 58years old because of worsening thoracic lumbar scoliosis  She states her lung capacity was being restricted the time and she had this surgery done at the [facility] in [address] today was reviewed  This shows severe restrictive impairment and there is at least mild airflow obstruction  Restrictive impairment is likely due to her thoracic spine surgery but I cannot exclude an element of air trapping  Spirometry done 4/4/18;    FVC - 0 89 Liters or 41% of predicted  FEV1 - 0 60 Liters or 38% of predicted  FEV1/FVC% - 67 5%    Plan:  Can consider change to p o  prednisone and would Rx with prednisone 40 mg daily with 10 mg taper every 4th day  Continue Symbicort 160 mcg 2 puffs b i d  and nebulizer treatments with DuoNeb 4 times a day  Oxygen 2 L minute during the day and continue BiPAP 16/6 with 2 liters of oxygen at bedtime for ventilatory support  I spoke with hospitalist, Dr Irvin Taylor      Subjective:   Patient is feeling better  Her cough is better  No shortness of breath at rest    Objective:     Vitals: Blood pressure 145/70, pulse 72, temperature 97 6 °F (36 4 °C), temperature source Oral, resp  rate 20, height 5' 4" (1 626 m), weight 77 5 kg (170 lb 13 7 oz), SpO2 95 %, not currently breastfeeding  ,Body mass index is 29 33 kg/m²      No intake or output data in the 24 hours ending 04/04/18 1016    Physical Exam: Physical Exam   Constitutional: She is oriented to person, place, and time  No distress  Patient is sitting in a chair  She is alert  She is coherent and has not having any conversational dyspnea  On 1 5 L of oxygen O2 saturation 97%   HENT:   Head: Normocephalic  Nose: Nose normal    Mouth/Throat: Oropharynx is clear and moist  No oropharyngeal exudate  Eyes: Conjunctivae are normal    Neck: Neck supple  No JVD present  Cardiovascular: Normal rate, regular rhythm and normal heart sounds  Pulmonary/Chest: Effort normal    Lung sounds are decreased with some faint expiratory wheezes at the bases  Abdominal: Soft  She exhibits no distension  There is no tenderness  There is no guarding  Musculoskeletal: She exhibits no edema  Neurological: She is alert and oriented to person, place, and time  Skin: Skin is warm  She is not diaphoretic  Psychiatric: She has a normal mood and affect  Her behavior is normal         Labs: I have personally reviewed pertinent lab results  , ABG:   Lab Results   Component Value Date    PHART 7 469 (H) 04/03/2018    [id number] 63 3 (HH) 04/03/2018    PO2ART 73 5 (L) 04/03/2018    [id number] 44 9 (H) 04/03/2018    BEART 18 2 04/03/2018    SOURCE Radial, Right 04/03/2018   , BNP: No results found for: BNP, CBC:   Lab Results   Component Value Date    WBC 9 80 04/01/2018    HGB 11 5 (L) 04/01/2018    HCT 36 7 (L) 04/01/2018    MCV 96 04/01/2018     04/01/2018    ADJUSTEDWBC 5 00 07/26/2016    MCH 29 9 04/01/2018    MCHC 31 3 (L) 04/01/2018    RDW 13 9 04/01/2018    MPV 8 8 (L) 04/01/2018    NRBC 0 03/30/2018   , CMP:   Lab Results   Component Value Date     04/04/2018     01/05/2016    K 4 1 04/04/2018    K 4 4 01/05/2016    CL 94 (L) 04/04/2018     01/05/2016    CO2 >45 (HH) 04/04/2018    CO2 27 01/05/2016    ANIONGAP  04/04/2018      Comment:      Unable to calculate    ANIONGAP 12 4 01/05/2016    BUN 61 (H) 04/04/2018    BUN 45 (H) 01/05/2016    CREATININE 1 68 (H) 04/04/2018    CREATININE 1 8 (H) 01/05/2016    GLUCOSE 169 (H) 04/04/2018    GLUCOSE 92 01/05/2016    CALCIUM 10 1 04/04/2018    CALCIUM 9 4 01/05/2016    AST 14 03/06/2018    AST 21 01/05/2016    ALT 18 03/06/2018    ALT 30 01/05/2016    ALKPHOS 56 03/06/2018    ALKPHOS 64 01/05/2016    PROT 7 1 03/06/2018    PROT 8 5 (H) 01/05/2016    BILITOT 0 30 03/06/2018    BILITOT 0 5 01/05/2016    EGFR 27 04/04/2018   , PT/INR:   Lab Results   Component Value Date    INR 1 04 03/29/2018   , Troponin: No results found for: TROPONIN    Imaging and other studies: I have personally reviewed pertinent reports     and I have personally reviewed pertinent films in PACS

## 2018-04-04 NOTE — ASSESSMENT & PLAN NOTE
Possible COPD component with acute exacerbation  Breathing is improving  Continue Symbicort, nebulizer treatments      Prednisone taper

## 2018-04-04 NOTE — DISCHARGE SUMMARY
Discharge- Mirna De Los Santos 5/12/1928, 80 y o  female MRN: [id number]    Unit/Bed#: [address] [place] [address] Encounter: [id number]    Primary Care Provider: Mercedes Moody DO   Date and time admitted to hospital: 3/29/2018  7:05 AM        * Acute respiratory failure with hypoxia and hypercapnia ([facility] [address] )   Assessment & Plan    Likely due to hyperactive airways disease, possibly element of COPD as well  Patient might also have a component of restrictive lung disease secondary to andrés placement for kyphoscoliosis  Continue supplemental oxygen by nasal cannula  Patient's home O2 assessment revealed oxygen saturation of 85% with exertion on room air which improved to 92% with 2 liters oxygen  Patient's room air oxygen saturation at rest is 92% which improved to 97% with 2 liters oxygen  Initially required BiPAP support while in the ICU  hypercarbic respiratory failure may be due to noncompliance with BiPAP at bedtime  Continue BiPAP 15/5 with 35% FiO2 QHS  Discussed the importance of using BiPAP when sleeping with the patient  ABG showed a pH of 7 47 with pCO2 of 60 which is improved from pCO2 of 80 previously  Taper Solu-Medrol to prednisone taper           CKD (chronic kidney disease) stage 3, GFR 30-59 ml/min   Assessment & Plan    Creatinine close to baseline and better today  Sodium level has improved and is at 143 today  Will encourage p o  fluid intake        Hyperlipidemia   Assessment & Plan    LDL from December 2017 was 62        Deep vein thrombosis (DVT) of left lower extremity ([facility] [address] )   Assessment & Plan    Diagnosed with acute DVT in [place] in 2/2018     Continue Eliquis        Essential hypertension   Assessment & Plan    Continue Norvasc and Coreg        Toxic metabolic encephalopathy   Assessment & Plan    Likely due to hypercapnia  Mental status is now back at baseline        Asthma   Assessment & Plan    Possible COPD component with acute exacerbation  Breathing is improving  Continue Symbicort, nebulizer treatments  Prednisone taper        Bradycardia   Assessment & Plan    Asymptomatic  Bradycardia has improved  Dose of Coreg was decreased from 25 to 6 25 b i d  Aortic stenosis, moderate   Assessment & Plan    Echo from March 2018 showed EF of 55 % to 60 % with grade 2 diastolic dysfunction        Chronic diastolic CHF (congestive heart failure) ([address] )   Assessment & Plan    Did receive a dose of IV Lasix in ICU  Continue torsemide, Coreg                Resolved Problems  Date Reviewed: 4/4/2018    None          Consultations During Hospital Stay:  · Dr Maureen Gore Tests Requested: Follow up Dr Zena Spicer in 1 week    Complications:  None    Reason for Admission:  Altered mental status    Hospital Course: Mirna De Los Santos is a 80 y o  female patient with past medical history of diastolic CHF, hypertension, hyperlipidemia, CVA, left leg DVT who originally presented to the hospital on 3/29/2018 due to altered mental status  Patient could not regain eyes her PCP and was sent into the emergency room for further evaluation  In the ED patient's ABG showed pCO2 of 86 8 patient was placed on BiPAP and admitted to step-down unit  Patient had repeat ABG which remained stable  Patient noted to have mild pulmonary edema chest x-ray and patient was given an extra dose of Lasix later S patient was noted to have some wheezing patient was started on Solu-Medrol  Patient continued to improve and patient was later transferred to the floor  Patient was continued on steroids were slowly tapered over the next few days  Patient was continued on BiPAP 15/5 at 35% FiO2  Patient remained in a stable condition with improved respiratory status and repeat ABG showed improved pCO2 to 60  Patient was stable on oxygen during the day and BiPAP at night  Patient will be discharged to short-term rehabilitation on prednisone taper, BiPAP 15/5 with 35% FiO2 q h s  on 2 liters oxygen during the day    I have discussed the coordination of care in detail with patient's    Please see above list of diagnoses and related plan for additional information  Condition at Discharge: stable     Discharge Day Visit / Exam:     Subjective:  Patient denies any shortness of breath or palpitations  Occasional cough  Patient has been tolerating BiPAP  Vitals: Blood Pressure: 132/68 (04/04/18 1320)  Pulse: 71 (04/04/18 1256)  Temperature: 98 °F (36 7 °C) (04/04/18 1256)  Temp Source: Tympanic (04/04/18 1256)  Respirations: 20 (04/04/18 1256)  Height: 5' 4" (162 6 cm) (03/29/18 1100)  Weight - Scale: 77 5 kg (170 lb 13 7 oz) (03/29/18 1100)  SpO2: 98 % (04/04/18 1325)  Exam:   Physical Exam   Constitutional: No distress  HENT:   Head: Normocephalic and atraumatic  Nose: Nose normal    Eyes: Conjunctivae and EOM are normal  Pupils are equal, round, and reactive to light  Neck: Normal range of motion  Neck supple  No JVD present  Cardiovascular: Normal rate and regular rhythm  Exam reveals gallop  Exam reveals no friction rub  Murmur heard  Pulmonary/Chest: Effort normal and breath sounds normal  No respiratory distress  She has no wheezes  She has no rales  She exhibits no tenderness  Few scattered rhonchi   Abdominal: Soft  Bowel sounds are normal  She exhibits no distension  There is no tenderness  There is no rebound and no guarding  Musculoskeletal: She exhibits no edema  Neurological: She is alert  No cranial nerve deficit  Skin: Skin is warm and dry  No rash noted  Psychiatric: She has a normal mood and affect  Discussion with Family:  Daughter on the day before admission    Discharge instructions/Information to patient and family:   See after visit summary for information provided to patient and family  Provisions for Follow-Up Care:  See after visit summary for information related to follow-up care and any pertinent home health orders        Disposition:     [facility] Santiago    For Discharges to [facility] SNF:   · Not Applicable to this Patient - Not Applicable to this Patient    Planned Readmission: No     Discharge Statement:  I spent 35 minutes discharging the patient  This time was spent on the day of discharge  I had direct contact with the patient on the day of discharge  Greater than 50% of the total time was spent examining patient, answering all patient questions, arranging and discussing plan of care with patient as well as directly providing post-discharge instructions  Additional time then spent on discharge activities  Discharge Medications:  See after visit summary for reconciled discharge medications provided to patient and family        ** Please Note: This note has been constructed using a voice recognition system **

## 2018-04-04 NOTE — ASSESSMENT & PLAN NOTE
Creatinine close to baseline and better today  Sodium level has improved and is at 143 today  Will encourage p o  fluid intake

## 2018-04-04 NOTE — SOCIAL WORK
PER PCP, PT STABLE FOR DC TODAY TO [facility]  CONFIRMED THAT [facility] IS READY FOR HER  ASKED FOR TRANSPORT AFTER 5PM TODAY  CM ATTEMPTED TO CALL DTR AT WORK TO DISCUSS TRANSPORTATION, SHE RETURNED TO WORK FOR THE AFTERNOON  UNABLE TO LEAVE HER  MSG AT WORK AND RN UNABLE TO REACH HER  WILL TRY AGAIN AT A LATER TIME      PT IS CURRENTLY ON OXYGEN AT 2L NC  [facility] REFERRED FOR TRANSPORT AT 630PM

## 2018-04-04 NOTE — PLAN OF CARE
Problem: PHYSICAL THERAPY ADULT  Goal: Performs mobility at highest level of function for planned discharge setting  See evaluation for individualized goals  Outcome: Progressing  Prognosis: Good  Problem List: Decreased strength, Decreased range of motion, Decreased endurance, Impaired balance, Decreased mobility, Decreased coordination, Pain, Impaired hearing  Assessment: Pt is noted with improved trans, amb, amb endurance and mobility with the RW  Pt will cont to benefit from skilled PT services  Recommendation:  (STR)          See flowsheet documentation for full assessment

## 2018-04-04 NOTE — ASSESSMENT & PLAN NOTE
Likely due to hyperactive airways disease, possibly element of COPD as well  Patient might also have a component of restrictive lung disease secondary to andrés placement for kyphoscoliosis  Continue supplemental oxygen by nasal cannula  Patient's home O2 assessment revealed oxygen saturation of 85% with exertion on room air which improved to 92% with 2 liters oxygen  Patient's room air oxygen saturation at rest is 92% which improved to 97% with 2 liters oxygen  Initially required BiPAP support while in the ICU  hypercarbic respiratory failure may be due to noncompliance with BiPAP at bedtime  Continue BiPAP 15/5 with 35% FiO2 QHS    Discussed the importance of using BiPAP when sleeping with the patient  ABG showed a pH of 7 47 with pCO2 of 60 which is improved from pCO2 of 80 previously  Taper Solu-Medrol to prednisone taper

## 2018-04-04 NOTE — PHYSICAL THERAPY NOTE
PT TREATMENT     04/04/18 1522   Pain Assessment   Pain Assessment No/denies pain   Restrictions/Precautions   Other Precautions O2;Fall Risk;Bed Alarm; Chair Alarm   General   Chart Reviewed Yes   Family/Caregiver Present No   Subjective   Subjective "I'm going to rehab later today, and then it's one step closer to going home"   Transfers   Sit to Stand 5  Supervision   Stand to Sit 5  Supervision   Ambulation/Elevation   Gait pattern Forward Flexion   Gait Assistance (stand by assist)   Additional items Verbal cues; Tactile cues   Assistive Device Rolling walker   Distance 80 feet;2L O2;Unable to get SAO2 reading after amb;pt minimally SOB   Balance   Static Sitting Good   Static Standing Fair +  (with RW)   Dynamic Standing Fair  (with RW)   Ambulatory Fair  (with RW)   Activity Tolerance   Activity Tolerance Patient limited by fatigue  (and SOB)   Assessment   Assessment Pt is noted with improved trans, amb, amb endurance and mobility with the RW  Pt will cont to benefit from skilled PT services  Plan   Treatment/Interventions ADL retraining;Functional transfer training;LE strengthening/ROM; Therapeutic exercise; Endurance training;Patient/family training;Bed mobility;Gait training   PT Frequency 5x/wk   Recommendation   Recommendation (STR)   Equipment Recommended (cont amb with RW)   Licensure   [place] License Number  [place], [id number]

## 2018-04-04 NOTE — [place] UNIVERSAL TRANSFER FORM
[place] UN[place] FOR[place]  (ALL ITEMS MUST BE COMPLETED)    1  TRANSFER FROM: [address]      [facility]    2  DATE OF TRANSFER: 4/4/2018                        TIME OF TRANSFER: between [id number]    3  PATIENT NAME: Sabi Pham,        YOB: 1928                             GENDER: female    4  LANGUAGE:   English    5  PHYSICIAN NAME:  Lidia Montemayor MD                   PHONE: [phone]    6  CODE STATUS: Level 1 - Full Code        Out of Hospital DNR Attached: No    7  :                                      :  Extended Emergency Contact Information  Primary Emergency Contact: Cassandra Brandt [address] Phone: [phone]  Work Phone: [phone]  Relation: Child  [facility] Emergency Contact[facility] [address] [facility]  Mobile Phone: [phone]  Relation: Mickey Au Representative/Proxy:  No           Legal Guardian:  No             NAME OF:           HEALTH CARE REPRESENTATIVE/PROXY:                                         OR           LEGAL GUARDIAN, IF NOT :                                               PHONE:  (Day)           (Night)                        (Cell)    8  REASON FOR TRANSFER: (Must include brief medical history and recent changes in physical function or cognition ) STR s/p acute respiratory failure with hypoxia and hypercapnia  V/S: /68 (BP Location: Right arm)   Pulse 71   Temp 98 °F (36 7 °C) (Tympanic)   Resp 20   Ht 5' 4" (1 626 m)   Wt 77 5 kg (170 lb 13 7 oz)   SpO2 98%   BMI 29 33 kg/m²           PAIN: Yes, Site Chronic, generalized    9  PRIMARY DIAGNOSIS: Acute respiratory failure with hypoxia and hypercapnia (HCC)      Secondary Diagnosis:         Pacemaker: No      Internal Defib: No          Mental Health Diagnosis (if Applicable):    10  RESTRAINTS: No     11  RESPIRATORY NEEDS: Oxygen Device NC, and Flow rate: 1 5L    12  ISOLATION/PRECAUTION: None    13  ALLERGY: Lyrica [pregabalin]    14  SENSORY:       Vision Glasses and Hearing Hearing Aid Left and Right    15  SKIN CONDITION: No Wounds    16  DIET: Special (describe) Cardiac TLC 2 3 GM NA, thin liquids    17  IV ACCESS: None    18  PERSONAL ITEMS SENT WITH PATIENT: Glasses, Hearing Aid Left and Right and Other Clothing    19  ATTACHED DOCUMENTS: MUST ATTACH CURRENT MEDICATION INFORMATION Face Sheet, MAR, Medication Reconciliation, TAR, POS, Diagnostic Studies, Labs, Operative Report, Respiratory Care, Discharge Summary, PT Note, OT Note, ST Note and HX/PE    20  AT RISK ALERTS:Falls        HARM TO: N/A    21  WEIGHT BEARING STATUS:         Left Leg: Full        Right Leg: Full    22  MENTAL STATUS:Alert, Oriented and Forgetful    23  FUNCTION:        Walk: With Help; walker, stand by assist        Transfer: With Help        Toilet: With Help        Feed: Self    24  IMMUNIZATIONS/SCREENING:     Immunization History   Administered Date(s) Administered    Pneumococcal Conjugate 13-Valent 09/02/2017       25  BOWEL: Continent and Date Last BM 4/2/18 WNL    26  BLADDER: Continent    27   SENDING FACILITY CONTACT: Cheryl Steward RN                  Title: RN        Unit: 3N        Phone: [phone] [address] (if known):        Title:        Unit:         Phone:         FORM PREFILLED BY (if applicable)       Title:       Unit:        Phone:         FORM COMPLETED BY Cheryl Steward RN      Title: [facility]      Phone: [phone]

## 2018-04-04 NOTE — PLAN OF CARE
PAIN - ADULT     Verbalizes/displays adequate comfort level or baseline comfort level Completed        SAFETY ADULT     Maintain or return mobility status to optimal level Completed          DISCHARGE PLANNING     Discharge to home or other facility with appropriate resources Progressing        DISCHARGE PLANNING - CARE MANAGEMENT     Discharge to post-acute care or home with appropriate resources Progressing        INFECTION - ADULT     Absence or prevention of progression during hospitalization Progressing        Knowledge Deficit     Patient/family/caregiver demonstrates understanding of disease process, treatment plan, medications, and discharge instructions Progressing        Potential for Falls     Patient will remain free of falls Progressing        Prexisting or High Potential for Compromised Skin Integrity     Skin integrity is maintained or improved Progressing        RESPIRATORY - ADULT     Achieves optimal ventilation and oxygenation Progressing        SAFETY ADULT     Maintain or return to baseline ADL function Progressing

## 2018-04-05 NOTE — NURSING NOTE
Patient discharged to [facility] via Critical access hospital squad, via stretcher  Patient alert and awake at time of discharge, denies pain, no s/s of distress noted  Patient's belongings and discharge papers given to the squad

## 2018-04-09 ENCOUNTER — OFFICE VISIT (OUTPATIENT)
Dept: PULMONOLOGY | Facility: MEDICAL CENTER | Age: 83
End: 2018-04-09
Payer: MEDICARE

## 2018-04-09 VITALS
HEART RATE: 59 BPM | TEMPERATURE: 98.2 F | RESPIRATION RATE: 12 BRPM | WEIGHT: 158 LBS | BODY MASS INDEX: 26.98 KG/M2 | HEIGHT: 64 IN | DIASTOLIC BLOOD PRESSURE: 82 MMHG | OXYGEN SATURATION: 92 % | SYSTOLIC BLOOD PRESSURE: 138 MMHG

## 2018-04-09 DIAGNOSIS — M41.9 RESTRICTIVE LUNG DISEASE DUE TO KYPHOSCOLIOSIS: ICD-10-CM

## 2018-04-09 DIAGNOSIS — J96.11 CHRONIC RESPIRATORY FAILURE WITH HYPOXIA AND HYPERCAPNIA (HCC): ICD-10-CM

## 2018-04-09 DIAGNOSIS — J98.9 REACTIVE AIRWAY DISEASE THAT IS NOT ASTHMA: ICD-10-CM

## 2018-04-09 DIAGNOSIS — I50.32 CHRONIC DIASTOLIC CHF (CONGESTIVE HEART FAILURE) (HCC): Chronic | ICD-10-CM

## 2018-04-09 DIAGNOSIS — J96.12 CHRONIC RESPIRATORY FAILURE WITH HYPOXIA AND HYPERCAPNIA (HCC): ICD-10-CM

## 2018-04-09 DIAGNOSIS — J98.4 RESTRICTIVE LUNG DISEASE DUE TO KYPHOSCOLIOSIS: ICD-10-CM

## 2018-04-09 DIAGNOSIS — J45.909 ASTHMA, UNSPECIFIED ASTHMA SEVERITY, UNSPECIFIED WHETHER COMPLICATED, UNSPECIFIED WHETHER PERSISTENT: Primary | ICD-10-CM

## 2018-04-09 PROBLEM — I50.33 ACUTE ON CHRONIC DIASTOLIC CHF (CONGESTIVE HEART FAILURE) (HCC): Status: RESOLVED | Noted: 2018-03-07 | Resolved: 2018-04-09

## 2018-04-09 PROBLEM — J96.01 ACUTE RESPIRATORY FAILURE WITH HYPOXIA AND HYPERCAPNIA (HCC): Status: RESOLVED | Noted: 2018-03-07 | Resolved: 2018-04-09

## 2018-04-09 PROBLEM — J96.02 ACUTE RESPIRATORY FAILURE WITH HYPOXIA AND HYPERCAPNIA (HCC): Status: RESOLVED | Noted: 2018-03-07 | Resolved: 2018-04-09

## 2018-04-09 PROCEDURE — 99214 OFFICE O/P EST MOD 30 MIN: CPT | Performed by: INTERNAL MEDICINE

## 2018-04-09 RX ORDER — SODIUM PHOSPHATE, DIBASIC AND SODIUM PHOSPHATE, MONOBASIC 7; 19 G/133ML; G/133ML
1 ENEMA RECTAL
COMMUNITY
End: 2018-05-17 | Stop reason: ALTCHOICE

## 2018-04-09 RX ORDER — BISACODYL 10 MG
10 SUPPOSITORY, RECTAL RECTAL DAILY
COMMUNITY
End: 2018-05-17 | Stop reason: ALTCHOICE

## 2018-04-09 RX ORDER — ACETAMINOPHEN 325 MG/1
650 TABLET ORAL EVERY 6 HOURS PRN
COMMUNITY

## 2018-04-09 RX ORDER — FLUTICASONE FUROATE AND VILANTEROL 200; 25 UG/1; UG/1
1 POWDER RESPIRATORY (INHALATION)
COMMUNITY
End: 2018-06-08 | Stop reason: ALTCHOICE

## 2018-04-10 NOTE — ASSESSMENT & PLAN NOTE
Where did have cough about 2 weeks ago and this caused her have increasing shortness of breath and wheezing  She did develop some reactive airways disease from what was probably a viral infection  Her wheezing has improved in on examination today is no longer present  Also her breathing has improved  She is completing a course of prednisone taper  She will remain on Symbicort 160 mcg 2 puffs b i d  and will recessed the need for this at next office visit    Also she will continue nebulized treatments with DuoNeb q i d

## 2018-04-10 NOTE — ASSESSMENT & PLAN NOTE
\[facility]\"" had recent bout of acute on chronic hypercapnic respiratory failure responded well to treatment with BiPAP therapy and course of IV steroids when she was hospitalized from March 29 to April 4, 2018  Prior to discharge her blood gas drawn on 2 L of oxygen showed her pH to be 7 47, pCO2 63, and PO2 74 mm Hg  She has nearly completed a course of prednisone therapy  She will continue for BiPAP for now 16/6 with 2 L of oxygen nighttime and can use oxygen 2-3 during the day  Patient's pCO2 remain elevated after use the BiPAP and will be switched to Trilogy Unit once available  Xochtil Callaway has chronic respiratory failure due to COPD and will need mechanical ventilator  ABG results ph 7 47 PCO2 63 PO2 74  Patient tried BiPAP and failed due to persistent elevation of pCO2  NIV is needed to decrease the work of breathing and improve pulmonary status  Without this respiratory support, serious harm or death could occur  An NIV would decrease in hospital readmission rate and improve overall quality of life  She does have some restrictive lung disease related to prior thoracic scoliosis for which she had corrective surgery at age 58 and has Duarte rods in her thoracic spine    When she is ready for discharge from [facility] short-term rehab, then I will try to arrange for trilogy ventilatory support for use at bedtime and reassess her oxygen requirements    She has not needed oxygen therapy in the past

## 2018-04-10 NOTE — ASSESSMENT & PLAN NOTE
Ashlie Heller had corrective surgery for a significant thoracic kyphoscoliosis and this was done at age 58 at the [facility] in [place]  She states at that time she was having significant restrictive impairment lung function and shortness of breath  Spirometry today shows severe restrictive impairment with FVC of 1 47 L or 43% of predicted  This is slightly improved compared to testing done on April 4 when FVC was 0 89 L or 41% of predicted    FEV1 today is also improved at 1 45 L today or 62% of predicted and previously it was 0 6 L or 38% of predicted

## 2018-04-10 NOTE — PROGRESS NOTES
Assessment/Plan:     Problem List Items Addressed This Visit        Respiratory    Reactive airway disease that is not asthma     Where did have cough about 2 weeks ago and this caused her have increasing shortness of breath and wheezing  She did develop some reactive airways disease from what was probably a viral infection  Her wheezing has improved in on examination today is no longer present  Also her breathing has improved  She is completing a course of prednisone taper  She will remain on Symbicort 160 mcg 2 puffs b i d  and will recessed the need for this at next office visit    Also she will continue nebulized treatments with DuoNeb q i d  Relevant Medications    fluticasone furoate-vilanterol (BREO ELLIPTA) 200-25 MCG/INH inhaler    Chronic respiratory failure with hypoxia and hypercapnia ([facility] )     Angela Sepulveda had recent bout of acute on chronic hypercapnic respiratory failure responded well to treatment with BiPAP therapy and course of IV steroids when she was hospitalized from March 29 to April 4, 2018  Prior to discharge her blood gas drawn on 2 L of oxygen showed her pH to be 7 47, pCO2 63, and PO2 74 mm Hg  She has nearly completed a course of prednisone therapy  She will continue for BiPAP for now 16/6 with 2 L of oxygen nighttime and can use oxygen 2-3 during the day  Patient's pCO2 remain elevated after use the BiPAP and will be switched to Trilogy Unit once available  Kaylee Raygoza has chronic respiratory failure due to COPD and will need mechanical ventilator  ABG results ph 7 47 PCO2 63 PO2 74  Patient tried BiPAP and failed due to persistent elevation of pCO2  NIV is needed to decrease the work of breathing and improve pulmonary status  Without this respiratory support, serious harm or death could occur  An NIV would decrease in hospital readmission rate and improve overall quality of life        She does have some restrictive lung disease related to prior thoracic scoliosis for which she had corrective surgery at age 58 and has Duarte rods in her thoracic spine    When she is ready for discharge from [address] [place] [address] short-term rehab, then I will try to arrange for trilogy ventilatory support for use at bedtime and reassess her oxygen requirements  She has not needed oxygen therapy in the past          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     Selam Florian had corrective surgery for a significant thoracic kyphoscoliosis and this was done at age 58 at the [facility] in [place]  She states at that time she was having significant restrictive impairment lung function and shortness of breath  Spirometry today shows severe restrictive impairment with FVC of 1 47 L or 43% of predicted  This is slightly improved compared to testing done on April 4 when FVC was 0 89 L or 41% of predicted  FEV1 today is also improved at 1 45 L today or 62% of predicted and previously it was 0 6 L or 38% of predicted         Relevant Medications    fluticasone furoate-vilanterol (BREO ELLIPTA) 200-25 MCG/INH inhaler       Cardiovascular and Mediastinum    Chronic diastolic CHF (congestive heart failure) (HCC) (Chronic)      Other Visit Diagnoses     Asthma, unspecified asthma severity, unspecified whether complicated, unspecified whether persistent    -  Primary    Relevant Medications    fluticasone furoate-vilanterol (BREO ELLIPTA) 200-25 MCG/INH inhaler    Other Relevant Orders    POCT spirometry (Completed)            Return in about 2 months (around 6/9/2018)  All questions are answered to the patient's satisfaction and understanding  She verbalizes understanding  She is encouraged to call with any further questions or concerns  Portions of the record may have been created with voice recognition software  Occasional wrong word or "sound a like" substitutions may have occurred due to the inherent limitations of voice recognition software    Read the chart carefully and recognize, using context, where substitutions have occurred  ______________________________________________________________________    Chief Complaint:   Chief Complaint   Patient presents with    Follow-up     pt was in hosp for low 02 chf  pt was in hosp 3 times  since Feb 1    Shortness of Breath     pt states that she is okay    Cough     loose  non productive       Patient ID: Wendi Dyson is a 80 y o  y o  female has a past medical history of CH[facility] (congestive heart failure) ([facility] ) (08/2017); DVT (deep vein thrombosis) in pregnancy ([facility] ); Hyperlipidemia; Hypertension; Pulmonary embolism ([facility] ) (02/06/2018); Renal disorder; and Stroke [facility]) (2016)     4/9/2018  HPI     Wendi Dyson was hospitalized from March 29 to April 4, 2018 at [address] [place] [address] for acute on chronic hypercapnic hypoxemic respiratory failure and reactive airways disease possibly due to a viral infection  Her initial blood gas showed her pCO2 to be as high as 87 mm Hg  She presented with shortness of breath and cough  She was placed on BiPAP therapy with improvement and then was maintained on BiPAP 16/6 with 2 L of oxygen at bedtime  She tolerated the BiPAP well  She does have history of severe thoracic scoliosis that was causing her to have breathing impairment  She at age 58 underwent thoracic spinal surgery at [facility] in [facility] to help correct the scoliosis  Duarte rods were inserted  Prior to her discharge spirometry done April 4th showed severe restrictive impairment and some mild airflow obstruction  Forced vital capacity that time was 0 89 L or 41% of predicted, FEV1 was 0 6 L or 38% predicted obstructive index 68%  She was initiated on Symbicort 160 mcg 2 puffs twice a day and nebulizer treatments with DuoNeb  She was also placed on a tapering course of prednisone starting at 40 mg per day with 10 mg taper every 4th day  She now is at [address] undergoing short-term rehab    She feels as though she is doing well not have any nocturnal dyspnea  She does have some exertional dyspnea but this is holding stable  Her cough is nearly resolved  Patient does live part-time in [place]  She would was admitted there on February 4th because of difficulty breathing  She was there from February 4th to 14th of 2018  She was treated for acute respiratory failure and congestive heart failure  She was intubated when she 1st came in and was on ventilator support for 3 days  She then went home and came back to this region in early March  From March 6 to March 12th she was admitted for acute on chronic diastolic congestive heart failure  She also had acute hypercapnic hypoxemic respiratory failure 1 point was treated with BiPAP therapy  Initially that admission she was intubated  She does have a history of right basal ganglion stroke and she also has history left lower extremity DVT  She has history of thoracic scoliosis which was severe enough that she had surgery at age 58  She also has some chronic kidney disease  She has had bilateral total knee replacement surgeries  [place] Gerber has never smoked  She was accompanied by her daughter Alecia Maldonado (cell phone #  [phone])    Echocardiogram done August 2017 showed normal left ventricular systolic function with ejection fraction of 55-60% and grade 1 diastolic cardiac dysfunction  There is evidence of pulmonary artery hypertension with estimated pulmonary artery pressure of 67 mm Hg  She does have chronic kidney disease and last serum creatinine was elevated 1 68 mg/dL      Review of Systems   Constitutional: Negative for diaphoresis and fever  She has been feeling better  She has some mild weakness  No shortness of breath at rest   She has minimal cough   HENT: Negative for congestion and postnasal drip  Eyes: Negative for pain and discharge  Respiratory: Negative for chest tightness and wheezing           Has some mild exertional shortness of breath  Cardiovascular: Negative for chest pain and leg swelling  Gastrointestinal: Negative for abdominal distention and diarrhea  Endocrine: Negative for polydipsia and polyphagia  Genitourinary: Negative for hematuria  Musculoskeletal: Negative for joint swelling and myalgias  Skin: Negative for rash  Neurological: Negative for dizziness and syncope  Psychiatric/Behavioral: Negative for confusion  Smoking history: She reports that she has never smoked  She has never used smokeless tobacco     The following portions of the patient's history were reviewed and updated as appropriate: allergies, current medications, past family history, past medical history, past social history, past surgical history and problem list     Immunization History   Administered Date(s) Administered    Pneumococcal Conjugate 13-Valent 09/02/2017     Current Outpatient Prescriptions   Medication Sig Dispense Refill    acetaminophen (TYLENOL) 325 mg tablet Take 650 mg by mouth every 6 (six) hours as needed for mild pain      amLODIPine (NORVASC) 2 5 mg tablet Take 5 mg by mouth 2 (two) times a day      apixaban (ELIQUIS) 5 mg Take 5 mg by mouth 2 (two) times a day      bisacodyl (DULCOLAX) 10 mg suppository Insert 10 mg into the rectum daily      calcium-vitamin D (OSCAL 500 + D) 500 mg-200 units per tablet Take 1 tablet by mouth 2 (two) times a day   carvedilol (COREG) 6 25 mg tablet Take 1 tablet (6 25 mg total) by mouth 2 (two) times a day with meals  0    cholecalciferol (VITAMIN D3) 1,000 units tablet Take 2,000 Units by mouth daily      clopidogrel (PLAVIX) 75 mg tablet Take 75 mg by mouth daily      co-enzyme Q-10 50 MG capsule Take 200 mg by mouth daily        colesevelam (WELCHOL) 625 mg tablet Take 1,875 mg by mouth 2 (two) times a day with meals Indications: 3 tabs twice a day        docusate sodium (COLACE) 100 mg capsule Take 100 mg by mouth 2 (two) times a day      fluticasone furoate-vilanterol (BREO ELLIPTA) 200-25 MCG/INH inhaler Inhale 1 puff      ipratropium-albuterol (DUO-NEB) 0 5-2 5 mg/3 mL Take 3 mL by nebulization every 6 (six) hours  0    magnesium hydroxide (MILK OF MAGNESIA) 400 mg/5 mL oral suspension Take by mouth daily as needed for constipation      magnesium oxide (MAG-OX) 400 mg Take 1 tablet (400 mg total) by mouth 2 (two) times a day  0    Multiple Vitamins-Minerals (OCUVITE PO) Take 1 tablet by mouth daily        predniSONE 20 mg tablet Take 0 5 tablets (10 mg total) by mouth daily (Patient taking differently: Take 40 mg by mouth daily  )  0    senna-docusate sodium (SENOKOT S) 8 6-50 mg per tablet Take 1 tablet by mouth 2 (two) times a day  0    sodium phosphate-biphosphate (FLEET) 7-19 g 118 mL enema Insert 1 enema into the rectum      torsemide (DEMADEX) 20 mg tablet Take 1 tablet (20 mg total) by mouth 2 (two) times a day  0    budesonide-formoterol (SYMBICORT) 160-4 5 mcg/act inhaler Inhale 2 puffs 2 (two) times a day       No current facility-administered medications for this visit  Allergies: Lyrica [pregabalin]    Objective:  Vitals:    04/09/18 1459 04/09/18 1505   BP: 138/82    BP Location: Right arm    Patient Position: Sitting    Cuff Size: Adult    Pulse: 59    Resp: 12    Temp: 98 2 °F (36 8 °C)    TempSrc: Oral    SpO2: 92% 92%   Weight: 71 7 kg (158 lb)    Height: 5' 4" (1 626 m)    Oxygen Therapy  SpO2: 92 %  Oxygen Therapy: Supplemental oxygen  O2 Delivery Method: Nasal cannula  O2 Flow Rate (L/min): 3 L/min    Wt Readings from Last 3 Encounters:   04/09/18 71 7 kg (158 lb)   03/29/18 77 5 kg (170 lb 13 7 oz)   03/12/18 72 7 kg (160 lb 4 4 oz)     Body mass index is 27 12 kg/m²  Physical Exam   Constitutional: She is oriented to person, place, and time  Alert, no conversational dyspnea, on 3 L O2 saturation is 96%   HENT:   Head: Normocephalic     Nose: Nose normal    Mouth/Throat: Oropharynx is clear and moist    Eyes: Conjunctivae are normal  Pupils are equal, round, and reactive to light  Neck: Neck supple  No JVD present  Cardiovascular: Normal rate, regular rhythm and normal heart sounds  Pulmonary/Chest: Effort normal    Breath sounds are mildly decreased but they are clear to auscultation  No wheezes, crackles or rhonchi   Abdominal: Soft  Bowel sounds are normal  She exhibits no distension  There is no rebound and no guarding  Musculoskeletal: She exhibits no edema  Neurological: She is alert and oriented to person, place, and time  Skin: Skin is warm  No rash noted  No erythema  Psychiatric: She has a normal mood and affect  Her behavior is normal  Thought content normal        Lab Review:     ABG on 2 l/m done 4/3/18  PH - 7 47  PCO2 - 63 mm Hg  PO2 - 74 mm Hg      Diagnostics:  Office Spirometry Results:  Done today 4/9/18  FVC - 1 47  43%  FEV1 - 1 45 L   62%  FEV1/FVC%  99%    Severe restrictive impairment     Xr Chest 2 Views    Result Date: 3/29/2018  Narrative: CHEST INDICATION:   sob  COMPARISON:  03/09/2018 EXAM PERFORMED/VIEWS:  XR CHEST PA & LATERAL Images: 3 FINDINGS: Cardiomediastinal silhouette appears enlarged  The lungs are clear  No pneumothorax or pleural effusion  Spinal fusion hardware  Impression: No acute cardiopulmonary disease  Cardiomegaly  Workstation performed: [id number]     Xr Chest Portable Icu    Result Date: 3/30/2018  Narrative: CHEST INDICATION:   dyspnea  COMPARISON:  3/29/2018 EXAM PERFORMED/VIEWS:  XR CHEST PORTABLE ICU FINDINGS: Cardiomediastinal silhouette appears unremarkable  Mild pulmonary edema  Low lung volumes present  Thoracolumbar fusion rods noted  Impression: Stable chest with mild pulmonary edema noted   Workstation performed: [id number]

## 2018-04-26 ENCOUNTER — LAB REQUISITION (OUTPATIENT)
Dept: LAB | Facility: HOSPITAL | Age: 83
End: 2018-04-26
Payer: MEDICARE

## 2018-04-26 DIAGNOSIS — N18.30 CHRONIC KIDNEY DISEASE, STAGE III (MODERATE) (HCC): ICD-10-CM

## 2018-04-26 LAB
ANION GAP SERPL CALCULATED.3IONS-SCNC: 6 MMOL/L (ref 4–13)
BUN SERPL-MCNC: 23 MG/DL (ref 5–25)
CALCIUM SERPL-MCNC: 9.4 MG/DL (ref 8.3–10.1)
CHLORIDE SERPL-SCNC: 105 MMOL/L (ref 100–108)
CO2 SERPL-SCNC: 31 MMOL/L (ref 21–32)
CREAT SERPL-MCNC: 1.52 MG/DL (ref 0.6–1.3)
GFR SERPL CREATININE-BSD FRML MDRD: 30 ML/MIN/1.73SQ M
GLUCOSE P FAST SERPL-MCNC: 101 MG/DL (ref 65–99)
POTASSIUM SERPL-SCNC: 3.8 MMOL/L (ref 3.5–5.3)
SODIUM SERPL-SCNC: 142 MMOL/L (ref 136–145)

## 2018-04-26 PROCEDURE — 80048 BASIC METABOLIC PNL TOTAL CA: CPT | Performed by: FAMILY MEDICINE

## 2018-05-08 DIAGNOSIS — I50.33 ACUTE ON CHRONIC DIASTOLIC CHF (CONGESTIVE HEART FAILURE) (HCC): ICD-10-CM

## 2018-05-08 RX ORDER — IPRATROPIUM BROMIDE AND ALBUTEROL SULFATE 2.5; .5 MG/3ML; MG/3ML
3 SOLUTION RESPIRATORY (INHALATION)
Qty: 360 ML | Refills: 3
Start: 2018-05-08 | End: 2018-05-25 | Stop reason: SDUPTHER

## 2018-05-14 DIAGNOSIS — I82.5Y9 CHRONIC DEEP VEIN THROMBOSIS (DVT) OF PROXIMAL VEIN OF LOWER EXTREMITY, UNSPECIFIED LATERALITY (HCC): Primary | ICD-10-CM

## 2018-05-16 PROBLEM — I12.9 HYPERTENSIVE CHRONIC KIDNEY DISEASE WITH STAGE 1 THROUGH STAGE 4 CHRONIC KIDNEY DISEASE, OR UNSPECIFIED CHRONIC KIDNEY DISEASE: Status: ACTIVE | Noted: 2018-05-16

## 2018-05-16 PROBLEM — D63.1 ANEMIA OF CHRONIC RENAL FAILURE, STAGE 3 (MODERATE) (HCC): Status: ACTIVE | Noted: 2018-05-16

## 2018-05-16 PROBLEM — I95.1 ORTHOSTATIC HYPOTENSION: Status: ACTIVE | Noted: 2018-05-16

## 2018-05-16 PROBLEM — N18.30 ANEMIA OF CHRONIC RENAL FAILURE, STAGE 3 (MODERATE) (HCC): Status: ACTIVE | Noted: 2018-05-16

## 2018-05-16 NOTE — PROGRESS NOTES
RENAL FOLLOW UP NOTE: td    ASSESSMENT AND PLAN:     1  CK D stage III: The  patient's creatinine has returned back to baseline at 1 61mg/dL  Her urinalysis demonstrated no proteinuria at the end of March  The mainstay of therapy remains keeping proteinuria less than 1 g, good hypertensive control, and good dyslipidemic control  2  Volume:  She appears euvolemic today  3  Hypertension/orthostasis: Her blood pressure is reasonably good today and no significant orthostasis  I will have her send in 1 week a readings  I will make no changes today  Current regimen:  -amlodipine 5 mg daily  -carvedilol 6 25 mg twice a day  -torsemide 10 mg daily  4  Electrolytes are normal   She did have metabolic alkalosis in the hospital but this has resolved  5  Mineral bone disease:  Need to be reassessed at this time  6  Anemia:  Was slightly low post hospitalization but improved to 11 8  Also check iron studies  7  Status post recent CVA  8  History of MGUS: She follows with the hematologist Dr Brasher  9  CAD/atrial fibrillation:  Followed closely by Dr Jay Shelby  I told the patient to contact cardiology regarding epistaxis  10   Dyslipidemia: To be evaluated at this time  PATIENT INSTRUCTIONS:    Patient Instructions   1  No medication changes today  2    Please weigh yourself every day and check your leg swelling every day:  -if your weight goes up by 2 lb or more in 1 or 2 days take an extra torsemide in the evening  -if your swelling is any worse take an extra torsemide in the evening  -if the extra water pill/torsemide does not help call me  -continue to avoid salt  -continue to raise her legs when you are seated  3  Please send in 1 week a blood pressure readings morning and evening, sitting and standing now at your convenience  4  Please have lab work done 05/21/2018, fasting if possible  5    Follow-up in 3 months:  -please go for fasting lab work prior to your appointment  -please bring in 1 week a blood pressure readings morning and evening sitting and standing  6  General instructions:  -avoid salt  -avoid medications such as Motrin, Naprosyn, ibuprofen, Aleve or Advil or Celebrex as they can affect your kidney function; you can use Tylenol as needed for pain or fevers if you have no liver problems  -avoid medications such as Sudafed or other medications with decongestants as they can raise your blood pressure  -try to remain active        Subjective:   Patient was admitted in [place] to the hospital February 6th for congestive heart failure  The patient was intubated  She was seen by Cardiology who discovered that she had atrial fibrillation placed on Eliquis and Plavix  In addition, carvedilol was initiated  She had an early admission for congestive heart failure in early March  The patient was readmitted to [facility] in March the on 3/28 secondary to change in mental status  She had pCO2 retention  She was found to have mild pulmonary edema and was treated with intravenous diuretics initially and also Solu-Medrol for wheezing  Her pCO2 improved  She was maintained on Trilogy(similar to CPAP/BiPAP but more gentle) at night and oxygen during the day  She follows with pulmonary  Currently overall markedly improved  No fevers chills or coughing  She does complain of epistaxis on Plavix/Eliquis  Appetite is good energy improving    No gastrointestinal symptoms  No urinary symptoms including foamy urine  No headaches, only rare dizziness even just sitting not on standing  No chest pain, dyspnea on exertion is markedly improve and overall leg swelling is doing well  Medications for CHF:  -Torsemide 10 mg daily, apparently it was supposed to  be 20 mg twice a day    ROS:  See HPI, otherwise review of systems as completely reviewed with the patient are negative    Past Medical History:   Diagnosis Date    [facility] 16/4921    diastolic     DVT (deep vein thrombosis) in pregnancy ([facility] )     left LE in 2018 and 50 years ago    Hyperlipidemia     Hypertension     Pulmonary embolism ([facility] ) 2018    Renal disorder     ckd    Stroke [facility]) 2016    left hand weakness,paresthesia  Past Surgical History:   Procedure Laterality Date    ABDOMINAL SURGERY      ruputured bowel  had colostomy and ileostomy,reversed later on   BACK SURGERY      rods in back  done 30 years ago   CARPAL TUNNEL RELEASE Bilateral     CYST REMOVAL      back    HERNIA REPAIR      REPLACEMENT TOTAL KNEE BILATERAL       Family History   Problem Relation Age of Onset    COPD Brother       reports that she has never smoked  She has never used smokeless tobacco  She reports that she drinks alcohol  She reports that she does not use drugs  I COMPLETELY REVIEWED THE PAST MEDICAL HISTORY/PAST SURGICAL HISTORY/SOCIAL HISTORY/FAMILY HISTORY/AND MEDICATIONS  AND UPDATED ALL    Objective:     Vitals:  Blood pressure on right sittin/72 with a heart rate of 84 and slightly irregular  Blood pressure on right standin/68 with a heart rate of 80 and slightly irregular      Weight (last 2 days)     Date/Time   Weight    18 0910  72 1 (159)            Body mass index is 27 29 kg/m²      Physical Exam: General:  No acute distress, on oxygen  Skin:  No acute rash  Eyes:  No scleral icterus, noninjected  ENT:  Moist mucous membranes  Neck:  Supple, no jugular venous distention  Back   No CVAT  Chest:  Slight basilar crackles on the right, otherwise clear to auscultation and percussion  CVS:  Irregular rhythm, no rub or gallops appreciable, grade 1/6 systolic ejection type murmur  Abdomen:  Soft and nontender with normal bowel sounds  Extremities:  No cyanosis, trace left lower extremity edema, venous stasis changes  Neuro:  Grossly the unchanged  Psych:  Alert, oriented x3 and appropriate      Medications:    Current Outpatient Prescriptions:     acetaminophen (TYLENOL) 325 mg tablet, Take 650 mg by mouth every 6 (six) hours as needed for mild pain, Disp: , Rfl:     amLODIPine (NORVASC) 2 5 mg tablet, Take 5 mg by mouth daily  , Disp: , Rfl:     apixaban (ELIQUIS) 5 mg, Take 1 tablet (5 mg total) by mouth 2 (two) times a day, Disp: 60 tablet, Rfl: 5    calcium-vitamin D (OSCAL 500 + D) 500 mg-200 units per tablet, Take 1 tablet by mouth 2 (two) times a day , Disp: , Rfl:     carvedilol (COREG) 6 25 mg tablet, Take 1 tablet (6 25 mg total) by mouth 2 (two) times a day with meals (Patient taking differently: Take 25 mg by mouth 2 (two) times a day with meals  ), Disp: , Rfl: 0    clopidogrel (PLAVIX) 75 mg tablet, Take 75 mg by mouth daily, Disp: , Rfl:     co-enzyme Q-10 50 MG capsule, Take 200 mg by mouth daily  , Disp: , Rfl:     colesevelam (WELCHOL) 625 mg tablet, Take 1,875 mg by mouth 2 (two) times a day with meals Indications: 3 tabs twice a day , Disp: , Rfl:     docusate sodium (COLACE) 100 mg capsule, Take 100 mg by mouth 2 (two) times a day, Disp: , Rfl:     fluticasone furoate-vilanterol (BREO ELLIPTA) 200-25 MCG/INH inhaler, Inhale 1 puff, Disp: , Rfl:     ipratropium-albuterol (DUO-NEB) 0 5-2 5 mg/3 mL, Take 3 mL by nebulization every 6 (six) hours, Disp: 360 mL, Rfl: 3    Multiple Vitamins-Minerals (OCUVITE PO), Take 1 tablet by mouth daily  , Disp: , Rfl:     torsemide (DEMADEX) 20 mg tablet, Take 1 tablet (20 mg total) by mouth 2 (two) times a day (Patient taking differently: Take 10 mg by mouth daily  ), Disp: , Rfl: 0    cholecalciferol (VITAMIN D3) 1,000 units tablet, Take 2,000 Units by mouth daily, Disp: , Rfl:     Lab, Imaging and other studies: I have personally reviewed pertinent labs    Laboratory Results:  Results for orders placed or performed in visit on 50/06/[birth year]   Basic metabolic panel   Result Value Ref Range    Sodium 142 136 - 145 mmol/L    Potassium 3 8 3 5 - 5 3 mmol/L    Chloride 105 100 - 108 mmol/L    CO2 31 21 - 32 mmol/L    Anion Gap 6 4 - 13 mmol/L BUN 23 5 - 25 mg/dL    Creatinine 1 52 (H) 0 60 - 1 30 mg/dL    Glucose, Fasting 101 (H) 65 - 99 mg/dL    Calcium 9 4 8 3 - 10 1 mg/dL    eGFR 30 ml/min/1 73sq m               Radiology review:   chest X-ray    Ultrasound      Portions of the record may have been created with voice recognition software   Occasional wrong word or "sound a like" substitutions may have occurred due to the inherent limitations of voice recognition software   Read the chart carefully and recognize, using context, where substitutions have occurred

## 2018-05-17 ENCOUNTER — OFFICE VISIT (OUTPATIENT)
Dept: NEPHROLOGY | Facility: CLINIC | Age: 83
End: 2018-05-17
Payer: MEDICARE

## 2018-05-17 VITALS — BODY MASS INDEX: 27.14 KG/M2 | HEIGHT: 64 IN | WEIGHT: 159 LBS

## 2018-05-17 DIAGNOSIS — D63.1 ANEMIA OF CHRONIC RENAL FAILURE, STAGE 3 (MODERATE) (HCC): ICD-10-CM

## 2018-05-17 DIAGNOSIS — I12.9 HYPERTENSIVE CHRONIC KIDNEY DISEASE WITH STAGE 1 THROUGH STAGE 4 CHRONIC KIDNEY DISEASE, OR UNSPECIFIED CHRONIC KIDNEY DISEASE: ICD-10-CM

## 2018-05-17 DIAGNOSIS — I95.1 ORTHOSTATIC HYPOTENSION: ICD-10-CM

## 2018-05-17 DIAGNOSIS — Z86.718 HISTORY OF DVT (DEEP VEIN THROMBOSIS): Primary | ICD-10-CM

## 2018-05-17 DIAGNOSIS — E78.5 DYSLIPIDEMIA: ICD-10-CM

## 2018-05-17 DIAGNOSIS — N18.30 ANEMIA OF CHRONIC RENAL FAILURE, STAGE 3 (MODERATE) (HCC): ICD-10-CM

## 2018-05-17 DIAGNOSIS — N18.30 CHRONIC KIDNEY DISEASE, STAGE III (MODERATE) (HCC): Primary | ICD-10-CM

## 2018-05-17 PROCEDURE — 99214 OFFICE O/P EST MOD 30 MIN: CPT | Performed by: INTERNAL MEDICINE

## 2018-05-17 RX ORDER — CLOPIDOGREL BISULFATE 75 MG/1
75 TABLET ORAL DAILY
Status: ON HOLD | COMMUNITY
End: 2020-03-16

## 2018-05-17 NOTE — LETTER
May 17, 2018     Wilbur KellereDO  [address] [facility]  [address]  [address] [place] [address]    Patient: Tatiana Carbajal   YOB: 1928   Date of Visit: 5/17/2018       Dear Dr Cherry Prado: Thank you for referring Tatiana Carbajal to me for evaluation  Below are my notes for this consultation  If you have questions, please do not hesitate to call me  I look forward to following your patient along with you  Sincerely,        Ellen Dubin, MD        CC: Quillian Musa, MD Sherra Ax, MD Raynaldo Pierce, DO Ellen Dubin, MD  5/17/2018  9:43 AM  Sign at close encounter  RENAL FOLLOW UP NOTE: td    ASSESSMENT AND PLAN:     1  CK D stage III: The  patient's creatinine has returned back to baseline at 1 61mg/dL  Her urinalysis demonstrated no proteinuria at the end of March  The mainstay of therapy remains keeping proteinuria less than 1 g, good hypertensive control, and good dyslipidemic control  2  Volume:  She appears euvolemic today  3  Hypertension/orthostasis: Her blood pressure is reasonably good today and no significant orthostasis  I will have her send in 1 week a readings  I will make no changes today  Current regimen:  -amlodipine 5 mg daily  -carvedilol 6 25 mg twice a day  -torsemide 10 mg daily  4  Electrolytes are normal   She did have metabolic alkalosis in the hospital but this has resolved  5  Mineral bone disease:  Need to be reassessed at this time  6  Anemia:  Was slightly low post hospitalization but improved to 11 8  Also check iron studies  7  Status post recent CVA  8  History of MGUS: She follows with the hematologist Dr Brasher  9  CAD/atrial fibrillation:  Followed closely by Dr Letty Bullock  I told the patient to contact cardiology regarding epistaxis  10   Dyslipidemia: To be evaluated at this time  PATIENT INSTRUCTIONS:    Patient Instructions   1  No medication changes today    2    Please weigh yourself every day and check your leg swelling every day:  -if your weight goes up by 2 lb or more in 1 or 2 days take an extra torsemide in the evening  -if your swelling is any worse take an extra torsemide in the evening  -if the extra water pill/torsemide does not help call me  -continue to avoid salt  -continue to raise her legs when you are seated  3  Please send in 1 week a blood pressure readings morning and evening, sitting and standing now at your convenience  4  Please have lab work done 05/21/2018, fasting if possible  5  Follow-up in 3 months:  -please go for fasting lab work prior to your appointment  -please bring in 1 week a blood pressure readings morning and evening sitting and standing  6  General instructions:  -avoid salt  -avoid medications such as Motrin, Naprosyn, ibuprofen, Aleve or Advil or Celebrex as they can affect your kidney function; you can use Tylenol as needed for pain or fevers if you have no liver problems  -avoid medications such as Sudafed or other medications with decongestants as they can raise your blood pressure  -try to remain active        Subjective:   Patient was admitted in [place] to the hospital February 6th for congestive heart failure  The patient was intubated  She was seen by Cardiology who discovered that she had atrial fibrillation placed on Eliquis and Plavix  In addition, carvedilol was initiated  She had an early admission for congestive heart failure in early March  The patient was readmitted to [facility] in March the on 3/28 secondary to change in mental status  She had pCO2 retention  She was found to have mild pulmonary edema and was treated with intravenous diuretics initially and also Solu-Medrol for wheezing  Her pCO2 improved  She was maintained on Trilogy(similar to CPAP/BiPAP but more gentle) at night and oxygen during the day  She follows with pulmonary  Currently overall markedly improved  No fevers chills or coughing  She does complain of epistaxis on Plavix/Eliquis    Appetite is good energy improving  No gastrointestinal symptoms  No urinary symptoms including foamy urine  No headaches, only rare dizziness even just sitting not on standing  No chest pain, dyspnea on exertion is markedly improve and overall leg swelling is doing well  Medications for CHF:  -Torsemide 10 mg daily, apparently it was supposed to  be 20 mg twice a day    ROS:  See HPI, otherwise review of systems as completely reviewed with the patient are negative    Past Medical History:   Diagnosis Date    [facility]     diastolic     DVT (deep vein thrombosis) in pregnancy ([facility] )     left LE in 2018 and 50 years ago    Hyperlipidemia     Hypertension     [facility] embolism [facility] 2018    Renal disorder     ckd    Stroke [facility] )     left hand weakness,paresthesia  Past Surgical History:   Procedure Laterality Date    ABDOMINAL SURGERY      ruputured bowel  had colostomy and ileostomy,reversed later on   BACK SURGERY      rods in back  done 30 years ago   CARPAL TUNNEL RELEASE Bilateral     CYST REMOVAL      back    HERNIA REPAIR      REPLACEMENT TOTAL KNEE BILATERAL       Family History   Problem Relation Age of Onset    COPD Brother       reports that she has never smoked  She has never used smokeless tobacco  She reports that she drinks alcohol  She reports that she does not use drugs  I COMPLETELY REVIEWED THE PAST MEDICAL HISTORY/PAST SURGICAL HISTORY/SOCIAL HISTORY/FAMILY HISTORY/AND MEDICATIONS  AND UPDATED ALL    Objective:     Vitals:  Blood pressure on right sittin/72 with a heart rate of 84 and slightly irregular  Blood pressure on right standin/68 with a heart rate of 80 and slightly irregular      Weight (last 2 days)     Date/Time   Weight    18 0910  72 1 (159)            Body mass index is 27 29 kg/m²      Physical Exam: General:  No acute distress, on oxygen  Skin:  No acute rash  Eyes:  No scleral icterus, noninjected  ENT:  Moist mucous membranes  Neck:  Supple, no jugular venous distention  Back   No CVAT  Chest:  Slight basilar crackles on the right, otherwise clear to auscultation and percussion  CVS:  Irregular rhythm, no rub or gallops appreciable, grade 1/6 systolic ejection type murmur  Abdomen:  Soft and nontender with normal bowel sounds  Extremities:  No cyanosis, trace left lower extremity edema, venous stasis changes  Neuro:  Grossly the unchanged  Psych:  Alert, oriented x3 and appropriate      Medications:    Current Outpatient Prescriptions:     acetaminophen (TYLENOL) 325 mg tablet, Take 650 mg by mouth every 6 (six) hours as needed for mild pain, Disp: , Rfl:     amLODIPine (NORVASC) 2 5 mg tablet, Take 5 mg by mouth daily  , Disp: , Rfl:     apixaban (ELIQUIS) 5 mg, Take 1 tablet (5 mg total) by mouth 2 (two) times a day, Disp: 60 tablet, Rfl: 5    calcium-vitamin D (OSCAL 500 + D) 500 mg-200 units per tablet, Take 1 tablet by mouth 2 (two) times a day , Disp: , Rfl:     carvedilol (COREG) 6 25 mg tablet, Take 1 tablet (6 25 mg total) by mouth 2 (two) times a day with meals (Patient taking differently: Take 25 mg by mouth 2 (two) times a day with meals  ), Disp: , Rfl: 0    clopidogrel (PLAVIX) 75 mg tablet, Take 75 mg by mouth daily, Disp: , Rfl:     co-enzyme Q-10 50 MG capsule, Take 200 mg by mouth daily  , Disp: , Rfl:     colesevelam (WELCHOL) 625 mg tablet, Take 1,875 mg by mouth 2 (two) times a day with meals Indications: 3 tabs twice a day , Disp: , Rfl:     docusate sodium (COLACE) 100 mg capsule, Take 100 mg by mouth 2 (two) times a day, Disp: , Rfl:     fluticasone furoate-vilanterol (BREO ELLIPTA) 200-25 MCG/INH inhaler, Inhale 1 puff, Disp: , Rfl:     ipratropium-albuterol (DUO-NEB) 0 5-2 5 mg/3 mL, Take 3 mL by nebulization every 6 (six) hours, Disp: 360 mL, Rfl: 3    Multiple Vitamins-Minerals (OCUVITE PO), Take 1 tablet by mouth daily  , Disp: , Rfl:     torsemide (DEMADEX) 20 mg tablet, Take 1 tablet (20 mg total) by mouth 2 (two) times a day (Patient taking differently: Take 10 mg by mouth daily  ), Disp: , Rfl: 0    cholecalciferol (VITAMIN D3) 1,000 units tablet, Take 2,000 Units by mouth daily, Disp: , Rfl:     Lab, Imaging and other studies: I have personally reviewed pertinent labs  Laboratory Results:  Results for orders placed or performed in visit on 68/69/05   Basic metabolic panel   Result Value Ref Range    Sodium 142 136 - 145 mmol/L    Potassium 3 8 3 5 - 5 3 mmol/L    Chloride 105 100 - 108 mmol/L    CO2 31 21 - 32 mmol/L    Anion Gap 6 4 - 13 mmol/L    BUN 23 5 - 25 mg/dL    Creatinine 1 52 (H) 0 60 - 1 30 mg/dL    Glucose, Fasting 101 (H) 65 - 99 mg/dL    Calcium 9 4 8 3 - 10 1 mg/dL    eGFR 30 ml/min/1 73sq m               Radiology review:   chest X-ray    Ultrasound      Portions of the record may have been created with voice recognition software   Occasional wrong word or "sound a like" substitutions may have occurred due to the inherent limitations of voice recognition software   Read the chart carefully and recognize, using context, where substitutions have occurred

## 2018-05-17 NOTE — PROGRESS NOTES
Patient had epistaxis yesterday of a mild degree and is currently on Eliquis 5 milligrams b i d  and clopidogrel 75 milligrams daily  The former was started during an episode of DVT of her lower extremity on 02/06/2018, according to her daughter  I discontinued the Eliquis 5 milligrams in ordered 2 5 milligrams b i d  to begin tomorrow with a hold of the evening dose of Eliquis today  She is scheduled to see me on 06/04/2018

## 2018-05-17 NOTE — PATIENT INSTRUCTIONS
1   No medication changes today  2    Please weigh yourself every day and check your leg swelling every day:  -if your weight goes up by 2 lb or more in 1 or 2 days take an extra torsemide in the evening  -if your swelling is any worse take an extra torsemide in the evening  -if the extra water pill/torsemide does not help call me  -continue to avoid salt  -continue to raise her legs when you are seated  3  Please send in 1 week a blood pressure readings morning and evening, sitting and standing now at your convenience  4  Please have lab work done 05/21/2018, fasting if possible  5  Follow-up in 3 months:  -please go for fasting lab work prior to your appointment  -please bring in 1 week a blood pressure readings morning and evening sitting and standing  6  General instructions:  -avoid salt  -avoid medications such as Motrin, Naprosyn, ibuprofen, Aleve or Advil or Celebrex as they can affect your kidney function; you can use Tylenol as needed for pain or fevers if you have no liver problems  -avoid medications such as Sudafed or other medications with decongestants as they can raise your blood pressure  -try to remain active

## 2018-05-22 DIAGNOSIS — E78.5 DYSLIPIDEMIA: Primary | ICD-10-CM

## 2018-05-22 RX ORDER — ATORVASTATIN CALCIUM 10 MG/1
10 TABLET, FILM COATED ORAL DAILY
Qty: 30 TABLET | Refills: 5 | Status: SHIPPED | OUTPATIENT
Start: 2018-05-22 | End: 2018-05-31

## 2018-05-22 NOTE — PROGRESS NOTES
1   Lipid profile demonstrates an LDL of about 100  Given CKD and CVA, it is important to get her LDL cholesterol which is the back cholesterol less than 70  I would recommend the following:  -atorvastatin 10 mg daily watch for muscle aches and joint pains  MAKE SURE THE PATIENT HAS HAD NO PROBLEMS WITH STATINS IN THE PAST  -CMP/CPK in 6 weeks  -CMP and CPK in 12 weeks  -lipid profile in 12 weeks  2    Please have the patient take over-the-counter iron pill watch for constipation

## 2018-05-23 DIAGNOSIS — I10 ESSENTIAL HYPERTENSION: Primary | ICD-10-CM

## 2018-05-23 DIAGNOSIS — N17.9 ACUTE RENAL FAILURE, UNSPECIFIED ACUTE RENAL FAILURE TYPE (HCC): ICD-10-CM

## 2018-05-23 DIAGNOSIS — N13.30 HYDRONEPHROSIS, UNSPECIFIED HYDRONEPHROSIS TYPE: ICD-10-CM

## 2018-05-23 DIAGNOSIS — I12.9 HYPERTENSIVE CHRONIC KIDNEY DISEASE WITH STAGE 1 THROUGH STAGE 4 CHRONIC KIDNEY DISEASE, OR UNSPECIFIED CHRONIC KIDNEY DISEASE: ICD-10-CM

## 2018-05-23 NOTE — PROGRESS NOTES
Patient's daughter notified of lab results and future lab orders for CMP/CPK/Lipid profile  Also will include atorvastatin in new regimen

## 2018-05-25 DIAGNOSIS — J45.20 MILD INTERMITTENT REACTIVE AIRWAY DISEASE WITHOUT COMPLICATION: Primary | ICD-10-CM

## 2018-05-25 DIAGNOSIS — I50.33 ACUTE ON CHRONIC DIASTOLIC CHF (CONGESTIVE HEART FAILURE) (HCC): ICD-10-CM

## 2018-05-25 RX ORDER — IPRATROPIUM BROMIDE AND ALBUTEROL SULFATE 2.5; .5 MG/3ML; MG/3ML
3 SOLUTION RESPIRATORY (INHALATION)
Qty: 360 ML | Refills: 0
Start: 2018-05-25 | End: 2018-12-11 | Stop reason: HOSPADM

## 2018-05-31 ENCOUNTER — TELEPHONE (OUTPATIENT)
Dept: NEPHROLOGY | Facility: CLINIC | Age: 83
End: 2018-05-31

## 2018-05-31 NOTE — TELEPHONE ENCOUNTER
Pt called  Cannot tolerate Atorvastatin; increased muscle weakness and mobility  Has had same problem in the past with statins  She has stopped it and refuses to take any other

## 2018-06-06 ENCOUNTER — OFFICE VISIT (OUTPATIENT)
Dept: CARDIOLOGY CLINIC | Facility: CLINIC | Age: 83
End: 2018-06-06
Payer: MEDICARE

## 2018-06-06 VITALS
DIASTOLIC BLOOD PRESSURE: 82 MMHG | HEIGHT: 58 IN | HEART RATE: 79 BPM | BODY MASS INDEX: 33.84 KG/M2 | OXYGEN SATURATION: 91 % | SYSTOLIC BLOOD PRESSURE: 132 MMHG | WEIGHT: 161.2 LBS

## 2018-06-06 DIAGNOSIS — E78.5 DYSLIPIDEMIA: ICD-10-CM

## 2018-06-06 DIAGNOSIS — R94.31 ABNORMAL EKG: ICD-10-CM

## 2018-06-06 DIAGNOSIS — I10 ESSENTIAL HYPERTENSION: ICD-10-CM

## 2018-06-06 DIAGNOSIS — E66.3 OVERWEIGHT (BMI 25.0-29.9): ICD-10-CM

## 2018-06-06 DIAGNOSIS — I50.32 CHRONIC DIASTOLIC HEART FAILURE (HCC): ICD-10-CM

## 2018-06-06 DIAGNOSIS — I35.0 MILD AORTIC STENOSIS: ICD-10-CM

## 2018-06-06 DIAGNOSIS — Z86.73 STATUS POST STROKE DUE TO CEREBROVASCULAR DISEASE: ICD-10-CM

## 2018-06-06 DIAGNOSIS — N18.4 CHRONIC KIDNEY DISEASE, STAGE IV (SEVERE) (HCC): ICD-10-CM

## 2018-06-06 DIAGNOSIS — R06.00 DYSPNEA ON EXERTION: Primary | ICD-10-CM

## 2018-06-06 PROCEDURE — 99214 OFFICE O/P EST MOD 30 MIN: CPT | Performed by: INTERNAL MEDICINE

## 2018-06-06 PROCEDURE — 93000 ELECTROCARDIOGRAM COMPLETE: CPT | Performed by: INTERNAL MEDICINE

## 2018-06-06 RX ORDER — PANTOPRAZOLE SODIUM 40 MG/1
40 TABLET, DELAYED RELEASE ORAL DAILY PRN
COMMUNITY

## 2018-06-06 NOTE — PATIENT INSTRUCTIONS
1   Continue present medications  2   A medical release form was signed by the patient to obtain records from 2/6 through 02/14/2018 when she was hospitalized at [facility] in [place], [place]  3   Cardiology follow-up in approximately 2 months with anticipated discontinuation of Eliquis at that time  4   Will get follow-up venous duplex scan to make sure there is no further DVT after that visit  5   Recheck EKG on follow-up visit

## 2018-06-06 NOTE — PROGRESS NOTES
Cardiology Progress Note    ASSESSMENT:    1  Improved  exertional dyspnea from chronic hypercarbic hypoxemic respiratory failure and COPD with  CAD reportedly excluded on February, 2018 nuclear stress test done in [place], [place]  2  History of atherothrombotic bilateral basal ganglia strokes, most recently on the right on 6/25/16 with associated uncontrolled hypertension and residual left hand paresthesias with exacerbation of symptoms 5/9/17 during hypertensive emergency  History of old right thalamic stroke  3  Controlled dyslipidemia as of 05/21/2018 with prior history of statin myalgias and current direct LDL of 108   4  Mild  aortic valve stenosis with LVH, grade 2 diastolic dysfunction, elevated mean left atrial filling pressure, mild MAC with trace MR, trace AI, trace TR on 03/09/2018 echo  5  Class 1 obesity with 21 2 pound weight loss in approximately 5 75 years  6  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 with PACs and sinus arrhythmia and single PVC today  7  Chronic kidney disease, stage IV, with stabilized creatinine  8  Osteoarthritis  9  History of left lower extremity DVT February, 2018 and DVT of right gastrocnemius vein October, 2008 with possible prior episode of left lower extremity DVT in the past as well  10  History of left renal mass  11  Chronic large airway wheezing of no clinical significance  12  Resolved orthostatic hypotension, likely due to a combination of amlodipine, volume depletion, dysautonomia  13  History of acutely decompensated diastolic heart failure with [facility] admission 8/27 to 09/02/2017, as well as 02/06/2018 and 03/06/2018  14   Acute hypercapneic and hypoxic respiratory failure 02/06/2018, 03/06/2018, and 03/29/2018  Plan       Patient Instructions     1  Continue present medications    2   A medical release form was signed by the patient to obtain records from 2/6 through 02/14/2018 when she was hospitalized at [facility] in [place], [place]  3   Cardiology follow-up in approximately 2 months with anticipated discontinuation of Eliquis at that time  4   Will get follow-up venous duplex scan to make sure there is no further DVT after that visit  5   Recheck EKG on follow-up visit  HPI    This 80 y o  female  denies new cardiopulmonary and medical symptoms  She is less dyspneic than she had been when I last saw her  She did have epistaxis on 05/16/2018 which has resolved with a reduction in dose of Eliquis to 2 5 milligrams b i d  The patient was hospitalized from 02/06 through 02/14/2018 at [facility] in [facility] with acute respiratory failure attributed to diastolic heart failure, at which time she was also found to have DVT of the left lower extremity  She was discharged on new carvedilol and Eliquis  The patient had subsequent admissions to [facility] 3/6 through 03/12/2018 and again from 03/29 through 04/04/2018 in both cases related to acute hypercarbic hypoxic respiratory failure with exacerbation of COPD and acute on chronic diastolic heart failure  The patient is currently using a Trilogy ventilator overnight and oxygen 2 liters/minute on a continuous basis  She also uses a nebulizer once a day and as needed and a maintenance inhaler  She has a pending follow-up with Dr Ami Phelps on 06/08/2018  Review of Systems    All other systems negative, except as noted in history of present illness    Historical Information   Past Medical History:   Diagnosis Date    CHF (congestive heart failure) ([facility] ) 86/6492    diastolic     DVT (deep vein thrombosis) in pregnancy ([facility] )     left LE in 2/2018 and 50 years ago    Hyperlipidemia     Hypertension     Pulmonary embolism ([facility] ) 02/06/2018    Renal disorder     ckd    Stroke [facility]) 2016    left hand weakness,paresthesia       Past Surgical History:   Procedure Laterality Date    ABDOMINAL SURGERY      ruputured bowel  had colostomy and ileostomy,reversed later on   BACK SURGERY      rods in back  done 30 years ago   CARPAL TUNNEL RELEASE Bilateral     CYST REMOVAL      back    HERNIA REPAIR      REPLACEMENT TOTAL KNEE BILATERAL       History   Alcohol Use    Yes     Comment: Occ  History   Drug Use No     History   Smoking Status    Never Smoker   Smokeless Tobacco    Never Used       Family History:  Family History   Problem Relation Age of Onset    COPD Brother          Meds/Allergies     Prior to Admission medications    Medication Sig Start Date End Date Taking? Authorizing Provider   acetaminophen (TYLENOL) 325 mg tablet Take 650 mg by mouth every 6 (six) hours as needed for mild pain   Yes Historical Provider, MD   amLODIPine (NORVASC) 2 5 mg tablet Take 5 mg by mouth daily     Yes Historical Provider, MD   apixaban (ELIQUIS) 2 5 mg Take 1 tablet (2 5 mg total) by mouth 2 (two) times a day  Patient taking differently: Take 5 mg by mouth 2 (two) times a day   5/17/18  Yes Chastity Bernardo MD   calcium-vitamin D (OSCAL 500 + D) 500 mg-200 units per tablet Take 1 tablet by mouth 2 (two) times a day  Yes Historical Provider, MD   carvedilol (COREG) 6 25 mg tablet Take 1 tablet (6 25 mg total) by mouth 2 (two) times a day with meals  Patient taking differently: Take 25 mg by mouth 2 (two) times a day with meals   4/4/18  Yes Doreen Portillo MD   cholecalciferol (VITAMIN D3) 1,000 units tablet Take 2,000 Units by mouth daily   Yes Historical Provider, MD   clopidogrel (PLAVIX) 75 mg tablet Take 75 mg by mouth daily   Yes Historical Provider, MD   co-enzyme Q-10 50 MG capsule Take 200 mg by mouth daily     Yes Historical Provider, MD   colesevelam (WELCHOL) 625 mg tablet Take 1,875 mg by mouth 2 (two) times a day with meals Indications: 3 tabs twice a day     Yes Historical Provider, MD   docusate sodium (COLACE) 100 mg capsule Take 100 mg by mouth 2 (two) times a day   Yes Historical Provider, MD   fluticasone furoate-vilanterol (BREO ELLIPTA) 200-25 MCG/INH inhaler Inhale 1 puff   Yes Historical Provider, MD   ipratropium-albuterol (DUO-NEB) 0 5-2 5 mg/3 mL Take 3 mL by nebulization every 6 (six) hours  Patient taking differently: Take 3 mL by nebulization daily   5/25/18  Yes Letha Duff, DO   Multiple Vitamins-Minerals (OCUVITE PO) Take 1 tablet by mouth daily     Yes Historical Provider, MD   pantoprazole (PROTONIX) 40 mg tablet Take 40 mg by mouth daily   Yes Historical Provider, MD   torsemide (DEMADEX) 20 mg tablet Take 1 tablet (20 mg total) by mouth 2 (two) times a day  Patient taking differently: Take 10 mg by mouth daily   3/12/18  Yes Mahogany Mendoza MD       Allergies   Allergen Reactions    Lyrica [Pregabalin] Other (See Comments)     Client says it made her "nutty"         Vitals:    06/06/18 1404   BP: 132/82   BP Location: Right arm   Patient Position: Sitting   Cuff Size: Standard   Pulse: 79   SpO2: 91%   Weight: 73 1 kg (161 lb 3 2 oz)   Height: 4' 10" (1 473 m)       Body mass index is 33 69 kg/m²    3 8 pound weight loss in approximately 6 months    Physical Exam:    General Appearance:  Alert, cooperative, no distress, appears stated age   Head:  Normocephalic, without obvious abnormality, atraumatic   Eyes:  PERRL, conjunctiva/corneas clear, EOM's intact,   both eyes   Ears:  Normal TM's and external ear canals, both ears   Nose: Nares normal, septum midline, mucosa normal, no drainage or sinus tenderness   Throat: Lips, mucosa, and tongue normal; teeth and gums normal   Neck: Supple, symmetrical, trachea midline, no adenopathy, thyroid: not enlarged, symmetric, no tenderness/mass/nodules, no carotid bruit or JVD   Back:   Symmetric, no curvature, ROM normal, no CVA tenderness   Lungs:   Clear to auscultation bilaterally, respirations unlabored   Chest Wall:  No tenderness or deformity   Heart:  Regular rate and rhythm, S1, S2 normal, grade 2/6 aortic systolic ejection murmur, transmitted to left sternal border with normal A2 and no rub or gallop   Abdomen:   Soft, non-tender, bowel sounds active all four quadrants,  no masses, no organomegaly   Extremities: Extremities normal, atraumatic, no cyanosis or edema   Pulses: 2+ and symmetric   Skin: Skin showed normal color, texture, turgor and no rashes or lesions   Lymph nodes: Cervical, supraclavicular, and axillary nodes normal   Neurologic: Normal         Cardiographics    ECG 06/06/2018:    Normal sinus rhythm with sinus arrhythmia and single PVC  Right bundle branch block and LAFB  New PVC and LAFB with suppressed PACs compared to 12/12/2017    Imaging    Chest X-Ray :  Xr Chest 2 Views    Result Date: 3/29/2018  Impression No acute cardiopulmonary disease  Cardiomegaly   Workstation performed: [id number]     Xr Chest Pa & Lateral    Result Date: 12/12/2017  Impression No acute abnormality in the chest  Workstation performed: [id number]             Lab Review     Outside laboratory data 05/21/2018:    H/H 11 7/36 1 with otherwise normal CBC, CMP, fasting lipid panel, and vitamin-D 25-OH  BUN/creatinine 42/1 58 with estimated GFR 29  Direct   Potassium 4 5    Lab Results   Component Value Date     04/26/2018    K 3 8 04/26/2018     04/26/2018    CO2 31 04/26/2018    ANIONGAP 6 04/26/2018    BUN 23 04/26/2018    CREATININE 1 52 (H) 04/26/2018    GLUCOSE 169 (H) 04/04/2018    GLUF 101 (H) 04/26/2018    CALCIUM 9 4 04/26/2018    AST 14 03/06/2018    ALT 18 03/06/2018    ALKPHOS 56 03/06/2018    PROT 7 1 03/06/2018    BILITOT 0 30 03/06/2018    EGFR 30 04/26/2018       Lab Results   Component Value Date    CHOL 151 12/06/2017    CHOL 167 12/04/2017    CHOL 150 05/10/2017     Lab Results   Component Value Date    HDL 69 (H) 12/06/2017    HDL 76 (H) 12/04/2017    HDL 67 (H) 05/10/2017     Lab Results   Component Value Date    LDLCALC 62 12/06/2017    LDLCALC 72 12/04/2017    [address] 67 05/10/2017 Lab Results   Component Value Date    TRIG 102 12/06/2017    TRIG 94 12/04/2017    TRIG 78 05/10/2017     No components found for: [place], [place]    Lab Results   Component Value Date    GLUCOSE 169 (H) 04/04/2018    CALCIUM 9 4 04/26/2018     04/26/2018    K 3 8 04/26/2018    CO2 31 04/26/2018     04/26/2018    BUN 23 04/26/2018    CREATININE 1 52 (H) 04/26/2018           Gary Shah MD

## 2018-06-07 ENCOUNTER — PATIENT OUTREACH (OUTPATIENT)
Dept: OTHER | Facility: HOSPITAL | Age: 83
End: 2018-06-07

## 2018-06-07 ENCOUNTER — DOCUMENTATION (OUTPATIENT)
Dept: PULMONOLOGY | Facility: MEDICAL CENTER | Age: 83
End: 2018-06-07

## 2018-06-07 ENCOUNTER — TELEPHONE (OUTPATIENT)
Dept: NEPHROLOGY | Facility: CLINIC | Age: 83
End: 2018-06-07

## 2018-06-07 NOTE — TELEPHONE ENCOUNTER
We were able to check her blood pressure readings on the current sheet  Averages:  -a m :  123/64 without orthostatic changes  -p  m :  139/64 without orthostatic changes  Given low diastolics I would not make any changes  Tell the patient these readings are acceptable and thank you very much

## 2018-06-07 NOTE — PROGRESS NOTES
Daughter Ulisses Stephens answers phone  Sabi Parisielda is home with care of family and private home health aid  Farnaz's biggest complaint is the O2 inhibits her movement  Ulisses Stephens states they have medications  There is no change is them as per cardiology apt  6/6  Sabi Norris will see pulmonology as hypercapnia is now identified as a "lung problem" and no CHF  Zuleimane Jr had ORIF with rods of her spine  It is thought these rods are now inhibiting her breathing  She wears a Trilogy at night  Sabi Norris does not have pain  There is no change in appetite  Ulisses Stephens does have my contact numbers and will call if she needs assistance

## 2018-06-07 NOTE — TELEPHONE ENCOUNTER
Called and requested to go over blood pressure log with patient as the faxed copy is to dark to read the numbers  Patient no longer has the log and will not do it again until a week before her appointment, which she is on the recall list for August   Offered to send new BP log without shaded area and she does not want this  She also will not take the atorvastatin discussed with patient on 5 22 18  Blood work will be completed before her appointment  Thank you

## 2018-06-08 ENCOUNTER — OFFICE VISIT (OUTPATIENT)
Dept: PULMONOLOGY | Facility: MEDICAL CENTER | Age: 83
End: 2018-06-08
Payer: MEDICARE

## 2018-06-08 ENCOUNTER — DOCUMENTATION (OUTPATIENT)
Dept: CARDIOLOGY CLINIC | Facility: CLINIC | Age: 83
End: 2018-06-08

## 2018-06-08 VITALS
DIASTOLIC BLOOD PRESSURE: 82 MMHG | HEIGHT: 58 IN | OXYGEN SATURATION: 97 % | BODY MASS INDEX: 33.37 KG/M2 | TEMPERATURE: 97.6 F | WEIGHT: 159 LBS | SYSTOLIC BLOOD PRESSURE: 160 MMHG | RESPIRATION RATE: 18 BRPM | HEART RATE: 66 BPM

## 2018-06-08 DIAGNOSIS — J98.4 RESTRICTIVE LUNG DISEASE DUE TO KYPHOSCOLIOSIS: ICD-10-CM

## 2018-06-08 DIAGNOSIS — J98.9 REACTIVE AIRWAY DISEASE THAT IS NOT ASTHMA: ICD-10-CM

## 2018-06-08 DIAGNOSIS — J96.12 CHRONIC RESPIRATORY FAILURE WITH HYPOXIA AND HYPERCAPNIA (HCC): Primary | ICD-10-CM

## 2018-06-08 DIAGNOSIS — J96.11 CHRONIC RESPIRATORY FAILURE WITH HYPOXIA AND HYPERCAPNIA (HCC): Primary | ICD-10-CM

## 2018-06-08 DIAGNOSIS — M41.9 RESTRICTIVE LUNG DISEASE DUE TO KYPHOSCOLIOSIS: ICD-10-CM

## 2018-06-08 DIAGNOSIS — J45.30 MILD PERSISTENT REACTIVE AIRWAY DISEASE WITHOUT COMPLICATION: ICD-10-CM

## 2018-06-08 DIAGNOSIS — J43.2 CENTRILOBULAR EMPHYSEMA (HCC): ICD-10-CM

## 2018-06-08 PROBLEM — G92.8 TOXIC METABOLIC ENCEPHALOPATHY: Status: RESOLVED | Noted: 2018-03-07 | Resolved: 2018-06-08

## 2018-06-08 PROCEDURE — 99213 OFFICE O/P EST LOW 20 MIN: CPT | Performed by: INTERNAL MEDICINE

## 2018-06-08 RX ORDER — IRON,CARBONYL/ASCORBIC ACID 100-250 MG
1 TABLET ORAL EVERY OTHER DAY
COMMUNITY
End: 2020-03-16 | Stop reason: HOSPADM

## 2018-06-08 RX ORDER — FLUTICASONE FUROATE AND VILANTEROL 100; 25 UG/1; UG/1
1 POWDER RESPIRATORY (INHALATION) DAILY
Qty: 1 INHALER | Refills: 8 | Status: SHIPPED | OUTPATIENT
Start: 2018-06-08 | End: 2018-06-08 | Stop reason: SDUPTHER

## 2018-06-08 RX ORDER — FLUTICASONE FUROATE AND VILANTEROL 100; 25 UG/1; UG/1
1 POWDER RESPIRATORY (INHALATION) DAILY
Qty: 1 INHALER | Refills: 0 | Status: SHIPPED | OUTPATIENT
Start: 2018-06-08 | End: 2019-08-14 | Stop reason: ALTCHOICE

## 2018-06-08 NOTE — PROGRESS NOTES
Assessment/Plan:     Problem List Items Addressed This Visit        Respiratory    Reactive airway disease that is not asthma     She has a component of reactive airways disease probably asthma  She will continue to use Breo 100 mcg 1 puff daily and she will use a nebulizer with DuoNeb 4 times daily only as needed  Saran Vargas was accompanied by her daughter Michelle Cervantes today and we reviewed instructions from today         Relevant Medications    fluticasone-vilanterol (BREO ELLIPTA) 100-25 mcg/inh inhaler    Chronic respiratory failure with hypoxia and hypercapnia ([address] ) - Primary     Saran Vargas has chronic hypercapnic hypoxemic respiratory failure secondary to restrictive lung impairment from her severe thoracic scoliosis for which she had surgery done age 58  She has a baseline pCO2 level around 55-60 and has been compliant with using the trilogy ventilator unit at bedtime with 2 L of oxygen  She uses for about 5 hr per night  Her daytime hypoxemia has improved  Room air O2 saturation at rest today was 94%  She does have a pulse oximeter at home  I told her during the day at rest she does not needed keep the oxygen on at for O2 saturations stay over 90%  She can still use 2 L of oxygen with activity  She does have a portable battery operated concentrator         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     Restrictive lung disease secondary to thoracic scoliosis  The surgery was done at age 59 yo at the \[facility]\"" as she had a severe thoracic scoliosis which was gradually worsenng  She does have Duarte rods throughout her spine           Relevant Medications    fluticasone-vilanterol (BREO ELLIPTA) 100-25 mcg/inh inhaler      Other Visit Diagnoses     Mild persistent reactive airway disease without complication        Relevant Medications    fluticasone-vilanterol (BREO ELLIPTA) 100-25 mcg/inh inhaler    Centrilobular emphysema (HCC)        Relevant Medications    fluticasone-vilanterol (BREO ELLIPTA) 100-25 mcg/inh inhaler            Return in about 3 months (around 9/8/2018)  All questions are answered to the patient's satisfaction and understanding  She verbalizes understanding  She is encouraged to call with any further questions or concerns  Portions of the record may have been created with voice recognition software  Occasional wrong word or "sound a like" substitutions may have occurred due to the inherent limitations of voice recognition software  Read the chart carefully and recognize, using context, where substitutions have occurred  ______________________________________________________________________    Chief Complaint:   Chief Complaint   Patient presents with    Follow-up       Patient ID: \[facility]\"" is a 80 y o  y o  female has a past medical history of CHF (congestive heart failure) ([facility] ) (08/2017); DVT (deep vein thrombosis) in pregnancy ([facility] ); Hyperlipidemia; Hypertension; Pulmonary embolism ([facility] ) (02/06/2018); Renal disorder; and Stroke ([facility] ) (2016)  6/8/2018       HPI   \[facility]\""  Is doing well  She uses her oxygen most of the day but does remove it when she gets a shower with the help of an aide at home  Overall she is doing well  She does have some mild shortness of breath with activity  No chronic cough  She does use Trilogy ventilator at bedtime for her chronic hypercapnic respiratory failure  She uses it at least 5 hours per night  She denies any nocturnal dyspnea and she is tolerating the Trilogy Unit  Sjhe has mild reactive airway disease likely asthma  She is using Breo 100 mcg 1 puff daily  She never smoked  No cough or leg edema      Review of Systems   Constitutional: Negative for activity change, appetite change, chills, fever and unexpected weight change  HENT: Negative for congestion, dental problem, postnasal drip, sinus pain, sinus pressure, sore throat and voice change  Eyes: Negative for visual disturbance     Respiratory: Negative for cough and wheezing  Cardiovascular: Negative for chest pain, palpitations and leg swelling  Gastrointestinal: Negative for abdominal pain, diarrhea, nausea and vomiting  Genitourinary: Negative for difficulty urinating  Musculoskeletal: Negative for arthralgias  Skin: Negative for rash and wound  Allergic/Immunologic: Negative for environmental allergies and food allergies  Neurological: Negative for dizziness, light-headedness and headaches  Hematological: Negative for adenopathy  Psychiatric/Behavioral: Negative for agitation, behavioral problems and confusion  Smoking history: She reports that she has never smoked  She has never used smokeless tobacco     The following portions of the patient's history were reviewed and updated as appropriate: allergies, current medications, past family history, past medical history, past social history, past surgical history and problem list     Immunization History   Administered Date(s) Administered    Pneumococcal Conjugate 13-Valent 09/02/2017     Current Outpatient Prescriptions   Medication Sig Dispense Refill    acetaminophen (TYLENOL) 325 mg tablet Take 650 mg by mouth every 6 (six) hours as needed for mild pain      amLODIPine (NORVASC) 2 5 mg tablet Take 5 mg by mouth daily        apixaban (ELIQUIS) 2 5 mg Take 1 tablet (2 5 mg total) by mouth 2 (two) times a day (Patient taking differently: Take 5 mg by mouth 2 (two) times a day  ) 60 tablet 5    calcium-vitamin D (OSCAL 500 + D) 500 mg-200 units per tablet Take 1 tablet by mouth 2 (two) times a day        carvedilol (COREG) 6 25 mg tablet Take 1 tablet (6 25 mg total) by mouth 2 (two) times a day with meals (Patient taking differently: Take 25 mg by mouth 2 (two) times a day with meals  )  0    cholecalciferol (VITAMIN D3) 1,000 units tablet Take 2,000 Units by mouth daily      clopidogrel (PLAVIX) 75 mg tablet Take 75 mg by mouth daily      co-enzyme Q-10 50 MG capsule Take 200 mg by mouth daily        [facility]) 625 mg tablet Take 1,875 mg by mouth 2 (two) times a day with meals Indications: 3 tabs twice a day   docusate sodium (COLACE) 100 mg capsule Take 100 mg by mouth 2 (two) times a day      ipratropium-albuterol (DUO-NEB) 0 5-2 5 mg/3 mL Take 3 mL by nebulization every 6 (six) hours (Patient taking differently: Take 3 mL by nebulization daily  ) 360 mL 0    Iron-Vitamin C (IRON 100/C) 100-250 MG TABS Take 1 tablet by mouth daily      Multiple Vitamins-Minerals (OCUVITE PO) Take 1 tablet by mouth daily        pantoprazole (PROTONIX) 40 mg tablet Take 40 mg by mouth daily      torsemide (DEMADEX) 20 mg tablet Take 1 tablet (20 mg total) by mouth 2 (two) times a day (Patient taking differently: Take 10 mg by mouth daily  )  0    fluticasone-vilanterol (BREO ELLIPTA) 100-25 mcg/inh inhaler Inhale 1 puff daily Rinse mouth after use  1 Inhaler 0     No current facility-administered medications for this visit  Allergies: Statins and Lyrica [pregabalin]    Objective:  Vitals:    06/08/18 1214   BP: 160/82   BP Location: Right arm   Patient Position: Sitting   Cuff Size: Standard   Pulse: 66   Resp: 18   Temp: 97 6 °F (36 4 °C)   TempSrc: Oral   SpO2: 97%   Weight: 72 1 kg (159 lb)   Height: 4' 10" (1 473 m)   Oxygen Therapy  SpO2: 97 %    Wt Readings from Last 3 Encounters:   06/08/18 72 1 kg (159 lb)   06/06/18 73 1 kg (161 lb 3 2 oz)   05/17/18 72 1 kg (159 lb)     Body mass index is 33 23 kg/m²  Physical Exam   Constitutional: She is oriented to person, place, and time  She appears well-developed and well-nourished  No distress  Room air O2sat is 94%  No conversational dyspnea, alert   HENT:   Head: Normocephalic  Nose: Nose normal    Mouth/Throat: Oropharynx is clear and moist  No oropharyngeal exudate  Eyes: Conjunctivae are normal  Pupils are equal, round, and reactive to light  Neck: Neck supple  No JVD present  No tracheal deviation present  Cardiovascular: Normal rate, regular rhythm and normal heart sounds  Pulmonary/Chest: Effort normal    Few faint expiratory wheezes right lower lobe which resolved with deep breaths  Mild decrease in breath sounds bilateral   Abdominal: Soft  She exhibits no distension  There is no tenderness  There is no guarding  Musculoskeletal: She exhibits no edema  Lymphadenopathy:     She has no cervical adenopathy  Neurological: She is alert and oriented to person, place, and time  Skin: Skin is warm and dry  No rash noted  Psychiatric: She has a normal mood and affect   Her behavior is normal  Thought content normal

## 2018-06-08 NOTE — PATIENT INSTRUCTIONS
Use Breo 100 mcg 1 puff daily    Use nebulizer only when needed up to 4 times a day    During the the day can keep the oxygen off if her oxygen saturation is 90% or better

## 2018-06-09 NOTE — PROGRESS NOTES
Reviewed records from [facility] in [place], [place] including discharge summary from admission of 02/06/2018 through 02/13/2018 with final impressions of acute respiratory failure with hypoxia and hypercapnia, DVT of lower extremity, acute on chronic renal failure  Echocardiogram showed 60-65% ejection fraction with mild LVH, mild RV dilatation and mildly reduced RV systolic function, mild MAC with trace MR, trace TR with RV systolic pressure 06-02 mmHg, mild-to-moderate aortic stenosis and 1+ AI, 1+ PI  Nuclear stress test on 02/09/2018 showed normal perfusion with 70% ejection fraction

## 2018-06-09 NOTE — ASSESSMENT & PLAN NOTE
She has a component of reactive airways disease probably asthma  She will continue to use Breo 100 mcg 1 puff daily and she will use a nebulizer with DuoNeb 4 times daily only as needed      Monae Guardado was accompanied by her daughter Ade Person today and we reviewed instructions from today

## 2018-06-09 NOTE — ASSESSMENT & PLAN NOTE
[facility] has chronic hypercapnic hypoxemic respiratory failure secondary to restrictive lung impairment from her severe thoracic scoliosis for which she had surgery done age 58  She has a baseline pCO2 level around 55-60 and has been compliant with using the trilogy ventilator unit at bedtime with 2 L of oxygen  She uses for about 5 hr per night  Her daytime hypoxemia has improved  Room air O2 saturation at rest today was 94%  She does have a pulse oximeter at home  I told her during the day at rest she does not needed keep the oxygen on at for O2 saturations stay over 90%  She can still use 2 L of oxygen with activity    She does have a portable battery operated concentrator

## 2018-06-09 NOTE — ASSESSMENT & PLAN NOTE
Restrictive lung disease secondary to thoracic scoliosis  The surgery was done at age 59 yo at the [facility] as she had a severe thoracic scoliosis which was gradually worsenng  She does have Duarte rods throughout her spine

## 2018-06-15 ENCOUNTER — APPOINTMENT (EMERGENCY)
Dept: RADIOLOGY | Facility: HOSPITAL | Age: 83
End: 2018-06-15
Payer: MEDICARE

## 2018-06-15 ENCOUNTER — HOSPITAL ENCOUNTER (EMERGENCY)
Facility: HOSPITAL | Age: 83
Discharge: HOME | End: 2018-06-15
Attending: EMERGENCY MEDICINE
Payer: MEDICARE

## 2018-06-15 VITALS
HEART RATE: 70 BPM | DIASTOLIC BLOOD PRESSURE: 83 MMHG | SYSTOLIC BLOOD PRESSURE: 178 MMHG | BODY MASS INDEX: 33.17 KG/M2 | HEIGHT: 58 IN | RESPIRATION RATE: 18 BRPM | WEIGHT: 158 LBS | OXYGEN SATURATION: 97 %

## 2018-06-15 DIAGNOSIS — M54.32 SCIATICA OF LEFT SIDE: ICD-10-CM

## 2018-06-15 DIAGNOSIS — M54.50 LOW BACK PAIN: ICD-10-CM
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
## 2018-06-15 PROCEDURE — 99284 EMERGENCY DEPT VISIT MOD MDM: CPT

## 2018-06-15 PROCEDURE — 72131 CT LUMBAR SPINE W/O DYE: CPT

## 2018-06-15 RX ORDER — ACETAMINOPHEN 325 MG/1
650 TABLET ORAL ONCE
Status: COMPLETED | OUTPATIENT
Start: 2018-06-15 | End: 2018-06-15

## 2018-06-15 RX ADMIN — ACETAMINOPHEN 650 MG: 325 TABLET ORAL at 22:24

## 2018-06-16 NOTE — DISCHARGE INSTRUCTIONS
Low back pain with sciatica after a fall  WHAT YOU NEED TO KNOW:   Sciatica is a condition that causes pain along your sciatic nerve  The sciatic nerve runs from your spine through both sides of your buttocks  It then runs down the back of your thigh, into your lower leg and foot  Your sciatic nerve may be compressed, inflamed, irritated, or stretched  DISCHARGE INSTRUCTIONS:   Medicines:   · NSAIDs:  These medicines decrease swelling and pain  NSAIDs are available without a doctor's order  Take as directed  NSAIDs can cause stomach bleeding or kidney problems if not taken correctly  · Acetaminophen: This medicine decreases pain  Acetaminophen is available without a doctor's order  You can take 2 pills every 6 hours as needed  · Muscle relaxers  help decrease pain and muscle spasms  Follow up with your healthcare provider as directed:  Write down your questions so you remember to ask them during your visits  Manage your symptoms:   · Activity:  Decrease your activity  Do not lift heavy objects or twist your back for at least 6 weeks  Slowly return to your usual activity  · Ice:  Ice helps decrease swelling and pain  Ice may also help prevent tissue damage  Use an ice pack, or put crushed ice in a plastic bag  Cover it with a towel and place it on your low back or leg for 15 to 20 minutes every hour or as directed  · Heat:  Heat helps decrease pain and muscle spasms  Apply heat on the area for 20 to 30 minutes every 2 hours for as many days as directed  · Physical therapy:  You may need to see physical therapist to teach you exercises to help improve movement and strength, and to decrease pain  An occupational therapist teaches you skills to help with your daily activities  · Use assistive devices if directed: You may need to wear back support, such as a back brace  You may need crutches, a cane, or a walker to decrease stress on your lower back and leg muscles   Ask your healthcare provider for more information about assistive devices and how to use them correctly  Self-care:   · Avoid pressure on your back and legs:  Do not  lift heavy objects, or stand or sit for long periods of time  · Lift objects safely:  Keep your back straight and bend your knees when you  an object  Do not bend or twist your back when you lift  · Maintain a healthy weight:  Ask your healthcare provider how much you should weigh  Ask him to help you create a weight loss plan if you are overweight  · Exercise:  Ask your healthcare provider about the best stretching, warmup, and exercise plan for you  Contact your healthcare provider if:   · You have pain in your lower back at night or when resting  · You have pain in your lower back with numbness below the knee  · You have weakness in one leg only  · You have questions or concerns about your condition or care  Return to the emergency department if:   · You have trouble holding back your urine or bowel movements  · You have weakness in both legs  · You have numbness in your groin or buttocks  © 2017 [facility] Information is for End User's use only and may not be sold, redistributed or otherwise used for commercial purposes  All illustrations and images included in CareNotes® are the copyrighted property of A D A M , Inc  or [employer]  The above information is an  only  It is not intended as medical advice for individual conditions or treatments  Talk to your doctor, nurse or pharmacist before following any medical regimen to see if it is safe and effective for you

## 2018-06-16 NOTE — ED PROVIDER NOTES
History  Chief Complaint   Patient presents with    Fall     Pt states she fell back on her but in her living room  C/O L Leg and lower back     79 yo female was turning without her walker and got off-balanced and fell down to sitting position on her buttocks and lower back  Occurred just prior to arrival   Did not hit head or pass out  C/o pain in left lower back radiating down left leg  Had been well prior to this  History provided by:  Patient   used: No    Fall       Prior to Admission Medications   Prescriptions Last Dose Informant Patient Reported? Taking? Iron-Vitamin C (IRON 100/C) 100-250 MG TABS 6/15/2018 at Unknown time Child Yes Yes   Sig: Take 1 tablet by mouth daily   Multiple Vitamins-Minerals (OCUVITE PO) 6/15/2018 at Unknown time Child Yes Yes   Sig: Take 1 tablet by mouth daily     acetaminophen (TYLENOL) 325 mg tablet Past Month at Unknown time Child Yes Yes   Sig: Take 650 mg by mouth every 6 (six) hours as needed for mild pain   amLODIPine (NORVASC) 2 5 mg tablet 6/15/2018 at Unknown time Child Yes Yes   Sig: Take 5 mg by mouth daily     apixaban (ELIQUIS) 2 5 mg 6/15/2018 at Unknown time Child No Yes   Sig: Take 1 tablet (2 5 mg total) by mouth 2 (two) times a day   Patient taking differently: Take 5 mg by mouth 2 (two) times a day     calcium-vitamin D (OSCAL 500 + D) 500 mg-200 units per tablet 6/15/2018 at Unknown time Child Yes Yes   Sig: Take 1 tablet by mouth 2 (two) times a day     carvedilol (COREG) 6 25 mg tablet 6/15/2018 at Unknown time Child No Yes   Sig: Take 1 tablet (6 25 mg total) by mouth 2 (two) times a day with meals   Patient taking differently: Take 25 mg by mouth 2 (two) times a day with meals     cholecalciferol (VITAMIN D3) 1,000 units tablet 6/15/2018 at Unknown time Child Yes Yes   Sig: Take 2,000 Units by mouth daily   clopidogrel (PLAVIX) 75 mg tablet 6/15/2018 at Unknown time Child Yes Yes   Sig: Take 75 mg by mouth daily co-enzyme Q-10 50 MG capsule 6/15/2018 at Unknown time Child Yes Yes   Sig: Take 200 mg by mouth daily     [facility]) 625 mg tablet 6/15/2018 at Unknown time Child Yes Yes   Sig: Take 1,875 mg by mouth 2 (two) times a day with meals Indications: 3 tabs twice a day  docusate sodium (COLACE) 100 mg capsule 6/15/2018 at Unknown time Child Yes Yes   Sig: Take 100 mg by mouth 2 (two) times a day   fluticasone-vilanterol (BREO ELLIPTA) 100-25 mcg/inh inhaler 6/15/2018 at Unknown time  No Yes   Sig: Inhale 1 puff daily Rinse mouth after use  ipratropium-albuterol (DUO-NEB) 0 5-2 5 mg/3 mL 6/15/2018 at Unknown time Child No Yes   Sig: Take 3 mL by nebulization every 6 (six) hours   Patient taking differently: Take 3 mL by nebulization daily     pantoprazole (PROTONIX) 40 mg tablet 6/15/2018 at Unknown time Child Yes Yes   Sig: Take 40 mg by mouth daily   torsemide (DEMADEX) 20 mg tablet 6/15/2018 at Unknown time Child No Yes   Sig: Take 1 tablet (20 mg total) by mouth 2 (two) times a day   Patient taking differently: Take 10 mg by mouth daily        Facility-Administered Medications: None       Past Medical History:   Diagnosis Date    [facility]congestive [facility] failure) ([facility] ) 38/1185    diastolic     DVT (deep vein thrombosis) in pregnancy ([facility] [address] [facility] )     left LE in 2/2018 and 50 years ago    Hyperlipidemia     Hypertension     Pul[facility] embolism [facility] ) 02/06/2018    Renal disorder     ckd    Stroke [facility] ) 2016    left hand weakness,paresthesia  Past Surgical History:   Procedure Laterality Date    ABDOMINAL SURGERY      ruputured bowel  had colostomy and ileostomy,reversed later on   BACK SURGERY      rods in back  done 30 years ago   CARPAL TUNNEL RELEASE Bilateral     CYST REMOVAL      back    HERNIA REPAIR      REPLACEMENT TOTAL KNEE BILATERAL         Family History   Problem Relation Age of Onset    COPD Brother      I have reviewed and agree with the history as documented      Social History   Substance Use Topics    Smoking status: Never Smoker    Smokeless tobacco: Never Used    Alcohol use Yes      Comment: Occ  Review of Systems    Physical Exam  Physical Exam   Constitutional: She is oriented to person, place, and time  She appears well-developed and well-nourished  No distress  HENT:   Head: Normocephalic and atraumatic  Eyes: Conjunctivae are normal  Pupils are equal, round, and reactive to light  Neck: Normal range of motion  Neck supple  Cardiovascular: Normal rate, regular rhythm and normal heart sounds  No murmur heard  Pulmonary/Chest: Effort normal and breath sounds normal  No respiratory distress  Abdominal: Soft  Bowel sounds are normal  She exhibits no distension  There is no tenderness  Musculoskeletal: Normal range of motion  She exhibits no edema, tenderness or deformity  + ttp left upper-mid paralumbar spine; negative straight leg test, DP palp bilat  Neurological: She is alert and oriented to person, place, and time  No cranial nerve deficit  She exhibits normal muscle tone  Skin: Skin is warm and dry  No rash noted  She is not diaphoretic  No pallor  Psychiatric: She has a normal mood and affect  Her behavior is normal    Nursing note and vitals reviewed  Vital Signs  ED Triage Vitals [06/15/18 2124]   Temp Pulse Respirations Blood Pressure SpO2   -- 70 18 (!) 157/101 96 %      Temp src Heart Rate Source Patient Position - Orthostatic VS BP Location FiO2 (%)   -- Monitor Lying Right arm --      Pain Score       4           Vitals:    06/15/18 2124 06/15/18 2145   BP: (!) 157/101    Pulse: 70 70   Patient Position - Orthostatic VS: Lying        Visual Acuity      ED Medications  Medications   acetaminophen (TYLENOL) tablet 650 mg (650 mg Oral Given 6/15/18 2224)       Diagnostic Studies  Results Reviewed     None                 CT lumbar spine without contrast   Final Result by Brianna Castellano MD (06/15 2221)      1    Limited evaluation due to extensive demineralization/degenerative change and severe levoscoliosis of the lumbar spine with multilevel fusion and Duarte rods which create streak artifact  2   No definite vertebral body fracture  Workstation performed: [id number]                    Procedures  Procedures       Phone Contacts  ED Phone Contact    ED Course                               MDM  Number of Diagnoses or Management Options  Fall, initial encounter:   Low back pain:   Sciatica of left side:   Diagnosis management comments: CT no acute fx seen but limited study per radiology noted  Will treat for sciatica/low back pain  Advised rest, alternate ice and heat, follow up outpt  Next week or return if any problems or severe worsening  Note: pt  Only wants tylenol, doesn't want prednisone or stronger pain meds  CritCare Time    Disposition  Final diagnoses:   Fall, initial encounter   Low back pain   Sciatica of left side     Time reflects when diagnosis was documented in both MDM as applicable and the Disposition within this note     Time User Action Codes Description Comment    3/25/9172 84:32 PM Khalif Martinez Add [U52  VXJK] Fall, initial encounter     1/50/8669 77:33 PM Khalif Martinez Add [P14 3] Low back pain     9/55/1383 51:84 PM Triston EVANS Add [P52 62] Sciatica of left side       ED Disposition     ED Disposition Condition Comment    Discharge  [place] discharge to home/self care  Condition at discharge: Stable        Follow-up Information     Follow up With Specialties Details Why [address]rogen 51, DO Family Medicine In 3 days  [address] [place] [address]  [address] [place] [address]  [phone]            Patient's Medications   Discharge Prescriptions    No medications on file     No discharge procedures on file      ED Provider  Electronically Signed by           Morgan Delcid MD  13/25/09 0075       Morgan Delcid MD  93/24/11 1812

## 2018-08-06 ENCOUNTER — OFFICE VISIT (OUTPATIENT)
Dept: CARDIOLOGY CLINIC | Facility: CLINIC | Age: 83
End: 2018-08-06
Payer: MEDICARE

## 2018-08-06 VITALS
OXYGEN SATURATION: 94 % | SYSTOLIC BLOOD PRESSURE: 120 MMHG | BODY MASS INDEX: 32.95 KG/M2 | HEIGHT: 58 IN | DIASTOLIC BLOOD PRESSURE: 68 MMHG | HEART RATE: 66 BPM | WEIGHT: 157 LBS

## 2018-08-06 DIAGNOSIS — Z86.73 HISTORY OF STROKE: ICD-10-CM

## 2018-08-06 DIAGNOSIS — I45.2 BIFASCICULAR BLOCK: ICD-10-CM

## 2018-08-06 DIAGNOSIS — E78.5 DYSLIPIDEMIA: ICD-10-CM

## 2018-08-06 DIAGNOSIS — I50.32 CHRONIC DIASTOLIC HEART FAILURE (HCC): ICD-10-CM

## 2018-08-06 DIAGNOSIS — Z86.718 HISTORY OF DVT (DEEP VEIN THROMBOSIS): ICD-10-CM

## 2018-08-06 DIAGNOSIS — N18.4 CHRONIC KIDNEY DISEASE, STAGE 4 (SEVERE) (HCC): ICD-10-CM

## 2018-08-06 DIAGNOSIS — I10 ESSENTIAL HYPERTENSION: ICD-10-CM

## 2018-08-06 DIAGNOSIS — I82.592 CHRONIC EMBOLISM AND THROMBOSIS OF OTHER SPECIFIED DEEP VEIN OF LEFT LOWER EXTREMITY (HCC): ICD-10-CM

## 2018-08-06 DIAGNOSIS — M17.0 PRIMARY OSTEOARTHRITIS OF BOTH KNEES: ICD-10-CM

## 2018-08-06 DIAGNOSIS — I49.3 ASYMPTOMATIC PVCS: ICD-10-CM

## 2018-08-06 DIAGNOSIS — J96.12 CHRONIC HYPERCAPNIC RESPIRATORY FAILURE (HCC): Primary | ICD-10-CM

## 2018-08-06 DIAGNOSIS — I11.9 HYPERTENSIVE HEART DISEASE WITHOUT HEART FAILURE: ICD-10-CM

## 2018-08-06 DIAGNOSIS — I49.1 PREMATURE ATRIAL CONTRACTIONS: ICD-10-CM

## 2018-08-06 DIAGNOSIS — I35.0 AORTIC STENOSIS, MILD: ICD-10-CM

## 2018-08-06 DIAGNOSIS — J44.9 COPD (CHRONIC OBSTRUCTIVE PULMONARY DISEASE) WITH CHRONIC BRONCHITIS (HCC): ICD-10-CM

## 2018-08-06 DIAGNOSIS — E66.9 CLASS 1 OBESITY: ICD-10-CM

## 2018-08-06 PROCEDURE — 99214 OFFICE O/P EST MOD 30 MIN: CPT | Performed by: INTERNAL MEDICINE

## 2018-08-06 PROCEDURE — 93000 ELECTROCARDIOGRAM COMPLETE: CPT | Performed by: INTERNAL MEDICINE

## 2018-08-06 RX ORDER — AMLODIPINE BESYLATE 5 MG/1
TABLET ORAL
COMMUNITY
Start: 2018-06-15 | End: 2018-08-06

## 2018-08-06 RX ORDER — TORSEMIDE 10 MG/1
10 TABLET ORAL DAILY
COMMUNITY
End: 2020-03-16 | Stop reason: HOSPADM

## 2018-08-06 NOTE — PROGRESS NOTES
Cardiology Progress Note    ASSESSMENT:    1  Improved  exertional dyspnea from chronic hypercarbic hypoxemic respiratory failure and COPD with  CAD  excluded on 02/09/2018 nuclear stress test done in [place], [place], it which time echocardiogram on 02/07/2018 showed 60-65% LVEF EF, mild LVH, mild RV dilatation and RV systolic dysfunction, mild MAC with trace MR Co/trace TR, and mild-to-moderate aortic stenosis with 1+ AI/1+ PI   2  History of atherothrombotic bilateral basal ganglia strokes, most recently on the right on 6/25/16 with associated uncontrolled hypertension and residual left hand paresthesias with exacerbation of symptoms 5/9/17 during hypertensive emergency  History of old right thalamic stroke  3  Controlled dyslipidemia as of 05/21/2018 with prior history of statin myalgias and current direct LDL of 108   4  Mild  aortic valve stenosis with LVH, grade 2 diastolic dysfunction, elevated mean left atrial filling pressure, mild MAC with trace MR, trace AI, trace TR on 03/09/2018 echo  5  Class 1 obesity with 25 4 pound weight loss in approximately 5 9 years  6  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 with history of PACs  and suppressed PVC's today  7  Chronic kidney disease, stage III-IV, with stabilized creatinine  8  Osteoarthritis  9  History of left lower extremity DVT February, 2018 and DVT of right gastrocnemius vein October, 2008 with possible prior episode of left lower extremity DVT in the past as well  10  History of left renal mass  11  Chronic large airway wheezing of no clinical significance  12  Resolved orthostatic hypotension, likely due to a combination of amlodipine, volume depletion, dysautonomia  13  History of acutely decompensated diastolic heart failure with [facility] admission 8/27 to 09/02/2017, as well as 02/06/2018 and 03/06/2018  14   Acute hypercapneic and hypoxic respiratory failure 02/06/2018, 03/06/2018, and 03/29/2018    Plan       Patient Instructions     1  Continue present management  2   Lower extremity bilateral venous duplex scan to be done to rule out any recurrence or persistence of DVT  3   Consider discontinuation of Eliquis, if this study is negative  4   Cardiology follow-up in 3 months with no lab or EKG ordered by me  HPI    This 80 y o  female  denies new cardiopulmonary and medical symptoms  She is currently using aTtrilogy machine for her chronic hypercapnic respiratory failure since discharge from the hospital, where she was last admitted to [address] [place] [address] from 03/29 through 04/04/2018 for respiratory failure  She has had a good response to this treatment with no further hospitalizations  She averages 2-4 hours nightly with use of the [employer] device  She was last seen by Dr Farrah Bateman on 06/08/2018 and was thought to be doing fairly well  The patient still uses 2 liters nasal cannula oxygen with activity or as needed for oxygen desaturation  Review of Systems    All other systems negative, except as noted in history of present illness    Historical Information   Past Medical History:   Diagnosis Date    [facility] 19/9555    diastolic     DVT [facility]deep vein thrombosis) in pregnancy ([facility] )     left LE in 2/2018 and 50 years ago    Hyperlipidemia     Hypertension     [facility] ) 02/06/2018    Renal disorder     ckd    Stroke [facility]) 2016    left hand weakness,paresthesia  Past Surgical History:   Procedure Laterality Date    ABDOMINAL SURGERY      ruputured bowel  had colostomy and ileostomy,reversed later on   BACK SURGERY      rods in back  done 30 years ago   CARPAL TUNNEL RELEASE Bilateral     CYST REMOVAL      back    HERNIA REPAIR      REPLACEMENT TOTAL KNEE BILATERAL       History   Alcohol Use    Yes     Comment: Occ       History   Drug Use No     History   Smoking Status    Never Smoker   Smokeless Tobacco    Never Used       Family History:  Family History   Problem Relation Age of Onset    COPD Brother          Meds/Allergies     Prior to Admission medications    Medication Sig Start Date End Date Taking? Authorizing Provider   acetaminophen (TYLENOL) 325 mg tablet Take 650 mg by mouth every 6 (six) hours as needed for mild pain   Yes Historical Provider, MD   amLODIPine (NORVASC) 2 5 mg tablet Take 5 mg by mouth daily     Yes Historical Provider, MD   apixaban (ELIQUIS) 2 5 mg Take 1 tablet (2 5 mg total) by mouth 2 (two) times a day  Patient taking differently: Take 5 mg by mouth 2 (two) times a day   5/17/18  Yes Debbie Vincent MD   calcium-vitamin D (OSCAL 500 + D) 500 mg-200 units per tablet Take 1 tablet by mouth 2 (two) times a day  Yes Historical Provider, MD   carvedilol (COREG) 6 25 mg tablet Take 1 tablet (6 25 mg total) by mouth 2 (two) times a day with meals  Patient taking differently: Take 25 mg by mouth 2 (two) times a day with meals   4/4/18  Yes Thiago Brenner MD   cholecalciferol (VITAMIN D3) 1,000 units tablet Take 2,000 Units by mouth daily   Yes Historical Provider, MD   clopidogrel (PLAVIX) 75 mg tablet Take 75 mg by mouth daily   Yes Historical Provider, MD   co-enzyme Q-10 50 MG capsule Take 200 mg by mouth daily     Yes Historical Provider, MD   colesevelam (WELCHOL) 625 mg tablet Take 1,875 mg by mouth 2 (two) times a day with meals Indications: 3 tabs twice a day  Yes Historical Provider, MD   docusate sodium (COLACE) 100 mg capsule Take 100 mg by mouth 2 (two) times a day   Yes Historical Provider, MD   fluticasone-vilanterol (BREO ELLIPTA) 100-25 mcg/inh inhaler Inhale 1 puff daily Rinse mouth after use   6/8/18  Yes Jose Guerrero,    ipratropium-albuterol (DUO-NEB) 0 5-2 5 mg/3 mL Take 3 mL by nebulization every 6 (six) hours  Patient taking differently: Take 3 mL by nebulization daily   5/25/18  Yes [employer], DO   Iron-Vitamin C (IRON 100/C) 100-250 MG TABS Take 1 tablet by mouth daily Yes Historical Provider, MD   Multiple Vitamins-Minerals (OCUVITE PO) Take 1 tablet by mouth daily     Yes Historical Provider, MD   pantoprazole (PROTONIX) 40 mg tablet Take 40 mg by mouth daily   Yes Historical Provider, MD   torsemide (DEMADEX) 10 mg tablet Take 10 mg by mouth daily   Yes Historical Provider, MD   amLODIPine (NORVASC) 5 mg tablet  6/15/18 8/6/18  Historical Provider, MD   torsemide (DEMADEX) 20 mg tablet Take 1 tablet (20 mg total) by mouth 2 (two) times a day  Patient taking differently: Take 10 mg by mouth daily   3/12/18 8/6/18  Enio Hernandez MD       Allergies   Allergen Reactions    Statins Myalgia    Lyrica [Pregabalin] Other (See Comments)     Client says it made her "nutty"         Vitals:    08/06/18 1525   BP: 120/68   BP Location: Right arm   Patient Position: Sitting   Cuff Size: Standard   Pulse: 66   SpO2: 94%   Weight: 71 2 kg (157 lb)   Height: 4' 10" (1 473 m)       Body mass index is 32 81 kg/m²  4 2 pound weight loss in approximately 2 months    Physical Exam:    General Appearance:  Alert, cooperative, no distress, appears stated age and is on nasal oxygen at 2 liters/minute     Head:  Normocephalic, without obvious abnormality, atraumatic   Eyes:  PERRL, conjunctiva/corneas clear, EOM's intact,   both eyes   Ears:  Normal TM's and external ear canals, both ears   Nose: Nares normal, septum midline, mucosa normal, no drainage or sinus tenderness   Throat: Lips, mucosa, and tongue normal; teeth and gums normal   Neck: Supple, symmetrical, trachea midline, no adenopathy, thyroid: not enlarged, symmetric, no tenderness/mass/nodules, no carotid bruit or JVD   Back:   Symmetric, no curvature, ROM normal, no CVA tenderness   Lungs:   Clear to auscultation bilaterally, respirations unlabored   Chest Wall:  No tenderness or deformity   Heart:  Regular rate and rhythm, S1, S2 normal, no murmur, rub or gallop   Abdomen:   Soft, non-tender, bowel sounds active all four quadrants, no masses, no organomegaly   Extremities: Extremities normal, atraumatic, no cyanosis or edema   Pulses: 2+ and symmetric   Skin: Skin showed normal color, texture, turgor and no rashes or lesions   Lymph nodes: Cervical, supraclavicular, and axillary nodes normal   Neurologic: Normal         Cardiographics    ECG 08/06/2018:    Complete right bundle branch block  Anteroseptal Q-waves consistent with old anteroseptal infarct  Less left axis deviation and more prominent anteroseptal Q-waves with suppression of PVCs compared to 06/06/2018    Imaging    Chest X-Ray :  Xr Chest 2 Views    Result Date: 3/29/2018  Impression No acute cardiopulmonary disease  Cardiomegaly   Workstation performed: [id number]     Xr Chest Pa & Lateral    Result Date: 12/12/2017  Impression No acute abnormality in the chest  Workstation performed: [id number]             Lab Review       Lab Results   Component Value Date     04/26/2018    K 3 8 04/26/2018     04/26/2018    CO2 31 04/26/2018    ANIONGAP 6 04/26/2018    BUN 23 04/26/2018    CREATININE 1 52 (H) 04/26/2018    GLUCOSE 169 (H) 04/04/2018    GLUF 101 (H) 04/26/2018    CALCIUM 9 4 04/26/2018    AST 14 03/06/2018    ALT 18 03/06/2018    ALKPHOS 56 03/06/2018    PROT 7 1 03/06/2018    BILITOT 0 30 03/06/2018    EGFR 30 04/26/2018       Lab Results   Component Value Date    CHOL 151 12/06/2017    CHOL 167 12/04/2017    CHOL 150 05/10/2017     Lab Results   Component Value Date    HDL 69 (H) 12/06/2017    HDL 76 (H) 12/04/2017    HDL 67 (H) 05/10/2017     Lab Results   Component Value Date    LDLCALC 62 12/06/2017    LDLCALC 72 12/04/2017    LDLCALC 67 05/10/2017     Lab Results   Component Value Date    TRIG 102 12/06/2017    TRIG 94 12/04/2017    TRIG 78 05/10/2017     No components found for: CHOLHDL    Lab Results   Component Value Date    GLUCOSE 169 (H) 04/04/2018    CALCIUM 9 4 04/26/2018     04/26/2018    K 3 8 04/26/2018    CO2 31 04/26/2018     04/26/2018    BUN 23 04/26/2018    CREATININE 1 52 (H) 04/26/2018           Ernie Lilly MD

## 2018-08-06 NOTE — PATIENT INSTRUCTIONS
1   Continue present management  2   Lower extremity bilateral venous duplex scan to be done to rule out any recurrence or persistence of DVT  3   Consider discontinuation of Eliquis, if this study is negative  4   Cardiology follow-up in 3 months with no lab or EKG ordered by me

## 2018-08-10 ENCOUNTER — HOSPITAL ENCOUNTER (OUTPATIENT)
Dept: RADIOLOGY | Facility: HOSPITAL | Age: 83
Discharge: HOME | End: 2018-08-10
Attending: INTERNAL MEDICINE
Payer: MEDICARE

## 2018-08-10 DIAGNOSIS — I82.592 CHRONIC EMBOLISM AND THROMBOSIS OF OTHER SPECIFIED DEEP VEIN OF LEFT LOWER EXTREMITY (HCC): ICD-10-CM

## 2018-08-10 DIAGNOSIS — Z86.718 HISTORY OF DVT (DEEP VEIN THROMBOSIS): ICD-10-CM

## 2018-08-10 PROCEDURE — 93970 EXTREMITY STUDY: CPT | Performed by: SURGERY

## 2018-08-10 PROCEDURE — 93970 EXTREMITY STUDY: CPT

## 2018-08-14 ENCOUNTER — TRANSCRIBE ORDERS (OUTPATIENT)
Dept: LAB | Facility: HOSPITAL | Age: 83
End: 2018-08-14

## 2018-08-14 ENCOUNTER — TELEPHONE (OUTPATIENT)
Dept: CARDIOLOGY CLINIC | Facility: CLINIC | Age: 83
End: 2018-08-14

## 2018-08-14 DIAGNOSIS — N18.30 CHRONIC KIDNEY DISEASE, STAGE III (MODERATE) (HCC): Primary | ICD-10-CM

## 2018-08-15 ENCOUNTER — TELEPHONE (OUTPATIENT)
Dept: CARDIOLOGY CLINIC | Facility: CLINIC | Age: 83
End: 2018-08-15

## 2018-08-15 NOTE — TELEPHONE ENCOUNTER
Anterior this question earlier today with instructions to continue Eliquis in view of her recurrent episodes of DVT, even though there is no new DVT on her Doppler study  However, if she does not want to continue it, she can stop it and continue with aspirin instead

## 2018-08-15 NOTE — TELEPHONE ENCOUNTER
----- Message from Mallory Escobar MD sent at 8/14/2018  2:24 PM EDT -----  Please call the patient regarding her abnormal result  No new issues  Chronic changes in left leg from prior clots  Could stop blood thinner Eliquis but there is increased risk of recurrent blood clots in leg veins because she has had 2 or 3 prior episodes  On balance, I think I would contine blood thinner Eliquis unless she has bleeding complications  If she does not want to take it, let me know

## 2018-08-20 LAB
25(OH)D3 SERPL-MCNC: 57 NG/ML (ref 30–100)
ALBUMIN SERPL-MCNC: 3.8 G/DL (ref 3.6–5.1)
ALBUMIN/GLOB SERPL: 1 (CALC) (ref 1–2.5)
ALP SERPL-CCNC: 84 U/L (ref 33–130)
ALT SERPL-CCNC: 10 U/L (ref 6–29)
AST SERPL-CCNC: 13 U/L (ref 10–35)
BASOPHILS # BLD AUTO: 10 CELLS/UL (ref 0–200)
BASOPHILS NFR BLD AUTO: 0.2 %
BILIRUB SERPL-MCNC: 0.4 MG/DL (ref 0.2–1.2)
BUN SERPL-MCNC: 42 MG/DL (ref 7–25)
BUN/CREAT SERPL: 21 (CALC) (ref 6–22)
CALCIUM SERPL-MCNC: 9.3 MG/DL (ref 8.6–10.4)
CALCIUM SERPL-MCNC: 9.4 MG/DL (ref 8.6–10.4)
CHLORIDE SERPL-SCNC: 107 MMOL/L (ref 98–110)
CHOLEST SERPL-MCNC: 157 MG/DL
CHOLEST/HDLC SERPL: 3 (CALC)
CO2 SERPL-SCNC: 26 MMOL/L (ref 20–32)
CREAT SERPL-MCNC: 1.98 MG/DL (ref 0.6–0.88)
CREAT UR-MCNC: NORMAL MG/DL
EOSINOPHIL # BLD AUTO: 98 CELLS/UL (ref 15–500)
EOSINOPHIL NFR BLD AUTO: 2 %
ERYTHROCYTE [DISTWIDTH] IN BLOOD BY AUTOMATED COUNT: 11.8 % (ref 11–15)
FERRITIN SERPL-MCNC: 74 NG/ML (ref 20–288)
GLOBULIN SER CALC-MCNC: 3.7 G/DL (CALC) (ref 1.9–3.7)
GLUCOSE SERPL-MCNC: 97 MG/DL (ref 65–99)
HCT VFR BLD AUTO: 36.7 % (ref 35–45)
HDLC SERPL-MCNC: 52 MG/DL
HGB BLD-MCNC: 11.9 G/DL (ref 11.7–15.5)
IRON SATN MFR SERPL: 28 % (CALC) (ref 11–50)
IRON SERPL-MCNC: 93 MCG/DL (ref 45–160)
LDLC SERPL CALC-MCNC: 81 MG/DL (CALC)
LYMPHOCYTES # BLD AUTO: 916 CELLS/UL (ref 850–3900)
LYMPHOCYTES NFR BLD AUTO: 18.7 %
MAGNESIUM SERPL-MCNC: 2.3 MG/DL (ref 1.5–2.5)
MCH RBC QN AUTO: 30.2 PG (ref 27–33)
MCHC RBC AUTO-ENTMCNC: 32.4 G/DL (ref 32–36)
MCV RBC AUTO: 93.1 FL (ref 80–100)
MONOCYTES # BLD AUTO: 377 CELLS/UL (ref 200–950)
MONOCYTES NFR BLD AUTO: 7.7 %
NEUTROPHILS # BLD AUTO: 3499 CELLS/UL (ref 1500–7800)
NEUTROPHILS NFR BLD AUTO: 71.4 %
NONHDLC SERPL-MCNC: 105 MG/DL (CALC)
PHOSPHATE SERPL-MCNC: 4.2 MG/DL (ref 2.1–4.3)
PLATELET # BLD AUTO: 167 THOUSAND/UL (ref 140–400)
PMV BLD REES-ECKER: 10.9 FL (ref 7.5–12.5)
POTASSIUM SERPL-SCNC: 4 MMOL/L (ref 3.5–5.3)
PROT SERPL-MCNC: 7.5 G/DL (ref 6.1–8.1)
PTH-INTACT SERPL-MCNC: 25 PG/ML (ref 14–64)
RBC # BLD AUTO: 3.94 MILLION/UL (ref 3.8–5.1)
SL AMB EGFR AFRICAN AMERICAN: 25 ML/MIN/1.73M2
SL AMB EGFR NON AFRICAN AMERICAN: 22 ML/MIN/1.73M2
SODIUM SERPL-SCNC: 143 MMOL/L (ref 135–146)
TIBC SERPL-MCNC: 327 MCG/DL (CALC) (ref 250–450)
TRIGL SERPL-MCNC: 140 MG/DL
WBC # BLD AUTO: 4.9 THOUSAND/UL (ref 3.8–10.8)

## 2018-08-23 ENCOUNTER — APPOINTMENT (OUTPATIENT)
Dept: LAB | Facility: HOSPITAL | Age: 83
End: 2018-08-23
Attending: INTERNAL MEDICINE
Payer: MEDICARE

## 2018-08-23 DIAGNOSIS — D63.1 ANEMIA OF CHRONIC RENAL FAILURE, STAGE 3 (MODERATE) (HCC): ICD-10-CM

## 2018-08-23 DIAGNOSIS — I10 ESSENTIAL HYPERTENSION: ICD-10-CM

## 2018-08-23 DIAGNOSIS — I95.1 ORTHOSTATIC HYPOTENSION: ICD-10-CM

## 2018-08-23 DIAGNOSIS — I12.9 HYPERTENSIVE CHRONIC KIDNEY DISEASE WITH STAGE 1 THROUGH STAGE 4 CHRONIC KIDNEY DISEASE, OR UNSPECIFIED CHRONIC KIDNEY DISEASE: ICD-10-CM

## 2018-08-23 DIAGNOSIS — N13.30 HYDRONEPHROSIS, UNSPECIFIED HYDRONEPHROSIS TYPE: ICD-10-CM

## 2018-08-23 DIAGNOSIS — E78.5 DYSLIPIDEMIA: ICD-10-CM

## 2018-08-23 DIAGNOSIS — N18.30 CHRONIC KIDNEY DISEASE, STAGE III (MODERATE) (HCC): ICD-10-CM

## 2018-08-23 DIAGNOSIS — N18.30 ANEMIA OF CHRONIC RENAL FAILURE, STAGE 3 (MODERATE) (HCC): ICD-10-CM

## 2018-08-23 DIAGNOSIS — N17.9 ACUTE RENAL FAILURE, UNSPECIFIED ACUTE RENAL FAILURE TYPE (HCC): ICD-10-CM

## 2018-08-23 LAB — VENIPUNCTURE: NORMAL

## 2018-08-23 PROCEDURE — 36415 COLL VENOUS BLD VENIPUNCTURE: CPT

## 2018-08-30 ENCOUNTER — OFFICE VISIT (OUTPATIENT)
Dept: NEPHROLOGY | Facility: CLINIC | Age: 83
End: 2018-08-30
Payer: MEDICARE

## 2018-08-30 VITALS
BODY MASS INDEX: 33.04 KG/M2 | SYSTOLIC BLOOD PRESSURE: 115 MMHG | HEIGHT: 58 IN | WEIGHT: 157.4 LBS | DIASTOLIC BLOOD PRESSURE: 51 MMHG | HEART RATE: 62 BPM

## 2018-08-30 DIAGNOSIS — E55.9 VITAMIN D DEFICIENCY: Chronic | ICD-10-CM

## 2018-08-30 DIAGNOSIS — N17.9 AKI (ACUTE KIDNEY INJURY) (HCC): ICD-10-CM

## 2018-08-30 DIAGNOSIS — I95.1 ORTHOSTATIC HYPOTENSION: Primary | ICD-10-CM

## 2018-08-30 DIAGNOSIS — N18.30 ANEMIA OF CHRONIC RENAL FAILURE, STAGE 3 (MODERATE) (HCC): ICD-10-CM

## 2018-08-30 DIAGNOSIS — N18.30 CHRONIC KIDNEY DISEASE, STAGE III (MODERATE) (HCC): Chronic | ICD-10-CM

## 2018-08-30 DIAGNOSIS — D63.1 ANEMIA OF CHRONIC RENAL FAILURE, STAGE 3 (MODERATE) (HCC): ICD-10-CM

## 2018-08-30 DIAGNOSIS — I12.9 HYPERTENSIVE CHRONIC KIDNEY DISEASE WITH STAGE 1 THROUGH STAGE 4 CHRONIC KIDNEY DISEASE, OR UNSPECIFIED CHRONIC KIDNEY DISEASE: ICD-10-CM

## 2018-08-30 DIAGNOSIS — E78.5 DYSLIPIDEMIA: Chronic | ICD-10-CM

## 2018-08-30 PROCEDURE — 99214 OFFICE O/P EST MOD 30 MIN: CPT | Performed by: INTERNAL MEDICINE

## 2018-08-30 RX ORDER — COLESEVELAM 180 1/1
1875 TABLET ORAL 2 TIMES DAILY WITH MEALS
Qty: 540 TABLET | Refills: 3 | Status: SHIPPED | OUTPATIENT
Start: 2018-08-30 | End: 2018-10-05 | Stop reason: SDUPTHER

## 2018-08-30 RX ORDER — AMLODIPINE BESYLATE 5 MG/1
5 TABLET ORAL DAILY
Qty: 30 TABLET | Refills: 5 | Status: SHIPPED | OUTPATIENT
Start: 2018-08-30 | End: 2019-04-02 | Stop reason: SDUPTHER

## 2018-08-30 NOTE — LETTER
August 30, 2018     Aruna Falcon DO  [address] [facility]  [address]  [address] [place] [address]    Patient: Kaylee Raygoza   YOB: 1928   Date of Visit: 8/30/2018       Dear Dr Jemal Moscoso: Thank you for referring Kaylee Raygoza to me for evaluation  Below are my notes for this consultation  If you have questions, please do not hesitate to call me  I look forward to following your patient along with you  Sincerely,        Ana María Lazo MD        CC: Gary Shah MD  [facility], DO Ana María Rodarte MD  8/30/2018 12:10 PM  Sign at close encounter  RENAL FOLLOW UP NOTE: td    ASSESSMENT AND PLAN:  1   CKD stage 3 :  · Etiology:  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 of TIANA  · Baseline creatinine:  1 5-1 92  · Current creatinine:  1 98 slightly above baseline  · Urine protein creatinine ratio:  Not done at this time  Recommendations:  · Treat hypertension-please see below  · Treat dyslipidemia-please see below  · Maintain proteinuria less than 1 g or as low as possible  · Avoid nephrotoxic agents such as NSAIDs, patient counseled as such  · Repeat a basic metabolic profile in the next couple weeks  2  Volume:  Euvolemic  3  Hypertension:  Blood pressure in the office is quite good actually slightly low normal   No readings at home at this time  · Goal blood pressure:  Less than 130/80 given CAD  Recommendations:  ·   Just obtain home readings at convenience no medication changes at this time  I would like to make sure blood pressure is not too low at this time  4   Electrolytes: All acceptable at this time  5  Mineral bone disorder:  · Calcium/magnesium/phosphorus:  Acceptable at this time  · PTH intact:  25 which is acceptable  · Vitamin-D:  57 which is acceptable  6    Dyslipidemia:  · Goal LDL:  Less than 70 ideally given CAD  · Current lipid profile:  LDL 81, HDL 52 and triglycerides 140        Recommendations:   Overall doing better on Welchol  Cannot tolerate statins  7   Anemia:   Hemoglobin is acceptable 11 9  Iron sat is 28%, ferritin is 74  Continue oral iron 3 times a week  Should her hemoglobin decrease in the future consideration for intravenous iron infusion  8   Other problems:  · Status post recent CVA  · CAD/atrial fibrillation:  Followed by Dr Kendra Villar  · History of MGUS followed by Dr Alxe Eye:    Patient Instructions   1  No medication changes today  2   At your convenience please send in 1 week a blood pressure readings morning and evening  Do the morning readings before taking any medications in the evening readings closer bedtime  Please do sitting and standing as well and support your arm at heart level when you stand  3   Please have lab work done in the next couple of weeks nonfasting any time of day  4   Follow-up in 4 months:  · Please have fasting lab work done before your appointment  · Please bring in 1 week a blood pressure readings morning and evening, sitting and standing  5  General instructions:  -avoid salt  -avoid medications such as Motrin, Naprosyn, ibuprofen, Aleve or Advil or Celebrex as they can affect your kidney function; you can use Tylenol as needed for pain or fevers if you have no liver problems  -avoid medications such as Sudafed or other medications with decongestants as they can raise your blood pressure  -try to maintain your weight by using Ensure or other supplements that you like          Subjective:   Overall doing better  No fevers chills  Mild cough with the trilogy machine used for restrictive lung disease which is helping her overall    No chest pain, breathing is overall better, no significant leg swelling  Appetite is fair she is losing weight very slowly she only lost 2 lb since last appointment  No urinary symptoms at this time  No headaches, no significant dizziness or lightheadedness upon standing  Blood pressure medications:  -amlodipine 5 mg once a day  -carvedilol 25 mg twice a day  -torsemide 10 mg daily  ROS:  See HPI, otherwise review of systems as completely reviewed with the patient are negative    Past Medical History:   Diagnosis Date    CHF (congestive heart failure) ([facility] ) 02/8198    diastolic     DVT (deep vein thrombosis) in pregnancy ([facility] )     left LE in 2/2018 and 50 years ago    Hyperlipidemia     Hypertension     Pulmonary embolism ([place] [id number] ) 02/06/2018    Renal disorder     ckd    Stroke [facility]) 2016    left hand weakness,paresthesia  Past Surgical History:   Procedure Laterality Date    ABDOMINAL SURGERY      ruputured bowel  had colostomy and ileostomy,reversed later on   BACK SURGERY      rods in back  done 30 years ago   CARPAL TUNNEL RELEASE Bilateral     CYST REMOVAL      back    HERNIA REPAIR      REPLACEMENT TOTAL KNEE BILATERAL       Family History   Problem Relation Age of Onset    COPD Brother       reports that she has never smoked  She has never used smokeless tobacco  She reports that she drinks alcohol  She reports that she does not use drugs  I COMPLETELY REVIEWED THE PAST MEDICAL HISTORY/PAST SURGICAL HISTORY/SOCIAL HISTORY/FAMILY HISTORY/AND MEDICATIONS  AND UPDATED ALL    Objective:     Vitals:   Vitals:    08/30/18 1053   BP: 115/51   Pulse: 62    BP sitting on right:  120/68 with a heart rate of 64 and irregular  BP standing on right:  110/60 with a heart rate of 64 and irregular    Weight (last 2 days)     Date/Time   Weight    08/30/18 1053  71 4 (157 4)            Body mass index is 32 9 kg/m²      Physical Exam: General:  No acute distress, with oxygen  Skin:  No acute rash  Eyes:  No scleral icterus, noninjected  ENT:  Moist mucous membranes  Neck:  Supple, no jugular venous distention  Back   No CVAT  Chest:  Clear to auscultation and percussion  CVS:  Irregular rhythm, no rub or gallops appreciable, grade 2/6 systolic ejection type murmur  Abdomen:  Soft and nontender with normal bowel sounds  Extremities:  No cyanosis and no significant edema  Neuro:  Grossly intact  Psych:  Alert, oriented x3 and appropriate      Medications:    Current Outpatient Prescriptions:     acetaminophen (TYLENOL) 325 mg tablet, Take 650 mg by mouth every 6 (six) hours as needed for mild pain, Disp: , Rfl:     apixaban (ELIQUIS) 2 5 mg, Take 1 tablet (2 5 mg total) by mouth 2 (two) times a day (Patient taking differently: Take 5 mg by mouth 2 (two) times a day  ), Disp: 60 tablet, Rfl: 5    calcium-vitamin D (OSCAL 500 + D) 500 mg-200 units per tablet, Take 1 tablet by mouth 2 (two) times a day , Disp: , Rfl:     carvedilol (COREG) 6 25 mg tablet, Take 1 tablet (6 25 mg total) by mouth 2 (two) times a day with meals (Patient taking differently: Take 25 mg by mouth 2 (two) times a day with meals  ), Disp: , Rfl: 0    cholecalciferol (VITAMIN D3) 1,000 units tablet, Take 2,000 Units by mouth daily, Disp: , Rfl:     clopidogrel (PLAVIX) 75 mg tablet, Take 75 mg by mouth daily, Disp: , Rfl:     co-enzyme Q-10 50 MG capsule, Take 200 mg by mouth daily  , Disp: , Rfl:     colesevelam (WELCHOL) 625 mg tablet, Take 3 tablets (1,875 mg total) by mouth 2 (two) times a day with meals, Disp: 540 tablet, Rfl: 3    docusate sodium (COLACE) 100 mg capsule, Take 100 mg by mouth 2 (two) times a day, Disp: , Rfl:     fluticasone-vilanterol (BREO ELLIPTA) 100-25 mcg/inh inhaler, Inhale 1 puff daily Rinse mouth after use , Disp: 1 Inhaler, Rfl: 0    Iron-Vitamin C (IRON 100/C) 100-250 MG TABS, Take 1 tablet by mouth daily, Disp: , Rfl:     Multiple Vitamins-Minerals (OCUVITE PO), Take 1 tablet by mouth daily  , Disp: , Rfl:     pantoprazole (PROTONIX) 40 mg tablet, Take 40 mg by mouth daily, Disp: , Rfl:     torsemide (DEMADEX) 10 mg tablet, Take 10 mg by mouth daily, Disp: , Rfl:     amLODIPine (NORVASC) 5 mg tablet, Take 1 tablet (5 mg total) by mouth daily, Disp: 30 tablet, Rfl: 5    ipratropium-albuterol (DUO-NEB) 0 5-2 5 mg/3 mL, Take 3 mL by nebulization every 6 (six) hours (Patient not taking: Reported on 8/30/2018 ), Disp: 360 mL, Rfl: 0    Lab, Imaging and other studies: I have personally reviewed pertinent labs  Laboratory Results:  Results for orders placed or performed in visit on 08/23/18   Mobile Phle[employer] Service Fee   Result Value Ref Range    Venipuncture Collected                Radiology review:   chest X-ray    Ultrasound      Portions of the record may have been created with voice recognition software   Occasional wrong word or "sound a like" substitutions may have occurred due to the inherent limitations of voice recognition software   Read the chart carefully and recognize, using context, where substitutions have occurred

## 2018-08-30 NOTE — PROGRESS NOTES
RENAL FOLLOW UP NOTE: td    ASSESSMENT AND PLAN:  1   CKD stage 3 :  · Etiology:  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 of TIANA  · Baseline creatinine:  1 5-1 92  · Current creatinine:  1 98 slightly above baseline  · Urine protein creatinine ratio:  Not done at this time  Recommendations:  · Treat hypertension-please see below  · Treat dyslipidemia-please see below  · Maintain proteinuria less than 1 g or as low as possible  · Avoid nephrotoxic agents such as NSAIDs, patient counseled as such  · Repeat a basic metabolic profile in the next couple weeks  2  Volume:  Euvolemic  3  Hypertension:  Blood pressure in the office is quite good actually slightly low normal   No readings at home at this time  · Goal blood pressure:  Less than 130/80 given CAD  Recommendations:  ·   Just obtain home readings at convenience no medication changes at this time  I would like to make sure blood pressure is not too low at this time  4   Electrolytes: All acceptable at this time  5  Mineral bone disorder:  · Calcium/magnesium/phosphorus:  Acceptable at this time  · PTH intact:  25 which is acceptable  · Vitamin-D:  57 which is acceptable  6  Dyslipidemia:  · Goal LDL:  Less than 70 ideally given CAD  · Current lipid profile:  LDL 81, HDL 52 and triglycerides 140        Recommendations:   Overall doing better on Welchol  Cannot tolerate statins  7   Anemia:   Hemoglobin is acceptable 11 9  Iron sat is 28%, ferritin is 74  Continue oral iron 3 times a week  Should her hemoglobin decrease in the future consideration for intravenous iron infusion  8   Other problems:  · Status post recent CVA  · CAD/atrial fibrillation:  Followed by Dr Georges Gill  · History of MGUS followed by Dr Hill Kid:    Patient Instructions   1  No medication changes today  2   At your convenience please send in 1 week a blood pressure readings morning and evening    Do the morning readings before taking any medications in the evening readings closer bedtime  Please do sitting and standing as well and support your arm at heart level when you stand  3   Please have lab work done in the next couple of weeks nonfasting any time of day  4   Follow-up in 4 months:  · Please have fasting lab work done before your appointment  · Please bring in 1 week a blood pressure readings morning and evening, sitting and standing  5  General instructions:  -avoid salt  -avoid medications such as Motrin, Naprosyn, ibuprofen, Aleve or Advil or Celebrex as they can affect your kidney function; you can use Tylenol as needed for pain or fevers if you have no liver problems  -avoid medications such as Sudafed or other medications with decongestants as they can raise your blood pressure  -try to maintain your weight by using Ensure or other supplements that you like          Subjective:   Overall doing better  No fevers chills  Mild cough with the trilogy machine used for restrictive lung disease which is helping her overall  No chest pain, breathing is overall better, no significant leg swelling  Appetite is fair she is losing weight very slowly she only lost 2 lb since last appointment  No urinary symptoms at this time  No headaches, no significant dizziness or lightheadedness upon standing  Blood pressure medications:  -amlodipine 5 mg once a day  -carvedilol 25 mg twice a day  -torsemide 10 mg daily  ROS:  See HPI, otherwise review of systems as completely reviewed with the patient are negative    Past Medical History:   Diagnosis Date    CHF (congestive heart failure) ([place] [address] ) 08/0616    diastolic     DVT (deep vein thrombosis) in pregnancy ([facility] )     left LE in 2/2018 and 50 years ago    Hyperlipidemia     Hypertension     Pulmonary embolism ([facility] ) 02/06/2018    Renal disorder     ckd    Stroke ([place] [address] ) 2016    left hand weakness,paresthesia       Past Surgical History:   Procedure Laterality Date    ABDOMINAL SURGERY ruputured bowel  had colostomy and ileostomy,reversed later on   BACK SURGERY      rods in back  done 30 years ago   CARPAL TUNNEL RELEASE Bilateral     CYST REMOVAL      back    HERNIA REPAIR      REPLACEMENT TOTAL KNEE BILATERAL       Family History   Problem Relation Age of Onset    COPD Brother       reports that she has never smoked  She has never used smokeless tobacco  She reports that she drinks alcohol  She reports that she does not use drugs  I COMPLETELY REVIEWED THE PAST MEDICAL HISTORY/PAST SURGICAL HISTORY/SOCIAL HISTORY/FAMILY HISTORY/AND MEDICATIONS  AND UPDATED ALL    Objective:     Vitals:   Vitals:    08/30/18 1053   BP: 115/51   Pulse: 62    BP sitting on right:  120/68 with a heart rate of 64 and irregular  BP standing on right:  110/60 with a heart rate of 64 and irregular    Weight (last 2 days)     Date/Time   Weight    08/30/18 1053  71 4 (157 4)            Body mass index is 32 9 kg/m²      Physical Exam: General:  No acute distress, with oxygen  Skin:  No acute rash  Eyes:  No scleral icterus, noninjected  ENT:  Moist mucous membranes  Neck:  Supple, no jugular venous distention  Back   No CVAT  Chest:  Clear to auscultation and percussion  CVS:  Irregular rhythm, no rub or gallops appreciable, grade 2/6 systolic ejection type murmur  Abdomen:  Soft and nontender with normal bowel sounds  Extremities:  No cyanosis and no significant edema  Neuro:  Grossly intact  Psych:  Alert, oriented x3 and appropriate      Medications:    Current Outpatient Prescriptions:     acetaminophen (TYLENOL) 325 mg tablet, Take 650 mg by mouth every 6 (six) hours as needed for mild pain, Disp: , Rfl:     apixaban (ELIQUIS) 2 5 mg, Take 1 tablet (2 5 mg total) by mouth 2 (two) times a day (Patient taking differently: Take 5 mg by mouth 2 (two) times a day  ), Disp: 60 tablet, Rfl: 5    calcium-vitamin D (OSCAL 500 + D) 500 mg-200 units per tablet, Take 1 tablet by mouth 2 (two) times a day , Disp: , Rfl:     carvedilol (COREG) 6 25 mg tablet, Take 1 tablet (6 25 mg total) by mouth 2 (two) times a day with meals (Patient taking differently: Take 25 mg by mouth 2 (two) times a day with meals  ), Disp: , Rfl: 0    cholecalciferol (VITAMIN D3) 1,000 units tablet, Take 2,000 Units by mouth daily, Disp: , Rfl:     clopidogrel (PLAVIX) 75 mg tablet, Take 75 mg by mouth daily, Disp: , Rfl:     co-enzyme Q-10 50 MG capsule, Take 200 mg by mouth daily  , Disp: , Rfl:     colesevelam (WELCHOL) 625 mg tablet, Take 3 tablets (1,875 mg total) by mouth 2 (two) times a day with meals, Disp: 540 tablet, Rfl: 3    docusate sodium (COLACE) 100 mg capsule, Take 100 mg by mouth 2 (two) times a day, Disp: , Rfl:     fluticasone-vilanterol (BREO ELLIPTA) 100-25 mcg/inh inhaler, Inhale 1 puff daily Rinse mouth after use , Disp: 1 Inhaler, Rfl: 0    Iron-Vitamin C (IRON 100/C) 100-250 MG TABS, Take 1 tablet by mouth daily, Disp: , Rfl:     Multiple Vitamins-Minerals (OCUVITE PO), Take 1 tablet by mouth daily  , Disp: , Rfl:     pantoprazole (PROTONIX) 40 mg tablet, Take 40 mg by mouth daily, Disp: , Rfl:     torsemide (DEMADEX) 10 mg tablet, Take 10 mg by mouth daily, Disp: , Rfl:     amLODIPine (NORVASC) 5 mg tablet, Take 1 tablet (5 mg total) by mouth daily, Disp: 30 tablet, Rfl: 5    ipratropium-albuterol (DUO-NEB) 0 5-2 5 mg/3 mL, Take 3 mL by nebulization every 6 (six) hours (Patient not taking: Reported on 8/30/2018 ), Disp: 360 mL, Rfl: 0    Lab, Imaging and other studies: I have personally reviewed pertinent labs    Laboratory Results:  Results for orders placed or performed in visit on 08/23/18   Mobile Phleb Service Fee   Result Value Ref Range    Venipuncture Collected                Radiology review:   chest X-ray    Ultrasound      Portions of the record may have been created with voice recognition software   Occasional wrong word or "sound a like" substitutions may have occurred due to the inherent limitations of voice recognition software   Read the chart carefully and recognize, using context, where substitutions have occurred

## 2018-08-30 NOTE — PATIENT INSTRUCTIONS
1   No medication changes today  2   At your convenience please send in 1 week a blood pressure readings morning and evening  Do the morning readings before taking any medications in the evening readings closer bedtime  Please do sitting and standing as well and support your arm at heart level when you stand  3   Please have lab work done in the next couple of weeks nonfasting any time of day  4   Follow-up in 4 months:  · Please have fasting lab work done before your appointment  · Please bring in 1 week a blood pressure readings morning and evening, sitting and standing  5  General instructions:  -avoid salt  -avoid medications such as Motrin, Naprosyn, ibuprofen, Aleve or Advil or Celebrex as they can affect your kidney function; you can use Tylenol as needed for pain or fevers if you have no liver problems  -avoid medications such as Sudafed or other medications with decongestants as they can raise your blood pressure  -try to maintain your weight by using Ensure or other supplements that you like

## 2018-09-12 ENCOUNTER — OFFICE VISIT (OUTPATIENT)
Dept: PULMONOLOGY | Facility: MEDICAL CENTER | Age: 83
End: 2018-09-12
Payer: MEDICARE

## 2018-09-12 VITALS
TEMPERATURE: 97.7 F | SYSTOLIC BLOOD PRESSURE: 136 MMHG | RESPIRATION RATE: 16 BRPM | WEIGHT: 156 LBS | BODY MASS INDEX: 32.75 KG/M2 | OXYGEN SATURATION: 98 % | HEIGHT: 58 IN | DIASTOLIC BLOOD PRESSURE: 70 MMHG | HEART RATE: 70 BPM

## 2018-09-12 DIAGNOSIS — J96.11 CHRONIC RESPIRATORY FAILURE WITH HYPOXIA AND HYPERCAPNIA (HCC): Primary | ICD-10-CM

## 2018-09-12 DIAGNOSIS — M41.9 RESTRICTIVE LUNG DISEASE DUE TO KYPHOSCOLIOSIS: ICD-10-CM

## 2018-09-12 DIAGNOSIS — J45.30 MILD PERSISTENT ASTHMA WITHOUT COMPLICATION: ICD-10-CM

## 2018-09-12 DIAGNOSIS — J98.4 RESTRICTIVE LUNG DISEASE DUE TO KYPHOSCOLIOSIS: ICD-10-CM

## 2018-09-12 DIAGNOSIS — J96.12 CHRONIC RESPIRATORY FAILURE WITH HYPOXIA AND HYPERCAPNIA (HCC): Primary | ICD-10-CM

## 2018-09-12 PROCEDURE — 99214 OFFICE O/P EST MOD 30 MIN: CPT | Performed by: INTERNAL MEDICINE

## 2018-09-12 RX ORDER — APIXABAN 5 MG/1
TABLET, FILM COATED ORAL
Refills: 5 | Status: ON HOLD | COMMUNITY
Start: 2018-09-06 | End: 2018-12-07 | Stop reason: SDUPTHER

## 2018-09-12 RX ORDER — TORSEMIDE 20 MG/1
TABLET ORAL
Refills: 0 | COMMUNITY
Start: 2018-09-06 | End: 2018-12-11 | Stop reason: HOSPADM

## 2018-09-12 NOTE — PROGRESS NOTES
Assessment/Plan:     Problem List Items Addressed This Visit        Respiratory    Mild persistent asthma without complication     [facility] has mild persistent asthma and is doing well on regimen of Breo 100 mcg 1 puff daily  She is not having any wheezing and not needing to use her nebulizer with DuoNeb  She will stay on Breo 100 mcg 1 puff daily         Chronic respiratory failure with hypoxia and hypercapnia ([address] ) - Primary     [facility] has chronic hypercapnic hypoxemic respiratory failure and is using a Trilogy ventilator unit     She is using this for at least 4 hr per night and has benefit from it  She does use oxygen 2 liters/minute during the day most of the time  She has felt benefit from use of the Trilogy and continues at nighttime for ventilatory support  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     [facility] has history of severe thoracic scoliosis and was severe enough that she had surgery at age 58 to help partially correct it  She has chronic hypercapnic hypoxemic respiratory failure secondary to this  Spirometry done April 2018 showed severe restrictive impairment with forced vital capacity 1 09 L or 49% of predicted                 Return in about 7 months (around 4/23/2019)  All questions are answered to the patient's satisfaction and understanding  She verbalizes understanding  She is encouraged to call with any further questions or concerns  Portions of the record may have been created with voice recognition software  Occasional wrong word or "sound a like" substitutions may have occurred due to the inherent limitations of voice recognition software  Read the chart carefully and recognize, using context, where substitutions have occurred      Electronically Signed by Sharath Worthington DO    ______________________________________________________________________    Chief Complaint:   Chief Complaint   Patient presents with    Follow-up     6 M    Shortness of Breath     Uses O2 2 L/M continuous       Patient ID: Rea Hickman is a 80 y o  y o  female has a past medical history of CHF (congestive heart failure) ([facility] ) (08/2017); DVT (deep vein thrombosis) in pregnancy ([place] [address] ); Hyperlipidemia; Hypertension; Pulmonary embolism ([facility] ) (02/06/2018); Renal disorder; and Stroke ([facility] ) (2016)  9/12/2018  HPI     Rea Hickman has mild persistent asthma and also has chronic respiratory failure with hypercapnia hypoxemia  This is due to restrictive lung impairment from her severe thoracic scoliosis  She did have surgery for scoliosis at age 58 because of the severity of it  She is using Trilogy ventilatory unit at bedtime without any oxygen and uses it about 4 hr or more per night  She is not have any nocturnal dyspnea and she feels she has benefit from use of this ventilatory unit  She does use 2 L of oxygen most of the time during the day but sometimes will take the oxygen off as her O2 saturations will stay above 90%  Last blood gases showed her pCO2 level baseline to be between 55-60 mm Hg  She uses a portable battery operated concentrator set at 2 liters/minute when she leaves the home  She is not have any wheezing and has not needed to use her nebulizer  She presently is on Breo 100 mcg 1 puff daily  Review of Systems   Constitutional: Negative for chills, fever and unexpected weight change  HENT: Negative for congestion, rhinorrhea and sore throat  Eyes: Negative for discharge and redness  Respiratory: Negative for cough  Has mild exertional dyspnea   Cardiovascular: Negative for chest pain, palpitations and leg swelling  Gastrointestinal: Negative for abdominal distention, abdominal pain and nausea  Endocrine: Negative for polydipsia and polyphagia  Genitourinary: Negative for dysuria  Musculoskeletal: Negative for joint swelling and myalgias  Skin: Negative for rash  Neurological: Negative for light-headedness         Smoking history: She reports that she has never smoked  She has never used smokeless tobacco     The following portions of the patient's history were reviewed and updated as appropriate: allergies, current medications, past family history, past medical history, past social history, past surgical history and problem list     Immunization History   Administered Date(s) Administered    Pneumococcal Conjugate 13-Valent 09/02/2017     Current Outpatient Prescriptions   Medication Sig Dispense Refill    acetaminophen (TYLENOL) 325 mg tablet Take 650 mg by mouth every 6 (six) hours as needed for mild pain      amLODIPine (NORVASC) 5 mg tablet Take 1 tablet (5 mg total) by mouth daily 30 tablet 5    apixaban (ELIQUIS) 2 5 mg Take 1 tablet (2 5 mg total) by mouth 2 (two) times a day (Patient taking differently: Take 5 mg by mouth 2 (two) times a day  ) 60 tablet 5    calcium-vitamin D (OSCAL 500 + D) 500 mg-200 units per tablet Take 1 tablet by mouth 2 (two) times a day   carvedilol (COREG) 6 25 mg tablet Take 1 tablet (6 25 mg total) by mouth 2 (two) times a day with meals (Patient taking differently: Take 25 mg by mouth 2 (two) times a day with meals  )  0    cholecalciferol (VITAMIN D3) 1,000 units tablet Take 2,000 Units by mouth daily      clopidogrel (PLAVIX) 75 mg tablet Take 75 mg by mouth daily      co-enzyme Q-10 50 MG capsule Take 200 mg by mouth daily        colesevelam (WELCHOL) 625 mg tablet Take 3 tablets (1,875 mg total) by mouth 2 (two) times a day with meals 540 tablet 3    docusate sodium (COLACE) 100 mg capsule Take 100 mg by mouth 2 (two) times a day      fluticasone-vilanterol (BREO ELLIPTA) 100-25 mcg/inh inhaler Inhale 1 puff daily Rinse mouth after use   1 Inhaler 0    Iron-Vitamin C (IRON 100/C) 100-250 MG TABS Take 1 tablet by mouth daily      Multiple Vitamins-Minerals (OCUVITE PO) Take 1 tablet by mouth daily        pantoprazole (PROTONIX) 40 mg tablet Take 40 mg by mouth daily      torsemide (DEMADEX) 10 mg tablet Take 10 mg by mouth daily      ELIQUIS 5 MG   5    ipratropium-albuterol (DUO-NEB) 0 5-2 5 mg/3 mL Take 3 mL by nebulization every 6 (six) hours (Patient not taking: Reported on 8/30/2018 ) 360 mL 0    torsemide (DEMADEX) 20 mg tablet   0     No current facility-administered medications for this visit  Allergies: Statins and Lyrica [pregabalin]    Objective:  Vitals:    09/12/18 1412   BP: 136/70   BP Location: Right arm   Patient Position: Sitting   Cuff Size: Standard   Pulse: 70   Resp: 16   Temp: 97 7 °F (36 5 °C)   TempSrc: Oral   SpO2: 98%   Weight: 70 8 kg (156 lb)   Height: 4' 10" (1 473 m)   Oxygen Therapy  SpO2: 98 %    Wt Readings from Last 3 Encounters:   09/12/18 70 8 kg (156 lb)   08/30/18 71 4 kg (157 lb 6 4 oz)   08/06/18 71 2 kg (157 lb)     Body mass index is 32 6 kg/m²  Physical Exam   Constitutional: She is oriented to person, place, and time  She appears well-developed and well-nourished  No distress  Room air O2 saturation is 94%  On 2 L O2 saturation was 97%  No conversational dyspnea   HENT:   Head: Normocephalic  Nose: Nose normal    Mouth/Throat: Oropharynx is clear and moist  No oropharyngeal exudate  Eyes: Conjunctivae are normal  Pupils are equal, round, and reactive to light  Neck: Neck supple  No JVD present  No tracheal deviation present  Cardiovascular: Normal rate, regular rhythm and normal heart sounds  Pulmonary/Chest: Effort normal    Lung sounds are clear to auscultation  No wheezes, crackles or rhonchi   Abdominal: Soft  She exhibits no distension  There is no tenderness  There is no guarding  Musculoskeletal:   Trace edema of lower tibial surfaces   Lymphadenopathy:     She has no cervical adenopathy  Neurological: She is alert and oriented to person, place, and time  Skin: Skin is warm and dry  No rash noted  Psychiatric: She has a normal mood and affect   Her behavior is normal  Thought content normal        Lab Review:   Appointment on 08/23/2018 Component Date Value    Venipuncture 08/23/2018 Collected    Orders Only on 08/17/2018   Component Date Value    PTH, Intact 08/17/2018 25     SL AMB CALCIUM 08/17/2018 9 3    Orders Only on 08/17/2018   Component Date Value    Total Cholesterol 08/17/2018 157     HDL 08/17/2018 52     Triglycerides 08/17/2018 140     LDL Direct 08/17/2018 81     Chol HDLC Ratio 08/17/2018 3 0     Non-HDL Cholesterol 08/17/2018 105     Magnesium, Serum 08/17/2018 2 3     Phosphorus, Serum 08/17/2018 4 2     Iron, Serum 08/17/2018 93     Total Iron Binding [place]* 08/17/2018 327     Iron Saturation 08/17/2018 28     Glucose 08/17/2018 97     BUN 08/17/2018 42*    Creatinine 08/17/2018 1 98*    eGFR Non  08/17/2018 22*    SL AMB EGFR  AMER* 08/17/2018 25*    SL AMB BUN/CREATININE RA* 08/17/2018 21     Sodium 08/17/2018 143     SL AMB POTASSIUM 08/17/2018 4 0     Chloride 08/17/2018 107     CO2 08/17/2018 26     SL AMB CALCIUM 08/17/2018 9 4     SL AMB PROTEIN, TOTAL 08/17/2018 7 5     Albumin 08/17/2018 3 8     Globulin 08/17/2018 3 7     SL AMB ALBUMIN/GLOBULIN * 08/17/2018 1 0     TOTAL BILIRUBIN 08/17/2018 0 4     Alkaline Phosphatase 08/17/2018 84     SL AMB AST 08/17/2018 13     SL AMB ALT 08/17/2018 10     White Blood Cell Count 08/17/2018 4 9     Red Blood Cell Count 08/17/2018 3 94     Hemoglobin 08/17/2018 11 9     HCT 08/17/2018 36 7     MCV 08/17/2018 93 1     MCH 08/17/2018 30 2     MCHC 08/17/2018 32 4     RDW 08/17/2018 11 8     Platelet Count 48/79/0841 167     SL AMB MPV 08/17/2018 10 9     Neutrophils (Absolute) 08/17/2018 3499     Lymphocytes (Absolute) 08/17/2018 916     Monocytes (Absolute) 08/17/2018 377     Eosinophils (Absolute) 08/17/2018 98     Basophils (Absolute) 08/17/2018 10     Neutrophils 08/17/2018 71 4     Lymphocytes 08/17/2018 18 7     Monocytes 08/17/2018 7 7     Eosinophils 08/17/2018 2 0     Basophils Relative 08/17/2018 0  2     Ferritin 08/17/2018 74     Vitamin D, 25-Hydroxy, S* 08/17/2018 57     Creatinine, Urine 08/17/2018 TNP        Diagnostics:  I have personally reviewed pertinent reports

## 2018-09-16 PROBLEM — J45.30 MILD PERSISTENT ASTHMA WITHOUT COMPLICATION: Status: ACTIVE | Noted: 2018-04-09

## 2018-09-16 NOTE — ASSESSMENT & PLAN NOTE
[facility] has mild persistent asthma and is doing well on regimen of Breo 100 mcg 1 puff daily  She is not having any wheezing and not needing to use her nebulizer with DuoNeb    She will stay on Breo 100 mcg 1 puff daily

## 2018-09-16 NOTE — ASSESSMENT & PLAN NOTE
[facility] has chronic hypercapnic hypoxemic respiratory failure and is using a Trilogy ventilator unit     She is using this for at least 4 hr per night and has benefit from it  She does use oxygen 2 liters/minute during the day most of the time  She has felt benefit from use of the Trilogy and continues at nighttime for ventilatory support

## 2018-09-16 NOTE — ASSESSMENT & PLAN NOTE
[facility] has history of severe thoracic scoliosis and was severe enough that she had surgery at age 58 to help partially correct it  She has chronic hypercapnic hypoxemic respiratory failure secondary to this    Spirometry done April 2018 showed severe restrictive impairment with forced vital capacity 1 09 L or 49% of predicted

## 2018-10-04 ENCOUNTER — APPOINTMENT (OUTPATIENT)
Dept: LAB | Facility: CLINIC | Age: 83
End: 2018-10-04
Payer: MEDICARE

## 2018-10-04 ENCOUNTER — TRANSCRIBE ORDERS (OUTPATIENT)
Dept: LAB | Facility: CLINIC | Age: 83
End: 2018-10-04

## 2018-10-04 DIAGNOSIS — D47.2 IGG MONOCLONAL GAMMOPATHY: Primary | ICD-10-CM

## 2018-10-04 LAB
ALBUMIN SERPL BCP-MCNC: 3.3 G/DL (ref 3.5–5)
ALP SERPL-CCNC: 72 U/L (ref 46–116)
ALT SERPL W P-5'-P-CCNC: 22 U/L (ref 12–78)
ANION GAP SERPL CALCULATED.3IONS-SCNC: 2 MMOL/L (ref 4–13)
AST SERPL W P-5'-P-CCNC: 17 U/L (ref 5–45)
BASOPHILS # BLD AUTO: 0.01 THOUSANDS/ΜL (ref 0–0.1)
BASOPHILS NFR BLD AUTO: 0 % (ref 0–1)
BILIRUB SERPL-MCNC: 0.55 MG/DL (ref 0.2–1)
BUN SERPL-MCNC: 55 MG/DL (ref 5–25)
CALCIUM SERPL-MCNC: 9.3 MG/DL (ref 8.3–10.1)
CHLORIDE SERPL-SCNC: 106 MMOL/L (ref 100–108)
CO2 SERPL-SCNC: 30 MMOL/L (ref 21–32)
CREAT SERPL-MCNC: 1.82 MG/DL (ref 0.6–1.3)
EOSINOPHIL # BLD AUTO: 0.12 THOUSAND/ΜL (ref 0–0.61)
EOSINOPHIL NFR BLD AUTO: 2 % (ref 0–6)
ERYTHROCYTE [DISTWIDTH] IN BLOOD BY AUTOMATED COUNT: 12.2 % (ref 11.6–15.1)
GFR SERPL CREATININE-BSD FRML MDRD: 24 ML/MIN/1.73SQ M
GLUCOSE P FAST SERPL-MCNC: 111 MG/DL (ref 65–99)
HCT VFR BLD AUTO: 38.1 % (ref 34.8–46.1)
HGB BLD-MCNC: 11.5 G/DL (ref 11.5–15.4)
IGG SERPL-MCNC: 2200 MG/DL (ref 700–1600)
IMM GRANULOCYTES # BLD AUTO: 0.03 THOUSAND/UL (ref 0–0.2)
IMM GRANULOCYTES NFR BLD AUTO: 1 % (ref 0–2)
LYMPHOCYTES # BLD AUTO: 0.98 THOUSANDS/ΜL (ref 0.6–4.47)
LYMPHOCYTES NFR BLD AUTO: 19 % (ref 14–44)
MCH RBC QN AUTO: 29.3 PG (ref 26.8–34.3)
MCHC RBC AUTO-ENTMCNC: 30.2 G/DL (ref 31.4–37.4)
MCV RBC AUTO: 97 FL (ref 82–98)
MONOCYTES # BLD AUTO: 0.41 THOUSAND/ΜL (ref 0.17–1.22)
MONOCYTES NFR BLD AUTO: 8 % (ref 4–12)
NEUTROPHILS # BLD AUTO: 3.68 THOUSANDS/ΜL (ref 1.85–7.62)
NEUTS SEG NFR BLD AUTO: 70 % (ref 43–75)
NRBC BLD AUTO-RTO: 0 /100 WBCS
PLATELET # BLD AUTO: 160 THOUSANDS/UL (ref 149–390)
PMV BLD AUTO: 10.9 FL (ref 8.9–12.7)
POTASSIUM SERPL-SCNC: 3.8 MMOL/L (ref 3.5–5.3)
PROT SERPL-MCNC: 8.1 G/DL (ref 6.4–8.2)
RBC # BLD AUTO: 3.92 MILLION/UL (ref 3.81–5.12)
SODIUM SERPL-SCNC: 138 MMOL/L (ref 136–145)
WBC # BLD AUTO: 5.23 THOUSAND/UL (ref 4.31–10.16)

## 2018-10-04 PROCEDURE — 85025 COMPLETE CBC W/AUTO DIFF WBC: CPT

## 2018-10-04 PROCEDURE — 36415 COLL VENOUS BLD VENIPUNCTURE: CPT

## 2018-10-04 PROCEDURE — 82784 ASSAY IGA/IGD/IGG/IGM EACH: CPT

## 2018-10-04 PROCEDURE — 80053 COMPREHEN METABOLIC PANEL: CPT

## 2018-10-04 PROCEDURE — 83883 ASSAY NEPHELOMETRY NOT SPEC: CPT

## 2018-10-05 DIAGNOSIS — I95.1 ORTHOSTATIC HYPOTENSION: ICD-10-CM

## 2018-10-05 DIAGNOSIS — N17.9 AKI (ACUTE KIDNEY INJURY) (HCC): ICD-10-CM

## 2018-10-05 DIAGNOSIS — E78.5 DYSLIPIDEMIA: Chronic | ICD-10-CM

## 2018-10-05 DIAGNOSIS — N18.30 CHRONIC KIDNEY DISEASE, STAGE III (MODERATE) (HCC): Chronic | ICD-10-CM

## 2018-10-05 DIAGNOSIS — N18.30 ANEMIA OF CHRONIC RENAL FAILURE, STAGE 3 (MODERATE) (HCC): ICD-10-CM

## 2018-10-05 DIAGNOSIS — I12.9 HYPERTENSIVE CHRONIC KIDNEY DISEASE WITH STAGE 1 THROUGH STAGE 4 CHRONIC KIDNEY DISEASE, OR UNSPECIFIED CHRONIC KIDNEY DISEASE: ICD-10-CM

## 2018-10-05 DIAGNOSIS — D63.1 ANEMIA OF CHRONIC RENAL FAILURE, STAGE 3 (MODERATE) (HCC): ICD-10-CM

## 2018-10-05 DIAGNOSIS — E55.9 VITAMIN D DEFICIENCY: Chronic | ICD-10-CM

## 2018-10-05 LAB
KAPPA LC FREE SER-MCNC: 25.4 MG/L (ref 3.3–19.4)
KAPPA LC FREE/LAMBDA FREE SER: 0.33 {RATIO} (ref 0.26–1.65)
LAMBDA LC FREE SERPL-MCNC: 76.2 MG/L (ref 5.7–26.3)

## 2018-10-05 RX ORDER — COLESEVELAM 180 1/1
1875 TABLET ORAL 2 TIMES DAILY WITH MEALS
Qty: 540 TABLET | Refills: 3 | Status: SHIPPED | OUTPATIENT
Start: 2018-10-05 | End: 2019-09-11 | Stop reason: SDUPTHER

## 2018-11-05 ENCOUNTER — TELEPHONE (OUTPATIENT)
Dept: CARDIOLOGY CLINIC | Facility: CLINIC | Age: 83
End: 2018-11-05

## 2018-11-05 NOTE — TELEPHONE ENCOUNTER
Patient is taking eliquis, due to blood clots  Last time she was seen Terry Part was ordered to get a doppler, eliquis has been continued since then  She is taking both Plavix and Eliquis  The patient has complaints in regards to nose bleeds and she isn't feeling well enough to come to an appointment today  Her daughter was wondering if you could give her a call back, or advise on potentially stopping the eliquis     Available til 1:30, 2:30-4:30pm   A phone number after that [phone]

## 2018-11-05 NOTE — TELEPHONE ENCOUNTER
Spoke with daughter on phone and recommended that patient could discontinue Eliquis, from my standpoint  I did indicate that it would be a good idea for her to discuss this with the family physician as well  I also indicated that she is at increased risk for recurrent DVT, which was the original indication, and indicated that the recent venous duplex scan was negative for any acute process involving her lower extremities

## 2018-12-07 ENCOUNTER — HOSPITAL ENCOUNTER (INPATIENT)
Facility: HOSPITAL | Age: 83
LOS: 4 days | Discharge: HOME HEALTH | DRG: 071 | End: 2018-12-11
Attending: EMERGENCY MEDICINE | Admitting: FAMILY MEDICINE
Payer: MEDICARE

## 2018-12-07 ENCOUNTER — APPOINTMENT (EMERGENCY)
Dept: RADIOLOGY | Facility: HOSPITAL | Age: 83
DRG: 071 | End: 2018-12-07
Payer: MEDICARE

## 2018-12-07 DIAGNOSIS — I50.32 CHRONIC DIASTOLIC CHF (CONGESTIVE HEART FAILURE) (HCC): Chronic | ICD-10-CM

## 2018-12-07 DIAGNOSIS — E87.2 CO2 RETENTION: ICD-10-CM

## 2018-12-07 DIAGNOSIS — J96.12 CHRONIC RESPIRATORY FAILURE WITH HYPOXIA AND HYPERCAPNIA (HCC): ICD-10-CM

## 2018-12-07 DIAGNOSIS — N18.30 CHRONIC KIDNEY DISEASE, STAGE III (MODERATE) (HCC): Chronic | ICD-10-CM

## 2018-12-07 DIAGNOSIS — J96.11 CHRONIC RESPIRATORY FAILURE WITH HYPOXIA AND HYPERCAPNIA (HCC): ICD-10-CM

## 2018-12-07 DIAGNOSIS — G93.40 ACUTE ENCEPHALOPATHY: Primary | ICD-10-CM

## 2018-12-07 DIAGNOSIS — I63.9 CEREBROVASCULAR ACCIDENT (CVA) (HCC): Chronic | ICD-10-CM

## 2018-12-07 DIAGNOSIS — J20.9 ACUTE TRACHEOBRONCHITIS: ICD-10-CM

## 2018-12-07 DIAGNOSIS — I10 ESSENTIAL HYPERTENSION: Chronic | ICD-10-CM

## 2018-12-07 PROBLEM — J96.21 ACUTE ON CHRONIC RESPIRATORY FAILURE WITH HYPOXIA AND HYPERCAPNIA (HCC): Status: ACTIVE | Noted: 2018-12-07

## 2018-12-07 PROBLEM — J96.22 ACUTE ON CHRONIC RESPIRATORY FAILURE WITH HYPOXIA AND HYPERCAPNIA (HCC): Status: ACTIVE | Noted: 2018-12-07

## 2018-12-07 LAB
ANION GAP SERPL CALCULATED.3IONS-SCNC: 2 MMOL/L (ref 4–13)
APTT PPP: 24 SECONDS (ref 24–33)
ARTERIAL PATENCY WRIST A: YES
BACTERIA UR QL AUTO: ABNORMAL /HPF
BASE EXCESS BLDA CALC-SCNC: 13.6 MMOL/L
BASOPHILS # BLD AUTO: 0.02 THOUSANDS/ΜL (ref 0–0.1)
BASOPHILS NFR BLD AUTO: 0 % (ref 0–1)
BILIRUB UR QL STRIP: NEGATIVE
BODY TEMPERATURE: 98 DEGREES FEHRENHEIT
BUN SERPL-MCNC: 30 MG/DL (ref 5–25)
CALCIUM SERPL-MCNC: 9.5 MG/DL (ref 8.3–10.1)
CHLORIDE SERPL-SCNC: 97 MMOL/L (ref 100–108)
CLARITY UR: CLEAR
CO2 SERPL-SCNC: 38 MMOL/L (ref 21–32)
COLOR UR: ABNORMAL
CREAT SERPL-MCNC: 1.6 MG/DL (ref 0.6–1.3)
EOSINOPHIL # BLD AUTO: 0.08 THOUSAND/ΜL (ref 0–0.61)
EOSINOPHIL NFR BLD AUTO: 1 % (ref 0–6)
ERYTHROCYTE [DISTWIDTH] IN BLOOD BY AUTOMATED COUNT: 12.3 % (ref 11.6–15.1)
GFR SERPL CREATININE-BSD FRML MDRD: 28 ML/MIN/1.73SQ M
GLUCOSE SERPL-MCNC: 131 MG/DL (ref 65–140)
GLUCOSE UR STRIP-MCNC: NEGATIVE MG/DL
HCO3 BLDA-SCNC: 41 MMOL/L (ref 22–28)
HCT VFR BLD AUTO: 34.4 % (ref 34.8–46.1)
HGB BLD-MCNC: 10.2 G/DL (ref 11.5–15.4)
HGB UR QL STRIP.AUTO: NEGATIVE
IMM GRANULOCYTES # BLD AUTO: 0.1 THOUSAND/UL (ref 0–0.2)
IMM GRANULOCYTES NFR BLD AUTO: 1 % (ref 0–2)
INR PPP: 1.09 (ref 0.86–1.16)
KETONES UR STRIP-MCNC: NEGATIVE MG/DL
LEUKOCYTE ESTERASE UR QL STRIP: ABNORMAL
LYMPHOCYTES # BLD AUTO: 0.79 THOUSANDS/ΜL (ref 0.6–4.47)
LYMPHOCYTES NFR BLD AUTO: 9 % (ref 14–44)
MAGNESIUM SERPL-MCNC: 1.9 MG/DL (ref 1.6–2.6)
MCH RBC QN AUTO: 30 PG (ref 26.8–34.3)
MCHC RBC AUTO-ENTMCNC: 29.7 G/DL (ref 31.4–37.4)
MCV RBC AUTO: 101 FL (ref 82–98)
MONOCYTES # BLD AUTO: 0.65 THOUSAND/ΜL (ref 0.17–1.22)
MONOCYTES NFR BLD AUTO: 7 % (ref 4–12)
NASAL CANNULA: ABNORMAL
NEUTROPHILS # BLD AUTO: 7.22 THOUSANDS/ΜL (ref 1.85–7.62)
NEUTS SEG NFR BLD AUTO: 82 % (ref 43–75)
NITRITE UR QL STRIP: NEGATIVE
NON-SQ EPI CELLS URNS QL MICRO: ABNORMAL /HPF
NRBC BLD AUTO-RTO: 0 /100 WBCS
NT-PROBNP SERPL-MCNC: 2657 PG/ML
O2 CT BLDA-SCNC: 14.6 ML/DL (ref 16–23)
OTHER STN SPEC: ABNORMAL
OXYHGB MFR BLDA: 94.7 % (ref 94–97)
PCO2 BLDA: 68.3 MM HG (ref 36–44)
PCO2 TEMP ADJ BLDA: 67.4 MM HG (ref 36–44)
PH BLD: 7.4 [PH] (ref 7.35–7.45)
PH BLDA: 7.4 [PH] (ref 7.35–7.45)
PH UR STRIP.AUTO: 5.5 [PH] (ref 5–9)
PLATELET # BLD AUTO: 283 THOUSANDS/UL (ref 149–390)
PMV BLD AUTO: 10 FL (ref 8.9–12.7)
PO2 BLD: 74.1 MM HG (ref 75–129)
PO2 BLDA: 75.6 MM HG (ref 75–129)
POTASSIUM SERPL-SCNC: 4 MMOL/L (ref 3.5–5.3)
PROT UR STRIP-MCNC: NEGATIVE MG/DL
PROTHROMBIN TIME: 11.4 SECONDS (ref 9.4–11.7)
RBC # BLD AUTO: 3.4 MILLION/UL (ref 3.81–5.12)
RBC #/AREA URNS AUTO: ABNORMAL /HPF
SODIUM SERPL-SCNC: 137 MMOL/L (ref 136–145)
SP GR UR STRIP.AUTO: 1.01 (ref 1–1.03)
SPECIMEN SOURCE: ABNORMAL
TROPONIN I SERPL-MCNC: <0.02 NG/ML
UROBILINOGEN UR QL STRIP.AUTO: 0.2 E.U./DL
WBC # BLD AUTO: 8.86 THOUSAND/UL (ref 4.31–10.16)
WBC #/AREA URNS AUTO: ABNORMAL /HPF

## 2018-12-07 PROCEDURE — 36600 WITHDRAWAL OF ARTERIAL BLOOD: CPT

## 2018-12-07 PROCEDURE — 94640 AIRWAY INHALATION TREATMENT: CPT

## 2018-12-07 PROCEDURE — 36415 COLL VENOUS BLD VENIPUNCTURE: CPT | Performed by: EMERGENCY MEDICINE

## 2018-12-07 PROCEDURE — 99285 EMERGENCY DEPT VISIT HI MDM: CPT

## 2018-12-07 PROCEDURE — 85610 PROTHROMBIN TIME: CPT | Performed by: EMERGENCY MEDICINE

## 2018-12-07 PROCEDURE — 99222 1ST HOSP IP/OBS MODERATE 55: CPT | Performed by: INTERNAL MEDICINE

## 2018-12-07 PROCEDURE — 81001 URINALYSIS AUTO W/SCOPE: CPT | Performed by: EMERGENCY MEDICINE

## 2018-12-07 PROCEDURE — 84484 ASSAY OF TROPONIN QUANT: CPT | Performed by: EMERGENCY MEDICINE

## 2018-12-07 PROCEDURE — 83735 ASSAY OF MAGNESIUM: CPT | Performed by: NURSE PRACTITIONER

## 2018-12-07 PROCEDURE — 80048 BASIC METABOLIC PNL TOTAL CA: CPT | Performed by: EMERGENCY MEDICINE

## 2018-12-07 PROCEDURE — 94760 N-INVAS EAR/PLS OXIMETRY 1: CPT

## 2018-12-07 PROCEDURE — 82805 BLOOD GASES W/O2 SATURATION: CPT | Performed by: EMERGENCY MEDICINE

## 2018-12-07 PROCEDURE — 1123F ACP DISCUSS/DSCN MKR DOCD: CPT | Performed by: PHYSICIAN ASSISTANT

## 2018-12-07 PROCEDURE — 71046 X-RAY EXAM CHEST 2 VIEWS: CPT

## 2018-12-07 PROCEDURE — 85025 COMPLETE CBC W/AUTO DIFF WBC: CPT | Performed by: EMERGENCY MEDICINE

## 2018-12-07 PROCEDURE — 85730 THROMBOPLASTIN TIME PARTIAL: CPT | Performed by: EMERGENCY MEDICINE

## 2018-12-07 PROCEDURE — 99223 1ST HOSP IP/OBS HIGH 75: CPT | Performed by: FAMILY MEDICINE

## 2018-12-07 PROCEDURE — 94664 DEMO&/EVAL PT USE INHALER: CPT

## 2018-12-07 PROCEDURE — 83880 ASSAY OF NATRIURETIC PEPTIDE: CPT | Performed by: EMERGENCY MEDICINE

## 2018-12-07 RX ORDER — FUROSEMIDE 10 MG/ML
20 INJECTION INTRAMUSCULAR; INTRAVENOUS ONCE
Status: COMPLETED | OUTPATIENT
Start: 2018-12-07 | End: 2018-12-07

## 2018-12-07 RX ORDER — CARVEDILOL 25 MG/1
25 TABLET ORAL 2 TIMES DAILY WITH MEALS
Status: DISCONTINUED | OUTPATIENT
Start: 2018-12-07 | End: 2018-12-11 | Stop reason: HOSPADM

## 2018-12-07 RX ORDER — GUAIFENESIN/DEXTROMETHORPHAN 100-10MG/5
10 SYRUP ORAL EVERY 4 HOURS PRN
Status: DISCONTINUED | OUTPATIENT
Start: 2018-12-07 | End: 2018-12-11 | Stop reason: HOSPADM

## 2018-12-07 RX ORDER — BENZONATATE 100 MG/1
200 CAPSULE ORAL 3 TIMES DAILY
Status: DISCONTINUED | OUTPATIENT
Start: 2018-12-07 | End: 2018-12-11 | Stop reason: HOSPADM

## 2018-12-07 RX ORDER — CLOPIDOGREL BISULFATE 75 MG/1
75 TABLET ORAL DAILY
Status: DISCONTINUED | OUTPATIENT
Start: 2018-12-07 | End: 2018-12-11 | Stop reason: HOSPADM

## 2018-12-07 RX ORDER — IPRATROPIUM BROMIDE AND ALBUTEROL SULFATE 2.5; .5 MG/3ML; MG/3ML
3 SOLUTION RESPIRATORY (INHALATION)
Status: DISCONTINUED | OUTPATIENT
Start: 2018-12-07 | End: 2018-12-10

## 2018-12-07 RX ORDER — FLUTICASONE FUROATE AND VILANTEROL 100; 25 UG/1; UG/1
1 POWDER RESPIRATORY (INHALATION) DAILY
Status: DISCONTINUED | OUTPATIENT
Start: 2018-12-07 | End: 2018-12-11 | Stop reason: HOSPADM

## 2018-12-07 RX ORDER — HEPARIN SODIUM 5000 [USP'U]/ML
5000 INJECTION, SOLUTION INTRAVENOUS; SUBCUTANEOUS EVERY 12 HOURS SCHEDULED
Status: DISCONTINUED | OUTPATIENT
Start: 2018-12-07 | End: 2018-12-07

## 2018-12-07 RX ORDER — ACETAMINOPHEN 325 MG/1
650 TABLET ORAL EVERY 6 HOURS PRN
Status: DISCONTINUED | OUTPATIENT
Start: 2018-12-07 | End: 2018-12-11 | Stop reason: HOSPADM

## 2018-12-07 RX ORDER — METHYLPREDNISOLONE SODIUM SUCCINATE 40 MG/ML
20 INJECTION, POWDER, LYOPHILIZED, FOR SOLUTION INTRAMUSCULAR; INTRAVENOUS EVERY 8 HOURS SCHEDULED
Status: DISCONTINUED | OUTPATIENT
Start: 2018-12-07 | End: 2018-12-09

## 2018-12-07 RX ORDER — IPRATROPIUM BROMIDE AND ALBUTEROL SULFATE 2.5; .5 MG/3ML; MG/3ML
3 SOLUTION RESPIRATORY (INHALATION)
Status: DISCONTINUED | OUTPATIENT
Start: 2018-12-07 | End: 2018-12-07

## 2018-12-07 RX ORDER — AMLODIPINE BESYLATE 5 MG/1
5 TABLET ORAL DAILY
Status: DISCONTINUED | OUTPATIENT
Start: 2018-12-08 | End: 2018-12-11 | Stop reason: HOSPADM

## 2018-12-07 RX ORDER — PANTOPRAZOLE SODIUM 40 MG/1
40 TABLET, DELAYED RELEASE ORAL
Status: DISCONTINUED | OUTPATIENT
Start: 2018-12-08 | End: 2018-12-11 | Stop reason: HOSPADM

## 2018-12-07 RX ORDER — TORSEMIDE 10 MG/1
10 TABLET ORAL DAILY
Status: DISCONTINUED | OUTPATIENT
Start: 2018-12-08 | End: 2018-12-11 | Stop reason: HOSPADM

## 2018-12-07 RX ORDER — DOCUSATE SODIUM 100 MG/1
100 CAPSULE, LIQUID FILLED ORAL 2 TIMES DAILY
Status: DISCONTINUED | OUTPATIENT
Start: 2018-12-07 | End: 2018-12-11 | Stop reason: HOSPADM

## 2018-12-07 RX ORDER — CEFTRIAXONE 1 G/50ML
1000 INJECTION, SOLUTION INTRAVENOUS EVERY 24 HOURS
Status: DISCONTINUED | OUTPATIENT
Start: 2018-12-07 | End: 2018-12-10

## 2018-12-07 RX ORDER — ONDANSETRON 2 MG/ML
4 INJECTION INTRAMUSCULAR; INTRAVENOUS EVERY 6 HOURS PRN
Status: DISCONTINUED | OUTPATIENT
Start: 2018-12-07 | End: 2018-12-11 | Stop reason: HOSPADM

## 2018-12-07 RX ADMIN — BENZONATATE 200 MG: 100 CAPSULE ORAL at 17:03

## 2018-12-07 RX ADMIN — APIXABAN 5 MG: 5 TABLET, FILM COATED ORAL at 21:05

## 2018-12-07 RX ADMIN — FLUTICASONE FUROATE AND VILANTEROL TRIFENATATE 1 PUFF: 100; 25 POWDER RESPIRATORY (INHALATION) at 18:46

## 2018-12-07 RX ADMIN — CARVEDILOL 25 MG: 25 TABLET, FILM COATED ORAL at 17:03

## 2018-12-07 RX ADMIN — IPRATROPIUM BROMIDE AND ALBUTEROL SULFATE 3 ML: 2.5; .5 SOLUTION RESPIRATORY (INHALATION) at 19:59

## 2018-12-07 RX ADMIN — IPRATROPIUM BROMIDE AND ALBUTEROL SULFATE 3 ML: 2.5; .5 SOLUTION RESPIRATORY (INHALATION) at 16:20

## 2018-12-07 RX ADMIN — DOCUSATE SODIUM 100 MG: 100 CAPSULE, LIQUID FILLED ORAL at 18:46

## 2018-12-07 RX ADMIN — METHYLPREDNISOLONE SODIUM SUCCINATE 20 MG: 40 INJECTION, POWDER, FOR SOLUTION INTRAMUSCULAR; INTRAVENOUS at 22:01

## 2018-12-07 RX ADMIN — CEFTRIAXONE 1000 MG: 1 INJECTION, SOLUTION INTRAVENOUS at 17:04

## 2018-12-07 RX ADMIN — FUROSEMIDE 20 MG: 10 INJECTION, SOLUTION INTRAMUSCULAR; INTRAVENOUS at 12:24

## 2018-12-07 RX ADMIN — BENZONATATE 200 MG: 100 CAPSULE ORAL at 21:54

## 2018-12-07 RX ADMIN — CLOPIDOGREL BISULFATE 75 MG: 75 TABLET ORAL at 17:03

## 2018-12-07 NOTE — H&P
H&P- Gaurang Card 5/12/1928, 80 y o  female MRN: [id number]    Unit/Bed#: [address] [place] [address] Encounter: [id number]    Primary Care Provider: Armand oLngoria DO   Date and time admitted to hospital: 12/7/2018  9:16 AM        * Acute encephalopathy   Assessment & Plan    Likely due to the CO2 retention as evidenced on ABG due to noncompliance with trilogy BiPAP for the last several weeks due to an upper respiratory infection at home  · Continue trilogy BiPAP at HS, supplemental oxygen during the day  · Continue home medications  · Supportive care, up with assist, fall precautions  · PT/OT  · Check TSH, monitor labs daily     Acute on chronic respiratory failure with hypoxia and hypercapnia (HCC)   Assessment & Plan    Likely due to BiPAP noncompliance at bedtime for past several weeks due to cough due to URI  · Continue BiPAP at bedtime (family will bring in home trilogy BiPAP machine)  · Supplemental oxygen during the day  · Pulmonology consult, appreciate input  · Consider need for IV steroids, holding at this time as exam without any significant wheezing  · Continue home duo nebs Q6H     Acute tracheobronchitis   Assessment & Plan    As evidence by URI like symptoms for the last 3 weeks according to patient along with moist, productive cough and shortness of breath  · Consult pulmonology, appreciate input  · Start Rocephin 1 g IV daily and continue home duo nebs Q6H  · Start Tessalon Perles Q8H and Robitussin PRN  · Consider need for IV steroids,  holding at this time as exam without any significant wheezing  · Check sputum culture and respiratory pathogen profile  · Supplemental oxygen during day and Trilogy BiPAP at HS     Chronic diastolic CHF (congestive heart failure) ([facility] )   Assessment & Plan    BNP 2657    Appears euvolemic and dry on exam  S/p Lasix 20 mg IV x1 in ED  · Resume home medication, torsemide 10 mg daily in am  · Monitor I&O, daily weights     Anemia of chronic renal failure, stage 3 (moderate) [facility])   Assessment & Plan    Hemoglobin 10 2, baseline 10 6-11 5 gm/dL  Previous iron panel revealed iron 28, ferritin 74  Per outpatient records, patient on oral iron 3 times a week  · Continue iron supplement 3 times a week  · Monitor counts daily     Chronic kidney disease, stage III (moderate) [facility])   Assessment & Plan    Follows outpatient nephrologist, Dr Thor Aase  Cr 1 6, baseline creatinine 1 5-1 9  BNP 2,657 with previous values between 961 - 3,571  Status post Lasix 20 mg IV x1 in ED  · Avoid nephrotoxins  · Monitor kidney function daily     Essential hypertension   Assessment & Plan    Blood pressure stable at 166/77  S/p Lasix 20 mg IV x1 in ED  · Continue home medications, Norvasc 5 mg daily, Coreg 25 mg BID, and Torsemide 10 mg daily  · Monitor blood pressure closely         VTE Prophylaxis: Heparin  / sequential compression device   Code Status: DNR/DNI- level 3  POLST: POLST form is not discussed and not completed at this time  Discussion with family: Daughter     Anticipated Length of Stay:  Patient will be admitted on an Inpatient basis with an anticipated length of stay of  Greater than 2 midnights  Justification for Hospital Stay: Acute encephalopathy, acute on chronic resp failure     Total Time for Visit, including Counseling / Coordination of Care: 1 hour  Greater than 50% of this total time spent on direct patient counseling and coordination of care  Chief Complaint:   Confusion, CO2 retention    History of Present Illness: Sho Santana is a 80 y o  female with a PMH including chronic respiratory failure with hypoxia and hypercapnia, chronic diastolic CHF, CKD stg 4, HTN, HLD, anemia who presents from home with altered mental status early this morning  Patient's visiting nurse could not wake her up this morning  Visiting nurse called EMS who were able to arouse the patient with stimuli  Patient was taken to [facility] Emergency Department for further evaluation and treatment  An ABG revealed pH of 7 396, CO2 of 68 3, and paO2 of 75 6  Chest x-ray showed no acute cardiopulmonary disease  She was given Lasix 20 mg IV x1  According to the patient she has had an upper respiratory infection for the last 3 weeks which created a harsh cough for which she has been noncompliant with her Trilogy BiPAP at nighttime because of the cough  States she has worn her Trilogy for about 3 hour per night  Upon previous review, patient has had prior admissions for CO2 retention due to BiPAP noncompliance  Presently, she denies headache, dizziness, lightheadedness, chest pain/tightness, abdominal pain, nausea, vomiting, diarrhea  She reports generalized fatigue and weakness, decreased appetite, shortness of breath, and a harsh cough  Her daughter is going to bring in her Trilogy BiPAP from home  Review of Systems:    Review of Systems   Constitutional: Positive for appetite change and fatigue  Negative for chills and fever  HENT: Negative for congestion, postnasal drip, rhinorrhea and sore throat  Eyes: Negative for photophobia and visual disturbance  Respiratory: Positive for cough and shortness of breath  Negative for chest tightness and wheezing  Cardiovascular: Negative for chest pain, palpitations and leg swelling  Gastrointestinal: Negative for abdominal distention, abdominal pain, constipation, diarrhea, nausea and vomiting  Genitourinary: Negative for difficulty urinating, dysuria and hematuria  Musculoskeletal: Negative for arthralgias, gait problem and myalgias  Skin: Negative for rash and wound  Neurological: Positive for weakness  Negative for dizziness, light-headedness, numbness and headaches  Psychiatric/Behavioral: Negative for confusion  The patient is not nervous/anxious          Past Medical and Surgical History:     Past Medical History:   Diagnosis Date    [facility] 99/9845    diastolic     CO2 retention     DVT (deep vein thrombosis) in pregnancy ([facility] [address] )     left LE in 2/2018 and 50 years ago    Hyperlipidemia     Hypertension     [facility]2018    Renal disorder     ckd    Stroke [facility]) 2016    left hand weakness,paresthesia  Past Surgical History:   Procedure Laterality Date    ABDOMINAL SURGERY      ruputured bowel  had colostomy and ileostomy,reversed later on   BACK SURGERY      rods in back  done 30 years ago   CARPAL TUNNEL RELEASE Bilateral     CYST REMOVAL      back    HERNIA REPAIR      REPLACEMENT TOTAL KNEE BILATERAL         Meds/Allergies:    Prior to Admission medications    Medication Sig Start Date End Date Taking? Authorizing Provider   acetaminophen (TYLENOL) 325 mg tablet Take 650 mg by mouth every 6 (six) hours as needed for mild pain   Yes Historical Provider, MD   amLODIPine (NORVASC) 5 mg tablet Take 1 tablet (5 mg total) by mouth daily 8/30/18  Yes Son Naqvi MD   apixaban (ELIQUIS) 2 5 mg Take 1 tablet (2 5 mg total) by mouth 2 (two) times a day  Patient taking differently: Take 5 mg by mouth 2 (two) times a day   5/17/18  Yes Dedrick Urrutia MD   calcium-vitamin D (OSCAL 500 + D) 500 mg-200 units per tablet Take 1 tablet by mouth 2 (two) times a day     Yes Historical Provider, MD   carvedilol (COREG) 6 25 mg tablet Take 1 tablet (6 25 mg total) by mouth 2 (two) times a day with meals  Patient taking differently: Take 25 mg by mouth 2 (two) times a day with meals   4/4/18  Yes Juan Tavares MD   cholecalciferol (VITAMIN D3) 1,000 units tablet Take 2,000 Units by mouth daily   Yes Historical Provider, MD   clopidogrel (PLAVIX) 75 mg tablet Take 75 mg by mouth daily   Yes Historical Provider, MD   co-enzyme Q-10 50 MG capsule Take 200 mg by mouth daily     Yes Historical Provider, MD   colesevelam (WELCHOL) 625 mg tablet Take 3 tablets (1,875 mg total) by mouth 2 (two) times a day with meals 10/5/18  Yes Son Naqvi MD   docusate sodium (COLACE) 100 mg capsule Take 100 mg by mouth 2 (two) times a day   Yes Historical Provider, MD   fluticasone-vilanterol (BREO ELLIPTA) 100-25 mcg/inh inhaler Inhale 1 puff daily Rinse mouth after use  6/8/18  Yes Jose Guerrero,    Iron-Vitamin C (IRON 100/C) 100-250 MG TABS Take 1 tablet by mouth daily   Yes Historical Provider, MD   Multiple Vitamins-Minerals (OCUVITE PO) Take 1 tablet by mouth daily     Yes Historical Provider, MD   pantoprazole (PROTONIX) 40 mg tablet Take 40 mg by mouth daily   Yes Historical Provider, MD   torsemide (DEMADEX) 10 mg tablet Take 10 mg by mouth daily   Yes Historical Provider, MD   ELIQUIS 5 MG  9/6/18   Historical Provider, MD   ipratropium-albuterol (DUO-NEB) 0 5-2 5 mg/3 mL Take 3 mL by nebulization every 6 (six) hours  Patient not taking: Reported on 8/30/2018 5/25/18   Jordan Crow DO   torsemide [facility]) 20 mg tablet  9/6/18   Historical Provider, MD     I have reviewed home medications with patient personally  Allergies: Allergies   Allergen Reactions    Statins Myalgia    Lyrica [Pregabalin] Other (See Comments)     Client says it made her "nutty"       Social History:     Marital Status:    Occupation: Retired   Patient Pre-hospital Living Situation: Home, alone   Patient Pre-hospital Level of Mobility: Independent   Patient Pre-hospital Diet Restrictions: Sodium   Substance Use History:   History   Alcohol Use    Yes     Comment: Occ       History   Smoking Status    Never Smoker   Smokeless Tobacco    Never Used     History   Drug Use No       Family History:    Family History   Problem Relation Age of Onset    COPD Brother        Physical Exam:     Vitals:   Blood Pressure: 166/77 (12/07/18 1254)  Pulse: 66 (12/07/18 1254)  Temperature: 97 7 °F (36 5 °C) (12/07/18 1254)  Temp Source: Temporal (12/07/18 1254)  Respirations: 20 (12/07/18 1254)  Height: 4' 10" (147 3 cm) (12/07/18 0919)  Weight - Scale: 68 kg (150 lb) (12/07/18 0919)  SpO2: 99 % (12/07/18 [phone])    Physical Exam   Constitutional: She is oriented to person, place, and time  She appears well-developed  No distress  HENT:   Head: Normocephalic  Mouth/Throat: Mucous membranes are dry  Eyes: Conjunctivae and EOM are normal  Right eye exhibits no discharge  Left eye exhibits no discharge  No scleral icterus  Neck: Normal range of motion  No JVD present  Cardiovascular: Normal rate, regular rhythm and intact distal pulses  Murmur heard  Pulmonary/Chest: Effort normal  No respiratory distress  She has decreased breath sounds in the right upper field, the right lower field, the left upper field and the left lower field  She has no wheezes  She has rhonchi in the right lower field and the left lower field  She has no rales  Abdominal: Soft  Bowel sounds are normal  She exhibits no distension  There is no tenderness  Musculoskeletal: Normal range of motion  She exhibits no edema or tenderness  Neurological: She is alert and oriented to person, place, and time  Alert to person and place, disoriented to time    Skin: Skin is warm and dry  No rash noted  She is not diaphoretic  Psychiatric: She has a normal mood and affect  Her speech is normal and behavior is normal  Cognition and memory are impaired  She expresses inappropriate judgment  Nursing note and vitals reviewed  Additional Data:     Lab Results: I have personally reviewed pertinent reports          Results from last 7 days  Lab Units 12/07/18  0953   WBC Thousand/uL 8 86   HEMOGLOBIN g/dL 10 2*   HEMATOCRIT % 34 4*   PLATELETS Thousands/uL 283   NEUTROS PCT % 82*   LYMPHS PCT % 9*   MONOS PCT % 7   EOS PCT % 1       Results from last 7 days  Lab Units 12/07/18  0954   SODIUM mmol/L 137   POTASSIUM mmol/L 4 0   CHLORIDE mmol/L 97*   CO2 mmol/L 38*   BUN mg/dL 30*   CREATININE mg/dL 1 60*   ANION GAP mmol/L 2*   CALCIUM mg/dL 9 5   GLUCOSE RANDOM mg/dL 131       Results from last 7 days  Lab Units 12/07/18  0954   INR  1 09 Imaging: I have personally reviewed pertinent reports  XR chest pa & lateral   Final Result by Edwin Jones MD (12/07 1124)      No acute cardiopulmonary disease  Workstation performed: [id number]             EKG, Pathology, and Other Studies Reviewed on Admission:   · EKG:  Sinus rhythm, T-wave inversions in V1, V2, V3, V4,  - when compared to prior EKG, no changes noted    Allscripts / Epic Records Reviewed: Yes     ** Please Note: This note has been constructed using a voice recognition system   **

## 2018-12-07 NOTE — ASSESSMENT & PLAN NOTE
Likely due to BiPAP noncompliance at bedtime for past several weeks due to cough due to URI  · Continue BiPAP at bedtime (family will bring in home trilogy BiPAP machine)  · Supplemental oxygen during the day  · Pulmonology consult, appreciate input  · Consider need for IV steroids, holding at this time as exam without any significant wheezing  · Continue home duo nebs Q6H

## 2018-12-07 NOTE — ASSESSMENT & PLAN NOTE
Hemoglobin 10 2, baseline 10 6-11 5 gm/dL  Previous iron panel revealed iron 28, ferritin 74  Per outpatient records, patient on oral iron 3 times a week    · Continue iron supplement 3 times a week  · Monitor counts daily

## 2018-12-07 NOTE — ASSESSMENT & PLAN NOTE
As evidence by URI like symptoms for the last 3 weeks according to patient along with moist, productive cough and shortness of breath  · Consult pulmonology, appreciate input  · Start Rocephin 1 g IV daily and continue home duo nebs Q6H  · Start Tessalon Perles Q8H and Robitussin PRN  · Consider need for IV steroids,  holding at this time as exam without any significant wheezing  · Check sputum culture and respiratory pathogen profile  · Supplemental oxygen during day and Trilogy BiPAP at HS

## 2018-12-07 NOTE — ASSESSMENT & PLAN NOTE
Likely due to the CO2 retention as evidenced on ABG due to noncompliance with trilogy BiPAP for the last several weeks due to an upper respiratory infection at home  No signs of bacterial infection  Chest x-ray negative  UA negative  Afebrile    · Continue trilogy BiPAP at HS, supplemental oxygen during the day  · Continue home medications  · Supportive care, up with assist, fall precautions  · PT/OT  · Check TSH and procalcitonin in am, monitor labs daily

## 2018-12-07 NOTE — RESPIRATORY THERAPY NOTE
RT Protocol Note  Caroline Dorsey 80 y o  female MRN: [id number]  Unit/Bed#: [address] [place] [address] Encounter: [id number]    Assessment    Principal Problem:    Acute encephalopathy  Active Problems:    Essential hypertension    Chronic kidney disease, stage III (moderate) (HCC)    Chronic diastolic CHF (congestive heart failure) ([facility])    Anemia of chronic renal failure, stage 3 (moderate) ([facility])    Acute on chronic respiratory failure with hypoxia and hypercapnia ([facility])    Acute tracheobronchitis      Home Pulmonary Medications:  Breo    duoneb q6  02    cpap  Home Devices/Therapy: BiPAP/CPAP, Home O2    Past Medical History:   Diagnosis Date    CHF (congestive heart failure) ([facility] ) 66/9474    diastolic     CO2 retention     DVT (deep vein thrombosis) in pregnancy ([facility] )     left LE in 2/2018 and 50 years ago    Hyperlipidemia     Hypertension     Pulmonary embolism ([facility] ) 02/06/2018    Renal disorder     ckd    Stroke ([facility] ) 2016    left hand weakness,paresthesia  Social History     Social History    Marital status:      Spouse name: N/A    Number of children: N/A    Years of education: N/A     Social History Main Topics    Smoking status: Never Smoker    Smokeless tobacco: Never Used    Alcohol use Yes      Comment: Occ   Drug use: No    Sexual activity: Not Asked     Other Topics Concern    None     Social History Narrative    Lives alone  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  Subjective    Subjective Data: pt states breathing    Objective    Physical Exam:   Assessment Type: During-treatment  General Appearance: Alert, Awake  Respiratory Pattern: Normal  Chest Assessment: Chest expansion symmetrical  Bilateral Breath Sounds: Diminished  R Breath Sounds: Rales  L Breath Sounds: Rales  Location Specific: No  Cough: Non-productive, Dry, Strong  O2 Device: nc 2 lpm    Vitals:  Blood pressure 166/77, pulse 66, temperature 97 7 °F (36 5 °C), temperature source Temporal, resp   rate 20, height 4' 10" (1 473 m), weight 68 kg (150 lb), SpO2 99 %, not currently breastfeeding  Results from last 7 days  Lab Units 12/07/18  1058   PH ART  7 396   PCO2 ART mm Hg 68 3*   PO2 ART mm Hg 75 6   HCO3 ART mmol/L 41 0*   BASE EXC ART mmol/L 13 6   O2 CONTENT ART mL/dL 14 6*   O2 HGB, ARTERIAL % 94 7   ABG SOURCE  Radial, Right   MICHELLE TEST  Yes       Imaging and other studies: I have personally reviewed pertinent reports        O2 Device: nc 2 lpm     Plan    Respiratory Plan: No distress/Pulmonary history        Resp Comments: no distress noted

## 2018-12-07 NOTE — ASSESSMENT & PLAN NOTE
BNP 2657    Appears euvolemic and dry on exam  S/p Lasix 20 mg IV x1 in ED  · Resume home medication, torsemide 10 mg daily in am  · Monitor I&O, daily weights

## 2018-12-07 NOTE — ASSESSMENT & PLAN NOTE
Blood pressure stable at 166/77   S/p Lasix 20 mg IV x1 in ED  · Continue home medications, Norvasc 5 mg daily, Coreg 25 mg BID, and Torsemide 10 mg daily  · Monitor blood pressure closely

## 2018-12-07 NOTE — ASSESSMENT & PLAN NOTE
Follows outpatient nephrologist, Dr Ramón García     Cr 1 6, baseline creatinine 1 5-1 9  BNP 2,657 with previous values between 961 - 3,571  Status post Lasix 20 mg IV x1 in ED  · Avoid nephrotoxins  · Monitor kidney function daily

## 2018-12-07 NOTE — ED PROVIDER NOTES
History  Chief Complaint   Patient presents with    Altered Mental Status     Pt  reports being treated a cold by Dr Brynn Grant  Pt daughter reports nurses aide came this morning and was unable to wake up pt this morning  EMT arrived and was able to wake up pt  Pt daughter reports pt with a history of CO2 problems  Pt Arrives AAOx4  HPI    This is a 79 yo F who presents today with AMS  Patient has past medical history CHF, DVT on eliquis, HTN, HLD, stroke who presents today for episode of unresponsiveness  Patient has been having a cough which her physician stated was viral  Secondary to cough producitve, patient has not been using her CPAP for 5 days  She has been taking her medications  She normally is on oxygen at 2 liters and currently at baseline  Patient hs been dyspnea with exertion worse than normal  Today when nurse aide came she could not wake up the patient  Called EMS who arrived and started shaking the patient and she woke up  Currently she denies any shortness of breath but does state she has cough  She had fever last Friday and since resolved  No abdominal pain, no chest pain  Impression: 79 yo F who was unresponsive  Patient currently alert and oriented  Will do cardiac workup and check CO2    Past Medical History:   Diagnosis Date    CHF (congestive heart failure) ([facility] [address] ) 11/8003    diastolic     CO2 retention     DVT (deep vein thrombosis) in pregnancy ([facility] [address] )     left LE in 2/2018 and 50 years ago    Hyperlipidemia     Hypertension     Pulmonary embolism ([facility] [address] ) 02/06/2018    Renal disorder     ckd    Stroke [facility]) 2016    left hand weakness,paresthesia  Prior to Admission Medications   Prescriptions Last Dose Informant Patient Reported? Taking?    ELIQUIS 5 MG Not Taking at Unknown time Self Yes No   Iron-Vitamin C (IRON 100/C) 100-250 MG TABS 12/6/2018 at Unknown time Self Yes Yes   Sig: Take 1 tablet by mouth daily   Multiple Vitamins-Minerals (OCUVITE PO) 12/6/2018 at Unknown time Self Yes Yes   Sig: Take 1 tablet by mouth daily     acetaminophen (TYLENOL) 325 mg tablet Past Week at Unknown time Self Yes Yes   Sig: Take 650 mg by mouth every 6 (six) hours as needed for mild pain   amLODIPine (NORVASC) 5 mg tablet 12/6/2018 at Unknown time Self No Yes   Sig: Take 1 tablet (5 mg total) by mouth daily   apixaban (ELIQUIS) 2 5 mg 12/6/2018 at Unknown time Self No Yes   Sig: Take 1 tablet (2 5 mg total) by mouth 2 (two) times a day   Patient taking differently: Take 5 mg by mouth 2 (two) times a day     calcium-vitamin D (OSCAL 500 + D) 500 mg-200 units per tablet 12/6/2018 at Unknown time Self Yes Yes   Sig: Take 1 tablet by mouth 2 (two) times a day  carvedilol (COREG) 6 25 mg tablet 12/6/2018 at Unknown time Self No Yes   Sig: Take 1 tablet (6 25 mg total) by mouth 2 (two) times a day with meals   Patient taking differently: Take 25 mg by mouth 2 (two) times a day with meals     cholecalciferol (VITAMIN D3) 1,000 units tablet 12/6/2018 at Unknown time Self Yes Yes   Sig: Take 2,000 Units by mouth daily   clopidogrel (PLAVIX) 75 mg tablet 12/6/2018 at Unknown time Self Yes Yes   Sig: Take 75 mg by mouth daily   co-enzyme Q-10 50 MG capsule 12/6/2018 at Unknown time Self Yes Yes   Sig: Take 200 mg by mouth daily     colesevelam (WELCHOL) 625 mg tablet 12/6/2018 at Unknown time  No Yes   Sig: Take 3 tablets (1,875 mg total) by mouth 2 (two) times a day with meals   docusate sodium (COLACE) 100 mg capsule 12/6/2018 at Unknown time Self Yes Yes   Sig: Take 100 mg by mouth 2 (two) times a day   fluticasone-vilanterol (BREO ELLIPTA) 100-25 mcg/inh inhaler 12/6/2018 at Unknown time Self No Yes   Sig: Inhale 1 puff daily Rinse mouth after use     ipratropium-albuterol (DUO-NEB) 0 5-2 5 mg/3 mL Not Taking at Unknown time Self No No   Sig: Take 3 mL by nebulization every 6 (six) hours   Patient not taking: Reported on 8/30/2018    pantoprazole (PROTONIX) 40 mg tablet 12/6/2018 at Unknown time Self Yes Yes   Sig: Take 40 mg by mouth daily   torsemide (DEMADEX) 10 mg tablet 12/7/2018 at Unknown time Self Yes Yes   Sig: Take 10 mg by mouth daily   torsemide (DEMADEX) 20 mg tablet Unknown at Unknown time Self Yes No      Facility-Administered Medications: None       Past Medical History:   Diagnosis Date    CHF (congestive heart failure) ([facility] ) 98/9653    diastolic     CO2 retention     DVT (deep vein thrombosis) in pregnancy ([facility] )     left LE in 2/2018 and 50 years ago    Hyperlipidemia     Hypertension     Pulmonary embolism ([place] [address] ) 02/06/2018    Renal disorder     ckd    Stroke [facility]) 2016    left hand weakness,paresthesia  Past Surgical History:   Procedure Laterality Date    ABDOMINAL SURGERY      ruputured bowel  had colostomy and ileostomy,reversed later on   BACK SURGERY      rods in back  done 30 years ago   CARPAL TUNNEL RELEASE Bilateral     CYST REMOVAL      back    HERNIA REPAIR      REPLACEMENT TOTAL KNEE BILATERAL         Family History   Problem Relation Age of Onset    COPD Brother      I have reviewed and agree with the history as documented  Social History   Substance Use Topics    Smoking status: Never Smoker    Smokeless tobacco: Never Used    Alcohol use Yes      Comment: Occ  Review of Systems   Constitutional: Negative for diaphoresis and fever  HENT: Negative  Respiratory: Positive for cough  Negative for shortness of breath and wheezing  Cardiovascular: Negative  Negative for chest pain, palpitations and leg swelling  Gastrointestinal: Negative for abdominal distention, abdominal pain, nausea and vomiting  Genitourinary: Negative  Musculoskeletal: Negative  Skin: Negative  Neurological: Positive for weakness  Psychiatric/Behavioral: Negative  All other systems reviewed and are negative  Physical Exam  Physical Exam   Constitutional: She is oriented to person, place, and time   She appears well-developed and well-nourished  No distress  HENT:   Head: Normocephalic and atraumatic  Eyes: Pupils are equal, round, and reactive to light  EOM are normal    Neck: Normal range of motion  Neck supple  Cardiovascular: Normal rate, regular rhythm and normal heart sounds  No murmur heard  Pulmonary/Chest: Effort normal and breath sounds normal    Rhonchi diffusely   Abdominal: Soft  Bowel sounds are normal  She exhibits no distension  There is no tenderness  There is no rebound and no guarding  Musculoskeletal: Normal range of motion  She exhibits edema  She exhibits no tenderness  Left leg edema chronic 2+ DP pulses     Neurological: She is alert and oriented to person, place, and time  Moving all extremities   Skin: Skin is warm and dry  Capillary refill takes 2 to 3 seconds  She is not diaphoretic  Psychiatric: She has a normal mood and affect  Vitals reviewed        Vital Signs  ED Triage Vitals [12/07/18 0919]   Temperature Pulse Respirations Blood Pressure SpO2   98 °F (36 7 °C) 69 18 162/76 97 %      Temp Source Heart Rate Source Patient Position - Orthostatic VS BP Location FiO2 (%)   Tympanic Monitor Lying Right arm --      Pain Score       No Pain           Vitals:    12/07/18 0919 12/07/18 1030 12/07/18 1100 12/07/18 1254   BP: 162/76 142/72  166/77   Pulse: 69 74 76 66   Patient Position - Orthostatic VS: Lying Lying  Lying       Visual Acuity      ED Medications  Medications   acetaminophen (TYLENOL) tablet 650 mg (not administered)   amLODIPine (NORVASC) tablet 5 mg (not administered)   carvedilol (COREG) tablet 25 mg (not administered)   clopidogrel (PLAVIX) tablet 75 mg (not administered)   docusate sodium (COLACE) capsule 100 mg (not administered)   fluticasone-vilanterol (BREO ELLIPTA) 100-25 mcg/inh inhaler 1 puff (not administered)   ipratropium-albuterol (DUO-NEB) 0 5-2 5 mg/3 mL inhalation solution 3 mL (3 mL Nebulization Given 12/7/18 1620)   pantoprazole (PROTONIX) EC tablet 40 mg (not administered)   torsemide (DEMADEX) tablet 10 mg (not administered)   ondansetron (ZOFRAN) injection 4 mg (not administered)   heparin (porcine) subcutaneous injection 5,000 Units (not administered)   cefTRIAXone (ROCEPHIN) IVPB (premix) 1,000 mg (not administered)   benzonatate (TESSALON PERLES) capsule 200 mg (not administered)   dextromethorphan-guaiFENesin (ROBITUSSIN DM)  mg/5 mL oral syrup 10 mL (not administered)   furosemide (LASIX) injection 20 mg (20 mg Intravenous Given 12/7/18 1224)       Diagnostic Studies  Results Reviewed     Procedure Component Value Units Date/Time    Magnesium [[id number]]  (Normal) Collected:  12/07/18 0954    Lab Status:  Final result Specimen:  Blood from Arm, Right Updated:  12/07/18 1558     Magnesium 1 9 mg/dL     Blood gas, arterial [[id number]]  (Abnormal) Collected:  12/07/18 1058    Lab Status:  Final result Specimen:  Blood, Arterial from Radial, Right Updated:  12/07/18 1108     pH, Arterial 7 396     PH ART TC 7 400     pCO2, Arterial 68 3 (HH) mm Hg      PCO2 (TC) Arterial 67 4 (HH) mm Hg      pO2, Arterial 75 6 mm Hg      PO2 (TC) Arterial 74 1 (L) mm Hg      HCO3, Arterial 41 0 (H) mmol/L      Base Excess, Arterial 13 6 mmol/L      O2 Content, Arterial 14 6 (L) mL/dL      O2 HGB,Arterial  94 7 %      SOURCE Radial, Right     MICHELLE TEST Yes     Temperature 98 0 Degrees Fehrenheit      Nasal Cannula 2 lpm    Basic metabolic panel [[id number]]  (Abnormal) Collected:  12/07/18 0954    Lab Status:  Final result Specimen:  Blood from Arm, Right Updated:  12/07/18 1025     Sodium 137 mmol/L      Potassium 4 0 mmol/L      Chloride 97 (L) mmol/L      CO2 38 (H) mmol/L      ANION GAP 2 (L) mmol/L      BUN 30 (H) mg/dL      Creatinine 1 60 (H) mg/dL      Glucose 131 mg/dL      Calcium 9 5 mg/dL      eGFR 28 ml/min/1 73sq m     Narrative:         National Kidney Disease Education Program recommendations are as follows:  GFR calculation is accurate only with a steady state creatinine  Chronic Kidney disease less than 60 ml/min/1 73 sq  meters  Kidney failure less than 15 ml/min/1 73 sq  meters      B-type natriuretic peptide [[id number]]  (Abnormal) Collected:  12/07/18 0954    Lab Status:  Final result Specimen:  Blood from Arm, Right Updated:  12/07/18 1025     NT-proBNP 2,657 (H) pg/mL     Urine Microscopic [[id number]]  (Abnormal) Collected:  12/07/18 0953    Lab Status:  Final result Specimen:  Urine from Urine, Other Updated:  12/07/18 1023     RBC, UA None Seen /hpf      WBC, UA 0-1 (A) /hpf      Epithelial Cells Moderate (A) /hpf      Bacteria, UA None Seen /hpf      OTHER OBSERVATIONS Renal Epithelial Cells Present    Troponin I [[id number]]  (Normal) Collected:  12/07/18 0954    Lab Status:  Final result Specimen:  Blood from Arm, Right Updated:  12/07/18 1020     Troponin I <0 02 ng/mL     Protime-INR [[id number]]  (Normal) Collected:  12/07/18 0954    Lab Status:  Final result Specimen:  Blood from Arm, Right Updated:  12/07/18 1016     Protime 11 4 seconds      INR 1 09    APTT [[id number]]  (Normal) Collected:  12/07/18 0954    Lab Status:  Final result Specimen:  Blood from Arm, Right Updated:  12/07/18 1016     PTT 24 seconds     UA w Reflex to Microscopic [[id number]]  (Abnormal) Collected:  12/07/18 0953    Lab Status:  Final result Specimen:  Urine from Urine, Other Updated:  12/07/18 1000     Color, UA Light Yellow     Clarity, UA Clear     Specific Gravity, UA 1 010     pH, UA 5 5     Leukocytes, UA Trace (A)     Nitrite, UA Negative     Protein, UA Negative mg/dl      Glucose, UA Negative mg/dl      Ketones, UA Negative mg/dl      Urobilinogen, UA 0 2 E U /dl      Bilirubin, UA Negative     Blood, UA Negative    CBC and differential [[id number]]  (Abnormal) Collected:  12/07/18 0953    Lab Status:  Final result Specimen:  Blood from Arm, Right Updated:  12/07/18 0959     WBC 8 86 Thousand/uL      RBC 3 40 (L) Million/uL      Hemoglobin 10 2 (L) g/dL      Hematocrit 34 4 (L) %       (H) fL      MCH 30 0 pg      MCHC 29 7 (L) g/dL      RDW 12 3 %      MPV 10 0 fL      Platelets 491 Thousands/uL      nRBC 0 /100 WBCs      Neutrophils Relative 82 (H) %      Immat GRANS % 1 %      Lymphocytes Relative 9 (L) %      Monocytes Relative 7 %      Eosinophils Relative 1 %      Basophils Relative 0 %      Neutrophils Absolute 7 22 Thousands/µL      Immature Grans Absolute 0 10 Thousand/uL      Lymphocytes Absolute 0 79 Thousands/µL      Monocytes Absolute 0 65 Thousand/µL      Eosinophils Absolute 0 08 Thousand/µL      Basophils Absolute 0 02 Thousands/µL                  XR chest pa & lateral   Final Result by Arti Kim MD (12/07 1124)      No acute cardiopulmonary disease              Workstation performed: [id number]                    Procedures  CriticalCare Time  Performed by: Celina Sandra by: Dorian Arredondo     Critical care provider statement:     Critical care time (minutes):  35    Critical care was necessary to treat or prevent imminent or life-threatening deterioration of the following conditions:  Respiratory failure and CNS failure or compromise    Critical care was time spent personally by me on the following activities:  Blood draw for specimens, obtaining history from patient or surrogate, development of treatment plan with patient or surrogate, evaluation of patient's response to treatment, examination of patient, review of old charts, re-evaluation of patient's condition, ordering and review of radiographic studies, ordering and review of laboratory studies and ordering and performing treatments and interventions             Phone Contacts  ED Phone Contact    ED Course  ED Course as of Dec 07 1641   Fri Dec 07, 2018   1109 pCO2, Arterial: (!!) 68 3                               MDM  CritCare Time    Disposition  Final diagnoses:   Acute encephalopathy   CO2 retention     Time reflects when diagnosis was documented in both MDM as applicable and the Disposition within this note     Time User Action Codes Description Comment    12/7/2018 11:51 AM Hiram Maria Add [G93 40] Acute encephalopathy     12/7/2018 11:51 AM Hiram Maria Add [E87 2] CO2 retention       ED Disposition     ED Disposition Condition Comment    Admit  Case was discussed with medicine and the patient's admission status was agreed to be Admission Status: inpatient status to the service of Dr Tamar Machuca   Follow-up Information    None         Current Discharge Medication List      CONTINUE these medications which have NOT CHANGED    Details   acetaminophen (TYLENOL) 325 mg tablet Take 650 mg by mouth every 6 (six) hours as needed for mild pain      amLODIPine (NORVASC) 5 mg tablet Take 1 tablet (5 mg total) by mouth daily  Qty: 30 tablet, Refills: 5    Associated Diagnoses: Orthostatic hypotension; Chronic kidney disease, stage III (moderate) ([facility] [address] ); TIANA (acute kidney injury) ([facility] [address] ); Anemia of chronic renal failure, stage 3 (moderate) ([facility] [address] ); Hypertensive chronic kidney disease with stage 1 through stage 4 chronic kidney disease, or unspecified chronic kidney disease; Dyslipidemia; Vitamin D deficiency      !! apixaban (ELIQUIS) 2 5 mg Take 1 tablet (2 5 mg total) by mouth 2 (two) times a day  Qty: 60 tablet, Refills: 5    Associated Diagnoses: History of DVT (deep vein thrombosis)      calcium-vitamin D (OSCAL 500 + D) 500 mg-200 units per tablet Take 1 tablet by mouth 2 (two) times a day        carvedilol (COREG) 6 25 mg tablet Take 1 tablet (6 25 mg total) by mouth 2 (two) times a day with meals  Refills: 0    Associated Diagnoses: Essential hypertension      cholecalciferol (VITAMIN D3) 1,000 units tablet Take 2,000 Units by mouth daily      clopidogrel (PLAVIX) 75 mg tablet Take 75 mg by mouth daily      co-enzyme Q-10 50 MG capsule Take 200 mg by mouth daily        colesevelam (WELCHOL) 625 mg tablet Take 3 tablets (1,875 mg total) by mouth 2 (two) times a day with meals  Qty: 540 tablet, Refills: 3    Comments: Please deliver to the patient's home  Associated Diagnoses: Orthostatic hypotension; Chronic kidney disease, stage III (moderate) ([facility] [address] ); TIANA (acute kidney injury) ([facility] [address] ); Anemia of chronic renal failure, stage 3 (moderate) ([facility] [address] ); Hypertensive chronic kidney disease with stage 1 through stage 4 chronic kidney disease, or unspecified chronic kidney disease; Dyslipidemia; Vitamin D deficiency      docusate sodium (COLACE) 100 mg capsule Take 100 mg by mouth 2 (two) times a day      fluticasone-vilanterol (BREO ELLIPTA) 100-25 mcg/inh inhaler Inhale 1 puff daily Rinse mouth after use  Qty: 1 Inhaler, Refills: 0    Associated Diagnoses: Mild persistent reactive airway disease without complication      Iron-Vitamin C (IRON 100/C) 100-250 MG TABS Take 1 tablet by mouth daily      Multiple Vitamins-Minerals (OCUVITE PO) Take 1 tablet by mouth daily        pantoprazole (PROTONIX) 40 mg tablet Take 40 mg by mouth daily      !! torsemide (DEMADEX) 10 mg tablet Take 10 mg by mouth daily      !! ELIQUIS 5 MG Refills: 5      ipratropium-albuterol (DUO-NEB) 0 5-2 5 mg/3 mL Take 3 mL by nebulization every 6 (six) hours  Qty: 360 mL, Refills: 0    Associated Diagnoses: Mild intermittent reactive airway disease without complication      !! torsemide (DEMADEX) 20 mg tablet Refills: 0       !! - Potential duplicate medications found  Please discuss with provider  No discharge procedures on file      ED Provider  Electronically Signed by           Alexa Dodge MD  12/07/18 0616

## 2018-12-08 PROBLEM — J45.31 MILD PERSISTENT ASTHMA WITH ACUTE EXACERBATION: Status: ACTIVE | Noted: 2018-12-08

## 2018-12-08 LAB
ALBUMIN SERPL BCP-MCNC: 2.2 G/DL (ref 3.5–5)
ALP SERPL-CCNC: 151 U/L (ref 46–116)
ALT SERPL W P-5'-P-CCNC: 27 U/L (ref 12–78)
ANION GAP SERPL CALCULATED.3IONS-SCNC: 4 MMOL/L (ref 4–13)
AST SERPL W P-5'-P-CCNC: 20 U/L (ref 5–45)
BASOPHILS # BLD AUTO: 0.01 THOUSANDS/ΜL (ref 0–0.1)
BASOPHILS NFR BLD AUTO: 0 % (ref 0–1)
BILIRUB SERPL-MCNC: 0.3 MG/DL (ref 0.2–1)
BUN SERPL-MCNC: 40 MG/DL (ref 5–25)
CALCIUM SERPL-MCNC: 9.2 MG/DL (ref 8.3–10.1)
CHLORIDE SERPL-SCNC: 99 MMOL/L (ref 100–108)
CO2 SERPL-SCNC: 37 MMOL/L (ref 21–32)
CREAT SERPL-MCNC: 1.73 MG/DL (ref 0.6–1.3)
EOSINOPHIL # BLD AUTO: 0 THOUSAND/ΜL (ref 0–0.61)
EOSINOPHIL NFR BLD AUTO: 0 % (ref 0–6)
ERYTHROCYTE [DISTWIDTH] IN BLOOD BY AUTOMATED COUNT: 12.2 % (ref 11.6–15.1)
GFR SERPL CREATININE-BSD FRML MDRD: 26 ML/MIN/1.73SQ M
GLUCOSE SERPL-MCNC: 168 MG/DL (ref 65–140)
GLUCOSE SERPL-MCNC: 211 MG/DL (ref 65–140)
HCT VFR BLD AUTO: 36.8 % (ref 34.8–46.1)
HGB BLD-MCNC: 10.9 G/DL (ref 11.5–15.4)
IMM GRANULOCYTES # BLD AUTO: 0.08 THOUSAND/UL (ref 0–0.2)
IMM GRANULOCYTES NFR BLD AUTO: 1 % (ref 0–2)
LYMPHOCYTES # BLD AUTO: 0.48 THOUSANDS/ΜL (ref 0.6–4.47)
LYMPHOCYTES NFR BLD AUTO: 5 % (ref 14–44)
MAGNESIUM SERPL-MCNC: 2.1 MG/DL (ref 1.6–2.6)
MCH RBC QN AUTO: 29.8 PG (ref 26.8–34.3)
MCHC RBC AUTO-ENTMCNC: 29.6 G/DL (ref 31.4–37.4)
MCV RBC AUTO: 101 FL (ref 82–98)
MONOCYTES # BLD AUTO: 0.08 THOUSAND/ΜL (ref 0.17–1.22)
MONOCYTES NFR BLD AUTO: 1 % (ref 4–12)
NEUTROPHILS # BLD AUTO: 9.84 THOUSANDS/ΜL (ref 1.85–7.62)
NEUTS SEG NFR BLD AUTO: 93 % (ref 43–75)
NRBC BLD AUTO-RTO: 0 /100 WBCS
PHOSPHATE SERPL-MCNC: 5.4 MG/DL (ref 2.3–4.1)
PLATELET # BLD AUTO: 306 THOUSANDS/UL (ref 149–390)
PMV BLD AUTO: 9.7 FL (ref 8.9–12.7)
POTASSIUM SERPL-SCNC: 4 MMOL/L (ref 3.5–5.3)
PROCALCITONIN SERPL-MCNC: 0.35 NG/ML
PROT SERPL-MCNC: 8.3 G/DL (ref 6.4–8.2)
RBC # BLD AUTO: 3.66 MILLION/UL (ref 3.81–5.12)
SODIUM SERPL-SCNC: 140 MMOL/L (ref 136–145)
TSH SERPL DL<=0.05 MIU/L-ACNC: 0.64 UIU/ML (ref 0.36–3.74)
WBC # BLD AUTO: 10.49 THOUSAND/UL (ref 4.31–10.16)

## 2018-12-08 PROCEDURE — 94640 AIRWAY INHALATION TREATMENT: CPT

## 2018-12-08 PROCEDURE — 97110 THERAPEUTIC EXERCISES: CPT

## 2018-12-08 PROCEDURE — G8978 MOBILITY CURRENT STATUS: HCPCS

## 2018-12-08 PROCEDURE — 84443 ASSAY THYROID STIM HORMONE: CPT | Performed by: NURSE PRACTITIONER

## 2018-12-08 PROCEDURE — G8979 MOBILITY GOAL STATUS: HCPCS

## 2018-12-08 PROCEDURE — 82948 REAGENT STRIP/BLOOD GLUCOSE: CPT

## 2018-12-08 PROCEDURE — 84145 PROCALCITONIN (PCT): CPT | Performed by: NURSE PRACTITIONER

## 2018-12-08 PROCEDURE — 80053 COMPREHEN METABOLIC PANEL: CPT | Performed by: NURSE PRACTITIONER

## 2018-12-08 PROCEDURE — 84100 ASSAY OF PHOSPHORUS: CPT | Performed by: NURSE PRACTITIONER

## 2018-12-08 PROCEDURE — 99232 SBSQ HOSP IP/OBS MODERATE 35: CPT | Performed by: PHYSICIAN ASSISTANT

## 2018-12-08 PROCEDURE — 85025 COMPLETE CBC W/AUTO DIFF WBC: CPT | Performed by: NURSE PRACTITIONER

## 2018-12-08 PROCEDURE — 83735 ASSAY OF MAGNESIUM: CPT | Performed by: NURSE PRACTITIONER

## 2018-12-08 PROCEDURE — 97163 PT EVAL HIGH COMPLEX 45 MIN: CPT

## 2018-12-08 PROCEDURE — 99232 SBSQ HOSP IP/OBS MODERATE 35: CPT | Performed by: FAMILY MEDICINE

## 2018-12-08 PROCEDURE — 94760 N-INVAS EAR/PLS OXIMETRY 1: CPT

## 2018-12-08 RX ORDER — POLYETHYLENE GLYCOL 3350 17 G/17G
17 POWDER, FOR SOLUTION ORAL DAILY PRN
Status: DISCONTINUED | OUTPATIENT
Start: 2018-12-08 | End: 2018-12-11 | Stop reason: HOSPADM

## 2018-12-08 RX ORDER — POLYETHYLENE GLYCOL 3350 17 G/17G
17 POWDER, FOR SOLUTION ORAL ONCE
Status: COMPLETED | OUTPATIENT
Start: 2018-12-08 | End: 2018-12-08

## 2018-12-08 RX ORDER — COLESEVELAM 180 1/1
1875 TABLET ORAL 2 TIMES DAILY WITH MEALS
Status: DISCONTINUED | OUTPATIENT
Start: 2018-12-09 | End: 2018-12-11 | Stop reason: HOSPADM

## 2018-12-08 RX ADMIN — POLYETHYLENE GLYCOL 3350 17 G: 17 POWDER, FOR SOLUTION ORAL at 14:45

## 2018-12-08 RX ADMIN — METHYLPREDNISOLONE SODIUM SUCCINATE 20 MG: 40 INJECTION, POWDER, FOR SOLUTION INTRAMUSCULAR; INTRAVENOUS at 21:31

## 2018-12-08 RX ADMIN — IPRATROPIUM BROMIDE AND ALBUTEROL SULFATE 3 ML: 2.5; .5 SOLUTION RESPIRATORY (INHALATION) at 13:59

## 2018-12-08 RX ADMIN — IPRATROPIUM BROMIDE AND ALBUTEROL SULFATE 3 ML: 2.5; .5 SOLUTION RESPIRATORY (INHALATION) at 07:39

## 2018-12-08 RX ADMIN — CEFTRIAXONE 1000 MG: 1 INJECTION, SOLUTION INTRAVENOUS at 17:14

## 2018-12-08 RX ADMIN — BENZONATATE 200 MG: 100 CAPSULE ORAL at 21:31

## 2018-12-08 RX ADMIN — APIXABAN 5 MG: 5 TABLET, FILM COATED ORAL at 17:13

## 2018-12-08 RX ADMIN — CARVEDILOL 25 MG: 25 TABLET, FILM COATED ORAL at 17:12

## 2018-12-08 RX ADMIN — TORSEMIDE 10 MG: 10 TABLET ORAL at 09:16

## 2018-12-08 RX ADMIN — CLOPIDOGREL BISULFATE 75 MG: 75 TABLET ORAL at 09:15

## 2018-12-08 RX ADMIN — IPRATROPIUM BROMIDE AND ALBUTEROL SULFATE 3 ML: 2.5; .5 SOLUTION RESPIRATORY (INHALATION) at 19:59

## 2018-12-08 RX ADMIN — METHYLPREDNISOLONE SODIUM SUCCINATE 20 MG: 40 INJECTION, POWDER, FOR SOLUTION INTRAMUSCULAR; INTRAVENOUS at 05:50

## 2018-12-08 RX ADMIN — BENZONATATE 200 MG: 100 CAPSULE ORAL at 17:12

## 2018-12-08 RX ADMIN — PANTOPRAZOLE SODIUM 40 MG: 40 TABLET, DELAYED RELEASE ORAL at 05:52

## 2018-12-08 RX ADMIN — BENZONATATE 200 MG: 100 CAPSULE ORAL at 09:15

## 2018-12-08 RX ADMIN — CARVEDILOL 25 MG: 25 TABLET, FILM COATED ORAL at 09:16

## 2018-12-08 RX ADMIN — APIXABAN 5 MG: 5 TABLET, FILM COATED ORAL at 09:15

## 2018-12-08 RX ADMIN — DOCUSATE SODIUM 100 MG: 100 CAPSULE, LIQUID FILLED ORAL at 09:16

## 2018-12-08 RX ADMIN — FLUTICASONE FUROATE AND VILANTEROL TRIFENATATE 1 PUFF: 100; 25 POWDER RESPIRATORY (INHALATION) at 09:13

## 2018-12-08 RX ADMIN — DOCUSATE SODIUM 100 MG: 100 CAPSULE, LIQUID FILLED ORAL at 17:13

## 2018-12-08 RX ADMIN — AMLODIPINE BESYLATE 5 MG: 5 TABLET ORAL at 09:15

## 2018-12-08 NOTE — UTILIZATION REVIEW
Initial Clinical Review    Admission: Date/Time/Statement: 12/7/18 @ 1152     Orders Placed This Encounter   Procedures    Inpatient Admission (expected length of stay for this patient is greater than two midnights)     Standing Status:   Standing     Number of Occurrences:   1     Order Specific Question:   Admitting Physician     Answer:   Kelton Chowdhury [[id number]]     Order Specific Question:   Level of Care     Answer:   Med Surg [[id number]]     Order Specific Question:   Estimated length of stay     Answer:   More than 2 Midnights     Order Specific Question:   Certification     Answer:   I certify that inpatient services are medically necessary for this patient for a duration of greater than two midnights  See H&P and MD Progress Notes for additional information about the patient's course of treatment  ED: Date/Time/Mode of Arrival:   ED Arrival Information     Expected Arrival Acuity Means of Arrival Escorted By Service Admission Type    - 12/7/2018 09:13 Urgent Ambulance [address] Urgent    Arrival Complaint    COPD        Chief Complaint:   Chief Complaint   Patient presents with    Altered Mental Status     Pt  reports being treated a cold by Dr Ced Randall  Pt daughter reports nurses aide came this morning and was unable to wake up pt this morning  EMT arrived and was able to wake up pt  Pt daughter reports pt with a history of CO2 problems  Pt Arrives AAOx4  History of Illness: Serena Dodson is a 80 y o  female with a PMH including chronic respiratory failure with hypoxia and hypercapnia, chronic diastolic CHF, CKD stg 4, HTN, HLD, anemia who presents from home with altered mental status early this morning  Patient's visiting nurse could not wake her up this morning  Visiting nurse called EMS who were able to arouse the patient with stimuli  Patient was taken to [facility] Emergency Department for further evaluation and treatment    An ABG revealed pH of 7 396, CO2 of 68 3, and paO2 of 75 6  Chest x-ray showed no acute cardiopulmonary disease  She was given Lasix 20 mg IV x1  According to the patient she has had an upper respiratory infection for the last 3 weeks which created a harsh cough for which she has been noncompliant with her Trilogy BiPAP at nighttime because of the cough  States she has worn her Trilogy for about 3 hour per night  Upon previous review, patient has had prior admissions for CO2 retention due to BiPAP noncompliance  Presently, she denies headache, dizziness, lightheadedness, chest pain/tightness, abdominal pain, nausea, vomiting, diarrhea  She reports generalized fatigue and weakness, decreased appetite, shortness of breath, and a harsh cough      ED Vital Signs:   ED Triage Vitals [12/07/18 0919]   Temperature Pulse Respirations Blood Pressure SpO2   98 °F (36 7 °C) 69 18 162/76 97 %      Temp Source Heart Rate Source Patient Position - Orthostatic VS BP Location FiO2 (%)   Tympanic Monitor Lying Right arm --      Pain Score       No Pain        Wt Readings from Last 1 Encounters:   12/07/18 68 kg (150 lb)     Vital Signs (abnormal):     12/07/18 1700   97 2 °F (36 2 °C)  71  20  156/75  97 %  --  --     Abnormal Labs:   12/07/18 1058   pH, Arterial 7 396    PH ART TC 7 400    pCO2, Arterial 68 3     PCO2 (TC) Arterial 67 4     pO2, Arterial 75 6    PO2 (TC) Arterial 74 1     HCO3, Arterial 41 0     Base Excess, Arterial 13 6    O2 Content, Arterial 14 6     O2 HGB,Arterial  94 7         12/7 12/8     Chloride  97 99     CO2  38 37     Anion Gap  2 4     BUN  30 40     Creatinine  1 60 1 73     Glucose, Random  131 168     Alkaline Phosphatase   151     Total Protein   8 3     Albumin   2 2     Phosphorus   5 4      BNP 2657  WBC 10 419  RBC 3 40  H/H 10 3/34 4  Urine trace leukocytes, WBC 0-1     Diagnostic Test Results:     12/7 CXR - no acute disease     ED Treatment:   Medication Administration from 12/07/2018 0913 to 12/07/2018 1251    Date/Time Order Dose Route Action   12/07/2018 1224 furosemide (LASIX) injection 20 mg 20 mg Intravenous Given        Past Medical/Surgical History:    Active Ambulatory Problems     Diagnosis Date Noted    Cerebrovascular accident (CVA) ([place] [address] ) 06/30/2016    Essential hypertension 06/30/2016    TIANA (acute kidney injury) ([place] [address] ) 06/30/2016    Left leg swelling 06/30/2016    Hyperlipidemia 06/30/2016    Chronic kidney disease, stage III (moderate) ([facility]) 05/09/2017    Numbness and tingling in left arm 05/09/2017    History of CVA (cerebrovascular accident) 05/09/2017    Obesity 05/09/2017    Gouty arthropathy 08/27/2017    Dyslipidemia 08/27/2017    Vitamin D deficiency 08/27/2017    Leg wound, left 08/27/2017    Chronic diastolic CHF (congestive heart failure) ([place] [address] ) 08/27/2017    Acute diastolic CHF (congestive heart failure) ([place] [address] ) 08/27/2017    Hyperphosphatemia 09/01/2017    Deep vein thrombosis (DVT) of left lower extremity ([place] [address] ) 03/07/2018    Acute encephalopathy 03/07/2018    Aortic stenosis, moderate 06/06/2017    Bradycardia 03/29/2018    Mild persistent asthma without complication 71/56/8154    Chronic respiratory failure with hypoxia and hypercapnia ([facility]) 04/09/2018    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 04/09/2018    CO2 retention     Anemia of chronic renal failure, stage 3 (moderate) ([place] [address] ) 05/16/2018    Hypertensive chronic kidney disease with stage 1 through stage 4 chronic kidney disease, or unspecified chronic kidney disease 05/16/2018    Orthostatic hypotension 05/16/2018     Resolved Ambulatory Problems     Diagnosis Date Noted    Hypertensive urgency 05/09/2017    Bacteriuria 05/09/2017    Cellulitis of left anterior lower leg 08/27/2017    Respiratory acidosis 09/01/2017    Acute respiratory failure with hypoxia and hypercapnia (HCC) 03/07/2018    Acute on chronic diastolic CHF (congestive heart failure) ([facility] ) 03/07/2018     Past Medical History:   Diagnosis Date    [facility]) [facility] ) 08/2017    CO2 retention     DVT (deep vein thrombosis) in pregnancy ([facility] )     Hyperlipidemia     Hypertension     Pulmonary embolism ([facility] ) 02/06/2018    Renal disorder     Stroke [facility]) 2016     Admitting Diagnosis: Altered mental status [R41 82]  CO2 retention [E87 2]  Acute encephalopathy [G93 40]    Age/Sex: 80 y o  female    Assessment/Plan:   * Acute encephalopathy   Assessment & Plan     Likely due to the CO2 retention as evidenced on ABG due to noncompliance with trilogy BiPAP for the last several weeks due to an upper respiratory infection at home  · Continue trilogy BiPAP at HS, supplemental oxygen during the day  · Continue home medications  · Supportive care, up with assist, fall precautions  · PT/OT  · Check TSH, monitor labs daily      Acute on chronic respiratory failure with hypoxia and hypercapnia (HCC)   Assessment & Plan     Likely due to BiPAP noncompliance at bedtime for past several weeks due to cough due to URI  · Continue BiPAP at bedtime (family will bring in home trilogy BiPAP machine)  · Supplemental oxygen during the day  · Pulmonology consult, appreciate input  · Consider need for IV steroids, holding at this time as exam without any significant wheezing  · Continue home duo nebs Q6H      Acute tracheobronchitis   Assessment & Plan     As evidence by URI like symptoms for the last 3 weeks according to patient along with moist, productive cough and shortness of breath  · Consult pulmonology, appreciate input  · Start Rocephin 1 g IV daily and continue home duo nebs Q6H  · Start Tessalon Perles Q8H and Robitussin PRN  · Consider need for IV steroids,  holding at this time as exam without any significant wheezing  · Check sputum culture and respiratory pathogen profile  · Supplemental oxygen during day and Trilogy BiPAP at HS      Chronic diastolic CHF (congestive heart failure) ([facility] )   Assessment & Plan     BNP 2657    Appears euvolemic and dry on exam  S/p Lasix 20 mg IV x1 in ED  · Resume home medication, torsemide 10 mg daily in am  · Monitor I&O, daily weights      Anemia of chronic renal failure, stage 3 (moderate) ([facility])   Assessment & Plan     Hemoglobin 10 2, baseline 10 6-11 5 gm/dL  Previous iron panel revealed iron 28, ferritin 74  Per outpatient records, patient on oral iron 3 times a week  · Continue iron supplement 3 times a week  · Monitor counts daily      Chronic kidney disease, stage III (moderate) ([facility] )   Assessment & Plan     Follows outpatient nephrologist, Dr Naye Pena  Cr 1 6, baseline creatinine 1 5-1 9  BNP 2,657 with previous values between 961 - 3,571  Status post Lasix 20 mg IV x1 in ED  · Avoid nephrotoxins  · Monitor kidney function daily      Essential hypertension   Assessment & Plan     Blood pressure stable at 166/77  S/p Lasix 20 mg IV x1 in ED  · Continue home medications, Norvasc 5 mg daily, Coreg 25 mg BID, and Torsemide 10 mg daily  · Monitor blood pressure closely          VTE Prophylaxis: Heparin  / sequential compression device   Code Status: DNR/DNI- level 3  Anticipated Length of Stay:  Patient will be admitted on an Inpatient basis with an anticipated length of stay of  Greater than 2 midnights     Justification for Hospital Stay: Acute encephalopathy, acute on chronic resp failure     Admission Orders:  Scheduled Meds:   Current Facility-Administered Medications:  acetaminophen 650 mg Oral Q6H PRN    amLODIPine 5 mg Oral Daily    apixaban 5 mg Oral BID    benzonatate 200 mg Oral TID    carvedilol 25 mg Oral BID With Meals    cefTRIAXone 1,000 mg Intravenous Q24H Last Rate: 1,000 mg (12/07/18 1704)   clopidogrel 75 mg Oral Daily    dextromethorphan-guaiFENesin 10 mL Oral Q4H PRN    docusate sodium 100 mg Oral BID    fluticasone-vilanterol 1 puff Inhalation Daily    ipratropium-albuterol 3 mL Nebulization Q6H    methylPREDNISolone sodium succinate 20 mg Intravenous Q8H PAULA    ondansetron 4 mg Intravenous Q6H PRN pantoprazole 40 mg Oral Early Morning    torsemide 10 mg Oral Daily      PRN Meds:   acetaminophen    dextromethorphan-guaiFENesin    ondansetron    SCDs  Daily wt  Up w/ assist   Cardiac diet   Cons Pulmonary   BIPAP @ HS   _____________________  12/7 Pulmonary Consult   Acute tracheobronchitis  Acute exacerbation of mild persistent asthma due to tracheobronchitis  Chronic hypercapnic hypoxemic respiratory failure secondary to severe thoracic scoliosis status post partial correction with surgery and bilateral Duarte rods  Acute encephalopathy impaired patient has had initially URI which appears to have been viral initially and may now be bacterial and also may have had some increased hypercapnia at home she has not been a be use her trilogy recent because of her sore throat and nasal congestion     Plan:   I concur with IV ceftriaxone 1 g daily  Check sputum Gram stain culture  Nebulizer treatments with DuoNeb every 6 hr  Solu-Medrol 20 mg IV every 8 hr  If patient can tolerated she will try using her trilogy Unit at nighttime    She did bring it to the hospital with her   2 L of oxygen during the day  I spoke with patient's daughter

## 2018-12-08 NOTE — CONSULTS
Consultation - Pulmonary Medicine   Serena Dodson 80 y o  female MRN: [id number]  Unit/Bed#: [address] [place] [address] Encounter: [id number]      Assessment:  Acute tracheobronchitis  Acute exacerbation of mild persistent asthma due to tracheobronchitis  Chronic hypercapnic hypoxemic respiratory failure secondary to severe thoracic scoliosis status post partial correction with surgery and bilateral Duarte rods  Acute encephalopathy impaired patient has had initially URI which appears to have been viral initially and may now be bacterial and also may have had some increased hypercapnia at home she has not been a be use her trilogy recent because of her sore throat and nasal congestion    Plan:   I concur with IV ceftriaxone 1 g daily  Check sputum Gram stain culture  Nebulizer treatments with DuoNeb every 6 hr  Solu-Medrol 20 mg IV every 8 hr  If patient can tolerated she will try using her trilogy Unit at nighttime  She did bring it to the hospital with her   2 L of oxygen during the day  I spoke with patient's daughter          History of Present Illness   Physician Requesting Consult: Akbar Nick,   Reason for Consult / Principal Problem:  Tracheobronchitis, acute on chronic respiratory failure    Hx and PE limited by: none  HPI: Serena Dodson is a 80y o  year old female who presents with complaints of fatigue, shortness of breath, sore throat throat, and weakness  She lives in an apartment in [place] and does have people check on her frequently including visiting nurse  Patient was having problems with cough for well over a week nasal congestion sore throat and also some increased shortness of breath with activity  Is a nurse found patient to be very lethargic today and difficulty rounds patient show she had patient come to the emergency room for evaluation    Patient does have chronic hypercapnic hypoxemic respiratory failure due to severe restrictive impairment of her lungs from her thoracic scoliosis  Jo Skipper Her forced vital capacity is 11 09 L or 49% of predicted  She did have a severe thoracic scoliosis that even at age 58years old she did need to have thoracic spine surgery to help partially correct this severe scoliosis which was causing her problems breathing  She also has mild persistent asthma and does use Breo 100 mcg 1 puff daily and uses nebulizer DuoNeb when needed  She does use a Trilogy ventilator unit at bedtime on 2 L of oxygen during the day  Because her her recent upper respiratory tract infection and sore throat she had not been using the the trilogy Unit recently  She never smoked  She has 2 large Duarte rods down the length of her thoracic and lumbar spine  On 2 L of oxygen blood gas showed pH is 7 40, pCO2 67 and PO2 of 76 mm Hg    White blood cell count is 8 86 with hemoglobin of 10 2    Portable chest x-ray shows thoracolumbar Duarte rods present but no lung infiltrates    Patient also has history of hypertension, chronic diastolic congestive heart failure, anemia, stage 3 kidney disease, stroke, left lower extremity DVT  She also has had bilateral total knee replacement surgeries    She has been started on IV ceftriaxone and methylprednisone 20 mg every 8 hr  Also she is receiving neb treatments with DuoNeb every 6 hr      Review of Systems   Constitutional: Positive for fatigue  Negative for fever  HENT: Positive for congestion and sore throat  Negative for rhinorrhea  Eyes: Negative for redness  Respiratory: Positive for cough and shortness of breath  Cardiovascular: Negative for chest pain and leg swelling  Gastrointestinal: Negative for abdominal pain and nausea  Endocrine: Negative for polydipsia and polyphagia  Genitourinary: Negative for dysuria  Neurological: Negative for light-headedness         Historical Information   Past Medical History:   Diagnosis Date    [facility]) [facility] ) 13/1936    diastolic     CO2 retention  DVT (deep vein thrombosis) in pregnancy [facility] )     left LE in 2/2018 and 50 years ago    Hyperlipidemia     Hypertension     Pulmonary embolism ([facility] ) 02/06/2018    Renal disorder     ckd    [facility]) 2016    left hand weakness,paresthesia  Past Surgical History:   Procedure Laterality Date    ABDOMINAL SURGERY      ruputured bowel  had colostomy and ileostomy,reversed later on   BACK SURGERY      rods in back  done 30 years ago   CARPAL TUNNEL RELEASE Bilateral     CYST REMOVAL      back    HERNIA REPAIR      REPLACEMENT TOTAL KNEE BILATERAL       Social History   History   Alcohol Use    Yes     Comment: Occ       History   Drug Use No     History   Smoking Status    Never Smoker   Smokeless Tobacco    Never Used       Family History:   Family History   Problem Relation Age of Onset    COPD Brother        Meds/Allergies     Current Facility-Administered Medications:     acetaminophen (TYLENOL) tablet 650 mg, 650 mg, Oral, Q6H PRN, MARGOT Olmos    [START ON 12/8/2018] amLODIPine (NORVASC) tablet 5 mg, 5 mg, Oral, Daily, MARGOT Pritchard    apixaban (ELIQUIS) tablet 5 mg, 5 mg, Oral, BID, Rosa Mcclelland DO    benzonatate (TESSALON PERLES) capsule 200 mg, 200 mg, Oral, TID, MARGOT Olmos, 200 mg at 12/07/18 1703    carvedilol (COREG) tablet 25 mg, 25 mg, Oral, BID With Meals, MARGOT Olmos, 25 mg at 12/07/18 1703    cefTRIAXone (ROCEPHIN) IVPB (premix) 1,000 mg, 1,000 mg, Intravenous, Q24H, MARGOT Pritchard, Last Rate: 100 mL/hr at 12/07/18 1704, 1,000 mg at 12/07/18 1704    clopidogrel (PLAVIX) tablet 75 mg, 75 mg, Oral, Daily, MARGOT Pritchard, 75 mg at 12/07/18 1703    dextromethorphan-guaiFENesin (ROBITUSSIN DM)  mg/5 mL oral syrup 10 mL, 10 mL, Oral, Q4H PRN, MARGOT Olmos    docusate sodium (COLACE) capsule 100 mg, 100 mg, Oral, BID, MARGOT Pritchard, 100 mg at 12/07/18 1846   fluticasone-vilanterol (BREO ELLIPTA) 100-25 mcg/inh inhaler 1 puff, 1 puff, Inhalation, Daily, MARGOT Mccarthy, 1 puff at 12/07/18 1846    ipratropium-albuterol (DUO-NEB) 0 5-2 5 mg/3 mL inhalation solution 3 mL, 3 mL, Nebulization, Q6H, MARGOT Mccarthy, 3 mL at 12/07/18 1959    methylPREDNISolone sodium succinate (Solu-MEDROL) injection 20 mg, 20 mg, Intravenous, Q8H [address], MARGOT Mccarthy    ondansetron [facility]) injection 4 mg, 4 mg, Intravenous, Q6H PRN, MARGOT Mccarthy    [START ON 12/8/2018] pantoprazole (PROTONIX) EC tablet 40 mg, 40 mg, Oral, Early Morning, MARGOT Mccarthy    [START ON 12/8/2018] torsemide [facility]) tablet 10 mg, 10 mg, Oral, Daily, MARGOT Mccarthy    Prior to Admission medications    Medication Sig Start Date End Date Taking? Authorizing Provider   acetaminophen (TYLENOL) 325 mg tablet Take 650 mg by mouth every 6 (six) hours as needed for mild pain   Yes Historical Provider, MD   amLODIPine (NORVASC) 5 mg tablet Take 1 tablet (5 mg total) by mouth daily 8/30/18  Yes Dylon Cruz MD   apixaban (ELIQUIS) 2 5 mg Take 1 tablet (2 5 mg total) by mouth 2 (two) times a day  Patient taking differently: Take 5 mg by mouth 2 (two) times a day   5/17/18  Yes Terry Pugh MD   calcium-vitamin D (OSCAL 500 + D) 500 mg-200 units per tablet Take 1 tablet by mouth 2 (two) times a day     Yes Historical Provider, MD   carvedilol (COREG) 6 25 mg tablet Take 1 tablet (6 25 mg total) by mouth 2 (two) times a day with meals  Patient taking differently: Take 25 mg by mouth 2 (two) times a day with meals   4/4/18  Yes Felicia Schumacher MD   cholecalciferol (VITAMIN D3) 1,000 units tablet Take 2,000 Units by mouth daily   Yes Historical Provider, MD   clopidogrel (PLAVIX) 75 mg tablet Take 75 mg by mouth daily   Yes Historical Provider, MD   co-enzyme Q-10 50 MG capsule Take 200 mg by mouth daily     Yes Historical Provider, MD   colesevelam [facility]) 625 mg tablet Take 3 tablets (1,875 mg total) by mouth 2 (two) times a day with meals 10/5/18  Yes Mehul Denis MD   docusate sodium (COLACE) 100 mg capsule Take 100 mg by mouth 2 (two) times a day   Yes Historical Provider, MD   fluticasone-vilanterol (BREO ELLIPTA) 100-25 mcg/inh inhaler Inhale 1 puff daily Rinse mouth after use  6/8/18  Yes Jose Guerrero DO   Iron-Vitamin C (IRON 100/C) 100-250 MG TABS Take 1 tablet by mouth daily   Yes Historical Provider, MD   Multiple Vitamins-Minerals (OCUVITE PO) Take 1 tablet by mouth daily     Yes Historical Provider, MD   pantoprazole (PROTONIX) 40 mg tablet Take 40 mg by mouth daily   Yes Historical Provider, MD   torsemide (DEMADEX) 10 mg tablet Take 10 mg by mouth daily   Yes Historical Provider, MD   ipratropium-albuterol (DUO-NEB) 0 5-2 5 mg/3 mL Take 3 mL by nebulization every 6 (six) hours  Patient not taking: Reported on 8/30/2018 5/25/18   Tommy Alarcon DO   torsemide [facility]) 20 mg tablet  9/6/18   Historical Provider, MD AMBRIZQUMELISSA 5 MG  9/6/18 12/7/18  Historical Provider, MD       Allergies   Allergen Reactions    Statins Myalgia    Lyrica [Pregabalin] Other (See Comments)     Client says it made her "nutty"       Objective   Vitals: Blood pressure 109/56, pulse 71, temperature 97 5 °F (36 4 °C), temperature source Temporal, resp  rate 18, height 4' 10" (1 473 m), weight 68 kg (150 lb), SpO2 95 %, not currently breastfeeding  ,Body mass index is 31 35 kg/m²  Intake/Output Summary (Last 24 hours) at 12/07/18 2053  Last data filed at 12/07/18 1640   Gross per 24 hour   Intake                0 ml   Output              750 ml   Net             -750 ml     Invasive Devices     Peripheral Intravenous Line            Peripheral IV 12/07/18 Right Hand less than 1 day                Physical Exam   Constitutional: She is oriented to person, place, and time  She appears well-developed and well-nourished  No distress     On 3 L O2 saturation is 93%   Patient is sitting in bed  She is not in distress  No diaphoresis  She is awake and alert   HENT:   Head: Normocephalic  Nose: Nose normal    Mouth/Throat: Oropharynx is clear and moist  No oropharyngeal exudate  Eyes: Pupils are equal, round, and reactive to light  Conjunctivae are normal    Neck: Neck supple  No JVD present  No tracheal deviation present  Cardiovascular: Normal rate, regular rhythm and normal heart sounds  Pulmonary/Chest: Effort normal    Has thoracic scoliosis  Breath sounds are decreased bilaterally with few faint expiratory wheezes bilaterally   Abdominal: Soft  She exhibits no distension  There is no tenderness  There is no guarding  Musculoskeletal: She exhibits no edema  Lymphadenopathy:     She has no cervical adenopathy  Neurological: She is alert and oriented to person, place, and time  Skin: Skin is warm and dry  No rash noted  Psychiatric: She has a normal mood and affect   Her behavior is normal  Thought content normal        Lab Results: ABG: Lab Results   Component Value Date    PHART 7 396 12/07/2018    XII9IAH 68 3 (HH) 12/07/2018    PO2ART 75 6 12/07/2018    YXR5UMN 41 0 (H) 12/07/2018    BEART 13 6 12/07/2018    SOURCE Radial, Right 12/07/2018   , BNP: No results found for: BNP, CBC: Lab Results   Component Value Date    WBC 8 86 12/07/2018    HGB 10 2 (L) 12/07/2018    HCT 34 4 (L) 12/07/2018     (H) 12/07/2018     12/07/2018    ADJUSTEDWBC 5 00 07/26/2016    MCH 30 0 12/07/2018    MCHC 29 7 (L) 12/07/2018    RDW 12 3 12/07/2018    MPV 10 0 12/07/2018    NRBC 0 12/07/2018   , CMP: Lab Results   Component Value Date     01/05/2016    K 4 0 12/07/2018    K 4 0 08/17/2018    CL 97 (L) 12/07/2018     08/17/2018    CO2 38 (H) 12/07/2018    CO2 26 08/17/2018    ANIONGAP 12 4 01/05/2016    BUN 30 (H) 12/07/2018    BUN 42 (H) 08/17/2018    CREATININE 1 60 (H) 12/07/2018    CREATININE 1 8 (H) 01/05/2016    GLUCOSE 92 01/05/2016 CALCIUM 9 5 12/07/2018    CALCIUM 9 4 08/17/2018    CALCIUM 9 3 08/17/2018    AST 17 10/04/2018    AST 21 01/05/2016    ALT 22 10/04/2018    ALT 30 01/05/2016    ALKPHOS 72 10/04/2018    ALKPHOS 84 08/17/2018    PROT 8 5 (H) 01/05/2016    BILITOT 0 5 01/05/2016    EGFR 28 12/07/2018   , PT/INR:   Lab Results   Component Value Date    INR 1 09 12/07/2018   , Troponin: No results found for: TROPONIN    Imaging Studies: I have personally reviewed pertinent reports  and I have personally reviewed pertinent films in PACS    EKG, Pathology, and Other Studies: I have personally reviewed pertinent reports  VTE Prophylaxis: e;iquis    Code Status: Level 3 - DNAR and DNI    Counseling/Coordination of Care: Total floor / unit time spent today 30 minutes  Greater than 50% of total time was spent with the patient and / or family counseling and / or coordination of care   A description of the counseling / coordination of care: I reviewed chart, chest radiograph, discussed case with hospitalist and also patient's daughter

## 2018-12-08 NOTE — PROGRESS NOTES
Progress Note - Pulmonary   Miguel Purvis 80 y o  female MRN: [id number]  Unit/Bed#: [address] [place] [address] Encounter: [id number]    Miguel Purvis is 80years old and has chronic mixed respiratory failure mostly due to thoracic kyphoscoliosis  This severity of her scoliosis required Duarte andrés placement at the age of 58  She utilizes a trilogy ventilation system at home and is chronically oxygen dependent on 2 L  She has severe restrictive disease  Past medical history of stage III CKD, chronic diastolic heart failure, HTN, and a history of a left lower extremity DVT  She was admitted to the hospital due to a recent upper respiratory infection  She was confused and lethargic on admission, mildly hypercapnic, but no evidence of lung infiltrates on initial exam     She is currently being treated for acute tracheobronchitis and asthma exacerbation  Assessment/Plan:   -acute tracheobronchitis:  -continue ceftriaxone currently day 2/5  -continue methylprednisolone 20 q 8  -every 6 hr DuoNeb around the clock  -follow up on sputum cultures  -Acute Encephalopathy:  -patient still believes she has been here for an entire week and wants to go home  -question of possible onset of delirium  -delirium sanitation  -Chronic Hypoxemic and Hypercapneic Respiratory Failure:  -patient remains on 3 L nasal cannula oxygen  -she is dependent on her noninvasive ventilator  -utilizing her trilogy ventilator here in the hospital  -severe thoracic kyphoscoliosis  -post bilateral Duarte andrés placement remotely at the age of 58        ______________________________________________________________________    Subjective: Pt seen and examined at bedside  Patient is confused  She thinks that she has been here greater than 1 week  She wants to go home today      Tele Events:     Vitals:   Temp:  [97 2 °F (36 2 °C)-97 7 °F (36 5 °C)] 97 5 °F (36 4 °C)  HR:  [66-82] 75  Resp:  [18-20] 18  BP: (109-166)/(56-77) 119/57  Weight (last 2 days) Date/Time   Weight    12/07/18 0919  68 (150)            Oxygen Therapy  SpO2: 92 %  O2 Flow Rate (L/min): 2 L/min    IV Infusions:       Nutrition:        Diet Orders            Start     Ordered    12/07/18 1512  Diet Cardiovascular; Cardiac  Diet effective now     Question Answer Comment   Diet Type Cardiovascular    Cardiac Cardiac    RD to adjust diet per protocol?  Yes        12/07/18 1511    12/07/18 1252  Room Service  Once     Question:  Type of Service  Answer:  Room Service - Appropriate with Assistance    12/07/18 1252          Ins/Outs:   I/O       12/06 0701 - 12/07 0700 12/07 0701 - 12/08 0700 12/08 0701 - 12/09 0700    Urine (mL/kg/hr)  950     Total Output   950      Net   -950                   Lines/Drains:  Invasive Devices     Peripheral Intravenous Line            Peripheral IV 12/07/18 Right Hand 1 day                 Active medications:  Scheduled Meds:  Current Facility-Administered Medications:  acetaminophen 650 mg Oral Q6H PRN Louise Olmos, CRNP    amLODIPine 5 mg Oral Daily Louise Olmos, CRNP    apixaban 5 mg Oral BID Rosa Revankar, DO    benzonatate 200 mg Oral TID Louise Olmos, CRNP    carvedilol 25 mg Oral BID With Meals Louise Olmos, CRNP    cefTRIAXone 1,000 mg Intravenous Q24H Louise Olmos, CRNP Last Rate: 1,000 mg (12/07/18 1704)   clopidogrel 75 mg Oral Daily Louise Olmos, CRNP    dextromethorphan-guaiFENesin 10 mL Oral Q4H PRN Louise Briceñohal, CRNP    docusate sodium 100 mg Oral BID Louise Olmos, CRNP    fluticasone-vilanterol 1 puff Inhalation Daily Louise Olmos, CRNP    ipratropium-albuterol 3 mL Nebulization Q6H Louise Briceñohal, CRNP    methylPREDNISolone sodium succinate 20 mg Intravenous [facility] Louise Briceñohal, CRNP    ondansetron 4 mg Intravenous Q6H PRN Louise Briceñohal, CRNP    pantoprazole 40 mg Oral Early Morning Louise Olmos, CRNP    torsemide 10 mg Oral Daily Louise Olmos, CRNP      PRN Meds:  acetaminophen 650 mg Q6H PRN   dextromethorphan-guaiFENesin 10 mL Q4H PRN   ondansetron 4 mg Q6H PRN     ____________________________________________________________________      Physical Exam   Constitutional: She is oriented to person, place, and time  She appears well-developed and well-nourished  HENT:   Head: Normocephalic and atraumatic  Eyes: Pupils are equal, round, and reactive to light  Conjunctivae are normal    Neck: Normal range of motion  Neck supple  Cardiovascular: Normal rate, regular rhythm and normal heart sounds  Pulmonary/Chest: Effort normal and breath sounds normal  No respiratory distress  She has no wheezes  She has no rales  She exhibits no tenderness  Abdominal: Soft  Bowel sounds are normal    Musculoskeletal: Normal range of motion  She exhibits no edema  Neurological: She is alert and oriented to person, place, and time  Skin: Skin is warm and dry     Psychiatric: She has a normal mood and affect            ____________________________________________________________________    Labs:   CBC:   Results from last 7 days  Lab Units 12/08/18  0711 12/07/18  0953   WBC Thousand/uL 10 49* 8 86   HEMOGLOBIN g/dL 10 9* 10 2*   HEMATOCRIT % 36 8 34 4*   MCV fL 101* 101*   PLATELETS Thousands/uL 306 283     CMP:   Results from last 7 days  Lab Units 12/08/18  0711 12/07/18  0954   POTASSIUM mmol/L 4 0 4 0   CHLORIDE mmol/L 99* 97*   CO2 mmol/L 37* 38*   BUN mg/dL 40* 30*   CREATININE mg/dL 1 73* 1 60*   CALCIUM mg/dL 9 2 9 5   AST U/L 20  --    ALT U/L 27  --    ALK PHOS U/L 151*  --    EGFR ml/min/1 73sq m 26 28     Magnesium:   Results from last 7 days  Lab Units 12/08/18  0711   MAGNESIUM mg/dL 2 1     Phosphorous:   Results from last 7 days  Lab Units 12/08/18  0711   PHOSPHORUS mg/dL 5 4*     Troponin:   Results from last 7 days  Lab Units 12/07/18  0954   TROPONIN I ng/mL <0 02     PT/INR:   Results from last 7 days  Lab Units 12/07/18  0954   PTT seconds 24   INR  1 09     Lactic Acid:     BNP:   Results from last 7 days  Lab Units 12/07/18  0954   NT-PRO BNP pg/mL 2,657*     TSH:   Results from last 7 days  Lab Units 12/08/18  0711   TSH 3RD GENERATON uIU/mL 0 637     Procalcitonin: Invalid input(s): PROCALCITONIN      Imaging:     XR chest pa & lateral   Final Result by Verito Andrea MD (12/07 1124)      No acute cardiopulmonary disease  Workstation performed: [id number]                 Micro: Lab Results   Component Value Date    BLOODCX No Growth After 5 Days  03/29/2018    BLOODCX No Growth After 5 Days  03/29/2018    BLOODCX No Growth After 5 Days  08/27/2017    BLOODCX No Growth After 5 Days   08/27/2017    URINECX No Growth <1000 cfu/mL 03/29/2018    URINECX >100,000 cfu/ml Mixed Contaminants X6 05/09/2017    MRSACULTURE  03/29/2018     No Methicillin Resistant Staphlyococcus aureus (MRSA) isolated            Invalid input(s): LEGIONELLAURINARYANTIGEN        Code Status: Level 3 - DNAR and DNI

## 2018-12-08 NOTE — PHYSICAL THERAPY NOTE
PT EVALUATION   12/08/18 1035   Pain Assessment   Pain Assessment Hoang-Baker FACES   Hoang-Baker FACES Pain Rating 4  (chronic R shoulder pain)   Home Living   Type of Home Apartment   Home Layout One level;Stairs to enter with rails  (6 stairs down to enter)   [address] [place] [address] chair   Home Equipment (rollator, O2)   Additional Comments patient using rollator walker prior to admission with O2   Prior Function   Level of [place] Needs assistance with ADLs and functional mobility   Lives With Alone   Receives Help From Home health; Family   ADL Assistance Needs assistance   IADLs Needs assistance   Comments patient with HHA 2xdaily for meal prep, bathing and dressing  Daughter in home daily at lunch   Restrictions/Precautions   Other Precautions Chair Alarm; Bed Alarm; Fall Risk;O2;Pain   General   Additional Pertinent History chart reviewed, patient admitted with AMS, acute encephalopathy   Now with improved cognition and presents as generally weak and deconditioned   Family/Caregiver Present No   Cognition   Overall Cognitive Status WFL   Arousal/Participation Cooperative   Orientation Level Oriented X4   Following Commands Follows multistep commands with increased time or repetition   RLE Assessment   RLE Assessment (ROm WFL, strength 3+/5)   LLE Assessment   LLE Assessment (ROM WFL, strength 3+/5)   Coordination   Movements are Fluid and Coordinated 0   Bed Mobility   Supine to Sit 5  Supervision   Transfers   Sit to Stand 4  Minimal assistance   Additional items Assist x 1   Stand to Sit 4  Minimal assistance   Additional items Assist x 1   Ambulation/Elevation   Gait Assistance 4  Minimal assist   Additional items Assist x 1   Assistive Device Rolling walker   Distance 20 feet with change in direction, forward flexed posturing and shortened stride length   Balance   Static Sitting Fair +   Dynamic Sitting Fair   Static Standing Fair -   Dynamic Standing Poor   Ambulatory Poor   Activity Tolerance Activity Tolerance Patient limited by fatigue   Nurse Made Aware yes   Assessment   Prognosis Good   Problem List Decreased strength;Decreased range of motion;Decreased endurance; Impaired balance;Decreased mobility; Decreased coordination;Pain   Assessment Patient seen for Physical Therapy evaluation  Patient admitted with Acute encephalopathy  Comorbidities affecting patient's physical performance include:chronic respiratory failure with hypoxia and hypercapnia, chronic diastolic CHF, CKD stg 4, HTN, HLD, anemia who presents from home with altered mental status early this morning,gait dysfunction  Personal factors affecting patient at time of initial evaluation include: ambulating with assistive device, stairs to enter home, inability to ambulate household distances, inability to navigate community distances, inability to navigate level surfaces without external assistance, inability to perform dynamic tasks in community, limited home support, inability to perform caregiver support/tasks, inability to perform physical activity, inability to perform ADLS and inability to perform IADLS   Prior to admission, patient was independent with functional mobility with walker/rollator, requiring assist for ADLS, requiring assist for IADLS, ambulating household distance and home with family assist   Please find objective findings from Physical Therapy assessment regarding body systems outlined above with impairments and limitations including weakness, decreased ROM, impaired balance, decreased endurance, impaired coordination, gait deviations, pain, decreased activity tolerance, decreased functional mobility tolerance, fall risk and SOB upon exertion  The Barthel Index was used as a functional outcome tool presenting with a score of 35 today indicating marked limitations of functional mobility and ADLS    Patient's clinical presentation is currently unstable/unpredictable as seen in patient's presentation of vital sign response, changing level of pain, increased fall risk, new onset of impairment of functional mobility, decreased endurance and new onset of weakness  Pt would benefit from continued Physical Therapy treatment to address deficits as defined above and maximize level of functional mobility  As demonstrated by objective findings, the assigned level of complexity for this evaluation is high  Goals   Patient Goals get stronger, go home   STG Expiration Date 12/15/18   Short Term Goal #1 transfers and gait with roller walker with supervision   Short Term Goal #2 gait endurance to 80 feet, strength BLEs 3+/4- all to meet patient goal of improved strength and return home   LTG Expiration Date 12/22/18   Long Term Goal #1 transfers and gait with roller walker independently   Long Term Goal #2 gait endurance to functional household distances, strength BLEs 4-/5   Treatment Day 1   Plan   Treatment/Interventions ADL retraining;Functional transfer training;LE strengthening/ROM; Therapeutic exercise;Elevations; Endurance training;Patient/family training;Equipment eval/education; Bed mobility;Gait training; Compensatory technique education   PT Frequency 5x/wk   Recommendation   Recommendation (home with services versus STR depending on function)   Barthel Index   Feeding 5   Bathing 0   Grooming Score 0   Dressing Score 5   Bladder Score 0   Bowels Score 10   Toilet Use Score 5   Transfers (Bed/Chair) Score 10   Mobility (Level Surface) Score 0   Stairs Score 0   Barthel Index Score 28   Licensure   [place] License Number  Philly Melton PT [id number]       PT TREATMENT  Time In:1010  Time Out:1035  Total Time: 25min      S:  Patient cooperative and motivated with chronic pain in R shoulder with movement  O:  -sit to and from stand with min assist of 1 x 3 for strengthening and balance training  -gait with roller walker with min assist of 1, endurance 40 feet with several changes in direction  -also ambulated to bathroom for attempted BM but unsuccessful, mod assist required to apply diaper due to incontinence and nurse aware  -exercise completed: LAQs, ankle pumps, hip flexion all 10 reps  A:  Patient cooperative and motivated to walk more and eventually return home  Will also benefit from continued PT with progression as tolerated  P:  DC recommendation: STR versus home with services as function allows    Unique Rodriguez, PT

## 2018-12-09 LAB
ALBUMIN SERPL BCP-MCNC: 2.1 G/DL (ref 3.5–5)
ALP SERPL-CCNC: 127 U/L (ref 46–116)
ALT SERPL W P-5'-P-CCNC: 21 U/L (ref 12–78)
ANION GAP SERPL CALCULATED.3IONS-SCNC: 1 MMOL/L (ref 4–13)
AST SERPL W P-5'-P-CCNC: 13 U/L (ref 5–45)
BILIRUB SERPL-MCNC: 0.2 MG/DL (ref 0.2–1)
BUN SERPL-MCNC: 60 MG/DL (ref 5–25)
CALCIUM SERPL-MCNC: 9.3 MG/DL (ref 8.3–10.1)
CHLORIDE SERPL-SCNC: 100 MMOL/L (ref 100–108)
CO2 SERPL-SCNC: 39 MMOL/L (ref 21–32)
CREAT SERPL-MCNC: 1.78 MG/DL (ref 0.6–1.3)
GFR SERPL CREATININE-BSD FRML MDRD: 25 ML/MIN/1.73SQ M
GLUCOSE SERPL-MCNC: 176 MG/DL (ref 65–140)
PHOSPHATE SERPL-MCNC: 3.4 MG/DL (ref 2.3–4.1)
POTASSIUM SERPL-SCNC: 4.2 MMOL/L (ref 3.5–5.3)
PROT SERPL-MCNC: 7.7 G/DL (ref 6.4–8.2)
SODIUM SERPL-SCNC: 140 MMOL/L (ref 136–145)

## 2018-12-09 PROCEDURE — 94640 AIRWAY INHALATION TREATMENT: CPT

## 2018-12-09 PROCEDURE — 99232 SBSQ HOSP IP/OBS MODERATE 35: CPT | Performed by: NURSE PRACTITIONER

## 2018-12-09 PROCEDURE — 94760 N-INVAS EAR/PLS OXIMETRY 1: CPT

## 2018-12-09 PROCEDURE — 84100 ASSAY OF PHOSPHORUS: CPT | Performed by: FAMILY MEDICINE

## 2018-12-09 PROCEDURE — 80053 COMPREHEN METABOLIC PANEL: CPT | Performed by: FAMILY MEDICINE

## 2018-12-09 RX ORDER — METHYLPREDNISOLONE SODIUM SUCCINATE 40 MG/ML
20 INJECTION, POWDER, LYOPHILIZED, FOR SOLUTION INTRAMUSCULAR; INTRAVENOUS EVERY 12 HOURS SCHEDULED
Status: DISCONTINUED | OUTPATIENT
Start: 2018-12-09 | End: 2018-12-10

## 2018-12-09 RX ADMIN — POLYETHYLENE GLYCOL 3350 17 G: 17 POWDER, FOR SOLUTION ORAL at 16:36

## 2018-12-09 RX ADMIN — BENZONATATE 200 MG: 100 CAPSULE ORAL at 21:25

## 2018-12-09 RX ADMIN — CEFTRIAXONE 1000 MG: 1 INJECTION, SOLUTION INTRAVENOUS at 16:45

## 2018-12-09 RX ADMIN — BENZONATATE 200 MG: 100 CAPSULE ORAL at 16:36

## 2018-12-09 RX ADMIN — DOCUSATE SODIUM 100 MG: 100 CAPSULE, LIQUID FILLED ORAL at 17:20

## 2018-12-09 RX ADMIN — AMLODIPINE BESYLATE 5 MG: 5 TABLET ORAL at 08:59

## 2018-12-09 RX ADMIN — APIXABAN 5 MG: 5 TABLET, FILM COATED ORAL at 08:59

## 2018-12-09 RX ADMIN — COLESEVELAM 1875 MG: 180 TABLET ORAL at 08:57

## 2018-12-09 RX ADMIN — BENZONATATE 200 MG: 100 CAPSULE ORAL at 08:59

## 2018-12-09 RX ADMIN — TORSEMIDE 10 MG: 10 TABLET ORAL at 08:59

## 2018-12-09 RX ADMIN — PANTOPRAZOLE SODIUM 40 MG: 40 TABLET, DELAYED RELEASE ORAL at 05:06

## 2018-12-09 RX ADMIN — METHYLPREDNISOLONE SODIUM SUCCINATE 20 MG: 40 INJECTION, POWDER, FOR SOLUTION INTRAMUSCULAR; INTRAVENOUS at 05:06

## 2018-12-09 RX ADMIN — APIXABAN 5 MG: 5 TABLET, FILM COATED ORAL at 17:20

## 2018-12-09 RX ADMIN — IPRATROPIUM BROMIDE AND ALBUTEROL SULFATE 3 ML: 2.5; .5 SOLUTION RESPIRATORY (INHALATION) at 07:25

## 2018-12-09 RX ADMIN — IPRATROPIUM BROMIDE AND ALBUTEROL SULFATE 3 ML: 2.5; .5 SOLUTION RESPIRATORY (INHALATION) at 14:15

## 2018-12-09 RX ADMIN — DOCUSATE SODIUM 100 MG: 100 CAPSULE, LIQUID FILLED ORAL at 08:59

## 2018-12-09 RX ADMIN — FLUTICASONE FUROATE AND VILANTEROL TRIFENATATE 1 PUFF: 100; 25 POWDER RESPIRATORY (INHALATION) at 09:04

## 2018-12-09 RX ADMIN — METHYLPREDNISOLONE SODIUM SUCCINATE 20 MG: 40 INJECTION, POWDER, FOR SOLUTION INTRAMUSCULAR; INTRAVENOUS at 21:27

## 2018-12-09 RX ADMIN — CARVEDILOL 25 MG: 25 TABLET, FILM COATED ORAL at 08:58

## 2018-12-09 RX ADMIN — CARVEDILOL 25 MG: 25 TABLET, FILM COATED ORAL at 16:36

## 2018-12-09 RX ADMIN — CLOPIDOGREL BISULFATE 75 MG: 75 TABLET ORAL at 08:59

## 2018-12-09 RX ADMIN — COLESEVELAM 1875 MG: 180 TABLET ORAL at 17:21

## 2018-12-09 RX ADMIN — IPRATROPIUM BROMIDE AND ALBUTEROL SULFATE 3 ML: 2.5; .5 SOLUTION RESPIRATORY (INHALATION) at 19:38

## 2018-12-09 NOTE — PHYSICIAN ADVISOR
Current patient class: Inpatient  The patient is currently on Hospital Day: 3 at [address] [place] [address]      The patient was admitted to the hospital at [phone] on 12/7/18 for the following diagnosis:  Altered mental status [R41 82]  CO2 retention [E87 2]  Acute encephalopathy [G93 40]       There is documentation in the medical record of an expected length of stay of at least 2 midnights  The patient is therefore expected to satisfy the 2 midnight benchmark and given the 2 midnight presumption is appropriate for INPATIENT ADMISSION  Given this expectation of a satisfying stay, CMS instructs us that the patient is most often appropriate for inpatient admission under part A provided medical necessity is documented in the chart  After review of the relevant documentation, labs, vital signs and test results, the patient is appropriate for INPATIENT ADMISSION  Admission to the hospital as an inpatient is a complex decision making process which requires the practitioner to consider the patients presenting complaint, history and physical examination and all relevant testing  With this in mind, in this case, the patient was deemed appropriate for INPATIENT ADMISSION  After review of the documentation and testing available at the time of the admission I concur with this clinical determination of medical necessity  Rationale is as follows: The patient is a 80 yrs old Female who presented to the ED at 12/7/2018  9:16 AM with a chief complaint of Altered Mental Status (Pt  reports being treated a cold by Dr Miguelito Westbrook  Pt daughter reports nurses aide came this morning and was unable to wake up pt this morning  EMT arrived and was able to wake up pt  Pt daughter reports pt with a history of CO2 problems  Pt Arrives AAOx4 )     Given the need for further hospitalization, and along with the documentation of medical necessity present in the chart, the patient is appropriate for inpatient admission    The patient is expected to satisfy the 2 midnight benchmark, and will require further acute medical care  The patient does have comorbid conditions which increases the risk for significant adverse outcome  Given this the patient is appropriate for inpatient admission  The patients vitals on arrival were ED Triage Vitals [12/07/18 0919]   Temperature Pulse Respirations Blood Pressure SpO2   98 °F (36 7 °C) 69 18 162/76 97 %      Temp Source Heart Rate Source Patient Position - Orthostatic VS BP Location FiO2 (%)   Tympanic Monitor Lying Right arm --      Pain Score       No Pain           Past Medical History:   Diagnosis Date    CHF (congestive heart failure) ([facility]) 90/1773    diastolic     CO2 retention     DVT (deep vein thrombosis) in pregnancy ([facility] )     left LE in 2/2018 and 50 years ago    Hyperlipidemia     Hypertension     Pulmonary embolism ([facility] ) 02/06/2018    Renal disorder     ckd    [facility]) 2016    left hand weakness,paresthesia  Past Surgical History:   Procedure Laterality Date    ABDOMINAL SURGERY      ruputured bowel  had colostomy and ileostomy,reversed later on   BACK SURGERY      rods in back  done 30 years ago       CARPAL TUNNEL RELEASE Bilateral     CYST REMOVAL      back    HERNIA REPAIR      REPLACEMENT TOTAL KNEE BILATERAL             Consults have been placed to:   IP CONSULT TO CASE MANAGEMENT  IP CONSULT TO PULMONOLOGY    Vitals:    12/08/18 1359 12/08/18 2001 12/08/18 2050 12/09/18 0051   BP:   106/60 131/66   BP Location:   Right arm Right arm   Pulse:  74 85 76   Resp:  16 18 18   Temp:   97 6 °F (36 4 °C) 98 9 °F (37 2 °C)   TempSrc:   Temporal Oral   SpO2: 94% 95% 96% 93%   Weight:       Height:           Most recent labs:    Recent Labs      12/07/18   0954  12/08/18   0711   WBC   --   10 49*   HGB   --   10 9*   HCT   --   36 8   PLT   --   306   K  4 0  4 0   CALCIUM  9 5  9 2   BUN  30*  40*   CREATININE  1 60*  1 73*   INR  1 09   --    TROPONINI  <0 02 --    AST   --   20   ALT   --   27   ALKPHOS   --   151*       Scheduled Meds:  Current Facility-Administered Medications:  acetaminophen 650 mg Oral Q6H PRN MARGOT Hastings    amLODIPine 5 mg Oral Daily MARGOT Hastings    apixaban 5 mg Oral BID Rosa Revankar, DO    benzonatate 200 mg Oral TID MARGOT Hastings    carvedilol 25 mg Oral BID With Meals MARGOT Hastings    cefTRIAXone 1,000 mg Intravenous Q24H MARGOT Hastings Last Rate: 1,000 mg (12/08/18 8714)   clopidogrel 75 mg Oral Daily MARGOT Pritchard    colesevelam 1,875 mg Oral BID With Meals Jani Garcia MD    dextromethorphan-guaiFENesin 10 mL Oral Q4H PRN MARGOT Hastings    docusate sodium 100 mg Oral BID MARGOT Hastings    fluticasone-vilanterol 1 puff Inhalation Daily MARGOT Hastings    ipratropium-albuterol 3 mL Nebulization Q6H MARGOT Hastings    methylPREDNISolone sodium succinate 20 mg Intravenous [facility] MARGOT Hastings    ondansetron 4 mg Intravenous Q6H PRN MARGOT Hastings    pantoprazole 40 mg Oral Early Morning MARGOT Hastings    polyethylene glycol 17 g Oral Daily PRN Rosa Revankar, DO    torsemide 10 mg Oral Daily MARGOT Hastings      Continuous Infusions:   PRN Meds:   acetaminophen    dextromethorphan-guaiFENesin    ondansetron    polyethylene glycol    Surgical procedures (if appropriate):

## 2018-12-09 NOTE — ASSESSMENT & PLAN NOTE
Hemoglobin stable  Previous iron panel revealed iron 28, ferritin 74  Per outpatient records, patient on oral iron 3 times a week

## 2018-12-09 NOTE — PROGRESS NOTES
Progress Note Jian Peterson 5/12/1928, 80 y o  female MRN: [id number]    Unit/Bed#: [address] [place] [address] Encounter: [id number]    Primary Care Provider: Armand Longoria DO   Date and time admitted to hospital: 12/7/2018  9:16 AM        * Acute encephalopathy   Assessment & Plan    Likely due to the CO2 retention as evidenced on ABG due to noncompliance with trilogy for the last several weeks  Chest x-ray negative  UA negative  Patient Afebrile  Presently, patient confused  Patient believes it is Wednesday  She is argumentative that she has been here for nearly 1 week  · Continue trilogy BiPAP at HS, supplemental oxygen during the day  · Supportive care, up with assist, fall precautions  · PT/OT     Acute tracheobronchitis   Assessment & Plan    As evidence by URI like symptoms for the last 3 weeks according to patient along with moist, productive cough and shortness of breath  · appreciate Pulmonary input  · Continue Rocephin 1 g IV daily, Solu-Medrol 20 mg IV q 8 hours, Duonebs Q6H, Tessalon Perles Q8H, and Robitussin PRN  · Check sputum culture and respiratory pathogen profile      Chronic diastolic CHF (congestive heart failure) ([facility] [address] )   Assessment & Plan    BNP 2657  Appears euvolemic  S/p Lasix 20 mg IV x1 in ED  · Continue torsemide, Coreg     Mild persistent asthma with acute exacerbation   Assessment & Plan    Continue with IV Solu-Medrol 20 mg q 8 hours, nebulizer treatments, Breo     Anemia of chronic renal failure, stage 3 (moderate) (HCC)   Assessment & Plan    Hemoglobin stable  Previous iron panel revealed iron 28, ferritin 74  Per outpatient records, patient on oral iron 3 times a week       Chronic respiratory failure with hypoxia and hypercapnia (HCC)   Assessment & Plan    Due to severe thoracic scoliosis status post partial correction with bilateral Duarte rods and surgery   Continue trilogy QHS  Supplemental oxygen during the day  Pulmonology consult, appreciate input     Chronic kidney disease, stage III (moderate) [facility])   Assessment & Plan    Follows outpatient nephrologist, Dr Darryle Ishihara  Creatinine at baseline  baseline creatinine 1 5-1 9  Status post Lasix 20 mg IV x1 in ED  · Avoid nephrotoxins  · Repeat lab work in a m  Essential hypertension   Assessment & Plan    Blood pressures improved  · Continue Norvasc 5 mg daily, Coreg 25 mg BID, and Torsemide 10 mg daily  · Monitor blood pressure          VTE Pharmacologic Prophylaxis:   Pharmacologic: Apixaban (Eliquis)  Mechanical VTE Prophylaxis in Place: Yes    Patient Centered Rounds: I have performed bedside rounds with nursing staff today  Discussions with Specialists or Other Care Team Provider:  Nursing, case management, pulmonology    Education and Discussions with Family / Patient:  I have answered all questions to the best of my ability  Time Spent for Care: 20 minutes  More than 50% of total time spent on counseling and coordination of care as described above  Current Length of Stay: 2 day(s)    Current Patient Status: Inpatient   Certification Statement: The patient will continue to require additional inpatient hospital stay due to Altered mental status, acute tracheobronchitis on IV antibiotics and IV steroids    Discharge Plan:  Patient is not medically stable for discharge today, likely in 24-48 hours pending progress  Code Status: Level 3 - DNAR and DNI      Subjective:   Patient observed resting out of bed in chair with her feet elevated  She is alert to person and place but disoriented to time  She is adamant that she has been in the hospital for nearly 1 week  She is adamant that to day is Wednesday and not Sunday  She states that she needs to leave the hospital because she has places to go and people to see  She is anxious for discharge home  She knows that she lives alone and safety is a concern    She states that her breathing is better at rest but continues with coughing spells and shortness of breath with exertion  She wore her trilogy BiPAP for about 3 hours last night  Reports constipation  She denies any headache, dizziness, lightheadedness, chest pain/tightness, abdominal pain, nausea, vomiting, shortness of breath  Objective:     Vitals:   Temp (24hrs), Av 1 °F (36 7 °C), Min:97 6 °F (36 4 °C), Max:98 9 °F (37 2 °C)    Temp:  [97 6 °F (36 4 °C)-98 9 °F (37 2 °C)] 97 7 °F (36 5 °C)  HR:  [64-85] 64  Resp:  [16-18] 18  BP: (106-137)/(60-67) 137/67  SpO2:  [93 %-96 %] 93 %  Body mass index is 31 35 kg/m²  Input and Output Summary (last 24 hours): Intake/Output Summary (Last 24 hours) at 18 1103  Last data filed at 18 0519   Gross per 24 hour   Intake                0 ml   Output              850 ml   Net             -850 ml       Physical Exam:     Physical Exam   Constitutional: She appears well-developed  No distress  HENT:   Head: Normocephalic  Neck: Normal range of motion  Cardiovascular: Normal rate and regular rhythm  Pulmonary/Chest: Effort normal  No respiratory distress  She has decreased breath sounds in the right upper field, the right lower field, the left upper field and the left lower field  She has no wheezes  She has no rhonchi  She has no rales  Abdominal: Soft  Bowel sounds are normal  She exhibits no distension  Musculoskeletal: She exhibits edema (Trace BLE)  She exhibits no tenderness  Neurological: She is alert  Skin: Skin is warm and dry  No rash noted  She is not diaphoretic  Psychiatric: She has a normal mood and affect  Her behavior is normal    Nursing note and vitals reviewed        Additional Data:     Labs:      Results from last 7 days  Lab Units 18  0711   WBC Thousand/uL 10 49*   HEMOGLOBIN g/dL 10 9*   HEMATOCRIT % 36 8   PLATELETS Thousands/uL 306   NEUTROS PCT % 93*   LYMPHS PCT % 5*   MONOS PCT % 1*   EOS PCT % 0       Results from last 7 days  Lab Units 18  0516   POTASSIUM mmol/L 4 2   CHLORIDE mmol/L 100 CO2 mmol/L 39*   BUN mg/dL 60*   CREATININE mg/dL 1 78*   CALCIUM mg/dL 9 3   ALK PHOS U/L 127*   ALT U/L 21   AST U/L 13       Results from last 7 days  Lab Units 12/07/18  0954   INR  1 09       * I Have Reviewed All Lab Data Listed Above  * Additional Pertinent Lab Tests Reviewed: All Labs Within Last 24 Hours Reviewed    Imaging:    Imaging Reports Reviewed Today Include:  Chest x-ray  Imaging Personally Reviewed by Myself Includes:  None    Recent Cultures (last 7 days):           Last 24 Hours Medication List:     Current Facility-Administered Medications:  acetaminophen 650 mg Oral Q6H PRN MARGOT Koroma    amLODIPine 5 mg Oral Daily MARGOT Koroma    apixaban 5 mg Oral BID Rosa Revankar, DO    benzonatate 200 mg Oral TID MARGOT Koroma    carvedilol 25 mg Oral BID With Meals MARGOT Koroma    cefTRIAXone 1,000 mg Intravenous Q24H MARGOT Koroma Last Rate: 1,000 mg (12/08/18 1714)   clopidogrel 75 mg Oral Daily MARGOT Pritchard    colesevelam 1,875 mg Oral BID With Meals Dalila Ellington MD    dextromethorphan-guaiFENesin 10 mL Oral Q4H PRN MARGOT Koroma    docusate sodium 100 mg Oral BID MARGOT Koroma    fluticasone-vilanterol 1 puff Inhalation Daily MARGOT Koroma    ipratropium-albuterol 3 mL Nebulization Q6H MARGOT Koroma    methylPREDNISolone sodium succinate 20 mg Intravenous [facility] MARGOT Koroma    ondansetron 4 mg Intravenous Q6H PRN MARGOT Koroma    pantoprazole 40 mg Oral Early Morning MARGOT Koroma    polyethylene glycol 17 g Oral Daily PRN Rosa Revankar,     torsemide 10 mg Oral Daily MARGOT Koroma         Today, Patient Was Seen By: MARGOT Kormoa    ** Please Note: Dictation voice to text software may have been used in the creation of this document   **

## 2018-12-09 NOTE — ASSESSMENT & PLAN NOTE
Due to severe thoracic scoliosis status post partial correction with bilateral Duarte rods and surgery   Continue trilogy QHS  Supplemental oxygen during the day  Pulmonology consult, appreciate input

## 2018-12-09 NOTE — ASSESSMENT & PLAN NOTE
Follows outpatient nephrologist, Dr Miles Earing  Creatinine at baseline  baseline creatinine 1 5-1 9  Status post Lasix 20 mg IV x1 in ED  · Avoid nephrotoxins  · Repeat lab work in a m

## 2018-12-09 NOTE — PROGRESS NOTES
[facility] Internal Medicine Progress Note  Patient: Dipika Jones 80 y o  female   MRN: [id number]  PCP: Tyson Garcia DO  Unit/Bed#: [address] [place] [address] Encounter: [id number]  Date Of Visit: 12/08/18    Problem List:    Principal Problem:    Acute encephalopathy  Active Problems:    Mild persistent asthma with acute exacerbation    Chronic respiratory failure with hypoxia and hypercapnia (HCC)    Acute tracheobronchitis    Essential hypertension    Chronic kidney disease, stage III (moderate) (HCC)    Chronic diastolic CHF (congestive heart failure) (HCC)    Anemia of chronic renal failure, stage 3 (moderate) (HCC)      Assessment & Plan:    * Acute encephalopathy   Assessment & Plan    Likely due to the CO2 retention as evidenced on ABG due to noncompliance with trilogy for the last several weeks due to an upper respiratory infection at home  No signs of bacterial infection  Chest x-ray negative  UA negative  Patient Afebrile    · Continue trilogy BiPAP at HS, supplemental oxygen during the day  · Mental status has now improved  · Supportive care, up with assist, fall precautions  · PT/OT  · TSH WNL     Mild persistent asthma with acute exacerbation   Assessment & Plan    Continue with IV Solu-Medrol 20 mg q 8 hours, nebulizer treatments, Breo     Acute tracheobronchitis   Assessment & Plan    As evidence by URI like symptoms for the last 3 weeks according to patient along with moist, productive cough and shortness of breath  · appreciate Pulmonary input  · Continue Rocephin 1 g IV daily and continue duo nebs Q6H, Tessalon Perles Q8H and Robitussin PRN  · Consider need for IV steroids,  holding at this time as exam without any significant wheezing  · sputum culture and respiratory pathogen profile pending     Chronic respiratory failure with hypoxia and hypercapnia (HCC)   Assessment & Plan    Due to severe thoracic scoliosis status post partial correction with bilateral Duarte rods and surgery   Continue trilogy QHS  Supplemental oxygen during the day  Pulmonology consult, appreciate input     Chronic diastolic CHF (congestive heart failure) ([facility] [address] )   Assessment & Plan    BNP 2657  Appears euvolemic  S/p Lasix 20 mg IV x1 in ED  · Continue torsemide, Coreg     Chronic kidney disease, stage III (moderate) ([facility] [address] )   Assessment & Plan    Follows outpatient nephrologist, Dr Prosper Villatoro  Creatinine at baseline  baseline creatinine 1 5-1 9  Status post Lasix 20 mg IV x1 in ED  · Avoid nephrotoxins  · Repeat lab work in a m  Essential hypertension   Assessment & Plan    Blood pressures improved  · Continue Norvasc 5 mg daily, Coreg 25 mg BID, and Torsemide 10 mg daily  · Monitor blood pressure      Anemia of chronic renal failure, stage 3 (moderate) ([facility])   Assessment & Plan    Hemoglobin stable  Previous iron panel revealed iron 28, ferritin 74  Per outpatient records, patient on oral iron 3 times a week  VTE Pharmacologic Prophylaxis:   Pharmacologic: Apixaban (Eliquis)  Mechanical VTE Prophylaxis in Place: Yes    Patient Centered Rounds: I have performed bedside rounds with nursing staff today  Discussions with Specialists or Other Care Team Provider: Yes    Education and Discussions with Family / Patient:Yes    Time Spent for Care: 30 minutes  More than 50% of total time spent on counseling and coordination of care as described above      Current Length of Stay: 1 day(s)    Current Patient Status: Inpatient     Discharge Plan: home    Code Status: Level 3 - DNAR and DNI    Certification Statement: The patient will continue to require additional inpatient hospital stay due to Acute asthma exacerbation, tracheobronchitis      Subjective:   States breathing and cough is better    Objective:     Vitals:   Temp (24hrs), Av 5 °F (36 4 °C), Min:97 5 °F (36 4 °C), Max:97 5 °F (36 4 °C)    Temp:  [97 5 °F (36 4 °C)] 97 5 °F (36 4 °C)  HR:  [71-82] 75  Resp:  [18-20] 18  BP: (109-148)/(56-66) 119/57  SpO2:  [92 %-98 %] 94 %  Body mass index is 31 35 kg/m²  Input and Output Summary (last 24 hours): Intake/Output Summary (Last 24 hours) at 12/08/18 1922  Last data filed at 12/08/18 0501   Gross per 24 hour   Intake                0 ml   Output              200 ml   Net             -200 ml       Physical Exam:     Physical Exam   Constitutional: No distress  HENT:   Head: Normocephalic and atraumatic  Eyes: EOM are normal  Right eye exhibits no discharge  Left eye exhibits no discharge  No scleral icterus  Neck: Neck supple  Cardiovascular: Normal rate and regular rhythm  Pulmonary/Chest: Effort normal  No respiratory distress  She has no wheezes  She has no rales  Decreased breath sounds bilaterally   Abdominal: Soft  Bowel sounds are normal  She exhibits no distension  There is no tenderness  Musculoskeletal: She exhibits edema (Left L E - chronic)  Neurological: She is alert  No cranial nerve deficit  forgetful   Skin: Skin is dry  She is not diaphoretic  Additional Data:     Labs:      Results from last 7 days  Lab Units 12/08/18  0711   WBC Thousand/uL 10 49*   HEMOGLOBIN g/dL 10 9*   HEMATOCRIT % 36 8   PLATELETS Thousands/uL 306   NEUTROS PCT % 93*   LYMPHS PCT % 5*   MONOS PCT % 1*   EOS PCT % 0       Results from last 7 days  Lab Units 12/08/18  0711   POTASSIUM mmol/L 4 0   CHLORIDE mmol/L 99*   CO2 mmol/L 37*   BUN mg/dL 40*   CREATININE mg/dL 1 73*   CALCIUM mg/dL 9 2   ALK PHOS U/L 151*   ALT U/L 27   AST U/L 20       Results from last 7 days  Lab Units 12/07/18  0954   INR  1 09       * I Have Reviewed All Lab Data Listed Above  * Additional Pertinent Lab Tests Reviewed: All Labs For Current Hospital Admission Reviewed    Imaging:  Xr Chest Pa & Lateral    Result Date: 12/7/2018  Narrative: CHEST INDICATION:   cardiac workup  Fever and cough  COMPARISON:  3/30/2018  EXAM PERFORMED/VIEWS:  XR CHEST PA & LATERAL FINDINGS: Stable cardiomegaly is noted  The lungs are clear    No pneumothorax or pleural effusion  Thoracolumbar rods are again present  Impression: No acute cardiopulmonary disease  Workstation performed: [id number]     Imaging Reports Reviewed by myself    Cultures:   Blood Culture:   Lab Results   Component Value Date    BLOODCX No Growth After 5 Days  03/29/2018    BLOODCX No Growth After 5 Days  03/29/2018    BLOODCX No Growth After 5 Days  08/27/2017    BLOODCX No Growth After 5 Days   08/27/2017     Urine Culture:   Lab Results   Component Value Date    URINECX No Growth <1000 cfu/mL 03/29/2018    URINECX >100,000 cfu/ml Mixed Contaminants X6 05/09/2017     Sputum Culture: No components found for: SPUTUMCX  Wound Culture: No results found for: WOUNDCULT    Last 24 Hours Medication List:     Current Facility-Administered Medications:  acetaminophen 650 mg Oral Q6H PRN Julisa Ritesh, CRNP    amLODIPine 5 mg Oral Daily Julisa Ritesh, CRNP    apixaban 5 mg Oral BID Rosa Revankar, DO    benzonatate 200 mg Oral TID Julisajethro Reyesa, CRNP    carvedilol 25 mg Oral BID With Meals Julisajethro Reyesa, CRNP    cefTRIAXone 1,000 mg Intravenous Q24H Julisajethro Awad, CRNP Last Rate: 1,000 mg (12/08/18 1714)   clopidogrel 75 mg Oral Daily Julisa Ritesh, CRNP    dextromethorphan-guaiFENesin 10 mL Oral Q4H PRN Julisa Ritesh, CRNP    docusate sodium 100 mg Oral BID Julisa Ritesh, CRNP    fluticasone-vilanterol 1 puff Inhalation Daily Julisa Ritesh, CRNP    ipratropium-albuterol 3 mL Nebulization Q6H Julisa Ritesh, CRNP    methylPREDNISolone sodium succinate 20 mg Intravenous [facility] Julisa Ritesh, CRNP    ondansetron 4 mg Intravenous Q6H PRN Julisa Ritesh, CRNP    pantoprazole 40 mg Oral Early Morning Julisa Ritesh, CRNP    polyethylene glycol 17 g Oral Daily PRN Rosa Revankar, DO    torsemide 10 mg Oral Daily Julisa Ritesh, DAYNENP         Today, Patient Was Seen By: Wei Sanchez DO    ** Please Note: "This note has been constructed using a voice recognition system  Therefore there may be syntax, spelling, and/or grammatical errors   Please call if you have any questions  "**

## 2018-12-09 NOTE — ASSESSMENT & PLAN NOTE
As evidence by URI like symptoms for the last 3 weeks according to patient along with moist, productive cough and shortness of breath  · appreciate Pulmonary input  · Continue Rocephin 1 g IV daily, Solu-Medrol 20 mg IV q 8 hours, Duonebs Q6H, Tessalon Perles Q8H, and Robitussin PRN  · Check sputum culture and respiratory pathogen profile

## 2018-12-09 NOTE — ASSESSMENT & PLAN NOTE
Blood pressures improved  · Continue Norvasc 5 mg daily, Coreg 25 mg BID, and Torsemide 10 mg daily  · Monitor blood pressure

## 2018-12-10 LAB
ANION GAP SERPL CALCULATED.3IONS-SCNC: 1 MMOL/L (ref 4–13)
BUN SERPL-MCNC: 78 MG/DL (ref 5–25)
CALCIUM SERPL-MCNC: 9.1 MG/DL (ref 8.3–10.1)
CHLORIDE SERPL-SCNC: 101 MMOL/L (ref 100–108)
CO2 SERPL-SCNC: 37 MMOL/L (ref 21–32)
CREAT SERPL-MCNC: 1.85 MG/DL (ref 0.6–1.3)
ERYTHROCYTE [DISTWIDTH] IN BLOOD BY AUTOMATED COUNT: 12.4 % (ref 11.6–15.1)
GFR SERPL CREATININE-BSD FRML MDRD: 24 ML/MIN/1.73SQ M
GLUCOSE SERPL-MCNC: 196 MG/DL (ref 65–140)
HCT VFR BLD AUTO: 34.3 % (ref 34.8–46.1)
HGB BLD-MCNC: 10.1 G/DL (ref 11.5–15.4)
MCH RBC QN AUTO: 29.6 PG (ref 26.8–34.3)
MCHC RBC AUTO-ENTMCNC: 29.4 G/DL (ref 31.4–37.4)
MCV RBC AUTO: 101 FL (ref 82–98)
PLATELET # BLD AUTO: 336 THOUSANDS/UL (ref 149–390)
PMV BLD AUTO: 9.9 FL (ref 8.9–12.7)
POTASSIUM SERPL-SCNC: 4.4 MMOL/L (ref 3.5–5.3)
RBC # BLD AUTO: 3.41 MILLION/UL (ref 3.81–5.12)
SODIUM SERPL-SCNC: 139 MMOL/L (ref 136–145)
WBC # BLD AUTO: 9.61 THOUSAND/UL (ref 4.31–10.16)

## 2018-12-10 PROCEDURE — 97167 OT EVAL HIGH COMPLEX 60 MIN: CPT

## 2018-12-10 PROCEDURE — 85027 COMPLETE CBC AUTOMATED: CPT | Performed by: NURSE PRACTITIONER

## 2018-12-10 PROCEDURE — 99232 SBSQ HOSP IP/OBS MODERATE 35: CPT | Performed by: INTERNAL MEDICINE

## 2018-12-10 PROCEDURE — G8988 SELF CARE GOAL STATUS: HCPCS

## 2018-12-10 PROCEDURE — 99232 SBSQ HOSP IP/OBS MODERATE 35: CPT | Performed by: NURSE PRACTITIONER

## 2018-12-10 PROCEDURE — 94760 N-INVAS EAR/PLS OXIMETRY 1: CPT

## 2018-12-10 PROCEDURE — 80048 BASIC METABOLIC PNL TOTAL CA: CPT | Performed by: NURSE PRACTITIONER

## 2018-12-10 PROCEDURE — 94640 AIRWAY INHALATION TREATMENT: CPT

## 2018-12-10 PROCEDURE — G8987 SELF CARE CURRENT STATUS: HCPCS

## 2018-12-10 RX ORDER — PREDNISONE 20 MG/1
40 TABLET ORAL DAILY
Status: DISCONTINUED | OUTPATIENT
Start: 2018-12-11 | End: 2018-12-11 | Stop reason: HOSPADM

## 2018-12-10 RX ORDER — CEFUROXIME AXETIL 250 MG/1
500 TABLET ORAL EVERY 24 HOURS
Status: DISCONTINUED | OUTPATIENT
Start: 2018-12-10 | End: 2018-12-11

## 2018-12-10 RX ORDER — IPRATROPIUM BROMIDE AND ALBUTEROL SULFATE 2.5; .5 MG/3ML; MG/3ML
3 SOLUTION RESPIRATORY (INHALATION) EVERY 6 HOURS PRN
Status: DISCONTINUED | OUTPATIENT
Start: 2018-12-10 | End: 2018-12-11 | Stop reason: HOSPADM

## 2018-12-10 RX ADMIN — APIXABAN 5 MG: 5 TABLET, FILM COATED ORAL at 08:37

## 2018-12-10 RX ADMIN — CARVEDILOL 25 MG: 25 TABLET, FILM COATED ORAL at 17:24

## 2018-12-10 RX ADMIN — CEFUROXIME AXETIL 500 MG: 250 TABLET ORAL at 14:53

## 2018-12-10 RX ADMIN — PANTOPRAZOLE SODIUM 40 MG: 40 TABLET, DELAYED RELEASE ORAL at 06:48

## 2018-12-10 RX ADMIN — IPRATROPIUM BROMIDE AND ALBUTEROL SULFATE 3 ML: 2.5; .5 SOLUTION RESPIRATORY (INHALATION) at 08:03

## 2018-12-10 RX ADMIN — IPRATROPIUM BROMIDE AND ALBUTEROL SULFATE 3 ML: 2.5; .5 SOLUTION RESPIRATORY (INHALATION) at 13:45

## 2018-12-10 RX ADMIN — CARVEDILOL 25 MG: 25 TABLET, FILM COATED ORAL at 08:37

## 2018-12-10 RX ADMIN — METHYLPREDNISOLONE SODIUM SUCCINATE 20 MG: 40 INJECTION, POWDER, FOR SOLUTION INTRAMUSCULAR; INTRAVENOUS at 08:39

## 2018-12-10 RX ADMIN — BENZONATATE 200 MG: 100 CAPSULE ORAL at 08:37

## 2018-12-10 RX ADMIN — FLUTICASONE FUROATE AND VILANTEROL TRIFENATATE 1 PUFF: 100; 25 POWDER RESPIRATORY (INHALATION) at 08:39

## 2018-12-10 RX ADMIN — APIXABAN 5 MG: 5 TABLET, FILM COATED ORAL at 17:24

## 2018-12-10 RX ADMIN — DOCUSATE SODIUM 100 MG: 100 CAPSULE, LIQUID FILLED ORAL at 08:37

## 2018-12-10 RX ADMIN — IPRATROPIUM BROMIDE AND ALBUTEROL SULFATE 3 ML: 2.5; .5 SOLUTION RESPIRATORY (INHALATION) at 02:33

## 2018-12-10 RX ADMIN — BENZONATATE 200 MG: 100 CAPSULE ORAL at 17:24

## 2018-12-10 RX ADMIN — COLESEVELAM 1875 MG: 180 TABLET ORAL at 17:23

## 2018-12-10 RX ADMIN — DOCUSATE SODIUM 100 MG: 100 CAPSULE, LIQUID FILLED ORAL at 17:24

## 2018-12-10 RX ADMIN — BENZONATATE 200 MG: 100 CAPSULE ORAL at 21:40

## 2018-12-10 RX ADMIN — CLOPIDOGREL BISULFATE 75 MG: 75 TABLET ORAL at 08:38

## 2018-12-10 RX ADMIN — AMLODIPINE BESYLATE 5 MG: 5 TABLET ORAL at 08:38

## 2018-12-10 RX ADMIN — COLESEVELAM 1875 MG: 180 TABLET ORAL at 08:37

## 2018-12-10 RX ADMIN — TORSEMIDE 10 MG: 10 TABLET ORAL at 08:38

## 2018-12-10 NOTE — ASSESSMENT & PLAN NOTE
Hemoglobin stable  · Previous iron panel revealed iron 28, ferritin 74  Per outpatient records  · On oral iron 3 times a week

## 2018-12-10 NOTE — ASSESSMENT & PLAN NOTE
Creatinine at baseline  baseline creatinine 1 5-1  Follows outpatient nephrologist, Dr Erika Jennings  Baseline creatinine 1 5-1 9   Status post Lasix 20 mg IV x1 in ED  · Avoid nephrotoxins  · Repeat  BMP in am

## 2018-12-10 NOTE — ASSESSMENT & PLAN NOTE
March 2018 echo showed EF of 55-60% with no regional wall motion abnormalities  BNP 2657  Appears euvolemic   S/p Lasix 20 mg IV x1 in ED  · Continue Torsemide, Coreg

## 2018-12-10 NOTE — ASSESSMENT & PLAN NOTE
Likely due to the CO2 retention as evidenced on ABG due to noncompliance with trilogy for the last several weeks  Chest x-ray negative  UA negative  Patient Afebrile  Mentation improved    · Continue trilogy BiPAP at HS, supplemental oxygen during the day  · Supportive care, up with assist, fall precautions  · PT/OT

## 2018-12-10 NOTE — PROGRESS NOTES
Progress Note - Pulmonary   Paty Knox 80 y o  female MRN: [id number]  Unit/Bed#: [address] [place] [address] Encounter: [id number]      Paty Knox is 80years old and has chronic mixed respiratory failure mostly due to thoracic kyphoscoliosis  This severity of her scoliosis required Duarte andrés placement at the age of 58  She utilizes a trilogy ventilation system at home and is chronically oxygen dependent on 2 L  She has severe restrictive disease  Past medical history of stage III CKD, chronic diastolic heart failure, HTN, and a history of a left lower extremity DVT      She was admitted to the hospital due to a recent upper respiratory infection  She was confused and lethargic on admission, mildly hypercapnic, but no evidence of lung infiltrates on initial exam      She is currently being treated for acute tracheobronchitis and asthma exacerbation         Assessment/Plan:   -acute tracheobronchitis:  -continue ceftriaxone currently day 4/5 > can transition to ceftin   -continue methylprednisolone transition to PO prednisone  -every 8 hr DuoNeb PRN  -continue Breo  -follow up on sputum cultures once acquired, RPCR, MRSA   -Acute Encephalopathy:  -improving  -delirium sanitation  -concern for home planning if remains confused  -Chronic Hypoxemic and Hypercapneic Respiratory Failure:  -patient remains on 2 L nasal cannula oxygen  -she is dependent on her noninvasive ventilator > utilizing and tolerating at night  -utilizing her trilogy ventilator here in the hospital  -severe thoracic kyphoscoliosis  -post bilateral Duarte andrés placement remotely at the age of 58            ______________________________________________________________________    Subjective: Pt seen and examined at bedside  No complaints    Wants to go home    Tele Events:     Vitals:   Temp:  [97 5 °F (36 4 °C)-98 1 °F (36 7 °C)] 98 1 °F (36 7 °C)  HR:  [] 96  Resp:  [18] 18  BP: (106-125)/(58-86) 123/86  Weight (last 2 days)     Date/Time Weight    12/10/18 0600  70 7 (155 87)            Oxygen Therapy  SpO2: 94 %  O2 Flow Rate (L/min): 2 L/min    IV Infusions:       Nutrition:        Diet Orders            Start     Ordered    12/08/18 1449  Dietary nutrition supplements  Once     Question Answer Comment   Select Supplement: Ensure Compact-Vanilla    Frequency Lunch, Dinner        12/08/18 1450    12/07/18 1512  Diet Cardiovascular; Cardiac  Diet effective now     Question Answer Comment   Diet Type Cardiovascular    Cardiac Cardiac    RD to adjust diet per protocol? Yes        12/07/18 1511    12/07/18 1252  Room Service  Once     Question:  Type of Service  Answer:  Room Service - Appropriate with Assistance    12/07/18 1252          Ins/Outs:   I/O       12/08 0701 - 12/09 0700 12/09 0701 - 12/10 0700 12/10 0701 - 12/11 0700    P  O   840     Total Intake(mL/kg)  840 (11 9)     Urine (mL/kg/hr) 850 (0 5) 1050 (0 6)     Total Output 850 1050      Net -850 -210                   Lines/Drains:  Invasive Devices     Peripheral Intravenous Line            Peripheral IV 12/07/18 Right Hand 3 days                 Active medications:  Scheduled Meds:  Current Facility-Administered Medications:  acetaminophen 650 mg Oral Q6H PRN MARGOT Jackson    amLODIPine 5 mg Oral Daily MARGOT Jackson    apixaban 5 mg Oral BID Rosa Mcclelland DO    benzonatate 200 mg Oral TID MARGOT Jackson    carvedilol 25 mg Oral BID With Meals MARGTO Jackson    cefTRIAXone 1,000 mg Intravenous Q24H MARGOT Jackson Last Rate: 1,000 mg (12/09/18 1645)   clopidogrel 75 mg Oral Daily MARGOT Pritchard    colesevelam 1,875 mg Oral BID With Meals Preet Leung MD    dextromethorphan-guaiFENesin 10 mL Oral Q4H PRN MARGOT Jackson    docusate sodium 100 mg Oral BID MARGOT Jackson    fluticasone-vilanterol 1 puff Inhalation Daily MARGOT Jackson    ipratropium-albuterol 3 mL Nebulization Q6H Ricardo Shines, CRNP    methylPREDNISolone sodium succinate 20 mg Intravenous Q12H [address] Prudence ELIZA Marquis    ondansetron 4 mg Intravenous Q6H PRN Ricardo Shines, CRNP    pantoprazole 40 mg Oral Early Morning Ricardo Shines, CRNP    polyethylene glycol 17 g Oral Daily PRN Rosa Revankar, DO    torsemide 10 mg Oral Daily Ricardo Shines, CRNP      PRN Meds:  acetaminophen 650 mg Q6H PRN   dextromethorphan-guaiFENesin 10 mL Q4H PRN   ondansetron 4 mg Q6H PRN   polyethylene glycol 17 g Daily PRN     ____________________________________________________________________      Physical Exam   Constitutional: She is oriented to person, place, and time  She appears well-developed and well-nourished  HENT:   Head: Normocephalic and atraumatic  Eyes: Pupils are equal, round, and reactive to light  Conjunctivae are normal    Neck: Normal range of motion  Neck supple  Cardiovascular: Normal rate, regular rhythm and normal heart sounds  Pulmonary/Chest: Effort normal  No accessory muscle usage  No tachypnea  No respiratory distress  She has decreased breath sounds in the right lower field and the left lower field  She has no wheezes  She has no rhonchi  She has no rales  She exhibits no tenderness  Abdominal: Soft  Bowel sounds are normal    Musculoskeletal: Normal range of motion  She exhibits no edema  Neurological: She is alert and oriented to person, place, and time  Skin: Skin is warm and dry     Psychiatric: She has a normal mood and affect            ____________________________________________________________________    Labs:   CBC:   Results from last 7 days  Lab Units 12/10/18  0555 12/08/18  0711 12/07/18  0953   WBC Thousand/uL 9 61 10 49* 8 86   HEMOGLOBIN g/dL 10 1* 10 9* 10 2*   HEMATOCRIT % 34 3* 36 8 34 4*   MCV fL 101* 101* 101*   PLATELETS Thousands/uL 336 306 283     CMP:   Results from last 7 days  Lab Units 12/10/18  0555 12/09/18  0516 12/08/18  0711   POTASSIUM mmol/L 4 4 4 2 4 0   CHLORIDE mmol/L 101 100 99*   CO2 mmol/L 37* 39* 37*   BUN mg/dL 78* 60* 40*   CREATININE mg/dL 1 85* 1 78* 1 73*   CALCIUM mg/dL 9 1 9 3 9 2   AST U/L  --  13 20   ALT U/L  --  21 27   ALK PHOS U/L  --  127* 151*   EGFR ml/min/1 73sq m 24 25 26     Magnesium:   Results from last 7 days  Lab Units 12/08/18  0711   MAGNESIUM mg/dL 2 1     Phosphorous:   Results from last 7 days  Lab Units 12/09/18  0516   PHOSPHORUS mg/dL 3 4     Troponin:   Results from last 7 days  Lab Units 12/07/18  0954   TROPONIN I ng/mL <0 02     PT/INR:   Results from last 7 days  Lab Units 12/07/18  0954   PTT seconds 24   INR  1 09     Lactic Acid:     BNP:   Results from last 7 days  Lab Units 12/07/18  0954   NT-PRO BNP pg/mL 2,657*     TSH:   Results from last 7 days  Lab Units 12/08/18  0711   TSH 3RD GENERATON uIU/mL 0 637     Procalcitonin: Invalid input(s): PROCALCITONIN      Imaging:   XR chest pa & lateral   Final Result by Terra Garcia MD (12/07 1124)      No acute cardiopulmonary disease  Workstation performed: [id number]                 Micro: Lab Results   Component Value Date    BLOODCX No Growth After 5 Days  03/29/2018    BLOODCX No Growth After 5 Days  03/29/2018    BLOODCX No Growth After 5 Days  08/27/2017    BLOODCX No Growth After 5 Days   08/27/2017    URINECX No Growth <1000 cfu/mL 03/29/2018    URINECX >100,000 cfu/ml Mixed Contaminants X6 05/09/2017    MRSACULTURE  03/29/2018     No Methicillin Resistant Staphlyococcus aureus (MRSA) isolated            Invalid input(s): LEGIONELLAURINARYANTIGEN        Code Status: Level 3 - DNAR and DNI

## 2018-12-10 NOTE — PLAN OF CARE
Problem: RESPIRATORY - ADULT  Goal: Achieves optimal ventilation and oxygenation  INTERVENTIONS:  - Assess for changes in respiratory status  - Assess for changes in mentation and behavior  - Position to facilitate oxygenation and minimize respiratory effort  - Oxygen administration by appropriate delivery method based on oxygen saturation (per order) or ABGs  - Initiate smoking cessation education as indicated  - Encourage broncho-pulmonary hygiene including cough, deep breathe, Incentive Spirometry  - Assess the need for suctioning and aspirate as needed  - Assess and instruct to report SOB or any respiratory difficulty  - Respiratory Therapy support as indicated   Outcome: Progressing      Comments: Patient continues with nebulizer treatments and oxygen therapy  Will update POC x 3 days 12/13/18  Kanika Batres, RRT

## 2018-12-10 NOTE — PLAN OF CARE
Problem: DISCHARGE PLANNING - CARE MANAGEMENT  Goal: Discharge to post-acute care or home with appropriate resources  INTERVENTIONS:  - Conduct assessment to determine patient/family and health care team treatment goals, and need for post-acute services based on payer coverage, community resources, and patient preferences, and barriers to discharge  - Address psychosocial, clinical, and financial barriers to discharge as identified in assessment in conjunction with the patient/family and health care team  - Arrange appropriate level of post-acute services according to patient's   needs and preference and payer coverage in collaboration with the physician and health care team  - Communicate with and update the patient/family, physician, and health care team regarding progress on the discharge plan  - Arrange appropriate transportation to post-acute venues  Patient will discharge home with family support when cleared for discharge    Outcome: Progressing

## 2018-12-10 NOTE — ASSESSMENT & PLAN NOTE
As evidenced by URI like symptoms for the last 3 weeks according to patient along with moist, productive cough and shortness of breath  · Pulmonary consult  Appreciate input  · Transitioned to PO Ceftin and PO Prednisone today by Pulmonary    · Continue Duonebs Q6H, Tessalon Perles Q8H, and Robitussin PRN  · Check sputum culture and respiratory pathogen profile pending

## 2018-12-10 NOTE — PROGRESS NOTES
Progress Note Dima Due 5/12/1928, 80 y o  female MRN: [id number]    Unit/Bed#: [address] [place] [address] Encounter: [id number]    Primary Care Provider: Adrián Sotelo DO   Date and time admitted to hospital: 12/7/2018  9:16 AM        * Acute encephalopathy   Assessment & Plan    Likely due to the CO2 retention as evidenced on ABG due to noncompliance with trilogy for the last several weeks  Chest x-ray negative  UA negative  Patient Afebrile  Mentation improved  · Continue trilogy BiPAP at HS, supplemental oxygen during the day  · Supportive care, up with assist, fall precautions  · PT/OT     Mild persistent asthma with acute exacerbation   Assessment & Plan    · Transitioned to prednisone per Pulmonary  · Continue nebulizer treatments, Breo     Acute tracheobronchitis   Assessment & Plan    As evidenced by URI like symptoms for the last 3 weeks according to patient along with moist, productive cough and shortness of breath  · Pulmonary consult  Appreciate input  · Transitioned to PO Ceftin and PO Prednisone today by Pulmonary  · Continue Duonebs Q6H, Tessalon Perles Q8H, and Robitussin PRN  · Check sputum culture and respiratory pathogen profile pending     Anemia of chronic renal failure, stage 3 (moderate) (HCC)   Assessment & Plan    Hemoglobin stable  · Previous iron panel revealed iron 28, ferritin 74  Per outpatient records  · On oral iron 3 times a week  Chronic respiratory failure with hypoxia and hypercapnia (HCC)   Assessment & Plan    Secondary to severe thoracic scoliosis status post partial correction with bilateral Duarte rods and surgery   · Continue trilogy QHS  · Supplemental oxygen during the day  · Pulmonology consult, appreciate input     Chronic diastolic CHF (congestive heart failure) [facility])   Assessment & Plan    March 2018 echo showed EF of 55-60% with no regional wall motion abnormalities  BNP 2657  Appears euvolemic   S/p Lasix 20 mg IV x1 in ED  · Continue Torsemide, Coreg     Chronic kidney disease, stage III (moderate) ([facility])   Assessment & Plan    Creatinine at baseline  baseline creatinine 1 5-1  Follows outpatient nephrologist, Dr Lyndon Roberts  Baseline creatinine 1 5-1 9  Status post Lasix 20 mg IV x1 in ED  · Avoid nephrotoxins  · Repeat  BMP in am     Essential hypertension   Assessment & Plan    Blood pressures improving  · Continue Amlodipine, Coreg, and Torsemide   · Monitor blood pressure            VTE Pharmacologic Prophylaxis:   Pharmacologic: Apixaban (Eliquis)  Mechanical VTE Prophylaxis in Place: Yes    Patient Centered Rounds: I have performed bedside rounds with nursing staff today  Discussions with Specialists or Other Care Team Provider: Yes  Education and Discussions with Family / Patient:Yes  Time Spent for Care: 20 minutes  More than 50% of total time spent on counseling and coordination of care as described above  Current Length of Stay: 3 day(s)  Current Patient Status: Inpatient     Discharge Plan: Home  Patient said she has a lateral help at home  Code Status: Level 3 - DNAR and DNI      Subjective:     Patient feeling much better  Denies shortness of breath chest pain alert and oriented x4  No overnight events per RN  Objective:     Vitals:   Temp (24hrs), Av °F (36 7 °C), Min:97 9 °F (36 6 °C), Max:98 1 °F (36 7 °C)    Temp:  [97 9 °F (36 6 °C)-98 1 °F (36 7 °C)] 98 1 °F (36 7 °C)  HR:  [] 92  Resp:  [18] 18  BP: (106-123)/(58-86) 123/86  SpO2:  [92 %-100 %] 100 %  Body mass index is 32 58 kg/m²  Input and Output Summary (last 24 hours): Intake/Output Summary (Last 24 hours) at 12/10/18 1717  Last data filed at 12/10/18 1124   Gross per 24 hour   Intake              640 ml   Output              900 ml   Net             -260 ml        Physical Exam:     Physical Exam   Constitutional: She is oriented to person, place, and time  She appears well-developed and well-nourished  HENT:   Head: Normocephalic and atraumatic  Neck: Normal range of motion  Neck supple  Cardiovascular: Normal rate, regular rhythm and normal heart sounds  No murmur heard  S1-S2 noted   Pulmonary/Chest: Effort normal and breath sounds normal  No respiratory distress  She has no wheezes  She has no rales  Diminished breath sounds on both bases   Abdominal: Soft  Bowel sounds are normal  She exhibits no distension  There is no tenderness  There is no rebound  Positive bowel sounds times   Musculoskeletal: Normal range of motion  She exhibits no edema  Neurological: She is alert and oriented to person, place, and time  Skin: Skin is warm and dry  Psychiatric: She has a normal mood and affect  Her behavior is normal  Judgment and thought content normal        Additional Data:     Labs:      Results from last 7 days  Lab Units 12/10/18  0555 12/08/18  0711 12/07/18  0953   WBC Thousand/uL 9 61 10 49* 8 86   HEMOGLOBIN g/dL 10 1* 10 9* 10 2*   HEMATOCRIT % 34 3* 36 8 34 4*   PLATELETS Thousands/uL 336 306 283   NEUTROS PCT %  --  93* 82*       Results from last 7 days  Lab Units 12/10/18  0555 12/09/18  0516 12/08/18  0711   SODIUM mmol/L 139 140 140   POTASSIUM mmol/L 4 4 4 2 4 0   CHLORIDE mmol/L 101 100 99*   CO2 mmol/L 37* 39* 37*   BUN mg/dL 78* 60* 40*   CREATININE mg/dL 1 85* 1 78* 1 73*   CALCIUM mg/dL 9 1 9 3 9 2   TOTAL BILIRUBIN mg/dL  --  0 20 0 30   ALK PHOS U/L  --  127* 151*   ALT U/L  --  21 27   AST U/L  --  13 20       Results from last 7 days  Lab Units 12/07/18  0954   INR  1 09       Results from last 7 days  Lab Units 12/07/18  0954   TROPONIN I ng/mL <0 02     Lab Results   Component Value Date/Time    HGBA1C 6 0 05/10/2017 05:24 AM       Results from last 7 days  Lab Units 12/08/18  2133   POC GLUCOSE mg/dl 211*       Results from last 7 days  Lab Units 12/08/18  0711   PROCALCITONIN ng/ml 0 35*       * I Have Reviewed All Lab Data Listed Above  * Additional Pertinent Lab Tests Reviewed:  Jorge Fry Admission Reviewed    Imaging:     XR chest pa & lateral   Final Result by Jesica Mcdaniels MD (12/07 1124)      No acute cardiopulmonary disease  Workstation performed: [id number]           Imaging Reports Reviewed by myself    Cultures:   Blood Culture:   Lab Results   Component Value Date    BLOODCX No Growth After 5 Days  03/29/2018    BLOODCX No Growth After 5 Days  03/29/2018    BLOODCX No Growth After 5 Days  08/27/2017    BLOODCX No Growth After 5 Days   08/27/2017     Urine Culture:   Lab Results   Component Value Date    URINECX No Growth <1000 cfu/mL 03/29/2018    URINECX >100,000 cfu/ml Mixed Contaminants X6 05/09/2017     Sputum Culture: No components found for: SPUTUMCX  Wound Culture: No results found for: WOUNDCULT    Last 24 Hours Medication List:     Current Facility-Administered Medications:  acetaminophen 650 mg Oral Q6H PRN Londell Goldsboro, CRNP   amLODIPine 5 mg Oral Daily Londell [place], CRNP   apixaban 5 mg Oral BID Rosa Revankar, DO   benzonatate 200 mg Oral TID Londell G[place], CRNP   carvedilol 25 mg Oral BID With Meals Londell G[place], CRNP   cefuroxime 500 mg Oral Q24H Schuyler Aguilera, PA-C   clopidogrel 75 mg Oral Daily Londell Golds[place], CRNP   colesevelam 1,875 mg Oral BID With Meals Heather Melvin MD   dextromethorphan-guaiFENesin 10 mL Oral Q4H PRN Londell Goldsboro, CRNP   docusate sodium 100 mg Oral BID Londell G[place], CRNP   fluticasone-vilanterol 1 puff Inhalation Daily Londell Golds[place], CRNP   ipratropium-albuterol 3 mL Nebulization Q6H PRN Marilynne Ambrosia, PA-C   ondansetron 4 mg Intravenous Q6H PRN Londell Goldsboro, CRNP   pantoprazole 40 mg Oral Early Morning Londell Goldsboro, CRNP   polyethylene glycol 17 g Oral Daily PRN Rosa Revankar, DO   [START ON 12/11/2018] predniSONE 40 mg Oral Daily Marilynne Ambrosia, PA-C   torsemide 10 mg Oral Daily Londell Goldsboro, CRNP        Today, Patient Was Seen By: Esme Palacios, MARGOT    ** Please Note: Dragon 360 Dictation voice to text software may have been used in the creation of this document   **

## 2018-12-10 NOTE — SOCIAL WORK
Explained role of CM to pt  Pt states she lives alone in an apt, has a RW, SPC, shower chair and Rolator  Pt states she uses the Rolator, has home 02 through [employer], wears 2LNC AAT, has her Triology Machine at hospital bedside, through the [employer] program has a HHA 3x we/for 2 hrs/day  However, Pt states her HHA left and her daughters have been paying OOP for a HHA the past month while the [employer] program is trying to find another HHA  Pt states Dr Ac Alberts sees her in her home and she has labs drawn at home if needed  Offered HHC, pt declined feels she does not need it  Denies any dc needs  CM reviewed d/c planning process including the following: identifying help at home, patient preference for d/c planning needs, regarding understanding medications and recognizing signs and symptoms once discharged  CM also encouraged patient to follow up with recommended appointments after discharge  Patient advised of importance for patient and family to participate in managing patients medical well being

## 2018-12-10 NOTE — ASSESSMENT & PLAN NOTE
Secondary to severe thoracic scoliosis status post partial correction with bilateral Duarte rods and surgery   · Continue trilogy QHS  · Supplemental oxygen during the day  · Pulmonology consult, appreciate input

## 2018-12-11 VITALS
HEART RATE: 64 BPM | TEMPERATURE: 98.1 F | WEIGHT: 155.87 LBS | DIASTOLIC BLOOD PRESSURE: 58 MMHG | OXYGEN SATURATION: 97 % | HEIGHT: 58 IN | BODY MASS INDEX: 32.72 KG/M2 | RESPIRATION RATE: 18 BRPM | SYSTOLIC BLOOD PRESSURE: 119 MMHG

## 2018-12-11 PROBLEM — Z86.718 HISTORY OF DVT (DEEP VEIN THROMBOSIS): Status: ACTIVE | Noted: 2018-12-11

## 2018-12-11 LAB
ALBUMIN SERPL BCP-MCNC: 1.9 G/DL (ref 3.5–5)
ALP SERPL-CCNC: 99 U/L (ref 46–116)
ALT SERPL W P-5'-P-CCNC: 48 U/L (ref 12–78)
ANION GAP SERPL CALCULATED.3IONS-SCNC: 3 MMOL/L (ref 4–13)
AST SERPL W P-5'-P-CCNC: 29 U/L (ref 5–45)
BILIRUB SERPL-MCNC: 0.1 MG/DL (ref 0.2–1)
BUN SERPL-MCNC: 88 MG/DL (ref 5–25)
CALCIUM SERPL-MCNC: 9.2 MG/DL (ref 8.3–10.1)
CHLORIDE SERPL-SCNC: 103 MMOL/L (ref 100–108)
CO2 SERPL-SCNC: 36 MMOL/L (ref 21–32)
CREAT SERPL-MCNC: 1.96 MG/DL (ref 0.6–1.3)
ERYTHROCYTE [DISTWIDTH] IN BLOOD BY AUTOMATED COUNT: 12.6 % (ref 11.6–15.1)
GFR SERPL CREATININE-BSD FRML MDRD: 22 ML/MIN/1.73SQ M
GLUCOSE SERPL-MCNC: 140 MG/DL (ref 65–140)
HCT VFR BLD AUTO: 35 % (ref 34.8–46.1)
HGB BLD-MCNC: 10 G/DL (ref 11.5–15.4)
MCH RBC QN AUTO: 30 PG (ref 26.8–34.3)
MCHC RBC AUTO-ENTMCNC: 28.6 G/DL (ref 31.4–37.4)
MCV RBC AUTO: 105 FL (ref 82–98)
PLATELET # BLD AUTO: 315 THOUSANDS/UL (ref 149–390)
PMV BLD AUTO: 10.2 FL (ref 8.9–12.7)
POTASSIUM SERPL-SCNC: 4.1 MMOL/L (ref 3.5–5.3)
PROT SERPL-MCNC: 6.7 G/DL (ref 6.4–8.2)
RBC # BLD AUTO: 3.33 MILLION/UL (ref 3.81–5.12)
SODIUM SERPL-SCNC: 142 MMOL/L (ref 136–145)
WBC # BLD AUTO: 8.16 THOUSAND/UL (ref 4.31–10.16)

## 2018-12-11 PROCEDURE — 99239 HOSP IP/OBS DSCHRG MGMT >30: CPT | Performed by: NURSE PRACTITIONER

## 2018-12-11 PROCEDURE — 94760 N-INVAS EAR/PLS OXIMETRY 1: CPT

## 2018-12-11 PROCEDURE — 97530 THERAPEUTIC ACTIVITIES: CPT

## 2018-12-11 PROCEDURE — 99232 SBSQ HOSP IP/OBS MODERATE 35: CPT | Performed by: PHYSICIAN ASSISTANT

## 2018-12-11 PROCEDURE — 80053 COMPREHEN METABOLIC PANEL: CPT | Performed by: NURSE PRACTITIONER

## 2018-12-11 PROCEDURE — 85027 COMPLETE CBC AUTOMATED: CPT | Performed by: NURSE PRACTITIONER

## 2018-12-11 RX ORDER — PREDNISONE 10 MG/1
TABLET ORAL
Qty: 26 TABLET | Refills: 0 | Status: SHIPPED | OUTPATIENT
Start: 2018-12-11 | End: 2019-08-14 | Stop reason: ALTCHOICE

## 2018-12-11 RX ORDER — CEFUROXIME AXETIL 250 MG/1
500 TABLET ORAL EVERY 24 HOURS
Status: COMPLETED | OUTPATIENT
Start: 2018-12-11 | End: 2018-12-11

## 2018-12-11 RX ORDER — CARVEDILOL 25 MG/1
25 TABLET ORAL 2 TIMES DAILY WITH MEALS
Qty: 60 TABLET | Refills: 0 | Status: SHIPPED | COMMUNITY
Start: 2018-12-11 | End: 2019-12-06 | Stop reason: SDUPTHER

## 2018-12-11 RX ORDER — IPRATROPIUM BROMIDE AND ALBUTEROL SULFATE 2.5; .5 MG/3ML; MG/3ML
3 SOLUTION RESPIRATORY (INHALATION) EVERY 6 HOURS PRN
Qty: 120 VIAL | Refills: 0 | Status: SHIPPED | COMMUNITY
Start: 2018-12-11 | End: 2019-08-14 | Stop reason: ALTCHOICE

## 2018-12-11 RX ORDER — BENZONATATE 200 MG/1
200 CAPSULE ORAL 3 TIMES DAILY
Qty: 45 CAPSULE | Refills: 0 | Status: SHIPPED | OUTPATIENT
Start: 2018-12-11 | End: 2019-08-14 | Stop reason: ALTCHOICE

## 2018-12-11 RX ADMIN — CARVEDILOL 25 MG: 25 TABLET, FILM COATED ORAL at 09:09

## 2018-12-11 RX ADMIN — PREDNISONE 40 MG: 20 TABLET ORAL at 09:09

## 2018-12-11 RX ADMIN — AMLODIPINE BESYLATE 5 MG: 5 TABLET ORAL at 09:09

## 2018-12-11 RX ADMIN — PANTOPRAZOLE SODIUM 40 MG: 40 TABLET, DELAYED RELEASE ORAL at 05:52

## 2018-12-11 RX ADMIN — APIXABAN 5 MG: 5 TABLET, FILM COATED ORAL at 09:09

## 2018-12-11 RX ADMIN — CLOPIDOGREL BISULFATE 75 MG: 75 TABLET ORAL at 09:09

## 2018-12-11 RX ADMIN — CEFUROXIME AXETIL 500 MG: 250 TABLET ORAL at 15:18

## 2018-12-11 RX ADMIN — TORSEMIDE 10 MG: 10 TABLET ORAL at 09:09

## 2018-12-11 RX ADMIN — DOCUSATE SODIUM 100 MG: 100 CAPSULE, LIQUID FILLED ORAL at 09:10

## 2018-12-11 RX ADMIN — BENZONATATE 200 MG: 100 CAPSULE ORAL at 09:10

## 2018-12-11 RX ADMIN — FLUTICASONE FUROATE AND VILANTEROL TRIFENATATE 1 PUFF: 100; 25 POWDER RESPIRATORY (INHALATION) at 09:11

## 2018-12-11 RX ADMIN — COLESEVELAM 1875 MG: 180 TABLET ORAL at 09:11

## 2018-12-11 NOTE — ASSESSMENT & PLAN NOTE
Blood pressures fluctuates  · Continue Amlodipine, Coreg, and Torsemide   · Monitor blood pressure at home

## 2018-12-11 NOTE — ASSESSMENT & PLAN NOTE
Creatinine at baseline  baseline creatinine 1 5-1  Follows outpatient nephrologist, Dr Michael Qureshi  Baseline creatinine 1 5-1 9  Status post Lasix 20 mg IV x1 in ED  · Creatinine 1 96 today  · Avoid nephrotoxins   · Repeat  BMP in 1 week

## 2018-12-11 NOTE — ASSESSMENT & PLAN NOTE
As evidenced by URI like symptoms for the last 3 weeks according to patient along with moist, productive cough and shortness of breath  · Transitioned to PO Ceftin, need one more dose before discharge today  Prednisone taper on discharge  · Continue Duonebs Q6H p r n , Tessalon Perles Q8H on discharge  · Sputum culture was not able to be collected  Respiratory pathogen profile pending  · Patient cleared for discharge by pulmonary   Unable to discharge today due to lack of caregiver at home per family request

## 2018-12-11 NOTE — DISCHARGE INSTRUCTIONS
F/U PCP and pulmonary in 7-10 days  Complete prednisone taper  Start tomorrow with 40 mg po  Take with breakfast   Use Trilogy  at bedtime  Follow-up Pulmonary in 7-10 days  BMP in 1 week to monitor for CKD  Monitor weight, BP at home

## 2018-12-11 NOTE — NURSING NOTE
Pt and pts daughter given and explained follow up care and new medications  Additional handout given for new prescription  1 new prescription for BMP given to pts daughter  IV removed  Pt left in wheelchair, with belongings, and trilogy with daughter at side

## 2018-12-11 NOTE — ASSESSMENT & PLAN NOTE
Resolved  Likely due to the CO2 retention as evidenced on ABG due to noncompliance with trilogy for the last several weeks  Chest x-ray negative  UA negative  Patient Afebrile  · Continue trilogy BiPAP at HS, supplemental oxygen during the day  · Supportive care, up with assist, fall precautions  · PT/OT evaluated patient  Recommended home with previous home services and family support

## 2018-12-11 NOTE — PLAN OF CARE
DISCHARGE PLANNING - CARE MANAGEMENT     Discharge to post-acute care or home with appropriate resources Adequate for Discharge        Nutrition/Hydration-ADULT     Nutrient/Hydration intake appropriate for improving, restoring or maintaining nutritional needs Adequate for Discharge        Potential for Falls     Patient will remain free of falls Adequate for Discharge        Prexisting or High Potential for Compromised Skin Integrity     Skin integrity is maintained or improved Adequate for Discharge        RESPIRATORY - ADULT     Achieves optimal ventilation and oxygenation Adequate for Discharge

## 2018-12-11 NOTE — PHYSICAL THERAPY NOTE
PT TREATMENT     12/11/18 1410   Pain Assessment   Pain Assessment No/denies pain   Restrictions/Precautions   Other Precautions Fall Risk;O2;Chair Alarm; Bed Alarm  (urinary incontinence)   General   Chart Reviewed Yes   Cognition   Memory (forgetful)   Subjective   Subjective "I've been here for a week and only walked once!"   Transfers   Sit to Stand (supervision/min assist)   Stand to Sit (supervision/min assist)   Stand pivot (supervision/min assist)   Toilet transfer 4  Minimal assistance   Additional items (min assist to wipe)   Additional Comments pt washed hands at sink with min assist   Ambulation/Elevation   Gait Assistance (supervision/min assist)   Assistive Device Rolling walker  (3L02)   Distance 75 feet, 2x 10 feet to and from bathroom   Balance   Static Sitting Fair +   Dynamic Sitting Fair   Static Standing Fair +   Dynamic Standing Fair   Activity Tolerance   Activity Tolerance Patient limited by fatigue;Patient tolerated treatment well   Assessment   Prognosis Good   Problem List Decreased strength;Decreased endurance; Impaired balance;Decreased mobility   Assessment pt tolerated activity well  pt does well with encouragement  offerred pt stair climbing practice however pt decined as she reports today was the first day that she has walked (per chart pt walked on PT evaluation)  Pt is generally weak but able to ambulate short functional distances with supervision/min assist    Plan   Treatment/Interventions ADL retraining;Functional transfer training;LE strengthening/ROM; Elevations; Therapeutic exercise;Patient/family training;Equipment eval/education; Bed mobility;Gait training   Progress Progressing toward goals   PT Frequency 5x/wk   Recommendation   Recommendation (home with 24 hr S/home PT per pt and SW)   Equipment Recommended (pt has a walker)   [employer] License Number  Christatyana Márquez PT  [id number]

## 2018-12-11 NOTE — PROGRESS NOTES
Progress Note - Pulmonary   Ashley Peck 80 y o  female MRN: [id number]  Unit/Bed#: [address] [place] [address] Encounter: [id number]      Ashley Peck is 80years old and has chronic mixed respiratory failure mostly due to thoracic kyphoscoliosis   This severity of her scoliosis required Duarte andrés placement at the age of 58  She utilizes a trilogy ventilation system at home and is chronically oxygen dependent on 2 L   She has severe restrictive disease   Past medical history of stage III CKD, chronic diastolic heart failure, HTN, and a history of a left lower extremity DVT      She was admitted to the hospital due to a recent upper respiratory infection   She was confused and lethargic on admission, mildly hypercapnic, but no evidence of lung infiltrates on initial exam      She is currently being treated for acute tracheobronchitis and asthma exacerbation  She was transitioned to oral medication 12/10 prednisone and cefuroxime        Assessment/Plan:   -acute tracheobronchitis:  -continue cefurxoime currently day 5/5 > d/c after today -continue  PO prednisone  -every 8 hr DuoNeb PRN  -continue Breo  -follow up on sputum cultures once acquired, RPCR, MRSA   -Chronic Hypoxemic and Hypercapneic Respiratory Failure:  -patient remains on 2 L nasal cannula oxygen  -she is dependent on her noninvasive ventilator > utilizing and tolerating at night  -utilizing her trilogy ventilator here in the hospital  -severe thoracic kyphoscoliosis  -post bilateral Duarte andrés placement remotely at the age of 58      -pulmonary to sign off  -finish abx today  -continue steroid taper as ordered  -follow up as an outpatient in 7-10 days  -no requirement for radiographic follow up at this time          ______________________________________________________________________    Subjective: Pt seen and examined at bedside  She wants to go home and feels well  Otherwise no complaints      Tele Events:     Vitals:   Temp:  [97 1 °F (36 2 °C)-98 °F (36 7 °C)] 98 °F (36 7 °C)  HR:  [69-92] 69  Resp:  [18] 18  BP: (126-139)/(67-79) 139/79  Weight (last 2 days)     Date/Time   Weight    12/10/18 0600  70 7 (155 87)            Oxygen Therapy  SpO2: 97 %  O2 Flow Rate (L/min): 2 L/min    IV Infusions:       Nutrition:        Diet Orders            Start     Ordered    12/08/18 1449  Dietary nutrition supplements  Once     Question Answer Comment   Select Supplement: Ensure Compact-Vanilla    Frequency Lunch, Dinner        12/08/18 1450    12/07/18 1512  Diet Cardiovascular; Cardiac  Diet effective now     Question Answer Comment   Diet Type Cardiovascular    Cardiac Cardiac    RD to adjust diet per protocol? Yes        12/07/18 1511    12/07/18 1252  Room Service  Once     Question:  Type of Service  Answer:  Room Service - Appropriate with Assistance    12/07/18 1252          Ins/Outs:   I/O       12/09 0701 - 12/10 0700 12/10 0701 - 12/11 0700 12/11 0701 - 12/12 0700    P  O  840 400     Total Intake(mL/kg) 840 (11 9) 400 (5 7)     Urine (mL/kg/hr) 1050 (0 6) 1450 (0 9)     Total Output 1050 1450      Net -210 -1050                   Lines/Drains:  Invasive Devices     Peripheral Intravenous Line            Peripheral IV 12/07/18 Right Hand 3 days                 Active medications:  Scheduled Meds:  Current Facility-Administered Medications:  acetaminophen 650 mg Oral Q6H PRN Kamari Olivas, DAYNENP   amLODIPine 5 mg Oral Daily Kamari Olivas, CRNP   apixaban 5 mg Oral BID Rosa Revankar, DO   benzonatate 200 mg Oral TID Kamari Olivas, CRNP   carvedilol 25 mg Oral BID With Meals Kamari Olivas, CRMITZI   cefuroxime 500 mg Oral Q24H Barbie Vaz PA-C   clopidogrel 75 mg Oral Daily Kamari Olivas, MARGOT   colesevelam 1,875 mg Oral BID With Meals Tari Martinez MD   dextromethorphan-guaiFENesin 10 mL Oral Q4H PRN Kamari Olivas, DAYNENP   docusate sodium 100 mg Oral BID Kamari Olivas, MARGOT   fluticasone-vilanterol 1 puff Inhalation Daily Mary Chain, CRNP   ipratropium-albuterol 3 mL Nebulization Q6H PRN Dwayne Canela PA-C   ondansetron 4 mg Intravenous Q6H PRN Mary Chain, CRNP   pantoprazole 40 mg Oral Early Morning Mary Chain, CRNP   polyethylene glycol 17 g Oral Daily PRN Rosa Revankar, DO   predniSONE 40 mg Oral Daily Dwayne Canela PA-C   torsemide 10 mg Oral Daily Mary Chain, CRNP     PRN Meds:  acetaminophen 650 mg Q6H PRN   dextromethorphan-guaiFENesin 10 mL Q4H PRN   ipratropium-albuterol 3 mL Q6H PRN   ondansetron 4 mg Q6H PRN   polyethylene glycol 17 g Daily PRN     ____________________________________________________________________      Physical Exam   Constitutional: She is oriented to person, place, and time  She appears well-developed and well-nourished  HENT:   Head: Normocephalic and atraumatic  Eyes: Pupils are equal, round, and reactive to light  Conjunctivae are normal    Neck: Normal range of motion  Neck supple  Cardiovascular: Normal rate, regular rhythm and normal heart sounds  Pulmonary/Chest: Effort normal and breath sounds normal  No respiratory distress  She has no wheezes  She has no rales  She exhibits no tenderness  Abdominal: Soft  Bowel sounds are normal    Musculoskeletal: Normal range of motion  She exhibits no edema  Neurological: She is alert and oriented to person, place, and time  Skin: Skin is warm and dry     Psychiatric: She has a normal mood and affect            ____________________________________________________________________    Labs:   CBC:   Results from last 7 days  Lab Units 12/11/18  0528 12/10/18  0555 12/08/18  0711   WBC Thousand/uL 8 16 9 61 10 49*   HEMOGLOBIN g/dL 10 0* 10 1* 10 9*   HEMATOCRIT % 35 0 34 3* 36 8   MCV fL 105* 101* 101*   PLATELETS Thousands/uL 315 336 306     CMP:   Results from last 7 days  Lab Units 12/11/18  0528 12/10/18  0555 12/09/18  0516 12/08/18  0711   POTASSIUM mmol/L 4 1 4 4 4 2 4 0 CHLORIDE mmol/L 103 101 100 99*   CO2 mmol/L 36* 37* 39* 37*   BUN mg/dL 88* 78* 60* 40*   CREATININE mg/dL 1 96* 1 85* 1 78* 1 73*   CALCIUM mg/dL 9 2 9 1 9 3 9 2   AST U/L 29  --  13 20   ALT U/L 48  --  21 27   ALK PHOS U/L 99  --  127* 151*   EGFR ml/min/1 73sq m 22 24 25 26     Magnesium:   Results from last 7 days  Lab Units 12/08/18  0711   MAGNESIUM mg/dL 2 1     Phosphorous:   Results from last 7 days  Lab Units 12/09/18  0516   PHOSPHORUS mg/dL 3 4     Troponin:   Results from last 7 days  Lab Units 12/07/18  0954   TROPONIN I ng/mL <0 02     PT/INR:   Results from last 7 days  Lab Units 12/07/18  0954   PTT seconds 24   INR  1 09     Lactic Acid:     BNP:   Results from last 7 days  Lab Units 12/07/18  0954   NT-PRO BNP pg/mL 2,657*     TSH:   Results from last 7 days  Lab Units 12/08/18  0711   TSH 3RD GENERATON uIU/mL 0 637     Procalcitonin: Invalid input(s): PROCALCITONIN      Imaging:   XR chest pa & lateral   Final Result by Jhoan Vidal MD (12/07 1124)      No acute cardiopulmonary disease  Workstation performed: [id number]                 Micro: Lab Results   Component Value Date    BLOODCX No Growth After 5 Days  03/29/2018    BLOODCX No Growth After 5 Days  03/29/2018    BLOODCX No Growth After 5 Days  08/27/2017    BLOODCX No Growth After 5 Days   08/27/2017    URINECX No Growth <1000 cfu/mL 03/29/2018    URINECX >100,000 cfu/ml Mixed Contaminants X6 05/09/2017    MRSACULTURE  03/29/2018     No Methicillin Resistant Staphlyococcus aureus (MRSA) isolated            Invalid input(s): LEGIONELLAURINARYANTIGEN        Code Status: Level 3 - DNAR and DNI

## 2018-12-11 NOTE — ASSESSMENT & PLAN NOTE
Blood pressures fluctuates    · Continue Amlodipine, Coreg, and Torsemide   · Monitor blood pressure

## 2018-12-11 NOTE — DISCHARGE SUMMARY
Discharge- Geovanna Cline 5/12/1928, 80 y o  female MRN: [id number]    Unit/Bed#: [address] [place] [address] Encounter: [id number]    Primary Care Provider: Claudia Song DO   Date and time admitted to hospital: 12/7/2018  9:16 AM        * Acute encephalopathy   Assessment & Plan    Resolved  Likely due to the CO2 retention as evidenced on ABG due to noncompliance with trilogy for the last several weeks  Chest x-ray negative  UA negative  Patient Afebrile  · Continue trilogy BiPAP at HS, supplemental oxygen during the day  · Supportive care, up with assist, fall precautions  · PT/OT evaluated patient  Recommended home with previous home services and family support  · Will discharge patient home with visiting nurse as well  Acute tracheobronchitis   Assessment & Plan    As evidenced by URI like symptoms for the last 3 weeks according to patient along with moist, productive cough and shortness of breath  · Transitioned to PO Ceftin, need one more dose before discharge today  Prednisone taper on discharge  · Continue Duonebs Q6H p r n , Tessalon Perles Q8H on discharge  · Sputum culture was not able to be collected  Respiratory pathogen profile pending  · Patient cleared for discharge by pulmonary  Mild persistent asthma with acute exacerbation   Assessment & Plan    · Continue prednisone taper per Pulmonary  · Continue nebulizer treatments, Breo     Chronic diastolic CHF (congestive heart failure) [facility])   Assessment & Plan    March 2018 echo showed EF of 55-60% with no regional wall motion abnormalities  BNP 2657  Appears euvolemic  S/p Lasix 20 mg IV x1 in ED  · Continue Torsemide, Coreg  Chronic respiratory failure with hypoxia and hypercapnia (HCC)   Assessment & Plan    Secondary to severe thoracic scoliosis status post partial correction with bilateral Duarte rods and surgery   · Continue trilogy QHS  · Supplemental oxygen during the day  · Patient cleared for discharge by Pulmonary  Prednisone taper on discharge  Continue DuoNeb p r n  And Tessalon pearls  · Follow-up in pulmonary office in 7-10 days  History of DVT (deep vein thrombosis)   Assessment & Plan    History of DVT/PE in 2/2018  Continue Eliquis  Anemia of chronic renal failure, stage 3 (moderate) (HCC)   Assessment & Plan    Hemoglobin stable  · Previous iron panel revealed iron 28, ferritin 74  Per outpatient records  · On oral iron 3 times a week  Chronic kidney disease, stage III (moderate) (HCC)   Assessment & Plan    Creatinine at baseline  baseline creatinine 1 5-1  Follows outpatient nephrologist, Dr Nayan Braxton  Baseline creatinine 1 5-1 9  Status post Lasix 20 mg IV x1 in ED  · Creatinine 1 96 today  · Avoid nephrotoxins   · Repeat  BMP in 1 week  Essential hypertension   Assessment & Plan    Blood pressures fluctuates  · Continue Amlodipine, Coreg, and Torsemide   · Monitor blood pressure at home  Discharging Physician / Practitioner: MARGOT Mcbride  PCP: Armand Longoria DO  Admission Date: 12/7/2018  Discharge Date: 12/11/18    Reason for Admission: Altered Mental Status (Pt  reports being treated a cold by Dr Hiwot Gooden  Pt daughter reports nurses aide came this morning and was unable to wake up pt this morning  EMT arrived and was able to wake up pt  Pt daughter reports pt with a history of CO2 problems    Pt Arrives AAOx4 )        Resolved Problems  Date Reviewed: 12/11/2018    None          Consultations During Hospital Stay:  Corina Olvera TO CASE MANAGEMENT  IP CONSULT TO PULMONOLOGY    Procedures Performed:     · None    Significant Findings / Test Results:     · None    Results from last 7 days  Lab Units 12/11/18  0528 12/10/18  0555 12/08/18  0711   WBC Thousand/uL 8 16 9 61 10 49*   HEMOGLOBIN g/dL 10 0* 10 1* 10 9*   PLATELETS Thousands/uL 315 336 306       Results from last 7 days  Lab Units 12/11/18  0528 12/10/18  0555 12/09/18  0516 12/08/18  0711   SODIUM mmol/L 142 139 140 140 POTASSIUM mmol/L 4 1 4 4 4 2 4 0   CHLORIDE mmol/L 103 101 100 99*   CO2 mmol/L 36* 37* 39* 37*   BUN mg/dL 88* 78* 60* 40*   CREATININE mg/dL 1 96* 1 85* 1 78* 1 73*   CALCIUM mg/dL 9 2 9 1 9 3 9 2   TOTAL BILIRUBIN mg/dL 0 10*  --  0 20 0 30   ALK PHOS U/L 99  --  127* 151*   ALT U/L 48  --  21 27   AST U/L 29  --  13 20       Results from last 7 days  Lab Units 12/07/18  0954   INR  1 09       Results from last 7 days  Lab Units 12/07/18  0954   TROPONIN I ng/mL <0 02     Lab Results   Component Value Date/Time    HGBA1C 6 0 05/10/2017 05:24 AM       Results from last 7 days  Lab Units 12/08/18  2133   POC GLUCOSE mg/dl 211*       Results from last 7 days  Lab Units 12/08/18  0711   PROCALCITONIN ng/ml 0 35*     Blood Culture:   Lab Results   Component Value Date    BLOODCX No Growth After 5 Days  03/29/2018    BLOODCX No Growth After 5 Days  03/29/2018    BLOODCX No Growth After 5 Days  08/27/2017    BLOODCX No Growth After 5 Days  08/27/2017     Urine Culture:   Lab Results   Component Value Date    URINECX No Growth <1000 cfu/mL 03/29/2018    URINECX >100,000 cfu/ml Mixed Contaminants X6 05/09/2017     Sputum Culture: No components found for: SPUTUMCX  Wound Culture: No results found for: WOUNDCULT     Imaging  XR chest pa & lateral   Final Result by Garrick Paulino MD (12/07 1124)      No acute cardiopulmonary disease  Workstation performed: [id number]               Incidental Findings:   · None     Test Results Pending at Discharge (will require follow up): · None     Outpatient Tests Requested:  · None    Complications:  None    Reason for Admission:  Altered mental status    Hospital Course:      Miguel Purvis is a 80 y o  female patient with a PMH of chronic hypercapnic hypoxemic respiratory failure secondary to severe scoliosis, CHF, DVT/PE, hypertension, hyperlipidemia, CKD, CVA who originally presented to the hospital on 12/7/2018 due to acute encephalopathy secondary to hypercapnia, acute tracheobronchitis, acute exacerbation of mild persistent asthma due to tracheobronchitis  Patient was treated with  IV Solu-Medrol, IV Rocephin  Patient was continued on Trilogy at [facility] and supplemental oxygen during the day  Patient cleared for discharge by Pulmonary  Patient will be discharged on prednisone taper, Tessalon Perles  Resume DuoNeb p r n  Continue Trilogy at  and oxygen 2 l/min via [place] during the day  Follow-up Pulmonary in 7-10 days  Patient will be discharged with HHA  Follow-up PCP in 7-10 days  Patient seen by PT OT  Recommending home with continued services/family support  Please see above list of diagnoses and related plan for additional information  Condition at Discharge: good       Discharge instructions/Information to patient and family:   See after visit summary for information provided to patient and family  Provisions for Follow-Up Care:  See after visit summary for information related to follow-up care and any pertinent home health orders  Disposition:     Home with VNA Services (Reminder: Complete face to face encounter)    Planned Readmission: no     Discharge Statement:  I spent 35 minutes discharging the patient  This time was spent on the day of discharge  I had direct contact with the patient on the day of discharge  Greater than 50% of the total time was spent examining patient, answering all patient questions, arranging and discussing plan of care with patient as well as directly providing post-discharge instructions  Additional time then spent on discharge activities  Discharge Medications:  See after visit summary for reconciled discharge medications provided to patient and family        ** Please Note: This note has been constructed using a voice recognition system **

## 2018-12-11 NOTE — ASSESSMENT & PLAN NOTE
Secondary to severe thoracic scoliosis status post partial correction with bilateral Duarte rods and surgery   · Continue trilogy QHS  · Supplemental oxygen during the day  · Patient cleared for discharge by Pulmonary  Prednisone taper on discharge  Continue DuoNeb p r n  And Tessalon pearls  · Follow-up in pulmonary office in 7-10 days

## 2018-12-11 NOTE — SOCIAL WORK
Met with pt to discuss [facility] referral   Pt did not know which agency was providing care for her  Per pt, daughter Nubia Sams is unable to pick her up to take her home as she is sick  Spoke to Nubia Sams, pt has [facility] VNA coming for her mom  Nubai Sams is unable to care for her mom at this time  Her other daughter Jordon Ulloa will be in today  Nubia Sams unsure her sister will care for pt in her absence  Will speak to Jordon Ulloa when she comes in to discuss d/c planning  [facility]

## 2018-12-11 NOTE — ASSESSMENT & PLAN NOTE
Creatinine at baseline  baseline creatinine 1 5-1  Follows outpatient nephrologist, Dr Nayan Braxton  Baseline creatinine 1 5-1 9  Status post Lasix 20 mg IV x1 in ED  · Creatinine 1 96 today  · Avoid nephrotoxins   · Repeat  BMP in 1 week

## 2018-12-11 NOTE — ASSESSMENT & PLAN NOTE
As evidenced by URI like symptoms for the last 3 weeks according to patient along with moist, productive cough and shortness of breath  · Transitioned to PO Ceftin, need one more dose before discharge today  Prednisone taper on discharge  · Continue Duonebs Q6H p r n , Tessalon Perles Q8H on discharge  · Sputum culture was not able to be collected  Respiratory pathogen profile pending  · Patient cleared for discharge by pulmonary

## 2018-12-11 NOTE — PROGRESS NOTES
Progress Note Reyes Richter 5/12/1928, 80 y o  female MRN: [id number]    Unit/Bed#: [address] [place] [address] Encounter: [id number]    Primary Care Provider: Ivory Carrel, DO   Date and time admitted to hospital: 12/7/2018  9:16 AM        * Acute encephalopathy   Assessment & Plan    Resolved  Likely due to the CO2 retention as evidenced on ABG due to noncompliance with trilogy for the last several weeks  Chest x-ray negative  UA negative  Patient Afebrile  · Continue trilogy BiPAP at HS, supplemental oxygen during the day  · Supportive care, up with assist, fall precautions  · PT/OT evaluated patient  Recommended home with previous home services and family support  Acute tracheobronchitis   Assessment & Plan    As evidenced by URI like symptoms for the last 3 weeks according to patient along with moist, productive cough and shortness of breath  · Transitioned to PO Ceftin, need one more dose before discharge today  Prednisone taper on discharge  · Continue Duonebs Q6H p r n , Tessalon Perles Q8H on discharge  · Sputum culture was not able to be collected  Respiratory pathogen profile pending  · Patient cleared for discharge by pulmonary  Unable to discharge today due to lack of caregiver at home per family request      Mild persistent asthma with acute exacerbation   Assessment & Plan    · Continue prednisone taper per Pulmonary  · Continue nebulizer treatments, Breo     Chronic diastolic CHF (congestive heart failure) [facility])   Assessment & Plan    March 2018 echo showed EF of 55-60% with no regional wall motion abnormalities  BNP 2657  Appears euvolemic  S/p Lasix 20 mg IV x1 in ED  · Continue Torsemide, Coreg       Chronic respiratory failure with hypoxia and hypercapnia (HCC)   Assessment & Plan    Secondary to severe thoracic scoliosis status post partial correction with bilateral Duarte rods and surgery   · Continue trilogy QHS  · Supplemental oxygen during the day  · Patient cleared for discharge by Pulmonary  Prednisone taper on discharge  Continue DuoNeb heladio craig  And Tessalon pearls  · Follow-up in pulmonary office in 7-10 days  History of DVT (deep vein thrombosis)   Assessment & Plan    History of DVT/PE in 2018  Continue Eliquis  Anemia of chronic renal failure, stage 3 (moderate) (HCC)   Assessment & Plan    Hemoglobin stable  · Previous iron panel revealed iron 28, ferritin 74  Per outpatient records  · On oral iron 3 times a week  Chronic kidney disease, stage III (moderate) (HCC)   Assessment & Plan    Creatinine at baseline  baseline creatinine 1 5-1  Follows outpatient nephrologist, Dr Pa Diehl  Baseline creatinine 1 5-1 9  Status post Lasix 20 mg IV x1 in ED  · Creatinine 1 96 today  · Avoid nephrotoxins   · Repeat  BMP in 1 week  Essential hypertension   Assessment & Plan    Blood pressures fluctuates  · Continue Amlodipine, Coreg, and Torsemide   · Monitor blood pressure            VTE Pharmacologic Prophylaxis:   Pharmacologic: Apixaban (Eliquis)  Mechanical VTE Prophylaxis in Place: No    Patient Centered Rounds: I have performed bedside rounds with nursing staff today  Discussions with Specialists or Other Care Team Provider:  Pulmonary    Education and Discussions with Family / Patient:  Yes    Time Spent for Care: 20 minutes  More than 50% of total time spent on counseling and coordination of care as described above  Current Length of Stay: 4 day(s)    Current Patient Status: Inpatient   Certification Statement: The patient will continue to require additional inpatient hospital stay due to Acute encephalopathy, acute tracheobronchitis  Discharge Plan: Home with family support    Code Status: Level 3 - DNAR and DNI      Subjective:   Patient denies SOB, cough  Denies chest pain, headache, dizziness, nausea, vomiting, diarrhea constipation  Denies fever or chills      Objective:     Vitals:   Temp (24hrs), Av 7 °F (36 5 °C), Min:97 1 °F (36 2 °C), Max:98 1 °F (36 7 °C)    Temp:  [97 1 °F (36 2 °C)-98 1 °F (36 7 °C)] 98 1 °F (36 7 °C)  HR:  [63-92] 64  Resp:  [18] 18  BP: (119-139)/(58-79) 119/58  SpO2:  [97 %-100 %] 97 %  Body mass index is 32 58 kg/m²  Input and Output Summary (last 24 hours): Intake/Output Summary (Last 24 hours) at 12/11/18 1314  Last data filed at 12/11/18 0600   Gross per 24 hour   Intake                0 ml   Output             1050 ml   Net            -1050 ml       Physical Exam:     Physical Exam   Constitutional: She appears well-developed and well-nourished  HENT:   Head: Normocephalic and atraumatic  Neck: Normal range of motion  Neck supple  Cardiovascular: Normal rate and regular rhythm  Exam reveals no gallop and no friction rub  No murmur heard  Pulmonary/Chest: No respiratory distress  She has no wheezes  She has no rales  Breath sounds diminished bilateral, on oxygen 3 liters/minute via nasal cannula, respirations easy  Musculoskeletal: Normal range of motion  She exhibits no edema, tenderness or deformity  Neurological: She is alert  Oriented to place and person, follows commands  Skin: Skin is warm and dry  Psychiatric: She has a normal mood and affect  Nursing note and vitals reviewed  Additional Data:     Labs:      Results from last 7 days  Lab Units 12/11/18  0528  12/08/18  0711   WBC Thousand/uL 8 16  < > 10 49*   HEMOGLOBIN g/dL 10 0*  < > 10 9*   HEMATOCRIT % 35 0  < > 36 8   PLATELETS Thousands/uL 315  < > 306   NEUTROS PCT %  --   --  93*   LYMPHS PCT %  --   --  5*   MONOS PCT %  --   --  1*   EOS PCT %  --   --  0   < > = values in this interval not displayed      Results from last 7 days  Lab Units 12/11/18  0528   POTASSIUM mmol/L 4 1   CHLORIDE mmol/L 103   CO2 mmol/L 36*   BUN mg/dL 88*   CREATININE mg/dL 1 96*   CALCIUM mg/dL 9 2   ALK PHOS U/L 99   ALT U/L 48   AST U/L 29       Results from last 7 days  Lab Units 12/07/18  0954   INR  1 09       * I Have Reviewed All Lab Data Listed Above  * Additional Pertinent Lab Tests Reviewed: Jorge 66 Admission Reviewed    Imaging:    Imaging Reports Reviewed Today Include:  Chest x-ray  Imaging Personally Reviewed by Myself Includes:  None    Recent Cultures (last 7 days):           Last 24 Hours Medication List:     Current Facility-Administered Medications:  acetaminophen 650 mg Oral Q6H PRN MARGOT Wang   amLODIPine 5 mg Oral Daily MARGOT Wang   apixaban 5 mg Oral BID Rosa Mcclelland, DO   benzonatate 200 mg Oral TID MARGOT Wang   carvedilol 25 mg Oral BID With Meals MARGOT Wang   cefuroxime 500 mg Oral Q24H Tenisha Mcneill PA-C   clopidogrel 75 mg Oral Daily MARGOT Pritchard   colesevelam 1,875 mg Oral BID With Meals Manuel Burnett MD   dextromethorphan-guaiFENesin 10 mL Oral Q4H PRN MARGOT Wang   docusate sodium 100 mg Oral BID MARGOT Wang   fluticasone-vilanterol 1 puff Inhalation Daily MARGOT Wang   ipratropium-albuterol 3 mL Nebulization Q6H PRN Tenisha Mcneill PA-C   ondansetron 4 mg Intravenous Q6H PRN MARGOT Wang   pantoprazole 40 mg Oral Early Morning MARGOT Wang   polyethylene glycol 17 g Oral Daily PRN Rosa Mcclelland, DO   predniSONE 40 mg Oral Daily Tenisha Mcneill PA-C   torsemide 10 mg Oral Daily MARGOT Wang        Today, Patient Was Seen By: MARGOT Campos    ** Please Note: Dragon 360 Dictation voice to text software may have been used in the creation of this document   **

## 2018-12-11 NOTE — ASSESSMENT & PLAN NOTE
Resolved  Likely due to the CO2 retention as evidenced on ABG due to noncompliance with trilogy for the last several weeks  Chest x-ray negative  UA negative  Patient Afebrile  · Continue trilogy BiPAP at HS, supplemental oxygen during the day  · Supportive care, up with assist, fall precautions  · PT/OT evaluated patient  Recommended home with previous home services and family support  · Will discharge patient home with visiting nurse as well

## 2018-12-18 ENCOUNTER — OFFICE VISIT (OUTPATIENT)
Dept: PULMONOLOGY | Facility: MEDICAL CENTER | Age: 83
End: 2018-12-18
Payer: MEDICARE

## 2018-12-18 VITALS
WEIGHT: 154 LBS | TEMPERATURE: 97.2 F | SYSTOLIC BLOOD PRESSURE: 100 MMHG | HEART RATE: 64 BPM | RESPIRATION RATE: 12 BRPM | HEIGHT: 58 IN | BODY MASS INDEX: 32.32 KG/M2 | DIASTOLIC BLOOD PRESSURE: 62 MMHG | OXYGEN SATURATION: 97 %

## 2018-12-18 DIAGNOSIS — J96.11 CHRONIC RESPIRATORY FAILURE WITH HYPOXIA AND HYPERCAPNIA (HCC): Primary | ICD-10-CM

## 2018-12-18 DIAGNOSIS — J96.12 CHRONIC RESPIRATORY FAILURE WITH HYPOXIA AND HYPERCAPNIA (HCC): Primary | ICD-10-CM

## 2018-12-18 DIAGNOSIS — J45.30 MILD PERSISTENT ASTHMA WITHOUT COMPLICATION: ICD-10-CM

## 2018-12-18 DIAGNOSIS — M41.9 RESTRICTIVE LUNG DISEASE DUE TO KYPHOSCOLIOSIS: ICD-10-CM

## 2018-12-18 DIAGNOSIS — J98.4 RESTRICTIVE LUNG DISEASE DUE TO KYPHOSCOLIOSIS: ICD-10-CM

## 2018-12-18 PROBLEM — G93.40 ACUTE ENCEPHALOPATHY: Status: RESOLVED | Noted: 2018-03-07 | Resolved: 2018-12-18

## 2018-12-18 PROCEDURE — 99214 OFFICE O/P EST MOD 30 MIN: CPT | Performed by: NURSE PRACTITIONER

## 2018-12-18 RX ORDER — ONDANSETRON 4 MG/1
4 TABLET, FILM COATED ORAL DAILY
Refills: 2 | COMMUNITY
Start: 2018-11-30 | End: 2020-03-16 | Stop reason: HOSPADM

## 2018-12-18 NOTE — ASSESSMENT & PLAN NOTE
Patient has a history of chronic respiratory failure with hypoxia and hypercapnia  She does use supplemental oxygen during the day  Room air oxygen at rest today was 88-90%  She does use 2 L of supplemental oxygen during the day  She is here today with her daughter and does have trilogy that she uses at [facility] for chronic hypercapnia  There are evenings where she uses only 2 or 3 hours per night  Is difficult for her to use but she attempts to be compliant  I did suggest that perhaps using it during the day, particularly in nights where she does not use it for 2 hours would be helpful

## 2018-12-18 NOTE — ASSESSMENT & PLAN NOTE
Val Day has mild persistent asthma  She is currently using Breo 100 mcg 1 puff daily she also is using nebulizer with DuoNeb as needed  She currently has clear lungs and appears to be doing well

## 2018-12-18 NOTE — PROGRESS NOTES
Assessment/Plan:     Problem List Items Addressed This Visit        Respiratory    Mild persistent asthma without complication     Darrian Coe has mild persistent asthma  She is currently using Breo 100 mcg 1 puff daily she also is using nebulizer with DuoNeb as needed  She currently has clear lungs and appears to be doing well  Relevant Medications    BREO ELLIPTA 200-25 MCG/INH inhaler    Chronic respiratory failure with hypoxia and hypercapnia (HCC) - Primary     Patient has a history of chronic respiratory failure with hypoxia and hypercapnia  She does use supplemental oxygen during the day  Room air oxygen at rest today was 88-90%  She does use 2 L of supplemental oxygen during the day  She is here today with her daughter and does have trilogy that she uses at [facility] for chronic hypercapnia  There are evenings where she uses only 2 or 3 hours per night  Is difficult for her to use but she attempts to be compliant  I did suggest that perhaps using it during the day, particularly in nights where she does not use it for 2 hours would be helpful  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     Darrian Coe has a history of severe thoracic scoliosis  She has chronic hypercapnic hypoxemic respiratory failure secondary to this  Spirometry done in April 2018 shows severe restrictive impairment with forced vital capacity 1 09 L or 49% of predicted  Relevant Medications    BREO ELLIPTA 200-25 MCG/INH inhaler          HPI:  Darrian Coe is a 29-year-old female who was here today for post hospitalization  She was admitted to [address] [place] [address] on December 7, 2018 for acute encephalopathy secondary to CO2 retention  Apparently she does have noninvasive ventilator at home and had been non compliant with usage  She also has a history of chronic respiratory failure with hypoxia and hypercapnia and also was treated for acute tracheobronchitis    She was given antibiotics and transition to oral Ceftin and also has history of mild persistent asthma for which she takes [place]  Patient has history of chronic diastolic CHF  She has a history of DVT of the left lower extremity is on Eliquis  She also has history of chronic respiratory failure with hypoxia and hypercapnia  He has severe thoracic scoliosis and is here today for hospital follow-up  Return in about 6 months (around 6/18/2019)  All questions are answered to the patient's satisfaction and understanding  She verbalizes understanding  She is encouraged to call with any further questions or concerns  Portions of the record may have been created with voice recognition software  Occasional wrong word or "sound a like" substitutions may have occurred due to the inherent limitations of voice recognition software  Read the chart carefully and recognize, using context, where substitutions have occurred  Electronically Signed by MARGOT eBard    ______________________________________________________________________    Chief Complaint:   Chief Complaint   Patient presents with    Follow-up     Hospial    Shortness of Breath       Patient ID: Marlene Napoles is a 80 y o  y o  female has a past medical history of CHF (congestive heart failure) ([facility] ) (08/2017); CO2 retention; DVT (deep vein thrombosis) in pregnancy ([facility] ); Hyperlipidemia; Hypertension; Pulmonary embolism ([facility] ) (02/06/2018); Renal disorder; and Stroke ([facility] ) (2016)  12/18/2018  Patient presents today for follow-up visit  Shortness of Breath   This is a chronic problem  The current episode started more than 1 year ago  The problem occurs daily  The problem has been waxing and waning  The symptoms are aggravated by exercise  The patient has no known risk factors (Patient is on anticoagulation for chronic DVT) for DVT/PE  She has tried beta agonist inhalers and rest for the symptoms  The treatment provided mild relief  Her past medical history is significant for asthma and a heart failure         Review of Systems   Constitutional: Negative  HENT: Negative  Eyes: Negative  Respiratory: Positive for shortness of breath  Cardiovascular: Negative  Gastrointestinal: Negative  Endocrine: Negative  Genitourinary: Negative  Musculoskeletal: Negative  Skin: Negative  Allergic/Immunologic: Negative  Neurological: Negative  Hematological: Negative  Psychiatric/Behavioral: Negative  Smoking history: She reports that she has never smoked  She has never used smokeless tobacco     The following portions of the patient's history were reviewed and updated as appropriate: allergies, current medications, past family history, past medical history, past social history, past surgical history and problem list     Immunization History   Administered Date(s) Administered    Pneumococcal Conjugate 13-Valent 09/02/2017     Current Outpatient Prescriptions   Medication Sig Dispense Refill    acetaminophen (TYLENOL) 325 mg tablet Take 650 mg by mouth every 6 (six) hours as needed for mild pain      amLODIPine (NORVASC) 5 mg tablet Take 1 tablet (5 mg total) by mouth daily 30 tablet 5    apixaban (ELIQUIS) 5 mg Take 1 tablet (5 mg total) by mouth 2 (two) times a day 60 tablet 0    benzonatate (TESSALON) 200 MG capsule Take 1 capsule (200 mg total) by mouth 3 (three) times a day 45 capsule 0    BREO ELLIPTA 200-25 MCG/INH inhaler   3    calcium-vitamin D (OSCAL 500 + D) 500 mg-200 units per tablet Take 1 tablet by mouth 2 (two) times a day        Carbonyl Iron 45 MG TABS Take by mouth      carvedilol (COREG) 25 mg tablet Take 1 tablet (25 mg total) by mouth 2 (two) times a day with meals 60 tablet 0    cholecalciferol (VITAMIN D3) 1,000 units tablet Take 2,000 Units by mouth daily      clopidogrel (PLAVIX) 75 mg tablet Take 75 mg by mouth daily      co-enzyme Q-10 50 MG capsule Take 200 mg by mouth daily        colesevelam (WELCHOL) 625 mg tablet Take 3 tablets (1,875 mg total) by mouth 2 (two) times a day with meals 540 tablet 3    docusate sodium (COLACE) 100 mg capsule Take 100 mg by mouth 2 (two) times a day      Iron-Vitamin C (IRON 100/C) 100-250 MG TABS Take 1 tablet by mouth daily      lidocaine (LIDODERM) 5 %   4    Multiple Vitamins-Minerals (OCUVITE PO) Take 1 tablet by mouth daily        ondansetron (ZOFRAN) 4 mg tablet   2    pantoprazole (PROTONIX) 40 mg tablet Take 40 mg by mouth daily      predniSONE 10 mg tablet Take 4 tabs p o  Daily for 2 more days, then 3 tabs p o  Daily for 3 days, then 2 tabs p o  Daily for 3 days, then 1 tab daily for 3 days  26 tablet 0    torsemide (DEMADEX) 10 mg tablet Take 10 mg by mouth daily      fluticasone-vilanterol (BREO ELLIPTA) 100-25 mcg/inh inhaler Inhale 1 puff daily Rinse mouth after use  (Patient not taking: Reported on 12/18/2018 ) 1 Inhaler 0    ipratropium-albuterol (DUO-NEB) 0 5-2 5 mg/3 mL nebulizer solution Take 1 vial (3 mL total) by nebulization every 6 (six) hours as needed for wheezing (Patient not taking: Reported on 12/18/2018 ) 120 vial 0     No current facility-administered medications for this visit  Allergies: Statins and Lyrica [pregabalin]    Objective:  Vitals:    12/18/18 1401   BP: 100/62   BP Location: Right arm   Patient Position: Sitting   Cuff Size: Standard   Pulse: 64   Resp: 12   Temp: (!) 97 2 °F (36 2 °C)   TempSrc: Oral   SpO2: 97%   Weight: 69 9 kg (154 lb)   Height: 4' 10" (1 473 m)   Oxygen Therapy  SpO2: 97 % (2 L/M continuous)    Wt Readings from Last 3 Encounters:   12/18/18 69 9 kg (154 lb)   12/10/18 70 7 kg (155 lb 13 8 oz)   09/12/18 70 8 kg (156 lb)     Body mass index is 32 19 kg/m²  Physical Exam   Constitutional: She is oriented to person, place, and time  She appears well-developed and well-nourished  HENT:   Head: Normocephalic and atraumatic  Mallampati 3   Eyes: Pupils are equal, round, and reactive to light  Conjunctivae are normal    Neck: Normal range of motion   Neck supple  Cardiovascular: Normal rate and regular rhythm  Pulmonary/Chest: Effort normal and breath sounds normal    Abdominal: Soft  Neurological: She is alert and oriented to person, place, and time  Skin: Skin is warm and dry  Psychiatric: She has a normal mood and affect   Her behavior is normal  Thought content normal        Lab Review:   Admission on 12/07/2018, Discharged on 12/11/2018   Component Date Value    WBC 12/07/2018 8 86     RBC 12/07/2018 3 40*    Hemoglobin 12/07/2018 10 2*    Hematocrit 12/07/2018 34 4*    MCV 12/07/2018 101*    MCH 12/07/2018 30 0     MCHC 12/07/2018 29 7*    RDW 12/07/2018 12 3     MPV 12/07/2018 10 0     Platelets 89/78/6154 283     nRBC 12/07/2018 0     Neutrophils Relative 12/07/2018 82*    Immat GRANS % 12/07/2018 1     Lymphocytes Relative 12/07/2018 9*    Monocytes Relative 12/07/2018 7     Eosinophils Relative 12/07/2018 1     Basophils Relative 12/07/2018 0     Neutrophils Absolute 12/07/2018 7 22     Immature Grans Absolute 12/07/2018 0 10     Lymphocytes Absolute 12/07/2018 0 79     Monocytes Absolute 12/07/2018 0 65     Eosinophils Absolute 12/07/2018 0 08     Basophils Absolute 12/07/2018 0 02     Sodium 12/07/2018 137     Potassium 12/07/2018 4 0     Chloride 12/07/2018 97*    CO2 12/07/2018 38*    ANION GAP 12/07/2018 2*    BUN 12/07/2018 30*    Creatinine 12/07/2018 1 60*    Glucose 12/07/2018 131     Calcium 12/07/2018 9 5     eGFR 12/07/2018 28     Troponin I 12/07/2018 <0 02     NT-proBNP 12/07/2018 2657*    Color, UA 12/07/2018 Light Yellow     Clarity, UA 12/07/2018 Clear     Specific Gravity, UA 12/07/2018 1 010     pH, UA 12/07/2018 5 5     Leukocytes, UA 12/07/2018 Trace*    Nitrite, UA 12/07/2018 Negative     Protein, UA 12/07/2018 Negative     Glucose, UA 12/07/2018 Negative     Ketones, UA 12/07/2018 Negative     Urobilinogen, UA 12/07/2018 0 2     Bilirubin, UA 12/07/2018 Negative     Blood, UA 12/07/2018 Negative     Protime 12/07/2018 11 4     INR 12/07/2018 1 09     PTT 12/07/2018 24     RBC, UA 12/07/2018 None Seen     WBC, UA 12/07/2018 0-1*    Epithelial Cells 12/07/2018 Moderate*    Bacteria, UA 12/07/2018 None Seen     OTHER OBSERVATIONS 12/07/2018 Renal Epithelial Cells Present     pH, Arterial 12/07/2018 7  [address] ART TC 12/07/2018 7 400     pCO2, Arterial 12/07/2018 68 3*    PCO2 (TC) Arterial 12/07/2018 67 4*    pO2, Arterial 12/07/2018 75 6     PO2 (TC) Arterial 12/07/2018 74 1*    HCO3, Arterial 12/07/2018 41 0*    Base Excess, Arterial 12/07/2018 13 6     O2 Content, Arterial 12/07/2018 14 6*    O2 HGB,Arterial  12/07/2018 94 7     SOURCE 12/07/2018 Radial, Right     MICHELLE TEST 12/07/2018 Yes     Temperature 12/07/2018 98 0     Nasal Cannula 12/07/2018 2 lpm     Magnesium 12/07/2018 1 9     TSH 3RD GENERATON 12/08/2018 0 637     Sodium 12/08/2018 140     Potassium 12/08/2018 4 0     Chloride 12/08/2018 99*    CO2 12/08/2018 37*    ANION GAP 12/08/2018 4     BUN 12/08/2018 40*    Creatinine 12/08/2018 1 73*    Glucose 12/08/2018 168*    Calcium 12/08/2018 9 2     AST 12/08/2018 20     ALT 12/08/2018 27     Alkaline Phosphatase 12/08/2018 151*    Total Protein 12/08/2018 8 3*    Albumin 12/08/2018 2 2*    Total Bilirubin 12/08/2018 0 30     eGFR 12/08/2018 26     Magnesium 12/08/2018 2 1     Phosphorus 12/08/2018 5 4*    WBC 12/08/2018 10 49*    RBC 12/08/2018 3 66*    Hemoglobin 12/08/2018 10 9*    Hematocrit 12/08/2018 36 8     MCV 12/08/2018 101*    MCH 12/08/2018 29 8     MCHC 12/08/2018 29 6*    RDW 12/08/2018 12 2     MPV 12/08/2018 9 7     Platelets 78/24/1490 306     nRBC 12/08/2018 0     Neutrophils Relative 12/08/2018 93*    Immat GRANS % 12/08/2018 1     Lymphocytes Relative 12/08/2018 5*    Monocytes Relative 12/08/2018 1*    Eosinophils Relative 12/08/2018 0     Basophils Relative 12/08/2018 0     Neutrophils Absolute 12/08/2018 9 84*    Immature Grans Absolute 12/08/2018 0 08     Lymphocytes Absolute 12/08/2018 0 48*    Monocytes Absolute 12/08/2018 0 08*    Eosinophils Absolute 12/08/2018 0 00     Basophils Absolute 12/08/2018 0 01     Procalcitonin 12/08/2018 0 35*    POC Glucose 12/08/2018 211*    Sodium 12/09/2018 140     Potassium 12/09/2018 4 2     Chloride 12/09/2018 100     CO2 12/09/2018 39*    ANION GAP 12/09/2018 1*    BUN 12/09/2018 60*    Creatinine 12/09/2018 1 78*    Glucose 12/09/2018 176*    Calcium 12/09/2018 9 3     AST 12/09/2018 13     ALT 12/09/2018 21     Alkaline Phosphatase 12/09/2018 127*    Total Protein 12/09/2018 7 7     Albumin 12/09/2018 2 1*    Total Bilirubin 12/09/2018 0 20     eGFR 12/09/2018 25     Phosphorus 12/09/2018 3 4     Sodium 12/10/2018 139     Potassium 12/10/2018 4 4     Chloride 12/10/2018 101     CO2 12/10/2018 37*    ANION GAP 12/10/2018 1*    BUN 12/10/2018 78*    Creatinine 12/10/2018 1 85*    Glucose 12/10/2018 196*    Calcium 12/10/2018 9 1     eGFR 12/10/2018 24     WBC 12/10/2018 9 61     RBC 12/10/2018 3 41*    Hemoglobin 12/10/2018 10 1*    Hematocrit 12/10/2018 34 3*    MCV 12/10/2018 101*    MCH 12/10/2018 29 6     MCHC 12/10/2018 29 4*    RDW 12/10/2018 12 4     Platelets 04/22/1919 336     MPV 12/10/2018 9 9     WBC 12/11/2018 8 16     RBC 12/11/2018 3 33*    Hemoglobin 12/11/2018 10 0*    Hematocrit 12/11/2018 35 0     MCV 12/11/2018 105*    MCH 12/11/2018 30 0     MCHC 12/11/2018 28 6*    RDW 12/11/2018 12 6     Platelets 52/57/8658 315     MPV 12/11/2018 10 2     Sodium 12/11/2018 142     Potassium 12/11/2018 4 1     Chloride 12/11/2018 103     CO2 12/11/2018 36*    ANION GAP 12/11/2018 3*    BUN 12/11/2018 88*    Creatinine 12/11/2018 1 96*    Glucose 12/11/2018 140     Calcium 12/11/2018 9 2     AST 12/11/2018 29     ALT 12/11/2018 48     Alkaline Phosphatase 12/11/2018 99     Total Protein 12/11/2018 6 7     Albumin 12/11/2018 1 9*    Total Bilirubin 12/11/2018 0 10*    eGFR 12/11/2018 22    Transcribe Orders on 10/04/2018   Component Date Value    WBC 10/04/2018 5 23     RBC 10/04/2018 3 92     Hemoglobin 10/04/2018 11 5     Hematocrit 10/04/2018 38 1     MCV 10/04/2018 97     MCH 10/04/2018 29 3     MCHC 10/04/2018 30 2*    RDW 10/04/2018 12 2     MPV 10/04/2018 10 9     Platelets 72/40/3892 160     nRBC 10/04/2018 0     Neutrophils Relative 10/04/2018 70     Immat GRANS % 10/04/2018 1     Lymphocytes Relative 10/04/2018 19     Monocytes Relative 10/04/2018 8     Eosinophils Relative 10/04/2018 2     Basophils Relative 10/04/2018 0     Neutrophils Absolute 10/04/2018 3 68     Immature Grans Absolute 10/04/2018 0 03     Lymphocytes Absolute 10/04/2018 0 98     Monocytes Absolute 10/04/2018 0 41     Eosinophils Absolute 10/04/2018 0 12     Basophils Absolute 10/04/2018 0 01     Sodium 10/04/2018 138     Potassium 10/04/2018 3 8     Chloride 10/04/2018 106     CO2 10/04/2018 30     ANION GAP 10/04/2018 2*    BUN 10/04/2018 55*    Creatinine 10/04/2018 1 82*    Glucose, Fasting 10/04/2018 111*    Calcium 10/04/2018 9 3     AST 10/04/2018 17     ALT 10/04/2018 22     Alkaline Phosphatase 10/04/2018 72     Total Protein 10/04/2018 8 1     Albumin 10/04/2018 3 3*    Total Bilirubin 10/04/2018 0 55     eGFR 10/04/2018 24     IGG 10/04/2018 2200 0*    Ig [place] Free Light Chain 10/04/2018 25 4*    Ig Lambda Free Light [place]* 10/04/2018 76 2*    Kappa/Lambda FluidC Ratio 10/04/2018 0 33    Appointment on 08/23/2018   Component Date Value    Venipuncture 08/23/2018 Collected    Orders Only on 08/17/2018   Component Date Value    PTH, Intact 08/17/2018 25     SL AMB CALCIUM 08/17/2018 9 3    Orders Only on 08/17/2018   Component Date Value    Total Cholesterol 08/17/2018 157     HDL 08/17/2018 52     Triglycerides 08/17/2018 140     LDL Direct 08/17/2018 81     Chol HDLC Ratio 08/17/2018 3 0     Non-HDL Cholesterol 08/17/2018 105     Magnesium, Serum 08/17/2018 2 3     Phosphorus, Serum 08/17/2018 4 2     Iron, Serum 08/17/2018 93     Total Iron Binding [place]* 08/17/2018 327     Iron Saturation 08/17/2018 28     Glucose, Random 08/17/2018 97     BUN 08/17/2018 42*    Creatinine 08/17/2018 1 98*    eGFR Non  08/17/2018 22*    SL AMB EGFR  AMER* 08/17/2018 25*    SL AMB BUN/CREATININE RA* 08/17/2018 21     Sodium 08/17/2018 143     Potassium 08/17/2018 4 0     Chloride 08/17/2018 107     CO2 08/17/2018 26     SL AMB CALCIUM 08/17/2018 9 4     SL AMB PROTEIN, TOTAL 08/17/2018 7 5     Albumin 08/17/2018 3 8     Globulin 08/17/2018 3 7     Albumin/Globulin Ratio 08/17/2018 1 0     TOTAL BILIRUBIN 08/17/2018 0 4     Alkaline Phosphatase 08/17/2018 84     SL AMB AST 08/17/2018 13     SL AMB ALT 08/17/2018 10     White Blood Cell Count 08/17/2018 4 9     Red Blood Cell Count 08/17/2018 3 94     Hemoglobin 08/17/2018 11 9     HCT 08/17/2018 36 7     MCV 08/17/2018 93 1     MCH 08/17/2018 30 2     MCHC 08/17/2018 32 4     RDW 08/17/2018 11 8     Platelet Count 21/93/4336 167     SL AMB MPV 08/17/2018 10 9     Neutrophils (Absolute) 08/17/2018 3499     Lymphocytes (Absolute) 08/17/2018 916     Monocytes (Absolute) 08/17/2018 377     Eosinophils (Absolute) 08/17/2018 98     Basophils ABS 08/17/2018 10     Neutrophils 08/17/2018 71 4     Lymphocytes 08/17/2018 18 7     Monocytes 08/17/2018 7 7     Eosinophils 08/17/2018 2 0     Basophils PCT 08/17/2018 0 2     Ferritin 08/17/2018 74     Vitamin D, 25-Hydroxy, S* 08/17/2018 57     Creatinine, Urine 08/17/2018 TNP        Diagnostics:  I have personally reviewed pertinent reports  Office Spirometry Results:     ESS:    Xr Chest Pa & Lateral    Result Date: 12/7/2018  Narrative: CHEST INDICATION:   cardiac workup  Fever and cough  COMPARISON:  3/30/2018   EXAM PERFORMED/VIEWS:  XR CHEST PA & LATERAL FINDINGS: Stable cardiomegaly is noted  The lungs are clear  No pneumothorax or pleural effusion  Thoracolumbar rods are again present  Impression: No acute cardiopulmonary disease   Workstation performed: [id number]

## 2018-12-18 NOTE — ASSESSMENT & PLAN NOTE
Ba Peters has a history of severe thoracic scoliosis  She has chronic hypercapnic hypoxemic respiratory failure secondary to this  Spirometry done in April 2018 shows severe restrictive impairment with forced vital capacity 1 09 L or 49% of predicted

## 2018-12-18 NOTE — PATIENT INSTRUCTIONS
You appear to be stable at this time  Try to use year trilogy at least  3 hours a night  If your unable to use during the night he can use used during the daytime   Use her Breo 1 puff daily  Range her mouth about 5 minutes later

## 2018-12-21 LAB
EXT GLUCOSE BLD: 113
EXTERNAL ALK PHOS: 90
EXTERNAL ALT: 30
EXTERNAL AST: 17
EXTERNAL BUN: 72
EXTERNAL CALCIUM: 9.1
EXTERNAL CHLORIDE: 101
EXTERNAL CO2: 40
EXTERNAL CREATININE: 1.68
EXTERNAL EGFR: 26
EXTERNAL PLATELET COUNT: 413 K/ΜL
EXTERNAL POTASSIUM: 4.5
EXTERNAL SODIUM: 146
EXTERNAL T.BILIRUBIN: 0.3
EXTERNAL TOTAL PROTEIN: 7.2
FOLATE SERPL-MCNC: 9.5 NG/ML
HCT VFR BLD AUTO: 36 % (ref 34.8–46.1)
HGB BLD-MCNC: 11.5 G/DL (ref 11.5–15.4)
IRON SERPL-MCNC: 81 UG/DL (ref 50–170)
MAGNESIUM SERPL-MCNC: 2.6 MG/DL (ref 1.6–2.6)
PHOSPHATE SERPL-MCNC: 4.3 MG/DL (ref 2.3–4.1)
VIT B12 SERPL-MCNC: 685 PG/ML (ref 100–900)
WBC # BLD AUTO: 9.4 THOUSAND/UL

## 2018-12-24 ENCOUNTER — TELEPHONE (OUTPATIENT)
Dept: NEPHROLOGY | Facility: CLINIC | Age: 83
End: 2018-12-24

## 2018-12-24 NOTE — TELEPHONE ENCOUNTER
----- Message from Fang Ziegler PA-C sent at 12/21/2018  4:02 PM EST -----  Please call patient and let her know renal function looks good but she could drink more fluids  Thanks

## 2018-12-26 NOTE — TELEPHONE ENCOUNTER
I spoke to caregiver and she made patient aware that renal function looks good but need to increase fluids

## 2019-01-04 DIAGNOSIS — I63.9 CEREBROVASCULAR ACCIDENT (CVA) (HCC): Chronic | ICD-10-CM

## 2019-01-06 RX ORDER — APIXABAN 5 MG/1
TABLET, FILM COATED ORAL
Qty: 60 TABLET | Refills: 5 | Status: SHIPPED | OUTPATIENT
Start: 2019-01-06 | End: 2019-08-09 | Stop reason: SDUPTHER

## 2019-01-08 LAB
EXT GLUCOSE BLD: 113
EXTERNAL ALBUMIN: 3.4
EXTERNAL ALK PHOS: 90
EXTERNAL ALT: 30
EXTERNAL AST: 17
EXTERNAL BUN: 72
EXTERNAL CALCIUM: 9.1
EXTERNAL CHLORIDE: 101
EXTERNAL CO2: 40
EXTERNAL CREATININE: 1.68
EXTERNAL EGFR: 26
EXTERNAL POTASSIUM: 4.5
EXTERNAL SODIUM: 146
EXTERNAL T.BILIRUBIN: 0.3
EXTERNAL TOTAL PROTEIN: 7.2
FOLATE SERPL-MCNC: 9.5 NG/ML (ref 3.1–17.5)
HCT VFR BLD AUTO: 36 % (ref 34.8–46.1)
HGB BLD-MCNC: 11.5 G/DL (ref 11.5–15.4)
IRON SERPL-MCNC: 81 UG/DL (ref 50–170)
MAGNESIUM SERPL-MCNC: 2.6 MG/DL (ref 1.6–2.6)
PHOSPHATE SERPL-MCNC: 4.3 MG/DL (ref 2.3–4.1)
PLATELET # BLD AUTO: 413 THOUSANDS/UL (ref 149–390)
VIT B12 SERPL-MCNC: 685 PG/ML (ref 100–900)
WBC # BLD AUTO: 9.4 THOUSAND/UL

## 2019-01-22 ENCOUNTER — TELEPHONE (OUTPATIENT)
Dept: NEPHROLOGY | Facility: CLINIC | Age: 84
End: 2019-01-22

## 2019-01-22 NOTE — TELEPHONE ENCOUNTER
Patient needs additional blood work done for appt with Dr Ty Ruiz I faxed the additional lab orders to [facility] per Daughter's request   Fax# [phone]

## 2019-01-26 LAB
BASOPHILS # BLD AUTO: 10 CELLS/UL (ref 0–200)
BASOPHILS NFR BLD AUTO: 0.2 %
EOSINOPHIL # BLD AUTO: 88 CELLS/UL (ref 15–500)
EOSINOPHIL NFR BLD AUTO: 1.7 %
ERYTHROCYTE [DISTWIDTH] IN BLOOD BY AUTOMATED COUNT: 12.1 % (ref 11–15)
FERRITIN SERPL-MCNC: 57 NG/ML (ref 20–288)
HCT VFR BLD AUTO: 33.2 % (ref 35–45)
HGB BLD-MCNC: 10.6 G/DL (ref 11.7–15.5)
IRON SATN MFR SERPL: 19 % (CALC) (ref 11–50)
IRON SERPL-MCNC: 64 MCG/DL (ref 45–160)
LYMPHOCYTES # BLD AUTO: 848 CELLS/UL (ref 850–3900)
LYMPHOCYTES NFR BLD AUTO: 16.3 %
MAGNESIUM SERPL-MCNC: 2.3 MG/DL (ref 1.5–2.5)
MCH RBC QN AUTO: 30.5 PG (ref 27–33)
MCHC RBC AUTO-ENTMCNC: 31.9 G/DL (ref 32–36)
MCV RBC AUTO: 95.4 FL (ref 80–100)
MONOCYTES # BLD AUTO: 411 CELLS/UL (ref 200–950)
MONOCYTES NFR BLD AUTO: 7.9 %
NEUTROPHILS # BLD AUTO: 3843 CELLS/UL (ref 1500–7800)
NEUTROPHILS NFR BLD AUTO: 73.9 %
PHOSPHATE SERPL-MCNC: 3.9 MG/DL (ref 2.1–4.3)
PLATELET # BLD AUTO: 168 THOUSAND/UL (ref 140–400)
PMV BLD REES-ECKER: 11.6 FL (ref 7.5–12.5)
RBC # BLD AUTO: 3.48 MILLION/UL (ref 3.8–5.1)
TIBC SERPL-MCNC: 339 MCG/DL (CALC) (ref 250–450)
WBC # BLD AUTO: 5.2 THOUSAND/UL (ref 3.8–10.8)

## 2019-01-28 LAB
CALCIUM SERPL-MCNC: 9.2 MG/DL (ref 8.6–10.4)
PTH-INTACT SERPL-MCNC: 39 PG/ML (ref 14–64)

## 2019-03-26 ENCOUNTER — OFFICE VISIT (OUTPATIENT)
Dept: PULMONOLOGY | Facility: MEDICAL CENTER | Age: 84
End: 2019-03-26
Payer: MEDICARE

## 2019-03-26 VITALS
TEMPERATURE: 98.1 F | HEART RATE: 66 BPM | OXYGEN SATURATION: 88 % | BODY MASS INDEX: 31.91 KG/M2 | SYSTOLIC BLOOD PRESSURE: 110 MMHG | DIASTOLIC BLOOD PRESSURE: 68 MMHG | RESPIRATION RATE: 12 BRPM | WEIGHT: 152 LBS | HEIGHT: 58 IN

## 2019-03-26 DIAGNOSIS — J96.12 CHRONIC RESPIRATORY FAILURE WITH HYPOXIA AND HYPERCAPNIA (HCC): Primary | ICD-10-CM

## 2019-03-26 DIAGNOSIS — J98.4 RESTRICTIVE LUNG DISEASE DUE TO KYPHOSCOLIOSIS: ICD-10-CM

## 2019-03-26 DIAGNOSIS — J96.11 CHRONIC RESPIRATORY FAILURE WITH HYPOXIA AND HYPERCAPNIA (HCC): Primary | ICD-10-CM

## 2019-03-26 DIAGNOSIS — J45.30 MILD PERSISTENT ASTHMA WITHOUT COMPLICATION: ICD-10-CM

## 2019-03-26 DIAGNOSIS — M41.9 RESTRICTIVE LUNG DISEASE DUE TO KYPHOSCOLIOSIS: ICD-10-CM

## 2019-03-26 PROCEDURE — 99214 OFFICE O/P EST MOD 30 MIN: CPT | Performed by: NURSE PRACTITIONER

## 2019-03-26 RX ORDER — OYSTER SHELL CALCIUM WITH VITAMIN D 500; 200 MG/1; [IU]/1
TABLET, FILM COATED ORAL
COMMUNITY
End: 2019-08-14 | Stop reason: SDUPTHER

## 2019-03-26 RX ORDER — NEBIVOLOL 20 MG/1
TABLET ORAL
COMMUNITY
End: 2020-02-24

## 2019-03-26 NOTE — PROGRESS NOTES
Assessment/Plan:     Problem List Items Addressed This Visit        Respiratory    Mild persistent asthma without complication     Neno Banuelos has mild persistent asthma  She has Breo 100 mics 1 puff daily and also uses nebulizer with DuoNeb  She has some slight wheezing today  However she reports recently completing antibiotic therapy that was prescribed by her primary care physician for acute bronchitis  Chronic respiratory failure with hypoxia and hypercapnia (HCC) - Primary     Neno Banuelos has history of chronic respiratory failure with hypoxia and hypercapnia  She is currently using 2 L of supplemental oxygen during the day and nighttime  Room air oxygen at rest today was 90-91%  2 L of supplemental oxygen at rest was 97-98%  Neno Banuelos also has noninvasive ventilator trilogy that she uses at nighttime for hypercapnia  She also has 2 L of oxygen  She does attempt to uses at least 2 or 3 hours per night  As she has been ill recently it has become more difficult  However she also reports using it 2-3 hours before she actually falls asleep tonight  This would be beneficial as well  Neno Banuelos is alert and oriented today and obviously is benefitting from this therapy  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     Neno Banuelos has severe thoracic scoliosis  She does have restrictive impairment that was seen on pulmonary function test April 2018  Forced vital capacity was 1 09 L or 49% of predicted  I am not doing a PFT at this visit as she has recently been ill  She is aware that use of noninvasive ventilator is an important factor in her treatment for chronic hypercapnia  HPI:  Neno Banuelos is here today for follow-up  She was seen in the office December 18, 2018  It was a post hospitalization visit  She has mild persistent asthma and also has history of chronic respiratory failure with hypoxia and hypercapnia  Hypercapnia is likely due to severe thoracic scoliosis    She is using Trilogy noninvasive ventilator  According to patient and her daughter who accompanies her today, [facility] has recently been treated for acute bronchitis  She was seen by her primary care physician and was given antibiotic  She recently completed antibiotic and is feeling better  Return in about 6 months (around 9/26/2019)  All questions are answered to the patient's satisfaction and understanding  She verbalizes understanding  She is encouraged to call with any further questions or concerns  Portions of the record may have been created with voice recognition software  Occasional wrong word or "sound a like" substitutions may have occurred due to the inherent limitations of voice recognition software  Read the chart carefully and recognize, using context, where substitutions have occurred  Electronically Signed by MARGOT Guerin    ______________________________________________________________________    Chief Complaint:   Chief Complaint   Patient presents with    Follow-up    Shortness of Breath     minimal exertion       Patient ID: [facility] is a 80 y o  y o  female has a past medical history of CHF (congestive heart failure) ([facility] ) (08/2017), CO2 retention, DVT (deep vein thrombosis) in pregnancy ([facility] ), Hyperlipidemia, Hypertension, Pulmonary embolism ([facility] ) (02/06/2018), Renal disorder, and Stroke [facility]) (2016)     3/26/2019  Patient presents today for follow-up visit  Cough   This is a chronic problem  The current episode started more than 1 year ago  The problem has been gradually improving  The problem occurs every few hours  The cough is productive of sputum  Associated symptoms include wheezing  The symptoms are aggravated by lying down  Risk factors for lung disease include animal exposure  She has tried a beta-agonist inhaler, ipratropium inhaler, steroid inhaler and rest for the symptoms  The treatment provided mild relief  Her past medical history is significant for COPD         Review of Systems   Respiratory: Positive for cough and wheezing  Smoking history: She reports that she has never smoked  She has never used smokeless tobacco     The following portions of the patient's history were reviewed and updated as appropriate: allergies, current medications, past family history, past medical history, past social history, past surgical history and problem list     Immunization History   Administered Date(s) Administered    Pneumococcal Conjugate 13-Valent 09/02/2017     Current Outpatient Medications   Medication Sig Dispense Refill    acetaminophen (TYLENOL) 325 mg tablet Take 650 mg by mouth every 6 (six) hours as needed for mild pain      amLODIPine (NORVASC) 5 mg tablet Take 1 tablet (5 mg total) by mouth daily 30 tablet 5    benzonatate (TESSALON) 200 MG capsule Take 1 capsule (200 mg total) by mouth 3 (three) times a day 45 capsule 0    BREO ELLIPTA 200-25 MCG/INH inhaler   3    calcium-vitamin D (OSCAL 500 + D) 500 mg-200 units per tablet Take 1 tablet by mouth 2 (two) times a day        Carbonyl Iron 45 MG TABS Take by mouth      carvedilol (COREG) 25 mg tablet Take 1 tablet (25 mg total) by mouth 2 (two) times a day with meals 60 tablet 0    cholecalciferol (VITAMIN D3) 1,000 units tablet Take 2,000 Units by mouth daily      clopidogrel (PLAVIX) 75 mg tablet Take 75 mg by mouth daily      co-enzyme Q-10 30 mg Take 30 mg by mouth      colesevelam (WELCHOL) 625 mg tablet Take 3 tablets (1,875 mg total) by mouth 2 (two) times a day with meals 540 tablet 3    docusate sodium (COLACE) 100 mg capsule Take 100 mg by mouth 2 (two) times a day      ELIQUIS 5 MG TAKE ONE TABLET TWICE DAILY 60 tablet 5    ipratropium-albuterol (DUO-NEB) 0 5-2 5 mg/3 mL nebulizer solution Take 1 vial (3 mL total) by nebulization every 6 (six) hours as needed for wheezing 120 vial 0    Iron-Vitamin C (IRON 100/C) 100-250 MG TABS Take 1 tablet by mouth daily      lidocaine (LIDODERM) 5 %   4    Multiple Vitamins-Minerals (OCUVITE PO) Take 1 tablet by mouth daily        nebivolol (BYSTOLIC) 20 MG tablet       ondansetron (ZOFRAN) 4 mg tablet   2    pantoprazole (PROTONIX) 40 mg tablet Take 40 mg by mouth daily      torsemide (DEMADEX) 10 mg tablet Take 10 mg by mouth daily      calcium-vitamin D (OSCAL 500/200 D-3) 500 mg-200 units per tablet Take by mouth      Cholecalciferol (VITAMIN D3) 1000 UNIT/SPRAY LIQD Take by mouth      co-enzyme Q-10 50 MG capsule Take 200 mg by mouth daily        fluticasone-vilanterol (BREO ELLIPTA) 100-25 mcg/inh inhaler Inhale 1 puff daily Rinse mouth after use  (Patient not taking: Reported on 12/18/2018 ) 1 Inhaler 0    predniSONE 10 mg tablet Take 4 tabs p o  Daily for 2 more days, then 3 tabs p o  Daily for 3 days, then 2 tabs p o  Daily for 3 days, then 1 tab daily for 3 days  (Patient not taking: Reported on 3/26/2019) 26 tablet 0     No current facility-administered medications for this visit  Allergies: Statins and Lyrica [pregabalin]    Objective:  Vitals:    03/26/19 1415   BP: 110/68   BP Location: Left arm   Patient Position: Sitting   Cuff Size: Standard   Pulse: 66   Resp: 12   Temp: 98 1 °F (36 7 °C)   TempSrc: Tympanic   SpO2: (!) 88%   Weight: 68 9 kg (152 lb)   Height: 4' 10" (1 473 m)   Oxygen Therapy  SpO2: (!) 88 %(Room air)    Wt Readings from Last 3 Encounters:   03/26/19 68 9 kg (152 lb)   12/18/18 69 9 kg (154 lb)   12/10/18 70 7 kg (155 lb 13 8 oz)     Body mass index is 31 77 kg/m²  Physical Exam   Constitutional: She is oriented to person, place, and time  She appears well-developed and well-nourished  HENT:   Head: Normocephalic and atraumatic  Mouth/Throat: Oropharynx is clear and moist    Mallampati 4   Eyes: Pupils are equal, round, and reactive to light  EOM are normal    Neck: Normal range of motion  Neck supple  Cardiovascular: Normal rate and regular rhythm  Pulmonary/Chest: Effort normal  She has wheezes  Abdominal: Soft  Musculoskeletal: Normal range of motion  Right lower leg: Normal         Left lower leg: Normal    Neurological: She is alert and oriented to person, place, and time  Skin: Skin is warm and dry  Capillary refill takes 2 to 3 seconds  Psychiatric: She has a normal mood and affect  Her behavior is normal        Lab Review:   Orders Only on 01/25/2019   Component Date Value    PTH, Intact 01/25/2019 39     SL AMB CALCIUM 01/25/2019 9 2    Orders Only on 01/25/2019   Component Date Value    Magnesium, Serum 01/25/2019 2 3     Phosphorus, Serum 01/25/2019 3 9     Iron, Serum 01/25/2019 64     Total Iron Binding [place]* 01/25/2019 339     Iron Saturation 01/25/2019 19     White Blood Cell Count 01/25/2019 5 2     Red Blood Cell Count 01/25/2019 3 48*    Hemoglobin 01/25/2019 10 6*    HCT 01/25/2019 33 2*    MCV 01/25/2019 95 4     MCH 01/25/2019 30 5     MCHC 01/25/2019 31 9*    RDW 01/25/2019 12 1     Platelet Count 74/87/2112 168     SL AMB MPV 01/25/2019 11 6     Neutrophils (Absolute) 01/25/2019 3,843     Lymphocytes (Absolute) 01/25/2019 848*    Monocytes (Absolute) 01/25/2019 411     Eosinophils (Absolute) 01/25/2019 88     Basophils ABS 01/25/2019 10     Neutrophils 01/25/2019 73 9     Lymphocytes 01/25/2019 16 3     Monocytes 01/25/2019 7 9     Eosinophils 01/25/2019 1 7     Basophils PCT 01/25/2019 0 2     Ferritin 01/25/2019 57    Abstract on 01/08/2019   Component Date Value    SODIUM 12/14/2018 146     POTASSIUM 12/14/2018 4 5     CHLORIDE 12/14/2018 101     CO2 12/14/2018 40     BUN 12/14/2018 72     CREATININE 12/14/2018 1 68     Glucose 12/14/2018 113     EXTERNAL CALCIUM 12/14/2018 9 1     TOTAL PROTEIN 12/14/2018 7 2     ALBUMIN 12/14/2018 3 4     AST 12/14/2018 17     ALT 12/14/2018 30     ALK PHOS 12/14/2018 90     EXTERNAL TOT   BILIRUBIN 12/14/2018 0 3     EXTERNAL EGFR 12/14/2018 26     Magnesium 12/14/2018 2 6     Phosphorus 12/14/2018 4 3*    WBC 12/14/2018 9 40     Hemoglobin 12/14/2018 11 5     Hematocrit 12/14/2018 36 0     Platelets 04/15/3959 413*    Iron 12/14/2018 81     Folate 12/14/2018 9 5     Vitamin B-12 12/14/2018 685    Abstract on 12/21/2018   Component Date Value    Vitamin B-12 12/14/2018 685     FOLATE, SERUM 12/14/2018 9 5    Abstract on 12/21/2018   Component Date Value    SODIUM 12/14/2018 146     POTASSIUM 12/14/2018 4 5     CHLORIDE 12/14/2018 101     CO2 12/14/2018 40     BUN 12/14/2018 72     CREATININE 12/14/2018 1 68     Glucose 12/14/2018 113     EXTERNAL CALCIUM 12/14/2018 9 1     TOTAL PROTEIN 12/14/2018 7 2     AST 12/14/2018 17     ALT 12/14/2018 30     ALK PHOS 12/14/2018 90     EXTERNAL TOT   BILIRUBIN 12/14/2018 0 3     EXTERNAL EGFR 12/14/2018 26     Magnesium 12/14/2018 2 6     Phosphorus 12/14/2018 4 3*    WBC 12/14/2018 9 40     Hemoglobin 12/14/2018 11 5     Hematocrit 12/14/2018 36 0     External Platelets 95/68/1403 413     Iron 12/14/2018 81    Admission on 12/07/2018, Discharged on 12/11/2018   Component Date Value    WBC 12/07/2018 8 86     RBC 12/07/2018 3 40*    Hemoglobin 12/07/2018 10 2*    Hematocrit 12/07/2018 34 4*    MCV 12/07/2018 101*    MCH 12/07/2018 30 0     MCHC 12/07/2018 29 7*    RDW 12/07/2018 12 3     MPV 12/07/2018 10 0     Platelets 25/24/9275 283     nRBC 12/07/2018 0     Neutrophils Relative 12/07/2018 82*    Immat GRANS % 12/07/2018 1     Lymphocytes Relative 12/07/2018 9*    Monocytes Relative 12/07/2018 7     Eosinophils Relative 12/07/2018 1     Basophils Relative 12/07/2018 0     Neutrophils Absolute 12/07/2018 7 22     Immature Grans Absolute 12/07/2018 0 10     Lymphocytes Absolute 12/07/2018 0 79     Monocytes Absolute 12/07/2018 0 65     Eosinophils Absolute 12/07/2018 0 08     Basophils Absolute 12/07/2018 0 02     Sodium 12/07/2018 137     Potassium 12/07/2018 4 0     Chloride 12/07/2018 97*    CO2 12/07/2018 38*    ANION GAP 12/07/2018 2*    BUN 12/07/2018 30*    Creatinine 12/07/2018 1 60*    Glucose 12/07/2018 131     Calcium 12/07/2018 9 5     eGFR 12/07/2018 28     Troponin I 12/07/2018 <0 02     NT-proBNP 12/07/2018 2,657*    Color, UA 12/07/2018 Light Yellow     Clarity, UA 12/07/2018 Clear     Specific Gravity, UA 12/07/2018 1 010     pH, UA 12/07/2018 5 5     Leukocytes, UA 12/07/2018 Trace*    Nitrite, UA 12/07/2018 Negative     Protein, UA 12/07/2018 Negative     Glucose, UA 12/07/2018 Negative     Ketones, UA 12/07/2018 Negative     Urobilinogen, UA 12/07/2018 0 2     Bilirubin, UA 12/07/2018 Negative     Blood, UA 12/07/2018 Negative     Protime 12/07/2018 11 4     INR 12/07/2018 1 09     PTT 12/07/2018 24     RBC, UA 12/07/2018 None Seen     WBC, UA 12/07/2018 0-1*    Epithelial Cells 12/07/2018 Moderate*    Bacteria, UA 12/07/2018 None Seen     OTHER OBSERVATIONS 12/07/2018 Renal Epithelial Cells Present     pH, Arterial 12/07/2018 7  [address] ART TC 12/07/2018 7 400     pCO2, Arterial 12/07/2018 68 3*    PCO2 (TC) Arterial 12/07/2018 67 4*    pO2, Arterial 12/07/2018 75 6     PO2 (TC) Arterial 12/07/2018 74 1*    HCO3, Arterial 12/07/2018 41 0*    Base Excess, Arterial 12/07/2018 13 6     O2 Content, Arterial 12/07/2018 14 6*    O2 HGB,Arterial  12/07/2018 94 7     SOURCE 12/07/2018 Radial, Right     MICHELLE TEST 12/07/2018 Yes     Temperature 12/07/2018 98 0     Nasal Cannula 12/07/2018 2 lpm     Magnesium 12/07/2018 1 9     TSH 3RD GENERATON 12/08/2018 0 637     Sodium 12/08/2018 140     Potassium 12/08/2018 4 0     Chloride 12/08/2018 99*    CO2 12/08/2018 37*    ANION GAP 12/08/2018 4     BUN 12/08/2018 40*    Creatinine 12/08/2018 1 73*    Glucose 12/08/2018 168*    Calcium 12/08/2018 9 2     AST 12/08/2018 20     ALT 12/08/2018 27     Alkaline Phosphatase 12/08/2018 151*    Total Protein 12/08/2018 8 3*    Albumin 12/08/2018 2 2*    Total Bilirubin 12/08/2018 0 30     eGFR 12/08/2018 26     Magnesium 12/08/2018 2 1     Phosphorus 12/08/2018 5 4*    WBC 12/08/2018 10 49*    RBC 12/08/2018 3 66*    Hemoglobin 12/08/2018 10 9*    Hematocrit 12/08/2018 36 8     MCV 12/08/2018 101*    MCH 12/08/2018 29 8     MCHC 12/08/2018 29 6*    RDW 12/08/2018 12 2     MPV 12/08/2018 9 7     Platelets 94/92/6513 306     nRBC 12/08/2018 0     Neutrophils Relative 12/08/2018 93*    Immat GRANS % 12/08/2018 1     Lymphocytes Relative 12/08/2018 5*    Monocytes Relative 12/08/2018 1*    Eosinophils Relative 12/08/2018 0     Basophils Relative 12/08/2018 0     Neutrophils Absolute 12/08/2018 9 84*    Immature Grans Absolute 12/08/2018 0 08     Lymphocytes Absolute 12/08/2018 0 48*    Monocytes Absolute 12/08/2018 0 08*    Eosinophils Absolute 12/08/2018 0 00     Basophils Absolute 12/08/2018 0 01     Procalcitonin 12/08/2018 0 35*    POC Glucose 12/08/2018 211*    Sodium 12/09/2018 140     Potassium 12/09/2018 4 2     Chloride 12/09/2018 100     CO2 12/09/2018 39*    ANION GAP 12/09/2018 1*    BUN 12/09/2018 60*    Creatinine 12/09/2018 1 78*    Glucose 12/09/2018 176*    Calcium 12/09/2018 9 3     AST 12/09/2018 13     ALT 12/09/2018 21     Alkaline Phosphatase 12/09/2018 127*    Total Protein 12/09/2018 7 7     Albumin 12/09/2018 2 1*    Total Bilirubin 12/09/2018 0 20     eGFR 12/09/2018 25     Phosphorus 12/09/2018 3 4     Sodium 12/10/2018 139     Potassium 12/10/2018 4 4     Chloride 12/10/2018 101     CO2 12/10/2018 37*    ANION GAP 12/10/2018 1*    BUN 12/10/2018 78*    Creatinine 12/10/2018 1 85*    Glucose 12/10/2018 196*    Calcium 12/10/2018 9 1     eGFR 12/10/2018 24     WBC 12/10/2018 9 61     RBC 12/10/2018 3 41*    Hemoglobin 12/10/2018 10 1*    Hematocrit 12/10/2018 34 3*    MCV 12/10/2018 101*    MCH 12/10/2018 29 6     MCHC 12/10/2018 29 4*    RDW 12/10/2018 12 4     Platelets 32/42/6742 336     MPV 12/10/2018 9 9     WBC 12/11/2018 8 16     RBC 12/11/2018 3 33*    Hemoglobin 12/11/2018 10 0*    Hematocrit 12/11/2018 35 0     MCV 12/11/2018 105*    MCH 12/11/2018 30 0     MCHC 12/11/2018 28 6*    RDW 12/11/2018 12 6     Platelets 76/56/4416 315     MPV 12/11/2018 10 2     Sodium 12/11/2018 142     Potassium 12/11/2018 4 1     Chloride 12/11/2018 103     CO2 12/11/2018 36*    ANION GAP 12/11/2018 3*    BUN 12/11/2018 88*    Creatinine 12/11/2018 1 96*    Glucose 12/11/2018 140     Calcium 12/11/2018 9 2     AST 12/11/2018 29     ALT 12/11/2018 48     Alkaline Phosphatase 12/11/2018 99     Total Protein 12/11/2018 6 7     Albumin 12/11/2018 1 9*    Total Bilirubin 12/11/2018 0 10*    eGFR 12/11/2018 22    Transcribe Orders on 10/04/2018   Component Date Value    WBC 10/04/2018 5 23     RBC 10/04/2018 3 92     Hemoglobin 10/04/2018 11 5     Hematocrit 10/04/2018 38 1     MCV 10/04/2018 97     MCH 10/04/2018 29 3     MCHC 10/04/2018 30 2*    RDW 10/04/2018 12 2     MPV 10/04/2018 10 9     Platelets 23/71/2908 160     nRBC 10/04/2018 0     Neutrophils Relative 10/04/2018 70     Immat GRANS % 10/04/2018 1     Lymphocytes Relative 10/04/2018 19     Monocytes Relative 10/04/2018 8     Eosinophils Relative 10/04/2018 2     Basophils Relative 10/04/2018 0     Neutrophils Absolute 10/04/2018 3 68     Immature Grans Absolute 10/04/2018 0 03     Lymphocytes Absolute 10/04/2018 0 98     Monocytes Absolute 10/04/2018 0 41     Eosinophils Absolute 10/04/2018 0 12     Basophils Absolute 10/04/2018 0 01     Sodium 10/04/2018 138     Potassium 10/04/2018 3 8     Chloride 10/04/2018 106     CO2 10/04/2018 30     ANION GAP 10/04/2018 2*    BUN 10/04/2018 55*    Creatinine 10/04/2018 1 82*    Glucose, Fasting 10/04/2018 111*    Calcium 10/04/2018 9 3     AST 10/04/2018 17     ALT 10/04/2018 22     Alkaline Phosphatase 10/04/2018 72     Total Protein 10/04/2018 8 1     Albumin 10/04/2018 3 3*    Total Bilirubin 10/04/2018 0 55     eGFR 10/04/2018 24     IGG 10/04/2018 2,200 0*    Ig [place] Free Light Chain 10/04/2018 25 4*    Ig Lambda Free Light Cherri* 10/04/2018 76 2*    Kappa/Lambda FluidC Ratio 10/04/2018 0 33        Diagnostics:  I have personally reviewed pertinent films in PACS    Office Spirometry Results:     ESS:    No results found

## 2019-03-26 NOTE — ASSESSMENT & PLAN NOTE
Eduard Thurston has mild persistent asthma  She has Breo 100 mics 1 puff daily and also uses nebulizer with DuoNeb  She has some slight wheezing today  However she reports recently completing antibiotic therapy that was prescribed by her primary care physician for acute bronchitis

## 2019-03-26 NOTE — ASSESSMENT & PLAN NOTE
Merlinda Monday has severe thoracic scoliosis  She does have restrictive impairment that was seen on pulmonary function test April 2018  Forced vital capacity was 1 09 L or 49% of predicted  I am not doing a PFT at this visit as she has recently been ill  She is aware that use of noninvasive ventilator is an important factor in her treatment for chronic hypercapnia

## 2019-03-26 NOTE — PATIENT INSTRUCTIONS
Continue to use her  2 L of oxygen P also I want you to continue to use her Breo 1 puff daily and be sure to range her mouth after usage  Attempt to use the trilogy at least 3-4 hours every day minimally

## 2019-03-26 NOTE — ASSESSMENT & PLAN NOTE
Val Day has history of chronic respiratory failure with hypoxia and hypercapnia  She is currently using 2 L of supplemental oxygen during the day and nighttime  Room air oxygen at rest today was 90-91%  2 L of supplemental oxygen at rest was 97-98%  Val Day also has noninvasive ventilator trilogy that she uses at nighttime for hypercapnia  She also has 2 L of oxygen  She does attempt to uses at least 2 or 3 hours per night  As she has been ill recently it has become more difficult  However she also reports using it 2-3 hours before she actually falls asleep tonight  This would be beneficial as well  Val Day is alert and oriented today and obviously is benefitting from this therapy

## 2019-03-27 ENCOUNTER — IN HOME VISIT (OUTPATIENT)
Dept: PODIATRY | Facility: CLINIC | Age: 84
End: 2019-03-27
Payer: MEDICARE

## 2019-03-27 DIAGNOSIS — L60.0 INGROWN TOENAIL: Primary | ICD-10-CM

## 2019-03-27 DIAGNOSIS — M21.962 DEFORMITY OF BOTH FEET: ICD-10-CM

## 2019-03-27 DIAGNOSIS — I70.209 ARTERIOSCLEROSIS OF ARTERIES OF EXTREMITIES (HCC): ICD-10-CM

## 2019-03-27 DIAGNOSIS — M79.672 PAIN IN BOTH FEET: ICD-10-CM

## 2019-03-27 DIAGNOSIS — M21.961 DEFORMITY OF BOTH FEET: ICD-10-CM

## 2019-03-27 DIAGNOSIS — M79.671 PAIN IN BOTH FEET: ICD-10-CM

## 2019-03-27 PROCEDURE — 99342 HOME/RES VST NEW LOW MDM 30: CPT | Performed by: PODIATRIST

## 2019-03-27 NOTE — PROGRESS NOTES
Assessment/Plan:    Aseptic debridement and planning of nails x10 and manually and mechanically  Partial removal of ingrown nail right hallux applied silver nitrate and dressing  The patient will apply Neosporin dressing daily patient will call if any redness or signs of infection  Follow-up 1 month     Diagnoses and all orders for this visit:    Ingrown toenail    Pain in both feet    Deformity of both feet    Arteriosclerosis of arteries of extremities (HCC)          Subjective:      Patient ID: Carlos Salinas is a 80 y o  female  Patient is seen on hallux call for painful ingrown nail right big toe lateral border  Has been hurting for the past week  Patient has not had her nails cut in over 6 months  Patient has homebound  Patient is seen on hallux call  Patient is only able to walk very short distances has instability when walking patient is on supplemental oxygen  Patient has had swelling and pain in the toe for the past week but has not noticed any pus or drainage  Past Medical History:   Diagnosis Date    CHF (congestive heart failure) ([facility] [address] ) 07/0465    diastolic     CO2 retention     DVT (deep vein thrombosis) in pregnancy ([facility] )     left LE in 2/2018 and 50 years ago    Hyperlipidemia     Hypertension     Pulmonary embolism ([facility] ) 02/06/2018    Renal disorder     ckd    Stroke [facility]) 2016    left hand weakness,paresthesia           Current Outpatient Medications:     acetaminophen (TYLENOL) 325 mg tablet, Take 650 mg by mouth every 6 (six) hours as needed for mild pain, Disp: , Rfl:     amLODIPine (NORVASC) 5 mg tablet, Take 1 tablet (5 mg total) by mouth daily, Disp: 30 tablet, Rfl: 5    benzonatate (TESSALON) 200 MG capsule, Take 1 capsule (200 mg total) by mouth 3 (three) times a day, Disp: 45 capsule, Rfl: 0    BREO ELLIPTA 200-25 MCG/INH inhaler, , Disp: , Rfl: 3    calcium-vitamin D (OSCAL 500 + D) 500 mg-200 units per tablet, Take 1 tablet by mouth 2 (two) times a day , Disp: , Rfl:     calcium-vitamin D (OSCAL 500/200 D-3) 500 mg-200 units per tablet, Take by mouth, Disp: , Rfl:     Carbonyl Iron 45 MG TABS, Take by mouth, Disp: , Rfl:     carvedilol (COREG) 25 mg tablet, Take 1 tablet (25 mg total) by mouth 2 (two) times a day with meals, Disp: 60 tablet, Rfl: 0    cholecalciferol (VITAMIN D3) 1,000 units tablet, Take 2,000 Units by mouth daily, Disp: , Rfl:     Cholecalciferol (VITAMIN D3) 1000 UNIT/SPRAY LIQD, Take by mouth, Disp: , Rfl:     clopidogrel (PLAVIX) 75 mg tablet, Take 75 mg by mouth daily, Disp: , Rfl:     co-enzyme Q-10 30 mg, Take 30 mg by mouth, Disp: , Rfl:     co-enzyme Q-10 50 MG capsule, Take 200 mg by mouth daily  , Disp: , Rfl:     colesevelam (WELCHOL) 625 mg tablet, Take 3 tablets (1,875 mg total) by mouth 2 (two) times a day with meals, Disp: 540 tablet, Rfl: 3    docusate sodium (COLACE) 100 mg capsule, Take 100 mg by mouth 2 (two) times a day, Disp: , Rfl:     ELIQUIS 5 MG, TAKE ONE TABLET TWICE DAILY, Disp: 60 tablet, Rfl: 5    fluticasone-vilanterol (BREO ELLIPTA) 100-25 mcg/inh inhaler, Inhale 1 puff daily Rinse mouth after use  (Patient not taking: Reported on 12/18/2018 ), Disp: 1 Inhaler, Rfl: 0    ipratropium-albuterol (DUO-NEB) 0 5-2 5 mg/3 mL nebulizer solution, Take 1 vial (3 mL total) by nebulization every 6 (six) hours as needed for wheezing, Disp: 120 vial, Rfl: 0    Iron-Vitamin C (IRON 100/C) 100-250 MG TABS, Take 1 tablet by mouth daily, Disp: , Rfl:     lidocaine (LIDODERM) 5 %, , Disp: , Rfl: 4    Multiple Vitamins-Minerals (OCUVITE PO), Take 1 tablet by mouth daily  , Disp: , Rfl:     nebivolol (BYSTOLIC) 20 MG tablet, , Disp: , Rfl:     ondansetron (ZOFRAN) 4 mg tablet, , Disp: , Rfl: 2    pantoprazole (PROTONIX) 40 mg tablet, Take 40 mg by mouth daily, Disp: , Rfl:     predniSONE 10 mg tablet, Take 4 tabs p o  Daily for 2 more days, then 3 tabs p o  Daily for 3 days, then 2 tabs p o   Daily for 3 days, then 1 tab daily for 3 days  (Patient not taking: Reported on 3/26/2019), Disp: 26 tablet, Rfl: 0    torsemide (DEMADEX) 10 mg tablet, Take 10 mg by mouth daily, Disp: , Rfl:     Past Surgical History:   Procedure Laterality Date    ABDOMINAL SURGERY      ruputured bowel  had colostomy and ileostomy,reversed later on   BACK SURGERY      rods in back  done 30 years ago   CARPAL TUNNEL RELEASE Bilateral     CYST REMOVAL      back    HERNIA REPAIR      REPLACEMENT TOTAL KNEE BILATERAL         Allergies   Allergen Reactions    Statins Myalgia    Lyrica [Pregabalin] Other (See Comments)     Client says it made her "nutty"       Patient Active Problem List   Diagnosis    [facility]brovascular [facility] (CVA) ([facility] [address] )    Essential hypertension    TIANA (acute kidney injury) ([facility] [address] )    Left leg swelling    Hyperlipidemia    Chronic kidney disease, stage III (moderate) ([facility])    Numbness and tingling in left arm    History of CVA (cerebrovascular accident)    Obesity    Gouty arthropathy    Dyslipidemia    Vitamin D deficiency    Leg wound, left    Chronic diastolic CHF (congestive heart failure) (HCC)    Acute diastolic CH[facility]congestive heart failure) ([facility])    Hyperphosphatemia    Deep vein thrombosis (DVT) of left lower extremity (HCC)    Aortic stenosis, moderate    Bradycardia    Mild persistent asthma without complication    Chronic respiratory failure with hypoxia and hypercapnia (HCC)    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    Anemia of chronic renal failure, stage 3 (moderate) (HCC)    Hypertensive chronic kidney disease with stage 1 through stage 4 chronic kidney disease, or unspecified chronic kidney disease    Orthostatic hypotension    Acute tracheobronchitis    Mild persistent asthma with acute exacerbation    History of DVT (deep vein thrombosis)       Review of Systems   Constitutional: Negative  HENT: Negative  Eyes: Negative  Respiratory: Positive for shortness of breath  Cardiovascular: Negative  Gastrointestinal: Negative  Endocrine: Negative  Genitourinary: Negative  Musculoskeletal: Positive for arthralgias and gait problem  Skin: Positive for color change and pallor  Allergic/Immunologic: Negative  Hematological: Negative  Psychiatric/Behavioral: Negative  Objective:  Patient's shoes and socks were removed, feet examined  There were no vitals taken for this visit  Foot Exam    General  General Appearance: appears stated age and healthy   Orientation: alert and oriented to person, place, and time   Affect: appropriate   Assistance: walker use       Right Foot/Ankle     Inspection and Palpation  Arch: pes planus  Hammertoes: second toe, fifth toe, fourth toe and third toe  Hallux valgus: yes  Skin Exam: callus, dry skin and skin changes; Neurovascular  Dorsalis pedis: absent  Posterior tibial: absent      Left Foot/Ankle      Inspection and Palpation  Arch: pes planus  Hammertoes: second toe, fifth toe, fourth toe and third toe  Hallux valgus: yes  Skin Exam: callus, dry skin and skin changes; Neurovascular  Dorsalis pedis: absent  Posterior tibial: absent          Right Foot/Ankle   Right Foot Inspection  Skin Exam: dry skin, callus and callus                              Vascular    The right DP pulse is absent  The right PT pulse is absent  Right Toe  - Comprehensive Exam  Arch: pes planus  Hammertoes: second toe, fifth toe, fourth toe and third toe  Hallux valgus: yes    Left Foot/Ankle  Left Foot Inspection  Skin Exam: dry skin and callus                                           Vascular    The left DP pulse is absent  The left PT pulse is absent  Left Toe  - Comprehensive Exam  Arch: pes planus  Hammertoes: second toe, fifth toe, fourth toe and third toe  Hallux valgus: yes         Physical Exam   Cardiovascular:   Pulses:       Dorsalis pedis pulses are Absent on the right side, and Absent on the left side  Posterior tibial pulses are Absent on the right side, and Absent on the left side  Feet:   Right Foot:   Skin Integrity: Positive for callus and dry skin  Left Foot:   Skin Integrity: Positive for callus and dry skin           Plus one pitting edema bilateral feet and ankles hyperpigmentation lower legs bilateral moderate venous stasis  Negative calf tenderness, negative Homans sign  Skin hairless and atrophic vascular changes noted bilateral  Absent vibratory sensation  Monofilament sensation 5/10 absent  All nails thick, discolored, dystrophic positive subungual debris positive pain on palpation  Plantar flexed metatarsal heads painful hyperkeratotic lesion 1st metatarsal head bilateral

## 2019-04-02 DIAGNOSIS — E78.5 DYSLIPIDEMIA: Chronic | ICD-10-CM

## 2019-04-02 DIAGNOSIS — E55.9 VITAMIN D DEFICIENCY: Chronic | ICD-10-CM

## 2019-04-02 DIAGNOSIS — I12.9 HYPERTENSIVE CHRONIC KIDNEY DISEASE WITH STAGE 1 THROUGH STAGE 4 CHRONIC KIDNEY DISEASE, OR UNSPECIFIED CHRONIC KIDNEY DISEASE: ICD-10-CM

## 2019-04-02 DIAGNOSIS — N18.30 ANEMIA OF CHRONIC RENAL FAILURE, STAGE 3 (MODERATE) (HCC): ICD-10-CM

## 2019-04-02 DIAGNOSIS — N18.30 CHRONIC KIDNEY DISEASE, STAGE III (MODERATE) (HCC): Chronic | ICD-10-CM

## 2019-04-02 DIAGNOSIS — I95.1 ORTHOSTATIC HYPOTENSION: ICD-10-CM

## 2019-04-02 DIAGNOSIS — D63.1 ANEMIA OF CHRONIC RENAL FAILURE, STAGE 3 (MODERATE) (HCC): ICD-10-CM

## 2019-04-02 DIAGNOSIS — N17.9 AKI (ACUTE KIDNEY INJURY) (HCC): ICD-10-CM

## 2019-04-02 RX ORDER — AMLODIPINE BESYLATE 5 MG/1
5 TABLET ORAL DAILY
Qty: 90 TABLET | Refills: 3 | Status: SHIPPED | OUTPATIENT
Start: 2019-04-02 | End: 2019-12-17 | Stop reason: SDUPTHER

## 2019-04-03 ENCOUNTER — TELEPHONE (OUTPATIENT)
Dept: NEPHROLOGY | Facility: CLINIC | Age: 84
End: 2019-04-03

## 2019-04-05 LAB
ALBUMIN SERPL-MCNC: 3.6 G/DL (ref 3.6–5.1)
ALBUMIN/GLOB SERPL: 1 (CALC) (ref 1–2.5)
ALP SERPL-CCNC: 61 U/L (ref 33–130)
ALT SERPL-CCNC: 10 U/L (ref 6–29)
AST SERPL-CCNC: 15 U/L (ref 10–35)
BASOPHILS # BLD AUTO: 8 CELLS/UL (ref 0–200)
BASOPHILS NFR BLD AUTO: 0.2 %
BILIRUB SERPL-MCNC: 0.4 MG/DL (ref 0.2–1.2)
BUN SERPL-MCNC: 43 MG/DL (ref 7–25)
BUN/CREAT SERPL: 26 (CALC) (ref 6–22)
CALCIUM SERPL-MCNC: 9.1 MG/DL (ref 8.6–10.4)
CALCIUM SERPL-MCNC: 9.1 MG/DL (ref 8.6–10.4)
CHLORIDE SERPL-SCNC: 101 MMOL/L (ref 98–110)
CO2 SERPL-SCNC: 33 MMOL/L (ref 20–32)
CREAT SERPL-MCNC: 1.67 MG/DL (ref 0.6–0.88)
CREAT UR-MCNC: 72 MG/DL (ref 20–275)
EOSINOPHIL # BLD AUTO: 111 CELLS/UL (ref 15–500)
EOSINOPHIL NFR BLD AUTO: 2.7 %
ERYTHROCYTE [DISTWIDTH] IN BLOOD BY AUTOMATED COUNT: 12 % (ref 11–15)
GLOBULIN SER CALC-MCNC: 3.5 G/DL (CALC) (ref 1.9–3.7)
GLUCOSE SERPL-MCNC: 88 MG/DL (ref 65–99)
HCT VFR BLD AUTO: 35.4 % (ref 35–45)
HGB BLD-MCNC: 11.6 G/DL (ref 11.7–15.5)
LYMPHOCYTES # BLD AUTO: 914 CELLS/UL (ref 850–3900)
LYMPHOCYTES NFR BLD AUTO: 22.3 %
MAGNESIUM SERPL-MCNC: 2.3 MG/DL (ref 1.5–2.5)
MCH RBC QN AUTO: 30.2 PG (ref 27–33)
MCHC RBC AUTO-ENTMCNC: 32.8 G/DL (ref 32–36)
MCV RBC AUTO: 92.2 FL (ref 80–100)
MONOCYTES # BLD AUTO: 287 CELLS/UL (ref 200–950)
MONOCYTES NFR BLD AUTO: 7 %
NEUTROPHILS # BLD AUTO: 2780 CELLS/UL (ref 1500–7800)
NEUTROPHILS NFR BLD AUTO: 67.8 %
PHOSPHATE SERPL-MCNC: 3.5 MG/DL (ref 2.1–4.3)
PLATELET # BLD AUTO: 157 THOUSAND/UL (ref 140–400)
PMV BLD REES-ECKER: 11.3 FL (ref 7.5–12.5)
POTASSIUM SERPL-SCNC: 3.9 MMOL/L (ref 3.5–5.3)
PROT SERPL-MCNC: 7.1 G/DL (ref 6.1–8.1)
PROT UR-MCNC: 18 MG/DL (ref 5–24)
PROT/CREAT UR: 250 MG/G CREAT (ref 21–161)
PTH-INTACT SERPL-MCNC: 42 PG/ML (ref 14–64)
RBC # BLD AUTO: 3.84 MILLION/UL (ref 3.8–5.1)
SL AMB EGFR AFRICAN AMERICAN: 31 ML/MIN/1.73M2
SL AMB EGFR NON AFRICAN AMERICAN: 27 ML/MIN/1.73M2
SODIUM SERPL-SCNC: 140 MMOL/L (ref 135–146)
WBC # BLD AUTO: 4.1 THOUSAND/UL (ref 3.8–10.8)

## 2019-04-10 ENCOUNTER — OFFICE VISIT (OUTPATIENT)
Dept: NEPHROLOGY | Facility: CLINIC | Age: 84
End: 2019-04-10
Payer: MEDICARE

## 2019-04-10 VITALS — HEIGHT: 58 IN | BODY MASS INDEX: 32.16 KG/M2 | WEIGHT: 153.2 LBS

## 2019-04-10 DIAGNOSIS — I12.9 HYPERTENSIVE CHRONIC KIDNEY DISEASE WITH STAGE 1 THROUGH STAGE 4 CHRONIC KIDNEY DISEASE, OR UNSPECIFIED CHRONIC KIDNEY DISEASE: Primary | ICD-10-CM

## 2019-04-10 DIAGNOSIS — N18.30 CHRONIC KIDNEY DISEASE, STAGE III (MODERATE) (HCC): Chronic | ICD-10-CM

## 2019-04-10 DIAGNOSIS — N18.30 ANEMIA OF CHRONIC RENAL FAILURE, STAGE 3 (MODERATE) (HCC): ICD-10-CM

## 2019-04-10 DIAGNOSIS — I95.1 ORTHOSTATIC HYPOTENSION: ICD-10-CM

## 2019-04-10 DIAGNOSIS — E55.9 VITAMIN D DEFICIENCY: Chronic | ICD-10-CM

## 2019-04-10 DIAGNOSIS — D63.1 ANEMIA OF CHRONIC RENAL FAILURE, STAGE 3 (MODERATE) (HCC): ICD-10-CM

## 2019-04-10 DIAGNOSIS — E78.5 DYSLIPIDEMIA: Chronic | ICD-10-CM

## 2019-04-10 PROCEDURE — 99214 OFFICE O/P EST MOD 30 MIN: CPT | Performed by: INTERNAL MEDICINE

## 2019-05-22 DIAGNOSIS — J45.30 MILD PERSISTENT ASTHMA WITHOUT COMPLICATION: Primary | ICD-10-CM

## 2019-07-10 DIAGNOSIS — I63.9 CEREBROVASCULAR ACCIDENT (CVA) (HCC): Chronic | ICD-10-CM

## 2019-07-11 RX ORDER — APIXABAN 5 MG/1
TABLET, FILM COATED ORAL
Qty: 60 TABLET | Refills: 5 | OUTPATIENT
Start: 2019-07-11

## 2019-07-11 NOTE — TELEPHONE ENCOUNTER
Patient not seen since August, 2018  Cannot renew any prescriptions unless a confirmed appointment has been scheduled  Notify patient

## 2019-07-11 NOTE — TELEPHONE ENCOUNTER
S/w pt - pt states she received her eliquis 7/10/19; left v/m for dtr Edmundo Pennington to call the office - pharmacy did refill her last refill

## 2019-08-09 ENCOUNTER — TELEPHONE (OUTPATIENT)
Dept: CARDIOLOGY CLINIC | Facility: CLINIC | Age: 84
End: 2019-08-09

## 2019-08-09 DIAGNOSIS — I63.9 CEREBROVASCULAR ACCIDENT (CVA) (HCC): Chronic | ICD-10-CM

## 2019-08-14 ENCOUNTER — OFFICE VISIT (OUTPATIENT)
Dept: CARDIOLOGY CLINIC | Facility: CLINIC | Age: 84
End: 2019-08-14
Payer: MEDICARE

## 2019-08-14 VITALS
HEIGHT: 58 IN | DIASTOLIC BLOOD PRESSURE: 82 MMHG | BODY MASS INDEX: 32.95 KG/M2 | OXYGEN SATURATION: 95 % | WEIGHT: 157 LBS | HEART RATE: 58 BPM | SYSTOLIC BLOOD PRESSURE: 142 MMHG

## 2019-08-14 DIAGNOSIS — E78.5 DYSLIPIDEMIA: ICD-10-CM

## 2019-08-14 DIAGNOSIS — I50.32 CHRONIC DIASTOLIC CHF (CONGESTIVE HEART FAILURE) (HCC): Primary | ICD-10-CM

## 2019-08-14 DIAGNOSIS — I10 ESSENTIAL HYPERTENSION: ICD-10-CM

## 2019-08-14 PROCEDURE — 1123F ACP DISCUSS/DSCN MKR DOCD: CPT | Performed by: INTERNAL MEDICINE

## 2019-08-14 PROCEDURE — 99214 OFFICE O/P EST MOD 30 MIN: CPT | Performed by: INTERNAL MEDICINE

## 2019-08-14 PROCEDURE — 93000 ELECTROCARDIOGRAM COMPLETE: CPT | Performed by: INTERNAL MEDICINE

## 2019-08-14 NOTE — PROGRESS NOTES
Office Cardiology Progress Note  Pranav Velazquez 80 y o  female MRN: [id number]  08/14/19  3:54 PM      ASSESSMENT:    1   Chronic stable exertional dyspnea from chronic hypercarbic hypoxemic respiratory failure and COPD with  CAD  excluded on 02/09/2018 nuclear stress test done in [place], [place], at which time echocardiogram on 02/07/2018 showed 60-65% LVEF EF, mild LVH, mild RV dilatation and RV systolic dysfunction, mild MAC with trace MR Co/trace TR, and mild-to-moderate aortic stenosis with 1+ AI/1+ PI  Patient with acute respiratory failure resulting in hospitalization from 12/07 through 12/11/2018 at [address] [place] [address]  2  History of atherothrombotic bilateral basal ganglia strokes, most recently on the right on 6/25/16 with associated uncontrolled hypertension and residual left hand paresthesias with exacerbation of symptoms 5/9/17 during hypertensive emergency  History of old right thalamic stroke  3  Controlled dyslipidemia as of 05/21/2018 with prior history of statin myalgias and current direct LDL of 108   4  Mild  aortic valve stenosis with LVH, grade 2 diastolic dysfunction, elevated mean left atrial filling pressure, mild MAC with trace MR, trace AI, trace TR on 03/09/2018 echo  5  Class 1 obesity with 25 4 pound weight loss in approximately 6 9 years  6  Chronic right bundle branch block and resolved LAFB with history of PACs  and  PVC's, suppressed  today  7  Chronic kidney disease, stage III-IV, with stabilized creatinine of  1 67 on 04/04/2019 with estimated GFR 31   8  Osteoarthritis  9  History of left lower extremity DVT February, 2018 and DVT of right gastrocnemius vein October, 2008 with possible prior episode of left lower extremity DVT in the past as well  10  History of left renal mass  11  Chronic large airway wheezing of no clinical significance  12  Resolved orthostatic hypotension, likely due to a combination of amlodipine, volume depletion, dysautonomia    13  History of acutely decompensated diastolic heart failure with [address] admission 8/27 to 09/02/2017, as well as 02/06/2018 and 03/06/2018  14   Acute hypercapneic and hypoxic respiratory failure 02/06/2018, 03/06/2018, and 03/29/2018 and again on 12/07/2018  15  Chronic oral anticoagulation  16  Left upper extremity pain, chronic, cause unknown to me  17  History of rotator cuff tear of right shoulder  18  Chronic pain syndrome      Plan       Patient Instructions     1  Continue current medications  2  Cardiology follow-up approximately six months with EKG, lipids  HPI    This 80 y o  female  denies new cardiopulmonary and medical symptoms  The patient was hospitalized on 12/07/2018 through 12/11/2018 at [facility] with exacerbation of chronic respiratory failure with secondary altered mental status  This may have been triggered by acute tracheobronchitis  Patient is on continuous home oxygen by nasal cannula at 2 liters/minute  She has been fairly stable since last December  She continues to use a Trilogy machine for chronic hypercapnic respiratory failure at night  She has chronic pain because of continuous paresthesias in her left hand, dating back to the time of her stroke 06/25/2016 and also complains of chronic pain in the left upper extremity from the shoulder to the elbow, as well as pain in the right shoulder girdle from a chronic rotator cuff injury  She is seen in follow-up of:    Encounter Diagnoses   Name Primary?     Chronic diastolic CHF (congestive heart failure) ([facility] [address] ) Yes    Essential hypertension     Dyslipidemia         Review of Systems    All other systems negative, except as noted in history of present illness    Historical Information   Past Medical History:   Diagnosis Date    CHF (congestive heart failure) ([facility] [address] ) 15/8358    diastolic     CO2 retention     DVT (deep vein thrombosis) in pregnancy ([facility] [address] )     left LE in 2/2018 and 50 years ago    Hyperlipidemia     Hypertension     Pulmonary embolism ([facility] [address] ) 02/06/2018    Renal disorder     ckd    Stroke [facility]) 2016    left hand weakness,paresthesia  Past Surgical History:   Procedure Laterality Date    ABDOMINAL SURGERY      ruputured bowel  had colostomy and ileostomy,reversed later on   BACK SURGERY      rods in back  done 30 years ago   CARPAL TUNNEL RELEASE Bilateral     CYST REMOVAL      back    HERNIA REPAIR      REPLACEMENT TOTAL KNEE BILATERAL       Social History     Substance and Sexual Activity   Alcohol Use Yes    Comment: Occ  Social History     Substance and Sexual Activity   Drug Use No     Social History     Tobacco Use   Smoking Status Never Smoker   Smokeless Tobacco Never Used       Family History:  Family History   Problem Relation Age of Onset    COPD Brother          Meds/Allergies     Prior to Admission medications    Medication Sig Start Date End Date Taking? Authorizing Provider   acetaminophen (TYLENOL) 325 mg tablet Take 650 mg by mouth every 6 (six) hours as needed for mild pain   Yes Historical Provider, MD   amLODIPine (NORVASC) 5 mg tablet Take 1 tablet (5 mg total) by mouth daily 4/2/19  Yes Gearld Boxer, MD   apixaban [facility] 5 mg Take one tablet by mouth twice a day  8/9/19  Yes MD Kassandra Davis [facility] ELLIPTA 200-25 MCG/INH inhaler Inhale 1 puff daily for 30 days 5/22/19 8/14/19 Yes MARGOT Trimble   calcium-vitamin D (OSCAL 500 + D) 500 mg-200 units per tablet Take 1 tablet by mouth 2 (two) times a day     Yes Historical Provider, MD   Carbonyl Iron 45 MG TABS Take by mouth 1 tab every other day   Yes Historical Provider, MD   carvedilol (COREG) 25 mg tablet Take 1 tablet (25 mg total) by mouth 2 (two) times a day with meals 12/11/18  Yes MARGOT Shields   cholecalciferol (VITAMIN D3) 1,000 units tablet Take 2,000 Units by mouth daily   Yes Historical Provider, MD   clopidogrel (PLAVIX) 75 mg tablet Take 75 mg by mouth daily   Yes Historical Provider, MD   co-enzyme Q-10 50 MG capsule Take 200 mg by mouth daily     Yes Historical Provider, MD   colesevelam (WELCHOL) 625 mg tablet Take 3 tablets (1,875 mg total) by mouth 2 (two) times a day with meals  Patient taking differently: Take 1,875 mg by mouth 3 (three) times a day  10/5/18  Yes Gearld Boxer, MD   docusate sodium (COLACE) 100 mg capsule Take 100 mg by mouth 2 (two) times a day   Yes Historical Provider, MD   Multiple Vitamins-Minerals (OCUVITE PO) Take 1 tablet by mouth daily     Yes Historical Provider, MD   pantoprazole (PROTONIX) 40 mg tablet Take 40 mg by mouth daily   Yes Historical Provider, MD   torsemide (DEMADEX) 10 mg tablet Take 10 mg by mouth daily   Yes Historical Provider, MD   Iron-Vitamin C (IRON 100/C) 100-250 MG TABS Take 1 tablet by mouth daily    Historical Provider, MD   LIDOCAINE EX Apply topically as needed 10% 11/27/18   Historical Provider, MD   nebivolol (BYSTOLIC) 20 MG tablet     Historical Provider, MD   Nutritional Supplements (ENSURE ACTIVE) LIQD Take by mouth daily    Historical Provider, MD   ondansetron (ZOFRAN) 4 mg tablet  11/30/18   Historical Provider, MD   benzonatate (TESSALON) 200 MG capsule Take 1 capsule (200 mg total) by mouth 3 (three) times a day  Patient not taking: Reported on 8/14/2019 12/11/18 8/14/19  MARGOT Shields   calcium-vitamin D (OSCAL 500/200 D-3) 500 mg-200 units per tablet Take by mouth  8/14/19  Historical Provider, MD   Cholecalciferol (VITAMIN D3) 1000 UNIT/SPRAY LIQD Take by mouth  8/14/19  Historical Provider, MD   co-enzyme Q-10 30 mg Take 30 mg by mouth daily   8/14/19  Historical Provider, MD   fluticasone-vilanterol (BREO ELLIPTA) 100-25 mcg/inh inhaler Inhale 1 puff daily Rinse mouth after use    Patient not taking: Reported on 8/14/2019 6/8/18 8/14/19  Jesus Gonzales,    ipratropium-albuterol (DUO-NEB) 0 5-2 5 mg/3 mL nebulizer solution Take 1 vial (3 mL total) by nebulization every 6 (six) hours as needed for wheezing  Patient not taking: Reported on 8/14/2019 12/11/18 8/14/19  MARGOT Shields   predniSONE 10 mg tablet Take 4 tabs p o  Daily for 2 more days, then 3 tabs p o  Daily for 3 days, then 2 tabs p o  Daily for 3 days, then 1 tab daily for 3 days  Patient not taking: Reported on 3/26/2019 12/11/18 8/14/19  MARGOT Galvez       Allergies   Allergen Reactions    Statins Myalgia    Lyrica [Pregabalin] Other (See Comments)     Client says it made her "nutty"         Vitals:    08/14/19 1504   BP: 142/82   BP Location: Right arm   Patient Position: Sitting   Cuff Size: Large   Pulse: 58   SpO2: 95%   Weight: 71 2 kg (157 lb)   Height: 4' 10" (1 473 m)       Body mass index is 32 81 kg/m²  No change in weight from one year ago  Physical Exam:    General Appearance:  Alert, cooperative, no distress, appears stated age   Head:  Normocephalic, without obvious abnormality, atraumatic   Eyes:  PERRL, conjunctiva/corneas clear, EOM's intact,   both eyes   Ears:  Normal TM's and external ear canals, both ears   Nose: Nares normal, septum midline, mucosa normal, no drainage or sinus tenderness   Throat: Lips, mucosa, and tongue normal; teeth and gums normal   Neck: Supple, symmetrical, trachea midline, no adenopathy, thyroid: not enlarged, symmetric, no tenderness/mass/nodules, no carotid bruit or JVD   Back:   Symmetric, no curvature, ROM normal, no CVA tenderness   Lungs:   Clear to auscultation bilaterally, respirations unlabored with patient using nasal oxygen at 2 liters/minute with generally diminished breath sounds  Chest Wall:  No tenderness or deformity   Heart:  Regular rate and rhythm, S1, S2 normal, no murmur, rub or gallop   Abdomen:   Soft, non-tender, bowel sounds active all four quadrants,  no masses, no organomegaly   Extremities: Extremities normal, atraumatic, no cyanosis or edema  She has tenderness to palpation over the left biceps and triceps area     Pulses: 1+ and symmetric Skin: Skin showed normal color, texture, turgor and no rashes or lesions   Lymph nodes: Cervical, supraclavicular, and axillary nodes normal   Neurologic: Normal         Cardiographics    ECG 08/14/19:    Sinus bradycardia with sinus arrhythmia  Complete right bundle branch block  Septal and inferior Q-waves of uncertain significance  Slower heart rate and less inferior and anteroseptal T-wave inversion compared to 12/07/2018    IMAGING:    Xr Chest Pa & Lateral    Result Date: 12/7/2018  Impression No acute cardiopulmonary disease   Workstation performed: [id number]             LAB REVIEW:    CBC 04/04/2019:  H/H-11 6/35 4 with normal WBC and platelets    Lab Results   Component Value Date    SODIUM 140 04/04/2019    K 3 9 04/04/2019     04/04/2019    CO2 33 (H) 04/04/2019    ANIONGAP 12 4 01/05/2016    BUN 43 (H) 04/04/2019    CREATININE 1 67 (H) 04/04/2019    GLUCOSE 92 01/05/2016    GLUF 111 (H) 10/04/2018    CALCIUM 9 1 04/04/2019    CALCIUM 9 1 04/04/2019    AST 15 04/04/2019    ALT 10 04/04/2019    ALKPHOS 61 04/04/2019    PROT 8 5 (H) 01/05/2016    BILITOT 0 5 01/05/2016    EGFR 26 12/14/2018    EGFR 26 12/14/2018    Glucose, random on 04/04/19:88  Lab Results   Component Value Date    CHOLESTEROL 157 08/17/2018    CHOLESTEROL 151 12/06/2017    CHOLESTEROL 167 12/04/2017     Lab Results   Component Value Date    HDL 52 08/17/2018    HDL 69 (H) 12/06/2017    HDL 76 (H) 12/04/2017     No results found for: LDLCHOLEST  Lab Results   Component Value Date    LDLCALC 62 12/06/2017    LDLCALC 72 12/04/2017    LDLCALC 67 05/10/2017     No components found for: [facility]  Lab Results   Component Value Date    TRIG 140 08/17/2018    TRIG 102 12/06/2017    TRIG 94 12/04/2017               Tsering Stearns MD

## 2019-08-20 ENCOUNTER — TELEPHONE (OUTPATIENT)
Dept: NEPHROLOGY | Facility: CLINIC | Age: 84
End: 2019-08-20

## 2019-08-20 NOTE — TELEPHONE ENCOUNTER
Patient daughter called the office and stated that she needs a paper script for ensure drink and incontinence pads  Daughter mentioned these have been done in the past for pt

## 2019-08-21 LAB
25(OH)D3 SERPL-MCNC: 50 NG/ML (ref 30–100)
ALBUMIN SERPL-MCNC: 3.5 G/DL (ref 3.6–5.1)
ALBUMIN/GLOB SERPL: 1 (CALC) (ref 1–2.5)
ALP SERPL-CCNC: 60 U/L (ref 33–130)
ALT SERPL-CCNC: 9 U/L (ref 6–29)
AST SERPL-CCNC: 13 U/L (ref 10–35)
BASOPHILS # BLD AUTO: 9 CELLS/UL (ref 0–200)
BASOPHILS NFR BLD AUTO: 0.2 %
BILIRUB SERPL-MCNC: 0.4 MG/DL (ref 0.2–1.2)
BUN SERPL-MCNC: 63 MG/DL (ref 7–25)
BUN/CREAT SERPL: 32 (CALC) (ref 6–22)
CALCIUM SERPL-MCNC: 8.8 MG/DL (ref 8.6–10.4)
CALCIUM SERPL-MCNC: 9.2 MG/DL (ref 8.6–10.4)
CHLORIDE SERPL-SCNC: 105 MMOL/L (ref 98–110)
CHOLEST SERPL-MCNC: 165 MG/DL
CHOLEST/HDLC SERPL: 3.1 (CALC)
CO2 SERPL-SCNC: 33 MMOL/L (ref 20–32)
CREAT SERPL-MCNC: 1.99 MG/DL (ref 0.6–0.88)
CREAT UR-MCNC: 78 MG/DL (ref 20–275)
EOSINOPHIL # BLD AUTO: 83 CELLS/UL (ref 15–500)
EOSINOPHIL NFR BLD AUTO: 1.8 %
ERYTHROCYTE [DISTWIDTH] IN BLOOD BY AUTOMATED COUNT: 12.3 % (ref 11–15)
FERRITIN SERPL-MCNC: 101 NG/ML (ref 16–288)
GLOBULIN SER CALC-MCNC: 3.4 G/DL (CALC) (ref 1.9–3.7)
GLUCOSE SERPL-MCNC: 82 MG/DL (ref 65–99)
HCT VFR BLD AUTO: 34 % (ref 35–45)
HDLC SERPL-MCNC: 53 MG/DL
HGB BLD-MCNC: 10.9 G/DL (ref 11.7–15.5)
IRON SATN MFR SERPL: 28 % (CALC) (ref 16–45)
IRON SERPL-MCNC: 84 MCG/DL (ref 45–160)
LDLC SERPL CALC-MCNC: 90 MG/DL (CALC)
LYMPHOCYTES # BLD AUTO: 736 CELLS/UL (ref 850–3900)
LYMPHOCYTES NFR BLD AUTO: 16 %
MAGNESIUM SERPL-MCNC: 2.6 MG/DL (ref 1.5–2.5)
MCH RBC QN AUTO: 30.4 PG (ref 27–33)
MCHC RBC AUTO-ENTMCNC: 32.1 G/DL (ref 32–36)
MCV RBC AUTO: 95 FL (ref 80–100)
MONOCYTES # BLD AUTO: 414 CELLS/UL (ref 200–950)
MONOCYTES NFR BLD AUTO: 9 %
NEUTROPHILS # BLD AUTO: 3358 CELLS/UL (ref 1500–7800)
NEUTROPHILS NFR BLD AUTO: 73 %
NONHDLC SERPL-MCNC: 112 MG/DL (CALC)
PHOSPHATE SERPL-MCNC: 4.4 MG/DL (ref 2.1–4.3)
PLATELET # BLD AUTO: 149 THOUSAND/UL (ref 140–400)
PMV BLD REES-ECKER: 11.1 FL (ref 7.5–12.5)
POTASSIUM SERPL-SCNC: 4.2 MMOL/L (ref 3.5–5.3)
PROT SERPL-MCNC: 6.9 G/DL (ref 6.1–8.1)
PROT UR-MCNC: 16 MG/DL (ref 5–24)
PROT/CREAT UR: 205 MG/G CREAT (ref 21–161)
PTH-INTACT SERPL-MCNC: 41 PG/ML (ref 14–64)
RBC # BLD AUTO: 3.58 MILLION/UL (ref 3.8–5.1)
SL AMB EGFR AFRICAN AMERICAN: 25 ML/MIN/1.73M2
SL AMB EGFR NON AFRICAN AMERICAN: 21 ML/MIN/1.73M2
SODIUM SERPL-SCNC: 143 MMOL/L (ref 135–146)
TIBC SERPL-MCNC: 300 MCG/DL (CALC) (ref 250–450)
TRIGL SERPL-MCNC: 120 MG/DL
WBC # BLD AUTO: 4.6 THOUSAND/UL (ref 3.8–10.8)

## 2019-08-22 DIAGNOSIS — J45.30 MILD PERSISTENT ASTHMA WITHOUT COMPLICATION: ICD-10-CM

## 2019-09-11 DIAGNOSIS — I95.1 ORTHOSTATIC HYPOTENSION: ICD-10-CM

## 2019-09-11 DIAGNOSIS — E55.9 VITAMIN D DEFICIENCY: Chronic | ICD-10-CM

## 2019-09-11 DIAGNOSIS — N18.30 ANEMIA OF CHRONIC RENAL FAILURE, STAGE 3 (MODERATE) (HCC): ICD-10-CM

## 2019-09-11 DIAGNOSIS — N18.30 CHRONIC KIDNEY DISEASE, STAGE III (MODERATE) (HCC): Chronic | ICD-10-CM

## 2019-09-11 DIAGNOSIS — E78.5 DYSLIPIDEMIA: Chronic | ICD-10-CM

## 2019-09-11 DIAGNOSIS — D63.1 ANEMIA OF CHRONIC RENAL FAILURE, STAGE 3 (MODERATE) (HCC): ICD-10-CM

## 2019-09-11 DIAGNOSIS — N17.9 AKI (ACUTE KIDNEY INJURY) (HCC): ICD-10-CM

## 2019-09-11 DIAGNOSIS — I12.9 HYPERTENSIVE CHRONIC KIDNEY DISEASE WITH STAGE 1 THROUGH STAGE 4 CHRONIC KIDNEY DISEASE, OR UNSPECIFIED CHRONIC KIDNEY DISEASE: ICD-10-CM

## 2019-09-11 RX ORDER — COLESEVELAM HYDROCHLORIDE 625 MG/1
TABLET, FILM COATED ORAL
Qty: 540 TABLET | Refills: 3 | Status: SHIPPED | OUTPATIENT
Start: 2019-09-11 | End: 2019-10-14 | Stop reason: SDUPTHER

## 2019-09-23 ENCOUNTER — DOCUMENTATION (OUTPATIENT)
Dept: NEPHROLOGY | Facility: CLINIC | Age: 84
End: 2019-09-23

## 2019-09-23 LAB
CO2 SERPL-SCNC: 30 MMOL/L (ref 21–32)
EXT GLUCOSE BLD: 172
EXTERNAL ALBUMIN: 3.5
EXTERNAL ALK PHOS: 52
EXTERNAL ALT: 9
EXTERNAL AST: 13
EXTERNAL BUN: 47
EXTERNAL CALCIUM: 8.8
EXTERNAL CHLORIDE: 102
EXTERNAL CREATININE: 1.97
EXTERNAL EGFR: 22
EXTERNAL POTASSIUM: 4.6
EXTERNAL SODIUM: 140
EXTERNAL T.BILIRUBIN: 0.4
EXTERNAL TOTAL PROTEIN: 6.7

## 2019-09-24 ENCOUNTER — TELEPHONE (OUTPATIENT)
Dept: NEPHROLOGY | Facility: CLINIC | Age: 84
End: 2019-09-24

## 2019-09-24 ENCOUNTER — DOCUMENTATION (OUTPATIENT)
Dept: NEPHROLOGY | Facility: CLINIC | Age: 84
End: 2019-09-24

## 2019-09-24 DIAGNOSIS — N18.30 CHRONIC KIDNEY DISEASE, STAGE III (MODERATE) (HCC): Primary | ICD-10-CM

## 2019-09-24 DIAGNOSIS — E78.5 DYSLIPIDEMIA: ICD-10-CM

## 2019-09-24 DIAGNOSIS — I12.9 HYPERTENSIVE CHRONIC KIDNEY DISEASE WITH STAGE 1 THROUGH STAGE 4 CHRONIC KIDNEY DISEASE, OR UNSPECIFIED CHRONIC KIDNEY DISEASE: ICD-10-CM

## 2019-09-24 NOTE — TELEPHONE ENCOUNTER
I spoke to patient she is aware that her Cr is slightly elevated  Stated she is not taking any NSAID's  - will mail out BMP for 1 week  - Patient did not want to go over medication list at the time

## 2019-09-24 NOTE — TELEPHONE ENCOUNTER
----- Message from Brooks Roberson MD sent at 9/24/2019  8:19 AM EDT -----  The patient is creatinine is slightly higher than typical baseline  Please review all medications and modify list as needed  Make sure the patient is not on any NSAIDs  Repeat a basic metabolic profile in the next 1 week nonfasting

## 2019-09-26 ENCOUNTER — TELEPHONE (OUTPATIENT)
Dept: NEPHROLOGY | Facility: CLINIC | Age: 84
End: 2019-09-26

## 2019-09-26 DIAGNOSIS — N18.30 CHRONIC KIDNEY DISEASE, STAGE III (MODERATE) (HCC): Primary | ICD-10-CM

## 2019-09-26 NOTE — TELEPHONE ENCOUNTER
Spoke with the patient and she is aware of her lab test results and to repeat a BMP in 1 week which has been mailed out for her  No further questions at this time        Laron Haas MA

## 2019-09-26 NOTE — TELEPHONE ENCOUNTER
----- Message from Brooks Roberson MD sent at 9/26/2019 11:09 AM EDT -----  The creatinine slightly higher than her typical baseline  Please repeat a basic metabolic profile nonfasting at this time in the next week or so

## 2019-09-30 ENCOUNTER — OFFICE VISIT (OUTPATIENT)
Dept: PULMONOLOGY | Facility: MEDICAL CENTER | Age: 84
End: 2019-09-30
Payer: MEDICARE

## 2019-09-30 VITALS
DIASTOLIC BLOOD PRESSURE: 68 MMHG | WEIGHT: 157 LBS | RESPIRATION RATE: 12 BRPM | HEART RATE: 66 BPM | OXYGEN SATURATION: 92 % | HEIGHT: 58 IN | SYSTOLIC BLOOD PRESSURE: 118 MMHG | BODY MASS INDEX: 32.95 KG/M2

## 2019-09-30 DIAGNOSIS — J98.4 RESTRICTIVE LUNG DISEASE DUE TO KYPHOSCOLIOSIS: ICD-10-CM

## 2019-09-30 DIAGNOSIS — J45.31 MILD PERSISTENT ASTHMA WITH ACUTE EXACERBATION: ICD-10-CM

## 2019-09-30 DIAGNOSIS — M41.9 RESTRICTIVE LUNG DISEASE DUE TO KYPHOSCOLIOSIS: ICD-10-CM

## 2019-09-30 DIAGNOSIS — J96.11 CHRONIC RESPIRATORY FAILURE WITH HYPOXIA AND HYPERCAPNIA (HCC): Primary | ICD-10-CM

## 2019-09-30 DIAGNOSIS — J96.12 CHRONIC RESPIRATORY FAILURE WITH HYPOXIA AND HYPERCAPNIA (HCC): Primary | ICD-10-CM

## 2019-09-30 PROCEDURE — 99213 OFFICE O/P EST LOW 20 MIN: CPT | Performed by: NURSE PRACTITIONER

## 2019-09-30 RX ORDER — FLUTICASONE FUROATE AND VILANTEROL 100; 25 UG/1; UG/1
1 POWDER RESPIRATORY (INHALATION) DAILY
Qty: 1 INHALER | Refills: 0 | Status: SHIPPED | COMMUNITY
Start: 2019-09-30 | End: 2020-10-14

## 2019-09-30 NOTE — ASSESSMENT & PLAN NOTE
Kait Dany has a history of chronic respiratory failure with hypoxia and hypercapnia  She continues to use an benefit from supplemental oxygen  Room air oxygen at rest was 92% and with walking 6200 ft was 87% on room air  On 2 L of supplemental oxygen with ambulation was 95%  She continues to use an benefit from portable concentrator  Patient also has noninvasive ventilator that she uses as needed for hypercapnia  She tries to uses at least 3 hours a day  She is aware that this is beneficial for her overall health and also chronic hypercapnia  She has not been hospitalized in some time  She is stable at this time

## 2019-09-30 NOTE — ASSESSMENT & PLAN NOTE
Alexis Galindo continues to use Breo 100 mcg 1 puff daily  She has a history of chronic mild persistent asthma  She is asymptomatic and doing well  She does not use a rescue inhaler only on a rare as needed basis  I am giving her a 4 day supply today of Breo 100 mics she will uses for the next 14 days port  During this time she will not use the higher dose of Breo  Patient is agreeable to this plan of care

## 2019-09-30 NOTE — PROGRESS NOTES
Assessment/Plan:     Problem List Items Addressed This Visit        Respiratory    Chronic respiratory failure with hypoxia and hypercapnia ([facility] [address] ) - Primary     Angela Sepulveda has a history of chronic respiratory failure with hypoxia and hypercapnia  She continues to use an benefit from supplemental oxygen  Room air oxygen at rest was 92% and with walking 6200 ft was 87% on room air  On 2 L of supplemental oxygen with ambulation was 95%  She continues to use an benefit from portable concentrator  Patient also has noninvasive ventilator that she uses as needed for hypercapnia  She tries to uses at least 3 hours a day  She is aware that this is beneficial for her overall health and also chronic hypercapnia  She has not been hospitalized in some time  She is stable at this time  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     Angela Sepulveda has severe thoracic scoliosis  She has restrictive impairment that was seen on pulmonary function test in April 2018  Forced vital capacity was 1 09 L or  49% of predicted  This is likely factorial in her fact that she has chronic hypercapnia  She does use noninvasive ventilator for this and continues to benefit  Relevant Medications    fluticasone-vilanterol (BREO ELLIPTA) 100-25 mcg/inh inhaler    Mild persistent asthma with acute exacerbation     Angela Sepulveda continues to use Breo 100 mcg 1 puff daily  She has a history of chronic mild persistent asthma  She is asymptomatic and doing well  She does not use a rescue inhaler only on a rare as needed basis  I am giving her a 4 day supply today of Breo 100 mics she will uses for the next 14 days port  During this time she will not use the higher dose of Breo  Patient is agreeable to this plan of care  Relevant Medications    fluticasone-vilanterol (BREO ELLIPTA) 100-25 mcg/inh inhaler            Return in about 1 year (around 9/30/2020)  All questions are answered to the patient's satisfaction and understanding  She verbalizes understanding  She is encouraged to call with any further questions or concerns  Portions of the record may have been created with voice recognition software  Occasional wrong word or "sound a like" substitutions may have occurred due to the inherent limitations of voice recognition software  Read the chart carefully and recognize, using context, where substitutions have occurred  HPI:  Rivka Medina is a 77-year-old female with history of mild persistent asthma and chronic respiratory failure with hypoxia and hypercapnia  Last seen in the office in March of 2019  Last pulmonary function test revealed severe restrictive impairment  Electronically Signed by MARGOT Martin    ______________________________________________________________________    Chief Complaint:   Chief Complaint   Patient presents with    Follow-up       Patient ID: Rivka Medina is a 80 y o  y o  female has a past medical history of CHF (congestive heart failure) ([facility] [address] ) (08/2017), CO2 retention, DVT (deep vein thrombosis) in pregnancy, Hyperlipidemia, Hypertension, Pulmonary embolism ([facility] [address] ) (02/06/2018), Renal disorder, and Stroke ([facility] ) (2016)  9/30/2019  Patient presents today for follow-up visit  Shortness of Breath   This is a chronic problem  The current episode started more than 1 year ago  The problem occurs daily  The problem has been waxing and waning  The symptoms are aggravated by exercise  The patient has no known risk factors for DVT/PE  She has tried rest and beta agonist inhalers for the symptoms  The treatment provided moderate relief  Her past medical history is significant for chronic lung disease  Review of Systems   Constitutional: Negative  HENT: Negative  Eyes: Negative  Respiratory: Positive for shortness of breath  Gastrointestinal: Negative  Endocrine: Negative  Genitourinary: Negative  Musculoskeletal: Negative  Skin: Negative  Allergic/Immunologic: Negative      Neurological: Negative  Hematological: Negative  Psychiatric/Behavioral: Negative  Smoking history: She reports that she has never smoked  She has never used smokeless tobacco     The following portions of the patient's history were reviewed and updated as appropriate: allergies, current medications, past family history, past medical history, past social history, past surgical history and problem list     Immunization History   Administered Date(s) Administered    Pneumococcal Conjugate 13-Valent 09/02/2017     Current Outpatient Medications   Medication Sig Dispense Refill    acetaminophen (TYLENOL) 325 mg tablet Take 650 mg by mouth every 6 (six) hours as needed for mild pain      amLODIPine (NORVASC) 5 mg tablet Take 1 tablet (5 mg total) by mouth daily 90 tablet 3    apixaban (ELIQUIS) 5 mg Take one tablet by mouth twice a day  60 tablet 5    BREO ELLIPTA 200-25 MCG/INH inhaler INHALE 1 PUFF DAILY AS DIRECTED 60 each 3    calcium-vitamin D (OSCAL 500 + D) 500 mg-200 units per tablet Take 1 tablet by mouth 2 (two) times a day   Carbonyl Iron 45 MG TABS Take by mouth 1 tab every other day      carvedilol (COREG) 25 mg tablet Take 1 tablet (25 mg total) by mouth 2 (two) times a day with meals 60 tablet 0    cholecalciferol (VITAMIN D3) 1,000 units tablet Take 2,000 Units by mouth daily      clopidogrel (PLAVIX) 75 mg tablet Take 75 mg by mouth daily      co-enzyme Q-10 50 MG capsule Take 200 mg by mouth daily        docusate sodium (COLACE) 100 mg capsule Take 100 mg by mouth 2 (two) times a day      fluticasone-vilanterol (BREO ELLIPTA) 100-25 mcg/inh inhaler Inhale 1 puff daily for 14 days Rinse mouth after use   1 Inhaler 0    Iron-Vitamin C (IRON 100/C) 100-250 MG TABS Take 1 tablet by mouth daily      LIDOCAINE EX Apply topically as needed 10%  4    Multiple Vitamins-Minerals (OCUVITE PO) Take 1 tablet by mouth daily        nebivolol (BYSTOLIC) 20 MG tablet       Nutritional Supplements (ENSURE ACTIVE) LIQD Take by mouth daily      ondansetron (ZOFRAN) 4 mg tablet Take 4 mg by mouth daily   2    pantoprazole (PROTONIX) 40 mg tablet Take 40 mg by mouth daily      torsemide (DEMADEX) 10 mg tablet Take 10 mg by mouth every other day       WELCHOL 625 MG tablet TAKE 3 TABLETS TWICE DAILY WITH MEALS 540 tablet 3     No current facility-administered medications for this visit  Allergies: Statins and Lyrica [pregabalin]    Objective:  Vitals:    09/30/19 1424   BP: 118/68   BP Location: Right arm   Patient Position: Sitting   Cuff Size: Standard   Pulse: 66   Resp: 12   SpO2: 92%   Weight: 71 2 kg (157 lb)   Height: 4' 10" (1 473 m)   Oxygen Therapy  SpO2: 92 %    Wt Readings from Last 3 Encounters:   09/30/19 71 2 kg (157 lb)   08/14/19 71 2 kg (157 lb)   04/10/19 69 5 kg (153 lb 3 2 oz)     Body mass index is 32 81 kg/m²  Physical Exam   Constitutional: She is oriented to person, place, and time  She appears well-developed and well-nourished  HENT:   Head: Normocephalic and atraumatic  Mallampati 4   Eyes: Pupils are equal, round, and reactive to light  EOM are normal    Neck: Normal range of motion  Cardiovascular: Normal rate and regular rhythm  Pulmonary/Chest: Effort normal and breath sounds normal    Abdominal: Soft  Musculoskeletal: Normal range of motion  Neurological: She is alert and oriented to person, place, and time  Skin: Skin is warm and dry  Capillary refill takes less than 2 seconds  Psychiatric: She has a normal mood and affect   Her behavior is normal        Lab Review:   Documentation on 09/23/2019   Component Date Value    SODIUM 09/19/2019 140     POTASSIUM 09/19/2019 4 6     CHLORIDE 09/19/2019 102     CO2 09/19/2019 30     BUN 09/19/2019 47     CREATININE 09/19/2019 1 97     Glucose 09/19/2019 172     EXTERNAL CALCIUM 09/19/2019 8 8     TOTAL PROTEIN 09/19/2019 6 7     ALBUMIN 09/19/2019 3 5     AST 09/19/2019 13     ALT 09/19/2019 9     ALK PHOS 09/19/2019 52     EXTERNAL TOT   BILIRUBIN 09/19/2019 0 4     EXTERNAL EGFR 09/19/2019 22    Orders Only on 08/20/2019   Component Date Value    PTH, Intact 08/20/2019 41     SL AMB CALCIUM 08/20/2019 9 2    Orders Only on 08/20/2019   Component Date Value    Total Cholesterol 08/20/2019 165     HDL 08/20/2019 53     Triglycerides 08/20/2019 120     LDL Direct 08/20/2019 90     Chol HDLC Ratio 08/20/2019 3 1     Non-HDL Cholesterol 08/20/2019 112     Magnesium, Serum 08/20/2019 2 6*    Phosphorus, Serum 08/20/2019 4 4*    Iron, Serum 08/20/2019 84     Total Iron Binding [place]* 08/20/2019 300     Iron Saturation 08/20/2019 28     Glucose, Random 08/20/2019 82     BUN 08/20/2019 63*    Creatinine 08/20/2019 1 99*    eGFR Non  08/20/2019 21*    eGFR  08/20/2019 25*    SL AMB BUN/CREATININE RA* 08/20/2019 32*    Sodium 08/20/2019 143     Potassium 08/20/2019 4 2     Chloride 08/20/2019 105     CO2 08/20/2019 33*    SL AMB CALCIUM 08/20/2019 8 8     Protein, Total 08/20/2019 6 9     Albumin 08/20/2019 3 5*    Globulin 08/20/2019 3 4     Albumin/Globulin Ratio 08/20/2019 1 0     TOTAL BILIRUBIN 08/20/2019 0 4     Alkaline Phosphatase 08/20/2019 60     AST 08/20/2019 13     ALT 08/20/2019 9     White Blood Cell Count 08/20/2019 4 6     Red Blood Cell Count 08/20/2019 3 58*    Hemoglobin 08/20/2019 10 9*    HCT 08/20/2019 34 0*    MCV 08/20/2019 95 0     MCH 08/20/2019 30 4     MCHC 08/20/2019 32 1     RDW 08/20/2019 12 3     Platelet Count 42/87/9041 149     SL AMB MPV 08/20/2019 11 1     Neutrophils (Absolute) 08/20/2019 3,358     Lymphocytes (Absolute) 08/20/2019 736*    Monocytes (Absolute) 08/20/2019 414     Eosinophils (Absolute) 08/20/2019 83     Basophils ABS 08/20/2019 9     Neutrophils 08/20/2019 73     Lymphocytes 08/20/2019 16 0     Monocytes 08/20/2019 9 0     Eosinophils 08/20/2019 1 8     Basophils PCT 08/20/2019 0 2  Always Message 08/20/2019      Ferritin 08/20/2019 101     Vitamin D, 25-Hydroxy, S* 08/20/2019 50     Creatinine, Urine 08/20/2019 78     Protein/Creat Ratio 08/20/2019 205*    Total Protein, Urine 08/20/2019 16    Orders Only on 04/04/2019   Component Date Value    PTH, Intact 04/04/2019 42     SL AMB CALCIUM 04/04/2019 9 1    Orders Only on 04/04/2019   Component Date Value    Magnesium, Serum 04/04/2019 2 3     Phosphorus, Serum 04/04/2019 3 5     Glucose, Random 04/04/2019 88     BUN 04/04/2019 43*    Creatinine 04/04/2019 1 67*    eGFR Non African American 04/04/2019 27*    eGFR  04/04/2019 31*    SL AMB BUN/CREATININE RA* 04/04/2019 26*    Sodium 04/04/2019 140     Potassium 04/04/2019 3 9     Chloride 04/04/2019 101     CO2 04/04/2019 33*    SL AMB CALCIUM 04/04/2019 9 1     Protein, Total 04/04/2019 7 1     Albumin 04/04/2019 3 6     Globulin 04/04/2019 3 5     Albumin/Globulin Ratio 04/04/2019 1 0     TOTAL BILIRUBIN 04/04/2019 0 4     Alkaline Phosphatase 04/04/2019 61     AST 04/04/2019 15     ALT 04/04/2019 10     White Blood Cell Count 04/04/2019 4 1     Red Blood Cell Count 04/04/2019 3 84     Hemoglobin 04/04/2019 11 6*    HCT 04/04/2019 35 4     MCV 04/04/2019 92 2     MCH 04/04/2019 30 2     MCHC 04/04/2019 32 8     RDW 04/04/2019 12 0     Platelet Count 86/72/5355 157     SL AMB MPV 04/04/2019 11 3     Neutrophils (Absolute) 04/04/2019 2,780     Lymphocytes (Absolute) 04/04/2019 914     Monocytes (Absolute) 04/04/2019 287     Eosinophils (Absolute) 04/04/2019 111     Basophils ABS 04/04/2019 8     Neutrophils 04/04/2019 67 8     Lymphocytes 04/04/2019 22 3     Monocytes 04/04/2019 7 0     Eosinophils 04/04/2019 2 7     Basophils PCT 04/04/2019 0 2     Creatinine, Urine 04/04/2019 72     Protein/Creat Ratio 04/04/2019 250*    Total Protein, Urine 04/04/2019 18        Diagnostics:  I have personally reviewed pertinent reports  Office Spirometry Results:     ESS:    No results found

## 2019-09-30 NOTE — ASSESSMENT & PLAN NOTE
Alexis Galindo has severe thoracic scoliosis  She has restrictive impairment that was seen on pulmonary function test in April 2018  Forced vital capacity was 1 09 L or  49% of predicted  This is likely factorial in her fact that she has chronic hypercapnia  She does use noninvasive ventilator for this and continues to benefit

## 2019-09-30 NOTE — PATIENT INSTRUCTIONS
Asthma   WHAT YOU NEED TO KNOW:   Asthma is a lung disease that makes breathing difficult  Chronic inflammation and reactions to triggers narrow the airways in the lungs  Asthma can become life-threatening if it is not managed  DISCHARGE INSTRUCTIONS:   Return to the emergency department if:   · You have severe shortness of breath  · Your lips or nails turn blue or gray  · The skin around your neck and ribs pulls in with each breath  · You have shortness of breath, even after you take your short-term medicine as directed  · Your peak flow numbers are in the red zone of your AAP  Contact your healthcare provider if:   · You run out of medicine before your next refill is due  · Your symptoms get worse  · You need to take more medicine than usual to control your symptoms  · You have questions or concerns about your condition or care  Medicines:   · Medicines  decrease inflammation, open airways, and make it easier to breathe  Medicines may be inhaled, taken as a pill, or injected  Short-term medicines relieve your symptoms quickly  Long-term medicines are used to prevent future attacks  You may also need medicine to help control your allergies  Ask your healthcare provider for more information about the medicine you are given and how to take it safely  · Take your medicine as directed  Contact your healthcare provider if you think your medicine is not helping or if you have side effects  Tell him of her if you are allergic to any medicine  Keep a list of the medicines, vitamins, and herbs you take  Include the amounts, and when and why you take them  Bring the list or the pill bottles to follow-up visits  Carry your medicine list with you in case of an emergency  Follow up with your healthcare provider as directed: You will need to return to make sure your medicine is working and your symptoms are controlled   You may be referred to an asthma specialist  Virgene Back may be asked to keep a record of your peak flow values and bring it with you to your appointments  Write down your questions so you remember to ask them during your visits  Manage your symptoms and prevent future attacks:   · Follow your Asthma Action Plan (AAP)  This is a written plan that you and your healthcare provider create  It explains which medicine you need and when to change doses if necessary  It also explains how you can monitor symptoms and use a peak flow meter  The meter measures how well your lungs are working  · Manage other health conditions , such as allergies, acid reflux, and sleep apnea  · Identify and avoid triggers  These may include pets, dust mites, mold, and cockroaches  · Do not smoke or be around others who smoke  Nicotine and other chemicals in cigarettes and cigars can cause lung damage  Ask your healthcare provider for information if you currently smoke and need help to quit  E-cigarettes or smokeless tobacco still contain nicotine  Talk to your healthcare provider before you use these products  · Ask about the flu vaccine  The flu can make your asthma worse  You may need a yearly flu shot  © 2017 [facility] Information is for End User's use only and may not be sold, redistributed or otherwise used for commercial purposes  All illustrations and images included in CareNotes® are the copyrighted property of A D A M , Inc  or [employer]  The above information is an  only  It is not intended as medical advice for individual conditions or treatments  Talk to your doctor, nurse or pharmacist before following any medical regimen to see if it is safe and effective for you

## 2019-10-01 ENCOUNTER — TELEPHONE (OUTPATIENT)
Dept: NEPHROLOGY | Facility: CLINIC | Age: 84
End: 2019-10-01

## 2019-10-01 LAB
BUN SERPL-MCNC: 52 MG/DL (ref 7–25)
BUN/CREAT SERPL: 28 (CALC) (ref 6–22)
CALCIUM SERPL-MCNC: 8.9 MG/DL (ref 8.6–10.4)
CHLORIDE SERPL-SCNC: 103 MMOL/L (ref 98–110)
CO2 SERPL-SCNC: 35 MMOL/L (ref 20–32)
CREAT SERPL-MCNC: 1.83 MG/DL (ref 0.6–0.88)
GLUCOSE SERPL-MCNC: 147 MG/DL (ref 65–99)
POTASSIUM SERPL-SCNC: 4.2 MMOL/L (ref 3.5–5.3)
SL AMB EGFR AFRICAN AMERICAN: 27 ML/MIN/1.73M2
SL AMB EGFR NON AFRICAN AMERICAN: 24 ML/MIN/1.73M2
SODIUM SERPL-SCNC: 142 MMOL/L (ref 135–146)

## 2019-10-01 NOTE — TELEPHONE ENCOUNTER
Patient's daughter called asking if Michael Baer will still need to have repeat labs done that were mailed to her

## 2019-10-07 ENCOUNTER — DOCUMENTATION (OUTPATIENT)
Dept: NEPHROLOGY | Facility: CLINIC | Age: 84
End: 2019-10-07

## 2019-10-07 NOTE — PROGRESS NOTES
The patient has the following blood pressure readings:  -a m :  116/54, standing 120/59  -p m :  151/73, standing 148/75  Heart rate in the 60s    Recommend:  · Determine what time of day she takes her amlodipine  If she is taking it in the evening I would switch it to the morning  Maintain the current dose of 5 mg  ·     Review her medications to see if I need to make any further adjustments

## 2019-10-07 NOTE — PROGRESS NOTES
If there is a way for her to take half a carvedilol in the evening or 12 5 mg in the evening and then 1 5 tablets in the morning with 1-37 5 mg that would be preferable  Let me know and review with the patient  Then recheck blood pressure readings in 2-3 week morning evening sitting and standing is outlined previously

## 2019-10-07 NOTE — PROGRESS NOTES
I spoke to patient daughter, patient is taking her amlodipine 5 mg in the Am  Medication list updated

## 2019-10-08 LAB
BUN SERPL-MCNC: 41 MG/DL (ref 7–25)
BUN/CREAT SERPL: 21 (CALC) (ref 6–22)
CALCIUM SERPL-MCNC: 9 MG/DL (ref 8.6–10.4)
CHLORIDE SERPL-SCNC: 104 MMOL/L (ref 98–110)
CO2 SERPL-SCNC: 35 MMOL/L (ref 20–32)
CREAT SERPL-MCNC: 1.93 MG/DL (ref 0.6–0.88)
GLUCOSE SERPL-MCNC: 89 MG/DL (ref 65–99)
POTASSIUM SERPL-SCNC: 4.2 MMOL/L (ref 3.5–5.3)
SL AMB EGFR AFRICAN AMERICAN: 26 ML/MIN/1.73M2
SL AMB EGFR NON AFRICAN AMERICAN: 22 ML/MIN/1.73M2
SODIUM SERPL-SCNC: 142 MMOL/L (ref 135–146)

## 2019-10-08 NOTE — PROGRESS NOTES
I spoke to patient Aid Saul Anderson she is aware of carvedilol changing and would like our office to make Dr Saleh aware  Note will be faxed over to his office

## 2019-10-14 DIAGNOSIS — N17.9 AKI (ACUTE KIDNEY INJURY) (HCC): ICD-10-CM

## 2019-10-14 DIAGNOSIS — N18.30 CHRONIC KIDNEY DISEASE, STAGE III (MODERATE) (HCC): Chronic | ICD-10-CM

## 2019-10-14 DIAGNOSIS — E78.5 DYSLIPIDEMIA: Chronic | ICD-10-CM

## 2019-10-14 DIAGNOSIS — E55.9 VITAMIN D DEFICIENCY: Chronic | ICD-10-CM

## 2019-10-14 DIAGNOSIS — D63.1 ANEMIA OF CHRONIC RENAL FAILURE, STAGE 3 (MODERATE) (HCC): ICD-10-CM

## 2019-10-14 DIAGNOSIS — N18.30 ANEMIA OF CHRONIC RENAL FAILURE, STAGE 3 (MODERATE) (HCC): ICD-10-CM

## 2019-10-14 DIAGNOSIS — I12.9 HYPERTENSIVE CHRONIC KIDNEY DISEASE WITH STAGE 1 THROUGH STAGE 4 CHRONIC KIDNEY DISEASE, OR UNSPECIFIED CHRONIC KIDNEY DISEASE: ICD-10-CM

## 2019-10-14 DIAGNOSIS — I95.1 ORTHOSTATIC HYPOTENSION: ICD-10-CM

## 2019-10-14 RX ORDER — COLESEVELAM HYDROCHLORIDE 625 MG/1
TABLET, FILM COATED ORAL
Qty: 540 TABLET | Refills: 3 | Status: SHIPPED | OUTPATIENT
Start: 2019-10-14 | End: 2020-12-28

## 2019-10-30 LAB — HBA1C MFR BLD HPLC: 5.7 %

## 2019-11-05 ENCOUNTER — DOCUMENTATION (OUTPATIENT)
Dept: NEPHROLOGY | Facility: CLINIC | Age: 84
End: 2019-11-05

## 2019-11-05 ENCOUNTER — TELEPHONE (OUTPATIENT)
Dept: NEPHROLOGY | Facility: CLINIC | Age: 84
End: 2019-11-05

## 2019-11-05 DIAGNOSIS — N18.30 CHRONIC KIDNEY DISEASE, STAGE III (MODERATE) (HCC): Primary | Chronic | ICD-10-CM

## 2019-11-05 DIAGNOSIS — D63.1 ANEMIA OF CHRONIC RENAL FAILURE, STAGE 3 (MODERATE) (HCC): ICD-10-CM

## 2019-11-05 DIAGNOSIS — N18.30 ANEMIA OF CHRONIC RENAL FAILURE, STAGE 3 (MODERATE) (HCC): ICD-10-CM

## 2019-11-05 LAB
EXT GLUCOSE BLD: 92
EXT GLUCOSE BLD: 99
EXTERNAL ALBUMIN: 3
EXTERNAL ALBUMIN: 3.6
EXTERNAL ALK PHOS: 44
EXTERNAL ALK PHOS: 51
EXTERNAL ALT: 10
EXTERNAL ALT: 15
EXTERNAL AST: 16
EXTERNAL AST: 17
EXTERNAL BUN: 43
EXTERNAL BUN: 45
EXTERNAL CALCIUM: 8.4
EXTERNAL CALCIUM: 8.9
EXTERNAL CHLORIDE: 102
EXTERNAL CHLORIDE: 103
EXTERNAL CO2: 36
EXTERNAL CO2: 36
EXTERNAL CREATININE: 1.73
EXTERNAL CREATININE: 1.92
EXTERNAL EGFR: 22
EXTERNAL EGFR: 25
EXTERNAL POTASSIUM: 4.5
EXTERNAL POTASSIUM: 4.6
EXTERNAL SODIUM: 141
EXTERNAL SODIUM: 142
EXTERNAL T.BILIRUBIN: 0.3
EXTERNAL T.BILIRUBIN: 0.4
EXTERNAL TOTAL PROTEIN: 6.7
EXTERNAL TOTAL PROTEIN: 7
HBA1C MFR BLD: 5.7 % (ref 4.2–6.3)
HCT VFR BLD AUTO: 32.1 % (ref 34.8–46.1)
HCT VFR BLD AUTO: 33.7 % (ref 34.8–46.1)
HGB BLD-MCNC: 10.7 G/DL (ref 11.5–15.4)
HGB BLD-MCNC: 10.7 G/DL (ref 11.5–15.4)
IRON SERPL-MCNC: 86 UG/DL (ref 50–170)
PHOSPHATE SERPL-MCNC: 4.1 MG/DL (ref 2.3–4.1)
PLATELET # BLD AUTO: 122 THOUSANDS/UL (ref 149–390)
PLATELET # BLD AUTO: 133 THOUSANDS/UL (ref 149–390)
TSH SERPL DL<=0.005 MIU/L-ACNC: 2.27 M[IU]/L
WBC # BLD AUTO: 3.9 THOUSAND/UL
WBC # BLD AUTO: 4 THOUSAND/UL

## 2019-11-05 NOTE — TELEPHONE ENCOUNTER
----- Message from Starr Tinoco MD sent at 11/5/2019 12:14 PM EST -----  Overall patient's labs look good  She has mild anemia and mild thrombocytopenia:  Recommend a repeat CBC nonfasting in 1 month

## 2019-11-13 ENCOUNTER — TELEPHONE (OUTPATIENT)
Dept: CARDIOLOGY CLINIC | Facility: CLINIC | Age: 84
End: 2019-11-13

## 2019-11-13 NOTE — TELEPHONE ENCOUNTER
Can pt stop taking the plavix since she is on the eliquis as a blood thinner - Dr Brittny Santiago is asking this due to the pt balance issues - advise

## 2019-11-14 NOTE — TELEPHONE ENCOUNTER
Tell patient I have no objection to her stopping the Plavix or clopidogrel  She should stay on the Eliquis

## 2019-11-14 NOTE — TELEPHONE ENCOUNTER
S/w Rosaline from Dr Flaco Flood office - she will confirm with the pt to hold the plavix and continue eliquis

## 2019-12-06 DIAGNOSIS — I50.32 CHRONIC DIASTOLIC CHF (CONGESTIVE HEART FAILURE) (HCC): Chronic | ICD-10-CM

## 2019-12-06 RX ORDER — CARVEDILOL 25 MG/1
TABLET ORAL
Qty: 180 TABLET | Refills: 3 | Status: SHIPPED | OUTPATIENT
Start: 2019-12-06 | End: 2020-02-24

## 2019-12-07 DIAGNOSIS — I63.9 CEREBROVASCULAR ACCIDENT (CVA) (HCC): Chronic | ICD-10-CM

## 2019-12-09 RX ORDER — APIXABAN 5 MG/1
TABLET, FILM COATED ORAL
Qty: 60 TABLET | Refills: 5 | Status: SHIPPED | OUTPATIENT
Start: 2019-12-09 | End: 2020-03-16 | Stop reason: HOSPADM

## 2019-12-17 DIAGNOSIS — N17.9 AKI (ACUTE KIDNEY INJURY) (HCC): ICD-10-CM

## 2019-12-17 DIAGNOSIS — N18.30 CHRONIC KIDNEY DISEASE, STAGE III (MODERATE) (HCC): Chronic | ICD-10-CM

## 2019-12-17 DIAGNOSIS — E55.9 VITAMIN D DEFICIENCY: Chronic | ICD-10-CM

## 2019-12-17 DIAGNOSIS — N18.30 ANEMIA OF CHRONIC RENAL FAILURE, STAGE 3 (MODERATE) (HCC): ICD-10-CM

## 2019-12-17 DIAGNOSIS — I95.1 ORTHOSTATIC HYPOTENSION: ICD-10-CM

## 2019-12-17 DIAGNOSIS — D63.1 ANEMIA OF CHRONIC RENAL FAILURE, STAGE 3 (MODERATE) (HCC): ICD-10-CM

## 2019-12-17 DIAGNOSIS — I12.9 HYPERTENSIVE CHRONIC KIDNEY DISEASE WITH STAGE 1 THROUGH STAGE 4 CHRONIC KIDNEY DISEASE, OR UNSPECIFIED CHRONIC KIDNEY DISEASE: ICD-10-CM

## 2019-12-17 DIAGNOSIS — E78.5 DYSLIPIDEMIA: Chronic | ICD-10-CM

## 2019-12-17 RX ORDER — AMLODIPINE BESYLATE 5 MG/1
5 TABLET ORAL DAILY
Qty: 90 TABLET | Refills: 3 | Status: SHIPPED | OUTPATIENT
Start: 2019-12-17 | End: 2020-03-14 | Stop reason: SDUPTHER

## 2020-01-03 ENCOUNTER — TELEPHONE (OUTPATIENT)
Dept: NEPHROLOGY | Facility: CLINIC | Age: 85
End: 2020-01-03

## 2020-01-06 NOTE — TELEPHONE ENCOUNTER
I spoke with patients daughter and let her know the fax is not going through  I told her we will keep trying today and if it does not go through we can send it to [facility] labs

## 2020-01-07 ENCOUNTER — TELEPHONE (OUTPATIENT)
Dept: CARDIOLOGY CLINIC | Facility: CLINIC | Age: 85
End: 2020-01-07

## 2020-01-07 NOTE — TELEPHONE ENCOUNTER
Daughter called inquiring about labs to be drawn at home  I called PCP to attain slip to do so  Dr Peres Safe office provided  Fax was sent to [facility]; successfully faxed for home draw  I called Connie Wallace to make her aware

## 2020-01-13 DIAGNOSIS — J45.30 MILD PERSISTENT ASTHMA WITHOUT COMPLICATION: ICD-10-CM

## 2020-01-13 NOTE — PROGRESS NOTES
I spoke to patient and she states she is on Breo 200 mcg 1 puff daily and not 100 mcg dose  She just received her Breo 200 mcg inhaler today  She gets her medication from [address] [place] [address] Drive and gets monthly delivery  She just got her delivery today  She needed a refill of her prescription for the 200 mcg dose    I placed this and spoke to the pharmacist

## 2020-01-21 LAB
25(OH)D3 SERPL-MCNC: 61 NG/ML (ref 30–100)
BASOPHILS # BLD AUTO: 9 CELLS/UL (ref 0–200)
BASOPHILS NFR BLD AUTO: 0.2 %
BUN SERPL-MCNC: 58 MG/DL (ref 7–25)
BUN/CREAT SERPL: 31 (CALC) (ref 6–22)
CALCIUM SERPL-MCNC: 8.7 MG/DL (ref 8.6–10.4)
CALCIUM SERPL-MCNC: 9.1 MG/DL (ref 8.6–10.4)
CHLORIDE SERPL-SCNC: 102 MMOL/L (ref 98–110)
CHOLEST SERPL-MCNC: 161 MG/DL
CHOLEST/HDLC SERPL: 3.4 (CALC)
CO2 SERPL-SCNC: 35 MMOL/L (ref 20–32)
CREAT SERPL-MCNC: 1.86 MG/DL (ref 0.6–0.88)
CREAT UR-MCNC: 64 MG/DL (ref 20–275)
EOSINOPHIL # BLD AUTO: 158 CELLS/UL (ref 15–500)
EOSINOPHIL NFR BLD AUTO: 3.6 %
ERYTHROCYTE [DISTWIDTH] IN BLOOD BY AUTOMATED COUNT: 11.9 % (ref 11–15)
FERRITIN SERPL-MCNC: 96 NG/ML (ref 16–288)
GLUCOSE SERPL-MCNC: 85 MG/DL (ref 65–99)
HCT VFR BLD AUTO: 34.6 % (ref 35–45)
HDLC SERPL-MCNC: 47 MG/DL
HGB BLD-MCNC: 11.3 G/DL (ref 11.7–15.5)
IRON SATN MFR SERPL: 28 % (CALC) (ref 16–45)
IRON SERPL-MCNC: 89 MCG/DL (ref 45–160)
LDLC SERPL CALC-MCNC: 93 MG/DL (CALC)
LYMPHOCYTES # BLD AUTO: 801 CELLS/UL (ref 850–3900)
LYMPHOCYTES NFR BLD AUTO: 18.2 %
MAGNESIUM SERPL-MCNC: 2.3 MG/DL (ref 1.5–2.5)
MCH RBC QN AUTO: 30.9 PG (ref 27–33)
MCHC RBC AUTO-ENTMCNC: 32.7 G/DL (ref 32–36)
MCV RBC AUTO: 94.5 FL (ref 80–100)
MONOCYTES # BLD AUTO: 365 CELLS/UL (ref 200–950)
MONOCYTES NFR BLD AUTO: 8.3 %
NEUTROPHILS # BLD AUTO: 3067 CELLS/UL (ref 1500–7800)
NEUTROPHILS NFR BLD AUTO: 69.7 %
NONHDLC SERPL-MCNC: 114 MG/DL (CALC)
PHOSPHATE SERPL-MCNC: 4.2 MG/DL (ref 2.1–4.3)
PLATELET # BLD AUTO: 132 THOUSAND/UL (ref 140–400)
PMV BLD REES-ECKER: 12.2 FL (ref 7.5–12.5)
POTASSIUM SERPL-SCNC: 4.4 MMOL/L (ref 3.5–5.3)
PROT UR-MCNC: 18 MG/DL (ref 5–24)
PROT/CREAT UR: 0.28 MG/MG CREAT (ref 0.02–0.16)
PROT/CREAT UR: 281 MG/G CREAT (ref 21–161)
PTH-INTACT SERPL-MCNC: 47 PG/ML (ref 14–64)
RBC # BLD AUTO: 3.66 MILLION/UL (ref 3.8–5.1)
SL AMB EGFR AFRICAN AMERICAN: 27 ML/MIN/1.73M2
SL AMB EGFR NON AFRICAN AMERICAN: 23 ML/MIN/1.73M2
SODIUM SERPL-SCNC: 140 MMOL/L (ref 135–146)
TIBC SERPL-MCNC: 315 MCG/DL (CALC) (ref 250–450)
TRIGL SERPL-MCNC: 118 MG/DL
WBC # BLD AUTO: 4.4 THOUSAND/UL (ref 3.8–10.8)

## 2020-02-12 ENCOUNTER — OFFICE VISIT (OUTPATIENT)
Dept: CARDIOLOGY CLINIC | Facility: CLINIC | Age: 85
End: 2020-02-12
Payer: MEDICARE

## 2020-02-12 VITALS
HEART RATE: 63 BPM | SYSTOLIC BLOOD PRESSURE: 118 MMHG | HEIGHT: 58 IN | WEIGHT: 157 LBS | OXYGEN SATURATION: 92 % | DIASTOLIC BLOOD PRESSURE: 60 MMHG | BODY MASS INDEX: 32.95 KG/M2

## 2020-02-12 DIAGNOSIS — E66.9 CLASS 1 OBESITY: ICD-10-CM

## 2020-02-12 DIAGNOSIS — I35.0 AORTIC STENOSIS, MILD: ICD-10-CM

## 2020-02-12 DIAGNOSIS — J96.12 CHRONIC HYPERCAPNIC RESPIRATORY FAILURE (HCC): ICD-10-CM

## 2020-02-12 DIAGNOSIS — I49.1 PREMATURE ATRIAL CONTRACTIONS: ICD-10-CM

## 2020-02-12 DIAGNOSIS — N18.4 CHRONIC KIDNEY DISEASE, STAGE 4 (SEVERE) (HCC): ICD-10-CM

## 2020-02-12 DIAGNOSIS — I50.32 CHRONIC DIASTOLIC CHF (CONGESTIVE HEART FAILURE) (HCC): ICD-10-CM

## 2020-02-12 DIAGNOSIS — E78.2 MIXED HYPERLIPIDEMIA: Chronic | ICD-10-CM

## 2020-02-12 DIAGNOSIS — I82.5Y9 CHRONIC DEEP VEIN THROMBOSIS (DVT) OF PROXIMAL VEIN OF LOWER EXTREMITY, UNSPECIFIED LATERALITY (HCC): ICD-10-CM

## 2020-02-12 DIAGNOSIS — I10 ESSENTIAL HYPERTENSION: Primary | Chronic | ICD-10-CM

## 2020-02-12 DIAGNOSIS — I49.3 ASYMPTOMATIC PVCS: ICD-10-CM

## 2020-02-12 DIAGNOSIS — I45.2 BIFASCICULAR BLOCK: ICD-10-CM

## 2020-02-12 DIAGNOSIS — I11.9 HYPERTENSIVE HEART DISEASE WITHOUT HEART FAILURE: ICD-10-CM

## 2020-02-12 DIAGNOSIS — Z86.73 HISTORY OF STROKE: ICD-10-CM

## 2020-02-12 PROCEDURE — 93000 ELECTROCARDIOGRAM COMPLETE: CPT | Performed by: INTERNAL MEDICINE

## 2020-02-12 PROCEDURE — 99214 OFFICE O/P EST MOD 30 MIN: CPT | Performed by: INTERNAL MEDICINE

## 2020-02-12 NOTE — PATIENT INSTRUCTIONS
1  Continue current medication  2  Cardiology follow-up approximately six months with EKG, lipids, CMP

## 2020-02-12 NOTE — PROGRESS NOTES
Office Cardiology Progress Note  Amrit Marie 80 y o  female MRN: [id number]  02/12/20  2:41 PM      ASSESSMENT:    1   Chronic stable exertional dyspnea from chronic hypercarbic hypoxemic respiratory failure and COPD with  CAD  excluded on 02/09/2018 nuclear stress test done in [place], [place], at which time echocardiogram on 02/07/2018 showed 60-65% LVEF EF, mild LVH, mild RV dilatation and RV systolic dysfunction, mild MAC with trace MR /trace TR, and mild-to-moderate aortic stenosis with 1+ AI/1+ PI  Patient with acute respiratory failure resulting in hospitalization from 12/07 through 12/11/2018 at [address] [place] [address]  2  History of atherothrombotic bilateral basal ganglia strokes, most recently on the right on 6/25/16 with associated uncontrolled hypertension and residual left hand paresthesias with exacerbation of symptoms 5/9/17 during hypertensive emergency  History of old right thalamic stroke  3  Controlled dyslipidemia as of 05/21/2018 with prior history of statin myalgias and current direct LDL of 93 as of 01/20/2020   4  Mild  aortic valve stenosis with LVH, grade 2 diastolic dysfunction, elevated mean left atrial filling pressure, mild MAC with trace MR, trace AI, trace TR on 03/09/2018 echo  5  Class 1 obesity with 25 4 pound weight loss in approximately 6 9 years  6  Chronic right bundle branch block and resolved LAFB with history of PACs  and  PVC's, suppressed  today  7  Chronic kidney disease, stage IV, with stabilized creatinine of  1 86 on 01/20/2020 with estimated GFR 23  Associated mild anemia and thrombocytopenia  8  Osteoarthritis  9  History of left lower extremity DVT February, 2018 and DVT of right gastrocnemius vein October, 2008 with possible prior episode of left lower extremity DVT in the past as well  10  History of left renal mass  11  Suppressed previously heard large airway wheezing of no clinical significance    12  Resolved orthostatic hypotension, likely due to a combination of amlodipine, volume depletion, dysautonomia  13  History of acutely decompensated diastolic heart failure with [facility] admission 8/27 to 09/02/2017, as well as 02/06/2018 and 03/06/2018  14   Acute hypercapneic and hypoxic respiratory failure 02/06/2018, 03/06/2018, and 03/29/2018 and again on 12/07/2018  15  Chronic Eliquis oral anticoagulation  16  Left upper extremity pain, chronic, cause unknown to me  17  History of rotator cuff tear of right shoulder  18  Chronic pain syndrome  19  Questionable gouty arthritis of left great toe  Plan       Patient Instructions     1  Continue current medication  2  Cardiology follow-up approximately six months with EKG, lipids, CMP  HPI    This 80 y o  female  denies new cardiopulmonary and medical symptoms  She has chronic pain in her feet and elsewhere and mentions occasional warmth of her left big toe but does not notice concomitant swelling or redness  She is using her nasal oxygen at 2 liters/minute 24 hours a day  She is being seen in follow-up of the below listed diagnoses  Encounter Diagnoses   Name Primary?     Essential hypertension Yes    Mixed hyperlipidemia     Chronic hypercapnic respiratory failure (HCC)     Chronic diastolic CHF (congestive heart failure) (HCC)     History of stroke     Aortic stenosis, mild     Hypertensive heart disease without heart failure     Class 1 obesity     Bifascicular block     Premature atrial contractions     Asymptomatic PVCs     Chronic kidney disease, stage 4 (severe) (HCC)     Chronic deep vein thrombosis (DVT) of proximal vein of lower extremity, unspecified laterality (HCC)         Review of Systems    All other systems negative, except as noted in history of present illness    Historical Information   Past Medical History:   Diagnosis Date    CHF (congestive heart failure) ([facility] [address] ) 26/4724    diastolic     CO2 retention     DVT (deep vein thrombosis) in pregnancy     left LE in 2/2018 and 50 years ago    Hyperlipidemia     Hypertension     Pulmonary embolism ([address] ) 02/06/2018    Renal disorder     ckd    Stroke [facility]) 2016    left hand weakness,paresthesia  Past Surgical History:   Procedure Laterality Date    ABDOMINAL SURGERY      ruputured bowel  had colostomy and ileostomy,reversed later on   BACK SURGERY      rods in back  done 30 years ago   CARPAL TUNNEL RELEASE Bilateral     CYST REMOVAL      back    HERNIA REPAIR      REPLACEMENT TOTAL KNEE BILATERAL       Social History     Substance and Sexual Activity   Alcohol Use Yes    Comment: Occ  Social History     Substance and Sexual Activity   Drug Use No     Social History     Tobacco Use   Smoking Status Never Smoker   Smokeless Tobacco Never Used       Family History:  Family History   Problem Relation Age of Onset    COPD Brother          Meds/Allergies     Prior to Admission medications    Medication Sig Start Date End Date Taking? Authorizing Provider   amLODIPine (NORVASC) 5 mg tablet TAKE 1 TABLET (5 MG TOTAL) BY MOUTH DAILY 12/17/19  Yes Oseas Peters MD   BREO ELLIPTA 200-25 MCG/INH inhaler INHALE 1 PUFF DAILY AS DIRECTED 1/13/20  Yes Heather Ramos DO   calcium-vitamin D (OSCAL 500 + D) 500 mg-200 units per tablet Take 1 tablet by mouth 2 (two) times a day  Yes Historical Provider, MD   carvedilol (COREG) 25 mg tablet Take 1 5 tablets (37 5) in the Am and 1 tab in the evening (12 5mg)  12/6/19  Yes Oseas Peters MD   cholecalciferol (VITAMIN D3) 1,000 units tablet Take 2,000 Units by mouth daily   Yes Historical Provider, MD   clopidogrel (PLAVIX) 75 mg tablet Take 75 mg by mouth daily   Yes Historical Provider, MD   co-enzyme Q-10 50 MG capsule Take 200 mg by mouth daily     Yes Historical Provider, MD   docusate sodium (COLACE) 100 mg capsule Take 100 mg by mouth 2 (two) times a day   Yes Historical Provider, MD   ELIQUIS 5 MG TAKE ONE TABLET BY MOUTH TWICE A DAY  12/9/19  Yes Rich Fernandez MD   fluticasone-vilanterol (BREO ELLIPTA) 100-25 mcg/inh inhaler Inhale 1 puff daily for 14 days Rinse mouth after use  9/30/19 2/12/20 Yes MARGOT Oshea   Iron-Vitamin C (IRON 100/C) 100-250 MG TABS Take 1 tablet by mouth every other day    Yes Historical Provider, MD   Multiple Vitamins-Minerals (OCUVITE PO) Take 1 tablet by mouth daily     Yes Historical Provider, MD   pantoprazole (PROTONIX) 40 mg tablet Take 40 mg by mouth daily   Yes Historical Provider, MD   torsemide (DEMADEX) 10 mg tablet Take 10 mg by mouth daily    Yes Historical Provider, MD   WELCHOL 625 MG tablet TAKE 3 TABLETS TWICE A DAILY WITH MEALS  10/14/19  Yes Siddhartha Serrano MD   acetaminophen (TYLENOL) 325 mg tablet Take 650 mg by mouth every 6 (six) hours as needed for mild pain    Historical Provider, MD   Carbonyl Iron 45 MG TABS Take by mouth 1 tab every other day    Historical Provider, MD   LIDOCAINE EX Apply topically as needed 10% 11/27/18   Historical Provider, MD   nebivolol (BYSTOLIC) 20 MG tablet     Historical Provider, MD   Nutritional Supplements (ENSURE ACTIVE) LIQD Take by mouth daily    Historical Provider, MD   ondansetron (ZOFRAN) 4 mg tablet Take 4 mg by mouth daily  11/30/18   Historical Provider, MD       Allergies   Allergen Reactions    Statins Myalgia    Lyrica [Pregabalin] Other (See Comments)     Client says it made her "nutty"         Vitals:    02/12/20 1401   BP: 118/60   BP Location: Right arm   Patient Position: Sitting   Cuff Size: Standard   Pulse: 63   SpO2: 92%   Weight: 71 2 kg (157 lb)   Height: 4' 10" (1 473 m)       Body mass index is 32 81 kg/m²  No change in weight compared to six months ago  Physical Exam:    General Appearance:  Alert, cooperative, no distress, appears stated age and is mildly obese     Head:  Normocephalic, without obvious abnormality, atraumatic   Eyes:  PERRL, conjunctiva/corneas clear, EOM's intact,   both eyes   Ears:  Normal TM's and external ear canals, both ears   Nose: Nares normal, septum midline, mucosa normal, no drainage or sinus tenderness   Throat: Lips, mucosa, and tongue normal; teeth and gums normal   Neck: Supple, symmetrical, trachea midline, no adenopathy, thyroid: not enlarged, symmetric, no tenderness/mass/nodules, no carotid bruit or JVD   Back:   Symmetric, no curvature, ROM normal, no CVA tenderness   Lungs:   Clear to auscultation bilaterally, respirations unlabored with diminished breath sounds  Chest Wall:  No tenderness or deformity   Heart:  Regular rate and rhythm, S1, S2 normal, no murmur, rub or gallop   Abdomen:   Soft, non-tender, bowel sounds active all four quadrants,  no masses, no organomegaly with mild obesity noted  Extremities: Extremities normal, atraumatic, no cyanosis or edema   Pulses: 1+ and symmetric   Skin: Skin showed normal color, texture, turgor and no rashes or lesions   Lymph nodes: Cervical, supraclavicular, and axillary nodes normal   Neurologic: Normal         Cardiographics    ECG 02/12/20:    Normal sinus rhythm  Right bundle branch block with secondary T-wave inversions in the inferior and anteroseptal leads  No major changes from 08/14/2019    IMAGING:    No Chest XR results available for this patient            LAB REVIEW:      Lab Results   Component Value Date    SODIUM 140 01/20/2020    K 4 4 01/20/2020     01/20/2020    CO2 35 (H) 01/20/2020    ANIONGAP 12 4 01/05/2016    BUN 58 (H) 01/20/2020    CREATININE 1 86 (H) 01/20/2020    GLUCOSE 92 01/05/2016    GLUF 111 (H) 10/04/2018    CALCIUM 9 1 01/20/2020    CALCIUM 8 7 01/20/2020    AST 17 10/30/2019    ALT 10 10/30/2019    ALKPHOS 44 10/30/2019    PROT 8 5 (H) 01/05/2016    BILITOT 0 5 01/05/2016    EGFR 25 10/30/2019   EGFR 01/20/2020:  23  Glucose 01/20/2020:  85  Lab Results   Component Value Date    CHOLESTEROL 161 01/20/2020    CHOLESTEROL 165 08/20/2019    CHOLESTEROL 157 08/17/2018     Lab Results   Component Value Date HDL 47 (L) 01/20/2020    HDL 53 08/20/2019    HDL 52 08/17/2018     LDL direct 01/20/2020:  93  Lab Results   Component Value Date    LDLCALC 62 12/06/2017    LDLCALC 72 12/04/2017    LDLCALC 67 05/10/2017       Lab Results   Component Value Date    TRIG 118 01/20/2020    TRIG 120 08/20/2019    TRIG 140 08/17/2018     Other laboratory data from 01/20/2020:    Normal PTH, magnesium, phosphorus, iron, TIBC, iron saturation, ferritin, vitamin-D, 25-OH  H/H-11 3/34 6 with normal WBC and platelets of 938I  Elevated protein/creatinine ratio          Martha Alvarez MD

## 2020-02-20 NOTE — PROGRESS NOTES
RENAL FOLLOW UP NOTE: td    ASSESSMENT AND PLAN:  1   CKD stage 3 :  · Etiology:  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 of TIANA  · Baseline creatinine:  1 5-1 92  · Current creatinine:  1 86 at baseline  · Urine protein creatinine ratio:  0 28 g at goal  Recommendations:  · Treat hypertension-please see below  · Treat dyslipidemia-please see below  · Maintain proteinuria less than 1 g or as low as possible  · Avoid nephrotoxic agents such as NSAIDs, patient counseled as such  2   Volume:  Euvolemic, chronic left lower extremity/ankle edema which is without change  3   Hypertension:    · Home blood pressure readings:none at this time  · Goal blood pressure:  Less than 125/80 given CAD  Recommendations:  · Push nonmedical regimen including weight loss, isotonic exercise and avoidance of salt; patient counseled as such  · Medication changes today:  I would consider switching her from carvedilol to Bystolic or metoprolol because of the potential orthostatic drop with carvedilol  Patient and her daughter will investigate the potential for getting 1 of these newer agents  After switching consider checking blood pressures for 1 week  4   Electrolytes:  All acceptable at this time; patient with mild metabolic alkalosis secondary to chronic CO2 retention  This is actually stable at 35    5   Mineral bone disorder:  · Calcium/magnesium/phosphorus:  Acceptable at this time  · PTH intact:  47 which is acceptable  · Vitamin-D:  will be done next visit  6   Dyslipidemia:  · Goal LDL:  Less than 70 ideally given CAD  · Current lipid profile:  LDL 93/HDL 47/triglycerides 118      Recommendations:   Overall doing better on Welchol   Cannot tolerate statins  ?   Zetia trial  7   Anemia:  Related to CKD and chronic disease  · Hemoglobin stable at 11 3   · Iron studies:  Saturation 20 percent/ferritin 96:  Oral iron as tolerated  8   Other problems:  · Status post  CVA  · CAD/atrial fibrillation:  Followed by Dr Howard Fuller  · History of MGUS followed by Dr Herbert Linares  · Admission in December 2018 for change in mental status after being treated for URI  Patient was felt to have hypercapnia and acute tracheobronchitis with mild persistent asthma from the tracheobronchitis  She was treated with intravenous steroids and Rocephin an oxygen supplementation during the day and at nighttime           PATIENT INSTRUCTIONS:    Patient Instructions   1  Medication changes today:  · Please begin Zetia 10 mg daily in the morning for your cholesterol  Please watch for body aches and joint pains and call if you developed any  · Please investigate as to whether not we can get Bystolic or metoprolol XL or Toprol XL in place of carvedilol  For now continue carvedilol until notified otherwise    2  Please go for nonfasting lab work in 6 weeks    3  Follow-up in 4 months:  -please bring in 1 week a blood pressure readings morning evening, sitting and standing as follows:  · Take the morning readings before any medications  · Take the evening readings before supper and before taking any medications at that time  · When taking standing readings, keep your arm supported at heart level and not dangling  · Make sure you are sitting with your back supported in feet on the ground on crossed  · Make sure you have not taken any coffee or caffeine products or exercised or smoke cigarettes at least 30 minutes before taking your blood pressure  -PLEASE BRING IN YOUR BLOOD PRESSURE MACHINE FOR CORRELATION WITH THE OFFICE MACHINE AT THE NEXT VISIT  -please go for fasting lab 1-2 weeks before your appointment    4  General instructions:   AVOID SALT BUT NOT ADDING AN READING LABELS TO MAKE SURE THERE IS LOW-SALT IN THE FOOD THAT YOU ARE EATING     Avoid nonsteroidal anti-inflammatory drugs such as Naprosyn, ibuprofen, Aleve, Advil, Celebrex, etc   You can use Tylenol as needed if you do not have any liver condition to be concerned about     Avoid medications such as Sudafed or decongestants and antihistamines that contained the D component which is the decongestant  You can take antihistamines without the decongestant or D component   Try to stay as active as possible     Please do not drink more than 2 glasses of alcohol/wine on a daily basis as this may contribute to your high blood pressure  Subjective: There has been no hospitalizations or acute illnesses since last visit  The patient overall is feeling well  No fevers chills cough or colds  Good appetite and good energy  No hematuria, dysuria, voiding symptoms or foamy urine  No gastrointestinal symptoms  No chest pain, chronic mild dyspnea on exertion but quite stable actually improving after recent URI; and mild swelling of the legs unchanged  No headaches, occasional dizziness or lightheadedness, not really related to standing and possibly related to the lytes that she stares at on occasion  Blood pressure medications:  · Torsemide 10 mg every other day in the morning  · Carvedilol 37 5 mg the morning, and 12 5 mg the evening  · Amlodipine 5 mg daily in the morning    ROS:  See HPI, otherwise review of systems as completely reviewed with the patient are negative    Past Medical History:   Diagnosis Date    CHF (congestive heart failure) ([address] ) 71/6196    diastolic     CO2 retention     DVT (deep vein thrombosis) in pregnancy     left LE in 2/2018 and 50 years ago    Hyperlipidemia     Hypertension     [facility] embol[facility] [address] ) 02/06/2018    Renal disorder     ckd    Stroke ([address] ) 2016    left hand weakness,paresthesia  Past Surgical History:   Procedure Laterality Date    ABDOMINAL SURGERY      ruputured bowel  had colostomy and ileostomy,reversed later on   BACK SURGERY      rods in back  done 30 years ago       CARPAL TUNNEL RELEASE Bilateral     CYST REMOVAL      back    HERNIA REPAIR      REPLACEMENT TOTAL KNEE BILATERAL       Family History   Problem Relation Age of Onset    COPD Brother       reports that she has never smoked  She has never used smokeless tobacco  She reports that she drinks alcohol  She reports that she does not use drugs  I COMPLETELY REVIEWED THE PAST MEDICAL HISTORY/PAST SURGICAL HISTORY/SOCIAL HISTORY/FAMILY HISTORY/AND MEDICATIONS  AND UPDATED ALL    Objective:     Vitals:   Vitals:    02/24/20 1231   BP: 132/63   Pulse: 63   BP SITTING ON RIGHT:  130/62 WITH A HEART RATE OF 56 AND REGULAR  BP STANDING ON RIGHT:  108/64 WITH A HEART RATE OF 60 AND REGULAR    Weight (last 2 days)     Date/Time   Weight    02/24/20 1231   70 9 (156 4)            Wt Readings from Last 3 Encounters:   02/24/20 70 9 kg (156 lb 6 4 oz)   02/12/20 71 2 kg (157 lb)   09/30/19 71 2 kg (157 lb)       Body mass index is 32 69 kg/m²      Physical Exam: General:  No acute distress with potable oxygen  Skin:  No acute rash  Eyes:  No scleral icterus, noninjected, no discharge from eyes  ENT:  Moist mucous membranes  Neck:  Supple, no jugular venous distention, trachea is midline, no lymphadenopathy and no thyromegaly  Back   No CVAT  Chest:  Clear to auscultation and percussion, good respiratory effort  CVS:  Regular rate and rhythm without a rub, or gallops; grade 2/6 systolic ejection type murmur heard throughout the precordium  Abdomen:  Soft and nontender with normal bowel sounds  Extremities:  No cyanosis and no edema, moderate  arthritic changes of the hands, normal range of motion  Neuro:  Grossly intact  Psych:  Alert, oriented x3 and appropriate      Medications:    Current Outpatient Medications:     acetaminophen (TYLENOL) 325 mg tablet, Take 650 mg by mouth every 6 (six) hours as needed for mild pain, Disp: , Rfl:     amLODIPine (NORVASC) 5 mg tablet, TAKE 1 TABLET (5 MG TOTAL) BY MOUTH DAILY, Disp: 90 tablet, Rfl: 3    BREO ELLIPTA 200-25 MCG/INH inhaler, INHALE 1 PUFF DAILY AS DIRECTED, Disp: 60 each, Rfl: 6    calcium-vitamin D (OSCAL 500 + D) 500 mg-200 units per tablet, Take 1 tablet by mouth 2 (two) times a day , Disp: , Rfl:     Carbonyl Iron 45 MG TABS, Take by mouth 1 tab every other day, Disp: , Rfl:     cholecalciferol (VITAMIN D3) 1,000 units tablet, Take 2,000 Units by mouth daily, Disp: , Rfl:     clopidogrel (PLAVIX) 75 mg tablet, Take 75 mg by mouth daily, Disp: , Rfl:     co-enzyme Q-10 50 MG capsule, Take 200 mg by mouth daily  , Disp: , Rfl:     docusate sodium (COLACE) 100 mg capsule, Take 100 mg by mouth 2 (two) times a day, Disp: , Rfl:     ELIQUIS 5 MG, TAKE ONE TABLET BY MOUTH TWICE A DAY , Disp: 60 tablet, Rfl: 5    fluticasone-vilanterol (BREO ELLIPTA) 100-25 mcg/inh inhaler, Inhale 1 puff daily for 14 days Rinse mouth after use , Disp: 1 Inhaler, Rfl: 0    Iron-Vitamin C (IRON 100/C) 100-250 MG TABS, Take 1 tablet by mouth every other day , Disp: , Rfl:     Multiple Vitamins-Minerals (OCUVITE PO), Take 1 tablet by mouth daily  , Disp: , Rfl:     pantoprazole (PROTONIX) 40 mg tablet, Take 40 mg by mouth daily, Disp: , Rfl:     torsemide (DEMADEX) 10 mg tablet, Take 10 mg by mouth daily , Disp: , Rfl:     WELCHOL 625 MG tablet, TAKE 3 TABLETS TWICE A DAILY WITH MEALS , Disp: 540 tablet, Rfl: 3    ezetimibe (ZETIA) 10 mg tablet, Take 1 tablet (10 mg total) by mouth daily, Disp: 30 tablet, Rfl: 5    LIDOCAINE EX, Apply topically as needed 10%, Disp: , Rfl: 4    Nutritional Supplements (ENSURE ACTIVE) LIQD, Take by mouth daily, Disp: , Rfl:     ondansetron (ZOFRAN) 4 mg tablet, Take 4 mg by mouth daily , Disp: , Rfl: 2    Lab, Imaging and other studies: I have personally reviewed pertinent labs    Laboratory Results:  Results for orders placed or performed in visit on 01/20/20   PTH, Intact and Calcium   Result Value Ref Range    PTH, Intact 47 14 - 64 pg/mL    SL AMB CALCIUM 8 7 8 6 - 10 4 mg/dL             Invalid input(s): ALBUMIN      Radiology review:   chest X-ray    Ultrasound      Portions of the record may have been created with voice recognition software  Occasional wrong word or "sound a like" substitutions may have occurred due to the inherent limitations of voice recognition software  Read the chart carefully and recognize, using context, where substitutions have occurred

## 2020-02-24 ENCOUNTER — OFFICE VISIT (OUTPATIENT)
Dept: NEPHROLOGY | Facility: CLINIC | Age: 85
End: 2020-02-24
Payer: MEDICARE

## 2020-02-24 VITALS
DIASTOLIC BLOOD PRESSURE: 63 MMHG | HEART RATE: 63 BPM | WEIGHT: 156.4 LBS | BODY MASS INDEX: 32.83 KG/M2 | SYSTOLIC BLOOD PRESSURE: 132 MMHG | HEIGHT: 58 IN

## 2020-02-24 DIAGNOSIS — I50.32 CHRONIC DIASTOLIC CHF (CONGESTIVE HEART FAILURE) (HCC): Chronic | ICD-10-CM

## 2020-02-24 DIAGNOSIS — M79.89 LEFT LEG SWELLING: ICD-10-CM

## 2020-02-24 DIAGNOSIS — N18.30 ANEMIA OF CHRONIC RENAL FAILURE, STAGE 3 (MODERATE) (HCC): ICD-10-CM

## 2020-02-24 DIAGNOSIS — E55.9 VITAMIN D DEFICIENCY: Chronic | ICD-10-CM

## 2020-02-24 DIAGNOSIS — D63.1 ANEMIA OF CHRONIC RENAL FAILURE, STAGE 3 (MODERATE) (HCC): ICD-10-CM

## 2020-02-24 DIAGNOSIS — E78.5 DYSLIPIDEMIA: Chronic | ICD-10-CM

## 2020-02-24 DIAGNOSIS — N18.30 CHRONIC KIDNEY DISEASE, STAGE III (MODERATE) (HCC): Chronic | ICD-10-CM

## 2020-02-24 DIAGNOSIS — I12.9 HYPERTENSIVE CHRONIC KIDNEY DISEASE WITH STAGE 1 THROUGH STAGE 4 CHRONIC KIDNEY DISEASE, OR UNSPECIFIED CHRONIC KIDNEY DISEASE: Primary | ICD-10-CM

## 2020-02-24 PROCEDURE — 99214 OFFICE O/P EST MOD 30 MIN: CPT | Performed by: INTERNAL MEDICINE

## 2020-02-24 RX ORDER — EZETIMIBE 10 MG/1
10 TABLET ORAL DAILY
Qty: 30 TABLET | Refills: 5 | Status: SHIPPED | OUTPATIENT
Start: 2020-02-24 | End: 2020-07-20

## 2020-02-24 NOTE — LETTER
February 24, 2020     [id number]    Patient: Celestino Rivas   YOB: 1928   Date of Visit: 2/24/2020       Dear Dr Juju Davalos: Thank you for referring Celestino Rivas to me for evaluation  Below are my notes for this consultation  If you have questions, please do not hesitate to call me  I look forward to following your patient along with you  Sincerely,        Bijal Diehl MD        CC: MD Jaison Mccabe MD Tessa Moors, MD  2/24/2020  1:11 PM  Sign at close encounter  RENAL FOLLOW UP NOTE: td    ASSESSMENT AND PLAN:  1   CKD stage 3 :  · Etiology:  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 of TIANA  · Baseline creatinine:  1 5-1 92  · Current creatinine:  1  86 at baseline  · Urine protein creatinine ratio:  0 2 8 g at goal  Recommendations:  · Treat hypertension-please see below  · Treat dyslipidemia-please see below  · Maintain proteinuria less than 1 g or as low as possible  · Avoid nephrotoxic agents such as NSAIDs, patient counseled as such  2   Volume:  Euvolemic, chronic left lower extremity/ankle edema which is without change  3   Hypertension:    · Home blood pressure readings:none at this time  · Goal blood pressure:  Less than 125/80 given CAD  Recommendations:  · Push nonmedical regimen including weight loss, isotonic exercise and avoidance of salt; patient counseled as such  · Medication changes today:  I would consider switching her from carvedilol to Bystolic or metoprolol because of the potential orthostatic drop with carvedilol  Patient and her daughter will investigate the potential for getting 1 of these newer agents  After switching consider checking blood pressures for 1 week  4   Electrolytes:  All acceptable at this time; patient with mild metabolic alkalosis secondary to chronic CO2 retention    This is actually stable at 35    5   Mineral bone disorder:  · Calcium/magnesium/phosphorus:  Acceptable at this time  · PTH intact:  47 which is acceptable  · Vitamin-D:  will be done next visit  6   Dyslipidemia:  · Goal LDL:  Less than 70 ideally given CAD  · Current lipid profile:  LDL 93/HDL 47/triglycerides 118      Recommendations:   Overall doing better on Welchol   Cannot tolerate statins  ? Zetia trial  7   Anemia:  Related to CKD and chronic disease  · Hemoglobin stable at 11 3   · Iron studies:  Saturation 20 percent/ferritin 96:  Oral iron as tolerated  8   Other problems:  · Status post  CVA  · CAD/atrial fibrillation:  Followed by Dr Sergio Motta  · History of MGUS followed by Dr Johan Darby  · Admission in December 2018 for change in mental status after being treated for URI  Patient was felt to have hypercapnia and acute tracheobronchitis with mild persistent asthma from the tracheobronchitis  She was treated with intravenous steroids and Rocephin an oxygen supplementation during the day and at nighttime           PATIENT INSTRUCTIONS:    Patient Instructions   1  Medication changes today:  · Please begin Zetia 10 mg daily in the morning for your cholesterol  Please watch for body aches and joint pains and call if you developed any  · Please investigate as to whether not we can get Bystolic or metoprolol XL or Toprol XL in place of carvedilol  For now continue carvedilol until notified otherwise    2  Please go for nonfasting lab work in 6 weeks    3   Follow-up in 4 months:  -please bring in 1 week a blood pressure readings morning evening, sitting and standing as follows:  · Take the morning readings before any medications  · Take the evening readings before supper and before taking any medications at that time  · When taking standing readings, keep your arm supported at heart level and not dangling  · Make sure you are sitting with your back supported in feet on the ground on crossed  · Make sure you have not taken any coffee or caffeine products or exercised or smoke cigarettes at least 30 minutes before taking your blood pressure  -PLEASE BRING IN YOUR BLOOD PRESSURE MACHINE FOR CORRELATION WITH THE OFFICE MACHINE AT THE NEXT VISIT  -please go for fasting lab 1-2 weeks before your appointment    4  General instructions:   AVOID SALT BUT NOT ADDING AN READING LABELS TO MAKE SURE THERE IS LOW-SALT IN THE FOOD THAT YOU ARE EATING     Avoid nonsteroidal anti-inflammatory drugs such as Naprosyn, ibuprofen, Aleve, Advil, Celebrex, etc   You can use Tylenol as needed if you do not have any liver condition to be concerned about     Avoid medications such as Sudafed or decongestants and antihistamines that contained the D component which is the decongestant  You can take antihistamines without the decongestant or D component   Try to stay as active as possible     Please do not drink more than 2 glasses of alcohol/wine on a daily basis as this may contribute to your high blood pressure  Subjective: There has been no hospitalizations or acute illnesses since last visit  The patient overall is feeling well  No fevers chills cough or colds    Good appetite and good energy  No hematuria, dysuria, voiding symptoms or foamy urine  No gastrointestinal symptoms  No chest pain, chronic mild dyspnea on exertion but quite stable actually improving after recent URI; and mild swelling of the legs unchanged  No headaches, occasional dizziness or lightheadedness, not really related to standing and possibly related to the lytes that she stares at on occasion  Blood pressure medications:  · Torsemide 10 mg every other day in the morning  · Carvedilol 37 5 mg the morning, and 12 5 mg the evening  · Amlodipine 5 mg daily in the morning    ROS:  See HPI, otherwise review of systems as completely reviewed with the patient are negative    Past Medical History:   Diagnosis Date    [facility] 65/3464    diastolic     CO2 retention  DVT (deep vein thrombosis) in pregnancy     left LE in 2/2018 and 50 years ago    Hyperlipidemia     Hypertension     [facility] 02/06/2018    Renal disorder     ckd    [facility]roke [facility]) 2016    left hand weakness,paresthesia  Past Surgical History:   Procedure Laterality Date    ABDOMINAL SURGERY      ruputured bowel  had colostomy and ileostomy,reversed later on   BACK SURGERY      rods in back  done 30 years ago   CARPAL TUNNEL RELEASE Bilateral     CYST REMOVAL      back    HERNIA REPAIR      REPLACEMENT TOTAL KNEE BILATERAL       Family History   Problem Relation Age of Onset    COPD Brother       reports that she has never smoked  She has never used smokeless tobacco  She reports that she drinks alcohol  She reports that she does not use drugs  I COMPLETELY REVIEWED THE PAST MEDICAL HISTORY/PAST SURGICAL HISTORY/SOCIAL HISTORY/FAMILY HISTORY/AND MEDICATIONS  AND UPDATED ALL    Objective:     Vitals:   Vitals:    02/24/20 1231   BP: 132/63   Pulse: 63   BP SITTING ON RIGHT:  130/62 WITH A HEART RATE OF 56 AND REGULAR  BP STANDING ON RIGHT:  108/64 WITH A HEART RATE OF 60 AND REGULAR    Weight (last 2 days)     Date/Time   Weight    02/24/20 1231   70 9 (156 4)            Wt Readings from Last 3 Encounters:   02/24/20 70 9 kg (156 lb 6 4 oz)   02/12/20 71 2 kg (157 lb)   09/30/19 71 2 kg (157 lb)       Body mass index is 32 69 kg/m²      Physical Exam: General:  No acute distress with potable oxygen  Skin:  No acute rash  Eyes:  No scleral icterus, noninjected, no discharge from eyes  ENT:  Moist mucous membranes  Neck:  Supple, no jugular venous distention, trachea is midline, no lymphadenopathy and no thyromegaly  Back   No CVAT  Chest:  Clear to auscultation and percussion, good respiratory effort  CVS:  Regular rate and rhythm without a rub, or gallops; grade 2/6 systolic ejection type murmur heard throughout the precordium  Abdomen:  Soft and nontender with normal bowel sounds  Extremities:  No cyanosis and no edema, moderate  arthritic changes of the hands, normal range of motion  Neuro:  Grossly intact  Psych:  Alert, oriented x3 and appropriate      Medications:    Current Outpatient Medications:     acetaminophen (TYLENOL) 325 mg tablet, Take 650 mg by mouth every 6 (six) hours as needed for mild pain, Disp: , Rfl:     amLODIPine (NORVASC) 5 mg tablet, TAKE 1 TABLET (5 MG TOTAL) BY MOUTH DAILY, Disp: 90 tablet, Rfl: 3    BREO ELLIPTA 200-25 MCG/INH inhaler, INHALE 1 PUFF DAILY AS DIRECTED, Disp: 60 each, Rfl: 6    calcium-vitamin D (OSCAL 500 + D) 500 mg-200 units per tablet, Take 1 tablet by mouth 2 (two) times a day , Disp: , Rfl:     Carbonyl Iron 45 MG TABS, Take by mouth 1 tab every other day, Disp: , Rfl:     cholecalciferol (VITAMIN D3) 1,000 units tablet, Take 2,000 Units by mouth daily, Disp: , Rfl:     clopidogrel (PLAVIX) 75 mg tablet, Take 75 mg by mouth daily, Disp: , Rfl:     co-enzyme Q-10 50 MG capsule, Take 200 mg by mouth daily  , Disp: , Rfl:     docusate sodium (COLACE) 100 mg capsule, Take 100 mg by mouth 2 (two) times a day, Disp: , Rfl:     ELIQUIS 5 MG, TAKE ONE TABLET BY MOUTH TWICE A DAY , Disp: 60 tablet, Rfl: 5    fluticasone-vilanterol (BREO ELLIPTA) 100-25 mcg/inh inhaler, Inhale 1 puff daily for 14 days Rinse mouth after use , Disp: 1 Inhaler, Rfl: 0    Iron-Vitamin C (IRON 100/C) 100-250 MG TABS, Take 1 tablet by mouth every other day , Disp: , Rfl:     Multiple Vitamins-Minerals (OCUVITE PO), Take 1 tablet by mouth daily  , Disp: , Rfl:     pantoprazole (PROTONIX) 40 mg tablet, Take 40 mg by mouth daily, Disp: , Rfl:     torsemide (DEMADEX) 10 mg tablet, Take 10 mg by mouth daily , Disp: , Rfl:     WELCHOL 625 MG tablet, TAKE 3 TABLETS TWICE A DAILY WITH MEALS , Disp: 540 tablet, Rfl: 3    ezetimibe (ZETIA) 10 mg tablet, Take 1 tablet (10 mg total) by mouth daily, Disp: 30 tablet, Rfl: 5    LIDOCAINE EX, Apply topically as needed 10%, Disp: , Rfl: 4    Nutritional Supplements (ENSURE ACTIVE) LIQD, Take by mouth daily, Disp: , Rfl:     ondansetron (ZOFRAN) 4 mg tablet, Take 4 mg by mouth daily , Disp: , Rfl: 2    Lab, Imaging and other studies: I have personally reviewed pertinent labs  Laboratory Results:  Results for orders placed or performed in visit on 01/20/20   PTH, Intact and Calcium   Result Value Ref Range    PTH, Intact 47 14 - 64 pg/mL    SL AMB CALCIUM 8 7 8 6 - 10 4 mg/dL             Invalid input(s): ALBUMIN      Radiology review:   chest X-ray    Ultrasound      Portions of the record may have been created with voice recognition software  Occasional wrong word or "sound a like" substitutions may have occurred due to the inherent limitations of voice recognition software  Read the chart carefully and recognize, using context, where substitutions have occurred

## 2020-02-24 NOTE — PATIENT INSTRUCTIONS
1  Medication changes today:  · Please begin Zetia 10 mg daily in the morning for your cholesterol  Please watch for body aches and joint pains and call if you developed any  · Please investigate as to whether not we can get Bystolic or metoprolol XL or Toprol XL in place of carvedilol  For now continue carvedilol until notified otherwise    2  Please go for nonfasting lab work in 6 weeks    3  Follow-up in 4 months:  -please bring in 1 week a blood pressure readings morning evening, sitting and standing as follows:  · Take the morning readings before any medications  · Take the evening readings before supper and before taking any medications at that time  · When taking standing readings, keep your arm supported at heart level and not dangling  · Make sure you are sitting with your back supported in feet on the ground on crossed  · Make sure you have not taken any coffee or caffeine products or exercised or smoke cigarettes at least 30 minutes before taking your blood pressure  -PLEASE BRING IN YOUR BLOOD PRESSURE MACHINE FOR CORRELATION WITH THE OFFICE MACHINE AT THE NEXT VISIT  -please go for fasting lab 1-2 weeks before your appointment    4  General instructions:   AVOID SALT BUT NOT ADDING AN READING LABELS TO MAKE SURE THERE IS LOW-SALT IN THE FOOD THAT YOU ARE EATING     Avoid nonsteroidal anti-inflammatory drugs such as Naprosyn, ibuprofen, Aleve, Advil, Celebrex, etc   You can use Tylenol as needed if you do not have any liver condition to be concerned about     Avoid medications such as Sudafed or decongestants and antihistamines that contained the D component which is the decongestant  You can take antihistamines without the decongestant or D component   Try to stay as active as possible     Please do not drink more than 2 glasses of alcohol/wine on a daily basis as this may contribute to your high blood pressure

## 2020-03-04 DIAGNOSIS — I12.9 HYPERTENSIVE CHRONIC KIDNEY DISEASE WITH STAGE 1 THROUGH STAGE 4 CHRONIC KIDNEY DISEASE, OR UNSPECIFIED CHRONIC KIDNEY DISEASE: Primary | ICD-10-CM

## 2020-03-04 RX ORDER — NEBIVOLOL 10 MG/1
10 TABLET ORAL DAILY
Qty: 30 TABLET | Refills: 5 | Status: SHIPPED | OUTPATIENT
Start: 2020-03-04 | End: 2020-08-05

## 2020-03-04 NOTE — PROGRESS NOTES
She can stop taking her carvedilol  But start taking Bystolic 10 mg daily in the morning  She needs to make sure to take the Bystolic as soon as she stops taking the carvedilol to avoid rebound phenomenon of her blood pressure

## 2020-03-05 NOTE — TELEPHONE ENCOUNTER
Patients home aid is aware that carveidolol should be stopped immediately after starting Bystolic  I spoke to the pharmacy both Zetia and Bystolic have been delivered to the patient

## 2020-03-14 DIAGNOSIS — E78.5 DYSLIPIDEMIA: Chronic | ICD-10-CM

## 2020-03-14 DIAGNOSIS — D63.1 ANEMIA OF CHRONIC RENAL FAILURE, STAGE 3 (MODERATE) (HCC): ICD-10-CM

## 2020-03-14 DIAGNOSIS — I95.1 ORTHOSTATIC HYPOTENSION: ICD-10-CM

## 2020-03-14 DIAGNOSIS — N18.30 ANEMIA OF CHRONIC RENAL FAILURE, STAGE 3 (MODERATE) (HCC): ICD-10-CM

## 2020-03-14 DIAGNOSIS — E55.9 VITAMIN D DEFICIENCY: Chronic | ICD-10-CM

## 2020-03-14 DIAGNOSIS — I12.9 HYPERTENSIVE CHRONIC KIDNEY DISEASE WITH STAGE 1 THROUGH STAGE 4 CHRONIC KIDNEY DISEASE, OR UNSPECIFIED CHRONIC KIDNEY DISEASE: ICD-10-CM

## 2020-03-14 DIAGNOSIS — N18.30 CHRONIC KIDNEY DISEASE, STAGE III (MODERATE) (HCC): Chronic | ICD-10-CM

## 2020-03-14 DIAGNOSIS — N17.9 AKI (ACUTE KIDNEY INJURY) (HCC): ICD-10-CM

## 2020-03-14 RX ORDER — AMLODIPINE BESYLATE 5 MG/1
5 TABLET ORAL DAILY
Qty: 90 TABLET | Refills: 2 | Status: SHIPPED | OUTPATIENT
Start: 2020-03-14

## 2020-03-14 RX ORDER — AMLODIPINE BESYLATE 5 MG/1
5 TABLET ORAL DAILY
Qty: 90 TABLET | Refills: 0 | Status: CANCELLED | OUTPATIENT
Start: 2020-03-14

## 2020-03-16 ENCOUNTER — HOSPITAL ENCOUNTER (OUTPATIENT)
Facility: HOSPITAL | Age: 85
Setting detail: OBSERVATION
End: 2020-03-16
Attending: EMERGENCY MEDICINE | Admitting: INTERNAL MEDICINE
Payer: MEDICARE

## 2020-03-16 VITALS
TEMPERATURE: 97.8 F | SYSTOLIC BLOOD PRESSURE: 160 MMHG | RESPIRATION RATE: 22 BRPM | HEIGHT: 58 IN | BODY MASS INDEX: 31.49 KG/M2 | DIASTOLIC BLOOD PRESSURE: 72 MMHG | HEART RATE: 56 BPM | WEIGHT: 150 LBS | OXYGEN SATURATION: 96 %

## 2020-03-16 DIAGNOSIS — K62.5 RECTAL BLEEDING: Primary | ICD-10-CM

## 2020-03-16 LAB
ABO GROUP BLD: NORMAL
ALBUMIN SERPL BCP-MCNC: 3.3 G/DL (ref 3.5–5)
ALP SERPL-CCNC: 68 U/L (ref 46–116)
ALT SERPL W P-5'-P-CCNC: 44 U/L (ref 12–78)
ANION GAP SERPL CALCULATED.3IONS-SCNC: 6 MMOL/L (ref 4–13)
APTT PPP: 23 SECONDS (ref 23–37)
AST SERPL W P-5'-P-CCNC: 31 U/L (ref 5–45)
BASOPHILS # BLD AUTO: 0.01 THOUSANDS/ΜL (ref 0–0.1)
BASOPHILS NFR BLD AUTO: 0 % (ref 0–1)
BILIRUB SERPL-MCNC: 0.4 MG/DL (ref 0.2–1)
BLD GP AB SCN SERPL QL: NEGATIVE
BUN SERPL-MCNC: 52 MG/DL (ref 5–25)
CALCIUM SERPL-MCNC: 9 MG/DL (ref 8.3–10.1)
CHLORIDE SERPL-SCNC: 105 MMOL/L (ref 100–108)
CO2 SERPL-SCNC: 31 MMOL/L (ref 21–32)
CREAT SERPL-MCNC: 1.76 MG/DL (ref 0.6–1.3)
EOSINOPHIL # BLD AUTO: 0.08 THOUSAND/ΜL (ref 0–0.61)
EOSINOPHIL NFR BLD AUTO: 2 % (ref 0–6)
ERYTHROCYTE [DISTWIDTH] IN BLOOD BY AUTOMATED COUNT: 12 % (ref 11.6–15.1)
GFR SERPL CREATININE-BSD FRML MDRD: 25 ML/MIN/1.73SQ M
GLUCOSE SERPL-MCNC: 104 MG/DL (ref 65–140)
HCT VFR BLD AUTO: 35.8 % (ref 34.8–46.1)
HCT VFR BLD AUTO: 37.8 % (ref 34.8–46.1)
HGB BLD-MCNC: 10.7 G/DL (ref 11.5–15.4)
HGB BLD-MCNC: 11.3 G/DL (ref 11.5–15.4)
IMM GRANULOCYTES # BLD AUTO: 0.02 THOUSAND/UL (ref 0–0.2)
IMM GRANULOCYTES NFR BLD AUTO: 0 % (ref 0–2)
INR PPP: 1.03 (ref 0.91–1.09)
LYMPHOCYTES # BLD AUTO: 0.92 THOUSANDS/ΜL (ref 0.6–4.47)
LYMPHOCYTES NFR BLD AUTO: 18 % (ref 14–44)
MCH RBC QN AUTO: 30.3 PG (ref 26.8–34.3)
MCHC RBC AUTO-ENTMCNC: 29.9 G/DL (ref 31.4–37.4)
MCV RBC AUTO: 101 FL (ref 82–98)
MONOCYTES # BLD AUTO: 0.4 THOUSAND/ΜL (ref 0.17–1.22)
MONOCYTES NFR BLD AUTO: 8 % (ref 4–12)
NEUTROPHILS # BLD AUTO: 3.65 THOUSANDS/ΜL (ref 1.85–7.62)
NEUTS SEG NFR BLD AUTO: 72 % (ref 43–75)
NRBC BLD AUTO-RTO: 0 /100 WBCS
PLATELET # BLD AUTO: 160 THOUSANDS/UL (ref 149–390)
PMV BLD AUTO: 11.2 FL (ref 8.9–12.7)
POTASSIUM SERPL-SCNC: 4.4 MMOL/L (ref 3.5–5.3)
PROT SERPL-MCNC: 7.7 G/DL (ref 6.4–8.2)
PROTHROMBIN TIME: 11.1 SECONDS (ref 9.8–12)
RBC # BLD AUTO: 3.73 MILLION/UL (ref 3.81–5.12)
RH BLD: POSITIVE
SODIUM SERPL-SCNC: 142 MMOL/L (ref 136–145)
SPECIMEN EXPIRATION DATE: NORMAL
WBC # BLD AUTO: 5.08 THOUSAND/UL (ref 4.31–10.16)

## 2020-03-16 PROCEDURE — 85730 THROMBOPLASTIN TIME PARTIAL: CPT | Performed by: EMERGENCY MEDICINE

## 2020-03-16 PROCEDURE — 85610 PROTHROMBIN TIME: CPT | Performed by: EMERGENCY MEDICINE

## 2020-03-16 PROCEDURE — 36415 COLL VENOUS BLD VENIPUNCTURE: CPT | Performed by: EMERGENCY MEDICINE

## 2020-03-16 PROCEDURE — 85025 COMPLETE CBC W/AUTO DIFF WBC: CPT | Performed by: EMERGENCY MEDICINE

## 2020-03-16 PROCEDURE — 99285 EMERGENCY DEPT VISIT HI MDM: CPT

## 2020-03-16 PROCEDURE — 85018 HEMOGLOBIN: CPT | Performed by: INTERNAL MEDICINE

## 2020-03-16 PROCEDURE — 99214 OFFICE O/P EST MOD 30 MIN: CPT | Performed by: INTERNAL MEDICINE

## 2020-03-16 PROCEDURE — 80053 COMPREHEN METABOLIC PANEL: CPT | Performed by: EMERGENCY MEDICINE

## 2020-03-16 PROCEDURE — 86900 BLOOD TYPING SEROLOGIC ABO: CPT | Performed by: EMERGENCY MEDICINE

## 2020-03-16 PROCEDURE — 99236 HOSP IP/OBS SAME DATE HI 85: CPT | Performed by: INTERNAL MEDICINE

## 2020-03-16 PROCEDURE — 86850 RBC ANTIBODY SCREEN: CPT | Performed by: EMERGENCY MEDICINE

## 2020-03-16 PROCEDURE — 86901 BLOOD TYPING SEROLOGIC RH(D): CPT | Performed by: EMERGENCY MEDICINE

## 2020-03-16 PROCEDURE — 94760 N-INVAS EAR/PLS OXIMETRY 1: CPT

## 2020-03-16 PROCEDURE — 85014 HEMATOCRIT: CPT | Performed by: INTERNAL MEDICINE

## 2020-03-16 PROCEDURE — 99281 EMR DPT VST MAYX REQ PHY/QHP: CPT | Performed by: EMERGENCY MEDICINE

## 2020-03-16 RX ORDER — PANTOPRAZOLE SODIUM 40 MG/1
40 TABLET, DELAYED RELEASE ORAL DAILY
Status: DISCONTINUED | OUTPATIENT
Start: 2020-03-16 | End: 2020-03-16 | Stop reason: HOSPADM

## 2020-03-16 RX ORDER — MELATONIN
2000 DAILY
Status: DISCONTINUED | OUTPATIENT
Start: 2020-03-16 | End: 2020-03-16 | Stop reason: HOSPADM

## 2020-03-16 RX ORDER — COLESEVELAM 180 1/1
1875 TABLET ORAL 2 TIMES DAILY WITH MEALS
Status: DISCONTINUED | OUTPATIENT
Start: 2020-03-16 | End: 2020-03-16 | Stop reason: HOSPADM

## 2020-03-16 RX ORDER — NEBIVOLOL 10 MG/1
10 TABLET ORAL DAILY
Status: DISCONTINUED | OUTPATIENT
Start: 2020-03-16 | End: 2020-03-16

## 2020-03-16 RX ORDER — DOCUSATE SODIUM 100 MG/1
100 CAPSULE, LIQUID FILLED ORAL 2 TIMES DAILY
Status: DISCONTINUED | OUTPATIENT
Start: 2020-03-16 | End: 2020-03-16 | Stop reason: HOSPADM

## 2020-03-16 RX ORDER — EZETIMIBE 10 MG/1
10 TABLET ORAL DAILY
Status: DISCONTINUED | OUTPATIENT
Start: 2020-03-16 | End: 2020-03-16 | Stop reason: HOSPADM

## 2020-03-16 RX ORDER — FERROUS SULFATE 325(65) MG
325 TABLET ORAL EVERY OTHER DAY
Status: DISCONTINUED | OUTPATIENT
Start: 2020-03-16 | End: 2020-03-16 | Stop reason: HOSPADM

## 2020-03-16 RX ORDER — AMLODIPINE BESYLATE 5 MG/1
5 TABLET ORAL ONCE
Status: COMPLETED | OUTPATIENT
Start: 2020-03-16 | End: 2020-03-16

## 2020-03-16 RX ORDER — MULTIVIT WITH MINERALS/LUTEIN
250 TABLET ORAL EVERY OTHER DAY
Status: DISCONTINUED | OUTPATIENT
Start: 2020-03-16 | End: 2020-03-16 | Stop reason: HOSPADM

## 2020-03-16 RX ORDER — AMLODIPINE BESYLATE 5 MG/1
5 TABLET ORAL DAILY
Status: DISCONTINUED | OUTPATIENT
Start: 2020-03-17 | End: 2020-03-16 | Stop reason: HOSPADM

## 2020-03-16 RX ORDER — FLUTICASONE FUROATE AND VILANTEROL 200; 25 UG/1; UG/1
1 POWDER RESPIRATORY (INHALATION) DAILY
Status: DISCONTINUED | OUTPATIENT
Start: 2020-03-16 | End: 2020-03-16 | Stop reason: HOSPADM

## 2020-03-16 RX ORDER — ACETAMINOPHEN 325 MG/1
650 TABLET ORAL EVERY 6 HOURS PRN
Status: DISCONTINUED | OUTPATIENT
Start: 2020-03-16 | End: 2020-03-16 | Stop reason: HOSPADM

## 2020-03-16 RX ORDER — NEBIVOLOL 10 MG/1
10 TABLET ORAL DAILY
Status: DISCONTINUED | OUTPATIENT
Start: 2020-03-17 | End: 2020-03-16 | Stop reason: HOSPADM

## 2020-03-16 RX ORDER — IRON,CARBONYL/ASCORBIC ACID 100-250 MG
1 TABLET ORAL EVERY OTHER DAY
Status: DISCONTINUED | OUTPATIENT
Start: 2020-03-16 | End: 2020-03-16 | Stop reason: SDUPTHER

## 2020-03-16 RX ORDER — B-COMPLEX WITH VITAMIN C
1 TABLET ORAL 2 TIMES DAILY
Status: DISCONTINUED | OUTPATIENT
Start: 2020-03-16 | End: 2020-03-16 | Stop reason: HOSPADM

## 2020-03-16 RX ADMIN — PANTOPRAZOLE SODIUM 40 MG: 40 TABLET, DELAYED RELEASE ORAL at 15:55

## 2020-03-16 RX ADMIN — DOCUSATE SODIUM 100 MG: 100 CAPSULE, LIQUID FILLED ORAL at 17:20

## 2020-03-16 RX ADMIN — EZETIMIBE 10 MG: 10 TABLET ORAL at 15:57

## 2020-03-16 RX ADMIN — VITAMIN D, TAB 1000IU (100/BT) 2000 UNITS: 25 TAB at 15:55

## 2020-03-16 RX ADMIN — NEBIVOLOL HYDROCHLORIDE 10 MG: 10 TABLET ORAL at 12:18

## 2020-03-16 RX ADMIN — OYSTER SHELL CALCIUM WITH VITAMIN D 1 TABLET: 500; 200 TABLET, FILM COATED ORAL at 17:21

## 2020-03-16 RX ADMIN — FERROUS SULFATE TAB 325 MG (65 MG ELEMENTAL FE) 325 MG: 325 (65 FE) TAB at 17:21

## 2020-03-16 RX ADMIN — Medication 1 TABLET: at 15:55

## 2020-03-16 RX ADMIN — ASCORBIC ACID TAB 250 MG 250 MG: 250 TAB at 17:20

## 2020-03-16 RX ADMIN — AMLODIPINE BESYLATE 5 MG: 5 TABLET ORAL at 12:18

## 2020-03-16 NOTE — CONSULTS
Consultation - [facility]  Mishel Matthew 80 y o  female MRN: [id number]  Unit/Bed#: [address] [place], [place] [id number] Encounter: [id number]        Consults    Reason for Consult / Principal Problem: rectal bleeding and weight loss    ASSESSMENT and PLAN:    Principal Problem:    Rectal bleeding    #1  Rectal bleeding and weight loss: patient with one episode of normal BM with blood with wiping, none in toilet bowel  hgb stable  No further bleeding  On Eliquis and plavix  Last colonoscopy 10 years ago lead to perforated bowel, significant hospitalization and two surgeries  Reports 20 pounds weight loss in last year  Likely suspect hemorrhoidal bleeding but with weight loss also rule out malignancy  -patient is stable at this time with no further bleeding  Due to age and her hx will not plan for colonoscopy at this time  Patient prefers to see her surgeon Dr Adriane Almendarez in [place] for any colonoscopy  She is refusing any inpatient procedures here at [facility] at this time    -would recommend an EGD and colonoscopy as outpatient due to weight loss and rectal bleeding if patient is agreeable   -monitor hgb and for further bleeding  -diet as tolerated    -------------------------------------------------------------------------------------------------------------------    HPI:  This is a 80-year-old female with a history of arthritis, CHF, DVT, pulmonary embolism, stroke on Eliquis and Plavix, and hypertension who presented to the emergency room due to rectal bleeding  The patient reports that she woke up this morning and had a regular bowel movement  After this she wiped her bottom and had a large amount of blood on the toilet paper  Due to her being on Eliquis and Plavix, she presented to the hospital for further evaluation  Unfortunately due to this she missed her son in laws  today  She denies any abdominal pain  Denies any recent diarrhea or constipation    Denies any nausea, vomiting, heartburn, trouble swallowing  She does report that she has lost about 20 lb in the last year  Unfortunately the patient had a colonoscopy performed about 10 years ago and at that time she had a perforated bowel and was transferred over to [facility]  She reports that she had 2 surgeries, was on a ventilator for 14 days, and was in critical condition but made it through  She reports that Dr Roxana Mcmahon did her surgery and she will only allow for him to do any sort of colonoscopies on her moving forward if necessary  She did not take her medications this morning  REVIEW OF SYSTEMS:    CONSTITUTIONAL: Denies any fever, chills, or rigors  Good appetite, and recent weight loss  HEENT: No earache or tinnitus  Denies hearing loss or visual disturbances  CARDIOVASCULAR: No chest pain or palpitations  RESPIRATORY: Denies any cough, hemoptysis, shortness of breath or dyspnea on exertion  GASTROINTESTINAL: As noted in the History of Present Illness  GENITOURINARY: No problems with urination  Denies any hematuria or dysuria  NEUROLOGIC: No dizziness or vertigo, denies headaches  MUSCULOSKELETAL: Denies any muscle or joint pain  SKIN: Denies skin rashes or itching  ENDOCRINE: Denies excessive thirst  Denies intolerance to heat or cold  PSYCHOSOCIAL: Denies depression or anxiety  Denies any recent memory loss  Historical Information   Past Medical History:   Diagnosis Date    Arthritis     [facility] 12/5165    diastolic     CO2 retention     DVT (deep vein thrombosis) in pregnancy     left LE in 2/2018 and 50 years ago    Hyperlipidemia     Hypertension     [facility] 02/06/2018    Renal disorder     ckd    Stroke [facility]) 2016    left hand weakness,paresthesia  Past Surgical History:   Procedure Laterality Date    ABDOMINAL SURGERY      ruputured bowel  had colostomy and ileostomy,reversed later on   BACK SURGERY      rods in back  done 30 years ago   CARPAL TUNNEL RELEASE Bilateral     CYST REMOVAL      back    HERNIA REPAIR      REPLACEMENT TOTAL KNEE BILATERAL       Social History   Social History     Substance and Sexual Activity   Alcohol Use Never    Frequency: Never    Comment: one drink at weddings      Social History     Substance and Sexual Activity   Drug Use Never     Social History     Tobacco Use   Smoking Status Never Smoker   Smokeless Tobacco Never Used     Family History   Problem Relation Age of Onset    COPD Brother        Meds/Allergies     Medications Prior to Admission   Medication    amLODIPine (NORVASC) 5 mg tablet    BREO ELLIPTA 200-25 MCG/INH inhaler    calcium-vitamin D (OSCAL 500 + D) 500 mg-200 units per tablet    Carbonyl Iron 45 MG TABS    cholecalciferol (VITAMIN D3) 1,000 units tablet    co-enzyme Q-10 50 MG capsule    docusate sodium (COLACE) 100 mg capsule    ELIQUIS 5 MG    ezetimibe (ZETIA) 10 mg tablet    Iron-Vitamin C (IRON 100/C) 100-250 MG TABS    LIDOCAINE EX    Multiple Vitamins-Minerals (OCUVITE PO)    nebivolol (BYSTOLIC) 10 mg tablet    Nutritional Supplements (ENSURE ACTIVE) LIQD    ondansetron (ZOFRAN) 4 mg tablet    pantoprazole (PROTONIX) 40 mg tablet    torsemide (DEMADEX) 10 mg tablet    WELCHOL 625 MG tablet    acetaminophen (TYLENOL) 325 mg tablet    clopidogrel (PLAVIX) 75 mg tablet    fluticasone-vilanterol (BREO ELLIPTA) 100-25 mcg/inh inhaler     Current Facility-Administered Medications   Medication Dose Route Frequency    acetaminophen (TYLENOL) tablet 650 mg  650 mg Oral Q6H PRN    nebivolol (BYSTOLIC) tablet 10 mg  10 mg Oral Daily       Allergies   Allergen Reactions    Statins Myalgia    Lyrica [Pregabalin] Other (See Comments)     Client says it made her "nutty"           Objective     Blood pressure 162/68, pulse 58, temperature 98 9 °F (37 2 °C), temperature source Oral, resp   rate 17, height 4' 10" (1 473 m), weight 68 kg (150 lb), SpO2 100 %, not currently breastfeeding  No intake or output data in the 24 hours ending 03/16/20 1410      PHYSICAL EXAM:      General Appearance:   Alert, cooperative, no distress, appears stated age    HEENT:   Normocephalic, atraumatic, anicteric, no oropharyngeal thrush present      Neck:  Supple, symmetrical, trachea midline, no adenopathy;    thyroid: no enlargement/tenderness/nodules; no carotid  bruit or JVD    Lungs:   Clear to auscultation bilaterally; no rales, rhonchi or wheezing; respirations unlabored    Heart[de-identified]   S1 and S2 normal; regular rate and rhythm; no murmur, rub, or gallop  Abdomen:   Soft, non-tender, non-distended; normal bowel sounds; no masses, no organomegaly    Genitalia:   Deferred    Rectal:   Deferred    Extremities:  No cyanosis, clubbing or edema    Pulses:  2+ and symmetric all extremities    Skin:  Skin color, texture, turgor normal, no rashes or lesions    Lymph nodes:  No palpable cervical, axillary or inguinal lymphadenopathy        Lab Results:   Results from last 7 days   Lab Units 03/16/20  0958   WBC Thousand/uL 5 08   HEMOGLOBIN g/dL 11 3*   HEMATOCRIT % 37 8   PLATELETS Thousands/uL 160   NEUTROS PCT % 72   LYMPHS PCT % 18   MONOS PCT % 8   EOS PCT % 2     Results from last 7 days   Lab Units 03/16/20  0958   POTASSIUM mmol/L 4 4   CHLORIDE mmol/L 105   CO2 mmol/L 31   BUN mg/dL 52*   CREATININE mg/dL 1 76*   CALCIUM mg/dL 9 0   ALK PHOS U/L 68   ALT U/L 44   AST U/L 31     Results from last 7 days   Lab Units 03/16/20  0958   INR  1 03           Imaging Studies: I have personally reviewed pertinent imaging studies  No results found  Patient was seen and examined by Dr Talmadge Castleman  All faust medical decisions were made by Dr Talmadge Castleman  Thank you for allowing us to participate in the care of this present patient  We will follow-up with you closely

## 2020-03-16 NOTE — ED PROVIDER NOTES
History  Chief Complaint   Patient presents with    Rectal Bleeding     Patient had BM this morning an noticed "bright red blood" on her toilert paper, no blood in the toilet as per patient     HPI  80-year-old female that presents today with rectal bleeding  Patient noticed some bright red blood on her toilet paper this morning  States no history of GI bleeds in the past   States whenever she wiped she noticed bright red blood  Prior to Admission Medications   Prescriptions Last Dose Informant Patient Reported? Taking? BREO ELLIPTA 200-25 MCG/INH inhaler  Child No Yes   Sig: INHALE 1 PUFF DAILY AS DIRECTED   Carbonyl Iron 45 MG TABS  Child Yes Yes   Sig: Take by mouth 1 tab every other day   ELIQUIS 5 MG  Child No Yes   Sig: TAKE ONE TABLET BY MOUTH TWICE A DAY  Iron-Vitamin C (IRON 100/C) 100-250 MG TABS  Child Yes Yes   Sig: Take 1 tablet by mouth every other day    LIDOCAINE EX Not Taking at Unknown time Child Yes No   Sig: Apply topically as needed 10%   Multiple Vitamins-Minerals (OCUVITE PO)  Child Yes Yes   Sig: Take 1 tablet by mouth daily     Nutritional Supplements (ENSURE ACTIVE) LIQD Unknown at Unknown time Child Yes No   Sig: Take by mouth daily   WELCHOL 625 MG tablet  Child No Yes   Sig: TAKE 3 TABLETS TWICE A DAILY WITH MEALS    acetaminophen (TYLENOL) 325 mg tablet  Child Yes Yes   Sig: Take 650 mg by mouth every 6 (six) hours as needed for mild pain   amLODIPine (NORVASC) 5 mg tablet   No Yes   Sig: Take 1 tablet (5 mg total) by mouth daily   calcium-vitamin D (OSCAL 500 + D) 500 mg-200 units per tablet  Child Yes Yes   Sig: Take 1 tablet by mouth 2 (two) times a day     cholecalciferol (VITAMIN D3) 1,000 units tablet  Child Yes Yes   Sig: Take 2,000 Units by mouth daily   clopidogrel (PLAVIX) 75 mg tablet Not Taking at Unknown time Child Yes No   Sig: Take 75 mg by mouth daily   co-enzyme Q-10 50 MG capsule  Child Yes Yes   Sig: Take 200 mg by mouth daily     docusate sodium (COLACE) 100 mg capsule  Child Yes Yes   Sig: Take 100 mg by mouth 2 (two) times a day   ezetimibe (ZETIA) 10 mg tablet   No Yes   Sig: Take 1 tablet (10 mg total) by mouth daily   fluticasone-vilanterol (BREO ELLIPTA) 100-25 mcg/inh inhaler   No No   Sig: Inhale 1 puff daily for 14 days Rinse mouth after use    nebivolol (BYSTOLIC) 10 mg tablet   No Yes   Sig: Take 1 tablet (10 mg total) by mouth daily   ondansetron (ZOFRAN) 4 mg tablet Not Taking at Unknown time Child Yes No   Sig: Take 4 mg by mouth daily    pantoprazole (PROTONIX) 40 mg tablet  Child Yes Yes   Sig: Take 40 mg by mouth daily   torsemide (DEMADEX) 10 mg tablet  Child Yes Yes   Sig: Take 10 mg by mouth daily       Facility-Administered Medications: None       Past Medical History:   Diagnosis Date    CHF (congestive heart failure) ([facility]) 47/2390    diastolic     CO2 retention     DVT (deep vein thrombosis) in pregnancy     left LE in 2/2018 and 50 years ago    Hyperlipidemia     Hypertension     Pulmonary embolism ([facility] [address] ) 02/06/2018    Renal disorder     ckd    Stroke ([facility] [address] ) 2016    left hand weakness,paresthesia  Past Surgical History:   Procedure Laterality Date    ABDOMINAL SURGERY      ruputured bowel  had colostomy and ileostomy,reversed later on   BACK SURGERY      rods in back  done 30 years ago   CARPAL TUNNEL RELEASE Bilateral     CYST REMOVAL      back    HERNIA REPAIR      REPLACEMENT TOTAL KNEE BILATERAL         Family History   Problem Relation Age of Onset    COPD Brother      I have reviewed and agree with the history as documented      E-Cigarette/Vaping     E-Cigarette/Vaping Substances     Social History     Tobacco Use    Smoking status: Never Smoker    Smokeless tobacco: Never Used   Substance Use Topics    Alcohol use: Never     Frequency: Never     Comment: one drink at [employer] Drug use: Never       Review of Systems    Physical Exam  Physical Exam    Vital Signs  ED Triage Vitals   Temperature Pulse Respirations Blood Pressure SpO2   03/16/20 0936 03/16/20 0938 03/16/20 0938 03/16/20 0938 03/16/20 0936   (!) 97 1 °F (36 2 °C) 60 18 (!) 211/90 98 %      Temp Source Heart Rate Source Patient Position - Orthostatic VS BP Location FiO2 (%)   03/16/20 0936 03/16/20 0938 03/16/20 0938 03/16/20 0938 --   Tympanic Monitor Lying Right arm       Pain Score       --                  Vitals:    03/16/20 0938 03/16/20 0954 03/16/20 1028   BP: (!) 211/90 (!) 206/86 (!) 183/73   Pulse: 60 62 60   Patient Position - Orthostatic VS: Lying Lying Lying         Visual Acuity      ED Medications  Medications - No data to display    Diagnostic Studies  Results Reviewed     Procedure Component Value Units Date/Time    Comprehensive metabolic panel [[id number]]  (Abnormal) Collected:  03/16/20 0958    Lab Status:  Final result Specimen:  Blood from Arm, Right Updated:  03/16/20 1026     Sodium 142 mmol/L      Potassium 4 4 mmol/L      Chloride 105 mmol/L      CO2 31 mmol/L      ANION GAP 6 mmol/L      BUN 52 mg/dL      Creatinine 1 76 mg/dL      Glucose 104 mg/dL      Calcium 9 0 mg/dL      AST 31 U/L      ALT 44 U/L      Alkaline Phosphatase 68 U/L      Total Protein 7 7 g/dL      Albumin 3 3 g/dL      Total Bilirubin 0 40 mg/dL      eGFR 25 ml/min/1 73sq m     Narrative:       [place]udson guidelines for Chronic Kidney Disease (CKD):     Stage 1 with normal or high GFR (GFR > 90 mL/min/1 73 square meters)    Stage 2 Mild CKD (GFR = 60-89 mL/min/1 73 square meters)    Stage 3A Moderate CKD (GFR = 45-59 mL/min/1 73 square meters)    Stage 3B Moderate CKD (GFR = 30-44 mL/min/1 73 square meters)    Stage 4 Severe CKD (GFR = 15-29 mL/min/1 73 square meters)    Stage 5 End Stage CKD (GFR <15 mL/min/1 73 square meters)  Note: GFR calculation is accurate only with a steady state creatinine    Protime-INR [[id number]]  (Normal) Collected:  03/16/20 0958    Lab Status:  Final result Specimen:  Blood from Arm, Right Updated:  03/16/20 1021     Protime 11 1 seconds      INR 1 03    APTT [[id number]]  (Normal) Collected:  03/16/20 0958    Lab Status:  Final result Specimen:  Blood from Arm, Right Updated:  03/16/20 1021     PTT 23 seconds     CBC and differential [[id number]]  (Abnormal) Collected:  03/16/20 0958    Lab Status:  Final result Specimen:  Blood from Arm, Right Updated:  03/16/20 1006     WBC 5 08 Thousand/uL      RBC 3 73 Million/uL      Hemoglobin 11 3 g/dL      Hematocrit 37 8 %       fL      MCH 30 3 pg      MCHC 29 9 g/dL      RDW 12 0 %      MPV 11 2 fL      Platelets 388 Thousands/uL      nRBC 0 /100 WBCs      Neutrophils Relative 72 %      Immat GRANS % 0 %      Lymphocytes Relative 18 %      Monocytes Relative 8 %      Eosinophils Relative 2 %      Basophils Relative 0 %      Neutrophils Absolute 3 65 Thousands/µL      Immature Grans Absolute 0 02 Thousand/uL      Lymphocytes Absolute 0 92 Thousands/µL      Monocytes Absolute 0 40 Thousand/µL      Eosinophils Absolute 0 08 Thousand/µL      Basophils Absolute 0 01 Thousands/µL     UA (URINE) with reflex to Scope [[id number]]     Lab Status:  No result Specimen:  Urine                  No orders to display              Procedures  Procedures         ED Course  ED Course as of Mar 16 1102   Mon Mar 16, 2020   1032 Rectal : bright red blood per rectum                                     MDM      Disposition  Final diagnoses:   None     ED Disposition     None      Follow-up Information    None         Patient's Medications   Discharge Prescriptions    No medications on file     No discharge procedures on file      PDMP Review     None          ED Provider  Electronically Signed by           Cynthia Eli MD  03/16/20 7131

## 2020-03-16 NOTE — ASSESSMENT & PLAN NOTE
Wt Readings from Last 3 Encounters:   03/16/20 68 kg (150 lb)   02/24/20 70 9 kg (156 lb 6 4 oz)   02/12/20 71 2 kg (157 lb)     Appears euvolemic  Monitor daily weight  Hold torsemide for now

## 2020-03-16 NOTE — PLAN OF CARE
Problem: Potential for Falls  Goal: Patient will remain free of falls  Description  INTERVENTIONS:  - Assess patient frequently for physical needs  -  Identify cognitive and physical deficits and behaviors that affect risk of falls    -  Spencer fall precautions as indicated by assessment   - Educate patient/family on patient safety including physical limitations  - Instruct patient to call for assistance with activity based on assessment  - Modify environment to reduce risk of injury  - Consider OT/PT consult to assist with strengthening/mobility  Outcome: Progressing     Problem: SAFETY ADULT  Goal: Maintain or return to baseline ADL function  Description  INTERVENTIONS:  -  Assess patient's ability to carry out ADLs; assess patient's baseline for ADL function and identify physical deficits which impact ability to perform ADLs (bathing, care of mouth/teeth, toileting, grooming, dressing, etc )  - Assess/evaluate cause of self-care deficits   - Assess range of motion  - Assess patient's mobility; develop plan if impaired  - Assess patient's need for assistive devices and provide as appropriate  - Encourage maximum independence but intervene and supervise when necessary  - Involve family in performance of ADLs  - Assess for home care needs following discharge   - Consider OT consult to assist with ADL evaluation and planning for discharge  - Provide patient education as appropriate  Outcome: Progressing  Goal: Maintain or return mobility status to optimal level  Description  INTERVENTIONS:  - Assess patient's baseline mobility status (ambulation, transfers, stairs, etc )    - Identify cognitive and physical deficits and behaviors that affect mobility  - Identify mobility aids required to assist with transfers and/or ambulation (gait belt, sit-to-stand, lift, walker, cane, etc )  - Spencer fall precautions as indicated by assessment  - Record patient progress and toleration of activity level on Mobility SBAR; progress patient to next Phase/Stage  - Instruct patient to call for assistance with activity based on assessment       Outcome: Progressing     Problem: GASTROINTESTINAL - ADULT  Goal: Minimal or absence of nausea and/or vomiting  Description  INTERVENTIONS:  - Administer IV fluids if ordered to ensure adequate hydration  - Provide nonpharmacologic comfort measures as appropriate  - Advance diet as tolerated, if ordered  - Consider nutrition services referral to assist patient with adequate nutrition and appropriate food choices   Outcome: Progressing  Goal: Maintains or returns to baseline bowel function  Description  INTERVENTIONS:  - Assess bowel function  - Encourage oral fluids to ensure adequate hydration  - Administer IV fluids if ordered to ensure adequate hydration  - Administer ordered medications as needed  - Encourage mobilization and activity  - Consider nutritional services referral to assist patient with adequate nutrition and appropriate food choices  Outcome: Progressing  Goal: Maintains adequate nutritional intake  Description  INTERVENTIONS:  - Monitor percentage of each meal consumed  - Identify factors contributing to decreased intake, treat as appropriate  - Assist with meals as needed  - Monitor I&O, weight, and lab values if indicated  - Obtain nutrition services referral as needed  Outcome: Progressing     Problem: METABOLIC, FLUID AND ELECTROLYTES - ADULT  Goal: Electrolytes maintained within normal limits  Description  INTERVENTIONS:  - Monitor labs and assess patient for signs and symptoms of electrolyte imbalances  - Administer electrolyte replacement as ordered  - Monitor response to electrolyte replacements, including repeat lab results as appropriate  - Instruct patient on fluid and nutrition as appropriate  Outcome: Progressing  Goal: Fluid balance maintained  Description  INTERVENTIONS:  - Monitor labs   - Monitor I/O and WT  - Instruct patient on fluid and nutrition as appropriate  - Assess for signs & symptoms of volume excess or deficit  Outcome: Progressing  Goal: Glucose maintained within target range  Description  INTERVENTIONS:  - Monitor Blood Glucose as ordered  - Assess for signs and symptoms of hyperglycemia and hypoglycemia  - Administer ordered medications to maintain glucose within target range  - Assess nutritional intake and initiate nutrition service referral as needed  Outcome: Progressing     Problem: SKIN/TISSUE INTEGRITY - ADULT  Goal: Skin integrity remains intact  Description  INTERVENTIONS  - Identify patients at risk for skin breakdown  - Assess and monitor skin integrity  - Assess and monitor nutrition and hydration status  - Monitor labs (i e  albumin)  - Assess for incontinence   - Turn and reposition patient  - Assist with mobility/ambulation  - Relieve pressure over bony prominences  - Avoid friction and shearing  - Provide appropriate hygiene as needed including keeping skin clean and dry  - Evaluate need for skin moisturizer/barrier cream  - Collaborate with interdisciplinary team (i e  Nutrition, Rehabilitation, etc )   - Patient/family teaching  Outcome: Progressing     Problem: HEMATOLOGIC - ADULT  Goal: Maintains hematologic stability  Description  INTERVENTIONS  - Assess for signs and symptoms of bleeding or hemorrhage  - Monitor labs  - Administer supportive blood products/factors as ordered and appropriate  Outcome: Progressing

## 2020-03-16 NOTE — ASSESSMENT & PLAN NOTE
Uncontrolled on presentation as patient did not take her morning meds  Asymptomatic  Resumed amlodipine and nebivolol with improvement  Monitor

## 2020-03-16 NOTE — ED NOTES
Patients  daughter called     Stated if her mother needs to have a colonoscopy  To notify her first before doing any procedure     Smita Velásquez  03/16/20 1124

## 2020-03-16 NOTE — ASSESSMENT & PLAN NOTE
Presented with rectal bleeding which she describes as large quantity of bright red blood per rectum with bowel movement, unclear if clots were present  Denied any pain or diarrhea  Hemoglobin hemoglobin level 11 3 on presentation, bright red blood on rectal exam  No prior episode of bleeding but reports 20 point weight loss over last year  Last colonoscopy around 10 years ago which was complicated by perforated bowel with subsequent extensive hospitalization and multiple surgical intervention  Hemodynamically stable, abdominal exam is benign  2 episode of bowel movement with bright red blood during hospitalization  Subsequent hemoglobin 10 7 grams/deciliters  · Monitor H&H, type and screen  · Monitor bleeding and abdominal exam  · Hold apixaban  · Patient was seen by GI, input appreciated  Recommended EGD and colonoscopy  · Patient and family requested transfer to [facility] to her own gastroenterologist Dr Presley Mckee for further evaluation  Discussed with on-call Gastroenterologist Dr Wilfredo Limon at [facility], covering for Dr Presley Mckee who accepted when requested admission under medicine    Discussed on-call admitting physician, patient will be transferred to [facility]

## 2020-03-16 NOTE — DISCHARGE SUMMARY
Discharge Summary - [address] [facility]    Patient Information: Yara Medellin 80 y o  female MRN: [id number]  Unit/Bed#: [address] [place] [address] Encounter: [id number]    Discharging Physician / Practitioner: Winsome Morgan MD  PCP: Sukh Blanco DO  Admission Date: 3/16/2020  Discharge Date: 03/16/20    Reason for Admission: Rectal Bleeding (Patient had BM this morning an noticed "bright red blood" on her toilert paper, no blood in the toilet as per patient)      Discharge Diagnoses:     Principal Problem:    Rectal bleeding  Active Problems:    Essential hypertension    Chronic diastolic CHF (congestive heart failure) (HCC)    Chronic respiratory failure with hypoxia and hypercapnia (HCC)    [facility] ([facility] )    Chronic kidney disease, stage III (moderate) ([facility] )    Dyslipidemia    Deep vein thrombosis (DVT) of left lower extremity ([facility] )  Resolved Problems:    * No resolved hospital problems  *        * Rectal bleeding  Assessment & Plan  Presented with rectal bleeding which she describes as large quantity of bright red blood per rectum with bowel movement, unclear if clots were present  Denied any pain or diarrhea  Hemoglobin hemoglobin level 11 3 on presentation, bright red blood on rectal exam  No prior episode of bleeding but reports 20 point weight loss over last year  Last colonoscopy around 10 years ago which was complicated by perforated bowel with subsequent extensive hospitalization and multiple surgical intervention  Hemodynamically stable, abdominal exam is benign  2 episode of bowel movement with bright red blood during hospitalization  Subsequent hemoglobin 10 7 grams/deciliters  · Monitor H&H, type and screen  · Monitor bleeding and abdominal exam  · Hold apixaban  · Patient was seen by GI, input appreciated  Recommended EGD and colonoscopy  · Patient and family requested transfer to [facility] to her own gastroenterologist Dr Eulalia Santos for further evaluation    Discussed with on-call Gastroenterologist Dr Nusrat Ji at [facility], covering for Dr Nik Cosby who accepted when requested admission under medicine  Discussed on-call admitting physician, patient will be transferred to [facility]    Chronic respiratory failure with hypoxia and hypercapnia ([facility] [address] )  Assessment & Plan  Secondary to restrictive lung disease  Stable at baseline  On supplemental oxygen 2 L and trilogy at night  Continue Breo    Chronic diastolic CHF (congestive heart failure) (HCC)  Assessment & Plan  Wt Readings from Last 3 Encounters:   03/16/20 68 kg (150 lb)   02/24/20 70 9 kg (156 lb 6 4 oz)   02/12/20 71 2 kg (157 lb)     Appears euvolemic  Monitor daily weight  Hold torsemide for now          Essential hypertension  Assessment & Plan  Uncontrolled on presentation as patient did not take her morning meds  Asymptomatic  Resumed amlodipine and nebivolol with improvement  Monitor    Chronic kidney disease, stage III (moderate) (HCC)  Assessment & Plan  Baseline creatinine 1 5-1 9  Currently at baseline  Monitor    Cerebrovascular accident (CVA) [facility])  Assessment & Plan  Patient reports that she was previously on Plavix noted present  Continue statin  Holding apixaban    Deep vein thrombosis (DVT) of left lower extremity ([facility] [address] )  Assessment & Plan  History of DVT/PE  On apixaban  Holding at present    Dyslipidemia  Assessment & Plan  Continue home medication zetia and welchol      Consultations During Hospital Stay:  IP CONSULT TO GASTROENTEROLOGY    Procedures Performed:     · None    Significant Findings:     · Refer to hospital course and above listed diagnosis related plan for details    Imaging while in hospital:    No results found      Incidental Findings:   · None     Test Results Pending at Discharge (will require follow up):   · As per After Visit Summary     Outpatient Tests Requested:  · Not applicable    Complications:  Refer to hospital course and above listed diagnosis related plan, if any    [facility] MONZON [facility] Garbiel Cooper is a 80 y o  female patient with history of DVT/PE on anticoagulation, throat is, diastolic CHF, CVA, hypertension, dyslipidemia who originally presented to the hospital on 3/16/2020 due to rectal bleeding with started earlier today  Patient reported that she was in her usual state of health until today morning when she had a bowel movement and subsequently she noticed significant quantity of bright red blood upon wiping with the bowel movement  Patient denied any pain, diarrhea, constipation, nausea vomiting  She was unable to report whether they were clots present or not  Patient reported good appetite but also reported that she has lost about 20 lb in last year  Patient reported that she had colonoscopy about 10 years ago which was complicated by perforation and subsequently she had required extensive surgery and prolonged hospitalization in critical condition  Patient denied any similar episodes in the past     Please see above list of diagnoses and related plan for additional information  Condition at Discharge: stable     Discharge Day Visit / Exam:     Subjective:    Denies abdominal pain  Two more episode of bowel movement with rectal bleeding  Clinically remains stable  Denies dizziness    Discussed with patient and family regarding transfer to [address] [place] [address]: Blood Pressure: 146/68 (03/16/20 1550)  Pulse: 59 (03/16/20 1550)  Temperature: 98 6 °F (37 °C) (03/16/20 1550)  Temp Source: Oral (03/16/20 1550)  Respirations: 19 (03/16/20 1550)  Height: 4' 10" (147 3 cm) (03/16/20 5402)  Weight - Scale: 68 kg (150 lb) (03/16/20 0938)  SpO2: 99 % (03/16/20 1550)  Exam:   Physical Exam   Constitutional: She is oriented to person, place, and time  She appears well-developed  No distress  HENT:   Head: Normocephalic and atraumatic  Eyes: Pupils are equal, round, and reactive to light  Neck: Neck supple  Cardiovascular: Normal rate and regular rhythm  Murmur heard  Pulmonary/Chest: Effort normal and breath sounds normal  No respiratory distress  She has no wheezes  She has no rales  Diminished   Abdominal: Soft  Bowel sounds are normal  She exhibits no distension  There is no tenderness  There is no rebound and no guarding  Musculoskeletal: Normal range of motion  She exhibits edema (Chronic left leg)  Neurological: She is alert and oriented to person, place, and time  No cranial nerve deficit  Skin: Skin is warm and dry  No rash noted  Psychiatric: She has a normal mood and affect  Discharge instructions/Information to patient and family:(Discharge Medications and Follow up):   See after visit summary for information provided to patient and family  Provisions for Follow-Up Care:  See after visit summary for information related to follow-up care and any pertinent home health orders  Disposition: See After Visit Summary for discharge disposition information  Planned Readmission:  No     Discharge Statement:  I spent 45 minutes discharging the patient  This time was spent on the day of discharge  I had direct contact with the patient on the day of discharge  Greater than 50% of the total time was spent examining patient, answering all patient questions, arranging and discussing plan of care with patient as well as directly providing post-discharge instructions  Additional time then spent on discharge activities  Discussed with accepting physician and covering gastroenterologist regarding patient  Discharge Medications:  See after visit summary for reconciled discharge medications provided to patient and family  ** Please Note: "This note has been constructed using a voice recognition system  Therefore there may be syntax, spelling, and/or grammatical errors   Please call if you have any questions  "**

## 2020-03-16 NOTE — ASSESSMENT & PLAN NOTE
Secondary to restrictive lung disease  Stable at baseline  On supplemental oxygen 2 L and trilogy at night  Continue Breo

## 2020-03-16 NOTE — ED NOTES
Patient refused medication administration without food, Dr Larry Watson aware of same  Md chu with crackers to be given with PO administration        Jenifer Noriega RN  03/16/20 2339

## 2020-03-16 NOTE — EMTALA/ACUTE CARE TRANSFER
[address] [facility] [address]  Dept: [phone]      ACUTE CARE TRANSFER CONSENT    NAME Glen Awad                                         1928                              MRN [id number]    I have been informed of my rights regarding examination, treatment, and transfer   by Dr Ki Huston MD    Benefits: Continuity of care, Patient preference    Risks: Potential for delay in receiving treatment, Potential deterioration of medical condition, Loss of IV, Increased discomfort during transfer, Possible worsening of condition or death during transfer      Transfer Request:  I acknowledge that my medical condition has been evaluated and explained to me by the treating physician or other qualified medical person and/or my attending physician who has recommended and offered to me further medical examination and treatment  I understand the Hospital's obligation with respect to the treatment and stabilization of my medical condition  I nevertheless request to be transferred  I release the Hospital, the doctor, and any other persons caring for me from all responsibility or liability for any injury or ill effects that may result from my transfer and agree to accept all responsibility for the consequences of my choice to transfer, rather than receive stabilizing treatment at the Hospital  I understand that because the transfer is my request, my insurance may not provide reimbursement for the services  The Hospital will assist and direct me and my family in how to make arrangements for transfer, but the hospital is not liable for any fees charged by the transport service  In spite of this understanding, I refuse to consent to further medical examination and treatment which has been offered to me, and request transfer to  Helio Rothman Name, [address] : [facility]   I authorize the performance of emergency medical procedures and treatments upon me in both transit and upon arrival at the receiving facility  Additionally, I authorize the release of any and all medical records to the receiving facility and request they be transported with me, if possible  I authorize the performance of emergency medical procedures and treatments upon me in both transit and upon arrival at the receiving facility  Additionally, I authorize the release of any and all medical records to the receiving facility and request they be transported with me, if possible  I understand that the safest mode of transportation during a medical emergency is an ambulance and that the Hospital advocates the use of this mode of transport  Risks of traveling to the receiving facility by car, including absence of medical control, life sustaining equipment, such as oxygen, and medical personnel has been explained to me and I fully understand them  (RIAN CORRECT BOX BELOW)  [ X ]  I consent to the stated transfer and to be transported by ambulance/helicopter  [  ]  I consent to the stated transfer, but refuse transportation by ambulance and accept full responsibility for my transportation by car  I understand the risks of non-ambulance transfers and I exonerate the Hospital and its staff from any deterioration in my condition that results from this refusal     X___________________________________________    DATE  20  TIME________  Signature of patient or legally responsible individual signing on patient behalf           RELATIONSHIP TO PATIENT_________________________          Provider Certification    NAME Kim Salinas                                        [facility] 1928                              MRN [id number]    A medical screening exam was performed on the above named patient    Based on the examination:    Condition Necessitating Transfer rectal bleeding requiring GI evaluation and workup    Patient Condition: The patient has been stabilized such that within reasonable medical probability, no material deterioration of the patient condition or the condition of the unborn child(dory) is likely to result from the transfer    Reason for Transfer: Patient/Family request    Transfer Requirements: [facility]   · Space available and qualified personnel available for treatment as acknowledged by    · Agreed to accept transfer and to provide appropriate medical treatment as acknowledged by       Dr Alex Johnson, Dr Quinn Cos  · Appropriate medical records of the examination and treatment of the patient are provided at the time of transfer   [address] [facility], [address] _______  · Transfer will be performed by qualified personnel from    and appropriate transfer equipment as required, including the use of necessary and appropriate life support measures  Provider Certification: I have examined the patient and explained the following risks and benefits of being transferred/refusing transfer to the patient/family:  General risk, such as traffic hazards, adverse weather conditions, rough terrain or turbulence, possible failure of equipment (including vehicle or aircraft), or consequences of actions of persons outside the control of the transport personnel, Unanticipated needs of medical equipment and personnel during transport, Risk of worsening condition, The possibility of a transport vehicle being unavailable      Based on these reasonable risks and benefits to the patient and/or the unborn child(dory), and based upon the information available at the time of the patients examination, I certify that the medical benefits reasonably to be expected from the provision of appropriate medical treatments at another medical facility outweigh the increasing risks, if any, to the individuals medical condition, and in the case of labor to the unborn child, from effecting the transfer      X____________________________________________ DATE 03/16/20        TIME_______      ORIGINAL - SEND TO MEDICAL RECORDS   COPY - SEND WITH PATIENT DURING TRANSFER

## 2020-03-16 NOTE — ED NOTES
Pt states she "didn't take none of my pills either oren I didn't eat"  Dr Fernandez Current notified     Ugo Costa, ODILIA  03/16/20 1950 Abhijeet Soto, ODILIA  03/16/20 8243

## 2020-03-16 NOTE — H&P
History and Physical - [facility]    Patient Information: Glen Awad 80 y o  female MRN: [id number]  Unit/Bed#: [address] [place] [address] Encounter: [id number]  Admitting Physician: Ki Huston MD  PCP: Lorelei Cervantes DO  Date of Admission:  03/16/20        Hospital Problem List:     Principal Problem:    Rectal bleeding  Active Problems:    Essential hypertension    Chronic diastolic CHF (congestive heart failure) ([facility])    Chronic respiratory failure with hypoxia and hypercapnia ([place] [address] )    Cerebrovascular accident (CVA) ([place] [address] )    Chronic kidney disease, stage III (moderate) ([place] [address] )    Dyslipidemia    Deep vein thrombosis (DVT) of left lower extremity ([place] [address] )      Assessment/Plan:    * Rectal bleeding  Assessment & Plan  Presented with rectal bleeding which she describes as large quantity of bright red blood per rectum with bowel movement, unclear if clots were present  Denied any pain or diarrhea  Hemoglobin hemoglobin level 11 3 on presentation, bright red blood on rectal exam  No prior episode of bleeding but reports 20 point weight loss over last year  Last colonoscopy around 10 years ago which was complicated by perforated bowel with subsequent extensive hospitalization and multiple surgical intervention  Hemodynamically stable, abdominal exam is benign  · Monitor H&H, type and screen  · Monitor bleeding and abdominal exam  · Hold apixaban  · Patient was seen by GI, input appreciated  Recommended EGD and colonoscopy  · Patient and family does not want any GI intervention here but requesting transfer to [facility] to follow-up her own gastroenterologist Dr Teagan Lopez for further evaluation  They have already reached out to his office and apparently they were advised transfer    · Will reach out to transfer center and physician    Chronic respiratory failure with hypoxia and hypercapnia ([place] [address] )  Assessment & Plan  Secondary to restrictive lung disease  Stable at baseline  On supplemental oxygen 2 L and trilogy at night  Continue Breo    Chronic diastolic CHF (congestive heart failure) (HCC)  Assessment & Plan  Wt Readings from Last 3 Encounters:   03/16/20 68 kg (150 lb)   02/24/20 70 9 kg (156 lb 6 4 oz)   02/12/20 71 2 kg (157 lb)     Appears euvolemic  Monitor daily weight  Hold torsemide for now          Essential hypertension  Assessment & Plan  Uncontrolled on presentation as patient did not take her morning meds  Asymptomatic  Resume amlodipine and nebivolol  Monitor    Chronic kidney disease, stage III (moderate) (HCC)  Assessment & Plan  Baseline creatinine 1 5-1 9  Currently at baseline  Monitor    Cerebrovascular accident (CVA) [facility])  Assessment & Plan  Patient reports that she was previously on Plavix noted present  Continue statin  Holding apixaban    Deep vein thrombosis (DVT) of left lower extremity ([address] )  Assessment & Plan  History of DVT/PE  On apixaban  Holding at present    Dyslipidemia  Assessment & Plan  Continue home medication zetia and welchol          VTE Prophylaxis: Apixaban (Eliquis)on hold    / sequential compression device   Code Status: Level 3 - DNAR and DNI    Anticipated Length of Stay:  Patient will be admitted on an Observation basis with an anticipated length of stay of  < 2 midnights  Justification for Hospital Stay:  Rectal bleeding    Total Time for Visit, including Counseling / Coordination of Care: 45 minutes  Greater than 50% of this total time spent on direct patient counseling and coordination of care  Chief Complaint:     Rectal Bleeding (Patient had BM this morning an noticed "bright red blood" on her toilert paper, no blood in the toilet as per patient)    History of Present Illness: Yara Medellin is a 80 y o  female with history of DVT , PE on anticoagulation , arthritis , diastolic CHF, CVA , hypertension, dyslipidemia who presents with rectal bleeding with started earlier today    Patient reported that she was in her usual state of health until today morning when she had a bowel movement and subsequently she noticed significant quantity of bright red blood upon wiping with the bowel movement  Patient denied any pain, diarrhea, constipation, nausea vomiting  She was unable to report whether they were clots present or not  Patient reported good appetite but also reported that she has lost about 20 lb in last year  Patient reported that she had colonoscopy about 10 years ago which was complicated by perforation and subsequently she had required extensive surgery and prolonged hospitalization in critical condition  Patient denied any similar episodes in the past     Review of Systems:    Review of Systems   Constitutional: Positive for fatigue and unexpected weight change  Negative for activity change, appetite change, chills, diaphoresis and fever  HENT: Negative for congestion, rhinorrhea, sneezing, sore throat, tinnitus and trouble swallowing  Eyes: Negative for visual disturbance  Respiratory: Negative for cough, chest tightness, shortness of breath, wheezing and stridor  Cardiovascular: Positive for leg swelling (Chronic left leg)  Negative for chest pain and palpitations  Gastrointestinal: Positive for anal bleeding and blood in stool  Negative for abdominal distention, abdominal pain, constipation, diarrhea, nausea, rectal pain and vomiting  Genitourinary: Negative for difficulty urinating, dysuria and flank pain  Musculoskeletal: Negative for arthralgias and back pain  Skin: Negative for pallor and rash  Neurological: Negative for dizziness, seizures, speech difficulty, light-headedness and numbness  Hematological: Negative for adenopathy  Psychiatric/Behavioral: Negative for agitation and confusion         Past Medical and Surgical History:     Past Medical History:   Diagnosis Date    [facility] 79/1139    diastolic     CO2 retention     DVT (deep vein thrombosis) in pregnancy     left LE in 2/2018 and 50 years ago    Hyperlipidemia     Hypertension     Pulmonary embolism ([address] ) 02/06/2018    Renal disorder     ckd    Stroke [facility]) 2016    left hand weakness,paresthesia  Past Surgical History:   Procedure Laterality Date    ABDOMINAL SURGERY      ruputured bowel  had colostomy and ileostomy,reversed later on   BACK SURGERY      rods in back  done 30 years ago   CARPAL TUNNEL RELEASE Bilateral     CYST REMOVAL      back    HERNIA REPAIR      REPLACEMENT TOTAL KNEE BILATERAL         Meds/Allergies:    PTA meds:   Prior to Admission Medications   Prescriptions Last Dose Informant Patient Reported? Taking? BREO ELLIPTA 200-25 MCG/INH inhaler 3/15/2020 at Unknown time Child No Yes   Sig: INHALE 1 PUFF DAILY AS DIRECTED   Carbonyl Iron 45 MG TABS 3/15/2020 at Unknown time Child Yes Yes   Sig: Take by mouth 1 tab every other day   ELIQUIS 5 MG 3/15/2020 at Unknown time Child No Yes   Sig: TAKE ONE TABLET BY MOUTH TWICE A DAY     Iron-Vitamin C (IRON 100/C) 100-250 MG TABS 3/15/2020 at Unknown time Child Yes Yes   Sig: Take 1 tablet by mouth every other day    LIDOCAINE EX 3/15/2020 at Unknown time Child Yes Yes   Sig: Apply topically as needed 10%   Multiple Vitamins-Minerals (OCUVITE PO) 3/15/2020 at Unknown time Child Yes Yes   Sig: Take 1 tablet by mouth daily     Nutritional Supplements (ENSURE ACTIVE) LIQD 3/15/2020 at Unknown time Child Yes Yes   Sig: Take by mouth daily   WELCHOL 625 MG tablet 3/15/2020 at Unknown time Child No Yes   Sig: TAKE 3 TABLETS TWICE A DAILY WITH MEALS    acetaminophen (TYLENOL) 325 mg tablet Not Taking at Unknown time Child Yes No   Sig: Take 650 mg by mouth every 6 (six) hours as needed for mild pain   amLODIPine (NORVASC) 5 mg tablet 3/16/2020 at Unknown time  No Yes   Sig: Take 1 tablet (5 mg total) by mouth daily   calcium-vitamin D (OSCAL 500 + D) 500 mg-200 units per tablet 3/15/2020 at Unknown time Child Yes Yes   Sig: Take 1 tablet by mouth 2 (two) times a day    cholecalciferol (VITAMIN D3) 1,000 units tablet 3/15/2020 at Unknown time Child Yes Yes   Sig: Take 2,000 Units by mouth daily   clopidogrel (PLAVIX) 75 mg tablet Not Taking at Unknown time Child Yes No   Sig: Take 75 mg by mouth daily   co-enzyme Q-10 50 MG capsule 3/15/2020 at Unknown time Child Yes Yes   Sig: Take 200 mg by mouth daily     docusate sodium (COLACE) 100 mg capsule 3/15/2020 at Unknown time Child Yes Yes   Sig: Take 100 mg by mouth 2 (two) times a day   ezetimibe (ZETIA) 10 mg tablet 3/15/2020 at Unknown time  No Yes   Sig: Take 1 tablet (10 mg total) by mouth daily   fluticasone-vilanterol (BREO ELLIPTA) 100-25 mcg/inh inhaler   No No   Sig: Inhale 1 puff daily for 14 days Rinse mouth after use    nebivolol (BYSTOLIC) 10 mg tablet 4/95/7009 at Unknown time  No Yes   Sig: Take 1 tablet (10 mg total) by mouth daily   ondansetron (ZOFRAN) 4 mg tablet 3/15/2020 at Unknown time Child Yes Yes   Sig: Take 4 mg by mouth daily    pantoprazole (PROTONIX) 40 mg tablet 3/15/2020 at Unknown time Child Yes Yes   Sig: Take 40 mg by mouth daily   torsemide (DEMADEX) 10 mg tablet 3/15/2020 at Unknown time Child Yes Yes   Sig: Take 10 mg by mouth daily       Facility-Administered Medications: None       Allergies: Allergies   Allergen Reactions    Statins Myalgia    Lyrica [Pregabalin] Other (See Comments)     Client says it made her "nutty"     History:     Marital Status:       Substance Use History:   Social History     Substance and Sexual Activity   Alcohol Use Never    Frequency: Never    Comment: one drink at weddings      Social History     Tobacco Use   Smoking Status Never Smoker   Smokeless Tobacco Never Used     Social History     Substance and Sexual Activity   Drug Use Never       Family History:    Family History   Problem Relation Age of Onset    COPD Brother        Physical Exam:     Vitals:   Blood Pressure: (!) 202/81 (03/16/20 1217)  Pulse: 60 (03/16/20 1217)  Temperature: Viky Goodrich ) 97 1 °F (36 2 °C) (03/16/20 0936)  Temp Source: Tympanic (03/16/20 0936)  Respirations: 18 (03/16/20 1217)  Height: 4' 10" (147 3 cm) (03/16/20 3442)  Weight - Scale: 68 kg (150 lb) (03/16/20 0938)  SpO2: 100 % (03/16/20 1217)    Physical Exam   Constitutional: She is oriented to person, place, and time  She appears well-developed  No distress  HENT:   Head: Normocephalic and atraumatic  Eyes: Pupils are equal, round, and reactive to light  Neck: Normal range of motion  Neck supple  Cardiovascular: Normal rate and regular rhythm  Murmur heard  Pulmonary/Chest: Effort normal and breath sounds normal  No respiratory distress  She has no wheezes  She has no rales  Diminished but clear   Abdominal: Soft  Bowel sounds are normal  She exhibits no distension  There is no tenderness  There is no rebound and no guarding  Musculoskeletal: Normal range of motion  She exhibits edema (Mild left leg)  Neurological: She is alert and oriented to person, place, and time  No cranial nerve deficit  Skin: Skin is warm and dry  No rash noted  Psychiatric:   Feeling sad due to recent loss of son in law           Lab Results: I have personally reviewed pertinent reports  Results from last 7 days   Lab Units 03/16/20  0958   WBC Thousand/uL 5 08   HEMOGLOBIN g/dL 11 3*   HEMATOCRIT % 37 8   PLATELETS Thousands/uL 160   NEUTROS PCT % 72   LYMPHS PCT % 18   MONOS PCT % 8   EOS PCT % 2     Results from last 7 days   Lab Units 03/16/20  0958   POTASSIUM mmol/L 4 4   CHLORIDE mmol/L 105   CO2 mmol/L 31   BUN mg/dL 52*   CREATININE mg/dL 1 76*   CALCIUM mg/dL 9 0   ALK PHOS U/L 68   ALT U/L 44   AST U/L 31     Results from last 7 days   Lab Units 03/16/20  0958   INR  1 03       Imaging: I have personally reviewed pertinent reports  No results found      No orders to display       EKG, Pathology, and Other Studies Reviewed on Admission:   · None    Allscripts/EPIC Records Reviewed: Yes     ** Please Note: "This note has been constructed using a voice recognition system  Therefore there may be syntax, spelling, and/or grammatical errors   Please call if you have any questions  "**

## 2020-03-17 NOTE — PLAN OF CARE
Problem: Potential for Falls  Goal: Patient will remain free of falls  Description  INTERVENTIONS:  - Assess patient frequently for physical needs  -  Identify cognitive and physical deficits and behaviors that affect risk of falls    -  [place] fall precautions as indicated by assessment   - Educate patient/family on patient safety including physical limitations  - Instruct patient to call for assistance with activity based on assessment  - Modify environment to reduce risk of injury  - Consider OT/PT consult to assist with strengthening/mobility  Outcome: Progressing     Problem: SAFETY ADULT  Goal: Maintain or return to baseline ADL function  Description  INTERVENTIONS:  -  Assess patient's ability to carry out ADLs; assess patient's baseline for ADL function and identify physical deficits which impact ability to perform ADLs (bathing, care of mouth/teeth, toileting, grooming, dressing, etc )  - Assess/evaluate cause of self-care deficits   - Assess range of motion  - Assess patient's mobility; develop plan if impaired  - Assess patient's need for assistive devices and provide as appropriate  - Encourage maximum independence but intervene and supervise when necessary  - Involve family in performance of ADLs  - Assess for home care needs following discharge   - Consider OT consult to assist with ADL evaluation and planning for discharge  - Provide patient education as appropriate  Outcome: Progressing  Goal: Maintain or return mobility status to optimal level  Description  INTERVENTIONS:  - Assess patient's baseline mobility status (ambulation, transfers, stairs, etc )    - Identify cognitive and physical deficits and behaviors that affect mobility  - Identify mobility aids required to assist with transfers and/or ambulation (gait belt, sit-to-stand, lift, walker, cane, etc )  - [place] fall precautions as indicated by assessment  - Record patient progress and toleration of activity level on Mobility SBAR; progress patient to next Phase/Stage  - Instruct patient to call for assistance with activity based on assessment       Outcome: Progressing     Problem: GASTROINTESTINAL - ADULT  Goal: Minimal or absence of nausea and/or vomiting  Description  INTERVENTIONS:  - Administer IV fluids if ordered to ensure adequate hydration  - Provide nonpharmacologic comfort measures as appropriate  - Advance diet as tolerated, if ordered  - Consider nutrition services referral to assist patient with adequate nutrition and appropriate food choices   Outcome: Progressing  Goal: Maintains or returns to baseline bowel function  Description  INTERVENTIONS:  - Assess bowel function  - Encourage oral fluids to ensure adequate hydration  - Administer IV fluids if ordered to ensure adequate hydration  - Administer ordered medications as needed  - Encourage mobilization and activity  - Consider nutritional services referral to assist patient with adequate nutrition and appropriate food choices  Outcome: Progressing  Goal: Maintains adequate nutritional intake  Description  INTERVENTIONS:  - Monitor percentage of each meal consumed  - Identify factors contributing to decreased intake, treat as appropriate  - Assist with meals as needed  - Monitor I&O, weight, and lab values if indicated  - Obtain nutrition services referral as needed  Outcome: Progressing     Problem: METABOLIC, FLUID AND ELECTROLYTES - ADULT  Goal: Electrolytes maintained within normal limits  Description  INTERVENTIONS:  - Monitor labs and assess patient for signs and symptoms of electrolyte imbalances  - Administer electrolyte replacement as ordered  - Monitor response to electrolyte replacements, including repeat lab results as appropriate  - Instruct patient on fluid and nutrition as appropriate  Outcome: Progressing  Goal: Fluid balance maintained  Description  INTERVENTIONS:  - Monitor labs   - Monitor I/O and WT  - Instruct patient on fluid and nutrition as appropriate  - Assess for signs & symptoms of volume excess or deficit  Outcome: Progressing  Goal: Glucose maintained within target range  Description  INTERVENTIONS:  - Monitor Blood Glucose as ordered  - Assess for signs and symptoms of hyperglycemia and hypoglycemia  - Administer ordered medications to maintain glucose within target range  - Assess nutritional intake and initiate nutrition service referral as needed  Outcome: Progressing     Problem: SKIN/TISSUE INTEGRITY - ADULT  Goal: Skin integrity remains intact  Description  INTERVENTIONS  - Identify patients at risk for skin breakdown  - Assess and monitor skin integrity  - Assess and monitor nutrition and hydration status  - Monitor labs (i e  albumin)  - Assess for incontinence   - Turn and reposition patient  - Assist with mobility/ambulation  - Relieve pressure over bony prominences  - Avoid friction and shearing  - Provide appropriate hygiene as needed including keeping skin clean and dry  - Evaluate need for skin moisturizer/barrier cream  - Collaborate with interdisciplinary team (i e  Nutrition, Rehabilitation, etc )   - Patient/family teaching  Outcome: Progressing     Problem: HEMATOLOGIC - ADULT  Goal: Maintains hematologic stability  Description  INTERVENTIONS  - Assess for signs and symptoms of bleeding or hemorrhage  - Monitor labs  - Administer supportive blood products/factors as ordered and appropriate  Outcome: Progressing

## 2020-03-17 NOTE — NURSING NOTE
Pt discharged to [facility]   Report given to ODILIA Waggoner at phone number of [phone]  Pt to transfer to floor 7B room 17B  SLETS picked up the pt at 20:30  Pt left with her IV access intact  2 liters of oxygen in place

## 2020-04-08 LAB
ALBUMIN SERPL-MCNC: 4 G/DL (ref 3.6–5.1)
ALBUMIN/GLOB SERPL: 1 (CALC) (ref 1–2.5)
ALP SERPL-CCNC: 67 U/L (ref 37–153)
ALT SERPL-CCNC: 23 U/L (ref 6–29)
AST SERPL-CCNC: 26 U/L (ref 10–35)
BILIRUB SERPL-MCNC: 0.4 MG/DL (ref 0.2–1.2)
BUN SERPL-MCNC: 51 MG/DL (ref 7–25)
BUN/CREAT SERPL: 24 (CALC) (ref 6–22)
CALCIUM SERPL-MCNC: 9.3 MG/DL (ref 8.6–10.4)
CHLORIDE SERPL-SCNC: 100 MMOL/L (ref 98–110)
CK SERPL-CCNC: 85 U/L (ref 29–143)
CO2 SERPL-SCNC: 34 MMOL/L (ref 20–32)
CREAT SERPL-MCNC: 2.11 MG/DL (ref 0.6–0.88)
GLOBULIN SER CALC-MCNC: 3.9 G/DL (CALC) (ref 1.9–3.7)
GLUCOSE SERPL-MCNC: 101 MG/DL (ref 65–139)
POTASSIUM SERPL-SCNC: 4.4 MMOL/L (ref 3.5–5.3)
PROT SERPL-MCNC: 7.9 G/DL (ref 6.1–8.1)
SL AMB EGFR AFRICAN AMERICAN: 23 ML/MIN/1.73M2
SL AMB EGFR NON AFRICAN AMERICAN: 20 ML/MIN/1.73M2
SODIUM SERPL-SCNC: 137 MMOL/L (ref 135–146)

## 2020-04-09 ENCOUNTER — TELEPHONE (OUTPATIENT)
Dept: NEPHROLOGY | Facility: CLINIC | Age: 85
End: 2020-04-09

## 2020-04-09 ENCOUNTER — DOCUMENTATION (OUTPATIENT)
Dept: NEPHROLOGY | Facility: CLINIC | Age: 85
End: 2020-04-09

## 2020-04-09 DIAGNOSIS — E78.2 MIXED HYPERLIPIDEMIA: Chronic | ICD-10-CM

## 2020-04-09 DIAGNOSIS — N18.30 CHRONIC KIDNEY DISEASE, STAGE III (MODERATE) (HCC): Primary | Chronic | ICD-10-CM

## 2020-04-17 DIAGNOSIS — I63.40 CEREBROVASCULAR ACCIDENT (CVA) DUE TO EMBOLISM OF CEREBRAL ARTERY (HCC): Primary | ICD-10-CM

## 2020-05-13 LAB
ALBUMIN SERPL-MCNC: 3.6 G/DL (ref 3.6–5.1)
ALBUMIN/GLOB SERPL: 1.1 (CALC) (ref 1–2.5)
ALP SERPL-CCNC: 55 U/L (ref 37–153)
ALT SERPL-CCNC: 15 U/L (ref 6–29)
AST SERPL-CCNC: 16 U/L (ref 10–35)
BILIRUB SERPL-MCNC: 0.3 MG/DL (ref 0.2–1.2)
BUN SERPL-MCNC: 62 MG/DL (ref 7–25)
BUN/CREAT SERPL: 32 (CALC) (ref 6–22)
CALCIUM SERPL-MCNC: 9 MG/DL (ref 8.6–10.4)
CHLORIDE SERPL-SCNC: 105 MMOL/L (ref 98–110)
CO2 SERPL-SCNC: 30 MMOL/L (ref 20–32)
CREAT SERPL-MCNC: 1.95 MG/DL (ref 0.6–0.88)
GLOBULIN SER CALC-MCNC: 3.2 G/DL (CALC) (ref 1.9–3.7)
GLUCOSE SERPL-MCNC: 94 MG/DL (ref 65–99)
POTASSIUM SERPL-SCNC: 4.6 MMOL/L (ref 3.5–5.3)
PROT SERPL-MCNC: 6.8 G/DL (ref 6.1–8.1)
SL AMB EGFR AFRICAN AMERICAN: 25 ML/MIN/1.73M2
SL AMB EGFR NON AFRICAN AMERICAN: 22 ML/MIN/1.73M2
SODIUM SERPL-SCNC: 142 MMOL/L (ref 135–146)

## 2020-05-18 ENCOUNTER — TELEPHONE (OUTPATIENT)
Dept: NEPHROLOGY | Facility: CLINIC | Age: 85
End: 2020-05-18

## 2020-05-26 ENCOUNTER — OFFICE VISIT (OUTPATIENT)
Dept: AUDIOLOGY | Facility: CLINIC | Age: 85
End: 2020-05-26
Payer: COMMERCIAL

## 2020-05-26 DIAGNOSIS — H90.3 SENSORY HEARING LOSS, BILATERAL: Primary | ICD-10-CM

## 2020-07-20 DIAGNOSIS — E78.5 DYSLIPIDEMIA: Chronic | ICD-10-CM

## 2020-07-20 DIAGNOSIS — N18.30 ANEMIA OF CHRONIC RENAL FAILURE, STAGE 3 (MODERATE) (HCC): ICD-10-CM

## 2020-07-20 DIAGNOSIS — E55.9 VITAMIN D DEFICIENCY: Chronic | ICD-10-CM

## 2020-07-20 DIAGNOSIS — N18.30 CHRONIC KIDNEY DISEASE, STAGE III (MODERATE) (HCC): Chronic | ICD-10-CM

## 2020-07-20 DIAGNOSIS — I12.9 HYPERTENSIVE CHRONIC KIDNEY DISEASE WITH STAGE 1 THROUGH STAGE 4 CHRONIC KIDNEY DISEASE, OR UNSPECIFIED CHRONIC KIDNEY DISEASE: ICD-10-CM

## 2020-07-20 DIAGNOSIS — D63.1 ANEMIA OF CHRONIC RENAL FAILURE, STAGE 3 (MODERATE) (HCC): ICD-10-CM

## 2020-07-20 DIAGNOSIS — M79.89 LEFT LEG SWELLING: ICD-10-CM

## 2020-07-20 RX ORDER — EZETIMIBE 10 MG/1
TABLET ORAL
Qty: 30 TABLET | Refills: 5 | Status: SHIPPED | OUTPATIENT
Start: 2020-07-20 | End: 2020-08-19 | Stop reason: SDUPTHER

## 2020-07-24 ENCOUNTER — TELEPHONE (OUTPATIENT)
Dept: NEPHROLOGY | Facility: CLINIC | Age: 85
End: 2020-07-24

## 2020-07-24 NOTE — TELEPHONE ENCOUNTER
Lm for the daughter to call back and verify if mother will be having labs done prior to her appt scheduled on July 29,2020

## 2020-07-27 ENCOUNTER — TELEPHONE (OUTPATIENT)
Dept: NEPHROLOGY | Facility: CLINIC | Age: 85
End: 2020-07-27

## 2020-07-27 ENCOUNTER — DOCUMENTATION (OUTPATIENT)
Dept: NEPHROLOGY | Facility: CLINIC | Age: 85
End: 2020-07-27

## 2020-07-27 LAB
EXT GLUCOSE BLD: 135
EXTERNAL ALBUMIN: 4
EXTERNAL ALK PHOS: 74
EXTERNAL ALT: 39
EXTERNAL AST: 27
EXTERNAL BUN: 33
EXTERNAL CALCIUM: 8.9
EXTERNAL CHLORIDE: 95
EXTERNAL CO2: 31
EXTERNAL CREATININE: 1.86
EXTERNAL EGFR: 23
EXTERNAL POTASSIUM: 3.8
EXTERNAL SODIUM: 132
EXTERNAL T.BILIRUBIN: 0.6
EXTERNAL THYROID STIMULATING HORMONE: 1.56 UIU/ML
EXTERNAL TOTAL PROTEIN: 7.6
EXTERNAL VITAMIN D,25: 66
HBA1C MFR BLD HPLC: 5.5 %
HCT VFR BLD AUTO: 36.1 % (ref 34.8–46.1)
HGB BLD-MCNC: 11.6 G/DL (ref 11.5–15.4)
PLATELET # BLD AUTO: 191 THOUSANDS/UL (ref 149–390)
WBC # BLD AUTO: 6.4 THOUSAND/UL

## 2020-07-27 NOTE — TELEPHONE ENCOUNTER
----- Message from Deepthi Cuenca MD sent at 7/27/2020  3:49 PM EDT -----  The patient has a sodium of 132 which  Is slightly low    Recommend:  -modest fluid restriction of 48 ounces per day  -repeat a nonfasting basic metabolic profile in 1 week  -make sure the patient is on no form of thiazide type diuretics or NSAID use

## 2020-07-28 NOTE — TELEPHONE ENCOUNTER
Pt daughter called and message was relayed  She said that the home draw will need to set [facility]  She had to check with her mom regarding diuretics and NSAIDs  She is not taking any NSAIDs and has been on her diuretics for the last 2 weeks and has been weighing herself often and hasn't gained any weight  Her daughter did express that it will be challenging to get her to drink water because her mom hates going to the bathroom consisently

## 2020-08-05 DIAGNOSIS — I12.9 HYPERTENSIVE CHRONIC KIDNEY DISEASE WITH STAGE 1 THROUGH STAGE 4 CHRONIC KIDNEY DISEASE, OR UNSPECIFIED CHRONIC KIDNEY DISEASE: ICD-10-CM

## 2020-08-05 RX ORDER — NEBIVOLOL HYDROCHLORIDE 10 MG/1
TABLET ORAL
Qty: 30 TABLET | Refills: 5 | Status: SHIPPED | OUTPATIENT
Start: 2020-08-05 | End: 2020-08-19 | Stop reason: SDUPTHER

## 2020-08-18 ENCOUNTER — OFFICE VISIT (OUTPATIENT)
Dept: CARDIOLOGY CLINIC | Facility: CLINIC | Age: 85
End: 2020-08-18
Payer: MEDICARE

## 2020-08-18 VITALS
OXYGEN SATURATION: 98 % | HEART RATE: 63 BPM | WEIGHT: 150 LBS | SYSTOLIC BLOOD PRESSURE: 114 MMHG | TEMPERATURE: 98.3 F | DIASTOLIC BLOOD PRESSURE: 56 MMHG | BODY MASS INDEX: 31.49 KG/M2 | HEIGHT: 58 IN

## 2020-08-18 DIAGNOSIS — I10 ESSENTIAL HYPERTENSION: ICD-10-CM

## 2020-08-18 DIAGNOSIS — E66.9 CLASS 1 OBESITY: ICD-10-CM

## 2020-08-18 DIAGNOSIS — I35.0 AORTIC STENOSIS, MILD: ICD-10-CM

## 2020-08-18 DIAGNOSIS — I11.9 HYPERTENSIVE HEART DISEASE WITHOUT HEART FAILURE: ICD-10-CM

## 2020-08-18 DIAGNOSIS — I49.3 ASYMPTOMATIC PVCS: ICD-10-CM

## 2020-08-18 DIAGNOSIS — I49.1 PREMATURE ATRIAL CONTRACTIONS: ICD-10-CM

## 2020-08-18 DIAGNOSIS — Z86.73 HISTORY OF MULTIPLE STROKES: ICD-10-CM

## 2020-08-18 DIAGNOSIS — I45.10 RIGHT BUNDLE BRANCH BLOCK: ICD-10-CM

## 2020-08-18 DIAGNOSIS — I35.1 MILD AORTIC REGURGITATION: ICD-10-CM

## 2020-08-18 DIAGNOSIS — E78.2 MIXED HYPERLIPIDEMIA: ICD-10-CM

## 2020-08-18 DIAGNOSIS — J96.12 CHRONIC HYPERCAPNIC RESPIRATORY FAILURE (HCC): Primary | ICD-10-CM

## 2020-08-18 DIAGNOSIS — N18.4 CHRONIC KIDNEY DISEASE, STAGE 4 (SEVERE) (HCC): ICD-10-CM

## 2020-08-18 DIAGNOSIS — I50.32 CHRONIC DIASTOLIC CHF (CONGESTIVE HEART FAILURE) (HCC): ICD-10-CM

## 2020-08-18 PROCEDURE — 99214 OFFICE O/P EST MOD 30 MIN: CPT | Performed by: INTERNAL MEDICINE

## 2020-08-18 PROCEDURE — 93000 ELECTROCARDIOGRAM COMPLETE: CPT | Performed by: INTERNAL MEDICINE

## 2020-08-18 RX ORDER — TORSEMIDE 10 MG/1
20 TABLET ORAL 2 TIMES DAILY
COMMUNITY
End: 2021-04-28 | Stop reason: SDUPTHER

## 2020-08-18 NOTE — PATIENT INSTRUCTIONS
1  Continue current medication  2  Have lipid panel drawn with next  blood test and requisition slip was given to patient  3  Cardiology follow-up approximately six months with EKG

## 2020-08-18 NOTE — PROGRESS NOTES
Office Cardiology Progress Note  Serena Dodson 80 y o  female MRN: [id number]  08/18/20  4:05 PM      ASSESSMENT:       1   Chronic stable exertional dyspnea from chronic hypercarbic hypoxemic respiratory failure and COPD with  CAD  excluded on 02/09/2018 nuclear stress test done in [facility] C time echocardiogram on 02/07/2018 showed 60-65% LVEF EF, mild LVH, mild RV dilatation and RV systolic dysfunction, mild MAC with trace MR /trace TR, and mild-to-moderate aortic stenosis with 1+ AI/1+ PI   Patient with acute respiratory failure resulting in hospitalization from 12/07 through 12/11/2018 at [address] [place] [address]  2  History of atherothrombotic bilateral basal ganglia strokes, most recently on the right on 6/25/16 with associated uncontrolled hypertension and residual left hand paresthesias with exacerbation of symptoms 5/9/17 during hypertensive emergency  History of old right thalamic stroke  3  Controlled dyslipidemia as of 01/20/2020 with prior history of statin myalgias and current direct LDL of 93 as of 01/20/2020   4  Mild  aortic valve stenosis with LVH, grade 2 diastolic dysfunction, elevated mean left atrial filling pressure, mild MAC with trace MR, trace AI, trace TR on 03/09/2018 echo  5  Class 1 obesity with 32 4 pound weight loss in approximately 7 4 years  6  Chronic right bundle branch block and resolved LAFB with history of PACs  and  PVC's, with prominent sinus arrhythmia today  7  Chronic kidney disease, stage IV, with stabilized creatinine of  1 86 on 07/17/2020 with estimated GFR 23  Associated mild anemia and resolved thrombocytopenia  8  Osteoarthritis  9  History of left lower extremity DVT February, 2018 and DVT of right gastrocnemius vein October, 2008 with possible prior episode of left lower extremity DVT in the past as well  10  History of left renal mass  11  Suppressed previously heard large airway wheezing of no clinical significance    12  Resolved orthostatic hypotension, likely due to a combination of amlodipine, volume depletion, dysautonomia  13  History of acutely decompensated diastolic heart failure with [facility] admission 8/27 to 09/02/2017, as well as 02/06/2018 and 03/06/2018  14   Acute hypercapneic and hypoxic respiratory failure 02/06/2018, 03/06/2018, and 03/29/2018 and again on 12/07/2018  15  Chronic Eliquis oral anticoagulation  16  Left upper extremity pain, chronic, cause unknown to me  17  History of rotator cuff tear of right shoulder     18  Chronic pain syndrome  19  History of prior questionable gouty arthritis of left great toe  Plan       Patient Instructions     1  Continue current medication  2  Have lipid panel drawn with next  blood test and requisition slip was given to patient  3  Cardiology follow-up approximately six months with EKG  HPI    This 80 y o  female  denies new cardiopulmonary and medical symptoms  She has been staying at home and avoiding contact with others during the pandemic  She has a visiting nurse, home blood draws, and even house calls by Dr Pilo Mcneil  She has been eating a healthy diet prepared by her daughter  She is seen in follow-up of the below listed diagnoses  She does wear nasal cannula oxygen continuously  Encounter Diagnoses   Name Primary?     Chronic hypercapnic respiratory failure (HCC) Yes    Aortic stenosis, mild     Mild aortic regurgitation     Essential hypertension     Mixed hyperlipidemia     History of multiple strokes     Class 1 obesity     Right bundle branch block     Chronic diastolic CHF (congestive heart failure) (HCC)     Hypertensive heart disease without heart failure     Chronic kidney disease, stage 4 (severe) (HCC)     Premature atrial contractions     Asymptomatic PVCs         Review of Systems    All other systems negative, except as noted in history of present illness    Historical Information   Past Medical History: Diagnosis Date    Arthritis     CHF (congestive heart failure) ([facility] [address] ) 50/3199    diastolic     CO2 retention     DVT (deep vein thrombosis) in pregnancy     left LE in 2/2018 and 50 years ago    Hyperlipidemia     Hypertension     [facility] 02[facility]2018    Renal disorder     ckd    Stroke [facility]) 2016    left hand weakness,paresthesia  Past Surgical History:   Procedure Laterality Date    ABDOMINAL SURGERY      ruputured bowel  had colostomy and ileostomy,reversed later on   BACK SURGERY      rods in back  done 30 years ago   CARPAL TUNNEL RELEASE Bilateral     CYST REMOVAL      back    HERNIA REPAIR      REPLACEMENT TOTAL KNEE BILATERAL       Social History     Substance and Sexual Activity   Alcohol Use Never    Frequency: Never    Comment: one drink at weddings      Social History     Substance and Sexual Activity   Drug Use Never     Social History     Tobacco Use   Smoking Status Never Smoker   Smokeless Tobacco Never Used       Family History:  Family History   Problem Relation Age of Onset    COPD Brother          Meds/Allergies     Prior to Admission medications    Medication Sig Start Date End Date Taking? Authorizing Provider   acetaminophen (TYLENOL) 325 mg tablet Take 650 mg by mouth every 6 (six) hours as needed for mild pain   Yes Historical Provider, MD   amLODIPine (NORVASC) 5 mg tablet Take 1 tablet (5 mg total) by mouth daily 3/14/20  Yes Sascha Severino MD   apixaban (ELIQUIS) 2 5 mg Take 1 tablet (2 5 mg total) by mouth 2 (two) times a day [address] Roberts [id number]  PATIENT WILL BE  OUT OF MEDICATION TONIGHT  4/17/20  Yes Sarahi Mendoza MD   Ascorbic Acid (VITAMIN C PO) Take 1 tablet by mouth daily   Yes Historical Provider, MD   Bystolic 10 MG tablet TAKE 1 TABLET (10 MG TOTAL) BY MOUTH DAILY 8/5/20  Yes Beatris Hatchet, MD   calcium-vitamin D (OSCAL 500 + D) 500 mg-200 units per tablet Take 1 tablet by mouth 2 (two) times a day     Yes Historical Provider, MD cholecalciferol (VITAMIN D3) 1,000 units tablet Take 2,000 Units by mouth daily   Yes Historical Provider, MD   co-enzyme Q-10 50 MG capsule Take 200 mg by mouth daily     Yes Historical Provider, MD   docusate sodium (COLACE) 100 mg capsule Take 100 mg by mouth 2 (two) times a day   Yes Historical Provider, MD   ezetimibe (ZETIA) 10 mg tablet ONE TAB DAILY 7/20/20  Yes Salvatore Romero MD   fluticasone-vilanterol (BREO ELLIPTA) 100-25 mcg/inh inhaler Inhale 1 puff daily for 14 days Rinse mouth after use  9/30/19 8/18/20 Yes MARGOT Mejia   Multiple Vitamins-Minerals (OCUVITE PO) Take 1 tablet by mouth daily     Yes Historical Provider, MD   pantoprazole (PROTONIX) 40 mg tablet Take 40 mg by mouth daily   Yes Historical Provider, MD   torsemide (DEMADEX) 10 mg tablet Take 10 mg by mouth every other day   Yes Historical Provider, MD   WELCHOL 625 MG tablet TAKE 3 TABLETS TWICE A DAILY WITH MEALS  10/14/19  Yes Salvatore Romero MD       Allergies   Allergen Reactions    Statins Myalgia    Lyrica [Pregabalin] Other (See Comments)     Client says it made her "nutty"         Vitals:    08/18/20 1519   BP: 114/56   BP Location: Right arm   Patient Position: Sitting   Cuff Size: Standard   Pulse: 63   Temp: 98 3 °F (36 8 °C)   SpO2: 98%   Weight: 68 kg (150 lb)   Height: 4' 10" (1 473 m)       Body mass index is 31 35 kg/m²  7 pound weight loss in approximately six months    Physical Exam:    General Appearance:  Alert, cooperative, no distress, appears stated age and is on continuous nasal cannula oxygen at 2 liters/minute     Head:  Normocephalic, without obvious abnormality, atraumatic   Eyes:  PERRL, conjunctiva/corneas clear, EOM's intact,   both eyes   Ears:  Normal TM's and external ear canals, both ears   Nose: Nares normal, septum midline, mucosa normal, no drainage or sinus tenderness   Throat: Lips, mucosa, and tongue normal; teeth and gums normal   Neck: Supple, symmetrical, trachea midline, no adenopathy, thyroid: not enlarged, symmetric, no tenderness/mass/nodules, no carotid bruit or JVD   Back:   Symmetric, no curvature, ROM normal, no CVA tenderness   Lungs:   Clear to auscultation bilaterally, respirations unlabored   Chest Wall:  No tenderness or deformity   Heart:  Regular rate and rhythm, S1, S2 normal, grade 2/6 basal midsystolic ejection murmur, radiating along left sternal border with normal A2 and no rub or gallop   Abdomen:   Soft, non-tender, bowel sounds active all four quadrants,  no masses, no organomegaly   Extremities: Extremities normal, atraumatic, no cyanosis or edema   Pulses: 1+ and symmetric   Skin: Skin showed normal color, texture, turgor and no rashes or lesions   Lymph nodes: Cervical, supraclavicular, and axillary nodes normal   Neurologic: Normal         Cardiographics    ECG 08/18/20:    Marked sinus arrhythmia with average ventricular rate 63 bpm  Right bundle branch block with secondary T inversions in the inferior and right precordial leads  Nondiagnostic Q-waves in V2-V3  More dysrhythmia and T inversion compared to 02/12/2020 with no other changes    IMAGING:    No Chest XR results available for this patient            LAB REVIEW:      Lab Results   Component Value Date    SODIUM 132 07/17/2020    K 3 8 07/17/2020    CL 95 07/17/2020    CO2 31 07/17/2020    ANIONGAP 12 4 01/05/2016    BUN 33 07/17/2020    CREATININE 1 86 07/17/2020    GLUCOSE 92 01/05/2016    GLUF 111 (H) 10/04/2018    CALCIUM 8 9 07/17/2020    AST 27 07/17/2020    ALT 39 07/17/2020    ALKPHOS 74 07/17/2020    PROT 8 5 (H) 01/05/2016    BILITOT 0 5 01/05/2016    EGFR 23 07/17/2020     Lab Results   Component Value Date    CHOLESTEROL 161 01/20/2020    CHOLESTEROL 165 08/20/2019    CHOLESTEROL 157 08/17/2018     Lab Results   Component Value Date    HDL 47 (L) 01/20/2020    HDL 53 08/20/2019    HDL 52 08/17/2018       Lab Results   Component Value Date    LDLCALC 93 01/20/2020    LDLCALC 90 08/20/2019    LDLCALC 81 08/17/2018     Lab Results   Component Value Date    TRIG 118 01/20/2020    TRIG 120 08/20/2019    TRIG 140 08/17/2018       CBC 07/17/2020:  H/H-11 6/36 1 with normal WBC and platelets  TSH 36/60/3250:  1 56  A1c 07/17/2020:  5 5%  Vitamin-D, 25-OH 07/17/2020:  66  CMP 07/17/2020:  Sodium 132, BUN/creatinine-33/1 86 with estimated GFR 23, glucose 135 and otherwise normal         Mac Scott MD

## 2020-08-19 DIAGNOSIS — D63.1 ANEMIA OF CHRONIC RENAL FAILURE, STAGE 3 (MODERATE) (HCC): ICD-10-CM

## 2020-08-19 DIAGNOSIS — M79.89 LEFT LEG SWELLING: ICD-10-CM

## 2020-08-19 DIAGNOSIS — N18.30 ANEMIA OF CHRONIC RENAL FAILURE, STAGE 3 (MODERATE) (HCC): ICD-10-CM

## 2020-08-19 DIAGNOSIS — E78.5 DYSLIPIDEMIA: Chronic | ICD-10-CM

## 2020-08-19 DIAGNOSIS — J45.30 MILD PERSISTENT ASTHMA WITHOUT COMPLICATION: ICD-10-CM

## 2020-08-19 DIAGNOSIS — N18.30 CHRONIC KIDNEY DISEASE, STAGE III (MODERATE) (HCC): Chronic | ICD-10-CM

## 2020-08-19 DIAGNOSIS — I12.9 HYPERTENSIVE CHRONIC KIDNEY DISEASE WITH STAGE 1 THROUGH STAGE 4 CHRONIC KIDNEY DISEASE, OR UNSPECIFIED CHRONIC KIDNEY DISEASE: ICD-10-CM

## 2020-08-19 DIAGNOSIS — E55.9 VITAMIN D DEFICIENCY: Chronic | ICD-10-CM

## 2020-08-19 RX ORDER — EZETIMIBE 10 MG/1
10 TABLET ORAL DAILY
Qty: 90 TABLET | Refills: 3 | Status: SHIPPED | OUTPATIENT
Start: 2020-08-19

## 2020-08-19 RX ORDER — NEBIVOLOL 10 MG/1
10 TABLET ORAL DAILY
Qty: 90 TABLET | Refills: 3 | Status: SHIPPED | OUTPATIENT
Start: 2020-08-19 | End: 2020-08-25 | Stop reason: SDUPTHER

## 2020-08-25 DIAGNOSIS — N18.30 ANEMIA OF CHRONIC RENAL FAILURE, STAGE 3 (MODERATE) (HCC): ICD-10-CM

## 2020-08-25 DIAGNOSIS — N18.30 CHRONIC KIDNEY DISEASE, STAGE III (MODERATE) (HCC): Chronic | ICD-10-CM

## 2020-08-25 DIAGNOSIS — D63.1 ANEMIA OF CHRONIC RENAL FAILURE, STAGE 3 (MODERATE) (HCC): ICD-10-CM

## 2020-08-25 DIAGNOSIS — I12.9 HYPERTENSIVE CHRONIC KIDNEY DISEASE WITH STAGE 1 THROUGH STAGE 4 CHRONIC KIDNEY DISEASE, OR UNSPECIFIED CHRONIC KIDNEY DISEASE: ICD-10-CM

## 2020-08-25 RX ORDER — NEBIVOLOL 10 MG/1
10 TABLET ORAL DAILY
Qty: 90 TABLET | Refills: 3 | Status: SHIPPED | OUTPATIENT
Start: 2020-08-25

## 2020-08-31 ENCOUNTER — TELEPHONE (OUTPATIENT)
Dept: NEPHROLOGY | Facility: CLINIC | Age: 85
End: 2020-08-31

## 2020-08-31 DIAGNOSIS — I12.9 HYPERTENSIVE CHRONIC KIDNEY DISEASE WITH STAGE 1 THROUGH STAGE 4 CHRONIC KIDNEY DISEASE, OR UNSPECIFIED CHRONIC KIDNEY DISEASE: ICD-10-CM

## 2020-08-31 DIAGNOSIS — E78.5 DYSLIPIDEMIA: ICD-10-CM

## 2020-08-31 DIAGNOSIS — N18.30 ANEMIA OF CHRONIC RENAL FAILURE, STAGE 3 (MODERATE) (HCC): ICD-10-CM

## 2020-08-31 DIAGNOSIS — E55.9 VITAMIN D DEFICIENCY: ICD-10-CM

## 2020-08-31 DIAGNOSIS — D63.1 ANEMIA OF CHRONIC RENAL FAILURE, STAGE 3 (MODERATE) (HCC): ICD-10-CM

## 2020-08-31 DIAGNOSIS — N18.30 CHRONIC KIDNEY DISEASE, STAGE III (MODERATE) (HCC): Primary | ICD-10-CM

## 2020-08-31 NOTE — TELEPHONE ENCOUNTER
Patient needs new labs orders placed in epic due prior to her appt on 9/9/2020; She is requesting a [facility] home draw       Once orders are set I will fax to [facility]

## 2020-09-02 LAB
25(OH)D3 SERPL-MCNC: 60 NG/ML (ref 30–100)
ALBUMIN SERPL-MCNC: 3.8 G/DL (ref 3.6–5.1)
ALBUMIN/GLOB SERPL: 1.1 (CALC) (ref 1–2.5)
ALP SERPL-CCNC: 61 U/L (ref 37–153)
ALT SERPL-CCNC: 16 U/L (ref 6–29)
AST SERPL-CCNC: 18 U/L (ref 10–35)
BASOPHILS # BLD AUTO: 22 CELLS/UL (ref 0–200)
BASOPHILS NFR BLD AUTO: 0.4 %
BILIRUB SERPL-MCNC: 0.4 MG/DL (ref 0.2–1.2)
BUN SERPL-MCNC: 63 MG/DL (ref 7–25)
BUN/CREAT SERPL: 28 (CALC) (ref 6–22)
CALCIUM SERPL-MCNC: 8.9 MG/DL (ref 8.6–10.4)
CHLORIDE SERPL-SCNC: 101 MMOL/L (ref 98–110)
CHOLEST SERPL-MCNC: 140 MG/DL
CHOLEST/HDLC SERPL: 2.6 (CALC)
CK SERPL-CCNC: 44 U/L (ref 29–143)
CO2 SERPL-SCNC: 32 MMOL/L (ref 20–32)
CREAT SERPL-MCNC: 2.26 MG/DL (ref 0.6–0.88)
CREAT UR-MCNC: 101 MG/DL (ref 20–275)
EOSINOPHIL # BLD AUTO: 112 CELLS/UL (ref 15–500)
EOSINOPHIL NFR BLD AUTO: 2 %
ERYTHROCYTE [DISTWIDTH] IN BLOOD BY AUTOMATED COUNT: 12.4 % (ref 11–15)
FERRITIN SERPL-MCNC: 79 NG/ML (ref 16–288)
GLOBULIN SER CALC-MCNC: 3.6 G/DL (CALC) (ref 1.9–3.7)
GLUCOSE SERPL-MCNC: 131 MG/DL (ref 65–99)
HCT VFR BLD AUTO: 35.4 % (ref 35–45)
HDLC SERPL-MCNC: 54 MG/DL
HGB BLD-MCNC: 11.2 G/DL (ref 11.7–15.5)
IRON SATN MFR SERPL: 25 % (CALC) (ref 16–45)
IRON SERPL-MCNC: 82 MCG/DL (ref 45–160)
LDLC SERPL CALC-MCNC: 67 MG/DL (CALC)
LYMPHOCYTES # BLD AUTO: 1271 CELLS/UL (ref 850–3900)
LYMPHOCYTES NFR BLD AUTO: 22.7 %
MAGNESIUM SERPL-MCNC: 2.2 MG/DL (ref 1.5–2.5)
MCH RBC QN AUTO: 29.2 PG (ref 27–33)
MCHC RBC AUTO-ENTMCNC: 31.6 G/DL (ref 32–36)
MCV RBC AUTO: 92.4 FL (ref 80–100)
MONOCYTES # BLD AUTO: 431 CELLS/UL (ref 200–950)
MONOCYTES NFR BLD AUTO: 7.7 %
NEUTROPHILS # BLD AUTO: 3763 CELLS/UL (ref 1500–7800)
NEUTROPHILS NFR BLD AUTO: 67.2 %
NONHDLC SERPL-MCNC: 86 MG/DL (CALC)
PHOSPHATE SERPL-MCNC: 4.6 MG/DL (ref 2.1–4.3)
PLATELET # BLD AUTO: 166 THOUSAND/UL (ref 140–400)
PMV BLD REES-ECKER: 12.1 FL (ref 7.5–12.5)
POTASSIUM SERPL-SCNC: 4.2 MMOL/L (ref 3.5–5.3)
PROT SERPL-MCNC: 7.4 G/DL (ref 6.1–8.1)
PROT UR-MCNC: 24 MG/DL (ref 5–24)
PROT/CREAT UR: 0.24 MG/MG CREAT (ref 0.02–0.16)
PROT/CREAT UR: 238 MG/G CREAT (ref 21–161)
RBC # BLD AUTO: 3.83 MILLION/UL (ref 3.8–5.1)
SL AMB EGFR AFRICAN AMERICAN: 21 ML/MIN/1.73M2
SL AMB EGFR NON AFRICAN AMERICAN: 18 ML/MIN/1.73M2
SODIUM SERPL-SCNC: 140 MMOL/L (ref 135–146)
TIBC SERPL-MCNC: 333 MCG/DL (CALC) (ref 250–450)
TRIGL SERPL-MCNC: 109 MG/DL
WBC # BLD AUTO: 5.6 THOUSAND/UL (ref 3.8–10.8)

## 2020-09-03 LAB
CALCIUM SERPL-MCNC: 8.9 MG/DL (ref 8.6–10.4)
PTH-INTACT SERPL-MCNC: 90 PG/ML (ref 14–64)

## 2020-09-04 PROBLEM — N18.4 ANEMIA OF CHRONIC RENAL FAILURE, STAGE 4 (SEVERE) (HCC): Status: ACTIVE | Noted: 2018-05-16

## 2020-09-04 PROBLEM — E87.3 METABOLIC ALKALOSIS: Status: ACTIVE | Noted: 2020-09-04

## 2020-09-04 PROBLEM — E61.1 IRON DEFICIENCY: Status: ACTIVE | Noted: 2020-09-04

## 2020-09-04 PROBLEM — N18.4 CHRONIC KIDNEY DISEASE, STAGE IV (SEVERE) (HCC): Status: ACTIVE | Noted: 2020-09-04

## 2020-09-04 NOTE — PROGRESS NOTES
Virtual Regular Visit      Assessment/Plan:  1   CKD stage 4 :  · Etiology:  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 of TIANA  · Baseline creatinine:  More Recently:  1 76-2 11  · Current creatinine:  2 26 slightly higher than baseline:  No new medications  Possible mild dehydration or mild progression  · Urine protein creatinine ratio:  0 238 g at goal  Recommendations:  · Treat hypertension-please see below  · Treat dyslipidemia-please see below  · Maintain proteinuria less than 1 g or as low as possible  · Avoid nephrotoxic agents such as NSAIDs, patient counseled as such  · Recheck a basic metabolic profile at this time with good hydration  2   Volume:  Euvolemic, chronic left lower extremity/ankle edema which is without change  3   Hypertension:    · Home blood pressure readings:  None at this time    · Goal blood pressure:  Less than 125/80 given CAD  Recommendations:  · Push nonmedical regimen including weight loss, isotonic exercise and avoidance of salt; patient counseled as such  · Medication changes today:  No changes pending 1 week of readings  4   Electrolytes:  All acceptable at this time; patient with mild metabolic alkalosis secondary to chronic CO2 retention   This is actually stable at 32    5   Mineral bone disorder:  · Calcium/magnesium/phosphorus:  Minimal hyperphosphatemia 4 6 otherwise magnesium calcium acceptable  Just monitor the low phosphorus diet:  · PTH intact:  90 which is acceptable  · Vitamin-D:  will be done next visit  6   Dyslipidemia:  · Goal LDL:  Less than 70 ideally given CAD  · Current lipid profile:  LDL 67/HDL 54/triglycerides 109   Recommendations:  At goal no changes  Cannot take statins  Continue Welchol and Zetia  Katelyn [place]   7   Anemia:  Related to CKD and chronic disease  · Hemoglobin stable at 11 6 from 07/17/2020  · Iron studies:  Saturation 25%/ferritin 79:  Oral iron as tolerated  8   Other problems:  · Status post  CVA  · CAD/atrial fibrillation:  Followed by Dr Sd Antonio  · History of MGUS followed by Dr Mayra Garibay  · Admission in December 2018 for change in mental status after being treated for URI   Patient was felt to have hypercapnia and acute tracheobronchitis with mild persistent asthma from the tracheobronchitis   She was treated with intravenous steroids and Rocephin an oxygen supplementation during the day and at nighttime        Problem List Items Addressed This Visit        Cardiovascular and Mediastinum    Orthostatic hypotension    Relevant Orders    Basic metabolic panel    Comprehensive metabolic panel    CK    CBC    Lipid Panel with Direct LDL reflex    Magnesium    Phosphorus    Protein / creatinine ratio, urine    PTH, intact    Vitamin D 25 hydroxy    Ferritin    Iron Saturation %       Genitourinary    Anemia of chronic renal failure, stage 4 (severe) ([facility])    Relevant Orders    Basic metabolic panel    Comprehensive metabolic panel    CK    CBC    Lipid Panel with Direct LDL reflex    Magnesium    Phosphorus    Protein / creatinine ratio, urine    PTH, intact    Vitamin D 25 hydroxy    Ferritin    Iron Saturation %    Hypertensive chronic kidney disease with stage 1 through stage 4 chronic kidney disease, or unspecified chronic kidney disease - Primary    Relevant Orders    Basic metabolic panel    Comprehensive metabolic panel    CK    CBC    Lipid Panel with Direct LDL reflex    Magnesium    Phosphorus    Protein / creatinine ratio, urine    PTH, intact    Vitamin D 25 hydroxy    Ferritin    Iron Saturation %    Chronic kidney disease, stage IV (severe) (HCC)    Relevant Orders    Basic metabolic panel    Comprehensive metabolic panel    CK    CBC    Lipid Panel with Direct LDL reflex    Magnesium    Phosphorus    Protein / creatinine ratio, urine    PTH, intact    Vitamin D 25 hydroxy    Ferritin    Iron Saturation %       Other    Dyslipidemia (Chronic)    Relevant Orders    Basic metabolic panel    Comprehensive metabolic panel    CK    CBC    Lipid Panel with Direct LDL reflex    Magnesium    Phosphorus    Protein / creatinine ratio, urine    PTH, intact    Vitamin D 25 hydroxy    Ferritin    Iron Saturation %    Vitamin D deficiency (Chronic)    Relevant Orders    Basic metabolic panel    Comprehensive metabolic panel    CK    CBC    Lipid Panel with Direct LDL reflex    Magnesium    Phosphorus    Protein / creatinine ratio, urine    PTH, intact    Vitamin D 25 hydroxy    Ferritin    Iron Saturation %    Hyperphosphatemia    Relevant Orders    Basic metabolic panel    Comprehensive metabolic panel    CK    CBC    Lipid Panel with Direct LDL reflex    Magnesium    Phosphorus    Protein / creatinine ratio, urine    PTH, intact    Vitamin D 25 hydroxy    Ferritin    Iron Saturation %    Iron deficiency    Relevant Orders    Basic metabolic panel    Comprehensive metabolic panel    CK    CBC    Lipid Panel with Direct LDL reflex    Magnesium    Phosphorus    Protein / creatinine ratio, urine    PTH, intact    Vitamin D 25 hydroxy    Ferritin    Iron Saturation %    Metabolic alkalosis    Relevant Orders    Basic metabolic panel    Comprehensive metabolic panel    CK    CBC    Lipid Panel with Direct LDL reflex    Magnesium    Phosphorus    Protein / creatinine ratio, urine    PTH, intact    Vitamin D 25 hydroxy    Ferritin    Iron Saturation %               Reason for visit is   Chief Complaint   Patient presents with    Virtual Regular Visit        Encounter provider Jake Ring MD    Provider located at [address] [address]      Recent Visits  No visits were found meeting these conditions     Showing recent visits within past 7 days and meeting all other requirements     Today's Visits  Date Type Provider Dept   09/09/20 Telemedicine Jake Ring MD [facility]'s visits and meeting all other requirements     Future Appointments  No visits were found meeting these conditions  Showing future appointments within next 150 days and meeting all other requirements        The patient was identified by name and date of birth  Gaurang Card was informed that this is a telemedicine visit and that the visit is being conducted through [facility] and patient was informed that this is a secure, HIPAA-compliant platform  She agrees to proceed     My office door was closed  No one else was in the room  She acknowledged consent and understanding of privacy and security of the video platform  The patient has agreed to participate and understands they can discontinue the visit at any time  Patient is aware this is a billable service  Subjective  Gaurang Card is a 80 y o  female with CKD stage 3      HPI   There has been no hospitalizations or acute illnesses since last visit  The patient overall is feeling well  No fevers, chills, or cough or colds  Good appetite and good energy  No hematuria, dysuria, voiding symptoms or foamy urine  No gastrointestinal symptoms  No cardiovascular symptoms; stable left lower extremity swelling  No headaches, occasional dizziness when she is sitting but this is chronic but no  lightheadedness  Blood pressure medications:  · Amlodipine 5 mg daily in the morning  · Bystolic 10 mg daily in the morning  · Torsemide 10 mg every other day    Past Medical History:   Diagnosis Date    Arthritis     CHF (congestive heart failure) ([facility] [address] ) 64/2998    diastolic     CO2 retention     DVT (deep vein thrombosis) in pregnancy     left LE in 2/2018 and 50 years ago    Hyperlipidemia     Hypertension     Pulmonary embolism ([facility] [address] ) 02/06/2018    Renal disorder     ckd    Stroke ([facility] [address] ) 2016    left hand weakness,paresthesia  Past Surgical History:   Procedure Laterality Date    ABDOMINAL SURGERY      ruputured bowel  had colostomy and ileostomy,reversed later on   BACK SURGERY      rods in back   done 30 years amanda Velásquez CARPAL TUNNEL RELEASE Bilateral     CYST REMOVAL      back    HERNIA REPAIR      REPLACEMENT TOTAL KNEE BILATERAL         Current Outpatient Medications   Medication Sig Dispense Refill    acetaminophen (TYLENOL) 325 mg tablet Take 650 mg by mouth every 6 (six) hours as needed for mild pain      amLODIPine (NORVASC) 5 mg tablet Take 1 tablet (5 mg total) by mouth daily 90 tablet 2    apixaban (ELIQUIS) 2 5 mg Take 1 tablet (2 5 mg total) by mouth 2 (two) times a day Andrew[address] Evansarez [address]01  PATIENT WILL BE  OUT OF MEDICATION TONIGHT  180 tablet 3    Ascorbic Acid (VITAMIN C PO) Take 1 tablet by mouth daily      Breo Ellipta 200-25 MCG/INH inhaler INHALE 1 PUFF DAILY AS DIRECTED 60 each 6    calcium-vitamin D (OSCAL 500 + D) 500 mg-200 units per tablet Take 1 tablet by mouth 2 (two) times a day   cholecalciferol (VITAMIN D3) 1,000 units tablet Take 2,000 Units by mouth daily      co-enzyme Q-10 50 MG capsule Take 200 mg by mouth daily        ezetimibe (ZETIA) 10 mg tablet Take 1 tablet (10 mg total) by mouth daily 90 tablet 3    Multiple Vitamins-Minerals (OCUVITE PO) Take 1 tablet by mouth daily        nebivolol (Bystolic) 10 mg tablet Take 1 tablet (10 mg total) by mouth daily 90 tablet 3    pantoprazole (PROTONIX) 40 mg tablet Take 40 mg by mouth daily      torsemide (DEMADEX) 10 mg tablet Take 10 mg by mouth every other day      WELCHOL 625 MG tablet TAKE 3 TABLETS TWICE A DAILY WITH MEALS  540 tablet 3    fluticasone-vilanterol (BREO ELLIPTA) 100-25 mcg/inh inhaler Inhale 1 puff daily for 14 days Rinse mouth after use  1 Inhaler 0     No current facility-administered medications for this visit           Allergies   Allergen Reactions    Statins Myalgia    Lyrica [Pregabalin] Other (See Comments)     Client says it made her "nutty"       Review of Systems:  Please see HPI, otherwise review of systems is completely reviewed with the patient are negative    Video Exam    There were no vitals filed for this visit  Physical Exam       General:  The patient feels comfortable, there is no acute distress/no overt diaphoresis  Skin:  No overt rash/there is no facial flushing or cyanosis and facial color appears normal  Head:  No overt trauma and normal overall appearance  HEENT:  Eyes appear normal without injection or scleral icterus and extraocular movements appear intact; mucous membranes appear moist; oropharynx is clear  Neck:  Trachea appears midline, no overt jugular venous distention,, and Neck is supple  Pulmonary:  Respiratory status appears normal :  The patient is able to speak without any overt shortness of breath and can speak in full sentences without any audible wheezing or stridor    Cardiovascular:  Heart rate is regular per patient/family/caregiver  Abdomen:  No tenderness as patient/family/caregiver press on the abdominal area with no overt distension  Musculoskeletal:  Mild swelling of the left lower extremity to her knee, but most to the ankle as the patient/family/caregiver presses on the legs or by visualization; no significant tenderness elicited/there is  significant arthritic changes of the hands  Neuro:  Patient appears to be able to move all extremities with no gross focality  Psych:  Patient is alert and oriented and appropriate and fully cooperative      I reviewed all the patient's medications with the patient during this visit  I have reviewed all the above findings and plans with the patient  I have answered the patient's questions to their complete satisfaction  I informed the patient that we will be mailing out instructions compatible with an after visit summary along with lab slips as needed  The patient was satisfied with all of the information as well as the visit  I spent 25 minutes directly with the patient during this visit      VIRTUAL VISIT DISCLAIMER    Gaurang Card acknowledges that she has consented to an online visit or consultation  She understands that the online visit is based solely on information provided by her, and that, in the absence of a face-to-face physical evaluation by the physician, the diagnosis she receives is both limited and provisional in terms of accuracy and completeness  This is not intended to replace a full medical face-to-face evaluation by the physician  Samia Dorsey understands and accepts these terms

## 2020-09-09 ENCOUNTER — TELEMEDICINE (OUTPATIENT)
Dept: NEPHROLOGY | Facility: CLINIC | Age: 85
End: 2020-09-09
Payer: MEDICARE

## 2020-09-09 DIAGNOSIS — N18.4 CHRONIC KIDNEY DISEASE, STAGE IV (SEVERE) (HCC): ICD-10-CM

## 2020-09-09 DIAGNOSIS — D63.1 ANEMIA OF CHRONIC RENAL FAILURE, STAGE 4 (SEVERE) (HCC): ICD-10-CM

## 2020-09-09 DIAGNOSIS — E87.3 METABOLIC ALKALOSIS: ICD-10-CM

## 2020-09-09 DIAGNOSIS — I12.9 HYPERTENSIVE CHRONIC KIDNEY DISEASE WITH STAGE 1 THROUGH STAGE 4 CHRONIC KIDNEY DISEASE, OR UNSPECIFIED CHRONIC KIDNEY DISEASE: Primary | ICD-10-CM

## 2020-09-09 DIAGNOSIS — I95.1 ORTHOSTATIC HYPOTENSION: ICD-10-CM

## 2020-09-09 DIAGNOSIS — N18.4 ANEMIA OF CHRONIC RENAL FAILURE, STAGE 4 (SEVERE) (HCC): ICD-10-CM

## 2020-09-09 DIAGNOSIS — E78.5 DYSLIPIDEMIA: ICD-10-CM

## 2020-09-09 DIAGNOSIS — E55.9 VITAMIN D DEFICIENCY: Chronic | ICD-10-CM

## 2020-09-09 DIAGNOSIS — E61.1 IRON DEFICIENCY: ICD-10-CM

## 2020-09-09 DIAGNOSIS — E83.39 HYPERPHOSPHATEMIA: ICD-10-CM

## 2020-09-09 PROCEDURE — 99214 OFFICE O/P EST MOD 30 MIN: CPT | Performed by: INTERNAL MEDICINE

## 2020-09-09 NOTE — LETTER
September 9, 2020     Parisa Jimenez, [address] [address]    Patient: Caroline Dorsey   YOB: 1928   Date of Visit: 9/9/2020       Dear Dr Anni Pastor: Thank you for referring Caroline Dorsey to me for evaluation  Below are my notes for this consultation  If you have questions, please do not hesitate to call me  I look forward to following your patient along with you  Sincerely,        Jake Ring MD        CC: Claude Leaf, MD Marvella Rink, MD Mathews Budge, MD  9/9/2020  3:37 PM  Sign when Signing Visit    Virtual Regular Visit      Assessment/Plan:  1   CKD stage 4 :  · Etiology:  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 of TIANA  · Baseline creatinine:  More Recently:  1 76-2 11  · Current creatinine:  2 26 slightly higher than baseline:  No new medications  Possible mild dehydration or mild progression    · Urine protein creatinine ratio:  0 238 g at goal  Recommendations:  · Treat hypertension-please see below  · Treat dyslipidemia-please see below  · Maintain proteinuria less than 1 g or as low as possible  · Avoid nephrotoxic agents such as NSAIDs, patient counseled as such  · Recheck a basic metabolic profile at this time with good hydration  2   Volume:  Euvolemic, chronic left lower extremity/ankle edema which is without change  3   Hypertension:    · Home blood pressure readings:  None at this time    · Goal blood pressure:  Less than 125/80 given CAD  Recommendations:  · Push nonmedical regimen including weight loss, isotonic exercise and avoidance of salt; patient counseled as such  · Medication changes today:  No changes pending 1 week of readings  4   Electrolytes:  All acceptable at this time; patient with mild metabolic alkalosis secondary to chronic CO2 retention   This is actually stable at 32    5   Mineral bone disorder:  · Calcium/magnesium/phosphorus:  Minimal hyperphosphatemia 4 6 otherwise magnesium calcium acceptable  Just monitor the low phosphorus diet:  · PTH intact:  90 which is acceptable  · Vitamin-D:  will be done next visit  6   Dyslipidemia:  · Goal LDL:  Less than 70 ideally given CAD  · Current lipid profile:  LDL 67/HDL 54/triglycerides 109   Recommendations:  At goal no changes  Cannot take statins  Continue Welchol and Sabi Woodson   7   Anemia:  Related to CKD and chronic disease  · Hemoglobin stable at 11 6 from 07/17/2020  · Iron studies:  Saturation 25%/ferritin 79:  Oral iron as tolerated  8   Other problems:  · Status post  CVA  · CAD/atrial fibrillation:  Followed by Dr Trice Mccollum  · History of MGUS followed by Dr Hari Fabian  · Admission in December 2018 for change in mental status after being treated for URI   Patient was felt to have hypercapnia and acute tracheobronchitis with mild persistent asthma from the tracheobronchitis   She was treated with intravenous steroids and Rocephin an oxygen supplementation during the day and at nighttime        Problem List Items Addressed This Visit        Cardiovascular and Mediastinum    Orthostatic hypotension    Relevant Orders    Basic metabolic panel    Comprehensive metabolic panel    CK    CBC    Lipid Panel with Direct LDL reflex    Magnesium    Phosphorus    Protein / creatinine ratio, urine    PTH, intact    Vitamin D 25 hydroxy    Ferritin    Iron Saturation %       Genitourinary    Anemia of chronic renal failure, stage 4 (severe) ([facility])    Relevant Orders    Basic metabolic panel    Comprehensive metabolic panel    CK    CBC    Lipid Panel with Direct LDL reflex    Magnesium    Phosphorus    Protein / creatinine ratio, urine    PTH, intact    Vitamin D 25 hydroxy    Ferritin    Iron Saturation %    Hypertensive chronic kidney disease with stage 1 through stage 4 chronic kidney disease, or unspecified chronic kidney disease - Primary    Relevant Orders    Basic metabolic panel    Comprehensive metabolic panel    CK    CBC    Lipid Panel with Direct LDL reflex    Magnesium    Phosphorus    Protein / creatinine ratio, urine    PTH, intact    Vitamin D 25 hydroxy    Ferritin    Iron Saturation %    Chronic kidney disease, stage IV (severe) (HCC)    Relevant Orders    Basic metabolic panel    Comprehensive metabolic panel    CK    CBC    Lipid Panel with Direct LDL reflex    Magnesium    Phosphorus    Protein / creatinine ratio, urine    PTH, intact    Vitamin D 25 hydroxy    Ferritin    Iron Saturation %       Other    Dyslipidemia (Chronic)    Relevant Orders    Basic metabolic panel    Comprehensive metabolic panel    CK    CBC    Lipid Panel with Direct LDL reflex    Magnesium    Phosphorus    Protein / creatinine ratio, urine    PTH, intact    Vitamin D 25 hydroxy    Ferritin    Iron Saturation %    Vitamin D deficiency (Chronic)    Relevant Orders    Basic metabolic panel    Comprehensive metabolic panel    CK    CBC    Lipid Panel with Direct LDL reflex    Magnesium    Phosphorus    Protein / creatinine ratio, urine    PTH, intact    Vitamin D 25 hydroxy    Ferritin    Iron Saturation %    Hyperphosphatemia    Relevant Orders    Basic metabolic panel    Comprehensive metabolic panel    CK    CBC    Lipid Panel with Direct LDL reflex    Magnesium    Phosphorus    Protein / creatinine ratio, urine    PTH, intact    Vitamin D 25 hydroxy    Ferritin    Iron Saturation %    Iron deficiency    Relevant Orders    Basic metabolic panel    Comprehensive metabolic panel    CK    CBC    Lipid Panel with Direct LDL reflex    Magnesium    Phosphorus    Protein / creatinine ratio, urine    PTH, intact    Vitamin D 25 hydroxy    Ferritin    Iron Saturation %    Metabolic alkalosis    Relevant Orders    Basic metabolic panel    Comprehensive metabolic panel    CK    CBC    Lipid Panel with Direct LDL reflex    Magnesium    Phosphorus    Protein / creatinine ratio, urine    PTH, intact    Vitamin D 25 hydroxy    Ferritin    Iron Saturation %               Reason for visit is Chief Complaint   Patient presents with    Virtual Regular Visit        Encounter provider Dylon Cruz MD    Provider located at [address] [place] [id number]      Recent Visits  No visits were found meeting these conditions  Showing recent visits within past 7 days and meeting all other requirements     Today's Visits  Date Type Provider Dept   09/09/20 Telemedicine Dylon Cruz MD [id number] today's visits and meeting all other requirements     Future Appointments  No visits were found meeting these conditions  Showing future appointments within next 150 days and meeting all other requirements        The patient was identified by name and date of birth  Gaurang Card was informed that this is a telemedicine visit and that the visit is being conducted through [facility] and patient was informed that this is a secure, HIPAA-compliant platform  She agrees to proceed     My office door was closed  No one else was in the room  She acknowledged consent and understanding of privacy and security of the video platform  The patient has agreed to participate and understands they can discontinue the visit at any time  Patient is aware this is a billable service  Subjective  Gaurang Card is a 80 y o  female with CKD stage 3      HPI   There has been no hospitalizations or acute illnesses since last visit  The patient overall is feeling well  No fevers, chills, or cough or colds    Good appetite and good energy  No hematuria, dysuria, voiding symptoms or foamy urine  No gastrointestinal symptoms  No cardiovascular symptoms; stable left lower extremity swelling  No headaches, occasional dizziness when she is sitting but this is chronic but no  lightheadedness  Blood pressure medications:  · Amlodipine 5 mg daily in the morning  · Bystolic 10 mg daily in the morning  · Torsemide 10 mg every other day    Past Medical History:   Diagnosis Date    Arthritis     CHF (congestive heart failure) ([facility] [address] ) 07/6158    diastolic     CO2 retention     DVT (deep vein thrombosis) in pregnancy     left LE in 2/2018 and 50 years ago    Hyperlipidemia     Hypertension     [facility]monary embolism ([facility] ) 02/06/2018    Renal disorder     ckd    [facility] 2016    left hand weakness,paresthesia  Past Surgical History:   Procedure Laterality Date    ABDOMINAL SURGERY      ruputured bowel  had colostomy and ileostomy,reversed later on   BACK SURGERY      rods in back  done 30 years ago   CARPAL TUNNEL RELEASE Bilateral     CYST REMOVAL      back    HERNIA REPAIR      REPLACEMENT TOTAL KNEE BILATERAL         Current Outpatient Medications   Medication Sig Dispense Refill    acetaminophen (TYLENOL) 325 mg tablet Take 650 mg by mouth every 6 (six) hours as needed for mild pain      amLODIPine (NORVASC) 5 mg tablet Take 1 tablet (5 mg total) by mouth daily 90 tablet 2    apixaban (ELIQUIS) 2 5 mg Take 1 tablet (2 5 mg total) by mouth 2 (two) times a day [facility] [address]82  PATIENT WILL BE  OUT OF MEDICATION TONIGHT  180 tablet 3    Ascorbic Acid (VITAMIN C PO) Take 1 tablet by mouth daily      Breo Ellipta 200-25 MCG/INH inhaler INHALE 1 PUFF DAILY AS DIRECTED 60 each 6    calcium-vitamin D (OSCAL 500 + D) 500 mg-200 units per tablet Take 1 tablet by mouth 2 (two) times a day        cholecalciferol (VITAMIN D3) 1,000 units tablet Take 2,000 Units by mouth daily      co-enzyme Q-10 50 MG capsule Take 200 mg by mouth daily        ezetimibe (ZETIA) 10 mg tablet Take 1 tablet (10 mg total) by mouth daily 90 tablet 3    Multiple Vitamins-Minerals (OCUVITE PO) Take 1 tablet by mouth daily        nebivolol (Bystolic) 10 mg tablet Take 1 tablet (10 mg total) by mouth daily 90 tablet 3    pantoprazole (PROTONIX) 40 mg tablet Take 40 mg by mouth daily      torsemide (DEMADEX) 10 mg tablet Take 10 mg by mouth every other day      WELCHOL 625 MG tablet TAKE 3 TABLETS TWICE A DAILY WITH MEALS  540 tablet 3    fluticasone-vilanterol (BREO ELLIPTA) 100-25 mcg/inh inhaler Inhale 1 puff daily for 14 days Rinse mouth after use  1 Inhaler 0     No current facility-administered medications for this visit  Allergies   Allergen Reactions    Statins Myalgia    Lyrica [Pregabalin] Other (See Comments)     Client says it made her "nutty"       Review of Systems:  Please see HPI, otherwise review of systems is completely reviewed with the patient are negative    Video Exam    There were no vitals filed for this visit      Physical Exam       General:  The patient feels comfortable, there is no acute distress/no overt diaphoresis  Skin:  No overt rash/there is no facial flushing or cyanosis and facial color appears normal  Head:  No overt trauma and normal overall appearance  HEENT:  Eyes appear normal without injection or scleral icterus and extraocular movements appear intact; mucous membranes appear moist; oropharynx is clear  Neck:  Trachea appears midline, no overt jugular venous distention,, and Neck is supple  Pulmonary:  Respiratory status appears normal :  The patient is able to speak without any overt shortness of breath and can speak in full sentences without any audible wheezing or stridor    Cardiovascular:  Heart rate is regular per patient/family/caregiver  Abdomen:  No tenderness as patient/family/caregiver press on the abdominal area with no overt distension  Musculoskeletal:  Mild swelling of the left lower extremity to her knee, but most to the ankle as the patient/family/caregiver presses on the legs or by visualization; no significant tenderness elicited/there is  significant arthritic changes of the hands  Neuro:  Patient appears to be able to move all extremities with no gross focality  Psych:  Patient is alert and oriented and appropriate and fully cooperative      I reviewed all the patient's medications with the patient during this visit  I have reviewed all the above findings and plans with the patient  I have answered the patient's questions to their complete satisfaction  I informed the patient that we will be mailing out instructions compatible with an after visit summary along with lab slips as needed  The patient was satisfied with all of the information as well as the visit  I spent 25 minutes directly with the patient during this visit      VIRTUAL VISIT DISCLAIMER    Samia Dorsey acknowledges that she has consented to an online visit or consultation  She understands that the online visit is based solely on information provided by her, and that, in the absence of a face-to-face physical evaluation by the physician, the diagnosis she receives is both limited and provisional in terms of accuracy and completeness  This is not intended to replace a full medical face-to-face evaluation by the physician  Samia Dorsey understands and accepts these terms

## 2020-09-09 NOTE — PATIENT INSTRUCTIONS
1  Medication changes today:   Continue to take iron 3 times a week as tolerated    2  Please go for non fasting  lab work 1-2 weeks after making the above medication change  MAKE SURE YOUR ARE WELL-HYDRATED FOR THE TEST    3  Please take 1 week a blood pressure readings AT THIS TIME ,  MORNING AND EVENING, SITTING AND STANDING as follows:  · TAKE THE MORNING READINGS BEFORE ANY MEDICATIONS AND WHEN YOU ARE RELAX FOR SEVERAL MINUTES  · TAKE THE EVENING READINGS BEFORE SUPPER/DINNER AND BEFORE ANY MEDICATIONS AND AGAIN WHEN YOU ARE RELAX FOR SEVERAL MINUTES  · PLEASE INCLUDE HEART RATE WITH YOUR BLOOD PRESSURE READINGS  · When taking standing readings, keep your arm supported at heart level and not dangling  · Make sure you are sitting with your back supported and feet on the ground and do not cross your legs or feet  · Make sure you have not taken any coffee or caffeine products or exercised or smoke cigarettes at least 30 minutes before taking your blood pressure  Then please mail these readings into the office    4  Follow-up in 3-4 months   Please bring in 1 week a blood pressure readings morning evening, sitting and standing is outlined above   PLEASE BRING IN YOUR BLOOD PRESSURE MACHINE FOR CORRELATION WITH THE OFFICE MACHINE AT THE NEXT VISIT   Please go for fasting lab work 1-2 weeks prior to your appointment:  Please go in approximately 3 months for the lab work      5  General instructions:   AVOID SALT BUT NOT ADDING AN READING LABELS TO MAKE SURE THERE IS LOW-SALT IN THE FOOD THAT YOU ARE EATING     Avoid nonsteroidal anti-inflammatory drugs such as Naprosyn, ibuprofen, Aleve, Advil, Celebrex, Meloxicam (Mobic) etc   You can use Tylenol as needed if you do not have any liver condition to be concerned about     Avoid medications such as Sudafed or decongestants and antihistamines that contained the D component which is the decongestant    You can take antihistamines without the decongestant or D component   Try to remain as active as possible     Please do not drink more than 2 glasses of alcohol/wine on a daily basis as this may contribute to your high blood pressure

## 2020-09-16 ENCOUNTER — TELEPHONE (OUTPATIENT)
Dept: CARDIOLOGY CLINIC | Facility: CLINIC | Age: 85
End: 2020-09-16

## 2020-09-16 NOTE — TELEPHONE ENCOUNTER
----- Message from Betsy Rivera MD sent at 9/15/2020  5:49 PM EDT -----  Notify patient or daughter that lipids were normal

## 2020-09-19 DIAGNOSIS — J45.30 MILD PERSISTENT ASTHMA WITHOUT COMPLICATION: ICD-10-CM

## 2020-09-21 DIAGNOSIS — J45.30 MILD PERSISTENT ASTHMA WITHOUT COMPLICATION: ICD-10-CM

## 2020-09-22 ENCOUNTER — TELEPHONE (OUTPATIENT)
Dept: NEPHROLOGY | Facility: CLINIC | Age: 85
End: 2020-09-22

## 2020-09-22 ENCOUNTER — DOCUMENTATION (OUTPATIENT)
Dept: NEPHROLOGY | Facility: CLINIC | Age: 85
End: 2020-09-22

## 2020-09-22 LAB
BUN SERPL-MCNC: 52 MG/DL (ref 7–25)
BUN/CREAT SERPL: 31 (CALC) (ref 6–22)
CALCIUM SERPL-MCNC: 9.8 MG/DL (ref 8.6–10.4)
CHLORIDE SERPL-SCNC: 106 MMOL/L (ref 98–110)
CO2 SERPL-SCNC: 33 MMOL/L (ref 20–32)
CREAT SERPL-MCNC: 1.67 MG/DL (ref 0.6–0.88)
GLUCOSE SERPL-MCNC: 87 MG/DL (ref 65–99)
POTASSIUM SERPL-SCNC: 4.9 MMOL/L (ref 3.5–5.3)
SL AMB EGFR AFRICAN AMERICAN: 30 ML/MIN/1.73M2
SL AMB EGFR NON AFRICAN AMERICAN: 26 ML/MIN/1.73M2
SODIUM SERPL-SCNC: 142 MMOL/L (ref 135–146)

## 2020-09-22 NOTE — PROGRESS NOTES
Home blood pressure readings:  -a m :  127/50   -p m :  135/60    Heart rate 50s    Given low diastolic readings I would not make any changes    Thanks

## 2020-09-22 NOTE — TELEPHONE ENCOUNTER
----- Message from Render Press, MD sent at 9/22/2020  7:45 AM EDT -----  Please let the patient know that her kidney function is back to baseline

## 2020-10-12 ENCOUNTER — OFFICE VISIT (OUTPATIENT)
Dept: PULMONOLOGY | Facility: MEDICAL CENTER | Age: 85
End: 2020-10-12
Payer: MEDICARE

## 2020-10-12 DIAGNOSIS — J45.30 MILD PERSISTENT ASTHMA WITHOUT COMPLICATION: Primary | ICD-10-CM

## 2020-10-12 DIAGNOSIS — J98.4 RESTRICTIVE LUNG DISEASE DUE TO KYPHOSCOLIOSIS: ICD-10-CM

## 2020-10-12 DIAGNOSIS — J45.31 MILD PERSISTENT ASTHMA WITH ACUTE EXACERBATION: ICD-10-CM

## 2020-10-12 DIAGNOSIS — J96.12 CHRONIC RESPIRATORY FAILURE WITH HYPOXIA AND HYPERCAPNIA (HCC): ICD-10-CM

## 2020-10-12 DIAGNOSIS — M41.9 RESTRICTIVE LUNG DISEASE DUE TO KYPHOSCOLIOSIS: ICD-10-CM

## 2020-10-12 DIAGNOSIS — J96.11 CHRONIC RESPIRATORY FAILURE WITH HYPOXIA AND HYPERCAPNIA (HCC): ICD-10-CM

## 2020-10-12 PROCEDURE — 99213 OFFICE O/P EST LOW 20 MIN: CPT | Performed by: INTERNAL MEDICINE

## 2020-10-14 RX ORDER — FLUTICASONE FUROATE AND VILANTEROL 100; 25 UG/1; UG/1
1 POWDER RESPIRATORY (INHALATION) DAILY
Qty: 1 INHALER | Refills: 8 | Status: SHIPPED | COMMUNITY
Start: 2020-10-14 | End: 2020-10-23 | Stop reason: SDUPTHER

## 2020-10-23 DIAGNOSIS — J45.31 MILD PERSISTENT ASTHMA WITH ACUTE EXACERBATION: ICD-10-CM

## 2020-10-23 RX ORDER — FLUTICASONE FUROATE AND VILANTEROL 100; 25 UG/1; UG/1
1 POWDER RESPIRATORY (INHALATION) DAILY
Qty: 1 INHALER | Refills: 8 | Status: SHIPPED | COMMUNITY
Start: 2020-10-23 | End: 2021-04-28

## 2020-12-27 DIAGNOSIS — N17.9 AKI (ACUTE KIDNEY INJURY) (HCC): ICD-10-CM

## 2020-12-27 DIAGNOSIS — I95.1 ORTHOSTATIC HYPOTENSION: ICD-10-CM

## 2020-12-27 DIAGNOSIS — N18.30 CHRONIC KIDNEY DISEASE, STAGE III (MODERATE) (HCC): Chronic | ICD-10-CM

## 2020-12-27 DIAGNOSIS — E78.5 DYSLIPIDEMIA: Chronic | ICD-10-CM

## 2020-12-27 DIAGNOSIS — N18.30 ANEMIA OF CHRONIC RENAL FAILURE, STAGE 3 (MODERATE) (HCC): ICD-10-CM

## 2020-12-27 DIAGNOSIS — I12.9 HYPERTENSIVE CHRONIC KIDNEY DISEASE WITH STAGE 1 THROUGH STAGE 4 CHRONIC KIDNEY DISEASE, OR UNSPECIFIED CHRONIC KIDNEY DISEASE: ICD-10-CM

## 2020-12-27 DIAGNOSIS — E55.9 VITAMIN D DEFICIENCY: Chronic | ICD-10-CM

## 2020-12-27 DIAGNOSIS — D63.1 ANEMIA OF CHRONIC RENAL FAILURE, STAGE 3 (MODERATE) (HCC): ICD-10-CM

## 2020-12-28 RX ORDER — COLESEVELAM HYDROCHLORIDE 625 MG/1
TABLET, FILM COATED ORAL
Qty: 540 TABLET | Refills: 3 | Status: SHIPPED | OUTPATIENT
Start: 2020-12-28 | End: 2021-04-28

## 2021-01-15 ENCOUNTER — TELEPHONE (OUTPATIENT)
Dept: NEPHROLOGY | Facility: CLINIC | Age: 86
End: 2021-01-15

## 2021-01-15 NOTE — TELEPHONE ENCOUNTER
Patient's daughter called to confirm the patient's appointment date   Patient's daughter ask about labs and stated that she does home drawn labs through [facility]  I sent over a home drawn request to [facility]

## 2021-01-27 PROBLEM — R80.1 PERSISTENT PROTEINURIA: Status: ACTIVE | Noted: 2021-01-27

## 2021-01-27 LAB
25(OH)D3 SERPL-MCNC: 52 NG/ML (ref 30–100)
ALBUMIN SERPL-MCNC: 3.9 G/DL (ref 3.6–5.1)
ALBUMIN/GLOB SERPL: 1.1 (CALC) (ref 1–2.5)
ALP SERPL-CCNC: 58 U/L (ref 37–153)
ALT SERPL-CCNC: 14 U/L (ref 6–29)
AST SERPL-CCNC: 17 U/L (ref 10–35)
BASOPHILS # BLD AUTO: 12 CELLS/UL (ref 0–200)
BASOPHILS NFR BLD AUTO: 0.2 %
BILIRUB SERPL-MCNC: 0.4 MG/DL (ref 0.2–1.2)
BUN SERPL-MCNC: 41 MG/DL (ref 7–25)
BUN/CREAT SERPL: 23 (CALC) (ref 6–22)
CALCIUM SERPL-MCNC: 9.3 MG/DL (ref 8.6–10.4)
CALCIUM SERPL-MCNC: 9.4 MG/DL (ref 8.6–10.4)
CHLORIDE SERPL-SCNC: 105 MMOL/L (ref 98–110)
CHOLEST SERPL-MCNC: 149 MG/DL
CHOLEST/HDLC SERPL: 2.5 (CALC)
CK SERPL-CCNC: 50 U/L (ref 29–143)
CO2 SERPL-SCNC: 26 MMOL/L (ref 20–32)
CREAT SERPL-MCNC: 1.78 MG/DL (ref 0.6–0.88)
CREAT UR-MCNC: 69 MG/DL (ref 20–275)
EOSINOPHIL # BLD AUTO: 100 CELLS/UL (ref 15–500)
EOSINOPHIL NFR BLD AUTO: 1.7 %
ERYTHROCYTE [DISTWIDTH] IN BLOOD BY AUTOMATED COUNT: 12.1 % (ref 11–15)
FERRITIN SERPL-MCNC: 81 NG/ML (ref 16–288)
GLOBULIN SER CALC-MCNC: 3.6 G/DL (CALC) (ref 1.9–3.7)
GLUCOSE SERPL-MCNC: 81 MG/DL (ref 65–99)
HCT VFR BLD AUTO: 36.3 % (ref 35–45)
HDLC SERPL-MCNC: 60 MG/DL
HGB BLD-MCNC: 11.5 G/DL (ref 11.7–15.5)
IRON SATN MFR SERPL: 22 % (CALC) (ref 16–45)
IRON SERPL-MCNC: 77 MCG/DL (ref 45–160)
LDLC SERPL CALC-MCNC: 69 MG/DL (CALC)
LYMPHOCYTES # BLD AUTO: 1410 CELLS/UL (ref 850–3900)
LYMPHOCYTES NFR BLD AUTO: 23.9 %
MAGNESIUM SERPL-MCNC: 2.2 MG/DL (ref 1.5–2.5)
MCH RBC QN AUTO: 29.6 PG (ref 27–33)
MCHC RBC AUTO-ENTMCNC: 31.7 G/DL (ref 32–36)
MCV RBC AUTO: 93.6 FL (ref 80–100)
MONOCYTES # BLD AUTO: 431 CELLS/UL (ref 200–950)
MONOCYTES NFR BLD AUTO: 7.3 %
NEUTROPHILS # BLD AUTO: 3947 CELLS/UL (ref 1500–7800)
NEUTROPHILS NFR BLD AUTO: 66.9 %
NONHDLC SERPL-MCNC: 89 MG/DL (CALC)
PHOSPHATE SERPL-MCNC: 4 MG/DL (ref 2.1–4.3)
PLATELET # BLD AUTO: 175 THOUSAND/UL (ref 140–400)
PMV BLD REES-ECKER: 11.3 FL (ref 7.5–12.5)
POTASSIUM SERPL-SCNC: 4.5 MMOL/L (ref 3.5–5.3)
PROT SERPL-MCNC: 7.5 G/DL (ref 6.1–8.1)
PROT UR-MCNC: 130 MG/DL (ref 5–24)
PROT/CREAT UR: 1.88 MG/MG CREAT (ref 0.02–0.16)
PROT/CREAT UR: 1884 MG/G CREAT (ref 21–161)
PTH-INTACT SERPL-MCNC: 23 PG/ML (ref 14–64)
RBC # BLD AUTO: 3.88 MILLION/UL (ref 3.8–5.1)
SL AMB EGFR AFRICAN AMERICAN: 28 ML/MIN/1.73M2
SL AMB EGFR NON AFRICAN AMERICAN: 24 ML/MIN/1.73M2
SODIUM SERPL-SCNC: 138 MMOL/L (ref 135–146)
TIBC SERPL-MCNC: 349 MCG/DL (CALC) (ref 250–450)
TRIGL SERPL-MCNC: 115 MG/DL
WBC # BLD AUTO: 5.9 THOUSAND/UL (ref 3.8–10.8)

## 2021-01-28 NOTE — PROGRESS NOTES
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  Etiology  Given age and comorbidities I would treat this conservatively  Discussed with the patient and her niece who agree with the conservative approach with no further investigation such as lab tests/serologies or kidney biopsy  I would try to add an angiotensin receptor 2 blocker to her regimen with good blood pressure control  Recommendations:  · Treat hypertension-please see below  · Treat dyslipidemia-please see below  · Maintain proteinuria less than 1 g or as low as possible     2   Volume:  Euvolemic, chronic left lower extremity/ankle edema which is without change  3   Hypertension:    · Home blood pressure readings:   ·  none at this time     · Goal blood pressure:  Less than 125/75 given proteinuria  Recommendations:  · Push nonmedical regimen including weight loss, isotonic exercise and avoidance of salt; patient counseled as such  · Preferably add a small dose of an angiotensin receptor 2 blocker given proteinuria  · Medication changes today:    ·  add losartan 25 mg daily in the morning  4   Electrolytes:  All acceptable at this time  5   Mineral bone disorder:  · Calcium/magnesium/phosphorus:  all acceptable  · PTH intact:  23 which is acceptable  · Vitamin-D: 52  6   Dyslipidemia:  · Goal LDL:  Less than 70 ideally given CAD  · Current lipid profile:  LDL  69/HDL 60/triglycerides 115  Recommendations:  At goal no changes   Cannot take statins   Continue Welchol and Zetia  Fernya Playesha   7   Anemia:  Related to CKD and chronic disease  · Hemoglobin stable at 11 5  · Iron studies:  Saturation 22%/ferritin saturation 22%/ferritin 81:  Oral iron over-the-counter on a daily basis as tolerated  8   Other problems:  · Status post  CVA  · CAD/atrial fibrillation:  Followed by Dr Nitish Márquez  · History of MGUS followed by Dr Crow Peralta  · Admission in December 2018 for change in mental status after being treated for URI   Patient was felt to have hypercapnia and acute tracheobronchitis with mild persistent asthma from the tracheobronchitis   She was treated with intravenous steroids and Rocephin an oxygen supplementation during the day and at nighttime  Problem List Items Addressed This Visit     None               Reason for visit is   Chief Complaint   Patient presents with    Virtual Regular Visit        Encounter provider Mariluz Vega MD    Provider located at [address] [place] [id number]      Recent Visits  No visits were found meeting these conditions  Showing recent visits within past 7 days and meeting all other requirements     Future Appointments  No visits were found meeting these conditions  Showing future appointments within next 150 days and meeting all other requirements        The patient was identified by name and date of birth  Bia Lopez was informed that this is a telemedicine visit and that the visit is being conducted through [facility] and patient was informed that this is a secure, HIPAA-compliant platform  She agrees to proceed     My office door was closed  No one else was in the room  She acknowledged consent and understanding of privacy and security of the video platform  The patient has agreed to participate and understands they can discontinue the visit at any time  Patient is aware this is a billable service  Subjective  Bia Lopez is a 80 y o  female  With CKD stage 4      HPI   There has been no hospitalizations or acute illnesses since last visit  The patient overall is feeling well  No fevers, chills, or cough or colds    Good appetite and good energy  No hematuria, dysuria, voiding symptoms or foamy urine  No gastrointestinal symptoms  No cardiovascular symptoms, mild chronic lower extremity edema unchanged  No headaches, dizziness or lightheadedness  Blood pressure medications:  ·  amlodipine 5 mg daily  ·  Bystolic 10 mg daily  ·  torsemide 10 mg daily in the morning    Past Medical History:   Diagnosis Date    Arthritis     CHF (congestive heart failure) ([facility] [address] ) 34/7997    diastolic     CO2 retention     DVT (deep vein thrombosis) in pregnancy     left LE in 2/2018 and 50 years ago    Hyperlipidemia     Hypertension     Pulmonary embolism ([facility] ) 02/06/2018    Renal disorder     ckd    [facility] 2016    left hand weakness,paresthesia  Past Surgical History:   Procedure Laterality Date    ABDOMINAL SURGERY      ruputured bowel  had colostomy and ileostomy,reversed later on   BACK SURGERY      rods in back  done 30 years ago   CARPAL TUNNEL RELEASE Bilateral     CYST REMOVAL      back    HERNIA REPAIR      REPLACEMENT TOTAL KNEE BILATERAL         Current Outpatient Medications   Medication Sig Dispense Refill    acetaminophen (TYLENOL) 325 mg tablet Take 650 mg by mouth every 6 (six) hours as needed for mild pain      amLODIPine (NORVASC) 5 mg tablet Take 1 tablet (5 mg total) by mouth daily 90 tablet 2    apixaban (ELIQUIS) 2 5 mg Take 1 tablet (2 5 mg total) by mouth 2 (two) times a day [address] 3070  PATIENT WILL BE  OUT OF MEDICATION TONIGHT  180 tablet 3    Ascorbic Acid (VITAMIN C PO) Take 1 tablet by mouth daily      calcium-vitamin D (OSCAL 500 + D) 500 mg-200 units per tablet Take 1 tablet by mouth 2 (two) times a day        cholecalciferol (VITAMIN D3) 1,000 units tablet Take 2,000 Units by mouth daily      co-enzyme Q-10 50 MG capsule Take 200 mg by mouth daily        ezetimibe (ZETIA) 10 mg tablet Take 1 tablet (10 mg total) by mouth daily 90 tablet 3    ferrous sulfate 325 (65 Fe) mg tablet Take 325 mg by mouth every other day      fluticasone-vilanterol (BREO ELLIPTA) 100-25 mcg/inh inhaler Inhale 1 puff daily Rinse mouth after use  1 Inhaler 8    Multiple Vitamins-Minerals (OCUVITE PO) Take 1 tablet by mouth daily        nebivolol (Bystolic) 10 mg tablet Take 1 tablet (10 mg total) by mouth daily 90 tablet 3    pantoprazole (PROTONIX) 40 mg tablet Take 40 mg by mouth daily      torsemide (DEMADEX) 10 mg tablet Take 10 mg by mouth daily       Welchol 625 MG tablet TAKE 3 TABLETS TWICE DAILY WITH MEALS AS DIRECTED 540 tablet 3     No current facility-administered medications for this visit           Allergies   Allergen Reactions    Statins Myalgia    Lyrica [Pregabalin] Other (See Comments)     Client says it made her "nutty"       Review of Systems:  Please see HPI, otherwise the review of systems as completely reviewed with the patient are negative    Video Exam    No blood pressures    Physical Exam       General:  The patient feels comfortable, there is no acute distress/no overt diaphoresis: on nasal canula oxxygen  Skin:  No overt rash/there is no facial flushing or cyanosis and facial color appears normal  Head:  No overt trauma and normal overall appearance  HEENT:  Eyes appear normal without injection or scleral icterus and extraocular movements appear intact; mucous membranes appear moist; oropharynx is clear  Neck:  Trachea appears midline, no overt jugular venous distention,, and Neck is supple  Pulmonary:  Respiratory status appears normal :  The patient is able to speak without any overt shortness of breath and can speak in full sentences without any audible wheezing or stridor    Cardiovascular:  Heart rate is regular per patient/family/caregiver  Abdomen:  No tenderness as patient/family/caregiver press on the abdominal area with no overt distension  Musculoskeletal:  Mild left vs right leg  edema as the patient/family/caregiver presses on the legs or by visualization; no significant tenderness elicited/moderate arthritic changes of the hands  Neuro:  Patient appears to be able to move all extremities with no gross focality  Psych:  Patient is alert and oriented and appropriate and fully cooperative      I reviewed all the patient's medications with the patient during this visit  I have reviewed all the above findings and plans with the patient  I have answered the patient's questions to their complete satisfaction  I informed the patient that we will be mailing out instructions compatible with an after visit summary along with lab slips as needed  The patient was satisfied with all of the information as well as the visit  I spent 25 minutes directly with the patient during this visit      VIRTUAL VISIT DISCLAIMER    Bia Lopez acknowledges that she has consented to an online visit or consultation  She understands that the online visit is based solely on information provided by her, and that, in the absence of a face-to-face physical evaluation by the physician, the diagnosis she receives is both limited and provisional in terms of accuracy and completeness  This is not intended to replace a full medical face-to-face evaluation by the physician  Bia Lopez understands and accepts these terms

## 2021-02-01 ENCOUNTER — TELEMEDICINE (OUTPATIENT)
Dept: NEPHROLOGY | Facility: CLINIC | Age: 86
End: 2021-02-01
Payer: MEDICARE

## 2021-02-01 DIAGNOSIS — M79.89 LEFT LEG SWELLING: ICD-10-CM

## 2021-02-01 DIAGNOSIS — R80.1 PERSISTENT PROTEINURIA: ICD-10-CM

## 2021-02-01 DIAGNOSIS — E83.39 HYPERPHOSPHATEMIA: ICD-10-CM

## 2021-02-01 DIAGNOSIS — I12.9 HYPERTENSIVE CHRONIC KIDNEY DISEASE WITH STAGE 1 THROUGH STAGE 4 CHRONIC KIDNEY DISEASE, OR UNSPECIFIED CHRONIC KIDNEY DISEASE: Primary | ICD-10-CM

## 2021-02-01 DIAGNOSIS — N18.4 CHRONIC KIDNEY DISEASE, STAGE IV (SEVERE) (HCC): ICD-10-CM

## 2021-02-01 DIAGNOSIS — E55.9 VITAMIN D DEFICIENCY: Chronic | ICD-10-CM

## 2021-02-01 DIAGNOSIS — E78.5 DYSLIPIDEMIA: Chronic | ICD-10-CM

## 2021-02-01 DIAGNOSIS — E61.1 IRON DEFICIENCY: ICD-10-CM

## 2021-02-01 DIAGNOSIS — E87.3 METABOLIC ALKALOSIS: ICD-10-CM

## 2021-02-01 DIAGNOSIS — D63.1 ANEMIA OF CHRONIC RENAL FAILURE, STAGE 4 (SEVERE) (HCC): ICD-10-CM

## 2021-02-01 DIAGNOSIS — N18.4 ANEMIA OF CHRONIC RENAL FAILURE, STAGE 4 (SEVERE) (HCC): ICD-10-CM

## 2021-02-01 PROCEDURE — 99214 OFFICE O/P EST MOD 30 MIN: CPT | Performed by: INTERNAL MEDICINE

## 2021-02-01 RX ORDER — LOSARTAN POTASSIUM 25 MG/1
25 TABLET ORAL DAILY
Qty: 30 TABLET | Refills: 5 | Status: SHIPPED | OUTPATIENT
Start: 2021-02-01 | End: 2021-04-10 | Stop reason: HOSPADM

## 2021-02-01 NOTE — LETTER
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  Etiology  Given age and comorbidities I would treat this conservatively  Discussed with the patient and her niece who agree with the conservative approach with no further investigation such as lab tests/serologies or kidney biopsy    I would try to add an angiotensin receptor 2 blocker to her regimen with good blood pressure control  Recommendations:  · Treat hypertension-please see below  · Treat dyslipidemia-please see below  · Maintain proteinuria less than 1 g or as low as possible     2   Volume:  Euvolemic, chronic left lower extremity/ankle edema which is without change  3   Hypertension:    · Home blood pressure readings:   ·  none at this time     · Goal blood pressure:  Less than 125/75 given proteinuria  Recommendations:  · Push nonmedical regimen including weight loss, isotonic exercise and avoidance of salt; patient counseled as such  · Preferably add a small dose of an angiotensin receptor 2 blocker given proteinuria  · Medication changes today:    ·  add losartan 25 mg daily in the morning  4   Electrolytes:  All acceptable at this time  5   Mineral bone disorder:  · Calcium/magnesium/phosphorus:  all acceptable  · PTH intact:  23 which is acceptable  · Vitamin-D: 52  6   Dyslipidemia:  · Goal LDL:  Less than 70 ideally given CAD  · Current lipid profile:  LDL  69/HDL 60/triglycerides 115  Recommendations:  At goal no changes   Cannot take statins   Continue Welchol and Zetia  Cecilia Wheatland 7   Anemia:  Related to CKD and chronic disease  · Hemoglobin stable at 11 5  · Iron studies:  Saturation 22%/ferritin saturation 22%/ferritin 81:  Oral iron over-the-counter on a daily basis as tolerated  8   Other problems:  · Status post  CVA  · CAD/atrial fibrillation:  Followed by Dr Tamara Mathis  · History of MGUS followed by Dr Jacob Brothers  · Admission in December 2018 for change in mental status after being treated for URI   Patient was felt to have hypercapnia and acute tracheobronchitis with mild persistent asthma from the tracheobronchitis   She was treated with intravenous steroids and Rocephin an oxygen supplementation during the day and at nighttime  Problem List Items Addressed This Visit     None               Reason for visit is   Chief Complaint   Patient presents with    Virtual Regular Visit        Encounter provider Alysa Egan MD    Provider located at [address] [place] [phone]      Recent Visits  No visits were found meeting these conditions  Showing recent visits within past 7 days and meeting all other requirements     Future Appointments  No visits were found meeting these conditions  Showing future appointments within next 150 days and meeting all other requirements        The patient was identified by name and date of birth  Ty Jean Baptiste was informed that this is a telemedicine visit and that the visit is being conducted through [facility] and patient was informed that this is a secure, HIPAA-compliant platform  She agrees to proceed     My office door was closed   No one else was in the room   She acknowledged consent and understanding of privacy and security of the video platform  The patient has agreed to participate and understands they can discontinue the visit at any time  Patient is aware this is a billable service  Subjective  Jose Mcknight is a 80 y o  female  With CKD stage 4      HPI   There has been no hospitalizations or acute illnesses since last visit  The patient overall is feeling well  No fevers, chills, or cough or colds  Good appetite and good energy  No hematuria, dysuria, voiding symptoms or foamy urine  No gastrointestinal symptoms  No cardiovascular symptoms, mild chronic lower extremity edema unchanged  No headaches, dizziness or lightheadedness  Blood pressure medications:  ·  amlodipine 5 mg daily  ·  Bystolic 10 mg daily  ·  torsemide 10 mg daily in the morning    Past Medical History:   Diagnosis Date    Arthritis     CHF (congestive heart failure) ([facility] ) 43/5480    diastolic     CO2 retention     DVT (deep vein thrombosis) in pregnancy     left LE in 2/2018 and 50 years ago    Hyperlipidemia     Hypertension     Pulmonary embolism ([facility] ) 02/06/2018    Renal disorder     ckd    Stroke [facility]) 2016    left hand weakness,paresthesia  Past Surgical History:   Procedure Laterality Date    ABDOMINAL SURGERY      ruputured bowel  had colostomy and ileostomy,reversed later on   BACK SURGERY      rods in back  done 30 years ago   CARPAL TUNNEL RELEASE Bilateral     CYST REMOVAL      back    HERNIA REPAIR      REPLACEMENT TOTAL KNEE BILATERAL         Current Outpatient Medications   Medication Sig Dispense Refill    acetaminophen (TYLENOL) 325 mg tablet Take 650 mg by mouth every 6 (six) hours as needed for mild pain      amLODIPine (NORVASC) 5 mg tablet Take 1 tablet (5 mg total) by mouth daily 90 tablet 2    apixaban (ELIQUIS) 2 5 mg Take 1 tablet (2 5 mg total) by mouth 2 (two) times a day [address] 9220   PATIENT WILL BE  OUT OF MEDICATION TONIGHT  180 tablet 3    Ascorbic Acid (VITAMIN C PO) Take 1 tablet by mouth daily      calcium-vitamin D (OSCAL 500 + D) 500 mg-200 units per tablet Take 1 tablet by mouth 2 (two) times a day   cholecalciferol (VITAMIN D3) 1,000 units tablet Take 2,000 Units by mouth daily      co-enzyme Q-10 50 MG capsule Take 200 mg by mouth daily        ezetimibe (ZETIA) 10 mg tablet Take 1 tablet (10 mg total) by mouth daily 90 tablet 3    ferrous sulfate 325 (65 Fe) mg tablet Take 325 mg by mouth every other day      fluticasone-vilanterol (BREO ELLIPTA) 100-25 mcg/inh inhaler Inhale 1 puff daily Rinse mouth after use  1 Inhaler 8    Multiple Vitamins-Minerals (OCUVITE PO) Take 1 tablet by mouth daily        nebivolol (Bystolic) 10 mg tablet Take 1 tablet (10 mg total) by mouth daily 90 tablet 3    pantoprazole (PROTONIX) 40 mg tablet Take 40 mg by mouth daily      torsemide (DEMADEX) 10 mg tablet Take 10 mg by mouth daily       Welchol 625 MG tablet TAKE 3 TABLETS TWICE DAILY WITH MEALS AS DIRECTED 540 tablet 3     No current facility-administered medications for this visit           Allergies   Allergen Reactions    Statins Myalgia    Lyrica [Pregabalin] Other (See Comments)     Client says it made her "nutty"       Review of Systems:  Please see HPI, otherwise the review of systems as completely reviewed with the patient are negative    Video Exam    No blood pressures    Physical Exam       General:  The patient feels comfortable, there is no acute distress/no overt diaphoresis: on nasal canula oxxygen  Skin:  No overt rash/there is no facial flushing or cyanosis and facial color appears normal  Head:  No overt trauma and normal overall appearance  HEENT:  Eyes appear normal without injection or scleral icterus and extraocular movements appear intact; mucous membranes appear moist; oropharynx is clear  Neck:  Trachea appears midline, no overt jugular venous distention,, and Neck is supple  Pulmonary:  Respiratory status appears normal :  The patient is able to speak without any overt shortness of breath and can speak in full sentences without any audible wheezing or stridor    Cardiovascular:  Heart rate is regular per patient/family/caregiver  Abdomen:  No tenderness as patient/family/caregiver press on the abdominal area with no overt distension  Musculoskeletal:  Mild left vs right leg  edema as the patient/family/caregiver presses on the legs or by visualization; no significant tenderness elicited/moderate arthritic changes of the hands  Neuro:  Patient appears to be able to move all extremities with no gross focality  Psych:  Patient is alert and oriented and appropriate and fully cooperative      I reviewed all the patient's medications with the patient during this visit  I have reviewed all the above findings and plans with the patient  I have answered the patient's questions to their complete satisfaction  I informed the patient that we will be mailing out instructions compatible with an after visit summary along with lab slips as needed  The patient was satisfied with all of the information as well as the visit  I spent 25 minutes directly with the patient during this visit      VIRTUAL VISIT DISCLAIMER    Kim Salinas acknowledges that she has consented to an online visit or consultation  She understands that the online visit is based solely on information provided by her, and that, in the absence of a face-to-face physical evaluation by the physician, the diagnosis she receives is both limited and provisional in terms of accuracy and completeness  This is not intended to replace a full medical face-to-face evaluation by the physician  Kim Salinas understands and accepts these terms

## 2021-02-01 NOTE — PATIENT INSTRUCTIONS
1  Medication changes today:    please begin losartan / Cozaar 25 mg daily in the morning to help your kidneys and lower the protein level in the urine  2  Please go for  non fasting  lab work 1-2 weeks after making the above medication change  3 Please take 1 week a blood pressure readings  4 weeks after making the above medication changes    AS FOLLOWS  MORNING AND EVENING, SITTING AND STANDING as follows:  · TAKE THE MORNING READINGS BEFORE ANY MEDICATIONS AND WHEN YOU ARE RELAXED FOR SEVERAL MINUTES  · TAKE THE EVENING READINGS:  BETWEEN 7-10 P M ; PRIOR TO ANY MEDICATIONS; AT LEAST IN OUR  FROM DINNER; AND CERTAINLY AFTER RELAXING FOR A FEW MINUTES  · PLEASE INCLUDE HEART RATE WITH YOUR BLOOD PRESSURE READINGS  · When taking standing readings, keep your arm supported at heart level and not dangling  · Make sure you are sitting with your back supported and feet on the ground and do not cross your legs or feet  · Make sure you have not taken any coffee or caffeine products or exercised or smoke cigarettes at least 30 minutes before taking your blood pressure  Then please mail these readings into the office    4  Follow-up in 4 months   Please bring in 1 week a blood pressure readings morning evening, sitting and standing is outlined above   PLEASE BRING AN YOUR BLOOD PRESSURE MACHINE TO CORRELATE WITH THE OFFICE MACHINE AT THIS NEXT SCHEDULED VISIT   Please go for fasting lab work 1-2 weeks prior to your appointment      5  General instructions:   AVOID SALT BUT NOT ADDING AN READING LABELS TO MAKE SURE THERE IS LOW-SALT IN THE FOOD THAT YOU ARE EATING  o Goal is less than 2 g of sodium intake or less than 5 g of sodium chloride intake per day     Avoid nonsteroidal anti-inflammatory drugs such as Naprosyn, ibuprofen, Aleve, Advil, Celebrex, Meloxicam (Mobic) etc   You can use Tylenol as needed if you do not have any liver condition to be concerned about     Avoid medications such as Sudafed or decongestants and antihistamines that contained the D component which is the decongestant  You can take antihistamines without the decongestant or D component   Try to avoid medications such as pantoprazole or  Protonix/Nexium or Esomeprazole)/Prilosec or omeprazole/Prevacid or lansoprazole/AcipHex or Rabeprazole  If you are able to, use Pepcid as this is safer for your kidneys   Try to  Remain as active as possible     Please do not drink more than 2 glasses of alcohol/wine on a daily basis as this may contribute to your high blood pressure

## 2021-02-03 ENCOUNTER — DOCUMENTATION (OUTPATIENT)
Dept: NEPHROLOGY | Facility: CLINIC | Age: 86
End: 2021-02-03

## 2021-03-06 LAB
BUN SERPL-MCNC: 45 MG/DL (ref 7–25)
BUN/CREAT SERPL: 23 (CALC) (ref 6–22)
CALCIUM SERPL-MCNC: 9 MG/DL (ref 8.6–10.4)
CHLORIDE SERPL-SCNC: 107 MMOL/L (ref 98–110)
CO2 SERPL-SCNC: 29 MMOL/L (ref 20–32)
CREAT SERPL-MCNC: 1.98 MG/DL (ref 0.6–0.88)
GLUCOSE SERPL-MCNC: 87 MG/DL (ref 65–99)
POTASSIUM SERPL-SCNC: 5.1 MMOL/L (ref 3.5–5.3)
SL AMB EGFR AFRICAN AMERICAN: 25 ML/MIN/1.73M2
SL AMB EGFR NON AFRICAN AMERICAN: 21 ML/MIN/1.73M2
SODIUM SERPL-SCNC: 139 MMOL/L (ref 135–146)

## 2021-03-08 ENCOUNTER — TELEPHONE (OUTPATIENT)
Dept: NEPHROLOGY | Facility: CLINIC | Age: 86
End: 2021-03-08

## 2021-03-08 DIAGNOSIS — I63.40 CEREBROVASCULAR ACCIDENT (CVA) DUE TO EMBOLISM OF CEREBRAL ARTERY (HCC): ICD-10-CM

## 2021-03-08 RX ORDER — APIXABAN 2.5 MG/1
TABLET, FILM COATED ORAL
Qty: 60 TABLET | Refills: 5 | Status: SHIPPED | OUTPATIENT
Start: 2021-03-08

## 2021-03-08 NOTE — TELEPHONE ENCOUNTER
I spoke to the patient and her daughter in regards to the blood work, Per daughter she continues to follow a low K diet

## 2021-03-08 NOTE — TELEPHONE ENCOUNTER
----- Message from Bijal Diehl MD sent at 3/8/2021  6:50 AM EST -----  The labs are stable  I would have her follow a modestly low-potassium diet

## 2021-03-11 NOTE — TELEPHONE ENCOUNTER
Labs would still not go through due to the Lab being temporarily closed  Patient's daughter told me she will take her to [place] the morning of her appointment for the blood work  marijuana

## 2021-04-04 ENCOUNTER — HOSPITAL ENCOUNTER (INPATIENT)
Facility: HOSPITAL | Age: 86
LOS: 6 days | Discharge: NURSING, REHAB OR LONG-TERM CARE | DRG: 291 | End: 2021-04-10
Attending: EMERGENCY MEDICINE | Admitting: INTERNAL MEDICINE
Payer: MEDICARE

## 2021-04-04 ENCOUNTER — APPOINTMENT (EMERGENCY)
Dept: RADIOLOGY | Facility: HOSPITAL | Age: 86
DRG: 291 | End: 2021-04-04
Payer: MEDICARE

## 2021-04-04 DIAGNOSIS — I50.9 CHF (CONGESTIVE HEART FAILURE) (HCC): Primary | ICD-10-CM

## 2021-04-04 DIAGNOSIS — J98.4 RESTRICTIVE LUNG DISEASE DUE TO KYPHOSCOLIOSIS: ICD-10-CM

## 2021-04-04 DIAGNOSIS — M41.9 RESTRICTIVE LUNG DISEASE DUE TO KYPHOSCOLIOSIS: ICD-10-CM

## 2021-04-04 DIAGNOSIS — R09.02 HYPOXIA: ICD-10-CM

## 2021-04-04 DIAGNOSIS — J45.30 MILD PERSISTENT ASTHMA WITHOUT COMPLICATION: ICD-10-CM

## 2021-04-04 DIAGNOSIS — J96.11 CHRONIC RESPIRATORY FAILURE WITH HYPOXIA AND HYPERCAPNIA (HCC): ICD-10-CM

## 2021-04-04 DIAGNOSIS — N17.9 AKI (ACUTE KIDNEY INJURY) (HCC): ICD-10-CM

## 2021-04-04 DIAGNOSIS — J96.12 CHRONIC RESPIRATORY FAILURE WITH HYPOXIA AND HYPERCAPNIA (HCC): ICD-10-CM

## 2021-04-04 DIAGNOSIS — I50.33 ACUTE ON CHRONIC DIASTOLIC CHF (CONGESTIVE HEART FAILURE) (HCC): ICD-10-CM

## 2021-04-04 DIAGNOSIS — I50.9 CONGESTIVE HEART FAILURE, UNSPECIFIED HF CHRONICITY, UNSPECIFIED HEART FAILURE TYPE (HCC): ICD-10-CM

## 2021-04-04 DIAGNOSIS — K59.00 CONSTIPATION: ICD-10-CM

## 2021-04-04 DIAGNOSIS — J90 PLEURAL EFFUSION: ICD-10-CM

## 2021-04-04 DIAGNOSIS — R91.8 OPACITY OF LUNG ON IMAGING STUDY: ICD-10-CM

## 2021-04-04 DIAGNOSIS — I50.33 ACUTE ON CHRONIC DIASTOLIC HEART FAILURE (HCC): ICD-10-CM

## 2021-04-04 DIAGNOSIS — N17.9 ACUTE KIDNEY INJURY SUPERIMPOSED ON CHRONIC KIDNEY DISEASE (HCC): ICD-10-CM

## 2021-04-04 DIAGNOSIS — N18.9 ACUTE KIDNEY INJURY SUPERIMPOSED ON CHRONIC KIDNEY DISEASE (HCC): ICD-10-CM

## 2021-04-04 LAB
ALBUMIN SERPL BCP-MCNC: 3 G/DL (ref 3.5–5)
ALP SERPL-CCNC: 106 U/L (ref 46–116)
ALT SERPL W P-5'-P-CCNC: 38 U/L (ref 12–78)
AMORPH URATE CRY URNS QL MICRO: ABNORMAL /HPF
ANION GAP SERPL CALCULATED.3IONS-SCNC: 5 MMOL/L (ref 4–13)
APTT PPP: 24 SECONDS (ref 23–37)
AST SERPL W P-5'-P-CCNC: 23 U/L (ref 5–45)
ATRIAL RATE: 58 BPM
ATRIAL RATE: 60 BPM
ATRIAL RATE: 60 BPM
BACTERIA UR QL AUTO: ABNORMAL /HPF
BASOPHILS # BLD AUTO: 0.01 THOUSANDS/ΜL (ref 0–0.1)
BASOPHILS NFR BLD AUTO: 0 % (ref 0–1)
BILIRUB SERPL-MCNC: 0.46 MG/DL (ref 0.2–1)
BILIRUB UR QL STRIP: NEGATIVE
BUN SERPL-MCNC: 44 MG/DL (ref 5–25)
CALCIUM ALBUM COR SERPL-MCNC: 10.1 MG/DL (ref 8.3–10.1)
CALCIUM SERPL-MCNC: 9.3 MG/DL (ref 8.3–10.1)
CHLORIDE SERPL-SCNC: 99 MMOL/L (ref 100–108)
CLARITY UR: CLEAR
CO2 SERPL-SCNC: 28 MMOL/L (ref 21–32)
COLOR UR: YELLOW
CREAT SERPL-MCNC: 2.33 MG/DL (ref 0.6–1.3)
EOSINOPHIL # BLD AUTO: 0.01 THOUSAND/ΜL (ref 0–0.61)
EOSINOPHIL NFR BLD AUTO: 0 % (ref 0–6)
ERYTHROCYTE [DISTWIDTH] IN BLOOD BY AUTOMATED COUNT: 12.9 % (ref 11.6–15.1)
GFR SERPL CREATININE-BSD FRML MDRD: 18 ML/MIN/1.73SQ M
GLUCOSE SERPL-MCNC: 157 MG/DL (ref 65–140)
GLUCOSE UR STRIP-MCNC: NEGATIVE MG/DL
HCT VFR BLD AUTO: 36.1 % (ref 34.8–46.1)
HGB BLD-MCNC: 10.7 G/DL (ref 11.5–15.4)
HGB UR QL STRIP.AUTO: ABNORMAL
IMM GRANULOCYTES # BLD AUTO: 0.05 THOUSAND/UL (ref 0–0.2)
IMM GRANULOCYTES NFR BLD AUTO: 1 % (ref 0–2)
INR PPP: 1.31 (ref 0.84–1.19)
KETONES UR STRIP-MCNC: NEGATIVE MG/DL
LEUKOCYTE ESTERASE UR QL STRIP: NEGATIVE
LYMPHOCYTES # BLD AUTO: 0.46 THOUSANDS/ΜL (ref 0.6–4.47)
LYMPHOCYTES NFR BLD AUTO: 6 % (ref 14–44)
MCH RBC QN AUTO: 29.2 PG (ref 26.8–34.3)
MCHC RBC AUTO-ENTMCNC: 29.6 G/DL (ref 31.4–37.4)
MCV RBC AUTO: 98 FL (ref 82–98)
MONOCYTES # BLD AUTO: 0.47 THOUSAND/ΜL (ref 0.17–1.22)
MONOCYTES NFR BLD AUTO: 6 % (ref 4–12)
NEUTROPHILS # BLD AUTO: 6.32 THOUSANDS/ΜL (ref 1.85–7.62)
NEUTS SEG NFR BLD AUTO: 87 % (ref 43–75)
NITRITE UR QL STRIP: NEGATIVE
NON-SQ EPI CELLS URNS QL MICRO: ABNORMAL /HPF
NRBC BLD AUTO-RTO: 0 /100 WBCS
NT-PROBNP SERPL-MCNC: ABNORMAL PG/ML
P AXIS: 69 DEGREES
PH UR STRIP.AUTO: 5.5 [PH]
PLATELET # BLD AUTO: 212 THOUSANDS/UL (ref 149–390)
PMV BLD AUTO: 10.9 FL (ref 8.9–12.7)
POTASSIUM SERPL-SCNC: 5.1 MMOL/L (ref 3.5–5.3)
PR INTERVAL: 174 MS
PR INTERVAL: 188 MS
PR INTERVAL: 192 MS
PROT SERPL-MCNC: 8.7 G/DL (ref 6.4–8.2)
PROT UR STRIP-MCNC: ABNORMAL MG/DL
PROTHROMBIN TIME: 16.1 SECONDS (ref 11.6–14.5)
QRS AXIS: -24 DEGREES
QRS AXIS: -36 DEGREES
QRS AXIS: 14 DEGREES
QRSD INTERVAL: 152 MS
QRSD INTERVAL: 158 MS
QRSD INTERVAL: 168 MS
QT INTERVAL: 452 MS
QT INTERVAL: 452 MS
QT INTERVAL: 474 MS
QTC INTERVAL: 452 MS
QTC INTERVAL: 452 MS
QTC INTERVAL: 465 MS
RBC # BLD AUTO: 3.67 MILLION/UL (ref 3.81–5.12)
RBC #/AREA URNS AUTO: ABNORMAL /HPF
SODIUM SERPL-SCNC: 132 MMOL/L (ref 136–145)
SP GR UR STRIP.AUTO: 1.02 (ref 1–1.03)
T WAVE AXIS: -21 DEGREES
T WAVE AXIS: -27 DEGREES
T WAVE AXIS: -28 DEGREES
TROPONIN I SERPL-MCNC: <0.02 NG/ML
UROBILINOGEN UR QL STRIP.AUTO: 0.2 E.U./DL
VENTRICULAR RATE: 58 BPM
VENTRICULAR RATE: 60 BPM
VENTRICULAR RATE: 60 BPM
WBC # BLD AUTO: 7.32 THOUSAND/UL (ref 4.31–10.16)
WBC #/AREA URNS AUTO: ABNORMAL /HPF

## 2021-04-04 PROCEDURE — G1004 CDSM NDSC: HCPCS

## 2021-04-04 PROCEDURE — 99285 EMERGENCY DEPT VISIT HI MDM: CPT

## 2021-04-04 PROCEDURE — 99223 1ST HOSP IP/OBS HIGH 75: CPT | Performed by: NURSE PRACTITIONER

## 2021-04-04 PROCEDURE — 1123F ACP DISCUSS/DSCN MKR DOCD: CPT | Performed by: INTERNAL MEDICINE

## 2021-04-04 PROCEDURE — 93005 ELECTROCARDIOGRAM TRACING: CPT

## 2021-04-04 PROCEDURE — 99285 EMERGENCY DEPT VISIT HI MDM: CPT | Performed by: EMERGENCY MEDICINE

## 2021-04-04 PROCEDURE — 85730 THROMBOPLASTIN TIME PARTIAL: CPT | Performed by: EMERGENCY MEDICINE

## 2021-04-04 PROCEDURE — 96374 THER/PROPH/DIAG INJ IV PUSH: CPT

## 2021-04-04 PROCEDURE — 85025 COMPLETE CBC W/AUTO DIFF WBC: CPT | Performed by: EMERGENCY MEDICINE

## 2021-04-04 PROCEDURE — 93010 ELECTROCARDIOGRAM REPORT: CPT | Performed by: INTERNAL MEDICINE

## 2021-04-04 PROCEDURE — 84484 ASSAY OF TROPONIN QUANT: CPT | Performed by: EMERGENCY MEDICINE

## 2021-04-04 PROCEDURE — 70450 CT HEAD/BRAIN W/O DYE: CPT

## 2021-04-04 PROCEDURE — 85610 PROTHROMBIN TIME: CPT | Performed by: EMERGENCY MEDICINE

## 2021-04-04 PROCEDURE — 36415 COLL VENOUS BLD VENIPUNCTURE: CPT | Performed by: EMERGENCY MEDICINE

## 2021-04-04 PROCEDURE — 81001 URINALYSIS AUTO W/SCOPE: CPT | Performed by: EMERGENCY MEDICINE

## 2021-04-04 PROCEDURE — 71045 X-RAY EXAM CHEST 1 VIEW: CPT

## 2021-04-04 PROCEDURE — 80053 COMPREHEN METABOLIC PANEL: CPT | Performed by: EMERGENCY MEDICINE

## 2021-04-04 PROCEDURE — 83880 ASSAY OF NATRIURETIC PEPTIDE: CPT | Performed by: EMERGENCY MEDICINE

## 2021-04-04 RX ORDER — FUROSEMIDE 10 MG/ML
40 INJECTION INTRAMUSCULAR; INTRAVENOUS ONCE
Status: COMPLETED | OUTPATIENT
Start: 2021-04-04 | End: 2021-04-04

## 2021-04-04 RX ORDER — IPRATROPIUM BROMIDE AND ALBUTEROL SULFATE 2.5; .5 MG/3ML; MG/3ML
3 SOLUTION RESPIRATORY (INHALATION) ONCE
Status: DISCONTINUED | OUTPATIENT
Start: 2021-04-04 | End: 2021-04-07

## 2021-04-04 RX ORDER — IPRATROPIUM BROMIDE AND ALBUTEROL SULFATE 2.5; .5 MG/3ML; MG/3ML
SOLUTION RESPIRATORY (INHALATION)
Status: DISPENSED
Start: 2021-04-04 | End: 2021-04-05

## 2021-04-04 RX ORDER — KETOROLAC TROMETHAMINE 10 MG/1
10 TABLET, FILM COATED ORAL EVERY 6 HOURS PRN
COMMUNITY
End: 2021-04-10 | Stop reason: HOSPADM

## 2021-04-04 RX ADMIN — FUROSEMIDE 40 MG: 10 INJECTION, SOLUTION INTRAMUSCULAR; INTRAVENOUS at 21:47

## 2021-04-05 ENCOUNTER — APPOINTMENT (INPATIENT)
Dept: NON INVASIVE DIAGNOSTICS | Facility: HOSPITAL | Age: 86
DRG: 291 | End: 2021-04-05
Payer: MEDICARE

## 2021-04-05 PROBLEM — J96.21 ACUTE ON CHRONIC RESPIRATORY FAILURE WITH HYPOXIA (HCC): Status: ACTIVE | Noted: 2021-04-05

## 2021-04-05 PROBLEM — E87.1 HYPONATREMIA: Status: ACTIVE | Noted: 2021-04-05

## 2021-04-05 PROBLEM — J96.22 ACUTE ON CHRONIC RESPIRATORY FAILURE WITH HYPOXIA AND HYPERCAPNIA (HCC): Status: ACTIVE | Noted: 2021-04-05

## 2021-04-05 LAB
ALBUMIN SERPL BCP-MCNC: 3.1 G/DL (ref 3.5–5)
ALP SERPL-CCNC: 100 U/L (ref 46–116)
ALT SERPL W P-5'-P-CCNC: 39 U/L (ref 12–78)
ANION GAP SERPL CALCULATED.3IONS-SCNC: 5 MMOL/L (ref 4–13)
ARTERIAL PATENCY WRIST A: YES
AST SERPL W P-5'-P-CCNC: 21 U/L (ref 5–45)
BASE EXCESS BLDA CALC-SCNC: 1.6 MMOL/L
BASOPHILS # BLD AUTO: 0.01 THOUSANDS/ΜL (ref 0–0.1)
BASOPHILS NFR BLD AUTO: 0 % (ref 0–1)
BILIRUB SERPL-MCNC: 0.43 MG/DL (ref 0.2–1)
BODY TEMPERATURE: 97.6 DEGREES FEHRENHEIT
BUN SERPL-MCNC: 44 MG/DL (ref 5–25)
CALCIUM ALBUM COR SERPL-MCNC: 10 MG/DL (ref 8.3–10.1)
CALCIUM SERPL-MCNC: 9.3 MG/DL (ref 8.3–10.1)
CHLORIDE SERPL-SCNC: 99 MMOL/L (ref 100–108)
CO2 SERPL-SCNC: 31 MMOL/L (ref 21–32)
CREAT SERPL-MCNC: 2.39 MG/DL (ref 0.6–1.3)
EOSINOPHIL # BLD AUTO: 0.01 THOUSAND/ΜL (ref 0–0.61)
EOSINOPHIL NFR BLD AUTO: 0 % (ref 0–6)
ERYTHROCYTE [DISTWIDTH] IN BLOOD BY AUTOMATED COUNT: 12.9 % (ref 11.6–15.1)
FLUAV RNA RESP QL NAA+PROBE: NEGATIVE
FLUBV RNA RESP QL NAA+PROBE: NEGATIVE
GFR SERPL CREATININE-BSD FRML MDRD: 17 ML/MIN/1.73SQ M
GLUCOSE SERPL-MCNC: 137 MG/DL (ref 65–140)
HCO3 BLDA-SCNC: 32.2 MMOL/L (ref 22–28)
HCT VFR BLD AUTO: 36.2 % (ref 34.8–46.1)
HGB BLD-MCNC: 10.3 G/DL (ref 11.5–15.4)
IMM GRANULOCYTES # BLD AUTO: 0.11 THOUSAND/UL (ref 0–0.2)
IMM GRANULOCYTES NFR BLD AUTO: 1 % (ref 0–2)
LYMPHOCYTES # BLD AUTO: 0.34 THOUSANDS/ΜL (ref 0.6–4.47)
LYMPHOCYTES NFR BLD AUTO: 4 % (ref 14–44)
MAGNESIUM SERPL-MCNC: 2.4 MG/DL (ref 1.6–2.6)
MCH RBC QN AUTO: 28.3 PG (ref 26.8–34.3)
MCHC RBC AUTO-ENTMCNC: 28.5 G/DL (ref 31.4–37.4)
MCV RBC AUTO: 100 FL (ref 82–98)
MONOCYTES # BLD AUTO: 0.48 THOUSAND/ΜL (ref 0.17–1.22)
MONOCYTES NFR BLD AUTO: 6 % (ref 4–12)
NASAL CANNULA: ABNORMAL
NEUTROPHILS # BLD AUTO: 7.07 THOUSANDS/ΜL (ref 1.85–7.62)
NEUTS SEG NFR BLD AUTO: 89 % (ref 43–75)
NRBC BLD AUTO-RTO: 0 /100 WBCS
O2 CT BLDA-SCNC: 13.7 ML/DL (ref 16–23)
OXYHGB MFR BLDA: 88.9 % (ref 94–97)
PCO2 BLDA: 91.8 MM HG (ref 36–44)
PCO2 TEMP ADJ BLDA: 89.4 MM HG (ref 36–44)
PH BLD: 7.17 [PH] (ref 7.35–7.45)
PH BLDA: 7.16 [PH] (ref 7.35–7.45)
PHOSPHATE SERPL-MCNC: 5.9 MG/DL (ref 2.3–4.1)
PLATELET # BLD AUTO: 183 THOUSANDS/UL (ref 149–390)
PMV BLD AUTO: 10.4 FL (ref 8.9–12.7)
PO2 BLD: 56.6 MM HG (ref 75–129)
PO2 BLDA: 59 MM HG (ref 75–129)
POTASSIUM SERPL-SCNC: 4.6 MMOL/L (ref 3.5–5.3)
PROT SERPL-MCNC: 8.6 G/DL (ref 6.4–8.2)
RBC # BLD AUTO: 3.64 MILLION/UL (ref 3.81–5.12)
RSV RNA RESP QL NAA+PROBE: NEGATIVE
SARS-COV-2 RNA RESP QL NAA+PROBE: NEGATIVE
SODIUM SERPL-SCNC: 135 MMOL/L (ref 136–145)
SPECIMEN SOURCE: ABNORMAL
TSH SERPL DL<=0.05 MIU/L-ACNC: 0.95 UIU/ML (ref 0.36–3.74)
WBC # BLD AUTO: 8.02 THOUSAND/UL (ref 4.31–10.16)

## 2021-04-05 PROCEDURE — 82805 BLOOD GASES W/O2 SATURATION: CPT | Performed by: INTERNAL MEDICINE

## 2021-04-05 PROCEDURE — 94760 N-INVAS EAR/PLS OXIMETRY 1: CPT

## 2021-04-05 PROCEDURE — 99223 1ST HOSP IP/OBS HIGH 75: CPT | Performed by: INTERNAL MEDICINE

## 2021-04-05 PROCEDURE — 94640 AIRWAY INHALATION TREATMENT: CPT

## 2021-04-05 PROCEDURE — 94002 VENT MGMT INPAT INIT DAY: CPT

## 2021-04-05 PROCEDURE — 85025 COMPLETE CBC W/AUTO DIFF WBC: CPT | Performed by: NURSE PRACTITIONER

## 2021-04-05 PROCEDURE — 0241U HB NFCT DS VIR RESP RNA 4 TRGT: CPT | Performed by: INTERNAL MEDICINE

## 2021-04-05 PROCEDURE — 83735 ASSAY OF MAGNESIUM: CPT | Performed by: NURSE PRACTITIONER

## 2021-04-05 PROCEDURE — 94003 VENT MGMT INPAT SUBQ DAY: CPT

## 2021-04-05 PROCEDURE — 93306 TTE W/DOPPLER COMPLETE: CPT

## 2021-04-05 PROCEDURE — 93306 TTE W/DOPPLER COMPLETE: CPT | Performed by: INTERNAL MEDICINE

## 2021-04-05 PROCEDURE — 80053 COMPREHEN METABOLIC PANEL: CPT | Performed by: NURSE PRACTITIONER

## 2021-04-05 PROCEDURE — 84443 ASSAY THYROID STIM HORMONE: CPT | Performed by: NURSE PRACTITIONER

## 2021-04-05 PROCEDURE — 99233 SBSQ HOSP IP/OBS HIGH 50: CPT | Performed by: INTERNAL MEDICINE

## 2021-04-05 PROCEDURE — 84100 ASSAY OF PHOSPHORUS: CPT | Performed by: NURSE PRACTITIONER

## 2021-04-05 PROCEDURE — 36600 WITHDRAWAL OF ARTERIAL BLOOD: CPT

## 2021-04-05 RX ORDER — FUROSEMIDE 10 MG/ML
40 INJECTION INTRAMUSCULAR; INTRAVENOUS
Status: DISCONTINUED | OUTPATIENT
Start: 2021-04-05 | End: 2021-04-05

## 2021-04-05 RX ORDER — PANTOPRAZOLE SODIUM 40 MG/1
40 TABLET, DELAYED RELEASE ORAL
Status: DISCONTINUED | OUTPATIENT
Start: 2021-04-05 | End: 2021-04-10 | Stop reason: HOSPADM

## 2021-04-05 RX ORDER — FUROSEMIDE 10 MG/ML
40 INJECTION INTRAMUSCULAR; INTRAVENOUS DAILY
Status: DISCONTINUED | OUTPATIENT
Start: 2021-04-05 | End: 2021-04-05

## 2021-04-05 RX ORDER — FLUTICASONE FUROATE AND VILANTEROL 100; 25 UG/1; UG/1
1 POWDER RESPIRATORY (INHALATION) DAILY
Status: DISCONTINUED | OUTPATIENT
Start: 2021-04-05 | End: 2021-04-10 | Stop reason: HOSPADM

## 2021-04-05 RX ORDER — IPRATROPIUM BROMIDE AND ALBUTEROL SULFATE 2.5; .5 MG/3ML; MG/3ML
3 SOLUTION RESPIRATORY (INHALATION)
Status: DISCONTINUED | OUTPATIENT
Start: 2021-04-05 | End: 2021-04-10 | Stop reason: HOSPADM

## 2021-04-05 RX ORDER — AMLODIPINE BESYLATE 5 MG/1
5 TABLET ORAL DAILY
Status: DISCONTINUED | OUTPATIENT
Start: 2021-04-05 | End: 2021-04-06

## 2021-04-05 RX ORDER — SENNOSIDES 8.6 MG
1 TABLET ORAL
Status: DISCONTINUED | OUTPATIENT
Start: 2021-04-05 | End: 2021-04-10 | Stop reason: HOSPADM

## 2021-04-05 RX ORDER — NEBIVOLOL 10 MG/1
10 TABLET ORAL DAILY
Status: DISCONTINUED | OUTPATIENT
Start: 2021-04-05 | End: 2021-04-10 | Stop reason: HOSPADM

## 2021-04-05 RX ORDER — ALBUTEROL SULFATE 2.5 MG/3ML
2.5 SOLUTION RESPIRATORY (INHALATION) EVERY 4 HOURS PRN
Status: DISCONTINUED | OUTPATIENT
Start: 2021-04-05 | End: 2021-04-10 | Stop reason: HOSPADM

## 2021-04-05 RX ORDER — ACETAMINOPHEN 325 MG/1
650 TABLET ORAL EVERY 6 HOURS PRN
Status: DISCONTINUED | OUTPATIENT
Start: 2021-04-05 | End: 2021-04-10 | Stop reason: HOSPADM

## 2021-04-05 RX ORDER — METHYLPREDNISOLONE SODIUM SUCCINATE 40 MG/ML
40 INJECTION, POWDER, LYOPHILIZED, FOR SOLUTION INTRAMUSCULAR; INTRAVENOUS EVERY 6 HOURS SCHEDULED
Status: DISCONTINUED | OUTPATIENT
Start: 2021-04-05 | End: 2021-04-06

## 2021-04-05 RX ORDER — FUROSEMIDE 10 MG/ML
40 INJECTION INTRAMUSCULAR; INTRAVENOUS ONCE
Status: COMPLETED | OUTPATIENT
Start: 2021-04-05 | End: 2021-04-05

## 2021-04-05 RX ORDER — EZETIMIBE 10 MG/1
10 TABLET ORAL DAILY
Status: DISCONTINUED | OUTPATIENT
Start: 2021-04-05 | End: 2021-04-10 | Stop reason: HOSPADM

## 2021-04-05 RX ORDER — COLESEVELAM 180 1/1
625 TABLET ORAL 2 TIMES DAILY WITH MEALS
Status: DISCONTINUED | OUTPATIENT
Start: 2021-04-05 | End: 2021-04-07

## 2021-04-05 RX ORDER — HYDRALAZINE HYDROCHLORIDE 20 MG/ML
5 INJECTION INTRAMUSCULAR; INTRAVENOUS EVERY 6 HOURS PRN
Status: DISCONTINUED | OUTPATIENT
Start: 2021-04-05 | End: 2021-04-10 | Stop reason: HOSPADM

## 2021-04-05 RX ORDER — ONDANSETRON 2 MG/ML
4 INJECTION INTRAMUSCULAR; INTRAVENOUS EVERY 6 HOURS PRN
Status: DISCONTINUED | OUTPATIENT
Start: 2021-04-05 | End: 2021-04-10 | Stop reason: HOSPADM

## 2021-04-05 RX ORDER — ALBUMIN (HUMAN) 12.5 G/50ML
12.5 SOLUTION INTRAVENOUS ONCE
Status: COMPLETED | OUTPATIENT
Start: 2021-04-05 | End: 2021-04-05

## 2021-04-05 RX ORDER — B-COMPLEX WITH VITAMIN C
1 TABLET ORAL 2 TIMES DAILY
Status: DISCONTINUED | OUTPATIENT
Start: 2021-04-05 | End: 2021-04-10 | Stop reason: HOSPADM

## 2021-04-05 RX ORDER — FERROUS SULFATE 325(65) MG
325 TABLET ORAL EVERY OTHER DAY
Status: DISCONTINUED | OUTPATIENT
Start: 2021-04-05 | End: 2021-04-10 | Stop reason: HOSPADM

## 2021-04-05 RX ORDER — MELATONIN
2000 DAILY
Status: DISCONTINUED | OUTPATIENT
Start: 2021-04-05 | End: 2021-04-10 | Stop reason: HOSPADM

## 2021-04-05 RX ADMIN — NEBIVOLOL HYDROCHLORIDE 10 MG: 10 TABLET ORAL at 08:52

## 2021-04-05 RX ADMIN — PANTOPRAZOLE SODIUM 40 MG: 40 TABLET, DELAYED RELEASE ORAL at 05:56

## 2021-04-05 RX ADMIN — IPRATROPIUM BROMIDE AND ALBUTEROL SULFATE 3 ML: 2.5; .5 SOLUTION RESPIRATORY (INHALATION) at 01:05

## 2021-04-05 RX ADMIN — FUROSEMIDE 40 MG: 10 INJECTION, SOLUTION INTRAMUSCULAR; INTRAVENOUS at 05:27

## 2021-04-05 RX ADMIN — Medication 2000 UNITS: at 08:52

## 2021-04-05 RX ADMIN — METHYLPREDNISOLONE SODIUM SUCCINATE 40 MG: 40 INJECTION, POWDER, FOR SOLUTION INTRAMUSCULAR; INTRAVENOUS at 18:04

## 2021-04-05 RX ADMIN — APIXABAN 2.5 MG: 2.5 TABLET, FILM COATED ORAL at 08:52

## 2021-04-05 RX ADMIN — FERROUS SULFATE TAB 325 MG (65 MG ELEMENTAL FE) 325 MG: 325 (65 FE) TAB at 08:52

## 2021-04-05 RX ADMIN — Medication 1 TABLET: at 08:52

## 2021-04-05 RX ADMIN — METHYLPREDNISOLONE SODIUM SUCCINATE 40 MG: 40 INJECTION, POWDER, FOR SOLUTION INTRAMUSCULAR; INTRAVENOUS at 23:25

## 2021-04-05 RX ADMIN — IPRATROPIUM BROMIDE AND ALBUTEROL SULFATE 3 ML: 2.5; .5 SOLUTION RESPIRATORY (INHALATION) at 07:21

## 2021-04-05 RX ADMIN — EZETIMIBE 10 MG: 10 TABLET ORAL at 08:50

## 2021-04-05 RX ADMIN — ALBUMIN (HUMAN) 12.5 G: 0.25 INJECTION, SOLUTION INTRAVENOUS at 05:08

## 2021-04-05 RX ADMIN — ALBUTEROL SULFATE 2.5 MG: 2.5 SOLUTION RESPIRATORY (INHALATION) at 05:05

## 2021-04-05 RX ADMIN — FUROSEMIDE 40 MG: 10 INJECTION, SOLUTION INTRAMUSCULAR; INTRAVENOUS at 15:34

## 2021-04-05 RX ADMIN — AMLODIPINE BESYLATE 5 MG: 5 TABLET ORAL at 08:50

## 2021-04-05 RX ADMIN — FLUTICASONE FUROATE AND VILANTEROL TRIFENATATE 1 PUFF: 100; 25 POWDER RESPIRATORY (INHALATION) at 08:53

## 2021-04-05 RX ADMIN — IPRATROPIUM BROMIDE AND ALBUTEROL SULFATE 3 ML: 2.5; .5 SOLUTION RESPIRATORY (INHALATION) at 13:10

## 2021-04-05 RX ADMIN — IPRATROPIUM BROMIDE AND ALBUTEROL SULFATE 3 ML: 2.5; .5 SOLUTION RESPIRATORY (INHALATION) at 20:06

## 2021-04-05 RX ADMIN — Medication 60 MG: at 08:53

## 2021-04-05 NOTE — PLAN OF CARE
Problem: Potential for Falls  Goal: Patient will remain free of falls  Description: INTERVENTIONS:  - Assess patient frequently for physical needs  -  Identify cognitive and physical deficits and behaviors that affect risk of falls  -  [place] fall precautions as indicated by assessment   - Educate patient/family on patient safety including physical limitations  - Instruct patient to call for assistance with activity based on assessment  - Modify environment to reduce risk of injury  - Consider OT/PT consult to assist with strengthening/mobility  Outcome: Progressing     Problem: Nutrition/Hydration-ADULT  Goal: Nutrient/Hydration intake appropriate for improving, restoring or maintaining nutritional needs  Description: Monitor and assess patient's nutrition/hydration status for malnutrition  Collaborate with interdisciplinary team and initiate plan and interventions as ordered  Monitor patient's weight and dietary intake as ordered or per policy  Utilize nutrition screening tool and intervene as necessary  Determine patient's food preferences and provide high-protein, high-caloric foods as appropriate       INTERVENTIONS:  - Monitor oral intake, urinary output, labs, and treatment plans  - Assess nutrition and hydration status and recommend course of action  - Evaluate amount of meals eaten  - Assist patient with eating if necessary   - Allow adequate time for meals  - Recommend/ encourage appropriate diets, oral nutritional supplements, and vitamin/mineral supplements  - Order, calculate, and assess calorie counts as needed  - Recommend, monitor, and adjust tube feedings and TPN/PPN based on assessed needs  - Assess need for intravenous fluids  - Provide specific nutrition/hydration education as appropriate  - Include patient/family/caregiver in decisions related to nutrition  Outcome: Progressing     Problem: RESPIRATORY - ADULT  Goal: Achieves optimal ventilation and oxygenation  Description: INTERVENTIONS:  - Assess for changes in respiratory status  - Assess for changes in mentation and behavior  - Position to facilitate oxygenation and minimize respiratory effort  - Oxygen administered by appropriate delivery if ordered  - Initiate smoking cessation education as indicated  - Encourage broncho-pulmonary hygiene including cough, deep breathe, Incentive Spirometry  - Assess the need for suctioning and aspirate as needed  - Assess and instruct to report SOB or any respiratory difficulty  - Respiratory Therapy support as indicated  Outcome: Progressing     Problem: Prexisting or High Potential for Compromised Skin Integrity  Goal: Skin integrity is maintained or improved  Description: INTERVENTIONS:  - Identify patients at risk for skin breakdown  - Assess and monitor skin integrity  - Assess and monitor nutrition and hydration status  - Monitor labs   - Assess for incontinence   - Turn and reposition patient  - Assist with mobility/ambulation  - Relieve pressure over bony prominences  - Avoid friction and shearing  - Provide appropriate hygiene as needed including keeping skin clean and dry  - Evaluate need for skin moisturizer/barrier cream  - Collaborate with interdisciplinary team   - Patient/family teaching  - Consider wound care consult   Outcome: Progressing

## 2021-04-05 NOTE — ASSESSMENT & PLAN NOTE
Hst of CKD stage 3  Presents with Cr of 2 33 with a baseline between 1 6-1 7  UA with 2+ protein, trace blood  Likely cardiorenal from volume overload   · Continue IV Lasix   · Monitor PVR and I+O  · Avoid nephrotoxic agents   · Hold losartan  · Trend Cr daily

## 2021-04-05 NOTE — ED PROVIDER NOTES
History  Chief Complaint   Patient presents with    Decreased Oxygen Level     family states oxygen at home was about 81% on 3l    Altered Mental Status     ams off and on all day     81 yo female here with daughter  She went to pt's house this morning at 9 and she was still in bed which is unusual for her  Pt  Told daughter she didn't sleep well  Daughter said she seemed a little confused and out of sorts  She got pt  Up, washed, fed and dressed and left her about 11:30  Another daughter picked her up at 3 pm and brought pt  To daughter's house for [employer]  She did eat well and after dinner was sitting in chair and then family noted she seemed out of it, mumbling and her breathing sounded distressed  They checked her pulse ox and it was only 81% on her normal oxygen  No recent fever  + cough  No vomiting or diarrhea  Pt  Denies pain  Pt  Says she hasn't used her trilogy machine for the last 3 nights  History provided by:  Relative   used: No    Altered Mental Status  Presenting symptoms: confusion    Associated symptoms: weakness    Associated symptoms: no abdominal pain, no fever, no headaches, no nausea, no rash and no vomiting        Prior to Admission Medications   Prescriptions Last Dose Informant Patient Reported? Taking?    Ascorbic Acid (VITAMIN C PO)  Child Yes No   Sig: Take 1 tablet by mouth daily   Eliquis 2 5 MG   No No   Sig: TAKE 1 TABLET TWO TIMES A DAY   Multiple Vitamins-Minerals (OCUVITE PO)  Child Yes No   Sig: Take 1 tablet by mouth daily     Welchol 625 MG tablet   No No   Sig: TAKE 3 TABLETS TWICE DAILY WITH MEALS AS DIRECTED   acetaminophen (TYLENOL) 325 mg tablet  Child Yes No   Sig: Take 650 mg by mouth every 6 (six) hours as needed for mild pain   amLODIPine (NORVASC) 5 mg tablet  Child No No   Sig: Take 1 tablet (5 mg total) by mouth daily   calcium-vitamin D (OSCAL 500 + D) 500 mg-200 units per tablet  Child Yes No   Sig: Take 1 tablet by mouth 2 (two) times a day  cholecalciferol (VITAMIN D3) 1,000 units tablet  Child Yes No   Sig: Take 2,000 Units by mouth daily   co-enzyme Q-10 50 MG capsule  Child Yes No   Sig: Take 200 mg by mouth daily     ezetimibe (ZETIA) 10 mg tablet   No No   Sig: Take 1 tablet (10 mg total) by mouth daily   ferrous sulfate 325 (65 Fe) mg tablet   Yes No   Sig: Take 325 mg by mouth every other day   fluticasone-vilanterol (BREO ELLIPTA) 100-25 mcg/inh inhaler   No No   Sig: Inhale 1 puff daily Rinse mouth after use    ketorolac (TORADOL) 10 mg tablet 4/3/2021 at Unknown time  Yes Yes   Sig: Take 10 mg by mouth every 6 (six) hours as needed for moderate pain   losartan (COZAAR) 25 mg tablet   No No   Sig: Take 1 tablet (25 mg total) by mouth daily   nebivolol (Bystolic) 10 mg tablet   No No   Sig: Take 1 tablet (10 mg total) by mouth daily   pantoprazole (PROTONIX) 40 mg tablet  Child Yes No   Sig: Take 40 mg by mouth daily as needed    torsemide (DEMADEX) 10 mg tablet  Child Yes No   Sig: Take 10 mg by mouth daily       Facility-Administered Medications: None       Past Medical History:   Diagnosis Date    Arthritis     CHF (congestive heart failure) ([facility] [address] ) 24/5401    diastolic     CO2 retention     DVT (deep vein thrombosis) in pregnancy     left LE in 2/2018 and 50 years ago    Hyperlipidemia     Hypertension     Pulmonary embolism ([facility] [address] ) 02/06/2018    Renal disorder     ckd    Stroke ([facility] [address] ) 2016    left hand weakness,paresthesia  Past Surgical History:   Procedure Laterality Date    ABDOMINAL SURGERY      ruputured bowel  had colostomy and ileostomy,reversed later on   BACK SURGERY      rods in back  done 30 years ago   CARPAL TUNNEL RELEASE Bilateral     CYST REMOVAL      back    HERNIA REPAIR      REPLACEMENT TOTAL KNEE BILATERAL         Family History   Problem Relation Age of Onset    COPD Brother      I have reviewed and agree with the history as documented      E-Cigarette/Vaping    E-Cigarette Use Never User      E-Cigarette/Vaping Substances    Nicotine No     THC No     CBD No     Flavoring No     Other No     Unknown No      Social History     Tobacco Use    Smoking status: Never Smoker    Smokeless tobacco: Never Used   Substance Use Topics    Alcohol use: Never     Frequency: Never     Comment: one drink at [employer] Drug use: Never       Review of Systems   Constitutional: Positive for fatigue  Negative for fever  HENT: Negative  Eyes: Negative  Respiratory: Positive for cough and shortness of breath  Cardiovascular: Negative  Negative for chest pain  Gastrointestinal: Negative  Negative for abdominal pain, diarrhea, nausea and vomiting  Genitourinary: Negative  Negative for dysuria and flank pain  Musculoskeletal: Negative  Negative for back pain and myalgias  Skin: Negative  Negative for rash and wound  Neurological: Positive for weakness  Negative for dizziness and headaches  Hematological: Does not bruise/bleed easily  Psychiatric/Behavioral: Positive for confusion  All other systems reviewed and are negative  Physical Exam  Physical Exam  Vitals signs and nursing note reviewed  Constitutional:       General: She is not in acute distress  Appearance: She is well-developed  She is ill-appearing  She is not diaphoretic  HENT:      Head: Normocephalic and atraumatic  Eyes:      Extraocular Movements: Extraocular movements intact  Conjunctiva/sclera: Conjunctivae normal    Neck:      Musculoskeletal: Normal range of motion and neck supple  Cardiovascular:      Rate and Rhythm: Normal rate and regular rhythm  Heart sounds: Murmur present  Pulmonary:      Effort: Pulmonary effort is normal  No respiratory distress  Breath sounds: Rales present  Abdominal:      General: Bowel sounds are normal  There is no distension  Palpations: Abdomen is soft  Tenderness: There is no abdominal tenderness  Musculoskeletal: Normal range of motion  General: No deformity  Right lower leg: Edema present  Left lower leg: Edema present  Skin:     General: Skin is warm and dry  Coloration: Skin is not pale  Findings: No erythema or rash  Neurological:      General: No focal deficit present  Mental Status: She is alert and oriented to person, place, and time  Cranial Nerves: No cranial nerve deficit        Comments: Pt  Did say year was 65 but oriented to month, place, age, holiday   Psychiatric:         Mood and Affect: Mood normal          Behavior: Behavior normal          Vital Signs  ED Triage Vitals [04/04/21 2032]   Temperature Pulse Respirations Blood Pressure SpO2   97 8 °F (36 6 °C) 59 (!) 28 (!) 194/104 91 %      Temp Source Heart Rate Source Patient Position - Orthostatic VS BP Location FiO2 (%)   Oral Monitor Lying Right arm --      Pain Score       --           Vitals:    04/04/21 2045 04/04/21 2145 04/04/21 2200 04/04/21 2230   BP: (!) 177/79 150/70 163/70 135/59   Pulse: 58 64 58 (!) 54   Patient Position - Orthostatic VS:             Visual Acuity      ED Medications  Medications   ipratropium-albuterol (DUO-NEB) 0 5-2 5 mg/3 mL inhalation solution 3 mL (has no administration in time range)   furosemide (LASIX) injection 40 mg (40 mg Intravenous Given 4/4/21 2147)       Diagnostic Studies  Results Reviewed     Procedure Component Value Units Date/Time    Urine Microscopic [[id number]]  (Abnormal) Collected: 04/04/21 2258    Lab Status: Final result Specimen: Urine, Other Updated: 04/04/21 2317     RBC, UA 0-1 /hpf      WBC, UA 0-1 /hpf      Epithelial Cells Moderate /hpf      Bacteria, UA Occasional /hpf      AMORPH URATES Moderate /hpf     UA (URINE) with reflex to Scope [[id number]]  (Abnormal) Collected: 04/04/21 2258    Lab Status: Final result Specimen: Urine, Other Updated: 04/04/21 2306     Color, UA Yellow     Clarity, UA Clear     Specific [place], UA 1 025 pH, UA 5 5     Leukocytes, UA Negative     Nitrite, UA Negative     Protein,  (2+) mg/dl      Glucose, UA Negative mg/dl      Ketones, UA Negative mg/dl      Urobilinogen, UA 0 2 E U /dl      Bilirubin, UA Negative     Blood, UA Trace-Intact    Comprehensive metabolic panel [[id number]]  (Abnormal) Collected: 04/04/21 2055    Lab Status: Final result Specimen: Blood from Arm, Right Updated: 04/04/21 2132     Sodium 132 mmol/L      Potassium 5 1 mmol/L      Chloride 99 mmol/L      CO2 28 mmol/L      ANION GAP 5 mmol/L      BUN 44 mg/dL      Creatinine 2 33 mg/dL      Glucose 157 mg/dL      Calcium 9 3 mg/dL      Corrected Calcium 10 1 mg/dL      AST 23 U/L      ALT 38 U/L      Alkaline Phosphatase 106 U/L      Total Protein 8 7 g/dL      Albumin 3 0 g/dL      Total Bilirubin 0 46 mg/dL      eGFR 18 ml/min/1 73sq m     Narrative:      [facility] guidelines for Chronic Kidney Disease (CKD):     Stage 1 with normal or high GFR (GFR > 90 mL/min/1 73 square meters)    Stage 2 Mild CKD (GFR = 60-89 mL/min/1 73 square meters)    Stage 3A Moderate CKD (GFR = 45-59 mL/min/1 73 square meters)    Stage 3B Moderate CKD (GFR = 30-44 mL/min/1 73 square meters)    Stage 4 Severe CKD (GFR = 15-29 mL/min/1 73 square meters)    Stage 5 End Stage CKD (GFR <15 mL/min/1 73 square meters)  Note: GFR calculation is accurate only with a steady state creatinine    NT-BNP PRO [[id number]]  (Abnormal) Collected: 04/04/21 2055    Lab Status: Final result Specimen: Blood from Arm, Right Updated: 04/04/21 2132     NT-proBNP 10,884 pg/mL     Troponin I [[id number]]  (Normal) Collected: 04/04/21 2055    Lab Status: Final result Specimen: Blood from Arm, Right Updated: 04/04/21 2124     Troponin I <0 02 ng/mL     Protime-INR [[id number]]  (Abnormal) Collected: 04/04/21 2055    Lab Status: Final result Specimen: Blood from Arm, Right Updated: 04/04/21 2120     Protime 16 1 seconds      INR 1 31    APTT [[id number]] (Normal) Collected: 04/04/21 2055    Lab Status: Final result Specimen: Blood from Arm, Right Updated: 04/04/21 2120     PTT 24 seconds     CBC and differential [[id number]]  (Abnormal) Collected: 04/04/21 2055    Lab Status: Final result Specimen: Blood from Arm, Right Updated: 04/04/21 2102     WBC 7 32 Thousand/uL      RBC 3 67 Million/uL      Hemoglobin 10 7 g/dL      Hematocrit 36 1 %      MCV 98 fL      MCH 29 2 pg      MCHC 29 6 g/dL      RDW 12 9 %      MPV 10 9 fL      Platelets 207 Thousands/uL      nRBC 0 /100 WBCs      Neutrophils Relative 87 %      Immat GRANS % 1 %      Lymphocytes Relative 6 %      Monocytes Relative 6 %      Eosinophils Relative 0 %      Basophils Relative 0 %      Neutrophils Absolute 6 32 Thousands/µL      Immature Grans Absolute 0 05 Thousand/uL      Lymphocytes Absolute 0 46 Thousands/µL      Monocytes Absolute 0 47 Thousand/µL      Eosinophils Absolute 0 01 Thousand/µL      Basophils Absolute 0 01 Thousands/µL                  CT head without contrast   Final Result by Tamar Verma DO (04/04 2341)      Sinus disease as described but no acute intracranial abnormality seen otherwise  Other findings as above                    Workstation performed: [id number]         XR chest 1 view portable    (Results Pending)              Procedures  ECG 12 Lead Documentation Only    Date/Time: 4/4/2021 8:42 PM  Performed by: Trice Davila MD  Authorized by: Trice Davila MD     Indications / Diagnosis:  Sob, altered mental status  ECG reviewed by me, the ED Provider: yes    Patient location:  ED  Previous ECG:     Previous ECG:  Compared to current    Similarity:  No change  Interpretation:     Interpretation: abnormal    Rate:     ECG rate:  60    ECG rate assessment: normal    Rhythm:     Rhythm: sinus rhythm    Ectopy:     Ectopy: PAC    QRS:     QRS axis:  Normal  Conduction:     Conduction: abnormal      Abnormal conduction: complete RBBB    ST segments:     ST segments: Normal  T waves:     T waves: inverted      Inverted:  III, aVF, V1, V2 and V3  Q waves:     Q waves:  III and aVF             ED Course                                           MDM  Number of Diagnoses or Management Options  Diagnosis management comments: Pt  With h/o CHF, on home O2, requiring higher flow and BNP 10,880 which is significantly elevated from prior level of 2650 in 12/2018  Given Lasix, BP down  Pulse ox good on 5L NC  Will admit  Discussed with hospitalist       Disposition  Final diagnoses:   CHF (congestive heart failure) ([facility] )   Hypoxia     Time reflects when diagnosis was documented in both MDM as applicable and the Disposition within this note     Time User Action Codes Description Comment    9/0/6590 51:64 PM Julián EVANS Add [R19 1] CHF (congestive heart failure) ([facility] )     8/7/6647 03:50 PM Nasim Sevilla Add [P11 86] Hypoxia       ED Disposition     ED Disposition Condition Date/Time Comment    Admit Stable [place] Apr 4, 2021 11:48 PM Case was discussed with *hospitalist** and the patient's admission status was agreed to be Admission Status: inpatient status   Follow-up Information    None         Patient's Medications   Discharge Prescriptions    No medications on file     No discharge procedures on file      PDMP Review     None          ED Provider  Electronically Signed by           Danielle Escobar MD  76/87/81 [id number]

## 2021-04-05 NOTE — ASSESSMENT & PLAN NOTE
Wt Readings from Last 3 Encounters:   04/05/21 67 7 kg (149 lb 4 oz)   08/18/20 68 kg (150 lb)   03/16/20 68 kg (150 lb)     Presents with SOB, tachypnea, and hypoxia  Pro BNP 10,884 which is up from previous of 2,657  CXR, pending   Echo from March 2018: LVEF 55-60%, G2DD  · Given Lasix 40 mg IV x1 in ED  · Will continue Lasix 40 mg IV daily  · May need to add albumin to facilitate diuresis  · Fluid restrict  · Monitor I+O, daily weights  · Consult cardiology   · Patient's creatinine is worsening  We will hold off on Lasix  Consult nephrology regarding titration of diuretics

## 2021-04-05 NOTE — RESPIRATORY THERAPY NOTE
Pl placed on home trilogy my daughter  Pt is fighting and resisting keeping the bipap on  MD aware of situation

## 2021-04-05 NOTE — ASSESSMENT & PLAN NOTE
POA, as evidence by tachypnea and worsening hypoxia requiring increased oxygen  Patient usually wears 2 L of O2 continuously  Required 6 L of O2 upon admission  · Continue treatment for acute on chronic diastolic heart failure  · Continue Breo and DuoNebs  · Currently on 3 L of O2  · Titrate oxygen to maintain SpO2 greater than 89%

## 2021-04-05 NOTE — ASSESSMENT & PLAN NOTE
· Possible etiology seems to be secondary to hypervolemic hyponatremia  · Monitor patient closely  Sodium level stable right now    · Consult Nephrology

## 2021-04-05 NOTE — ASSESSMENT & PLAN NOTE
Hst of CKD stage 3, which given her age seems to be now going toward stage IV CKD at least   Presents with Cr of 2 33, patient baseline creatinine is around 1 7-2 0   UA with 2+ protein, trace blood  Likely cardiorenal from volume overload, creatinine worsening  I will hold off on IV Lasix for now  Consult Nephrology  · Patient has been urinating okay  PVR negative    Monitor BMP

## 2021-04-05 NOTE — ASSESSMENT & PLAN NOTE
Wt Readings from Last 3 Encounters:   04/04/21 69 3 kg (152 lb 12 5 oz)   08/18/20 68 kg (150 lb)   03/16/20 68 kg (150 lb)     Presents with SOB, tachypnea, and hypoxia  Pro BNP 10,884 which is up from previous of 2,657  CXR, pending   Echo from March 2018: LVEF 55-60%, G2DD  · Given Lasix 40 mg IV x1 in ED  · Will continue Lasix 40 mg IV daily  · May need to add albumin to facilitate diuresis  · Fluid restrict  · Monitor I+O, daily weights  · Consult cardiology

## 2021-04-05 NOTE — ASSESSMENT & PLAN NOTE
POA, sodium 132, likely hypervolemic hyponatremia  · Continue Lasix 40 mg IV daily  · Fluid restriction

## 2021-04-05 NOTE — H&P
Noman 45  H&P- [address] 5/12/1928, 80 y o  female MRN: [id number]  Unit/Bed#: ED CT1 Encounter: [id number]  Primary Care Provider: Darina Primrose, DO   Date and time admitted to hospital: 4/4/2021  8:24 PM    * Acute on chronic diastolic heart failure (HCC)  Assessment & Plan  Wt Readings from Last 3 Encounters:   04/04/21 69 3 kg (152 lb 12 5 oz)   08/18/20 68 kg (150 lb)   03/16/20 68 kg (150 lb)     Presents with SOB, tachypnea, and hypoxia  Pro BNP 10,884 which is up from previous of 2,657  CXR, pending   Echo from March 2018: LVEF 55-60%, G2DD  · Given Lasix 40 mg IV x1 in ED  · Will continue Lasix 40 mg IV daily  · May need to add albumin to facilitate diuresis  · Fluid restrict  · Monitor I+O, daily weights  · Consult cardiology     Acute on chronic respiratory failure with hypoxia ([facility] )  Assessment & Plan  POA, as evidence by tachypnea and worsening hypoxia requiring increased oxygen  Patient usually wears 2 L of O2 continuously  Required 6 L of O2 upon admission  · Continue treatment for acute on chronic diastolic heart failure  · Continue Breo and DuoNebs  · Currently on 3 L of O2  · Titrate oxygen to maintain SpO2 greater than 89%    TIANA (acute kidney injury) ([facility] )  Assessment & Plan  Hst of CKD stage 3  Presents with Cr of 2 33 with a baseline between 1 6-1 7  UA with 2+ protein, trace blood  Likely cardiorenal from volume overload   · Continue IV Lasix   · Monitor PVR and I+O  · Avoid nephrotoxic agents   · Hold losartan  · Trend Cr daily     Hyponatremia  Assessment & Plan  POA, sodium 132, likely hypervolemic hyponatremia  · Continue Lasix 40 mg IV daily  · Fluid restriction    Deep vein thrombosis (DVT) of left lower extremity (HCC)  Assessment & Plan  · Continue Eliquis    Dyslipidemia  Assessment & Plan  · Continue Zetia and Welchol    Essential hypertension  Assessment & Plan  · Continue Norvasc 5 mg daily and Bystolic 10 mg daily  · Hold Losartan due to TIANA · Monitor     Cerebrovascular accident (CVA) ([facility] )  Assessment & Plan  · Continue statin and Eliquis    VTE Prophylaxis: Apixaban (Eliquis)  / reason for no mechanical VTE prophylaxis On Eliquis   Code Status:  DNR DNI  POLST: POLST form is not discussed and not completed at this time  Discussion with family:  Daughter at bedside    Anticipated Length of Stay:  Patient will be admitted on an Inpatient basis with an anticipated length of stay of  Greater than 2 midnights  Justification for Hospital Stay: acute diastolic CHF with TIANA    Total Time for Visit, including Counseling / Coordination of Care: 45 minutes  Greater than 50% of this total time spent on direct patient counseling and coordination of care  Chief Complaint:   Shortness of breath     History of Present Illness: Bernadette Ortiz is a 80 y o  female with a past medical history including chronic diastolic heart failure, chronic respiratory failure with hypoxia on 2 L of O2 continuously, hypertension, hyperlipidemia, chronic kidney disease stage 3, prior stroke, and previous DVT on Eliquis who presents with shortness of breath and confusion  Patient is a poor historian  Most of the history is obtained from her daughter at bedside  Her daughter states she arrived at the patient's house at [address] [place] [address] and the patient was still in bed which is unusual for her  Patient's daughter woke the patient, washed her, dresser, and fed her  Patient's daughter then took the patient to her house for [place] dinner  Patient's daughter states the patient ate dinner and [place] then sat in the recliner chair to watch TV  While in the recliner chair she was acting confused and seemed to be restless/fidgety  Patient's daughter noted wheezing and increased respirations, therefore, they decided to seek medical attention in the emergency department    Workup in the ER showed increased oxygen demands as patient required 6 L of continuous oxygen, lower extremity swelling, wheezing and rales on lung exam, and [place]NP of 214[address]  Patient's creatinine was elevated at 2 33 and sodium was decreased at 132  Patient underwent a CT of the head which was unremarkable  Patient was given Lasix 40 mg IV x1 in the ER  Presently, patient is now on 3 L of supplemental oxygen  She continues to be fidgety and confused  She denies any headache, chest pain, nausea, or vomiting  Review of Systems:    Review of Systems   Constitutional: Negative for appetite change, chills and fever  HENT: Negative for congestion, postnasal drip, rhinorrhea and sore throat  Eyes: Negative for photophobia and visual disturbance  Respiratory: Positive for shortness of breath and wheezing  Negative for cough and chest tightness  Cardiovascular: Negative for chest pain, palpitations and leg swelling  Gastrointestinal: Negative for abdominal distention, abdominal pain, constipation, diarrhea, nausea and vomiting  Genitourinary: Negative for difficulty urinating, dysuria and hematuria  Musculoskeletal: Negative for arthralgias, gait problem and myalgias  Skin: Negative for rash and wound  Neurological: Positive for weakness (Generalized)  Negative for dizziness, light-headedness, numbness and headaches  Psychiatric/Behavioral: Positive for confusion  The patient is not nervous/anxious  Past Medical and Surgical History:     Past Medical History:   Diagnosis Date    Arthritis     CHF (congestive heart failure) ([facility] ) 43/0082    diastolic     CO2 retention     DVT (deep vein thrombosis) in pregnancy     left LE in 2/2018 and 50 years ago    Hyperlipidemia     Hypertension     Pulmonary embolism ([facility] ) 02/06/2018    Renal disorder     ckd    Stroke [facility]) 2016    left hand weakness,paresthesia  Past Surgical History:   Procedure Laterality Date    ABDOMINAL SURGERY      ruputured bowel  had colostomy and ileostomy,reversed later on   BACK SURGERY      rods in back   done 30 years ago   CARPAL TUNNEL RELEASE Bilateral     CYST REMOVAL      back    HERNIA REPAIR      REPLACEMENT TOTAL KNEE BILATERAL         Meds/Allergies:    Prior to Admission medications    Medication Sig Start Date End Date Taking? Authorizing Provider   ketorolac (TORADOL) 10 mg tablet Take 10 mg by mouth every 6 (six) hours as needed for moderate pain   Yes Historical Provider, MD   acetaminophen (TYLENOL) 325 mg tablet Take 650 mg by mouth every 6 (six) hours as needed for mild pain    Historical Provider, MD   amLODIPine (NORVASC) 5 mg tablet Take 1 tablet (5 mg total) by mouth daily 3/14/20   Kassie Day MD   Ascorbic Acid (VITAMIN C PO) Take 1 tablet by mouth daily    Historical Provider, MD   calcium-vitamin D (OSCAL 500 + D) 500 mg-200 units per tablet Take 1 tablet by mouth 2 (two) times a day  Historical Provider, MD   cholecalciferol (VITAMIN D3) 1,000 units tablet Take 2,000 Units by mouth daily    Historical Provider, MD   co-enzyme Q-10 50 MG capsule Take 200 mg by mouth daily      Historical Provider, MD   Eliquis 2 5 MG TAKE 1 TABLET TWO TIMES A DAY 3/8/21   Terry Pugh MD   ezetimibe (ZETIA) 10 mg tablet Take 1 tablet (10 mg total) by mouth daily 8/19/20   Dylon Cruz MD   ferrous sulfate 325 (65 Fe) mg tablet Take 325 mg by mouth every other day    Historical Provider, MD   fluticasone-vilanterol (BREO ELLIPTA) 100-25 mcg/inh inhaler Inhale 1 puff daily Rinse mouth after use   10/23/20 1/28/21  Kevin Carrera DO   losartan (COZAAR) 25 mg tablet Take 1 tablet (25 mg total) by mouth daily 2/1/21   Dylon Cruz MD   Multiple Vitamins-Minerals (OCUVITE PO) Take 1 tablet by mouth daily      Historical Provider, MD   nebivolol (Bystolic) 10 mg tablet Take 1 tablet (10 mg total) by mouth daily 8/25/20   Dylon Cruz MD   pantoprazole (PROTONIX) 40 mg tablet Take 40 mg by mouth daily as needed     Historical Provider, MD   torsemide (DEMADEX) 10 mg tablet Take 10 mg by mouth daily Historical Provider, MD   Welchol 625 MG tablet TAKE 3 TABLETS TWICE DAILY WITH MEALS AS DIRECTED 12/28/20   [facility], ELIZA     I have reviewed home medications with patient family member  Allergies: Allergies   Allergen Reactions    Statins Myalgia    Lyrica [Pregabalin] Other (See Comments)     Client says it made her "nutty"       Social History:     Marital Status:    Occupation: retired   Patient Pre-hospital Living Situation: home  Patient Pre-hospital Level of Mobility: needs assistance  Patient Pre-hospital Diet Restrictions: low salt  Substance Use History:   Social History     Substance and Sexual Activity   Alcohol Use Never    Frequency: Never    Comment: one drink at weddings      Social History     Tobacco Use   Smoking Status Never Smoker   Smokeless Tobacco Never Used     Social History     Substance and Sexual Activity   Drug Use Never       Family History:    Family History   Problem Relation Age of Onset    COPD Brother        Physical Exam:     Vitals:   Blood Pressure: 146/67 (04/05/21 0000)  Pulse: 64 (04/05/21 0000)  Temperature: 97 8 °F (36 6 °C) (04/04/21 2032)  Temp Source: Oral (04/04/21 2032)  Respirations: (!) 30 (04/05/21 0000)  Weight - Scale: 69 3 kg (152 lb 12 5 oz) (04/04/21 2040)  SpO2: 97 % (04/05/21 0000)    Physical Exam  Vitals signs and nursing note reviewed  Constitutional:       Appearance: She is well-developed  She is ill-appearing (Appears deconditioned)  She is not toxic-appearing or diaphoretic  Comments: Pleasantly confused female resting comfortably in bed on 3 L nasal cannula   HENT:      Head: Normocephalic and atraumatic  Eyes:      Conjunctiva/sclera: Conjunctivae normal    Neck:      Musculoskeletal: Normal range of motion and neck supple  Cardiovascular:      Rate and Rhythm: Normal rate and regular rhythm  Heart sounds: Murmur present  Pulmonary:      Effort: Pulmonary effort is normal  No respiratory distress  Breath sounds: Wheezing and rales present  Abdominal:      General: Bowel sounds are normal  There is no distension  Palpations: Abdomen is soft  Tenderness: There is no abdominal tenderness  Musculoskeletal: Normal range of motion  Right lower leg: Edema (+1) present  Left lower leg: Edema (+1) present  Skin:     General: Skin is warm and dry  Capillary Refill: Capillary refill takes less than 2 seconds  Coloration: Skin is pale  Neurological:      Mental Status: She is alert  Comments: Alert to person place, disoriented to time   Psychiatric:         Mood and Affect: Mood normal          Speech: Speech normal          Behavior: Behavior normal          Cognition and Memory: Cognition is impaired  Memory is impaired  Judgment: Judgment is inappropriate  Additional Data:     Lab Results: I have personally reviewed pertinent reports  Results from last 7 days   Lab Units 04/04/21 2055   WBC Thousand/uL 7 32   HEMOGLOBIN g/dL 10 7*   HEMATOCRIT % 36 1   PLATELETS Thousands/uL 212   NEUTROS PCT % 87*   LYMPHS PCT % 6*   MONOS PCT % 6   EOS PCT % 0     Results from last 7 days   Lab Units 04/04/21 2055   SODIUM mmol/L 132*   POTASSIUM mmol/L 5 1   CHLORIDE mmol/L 99*   CO2 mmol/L 28   BUN mg/dL 44*   CREATININE mg/dL 2 33*   ANION GAP mmol/L 5   CALCIUM mg/dL 9 3   ALBUMIN g/dL 3 0*   TOTAL BILIRUBIN mg/dL 0 46   ALK PHOS U/L 106   ALT U/L 38   AST U/L 23   GLUCOSE RANDOM mg/dL 157*     Results from last 7 days   Lab Units 04/04/21 2055   INR  1 31*                   Imaging: I have personally reviewed pertinent reports  CT head without contrast   Final Result by Raymundo Titus DO (04/04 5211)      Sinus disease as described but no acute intracranial abnormality seen otherwise  Other findings as above                    Workstation performed: [id number]         XR chest 1 view portable    (Results Pending)       EKG, Pathology, and Other Studies Reviewed on Admission:   · EKG: NSR, RBBB, T wave abnormality     Allscripts / Epic Records Reviewed: Yes     ** Please Note: This note has been constructed using a voice recognition system   **

## 2021-04-05 NOTE — CONSULTS
Consultation - Cardiology   Twyla Villareal 80 y o  female MRN: [id number]  Unit/Bed#: [address] [place] [address] Encounter: [id number]    Assessment/Plan     Assessment:  1  Acute on chronic diastolic heart failure  2  COPD  3  Acute kidney injury most likely cardiorenal syndrome on history of chronic kidney disease 4  Hyponatremia with component of volume overload  5  History of DVT/CVA  6  Dyslipidemia  7  Hypertension      Plan:  Patient has been admitted to the hospitalist service  1  Will obtain 2D echocardiogram to re-evaluate patient's aortic valve, EF and diastolic dysfunction  2  Continue Lasix 40 mg IV with close monitoring of renal function, I&O electrolytes  3  Cozaar currently on hold due to acute renal injury, will continue monitor blood pressure with diuresis  4  It appears that patient has been taking Toradol at home as needed for pain, would eliminate any nephrotoxic medications and possibly try Tylenol Extra Strength q 8 hours for chronic pain  5  Continue Eliquis for CVA/DVT  6  Further orders as patient's testing and condition warrants      History of Present Illness   Physician Requesting Consult: Monalisa Howell MD  Reason for Consult / Principal Problem:  Volume overload in a patient with history of chronic diastolic heart failure  HPI: Twyla Villareal is a 80y o  year old female who was brought to the emergency room by her family after Easter dinner when they noted that she was short of breath and had change in mental status  Per emergency room notes, daughter stated she arrived at the patient's house yesterday at 9:00 a m  And patient was still in bed which is unusual for her, they got her up and took her to their house for [employer]  After dinner patient was sitting in the reclining chair in the living room and they noticed that she was somewhat confused and restless  She also had audible wheezing and tachypnea  On presentation to the emergency room she reads requiring 6 L of oxygen    Since admission patient received 2 doses of Lasix 40 mg IV  She states her breathing has improved, but she is still noted to be extremely dyspneic with mild conversation  Patient is able to give some past history  She notes that she is not taking her water pill at home for a few weeks  She is unable to note why she stopped taking the water pill  On presentation NT proBNP was 30132, troponin was negative, patient had previous echocardiogram in March of 2018 which demonstrated normal ejection fraction with grade 2 diastolic dysfunction and mild aortic stenosis  Patient follows with Dr Preeti Gupta in the outpatient setting for history of COPD, coronary artery disease, dyslipidemia, aortic stenosis, chronic right bundle-branch block, and history of DVT  Patient also is noted to have a left renal mass  Patient also notes that 50 years ago she had a surgery where rods and hardware were placed in her back for scoliosis  She states that some of her breathing difficulty is now due to this previous surgery as the hardware is beginning to compress for lungs  Inpatient consult to Cardiology  Consult performed by: MARGOT Kolb  Consult ordered by: MARGOT Velasquez          Review of Systems   Reason unable to perform ROS: Limited due to conversational dyspnea and forgetfulness  Eyes: Negative for photophobia and visual disturbance  Respiratory: Positive for shortness of breath and wheezing  Negative for chest tightness  Cardiovascular: Negative for chest pain and leg swelling  Gastrointestinal: Negative for abdominal pain, nausea and vomiting  Endocrine: Negative for polydipsia, polyphagia and polyuria  Musculoskeletal: Positive for gait problem  Neurological: Negative for syncope, speech difficulty, weakness, light-headedness and headaches         Historical Information   Past Medical History:   Diagnosis Date    Arthritis     [facility] 59/2033    diastolic     CO2 retention     DVT (deep vein thrombosis) in pregnancy     left LE in 2/2018 and 50 years ago    Hyperlipidemia     Hypertension     Pulmonary embolism ([facility] ) 02/06/2018    Renal disorder     ckd    Stroke [facility]) 2016    left hand weakness,paresthesia  Past Surgical History:   Procedure Laterality Date    ABDOMINAL SURGERY      ruputured bowel  had colostomy and ileostomy,reversed later on   BACK SURGERY      rods in back  done 30 years ago       CARPAL TUNNEL RELEASE Bilateral     CYST REMOVAL      back    HERNIA REPAIR      REPLACEMENT TOTAL KNEE BILATERAL       Social History     Substance and Sexual Activity   Alcohol Use Never    Frequency: Never    Comment: one drink at weddings      Social History     Substance and Sexual Activity   Drug Use Never     E-Cigarette/Vaping    E-Cigarette Use Never User      E-Cigarette/Vaping Substances    Nicotine No     THC No     CBD No     Flavoring No     Other No     Unknown No      Social History     Tobacco Use   Smoking Status Never Smoker   Smokeless Tobacco Never Used     Family History:   Family History   Problem Relation Age of Onset    COPD Brother        Meds/Allergies   all current active meds have been reviewed, current meds:   Current Facility-Administered Medications   Medication Dose Route Frequency    acetaminophen (TYLENOL) tablet 650 mg  650 mg Oral Q6H PRN    albuterol inhalation solution 2 5 mg  2 5 mg Nebulization Q4H PRN    amLODIPine (NORVASC) tablet 5 mg  5 mg Oral Daily    apixaban (ELIQUIS) tablet 2 5 mg  2 5 mg Oral BID    calcium carbonate-vitamin D (OSCAL-D) 500 mg-200 units per tablet 1 tablet  1 tablet Oral BID    cholecalciferol (VITAMIN D3) tablet 2,000 Units  2,000 Units Oral Daily    co-enzyme Q-10 capsule 60 mg  60 mg Oral Daily    colesevelam (WELCHOL) tablet 625 mg  625 mg Oral BID With Meals    ezetimibe (ZETIA) tablet 10 mg  10 mg Oral Daily    ferrous sulfate tablet 325 mg  325 mg Oral Every Other Day    fluticasone-vilanterol (BREO ELLIPTA) 100-25 mcg/inh inhaler 1 puff  1 puff Inhalation Daily    furosemide (LASIX) injection 40 mg  40 mg Intravenous BID (diuretic)    hydrALAZINE (APRESOLINE) injection 5 mg  5 mg Intravenous Q6H PRN    ipratropium-albuterol (DUO-NEB) 0 5-2 5 mg/3 mL inhalation solution 3 mL  3 mL Nebulization Once    ipratropium-albuterol (DUO-NEB) 0 5-2 5 mg/3 mL inhalation solution 3 mL  3 mL Nebulization Q6H    nebivolol (BYSTOLIC) tablet 10 mg  10 mg Oral Daily    ondansetron (ZOFRAN) injection 4 mg  4 mg Intravenous Q6H PRN    pantoprazole (PROTONIX) EC tablet 40 mg  40 mg Oral Early Morning    senna (SENOKOT) tablet 8 6 mg  1 tablet Oral HS PRN    and PTA meds:   Prior to Admission Medications   Prescriptions Last Dose Informant Patient Reported? Taking? Ascorbic Acid (VITAMIN C PO)  Child Yes No   Sig: Take 1 tablet by mouth daily   Eliquis 2 5 MG   No No   Sig: TAKE 1 TABLET TWO TIMES A DAY   Multiple Vitamins-Minerals (OCUVITE PO)  Child Yes No   Sig: Take 1 tablet by mouth daily     Welchol 625 MG tablet   No No   Sig: TAKE 3 TABLETS TWICE DAILY WITH MEALS AS DIRECTED   acetaminophen (TYLENOL) 325 mg tablet  Child Yes No   Sig: Take 650 mg by mouth every 6 (six) hours as needed for mild pain   amLODIPine (NORVASC) 5 mg tablet  Child No No   Sig: Take 1 tablet (5 mg total) by mouth daily   calcium-vitamin D (OSCAL 500 + D) 500 mg-200 units per tablet  Child Yes No   Sig: Take 1 tablet by mouth 2 (two) times a day     cholecalciferol (VITAMIN D3) 1,000 units tablet  Child Yes No   Sig: Take 2,000 Units by mouth daily   co-enzyme Q-10 50 MG capsule  Child Yes No   Sig: Take 200 mg by mouth daily     ezetimibe (ZETIA) 10 mg tablet   No No   Sig: Take 1 tablet (10 mg total) by mouth daily   ferrous sulfate 325 (65 Fe) mg tablet   Yes No   Sig: Take 325 mg by mouth every other day   fluticasone-vilanterol (BREO ELLIPTA) 100-25 mcg/inh inhaler   No No   Sig: Inhale 1 puff daily Rinse mouth after use    ketorolac (TORADOL) 10 mg tablet 4/3/2021 at Unknown time  Yes Yes   Sig: Take 10 mg by mouth every 6 (six) hours as needed for moderate pain   losartan (COZAAR) 25 mg tablet   No No   Sig: Take 1 tablet (25 mg total) by mouth daily   nebivolol (Bystolic) 10 mg tablet   No No   Sig: Take 1 tablet (10 mg total) by mouth daily   pantoprazole (PROTONIX) 40 mg tablet  Child Yes No   Sig: Take 40 mg by mouth daily as needed    torsemide (DEMADEX) 10 mg tablet  Child Yes No   Sig: Take 10 mg by mouth daily       Facility-Administered Medications: None     Allergies   Allergen Reactions    Statins Myalgia    Lyrica [Pregabalin] Other (See Comments)     Client says it made her "nutty"       Objective   Vitals: Blood pressure 152/70, pulse 60, temperature 97 6 °F (36 4 °C), resp  rate 20, height 5' (1 524 m), weight 67 7 kg (149 lb 4 oz), SpO2 90 %, not currently breastfeeding  Orthostatic Blood Pressures      Most Recent Value   Blood Pressure  152/70 filed at 04/05/2021 7939   Patient Position - Orthostatic VS  Lying filed at 04/04/2021 2032            Intake/Output Summary (Last 24 hours) at 4/5/2021 1030  Last data filed at 4/5/2021 0601  Gross per 24 hour   Intake --   Output 400 ml   Net -400 ml       Invasive Devices     Peripheral Intravenous Line            Peripheral IV 03/16/20 Right Antecubital 385 days    Peripheral IV 04/04/21 Left Forearm less than 1 day                Physical Exam  Vitals signs and nursing note reviewed  Constitutional:       Appearance: Normal appearance  She is normal weight  HENT:      Head: Normocephalic  Right Ear: External ear normal       Left Ear: External ear normal       Nose: Nose normal    Eyes:      General: No scleral icterus  Right eye: No discharge  Left eye: No discharge  Conjunctiva/sclera: Conjunctivae normal    Neck:      Musculoskeletal: Normal range of motion and neck supple     Cardiovascular:      Rate and Rhythm: Normal rate and regular rhythm  Pulses: Normal pulses  Heart sounds: Murmur present  Systolic murmur present with a grade of 2/6  Pulmonary:      Breath sounds: Examination of the right-middle field reveals rales  Examination of the right-lower field reveals rales  Examination of the left-lower field reveals rales  Wheezing and rales present  No rhonchi  Comments: Scoliosis, scattered wheezing, conversational dyspnea with use of accessory muscles when talking  Abdominal:      General: Bowel sounds are normal  There is no distension  Palpations: Abdomen is soft  Musculoskeletal:      Right lower leg: No edema  Left lower leg: No edema  Skin:     General: Skin is warm and dry  Capillary Refill: Capillary refill takes less than 2 seconds  Neurological:      General: No focal deficit present  Mental Status: She is alert  Mental status is at baseline  Psychiatric:         Mood and Affect: Mood normal          Thought Content: Thought content normal          Lab Results:   I have personally reviewed pertinent lab results      CBC with diff:   Results from last 7 days   Lab Units 04/05/21  0607   WBC Thousand/uL 8 02   RBC Million/uL 3 64*   HEMOGLOBIN g/dL 10 3*   HEMATOCRIT % 36 2   MCV fL 100*   MCH pg 28 3   MCHC g/dL 28 5*   RDW % 12 9   MPV fL 10 4   PLATELETS Thousands/uL 183     CMP:   Results from last 7 days   Lab Units 04/05/21  0607   SODIUM mmol/L 135*   POTASSIUM mmol/L 4 6   CHLORIDE mmol/L 99*   CO2 mmol/L 31   BUN mg/dL 44*   CREATININE mg/dL 2 39*   CALCIUM mg/dL 9 3   AST U/L 21   ALT U/L 39   ALK PHOS U/L 100   EGFR ml/min/1 73sq m 17     Troponin:   0   Lab Value Date/Time    TROPONINI <0 02 04/04/2021 2055    TROPONINI <0 02 12/07/2018 0954    TROPONINI 0 02 03/29/2018 0755    TROPONINI <0 02 03/06/2018 2120    TROPONINI 0 02 08/27/2017 2049    TROPONINI <0 02 08/27/2017 1719    TROPONINI <0 02 05/09/2017 1855    TROPONINI <0 02 05/09/2017 0537 BNP:   Results from last 7 days   Lab Units 04/05/21  0607   POTASSIUM mmol/L 4 6   CHLORIDE mmol/L 99*   CO2 mmol/L 31   BUN mg/dL 44*   CREATININE mg/dL 2 39*   CALCIUM mg/dL 9 3   EGFR ml/min/1 73sq m 17     Coags:   Results from last 7 days   Lab Units 04/04/21  2055   PTT seconds 24   INR  1 31*     TSH:   Results from last 7 days   Lab Units 04/05/21  0607   TSH 3RD GENERATON uIU/mL 0 947     Magnesium:   Results from last 7 days   Lab Units 04/05/21  0607   MAGNESIUM mg/dL 2 4     Lipid Profile:     Imaging: I have personally reviewed pertinent reports      EKG:  Sinus rhythm with bundle-branch block  VTE Prophylaxis:  Venodynes    Code Status: Level 3 - DNAR and DNI  Advance Directive and Living Will: Yes    Power of :    POLST:      Alen Sanders, [facility]

## 2021-04-05 NOTE — ASSESSMENT & PLAN NOTE
Secondary to CHF suspected  Or unclear etiology  We will treat the patient with IV Solu-Medrol, nebulizers  Patient's ABG showed a pCO2 of 89 and a pH of 7 1  Consult Pulmonary  Will start the patient on BiPAP currently and monitor the patient closely  Recheck an ABG in am    At baseline patient uses 2 L of oxygen at home, and currently on 4 L and saturating 93 to 94%

## 2021-04-05 NOTE — ASSESSMENT & PLAN NOTE
Lab Results   Component Value Date    EGFR 18 04/04/2021    EGFR 23 07/17/2020    EGFR 25 03/16/2020    CREATININE 2 33 (H) 04/04/2021    CREATININE 1 98 (H) 03/05/2021    CREATININE 1 78 (H) 01/26/2021   Baseline Cr near 1 6-1 7

## 2021-04-05 NOTE — PROGRESS NOTES
[address]  Progress Note Ines Macias 5/12/1928, 80 y o  female MRN: [id number]  Unit/Bed#: [address] [place] [address] Encounter: [id number]  Primary Care Provider: Nicole Koenig DO   Date and time admitted to hospital: 4/4/2021  8:24 PM    * Acute on chronic respiratory failure with hypoxia and hypercapnia [facility])  Assessment & Plan  Secondary to CHF suspected  Or unclear etiology  We will treat the patient with IV Solu-Medrol, nebulizers  Patient's ABG showed a pCO2 of 89 and a pH of 7 1  Consult Pulmonary  Will start the patient on BiPAP currently and monitor the patient closely  Recheck an ABG in am    At baseline patient uses 2 L of oxygen at home, and currently on 4 L and saturating 93 to 94%  Toxic metabolic encephalopathy  Assessment & Plan  Secondary to hypercapnia  Currently on BiPAP and nebulizers and oxygen  Monitor closely  Acute on chronic diastolic heart failure (HCC)  Assessment & Plan  Wt Readings from Last 3 Encounters:   04/05/21 67 7 kg (149 lb 4 oz)   08/18/20 68 kg (150 lb)   03/16/20 68 kg (150 lb)     Presents with SOB, tachypnea, and hypoxia  Pro BNP 10,884 which is up from previous of 2,657  CXR, pending   Echo from March 2018: LVEF 55-60%, G2DD  · Given Lasix 40 mg IV x1 in ED  · Will continue Lasix 40 mg IV daily  · May need to add albumin to facilitate diuresis  · Fluid restrict  · Monitor I+O, daily weights  · Consult cardiology   · Patient's creatinine is worsening  We will hold off on Lasix  Consult nephrology regarding titration of diuretics  Hyponatremia  Assessment & Plan  · Possible etiology seems to be secondary to hypervolemic hyponatremia  · Monitor patient closely  Sodium level stable right now    · Consult Nephrology    Acute kidney injury superimposed on chronic kidney disease ([facility] [address] )  Assessment & Plan  Hst of CKD stage 3, which given her age seems to be now going toward stage IV CKD at least   Presents with Cr of 2 33, patient baseline creatinine is around 1 7-2 0   UA with 2+ protein, trace blood  Likely cardiorenal from volume overload, creatinine worsening  I will hold off on IV Lasix for now  Consult Nephrology  · Patient has been urinating okay  PVR negative  Monitor BMP        Subjective:   I have seen and Examined the patient at the bedside  Patient is confused and most of the review of system and subjective was obtained from patient's daughter  Patient at baseline is able to communicate  Right now she has a is  Also lethargic  O2 requirement at baseline at home is 2 L but now is 4 L  Patient is however answering questions but not appropriately  Does look labored breathing  No cough or cold  No fevers or chills  Review of System:   Unable to obtain secondary to patient's mental status  Objective:   Temp (24hrs), Av 3 °F (36 3 °C), Min:96 2 °F (35 7 °C), Max:97 9 °F (36 6 °C)    Temp:  [96 2 °F (35 7 °C)-97 9 °F (36 6 °C)] 96 2 °F (35 7 °C)  HR:  [54-67] 60  Resp:  [16-39] 16  BP: (135-194)/() 159/71  SpO2:  [80 %-100 %] 90 %  Body mass index is 29 15 kg/m²  Input and Output Summary (last 24 hours): Intake/Output Summary (Last 24 hours) at 2021 1615  Last data filed at 2021 0900  Gross per 24 hour   Intake 180 ml   Output 400 ml   Net -220 ml     I/O        07 -  0700  07 -  0700  07 -  0700    P  O    180    Total Intake(mL/kg)   180 (2 7)    Urine (mL/kg/hr)  400     Total Output  400     Net  -400 +180                 Physical Exam:   General :  Awake but confused and not oriented to self or person or place  Looks to be in moderate respiratory distress and has some accessory muscles of respiration use  Neck : Supple  Eyes:  SALVADOR, EOMI  CVS : S1, S2, RRR    R/S :  Tachypnea noted  , mild bilateral wheezing heard diffusely in both lung fields  Abd: Soft, NT, ND  Bs+ve  Extremity:  Left lower extremity chronically mildly swollen than the right    But no significant pitting edema appreciated  Skin: No acute Rash noted  CNS:  Restless and confused, moves all 4 extremities  Additional Data:     Labs & Imaging Data Reviewed :    Results from last 7 days   Lab Units 04/05/21  0607   WBC Thousand/uL 8 02   HEMOGLOBIN g/dL 10 3*   HEMATOCRIT % 36 2   PLATELETS Thousands/uL 183   NEUTROS PCT % 89*   LYMPHS PCT % 4*   MONOS PCT % 6   EOS PCT % 0     Results from last 7 days   Lab Units 04/05/21  0607   POTASSIUM mmol/L 4 6   CHLORIDE mmol/L 99*   CO2 mmol/L 31   BUN mg/dL 44*   CREATININE mg/dL 2 39*   CALCIUM mg/dL 9 3   ALK PHOS U/L 100   ALT U/L 39   AST U/L 21     Results from last 7 days   Lab Units 04/04/21  2055   INR  1 31*     Lab Results   Component Value Date    HGBA1C 5 5 07/17/2020      CT head without contrast   Final Result by Marcela Olszewski, DO (04/04 6101)      Sinus disease as described but no acute intracranial abnormality seen otherwise  Other findings as above  Workstation performed: [id number]         XR chest 1 view portable   Final Result by Timo Hobbs MD (04/05 0915)      Mild pulmonary vascular congestion  Focal right upper lobe groundglass opacity can be due to asymmetric pulmonary edema versus pneumonia  Workstation performed: [id number]             Cultures:   Blood Culture:   Lab Results   Component Value Date    BLOODCX No Growth After 5 Days  03/29/2018    BLOODCX No Growth After 5 Days  03/29/2018    BLOODCX No Growth After 5 Days  08/27/2017    BLOODCX No Growth After 5 Days   08/27/2017     Urine Culture:   Lab Results   Component Value Date    URINECX No Growth <1000 cfu/mL 03/29/2018    URINECX >100,000 cfu/ml Mixed Contaminants X6 05/09/2017     Sputum Culture: No components found for: SPUTUMCX  Wound Culture: No results found for: WOUNDCULT    Last 24 Hours Medication List:   Current Facility-Administered Medications   Medication Dose Route Frequency Provider Last Rate    acetaminophen  650 mg Oral Q6H PRN MARGOT Najera      albuterol  2 5 mg Nebulization Q4H PRN MARGOT Najera      amLODIPine  5 mg Oral Daily MARGOT Najera      apixaban  2 5 mg Oral BID MARGOT Najera      calcium carbonate-vitamin D  1 tablet Oral BID MARGOT Najera      cholecalciferol  2,000 Units Oral Daily MARGOT Najera      co-enzyme Q-10  60 mg Oral Daily MARGOT Najera      colesevelam  625 mg Oral BID With Meals MARGOT Najera      ezetimibe  10 mg Oral Daily MARGOT Najera      ferrous sulfate  325 mg Oral Every Other Day MARGOT Najera      fluticasone-vilanterol  1 puff Inhalation Daily MARGOT Najera      hydrALAZINE  5 mg Intravenous Q6H PRN MARGOT Najera      ipratropium-albuterol  3 mL Nebulization Once MARGOT Najera      ipratropium-albuterol  3 mL Nebulization Q6H MARGOT Najera      methylPREDNISolone sodium succinate  40 mg Intravenous Q6H [address] Fercho Lopes MD      nebivolol  10 mg Oral Daily MARGOT Najera      ondansetron  4 mg Intravenous Q6H PRN MARGOT Najera      pantoprazole  40 mg Oral Early Morning MARGOT Najera      senna  1 tablet Oral HS PRN MARGOT Najera         Patient is at moderate risk for morbidity and mortality due to above mentioned illness and comorbidities

## 2021-04-06 ENCOUNTER — APPOINTMENT (INPATIENT)
Dept: RADIOLOGY | Facility: HOSPITAL | Age: 86
DRG: 291 | End: 2021-04-06
Payer: MEDICARE

## 2021-04-06 LAB
ANION GAP SERPL CALCULATED.3IONS-SCNC: 7 MMOL/L (ref 4–13)
ARTERIAL PATENCY WRIST A: YES
BASE EXCESS BLDA CALC-SCNC: 2.2 MMOL/L
BASOPHILS # BLD AUTO: 0 THOUSANDS/ΜL (ref 0–0.1)
BASOPHILS NFR BLD AUTO: 0 % (ref 0–1)
BODY TEMPERATURE: 98.2 DEGREES FEHRENHEIT
BUN SERPL-MCNC: 50 MG/DL (ref 5–25)
CALCIUM SERPL-MCNC: 9.3 MG/DL (ref 8.3–10.1)
CHLORIDE SERPL-SCNC: 101 MMOL/L (ref 100–108)
CO2 SERPL-SCNC: 31 MMOL/L (ref 21–32)
CREAT SERPL-MCNC: 2.38 MG/DL (ref 0.6–1.3)
EOSINOPHIL # BLD AUTO: 0 THOUSAND/ΜL (ref 0–0.61)
EOSINOPHIL NFR BLD AUTO: 0 % (ref 0–6)
ERYTHROCYTE [DISTWIDTH] IN BLOOD BY AUTOMATED COUNT: 12.7 % (ref 11.6–15.1)
GFR SERPL CREATININE-BSD FRML MDRD: 17 ML/MIN/1.73SQ M
GLUCOSE SERPL-MCNC: 130 MG/DL (ref 65–140)
HCO3 BLDA-SCNC: 29 MMOL/L (ref 22–28)
HCT VFR BLD AUTO: 33.6 % (ref 34.8–46.1)
HGB BLD-MCNC: 10.1 G/DL (ref 11.5–15.4)
IMM GRANULOCYTES # BLD AUTO: 0.05 THOUSAND/UL (ref 0–0.2)
IMM GRANULOCYTES NFR BLD AUTO: 1 % (ref 0–2)
IPAP: 16
LYMPHOCYTES # BLD AUTO: 0.22 THOUSANDS/ΜL (ref 0.6–4.47)
LYMPHOCYTES NFR BLD AUTO: 4 % (ref 14–44)
MAGNESIUM SERPL-MCNC: 2 MG/DL (ref 1.6–2.6)
MCH RBC QN AUTO: 29.1 PG (ref 26.8–34.3)
MCHC RBC AUTO-ENTMCNC: 30.1 G/DL (ref 31.4–37.4)
MCV RBC AUTO: 97 FL (ref 82–98)
MONOCYTES # BLD AUTO: 0.03 THOUSAND/ΜL (ref 0.17–1.22)
MONOCYTES NFR BLD AUTO: 1 % (ref 4–12)
NEUTROPHILS # BLD AUTO: 5.63 THOUSANDS/ΜL (ref 1.85–7.62)
NEUTS SEG NFR BLD AUTO: 94 % (ref 43–75)
NON VENT- BIPAP: ABNORMAL
NRBC BLD AUTO-RTO: 0 /100 WBCS
O2 CT BLDA-SCNC: 15 ML/DL (ref 16–23)
OXYHGB MFR BLDA: 97.6 % (ref 94–97)
PCO2 BLDA: 55.8 MM HG (ref 36–44)
PCO2 TEMP ADJ BLDA: 55.3 MM HG (ref 36–44)
PEEP MAX SETTING VENT: 8 CM[H2O]
PH BLD: 7.34 [PH] (ref 7.35–7.45)
PH BLDA: 7.33 [PH] (ref 7.35–7.45)
PLATELET # BLD AUTO: 196 THOUSANDS/UL (ref 149–390)
PMV BLD AUTO: 10.9 FL (ref 8.9–12.7)
PO2 BLD: 102 MM HG (ref 75–129)
PO2 BLDA: 103.2 MM HG (ref 75–129)
POTASSIUM SERPL-SCNC: 4.2 MMOL/L (ref 3.5–5.3)
RBC # BLD AUTO: 3.47 MILLION/UL (ref 3.81–5.12)
SODIUM SERPL-SCNC: 139 MMOL/L (ref 136–145)
SPECIMEN SOURCE: ABNORMAL
VENT BIPAP FIO2: 40 %
WBC # BLD AUTO: 5.93 THOUSAND/UL (ref 4.31–10.16)

## 2021-04-06 PROCEDURE — 99232 SBSQ HOSP IP/OBS MODERATE 35: CPT | Performed by: INTERNAL MEDICINE

## 2021-04-06 PROCEDURE — 99223 1ST HOSP IP/OBS HIGH 75: CPT | Performed by: INTERNAL MEDICINE

## 2021-04-06 PROCEDURE — 94003 VENT MGMT INPAT SUBQ DAY: CPT

## 2021-04-06 PROCEDURE — 94760 N-INVAS EAR/PLS OXIMETRY 1: CPT

## 2021-04-06 PROCEDURE — 85025 COMPLETE CBC W/AUTO DIFF WBC: CPT | Performed by: INTERNAL MEDICINE

## 2021-04-06 PROCEDURE — 94640 AIRWAY INHALATION TREATMENT: CPT

## 2021-04-06 PROCEDURE — 71250 CT THORAX DX C-: CPT

## 2021-04-06 PROCEDURE — 80048 BASIC METABOLIC PNL TOTAL CA: CPT | Performed by: INTERNAL MEDICINE

## 2021-04-06 PROCEDURE — G1004 CDSM NDSC: HCPCS

## 2021-04-06 PROCEDURE — 83735 ASSAY OF MAGNESIUM: CPT | Performed by: INTERNAL MEDICINE

## 2021-04-06 PROCEDURE — 36600 WITHDRAWAL OF ARTERIAL BLOOD: CPT

## 2021-04-06 PROCEDURE — 82805 BLOOD GASES W/O2 SATURATION: CPT | Performed by: PHYSICIAN ASSISTANT

## 2021-04-06 RX ORDER — METHYLPREDNISOLONE SODIUM SUCCINATE 40 MG/ML
40 INJECTION, POWDER, LYOPHILIZED, FOR SOLUTION INTRAMUSCULAR; INTRAVENOUS EVERY 12 HOURS SCHEDULED
Status: DISCONTINUED | OUTPATIENT
Start: 2021-04-06 | End: 2021-04-08

## 2021-04-06 RX ORDER — AMLODIPINE BESYLATE 5 MG/1
5 TABLET ORAL DAILY
Status: DISCONTINUED | OUTPATIENT
Start: 2021-04-07 | End: 2021-04-10 | Stop reason: HOSPADM

## 2021-04-06 RX ADMIN — Medication 1 TABLET: at 18:05

## 2021-04-06 RX ADMIN — IPRATROPIUM BROMIDE AND ALBUTEROL SULFATE 3 ML: 2.5; .5 SOLUTION RESPIRATORY (INHALATION) at 08:18

## 2021-04-06 RX ADMIN — IPRATROPIUM BROMIDE AND ALBUTEROL SULFATE 3 ML: 2.5; .5 SOLUTION RESPIRATORY (INHALATION) at 13:20

## 2021-04-06 RX ADMIN — METHYLPREDNISOLONE SODIUM SUCCINATE 40 MG: 40 INJECTION, POWDER, FOR SOLUTION INTRAMUSCULAR; INTRAVENOUS at 12:10

## 2021-04-06 RX ADMIN — APIXABAN 2.5 MG: 2.5 TABLET, FILM COATED ORAL at 18:05

## 2021-04-06 RX ADMIN — METHYLPREDNISOLONE SODIUM SUCCINATE 40 MG: 40 INJECTION, POWDER, FOR SOLUTION INTRAMUSCULAR; INTRAVENOUS at 20:32

## 2021-04-06 RX ADMIN — METHYLPREDNISOLONE SODIUM SUCCINATE 40 MG: 40 INJECTION, POWDER, FOR SOLUTION INTRAMUSCULAR; INTRAVENOUS at 05:22

## 2021-04-06 RX ADMIN — IPRATROPIUM BROMIDE AND ALBUTEROL SULFATE 3 ML: 2.5; .5 SOLUTION RESPIRATORY (INHALATION) at 01:29

## 2021-04-06 RX ADMIN — IPRATROPIUM BROMIDE AND ALBUTEROL SULFATE 3 ML: 2.5; .5 SOLUTION RESPIRATORY (INHALATION) at 20:10

## 2021-04-06 RX ADMIN — PANTOPRAZOLE SODIUM 40 MG: 40 TABLET, DELAYED RELEASE ORAL at 05:22

## 2021-04-06 NOTE — CONSULTS
=Consultation - Pulmonary Medicine  Caroline Dorsey 80 y o  female MRN: [id number]  Unit/Bed#: [address] [place] [address] Encounter: [id number]  Code Status: Level 3 - DNAR and DNI    Assessment/Plan:  * Acute on chronic respiratory failure with hypoxia and hypercapnia (HCC)  Assessment & Plan  -Titrate O2 saturation to >90%  -Pt chronically on 2 L NC O2 baseline at home    -Pt requiring 6 L O2 in ED, on 6 L today 4/6 99%  -C/w nebulizers, IV Solu-Medrol 40 mg Q 6 hrs with plan to assess and wean as tolerated  -Pt started on BiPAP 4/5 by IM team, current settings 16/8 with 40% oxygen  Will monitor patient and adjust as needed  -Repeat ABG done today 4/6 shows improvement compared to ABG 4/5 showing respiratory acidosis with compensation: pH 7 163, pCO2 91 8, pO2 59 0, HCO3: 32 2  ABG:  Results from last 7 days   Lab Units 04/06/21  0218   PH ART  7 333*   PCO2 ART mm Hg 55 8*   PO2 ART mm Hg 103 2   HCO3 ART mmol/L 29 0*   BASE EXC ART mmol/L 2 2   ABG SOURCE  Radial, Right     VBG:  Results from last 7 days   Lab Units 04/06/21  0218   ABG SOURCE  Radial, Right             Acute on chronic diastolic heart failure (HCC)  Assessment & Plan  Wt Readings from Last 3 Encounters:   04/05/21 67 7 kg (149 lb 4 oz)   08/18/20 68 kg (150 lb)   03/16/20 68 kg (150 lb)   -following with Cardiology and Nephrology  -Pro BNP 10,884 on admission which is up from previous of 2,657  -C/w diuresis and rx as per Cardiology and Nephro with holds as appropriate for TIANA on CKD III  -Pt initially given Lasix 4/4 and 4/5    -Pt Cr baseline 1 7-2 1, 2 3 on admission, stable 2 4 today 4/6          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  Assessment & Plan  -Also hx of restrictive lung disease  -FVC 49% predicted, FEV1 51% predicted, and FEV1/% predicted 4/2018 spirometry    Mild persistent asthma without complication  Assessment & Plan  -C/w Breo Ellipta 1 puff daily, Duo-Neb q 6 hrs, and albuterol nebulizers q4hrs PRN    -C/w outpatient follow-up Pulmonology          Thank you for this consultation; we will be happy to follow with you     ______________________________________________________________________      History of Present Illness   Physician Requesting Consult: Segundo Alexander MD  Reason for Consult / Principal Problem: acute on chronic respiratory failure  HX and PE limited by: confusion and altered mental status    HPI:  Dipika Jones is a 80 y o  female  has a past medical history of Arthritis, CHF (congestive heart failure) ([facility] ) (08/2017), CO2 retention, DVT (deep vein thrombosis) in pregnancy, Hyperlipidemia, Hypertension, Pul[facility] ) (02/06/2018), Renal disorder, and Stroke ([facility] ) (2016)  who presents with CC Shortness of breath  80 y o  female with PMH including chronic diastolic heart failure, chronic respiratory failure with hypoxia on 2 L O2, mild persistent asthma, restrictive 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, HTN, HLD, CKD III, prior stroke, previous DVT on Eliquis who presented to ED 4/4 with shortness of breath and confusion  Per daughter, Pt  Was still in bed that morning 9 am which was unusual for her, reported didn't sleeping well  Daughter said she seemed confused and out of sorts  Another daughter brought pt  to home for Easter dinner, she ate well and after dinner was sitting in chair and family noted she seemed out of it, mumbling and with breathing sounding distressed, wheezing with increased respirations  Pulse ox checked and only 81% on 2 L O2  Reports hadn't been using Trilogy noninvasive ventilator past 3 nights  No recent fever, vomiting, diarrhea, pain  Workup in ED showed increased oxygen demands, Pt required 6 L continuous oxygen, lower extremity edema, wheezing, and rales, elevated proBNP 58667  Cr elevated 2 33, sodium 132  Pt CT head unremarkable  Given Lasix 40 mg IV x1 in ER       She was admitted and found to be hypercapneic 7 17/91/59/32/88 and was on home Trilogy NIV machine  Transitioned patient last night to BIPAP 16/8/40% w/ improved abg to 7 33/55/103  Today at visit patient is asleep, reportedly confused this AM  Pt is known to Pulmonology practice, follows up with Dr Ivette Johnson outpatient  Pt is chronically on 2 L O2, on Breo-Ellipta and Duo-Nebs  Consideration is for causative respiratory failure is:  AE/COPD > currently remains on steroids  Trilogy NIV Non compliance: possible contributor  CHF- judicious diuresis as per cardiology /nephrology   ? Aspiration    Pursuing CT to find better imaging of lung fields and qualify infiltrates to better see basilar regions in consideration for aspiration  Speech consulted  VBS ordered  Smoking history: no smoking hx  Occupational history:deferred  Environmental History: deferred  Travel history: no recent travel  Respiratory History: mild persistent asthma, restrictive 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, chronic respiratory failure w/hypoxia and hypercapnia  Oxygen Therapy: uses 2 L oxygen at home baseline  Currently 6 L NC  PAP Therapy: on Trilogy noninvasive ventilation  Has been on BiPAP in past   DME Company: [employer]  Rx Insurance: Primary: Medicare A and B Secondary: [employer]  Tertiary: [facility]  PFT: spirometry done 4/9/2018 shows FVC 49% predicted, FEV1 51% predicted, FEV1/% predicted  PSG: deferred  Labs Reviewed: yes  Imaging Reviewed: yes  CT head 4/4: sinus disease as described but no acute intracranial abnormality  CXR 4/4 IMPRESSION: Mild pulmonary vascular congestion  Focal right upper lobe groundglass opacity can be due to asymmetric pulmonary edema versus pneumonia  Echo Reviewed: yes  4/5/2021 exam completed, read pending     Consults Reviewed: yes  Collaborative Discussion: w/IM team    Review of Systems   Unable to perform ROS: Mental status change (and on BIPAP)       Past Medical/Surgical History  Past Medical History:   Diagnosis Date    Arthritis     CHF (congestive heart failure) ([facility] ) 09/8573    diastolic     CO2 retention     DVT (deep vein thrombosis) in pregnancy     left LE in 2/2018 and 50 years ago    Hyperlipidemia     Hypertension     [facility] 02/06/2018    Renal disorder     ckd    Stroke [facility]) 2016    left hand weakness,paresthesia  Past Surgical History:   Procedure Laterality Date    ABDOMINAL SURGERY      ruputured bowel  had colostomy and ileostomy,reversed later on   BACK SURGERY      rods in back  done 30 years ago   CARPAL TUNNEL RELEASE Bilateral     CYST REMOVAL      back    HERNIA REPAIR      REPLACEMENT TOTAL KNEE BILATERAL         Social History  Social History     Substance and Sexual Activity   Alcohol Use Never    Frequency: Never    Comment: one drink at weddings      Social History     Substance and Sexual Activity   Drug Use Never     Social History     Tobacco Use   Smoking Status Never Smoker   Smokeless Tobacco Never Used       Family History  Family History   Problem Relation Age of Onset    COPD Brother        Allergies  Allergies   Allergen Reactions    Statins Myalgia    Lyrica [Pregabalin] Other (See Comments)     Client says it made her "nutty"       Home Meds:   Medications Prior to Admission   Medication Sig Dispense Refill Last Dose    acetaminophen (TYLENOL) 325 mg tablet Take 650 mg by mouth every 6 (six) hours as needed for mild pain   Past Week at Unknown time    amLODIPine (NORVASC) 5 mg tablet Take 1 tablet (5 mg total) by mouth daily 90 tablet 2 4/4/2021 at Unknown time    calcium-vitamin D (OSCAL 500 + D) 500 mg-200 units per tablet Take 1 tablet by mouth 2 (two) times a day     4/4/2021 at Unknown time    cholecalciferol (VITAMIN D3) 1,000 units tablet Take 2,000 Units by mouth daily   4/4/2021 at Unknown time    co-enzyme Q-10 50 MG capsule Take 200 mg by mouth daily     4/4/2021 at Unknown time    Eliquis 2 5 MG TAKE 1 TABLET TWO TIMES A DAY 60 tablet 5 4/4/2021 at Unknown time    ezetimibe (ZETIA) 10 mg tablet Take 1 tablet (10 mg total) by mouth daily 90 tablet 3 4/4/2021 at Unknown time    ferrous sulfate 325 (65 Fe) mg tablet Take 325 mg by mouth every other day   4/4/2021 at Unknown time    fluticasone-vilanterol (BREO ELLIPTA) 100-25 mcg/inh inhaler Inhale 1 puff daily Rinse mouth after use   1 Inhaler 8 4/4/2021 at Unknown time    ketorolac (TORADOL) 10 mg tablet Take 10 mg by mouth every 6 (six) hours as needed for moderate pain   4/4/2021 at Unknown time    Multiple Vitamins-Minerals (OCUVITE PO) Take 1 tablet by mouth daily     4/4/2021 at Unknown time    nebivolol (Bystolic) 10 mg tablet Take 1 tablet (10 mg total) by mouth daily 90 tablet 3 4/4/2021 at Unknown time    pantoprazole (PROTONIX) 40 mg tablet Take 40 mg by mouth daily as needed    4/4/2021 at Unknown time    torsemide (DEMADEX) 10 mg tablet Take 10 mg by mouth daily    4/4/2021 at Unknown time    Welchol 625 MG tablet TAKE 3 TABLETS TWICE DAILY WITH MEALS AS DIRECTED 540 tablet 3 4/4/2021 at Unknown time    Ascorbic Acid (VITAMIN C PO) Take 1 tablet by mouth daily   Not Taking at Unknown time    losartan (COZAAR) 25 mg tablet Take 1 tablet (25 mg total) by mouth daily 30 tablet 5 Unknown at Unknown time       Current Meds:   Scheduled Meds:  Current Facility-Administered Medications   Medication Dose Route Frequency Provider Last Rate    acetaminophen  650 mg Oral Q6H PRN MARGOT Sandy      albuterol  2 5 mg Nebulization Q4H PRN MARGOT Sandy      [START ON 4/7/2021] amLODIPine  5 mg Oral Daily Fidel Salazar MD      apixaban  2 5 mg Oral BID MARGOT Sandy      calcium carbonate-vitamin D  1 tablet Oral BID MARGOT Sandy      cholecalciferol  2,000 Units Oral Daily MARGOT Sandy      co-enzyme Q-10  60 mg Oral Daily MARGOT Sandy      colesevelam  625 mg Oral BID With Meals MARGOT Sandy      ezetimibe  10 mg Oral Daily [facility], CRNP      ferrous sulfate  325 mg Oral Every Other Day [facility], CRNP      fluticasone-vilanterol  1 puff Inhalation Daily [facility], [address]      hydrALAZINE  5 mg Intravenous Q6H PRN [facility], CRNP      ipratropium-albuterol  3 mL Nebulization Once [facility], CRNP      ipratropium-albuterol  3 mL Nebulization Q6H [facility], CRNP      methylPREDNISolone sodium succinate  40 mg Intravenous Q6H [address] Nanci Eaton MD      nebivolol  10 mg Oral Daily [facility], CRNP      ondansetron  4 mg Intravenous Q6H PRN [facility], CRNP      pantoprazole  40 mg Oral Early Morning [facility], CRNP      senna  1 tablet Oral HS PRN [facility], CRNP       PRN Meds:acetaminophen, 650 mg, Q6H PRN  albuterol, 2 5 mg, Q4H PRN  hydrALAZINE, 5 mg, Q6H PRN  ondansetron, 4 mg, Q6H PRN  senna, 1 tablet, HS PRN        ____________________________________________________________________    Objective   Vitals:   Temp:  [96 2 °F (35 7 °C)-99 2 °F (37 3 °C)] 99 2 °F (37 3 °C)  HR:  [] 71  Resp:  [16-22] 20  BP: (143-161)/(70-96) 143/96  Weight (last 2 days)     Date/Time   Weight    21 0600   67 7 (149 25)    21 0105   66 7 (147)    21 2040   69 3 (152 78)            Vitals:    21 0144 21 0310 21 0436 21 0818   BP:  143/96     Pulse:  (!) 124  71   Resp:  20     Temp:  99 2 °F (37 3 °C)     TempSrc:       SpO2: 96% 93% 96% 98%   Weight:       Height:         Temp (24hrs), Av 9 °F (36 6 °C), Min:96 2 °F (35 7 °C), Max:99 2 °F (37 3 °C)  Current: Temperature: 99 2 °F (37 3 °C)        SpO2: SpO2: 98 %, SpO2 Activity: SpO2 Activity: At Rest, SpO2 Device: O2 Device: Mid flow nasal cannula    IV Infusions:        Nutrition:        Diet Orders   (From admission, onward)             Start     Ordered    21 1532  Dietary nutrition supplements  Once Question Answer Comment   Select Supplement: Ensure Enlive-Vanilla    Frequency Lunch        04/05/21 1532    04/05/21 0941  Room Service  Once     Question:  Type of Service  Answer:  Room Service - Appropriate with Assistance    04/05/21 0941    04/05/21 0046  Diet Cardiovascular; Cardiac  Diet effective now     Question Answer Comment   Diet Type Cardiovascular    Cardiac Cardiac    RD to adjust diet per protocol? Yes        04/05/21 0045                Ins/Outs:   I/O       04/04 0701 - 04/05 0700 04/05 0701 - 04/06 0700 04/06 0701 - 04/07 0700    P  O   180     Total Intake(mL/kg)  180 (2 7)     Urine (mL/kg/hr) 400      Total Output 400      Net -400 +180                  Lines/Drains:  Invasive Devices     Peripheral Intravenous Line            Peripheral IV 03/16/20 Right Antecubital 386 days    Peripheral IV 04/04/21 Left Forearm 1 day              ___________________________________________________________________    Physical Exam  Constitutional:       General: She is sleeping  She is not in acute distress  Appearance: She is well-developed  Comments: Appears elderly / frail    HENT:      Head: Normocephalic and atraumatic  Eyes:      General:         Right eye: No discharge  Left eye: No discharge  Neck:      Musculoskeletal: Normal range of motion and neck supple  Cardiovascular:      Rate and Rhythm: Normal rate and regular rhythm  Heart sounds: Normal heart sounds  Pulmonary:      Effort: Pulmonary effort is normal  No respiratory distress  Breath sounds: Normal breath sounds  No wheezing or rales  Comments: 99% on BiPAP, sleeping  Physical exam limited to anterior  Chest:      Chest wall: No tenderness  Abdominal:      General: Bowel sounds are normal       Palpations: Abdomen is soft  Musculoskeletal: Normal range of motion  Skin:     General: Skin is warm and dry  Neurological:      Mental Status: She is disoriented  ___________________________________________________________________    Invasive/non-invasive ventilation settings   Respiratory    Lab Data (Last 4 hours)    None         O2/Vent Data (Last 4 hours)      04/06 0818          Non-Invasive Ventilation Mode BiPAP                   Laboratory and Diagnostics:  Results from last 7 days   Lab Units 04/06/21 0451 04/05/21 0607 04/04/21 2055   WBC Thousand/uL 5 93 8 02 7 32   HEMOGLOBIN g/dL 10 1* 10 3* 10 7*   HEMATOCRIT % 33 6* 36 2 36 1   PLATELETS Thousands/uL 196 183 212   NEUTROS PCT % 94* 89* 87*   MONOS PCT % 1* 6 6     Results from last 7 days   Lab Units 04/06/21 0451 04/05/21 0607 04/04/21 2055   SODIUM mmol/L 139 135* 132*   POTASSIUM mmol/L 4 2 4 6 5 1   CHLORIDE mmol/L 101 99* 99*   CO2 mmol/L 31 31 28   ANION GAP mmol/L 7 5 5   BUN mg/dL 50* 44* 44*   CREATININE mg/dL 2 38* 2 39* 2 33*   CALCIUM mg/dL 9 3 9 3 9 3   GLUCOSE RANDOM mg/dL 130 137 157*   ALT U/L  --  39 38   AST U/L  --  21 23   ALK PHOS U/L  --  100 106   ALBUMIN g/dL  --  3 1* 3 0*   TOTAL BILIRUBIN mg/dL  --  0 43 0 46     Results from last 7 days   Lab Units 04/06/21 0451 04/05/21 0607   MAGNESIUM mg/dL 2 0 2 4   PHOSPHORUS mg/dL  --  5 9*      Results from last 7 days   Lab Units 04/04/21 2055   INR  1 31*   PTT seconds 24      Results from last 7 days   Lab Units 04/04/21 2055   TROPONIN I ng/mL <0 02         ABG:  Results from last 7 days   Lab Units 04/06/21 0218   PH ART  7 333*   PCO2 ART mm Hg 55 8*   PO2 ART mm Hg 103 2   HCO3 ART mmol/L 29 0*   BASE EXC ART mmol/L 2 2   ABG SOURCE  Radial, Right     VBG:  Results from last 7 days   Lab Units 04/06/21  0218   ABG SOURCE  Radial, Right           Micro        Imaging:   CT head without contrast   Final Result by Servando Conti DO (04/04 4431)      Sinus disease as described but no acute intracranial abnormality seen otherwise  Other findings as above                    Workstation performed: [id number] XR chest 1 view portable   Final Result by Doreen Gilbert MD (04/05 0915)      Mild pulmonary vascular congestion  Focal right upper lobe groundglass opacity can be due to asymmetric pulmonary edema versus pneumonia  Workstation performed: [id number]               Micro:   Lab Results   Component Value Date    BLOODCX No Growth After 5 Days  03/29/2018    BLOODCX No Growth After 5 Days  03/29/2018    BLOODCX No Growth After 5 Days  08/27/2017    BLOODCX No Growth After 5 Days   08/27/2017    URINECX No Growth <1000 cfu/mL 03/29/2018    URINECX >100,000 cfu/ml Mixed Contaminants X6 05/09/2017    MRSACULTURE  03/29/2018     No Methicillin Resistant Staphlyococcus aureus (MRSA) isolated        ____________________________________________________________________

## 2021-04-06 NOTE — PROGRESS NOTES
[facility] Internal Medicine Progress Note  Patient: Miguel uPrvis 80 y o  female   MRN: [id number]  PCP: Scott Chan DO  Unit/Bed#: [address] [place] [address] Encounter: [id number]  Date Of Visit: 04/06/21    Problem List:    Principal Problem:    Acute on chronic respiratory failure with hypoxia and hypercapnia ([facility] )  Active Problems:    Toxic metabolic encephalopathy    Acute kidney injury superimposed on chronic kidney disease ([facility] [address] [address] )    Acute on chronic diastolic heart failure (HCC)    Deep vein thrombosis (DVT) of left lower extremity ([facility] [address] )    Cerebrovascular accident (CVA) ([facility] [address] )    Essential hypertension    Dyslipidemia    Mild persistent asthma without complication    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      Assessment & Plan:    Toxic metabolic encephalopathy  Assessment & Plan  Secondary to hypercapnia  CT head without any acute abnormality  Appears to be improving  · Monitor    * Acute on chronic respiratory failure with hypoxia and hypercapnia (HCC)  Assessment & Plan  Likely Secondary to CHF, restrictive lung disease, poor trilogy compliance  On presentation, ABG showed a pCO2 of 89 and a pH of 7 1    Improved with BiPAP  Repeat ABG with pH of 7 33, pCO2 55 8  Pulmonary following, input appreciated  · Continue diuresis as tolerated  · Continue BiPAP while asleep and as needed  · Follow up Pulmonary recommendation    Deep vein thrombosis (DVT) of left lower extremity (HCC)  Assessment & Plan  · Continue Eliquis    Acute on chronic diastolic heart failure (HCC)  Assessment & Plan  Wt Readings from Last 3 Encounters:   04/05/21 67 7 kg (149 lb 4 oz)   08/18/20 68 kg (150 lb)   03/16/20 68 kg (150 lb)     Presents with SOB, tachypnea, and hypoxia  Pro BNP 10,884 which is up from previous of 2,657  CXR, with evidence of vascular congestion  Echo from March 2018: LVEF 55-60%, G2DD  · Continue diuresis as tolerated  Follow-up with Nephrology and Cardiology input  · Fluid restrict  · Monitor I+O, daily weights    Acute kidney injury superimposed on chronic kidney disease ([facility] [address] )  Assessment & Plan  Hst of CKD stage 3, which given her age seems to be now going toward stage IV CKD at least   Presents with Cr of 2 33, patient baseline creatinine is around 1 7-2 1  UA with 2+ protein, trace blood  Losartan was added on 02/01, outpatient creatinine 3 5/5 2 mg/dL  Differential diagnosis includes acute kidney injury, cardiorenal syndrome? Progression of the CKD  Seen by Nephrology, input appreciated  · Continue to hold losartan for now  · Monitor renal function  · Monitor intake and output, PVR  · Avoid nephrotoxins  · Holding diuretics today  · Possible resuming torsemide in a m  · Follow-up BMP  Monitor BMP    Essential hypertension  Assessment & Plan  · Continue Norvasc 5 mg daily and Bystolic 10 mg daily  · Hold Losartan due to TIANA   · Monitor     Cerebrovascular accident (CVA) ([facility] )  Assessment & Plan  · Continue statin and Eliquis    Dyslipidemia  Assessment & Plan  · Continue Zetia and Welchol        VTE Pharmacologic Prophylaxis:   Pharmacologic: Apixaban (Eliquis)  Mechanical VTE Prophylaxis in Place: Yes    Patient Centered Rounds: I have performed bedside rounds with nursing staff today  Discussions with Specialists or Other Care Team Provider:  Yes    Education and Discussions with Family / Patient:  Daughter    Time Spent for Care: 45 minutes  More than 50% of total time spent on counseling and coordination of care as described above      Current Length of Stay: 2 day(s)    Current Patient Status: Inpatient   Certification Statement: The patient will continue to require additional inpatient hospital stay due to Respiratory failure    Discharge Plan:  Pending at present    Code Status: Level 3 - DNAR and DNI      Subjective:   Remained on BiPAP overnight  Noted to be comfortably sleeping with BiPAP on, easily aroused  BiPAP was removed and patient was placed on 2-3 L of supplemental oxygen with saturating in low 90s  Patient reports improvement in breathing  Confusion appears to be improving  Denies any chest pain or abdominal pain    Patient has been using trilogy only 2 hours at time prior to sleeping at home    Objective:     Vitals:   Temp (24hrs), Av 9 °F (36 6 °C), Min:96 2 °F (35 7 °C), Max:99 2 °F (37 3 °C)    Temp:  [96 2 °F (35 7 °C)-99 2 °F (37 3 °C)] 99 2 °F (37 3 °C)  HR:  [] 85  Resp:  [16-22] 20  BP: (143-161)/(70-96) 143/96  SpO2:  [90 %-98 %] 94 %  Body mass index is 29 15 kg/m²  Input and Output Summary (last 24 hours): Intake/Output Summary (Last 24 hours) at 2021 1422  Last data filed at 2021 1331  Gross per 24 hour   Intake 180 ml   Output --   Net 180 ml       Physical Exam:     Physical Exam  Constitutional:       General: She is not in acute distress  Comments: Cooperative  Elderly, frail   HENT:      Head: Normocephalic and atraumatic  Neck:      Musculoskeletal: Neck supple  Cardiovascular:      Rate and Rhythm: Normal rate  Heart sounds: Murmur present  Pulmonary:      Effort: Pulmonary effort is normal  No accessory muscle usage or respiratory distress  Breath sounds: Decreased breath sounds and rales (At bases) present  No wheezing  Comments: Diminished bilaterally  Abdominal:      General: Bowel sounds are normal  There is no distension  Palpations: Abdomen is soft  Tenderness: There is no abdominal tenderness  There is no guarding or rebound  Musculoskeletal:      Right lower leg: No edema  Left lower leg: No edema  Skin:     General: Skin is warm and dry  Findings: No rash  Neurological:      Mental Status: She is alert  GCS: GCS eye subscore is 4  GCS verbal subscore is 4  GCS motor subscore is 6           Additional Data:     Labs:    Results from last 7 days   Lab Units 21  0451   WBC Thousand/uL 5 93   HEMOGLOBIN g/dL 10 1*   HEMATOCRIT % 33 6*   PLATELETS Thousands/uL 196   NEUTROS PCT % 94* LYMPHS PCT % 4*   MONOS PCT % 1*   EOS PCT % 0     Results from last 7 days   Lab Units 04/06/21  0451 04/05/21  0607   POTASSIUM mmol/L 4 2 4 6   CHLORIDE mmol/L 101 99*   CO2 mmol/L 31 31   BUN mg/dL 50* 44*   CREATININE mg/dL 2 38* 2 39*   CALCIUM mg/dL 9 3 9 3   ALK PHOS U/L  --  100   ALT U/L  --  39   AST U/L  --  21     Results from last 7 days   Lab Units 04/04/21  2055   INR  1 31*       * I Have Reviewed All Lab Data Listed Above  * Additional Pertinent Lab Tests Reviewed:  All Labs Within Last 24 Hours Reviewed      Imaging:  Imaging Reports Reviewed Today Include: CXR, CT    Recent Cultures (last 7 days):           Last 24 Hours Medication List:   Current Facility-Administered Medications   Medication Dose Route Frequency Provider Last Rate    acetaminophen  650 mg Oral Q6H PRN MARGOT Snady      albuterol  2 5 mg Nebulization Q4H PRN MARGOT Sandy      [START ON 4/7/2021] amLODIPine  5 mg Oral Daily Fidel Salazar MD      apixaban  2 5 mg Oral BID MARGOT Sandy      calcium carbonate-vitamin D  1 tablet Oral BID MARGOT Sandy      cholecalciferol  2,000 Units Oral Daily MAGROT Sandy      co-enzyme Q-10  60 mg Oral Daily MARGOT Sandy      colesevelam  625 mg Oral BID With Meals MARGOT Sandy      ezetimibe  10 mg Oral Daily MARGOT Sandy      ferrous sulfate  325 mg Oral Every Other Day MARGOT Sandy      fluticasone-vilanterol  1 puff Inhalation Daily MARGOT Sandy      hydrALAZINE  5 mg Intravenous Q6H PRN MARGOT Sandy      ipratropium-albuterol  3 mL Nebulization Once MARGOT Sandy      ipratropium-albuterol  3 mL Nebulization Q6H MARGOT Sandy      methylPREDNISolone sodium succinate  40 mg Intravenous Q6H [facility] Isiah Mcneal MD      nebivolol  10 mg Oral Daily MARGOT Sandy      ondansetron  4 mg Intravenous Q6H PRN MARGOT Norman      pantoprazole  40 mg Oral Early Morning MARGOT Norman      senna  1 tablet Oral HS PRN MARGOT Norman            Today, Patient Was Seen By: Destiny Sepulveda MD    ** Please Note: "This note has been constructed using a voice recognition system  Therefore there may be syntax, spelling, and/or grammatical errors   Please call if you have any questions  "**

## 2021-04-06 NOTE — ASSESSMENT & PLAN NOTE
Wt Readings from Last 3 Encounters:   04/08/21 67 3 kg (148 lb 6 4 oz)   08/18/20 68 kg (150 lb)   03/16/20 68 kg (150 lb)   -following with Cardiology and Nephrology  -Pro BNP 10,884 on admission which is up from previous of 2,657  -C/w diuresis and rx as per Cardiology and Nephro with holds as appropriate for TIANA on CKD III     -Pt initially given Lasix 4/4 and 4/5 d/c'd d/t elevating Cr  -diuretics currently held

## 2021-04-06 NOTE — OCCUPATIONAL THERAPY NOTE
OT EVALUATION       04/06/21 1457   Note Type   Note type Evaluation   Cancel Reasons Other  (daughter refused due to patients SOB, nurse aware, will follow as medically appropriate)   Megan Strange Siddharth 87 OTR/L [id number]

## 2021-04-06 NOTE — ASSESSMENT & PLAN NOTE
· Nephrology following  · Baseline creatinine 1 7 to 2 1   · Creatinine increased to 2 6 today  · Question if this is acute kidney injury versus progression of underlying chronic kidney disease  · Continue to hold angiotensin blocker  · Hold diuretics at this time  · Retroperitoneal ultrasound being performed  · PVR ordered  · Continue to monitor I&Os and daily weights

## 2021-04-06 NOTE — PLAN OF CARE
Problem: Potential for Falls  Goal: Patient will remain free of falls  Description: INTERVENTIONS:  - Assess patient frequently for physical needs  -  Identify cognitive and physical deficits and behaviors that affect risk of falls  -  [place] fall precautions as indicated by assessment   - Educate patient/family on patient safety including physical limitations  - Instruct patient to call for assistance with activity based on assessment  - Modify environment to reduce risk of injury  - Consider OT/PT consult to assist with strengthening/mobility  Outcome: Progressing     Problem: Nutrition/Hydration-ADULT  Goal: Nutrient/Hydration intake appropriate for improving, restoring or maintaining nutritional needs  Description: Monitor and assess patient's nutrition/hydration status for malnutrition  Collaborate with interdisciplinary team and initiate plan and interventions as ordered  Monitor patient's weight and dietary intake as ordered or per policy  Utilize nutrition screening tool and intervene as necessary  Determine patient's food preferences and provide high-protein, high-caloric foods as appropriate       INTERVENTIONS:  - Monitor oral intake, urinary output, labs, and treatment plans  - Assess nutrition and hydration status and recommend course of action  - Evaluate amount of meals eaten  - Assist patient with eating if necessary   - Allow adequate time for meals  - Recommend/ encourage appropriate diets, oral nutritional supplements, and vitamin/mineral supplements  - Assess need for intravenous fluids  - Provide specific nutrition/hydration education as appropriate  - Include patient/family/caregiver in decisions related to nutrition  Outcome: Progressing     Problem: RESPIRATORY - ADULT  Goal: Achieves optimal ventilation and oxygenation  Description: INTERVENTIONS:  - Assess for changes in respiratory status  - Assess for changes in mentation and behavior  - Position to facilitate oxygenation and minimize respiratory effort  - Oxygen administered by appropriate delivery if ordered  - Initiate smoking cessation education as indicated  - Encourage broncho-pulmonary hygiene including cough, deep breathe, Incentive Spirometry  - Assess the need for suctioning and aspirate as needed  - Assess and instruct to report SOB or any respiratory difficulty  - Respiratory Therapy support as indicated  Outcome: Progressing     Problem: Prexisting or High Potential for Compromised Skin Integrity  Goal: Skin integrity is maintained or improved  Description: INTERVENTIONS:  - Identify patients at risk for skin breakdown  - Assess and monitor skin integrity  - Assess and monitor nutrition and hydration status  - Monitor labs   - Assess for incontinence   - Turn and reposition patient  - Assist with mobility/ambulation  - Relieve pressure over bony prominences  - Avoid friction and shearing  - Provide appropriate hygiene as needed including keeping skin clean and dry  - Evaluate need for skin moisturizer/barrier cream  - Collaborate with interdisciplinary team   - Patient/family teaching  - Consider wound care consult   Outcome: Progressing

## 2021-04-06 NOTE — ASSESSMENT & PLAN NOTE
· Currently on 3 L nasal cannula  · Felt to be secondary to CHF, restrictive lung disease from kyphoscoliosis, lack of compliance with trilogy overnight  · ABG showed a pCO2 of 89 and a pH of 7 1 which improved with BiPAP use  · Patient would only use BiPAP for 4 hours overnight last evening  She reports she is too claustrophobic to use this any longer  She was only using it for approximately 2 hours a night at home  · CT scan performed yesterday by Pulmonary is noted to show by bilateral opacities suggesting possible underlying pneumonia as well as a right-sided moderate pleural effusion  · Currently IV diuretics are on hold  · Continue BiPAP while asleep and as needed  · Check procalcitonin given possible underlying pneumonia    Discussed with Pulmonary

## 2021-04-06 NOTE — PROGRESS NOTES
Progress Note - Cardiology   [facility]     Carina 80 y o  female MRN: [id number]  : 1928  Unit/Bed#: [address] [place] [address] Encounter: [id number]    Assessment and Plan:   1  Acute on chronic diastolic heart failure:  Fluid volume status slowly improving  IV Lasix held today due to worsening in renal function       -  Nephrology consultation appreciated  -  Continue Bystolic and Norvasc     -  diuretics currently on hold    -  low-sodium diet, I&O, daily weights    2  COPD/restrictive lung disease secondary to kyphoscoliosis and previous corrective surgery: Followed by Pulmonary, continue BiPAP support    3  Acute kidney injury most likely cardiorenal syndrome on history of chronic kidney disease:  Diuretics on hold  Continue monitor labs    4  Hyponatremia with component of volume overload    5  History of DVT/CVA:  On Eliquis    6  Dyslipidemia    7  Hypertension:  Cozaar currently on hold due to acute kidney injury  Blood pressure is acceptable  Subjective / Objective:   Patient seen and examined  At this time she is resting comfortably after receiving sedation, patient earlier had been refusing to wear BiPAP and pulling mask off  Patient now on one-to-one observation  Vitals: Blood pressure 143/96, pulse 71, temperature 99 2 °F (37 3 °C), resp  rate 20, height 5' (1 524 m), weight 67 7 kg (149 lb 4 oz), SpO2 98 %, not currently breastfeeding  Vitals:    21 0105 21 0600   Weight: 66 7 kg (147 lb) 67 7 kg (149 lb 4 oz)     Body mass index is 29 15 kg/m²  BP Readings from Last 3 Encounters:   21 143/96   20 114/56   20 160/72     Orthostatic Blood Pressures      Most Recent Value   Blood Pressure  143/96 filed at 2021 0310   Patient Position - Orthostatic VS  Lying filed at 2021        I/O       701 -  07 07 -  07 07 -  07    P  O   180     Total Intake(mL/kg)  180 (2 7) Urine (mL/kg/hr) 400      Total Output 400      Net -400 +180                Invasive Devices     Peripheral Intravenous Line            Peripheral IV 03/16/20 Right Antecubital 386 days    Peripheral IV 04/04/21 Left Forearm 1 day                No intake or output data in the 24 hours ending 04/06/21 1305      Physical Exam:   Physical Exam  Vitals signs and nursing note reviewed  Constitutional:       General: She is not in acute distress  Appearance: Normal appearance  She is obese  HENT:      Right Ear: External ear normal       Left Ear: External ear normal       Nose: Nose normal    Eyes:      General:         Right eye: No discharge  Left eye: No discharge  Conjunctiva/sclera: Conjunctivae normal    Neck:      Musculoskeletal: Neck supple  Cardiovascular:      Rate and Rhythm: Normal rate and regular rhythm  Pulses: Normal pulses  Heart sounds: Murmur present  Systolic murmur present with a grade of 2/6  Pulmonary:      Effort: Pulmonary effort is normal  No accessory muscle usage or respiratory distress  Comments: On BiPAP  Abdominal:      General: Bowel sounds are normal  There is no distension  Palpations: Abdomen is soft  Musculoskeletal:      Right lower leg: No edema  Left lower leg: No edema  Skin:     General: Skin is warm and dry  Capillary Refill: Capillary refill takes less than 2 seconds  Neurological:      Mental Status: She is disoriented and confused  Psychiatric:         Behavior: Behavior is agitated  Cognition and Memory: Cognition is impaired        Comments: Patient was confused and disoriented earlier today, pulling off BiPAP, felt to be due to hypoxia                   Medications/ Allergies:     Current Facility-Administered Medications   Medication Dose Route Frequency Provider Last Rate    acetaminophen  650 mg Oral Q6H PRN Nathen Case, CRNP      albuterol  2 5 mg Nebulization Q4H PRN Nathen Layton, CRNP      [START ON 4/7/2021] amLODIPine  5 mg Oral Daily Shirley Romero MD      apixaban  2 5 mg Oral BID Thurlow Guard, CRNP      calcium carbonate-vitamin D  1 tablet Oral BID Thurlow Guard, CRNP      cholecalciferol  2,000 Units Oral Daily Thurlow Guard, [address]      co-enzyme Q-10  60 mg Oral Daily Thurlow Guard, CRNP      colesevelam  625 mg Oral BID With Meals Thurlow Guard, CRNP      ezetimibe  10 mg Oral Daily Thurlow Guard, CRNP      ferrous sulfate  325 mg Oral Every Other Day Thurlow Guard, CRNP      fluticasone-vilanterol  1 puff Inhalation Daily Thurlow Guard, CRNP      hydrALAZINE  5 mg Intravenous Q6H PRN Thurlow Guard, CRNP      ipratropium-albuterol  3 mL Nebulization Once Thurlow Guard, CRNP      ipratropium-albuterol  3 mL Nebulization Q6H Thurlow Guard, CRNP      methylPREDNISolone sodium succinate  40 mg Intravenous Q6H [address] Johanne Guardado MD      nebivolol  10 mg Oral Daily Thurlow Guard, CRNP      ondansetron  4 mg Intravenous Q6H PRN Thurlow Guard, CRNP      pantoprazole  40 mg Oral Early Morning Thurlow Guard, CRNP      senna  1 tablet Oral HS PRN Thurlow Guard, CRNP       acetaminophen, 650 mg, Q6H PRN  albuterol, 2 5 mg, Q4H PRN  hydrALAZINE, 5 mg, Q6H PRN  ondansetron, 4 mg, Q6H PRN  senna, 1 tablet, HS PRN      Allergies   Allergen Reactions    Statins Myalgia    Lyrica [Pregabalin] Other (See Comments)     Client says it made her "nutty"       VTE Pharmacologic Prophylaxis:   Sequential compression device (Venodyne)     Labs:   Troponins:  Results from last 7 days   Lab Units 04/04/21 2055   TROPONIN I ng/mL <0 02     CBC with diff:  Results from last 7 days   Lab Units 04/06/21  0451 04/05/21  0607 04/04/21 2055   WBC Thousand/uL 5 93 8 02 7 32   HEMOGLOBIN g/dL 10 1* 10 3* 10 7*   HEMATOCRIT % 33 6* 36 2 36 1   MCV fL 97 100* 98   PLATELETS Thousands/uL 196 183 212   MCH pg 29 1 28 3 29 2   MCHC g/dL 30 1* 28 5* 29 6*   RDW % 12 7 12 9 12 9   MPV fL 10 9 10 4 10 9   NRBC AUTO /100 WBCs 0 0 0       CMP:  Results from last 7 days   Lab Units 04/06/21  0451 04/05/21  0607 04/04/21 2055   SODIUM mmol/L 139 135* 132*   POTASSIUM mmol/L 4 2 4 6 5 1   CHLORIDE mmol/L 101 99* 99*   CO2 mmol/L 31 31 28   ANION GAP mmol/L 7 5 5   BUN mg/dL 50* 44* 44*   CREATININE mg/dL 2 38* 2 39* 2 33*   CALCIUM mg/dL 9 3 9 3 9 3   AST U/L  --  21 23   ALT U/L  --  39 38   ALK PHOS U/L  --  100 106   TOTAL PROTEIN g/dL  --  8 6* 8 7*   ALBUMIN g/dL  --  3 1* 3 0*   TOTAL BILIRUBIN mg/dL  --  0 43 0 46   EGFR ml/min/1 73sq m 17 17 18       Magnesium:  Results from last 7 days   Lab Units 04/06/21 0451 04/05/21  0607   MAGNESIUM mg/dL 2 0 2 4     Coags:  Results from last 7 days   Lab Units 04/04/21 2055   PTT seconds 24   INR  1 31*     TSH:  Results from last 7 days   Lab Units 04/05/21  0607   TSH 3RD GENERATON uIU/mL 0 947     NT-proBNP:   Recent Labs     04/04/21 2055   NTBNP 10,884*        Imaging & Testing   I have personally reviewed pertinent reports  Xr Chest 1 View Portable    Result Date: 4/5/2021  Narrative: CHEST INDICATION:   sob  COMPARISON:  Chest radiograph dated 12/7/2018  EXAM PERFORMED/VIEWS:  XR CHEST PORTABLE FINDINGS: Cardiomediastinal silhouette is enlarged but unchanged from prior exam  Mild pulmonary vascular congestion  Focal right upper lobe groundglass opacity can be due to asymmetric pulmonary edema versus pneumonia  No pneumothorax or pleural effusion  Thoracolumbar rods are again noted  Impression: Mild pulmonary vascular congestion  Focal right upper lobe groundglass opacity can be due to asymmetric pulmonary edema versus pneumonia  Workstation performed: [id number]     Ct Head Without Contrast    Result Date: 4/4/2021  Narrative: CT BRAIN - WITHOUT CONTRAST INDICATION:   Altered mental status altered mental status   COMPARISON:  CT head dated 3/6/2018 TECHNIQUE:  CT examination of the brain was performed  In addition to axial images, sagittal and coronal 2D reformatted images were created and submitted for interpretation  Radiation dose length product (DLP) for this visit:  913 67 mGy-cm   This examination, like all CT scans performed in the [facility], was performed utilizing techniques to minimize radiation dose exposure, including the use of iterative  reconstruction and automated exposure control  IMAGE QUALITY:  Diagnostic  FINDINGS: PARENCHYMA: Decreased attenuation is noted in periventricular and subcortical white matter demonstrating an appearance that is statistically most likely to represent moderate microangiopathic change  Mineralization of bilateral basal ganglia No CT signs of acute territorial infarction  No intracranial mass, mass effect or midline shift  No acute parenchymal hemorrhage  Gray-white differentiation appears maintained  Mild parenchymal atrophy  VENTRICLES AND EXTRA-AXIAL SPACES:  Ventricles and extra-axial CSF spaces are prominent commensurate with the degree of volume loss  No hydrocephalus  No acute extra-axial hemorrhage  VISUALIZED ORBITS AND PARANASAL SINUSES:  Small amount of fluid in the sphenoid sinus, otherwise grossly unremarkable  CALVARIUM AND EXTRACRANIAL SOFT TISSUES:  Normal      Impression: Sinus disease as described but no acute intracranial abnormality seen otherwise  Other findings as above   Workstation performed: [id number]     Cardiac testing:   Results for orders placed during the hospital encounter of 21   Echo complete with contrast if indicated    Narrative [address]  [address] [address]  [phone]    Transthoracic Echocardiogram  2D, M-mode, Doppler, and Color Doppler    Study date:  2021    Patient: Emily Cutler  MR number: [id number]  Account number: [de-identified]  : 12-May-1928  Age: 80 years  Gender: Female  Status: Inpatient  Location: Bedside  Height: 60 in  Weight: 148 7 lb  BP: 152/ 70 mmHg    Indications: SOB    Diagnoses: I50 9 - Heart failure, unspecified    Sonographer:  YADIRA Cervantes  Primary Physician:  Joshua Bullock DO  Referring Physician:  MARGOT Cain  Group:  [facility]  Interpreting Physician:  Darcie Sepulveda MD    SUMMARY    LEFT VENTRICLE:  Systolic function was normal  Ejection fraction was estimated in the range of 60 % to 65 % to be 60 %  There were no regional wall motion abnormalities  Wall thickness was mildly increased  Doppler parameters were consistent with abnormal left ventricular relaxation (grade 1 diastolic dysfunction)  RIGHT VENTRICLE:  Wall thickness was increased  LEFT ATRIUM:  The atrium was mildly dilated  RIGHT ATRIUM:  The atrium was mildly dilated  MITRAL VALVE:  There was trace regurgitation  AORTIC VALVE:  Transaortic velocity was increased due to valvular stenosis  There was mild to moderate stenosis  There was trace regurgitation  Valve mean gradient was 19 mmHg  TRICUSPID VALVE:  There was trace regurgitation  IVC, HEPATIC VEINS:  The inferior vena cava was mildly dilated  HISTORY: PRIOR HISTORY: Pulmonary Embolism,CHF,stroke,DVT,Hyperlipidemia,HTN,Renal disorder  PROCEDURE: The procedure was performed at the bedside  This was a routine study  The transthoracic approach was used  The study included complete 2D imaging, M-mode, complete spectral Doppler, and color Doppler  The heart rate was 66 bpm,  at the start of the study  Images were obtained from the parasternal, apical, subcostal, and suprasternal notch acoustic windows  Image quality was adequate  LEFT VENTRICLE: Size was normal  Systolic function was normal  Ejection fraction was estimated in the range of 60 % to 65 % to be 60 %  There were no regional wall motion abnormalities  Wall thickness was mildly increased   No evidence of  apical thrombus  DOPPLER: Doppler parameters were consistent with abnormal left ventricular relaxation (grade 1 diastolic dysfunction)  RIGHT VENTRICLE: The size was normal  Systolic function was normal with TAPSE-2 1cm Wall thickness was increased  LEFT ATRIUM: The atrium was mildly dilated  RIGHT ATRIUM: The atrium was mildly dilated  MITRAL VALVE: There was mild thickening  There was normal leaflet separation  DOPPLER: The transmitral velocity was within the normal range  There was no evidence for stenosis  There was trace regurgitation  AORTIC VALVE: The valve was trileaflet  Leaflets exhibited mildly to moderately increased thickness, mild to moderate calcification, mildly reduced cuspal separation, and sclerosis  DOPPLER: Transaortic velocity was increased due to  valvular stenosis  There was mild to moderate stenosis  There was trace regurgitation  TRICUSPID VALVE: The valve structure was normal  There was normal leaflet separation  DOPPLER: The transtricuspid velocity was within the normal range  There was no evidence for stenosis  There was trace regurgitation  Estimated peak PA  pressure was 70 mmHg  The findings suggest moderate to severe pulmonary hypertension  PULMONIC VALVE: Leaflets exhibited normal thickness, no calcification, and normal cuspal separation  DOPPLER: The transpulmonic velocity was within the normal range  There was no significant regurgitation  PERICARDIUM: There was no pericardial effusion  The pericardium was normal in appearance  AORTA: The root exhibited normal size  SYSTEMIC VEINS: IVC: The inferior vena cava was mildly dilated      MEASUREMENT TABLES    DOPPLER MEASUREMENTS  Aortic valve   (Reference normals)  Peak krista   3 cm/s   (--)  Peak gradient   36 mmHg   (--)  Mean gradient   19 mmHg   (--)  Valve area   1 1 cmï¾²   (--)    SYSTEM MEASUREMENT TABLES    2D  EF (Teich): 62 35 %  %FS: 33 58 %  Ao Diam: 3 36 cm  EDV(Teich): 100 73 ml  ESV(Teich): 37 93 ml  IVSd: 1 04 cm  LA Area: 27 7 cm2  LA Diam: 4 43 cm  LVIDd: 4 67 cm  LVIDs: 3 1 cm  LVOT Diam: 2 02 cm  LVPWd: 0 97 cm  RA Area: 20 97 cm2  RVIDd: 3 62 cm  SV (Teich): 62 8 ml    CW  AV Env  Ti: 327 31 ms  AV VTI: 66 03 cm  AV Vmax: 3 02 m/s  AV Vmean: 2 02 m/s  AV maxP 53 mmHg  AV meanP 76 mmHg  TR Vmax: 3 89 m/s  TR maxP 46 mmHg    MM  TAPSE: 2 13 cm    PW  MV E/A Ratio: 0 73  E' Sept: 0 06 m/s  E/E' Sept: 11 44  LVOT Env  Ti: 390 1 ms  LVOT VTI: 29 72 cm  LVOT Vmax: 1 13 m/s  LVOT Vmean: 0 76 m/s  LVOT maxP 11 mmHg  LVOT meanP 73 mmHg  MV A Jim: 1 m/s  MV Dec [place]: 7 47 m/s2  MV DecT: 97 98 ms  MV E Jim: 0 73 m/s  MV PHT: 28 41 ms  MVA By PHT: 7 74 cm2    [employer] Accredited Echocardiography Laboratory    Prepared and electronically signed by    Antoine Montgomery MD  Signed 2021 11:04:32           Farheen Trujillo        "This note has been constructed using a voice recognition system  Therefore there may be syntax, spelling, and/or grammatical errors   Please call if you have any questions  "

## 2021-04-06 NOTE — PHYSICAL THERAPY NOTE
04/06/21 0830   PT Last Visit   PT Visit Date 04/06/21   Note Type   Note type Evaluation   Cancel Reasons Other - per RN, patient just fell asleep, is a 1:1, and is on BiPAP presently     Licensure   [place] License Number  [place] PT [id number] PT

## 2021-04-06 NOTE — ASSESSMENT & PLAN NOTE
Wt Readings from Last 3 Encounters:   04/05/21 67 7 kg (149 lb 4 oz)   08/18/20 68 kg (150 lb)   03/16/20 68 kg (150 lb)     · Presented with SOB, tachypnea, and hypoxia  · Pro BNP 10,884 which is up from previous of 2,657  · CXR, with evidence of vascular congestion  · Echo from March 2018: LVEF 55-60%, G2DD  · IV diuretics are currently on hold in the setting of acute kidney injury  Will likely need to resume soon secondary to dilated IVC on echo  · If develops worsening shortness of breath or pulmonary edema can not give IV Lasix

## 2021-04-06 NOTE — ASSESSMENT & PLAN NOTE
· Secondary to hypercapnia- improved with trilogy use  · CT head without any acute abnormality  · Need to ensure compliance with BiPAP/trilogy use upon discharge and while inpatient

## 2021-04-06 NOTE — ASSESSMENT & PLAN NOTE
· Continue Norvasc 5 mg daily and Bystolic 10 mg daily  · Hold Losartan due to TIANA   · Blood pressure currently stable in the 140s

## 2021-04-06 NOTE — ASSESSMENT & PLAN NOTE
-Titrate O2 saturation to >90%  -Pt chronically on 2 L NC O2 baseline at home / currently remains on 3 L    -Repeat ABG done  4/6 shows improvement compared to ABG 4/5 > will f/u ABG     ABG:  Results from last 7 days   Lab Units 04/06/21  0218   PH ART  7 333*   PCO2 ART mm Hg 55 8*   PO2 ART mm Hg 103 2   HCO3 ART mmol/L 29 0*   BASE EXC ART mmol/L 2 2   ABG SOURCE  Radial, Right     VBG:  Results from last 7 days   Lab Units 04/06/21  0218   ABG SOURCE  Radial, Right     Consideration for causative respiratory failure is:  -AE/COPD/Asthma  > currently remains on Prednisone 40 mg > would opt for plan to continue for 3 more days then D/C  -Trilogy NIV Non compliance: probable contributor  -CHF- judicious diuresis as per cardiology /nephrology   -? Aspiration (potential with altered mentation) Speech consulted and VBS performed 4/7 which was overall normal  Showed ongoing aspiration risk present but very low  Recommended regular diet with thin liquids  CT scan 4/6 pursued for better imaging of lung fields and infiltrates  Showed R>L airspace opacities consistent w/possible pneumonia v   Aspiration   Layering moderate effusion on R     -Discussed w/IM team, likely isolated aspiration etiology   -for now to hold on Abx therapy > improving off of abx > renal failure contributory  -  Results from last 7 days   Lab Units 04/08/21  0542 04/07/21  1220   PROCALCITONIN ng/ml 0 36* 0 40*

## 2021-04-07 ENCOUNTER — APPOINTMENT (INPATIENT)
Dept: RADIOLOGY | Facility: HOSPITAL | Age: 86
DRG: 291 | End: 2021-04-07
Payer: MEDICARE

## 2021-04-07 PROBLEM — J45.30 MILD PERSISTENT ASTHMA WITHOUT COMPLICATION: Status: RESOLVED | Noted: 2018-04-09 | Resolved: 2021-04-07

## 2021-04-07 PROBLEM — G92.8 TOXIC METABOLIC ENCEPHALOPATHY: Status: RESOLVED | Noted: 2018-03-07 | Resolved: 2021-04-07

## 2021-04-07 PROBLEM — N64.59 ABNORMAL BREAST FINDING: Status: ACTIVE | Noted: 2021-04-07

## 2021-04-07 PROBLEM — E87.1 HYPONATREMIA: Status: RESOLVED | Noted: 2021-04-05 | Resolved: 2021-04-07

## 2021-04-07 PROBLEM — J90 PLEURAL EFFUSION: Status: ACTIVE | Noted: 2021-04-07

## 2021-04-07 PROBLEM — R91.8 OPACITY OF LUNG ON IMAGING STUDY: Status: ACTIVE | Noted: 2021-04-07

## 2021-04-07 LAB
ANION GAP SERPL CALCULATED.3IONS-SCNC: 5 MMOL/L (ref 4–13)
BUN SERPL-MCNC: 73 MG/DL (ref 5–25)
CALCIUM SERPL-MCNC: 9.3 MG/DL (ref 8.3–10.1)
CHLORIDE SERPL-SCNC: 102 MMOL/L (ref 100–108)
CO2 SERPL-SCNC: 33 MMOL/L (ref 21–32)
CREAT SERPL-MCNC: 2.58 MG/DL (ref 0.6–1.3)
GFR SERPL CREATININE-BSD FRML MDRD: 16 ML/MIN/1.73SQ M
GLUCOSE SERPL-MCNC: 160 MG/DL (ref 65–140)
PHOSPHATE SERPL-MCNC: 4.2 MG/DL (ref 2.3–4.1)
POTASSIUM SERPL-SCNC: 4.5 MMOL/L (ref 3.5–5.3)
PROCALCITONIN SERPL-MCNC: 0.4 NG/ML
SODIUM SERPL-SCNC: 140 MMOL/L (ref 136–145)

## 2021-04-07 PROCEDURE — 99233 SBSQ HOSP IP/OBS HIGH 50: CPT | Performed by: INTERNAL MEDICINE

## 2021-04-07 PROCEDURE — 94003 VENT MGMT INPAT SUBQ DAY: CPT

## 2021-04-07 PROCEDURE — 92611 MOTION FLUOROSCOPY/SWALLOW: CPT

## 2021-04-07 PROCEDURE — 94640 AIRWAY INHALATION TREATMENT: CPT

## 2021-04-07 PROCEDURE — 99232 SBSQ HOSP IP/OBS MODERATE 35: CPT | Performed by: INTERNAL MEDICINE

## 2021-04-07 PROCEDURE — 97535 SELF CARE MNGMENT TRAINING: CPT

## 2021-04-07 PROCEDURE — 76770 US EXAM ABDO BACK WALL COMP: CPT

## 2021-04-07 PROCEDURE — 74230 X-RAY XM SWLNG FUNCJ C+: CPT

## 2021-04-07 PROCEDURE — 92610 EVALUATE SWALLOWING FUNCTION: CPT

## 2021-04-07 PROCEDURE — 84145 PROCALCITONIN (PCT): CPT | Performed by: PHYSICIAN ASSISTANT

## 2021-04-07 PROCEDURE — 97163 PT EVAL HIGH COMPLEX 45 MIN: CPT

## 2021-04-07 PROCEDURE — 80048 BASIC METABOLIC PNL TOTAL CA: CPT | Performed by: INTERNAL MEDICINE

## 2021-04-07 PROCEDURE — 94760 N-INVAS EAR/PLS OXIMETRY 1: CPT

## 2021-04-07 PROCEDURE — 84100 ASSAY OF PHOSPHORUS: CPT | Performed by: INTERNAL MEDICINE

## 2021-04-07 PROCEDURE — 99232 SBSQ HOSP IP/OBS MODERATE 35: CPT | Performed by: PHYSICIAN ASSISTANT

## 2021-04-07 PROCEDURE — 97110 THERAPEUTIC EXERCISES: CPT

## 2021-04-07 PROCEDURE — 97167 OT EVAL HIGH COMPLEX 60 MIN: CPT

## 2021-04-07 RX ORDER — COLESEVELAM 180 1/1
1875 TABLET ORAL 2 TIMES DAILY WITH MEALS
Status: DISCONTINUED | OUTPATIENT
Start: 2021-04-07 | End: 2021-04-10 | Stop reason: HOSPADM

## 2021-04-07 RX ADMIN — Medication 1 TABLET: at 17:24

## 2021-04-07 RX ADMIN — IPRATROPIUM BROMIDE AND ALBUTEROL SULFATE 3 ML: 2.5; .5 SOLUTION RESPIRATORY (INHALATION) at 02:52

## 2021-04-07 RX ADMIN — METHYLPREDNISOLONE SODIUM SUCCINATE 40 MG: 40 INJECTION, POWDER, FOR SOLUTION INTRAMUSCULAR; INTRAVENOUS at 10:56

## 2021-04-07 RX ADMIN — IPRATROPIUM BROMIDE AND ALBUTEROL SULFATE 3 ML: 2.5; .5 SOLUTION RESPIRATORY (INHALATION) at 19:40

## 2021-04-07 RX ADMIN — NEBIVOLOL HYDROCHLORIDE 10 MG: 10 TABLET ORAL at 10:56

## 2021-04-07 RX ADMIN — IPRATROPIUM BROMIDE AND ALBUTEROL SULFATE 3 ML: 2.5; .5 SOLUTION RESPIRATORY (INHALATION) at 13:35

## 2021-04-07 RX ADMIN — APIXABAN 2.5 MG: 2.5 TABLET, FILM COATED ORAL at 17:24

## 2021-04-07 RX ADMIN — COLESEVELAM HYDROCHLORIDE 1875 MG: 625 TABLET, FILM COATED ORAL at 20:43

## 2021-04-07 RX ADMIN — APIXABAN 2.5 MG: 2.5 TABLET, FILM COATED ORAL at 10:55

## 2021-04-07 RX ADMIN — METHYLPREDNISOLONE SODIUM SUCCINATE 40 MG: 40 INJECTION, POWDER, FOR SOLUTION INTRAMUSCULAR; INTRAVENOUS at 20:43

## 2021-04-07 RX ADMIN — FERROUS SULFATE TAB 325 MG (65 MG ELEMENTAL FE) 325 MG: 325 (65 FE) TAB at 10:54

## 2021-04-07 RX ADMIN — Medication 2000 UNITS: at 10:55

## 2021-04-07 RX ADMIN — IPRATROPIUM BROMIDE AND ALBUTEROL SULFATE 3 ML: 2.5; .5 SOLUTION RESPIRATORY (INHALATION) at 07:50

## 2021-04-07 RX ADMIN — FLUTICASONE FUROATE AND VILANTEROL TRIFENATATE 1 PUFF: 100; 25 POWDER RESPIRATORY (INHALATION) at 10:50

## 2021-04-07 RX ADMIN — Medication 1 TABLET: at 10:55

## 2021-04-07 RX ADMIN — EZETIMIBE 10 MG: 10 TABLET ORAL at 10:55

## 2021-04-07 RX ADMIN — PANTOPRAZOLE SODIUM 40 MG: 40 TABLET, DELAYED RELEASE ORAL at 05:40

## 2021-04-07 RX ADMIN — Medication 60 MG: at 10:57

## 2021-04-07 RX ADMIN — AMLODIPINE BESYLATE 5 MG: 5 TABLET ORAL at 10:55

## 2021-04-07 NOTE — PROGRESS NOTES
Noman 45  Progress Note Shaggy Vaughn 5/12/1928, 80 y o  female MRN: [id number]  Unit/Bed#: [address] [place] [address] Encounter: [id number]  Primary Care Provider: Tyson Garcia DO   Date and time admitted to hospital: 4/4/2021  8:24 PM    * Acute on chronic respiratory failure with hypoxia and hypercapnia (HCC)  Assessment & Plan  · Currently on 3 L nasal cannula  · Felt to be secondary to CHF, restrictive lung disease from kyphoscoliosis, lack of compliance with trilogy overnight  · ABG showed a pCO2 of 89 and a pH of 7 1 which improved with BiPAP use  · Patient would only use BiPAP for 4 hours overnight last evening  She reports she is too claustrophobic to use this any longer  She was only using it for approximately 2 hours a night at home  · CT scan performed yesterday by Pulmonary is noted to show by bilateral opacities suggesting possible underlying pneumonia as well as a right-sided moderate pleural effusion  · Currently IV diuretics are on hold  · Continue BiPAP while asleep and as needed  · Check procalcitonin given possible underlying pneumonia  Discussed with Pulmonary    Acute on chronic diastolic heart failure ([facility] [address] )  Assessment & Plan  Wt Readings from Last 3 Encounters:   04/05/21 67 7 kg (149 lb 4 oz)   08/18/20 68 kg (150 lb)   03/16/20 68 kg (150 lb)     · Presented with SOB, tachypnea, and hypoxia  · Pro BNP 10,884 which is up from previous of 2,657  · CXR, with evidence of vascular congestion  · Echo from March 2018: LVEF 55-60%, G2DD  · IV diuretics are currently on hold in the setting of acute kidney injury  Will likely need to resume soon secondary to dilated IVC on echo  · If develops worsening shortness of breath or pulmonary edema can not give IV Lasix  Opacity of lung on imaging study  Assessment & Plan  · CT scan performed yesterday was noted to show right greater than left airspace opacities is consistent with pneumonia   There is layering moderate effusion on the right  · Discussed with Pulmonary  · Check procalcitonin  Hold on antibiotics pending this result  · Patient is afebrile and has no leukocytosis    Pleural effusion  Assessment & Plan  · Right-sided noted to have layering moderate effusion  · Discussed with Pulmonary whether would require thoracentesis  · Diuretics are currently on hold  · Await Pulmonary input  Toxic metabolic encephalopathy-resolved as of 4/7/2021  Assessment & Plan  · Secondary to hypercapnia- improved with trilogy use  · CT head without any acute abnormality  · Need to ensure compliance with BiPAP/trilogy use upon discharge and while inpatient    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  Assessment & Plan  · Pulmonary following  · Continue Breo  · Currently on IV steroids will which were decreased from 40 mg q 6 hours to 40 mg q 12 hours yesterday  · Continue wean and transition to p o  Prednisone as able  Acute kidney injury superimposed on chronic kidney disease [facility])  Assessment & Plan  · Nephrology following  · Baseline creatinine 1 7 to 2 1   · Creatinine increased to 2 6 today  · Question if this is acute kidney injury versus progression of underlying chronic kidney disease  · Continue to hold angiotensin blocker  · Hold diuretics at this time  · Retroperitoneal ultrasound being performed  · PVR ordered  · Continue to monitor I&Os and daily weights  Mild persistent asthma without complication  Assessment & Plan  · Continue Breo  · Pulmonary following    Essential hypertension  Assessment & Plan  · Continue Norvasc 5 mg daily and Bystolic 10 mg daily  · Hold Losartan due to TIANA   · Blood pressure currently stable in the 140s    Cerebrovascular accident (CVA) ([facility] [address] )  Assessment & Plan  · Continue statin and Eliquis    Abnormal breast finding  Assessment & Plan  · CT: Asymmetric right breast density    Although this could represent breast tissue, a neoplastic process is not excluded and mammographic follow-up is advised  · Discussed with patient and daughter     Deep vein thrombosis (DVT) of left lower extremity (HCC)  Assessment & Plan  · Continue Eliquis      VTE Pharmacologic Prophylaxis: VTE Score: 9 High Risk (Score >/= 5) - Pharmacological DVT Prophylaxis Ordered: apixaban (Eliquis)  Sequential Compression Devices Ordered  Patient Centered Rounds: I performed bedside rounds with nursing staff today  Discussions with Specialists or Other Care Team Provider:     Education and Discussions with Family / Patient: Updated  (daughter) via phone  Time Spent for Care: 45 minutes  More than 50% of total time spent on counseling and coordination of care as described above  Current Length of Stay: 3 day(s)  Current Patient Status: Inpatient   Certification Statement: The patient will continue to require additional inpatient hospital stay due to Monitor creatinine, continue trilogy, monitor respiratory status  Discharge Plan: Anticipate discharge in 48 hrs to discharge location to be determined pending rehab evaluations  Code Status: Level 3 - DNAR and DNI    Subjective:   Summary of hospital stay  59-year-old female who was noted to be admitted on 4/4/2021 with acute on chronic diastolic CHF  She has a past medical history of chronic hypoxic respiratory failure, kyphoscoliosis, mild asthma  Patient was started on IV Lasix 40 mg daily  Patient was also noted to have acute on chronic hypoxic respiratory failure  The patient normally wears 2 L of oxygen was requiring 6 L on admission  She also had a creatinine of 2 3 with baseline of 1 6-1 7  She was continued on IV Lasix  She was seen in Cardiology consultation  The patient was continued on IV diuretics  Patient was seen in consultation by Nephrology  Patient was continued to be withheld from [address]  Patient had been taking Toradol at home which was recommended to be discontinued    The patient was recommended to restart torsemide 10 mg daily upon discharge as opposed to b i d     The patient was held from diuretics secondary to euvolemic status  She was noted to require BiPAP during this hospital stay secondary to a pH of 7 1 and a pCO2 of 89  She was started on IV steroids  Patient was intermittently refusing her BiPAP  Last evening she only uses for 3 hours  She is short of breath and has some occasional blood-tinged sputum  She reports that this is not new  She feels cold and wants her daughter to bring her wrote diana orourke  Patient reports the right lower extremity is always smaller than left  Objective:     Vitals:   Temp (24hrs), Av 1 °F (36 7 °C), Min:97 5 °F (36 4 °C), Max:98 7 °F (37 1 °C)    Temp:  [97 5 °F (36 4 °C)-98 7 °F (37 1 °C)] 98 1 °F (36 7 °C)  HR:  [63-86] 67  Resp:  [18-22] 20  BP: (137-149)/(59-76) 144/76  SpO2:  [89 %-97 %] 91 %  Body mass index is 28 81 kg/m²  Input and Output Summary (last 24 hours): Intake/Output Summary (Last 24 hours) at 2021 1203  Last data filed at 2021 2015  Gross per 24 hour   Intake 190 ml   Output --   Net 190 ml       Physical Exam:   Physical Exam  Vitals signs and nursing note reviewed  Constitutional:       General: She is not in acute distress  Appearance: Normal appearance  She is not diaphoretic  Comments: Appears frail and chronically ill   HENT:      Head: Normocephalic and atraumatic  Mouth/Throat:      Mouth: Mucous membranes are moist    Cardiovascular:      Rate and Rhythm: Normal rate and regular rhythm  Pulmonary:      Effort: Pulmonary effort is normal       Breath sounds: Rhonchi and rales present  No wheezing  Comments: Decreased on right greater than left  Chest:       Abdominal:      General: Bowel sounds are normal       Palpations: Abdomen is soft  There is no mass  Tenderness: There is no abdominal tenderness  There is no guarding  Musculoskeletal:      Right lower leg: No edema        Left lower leg: Edema (greater than right) present  Skin:     General: Skin is warm and dry  Neurological:      Mental Status: She is alert and oriented to person, place, and time  Psychiatric:         Mood and Affect: Mood normal          Behavior: Behavior normal        Additional Data:     Labs:  Results from last 7 days   Lab Units 04/06/21  0451   WBC Thousand/uL 5 93   HEMOGLOBIN g/dL 10 1*   HEMATOCRIT % 33 6*   PLATELETS Thousands/uL 196   NEUTROS PCT % 94*   LYMPHS PCT % 4*   MONOS PCT % 1*   EOS PCT % 0     Results from last 7 days   Lab Units 04/07/21  0546  04/05/21  0607   SODIUM mmol/L 140   < > 135*   POTASSIUM mmol/L 4 5   < > 4 6   CHLORIDE mmol/L 102   < > 99*   CO2 mmol/L 33*   < > 31   BUN mg/dL 73*   < > 44*   CREATININE mg/dL 2 58*   < > 2 39*   ANION GAP mmol/L 5   < > 5   CALCIUM mg/dL 9 3   < > 9 3   ALBUMIN g/dL  --   --  3 1*   TOTAL BILIRUBIN mg/dL  --   --  0 43   ALK PHOS U/L  --   --  100   ALT U/L  --   --  39   AST U/L  --   --  21   GLUCOSE RANDOM mg/dL 160*   < > 137    < > = values in this interval not displayed       Results from last 7 days   Lab Units 04/04/21  2055   INR  1 31*                   Lines/Drains:  Invasive Devices     Peripheral Intravenous Line            Peripheral IV 04/04/21 Left Forearm 2 days                      Imaging: Reviewed radiology reports from this admission including: chest CT scan    Recent Cultures (last 7 days):         Last 24 Hours Medication List:   Current Facility-Administered Medications   Medication Dose Route Frequency Provider Last Rate    acetaminophen  650 mg Oral Q6H PRN MARGOT Penn      albuterol  2 5 mg Nebulization Q4H PRN MARGOT Penn      amLODIPine  5 mg Oral Daily Carlton Young MD      apixaban  2 5 mg Oral BID MARGOT Penn      calcium carbonate-vitamin D  1 tablet Oral BID MARGOT Penn      cholecalciferol  2,000 Units Oral Daily MARGOT Penn      co-enzyme Q-10  60 mg Oral Daily Latonya Proctor, MARGOT      colesevelam  625 mg Oral BID With Meals Latonya Proctor, MARGOT      ezetimibe  10 mg Oral Daily MARGOT Norman      ferrous sulfate  325 mg Oral Every Other Day Latonya Proctor, MARGOT      fluticasone-vilanterol  1 puff Inhalation Daily Latonya Proctor, [address]      hydrALAZINE  5 mg Intravenous Q6H PRN MARGOT Norman      ipratropium-albuterol  3 mL Nebulization Once Latonya Proctor, MARGOT      ipratropium-albuterol  3 mL Nebulization Q6H MARGOT Norman      methylPREDNISolone sodium succinate  40 mg Intravenous Q12H [facility]      nebivolol  10 mg Oral Daily MARGOT Norman      ondansetron  4 mg Intravenous Q6H PRN MARGOT Norman      pantoprazole  40 mg Oral Early Morning MARGOT Norman      psyllium  1 packet Oral Daily Destiny Sepulveda MD      senna  1 tablet Oral HS PRN MARGOT Norman          Today, Patient Was Seen By: Gonzales Sánchez PA-C    **Please Note: This note may have been constructed using a voice recognition system  **

## 2021-04-07 NOTE — ASSESSMENT & PLAN NOTE
· Right-sided noted to have layering moderate effusion  · Discussed with Pulmonary whether would require thoracentesis  · Diuretics are currently on hold  · Await Pulmonary input

## 2021-04-07 NOTE — PROGRESS NOTES
Miriam 50 PROGRESS NOTE   Mukesh Ty 80 y o  female MRN: [id number]  Unit/Bed#: [address] [place] [address]- Encounter: [id number]  Reason for Consult: TIANA on CKD III    ASSESSMENT and PLAN:     80 y o  female with PMHx of CKD III, HTN, dCHF, mild aortic stenosis, HPL, CVA, CAD, A fib, MGUS, DVT, who was admitted to [facility] after presenting with SOB on 4/4  A renal consultation is requested today for assistance in the management of TIANA     1) TIANA on CKD III     - outpatuient Nephrologist Dr Ileana Fitzgerald  - baseline creat 1 7-2 1 mg/dL  - losartan added on 2/1/2021 and creat on 3/5 was 2 mg/dL  - admission creat 2 3 mg/dL on 4/4 and remains stable on 4/6   - UA with 2+ protein, otherwise bland   - prior renal u/s in 2017 with L kidney 11 cm, R kidney 9 3 cm, echogenic kidneys  - pt was on toradol at home?  - pt initially received furosemide 40 IV X 2 on 4/5 and one time on 4/4    - 4/6 - Creat stable 2 4 mg/dL  -4/7-creatinine rising to 2 6 mg/dL  , phosphorus 4 2      TIANA vs progression of kidney disease  May have had CRS  UA relatively bland with exception protein which is known       Plan:     - cont to hold ARB  -weight is stable, oxygenation is stable, would consider holding diuretics for today, but likely will need to restart soon due to mildly dilated IVC on echo  -consider further evaluation for moderate effusion on the right side  -consider treatment for possible pneumonia  -consider infectious etiology is a component to lung disease  -check renal ultrasound  - daily weights  - need strict I/O  - check PVR  - restart torsemide but at 10 BID from 10 mg daily likely upon d/c  - if develops worsening oxygenation or pulmonary edema, can give IV Lasix  - recheck phos in AM  - may need to accept higher creat for euvolemia  -reviewed with the primary team attending     2) hypoxia-possibly multifactorial   Pneumonia versus infectious versus other     - mild pulm vascular congestion   Focal right upper lobe groundglass opacity due to asymmetric pulm edema vs PNA  - pt uses 2 L oxygen at home on baseline  - f/u ECHO-EF 68-21%, grade 1 diastolic dysfunction, trace aortic valve regurgitation, IVC mildly dilated  - prior ECHO in 2018 with grade 2 dCHF, EF 55-60; mild aortic stenosis at that time  -CT scan of the chest on 04/06 with right greater than left airspace opacities consistent with pneumonia, layering of moderate effusion on the right, gallstone, asymmetric right breast density  - Cardiology on board  - Pulmonary on board  - pt weight not recorded 4/6    - UOP not strictly recorded  - BIPAP per Primary team  - COVID19 PCR neg     3) electrolytes     - hyponatremia - improved  - hyperphos - f/u phos in AM     4) acid/base     - resp acidosis - improving with BIPAP     5) anemia     - stable  - no FAN due to CVA history     6) HTN     - on amlodipine and bystolic - avoid hypotension  - hold losartan  - SBP stable for now     7) MGUS     8) history of mild Aortic valve stenosis     As above     9) encephalopathy - per Primary team    10) asymmetric right breast density-per primary team       SUBJECTIVE / 24H INTERVAL HISTORY:    Blood pressure is 738 systolic  Afebrile  3 L nasal cannula  Weight is bed scale at 66 9 kg   Relatively stable  Urine output not recorded  Patient is more awake today  Appears slightly oriented today  Denies complaints but is not answering directly at times      OBJECTIVE:  Current Weight: Weight - Scale: 66 9 kg (147 lb 8 oz)  Vitals:    04/07/21 0538 04/07/21 0745 04/07/21 0746 04/07/21 0751   BP:  144/76 144/76    BP Location:   Right arm    Pulse:  66 66 67   Resp:   20 20   Temp:  98 1 °F (36 7 °C) 98 1 °F (36 7 °C)    TempSrc:   Oral    SpO2:  (!) 89% 91% 91%   Weight: 66 9 kg (147 lb 8 oz)      Height:           Intake/Output Summary (Last 24 hours) at 4/7/2021 0858  Last data filed at 4/6/2021 2015  Gross per 24 hour   Intake 190 ml   Output --   Net 190 ml     General: NAD  Skin: no rash  Eyes: anicteric sclera  ENT: moist mucous membrane  Neck: supple  Chest:  Diminished air intake right mid and base lung  Left lung with fine rales  No rhonchi  No wheezes    CVS: s1s2, soft murmur, no gallop, no rub  Abdomen: soft, nontender, nl sounds  Extremities:  Trace edema LE b/l  : no kuhn  Neuro: AAOX3  Psych: normal affect    Medications:    Current Facility-Administered Medications:     acetaminophen (TYLENOL) tablet 650 mg, 650 mg, Oral, Q6H PRN, MARGOT Olmos    albuterol inhalation solution 2 5 mg, 2 5 mg, Nebulization, Q4H PRN, MARGOT Olmos, 2 5 mg at 04/05/21 0505    amLODIPine (NORVASC) tablet 5 mg, 5 mg, Oral, Daily, Cata Benton MD    [facility]) tablet 2 5 mg, 2 5 mg, Oral, BID, MARGOT Pritchard, 2 5 mg at 04/06/21 1805    calcium carbonate-vitamin D (OSCAL-D) 500 mg-200 units per tablet 1 tablet, 1 tablet, Oral, BID, MARGOT Olmos, 1 tablet at 04/06/21 1805    cholecalciferol (VITAMIN D3) tablet 2,000 Units, 2,000 Units, Oral, Daily, MARGOT Olmos, 2,000 Units at 04/05/21 1939    co-enzyme Q-10 capsule 60 mg, 60 mg, Oral, Daily, MARGOT Pritchard, 60 mg at 04/05/21 7369    [facility]) tablet 625 mg, 625 mg, Oral, BID With Meals, MARGOT Olmos    ezetimibe (ZETIA) tablet 10 mg, 10 mg, Oral, Daily, MARGOT Pritchard, 10 mg at 04/05/21 0469    ferrous sulfate tablet 325 mg, 325 mg, Oral, Every Other Day, MARGOT Olmos, 325 mg at 04/05/21 0870    fluticasone-vilanterol (BREO ELLIPTA) 100-25 mcg/inh inhaler 1 puff, 1 puff, Inhalation, Daily, MARGOT Olmos, 1 puff at 04/05/21 8911    hydrALAZINE (APRESOLINE) injection 5 mg, 5 mg, Intravenous, Q6H PRN, MARGOT Olmos    ipratropium-albuterol (DUO-NEB) 0 5-2 5 mg/3 mL inhalation solution 3 mL, 3 mL, Nebulization, Once, MARGOT Pritchard    ipratropium-albuterol (DUO-NEB) 0 5-2 5 mg/3 mL inhalation solution 3 mL, 3 mL, Nebulization, Q6H, Enio Breech, CRNP, 3 mL at 04/07/21 0750    methylPREDNISolone sodium succinate (Solu-MEDROL) injection 40 mg, 40 mg, Intravenous, Q12H [facility], Shilo Yoonstephanie, ELIZA, 40 mg at 04/06/21 2032    nebivolol (BYSTOLIC) tablet 10 mg, 10 mg, Oral, Daily, Enio Breech, CRNP, 10 mg at 04/05/21 9427    [facility]ron [facility]) injection 4 mg, 4 mg, Intravenous, Q6H PRN, Enio Breech, CRNP    pantoprazole (PROTONIX) EC tablet 40 mg, 40 mg, Oral, Early Morning, Enio Breech, CRNP, 40 mg at 04/07/21 0540    senna (SENOKOT) tablet 8 6 mg, 1 tablet, Oral, HS PRN, Enio Breech, CRNP    Laboratory Results:  Results from last 7 days   Lab Units 04/07/21  0546 04/06/21  0451 04/05/21  0607 04/04/21  2055   WBC Thousand/uL  --  5 93 8 02 7 32   HEMOGLOBIN g/dL  --  10 1* 10 3* 10 7*   HEMATOCRIT %  --  33 6* 36 2 36 1   PLATELETS Thousands/uL  --  196 183 212   POTASSIUM mmol/L 4 5 4 2 4 6 5 1   CHLORIDE mmol/L 102 101 99* 99*   CO2 mmol/L 33* 31 31 28   BUN mg/dL 73* 50* 44* 44*   CREATININE mg/dL 2 58* 2 38* 2 39* 2 33*   CALCIUM mg/dL 9 3 9 3 9 3 9 3   MAGNESIUM mg/dL  --  2 0 2 4  --    PHOSPHORUS mg/dL 4 2*  --  5 9*  --

## 2021-04-07 NOTE — RESPIRATORY THERAPY NOTE
I placed patient on her home trilogy machine and could not maintain SPO2 over 85% with 8L bled into her machine  I am unsure how many liters of O2 can be bled into her machine so I placed her on our Bipap machine per documented settings  Tolerating well

## 2021-04-07 NOTE — ASSESSMENT & PLAN NOTE
· CT: Asymmetric right breast density  Although this could represent breast tissue, a neoplastic process is not excluded and mammographic follow-up is advised    · Discussed with patient and daughter

## 2021-04-07 NOTE — CASE MANAGEMENT
CM received notice that Berta Sullivan (Daughter) of the Pt wanted to speak to the CM  CM spoke to the daughter and she expressed concerns that they Pt would be D/C'd home with home care and they prefer her to be admitted to a [facility] vs go home with [facility]  According to the sister, the Pt lives alone and no one is able to be with her and LTC is what is being considered at this time although the other sister is not on board yet  CM informed that this information would be communicated to the CM department to review on the next business day  The contact number was outlined in the handoff

## 2021-04-07 NOTE — SPEECH THERAPY NOTE
Speech-Language Pathology Bedside Swallow Evaluation        Patient Name: Gaurang COCHRAN Date: 4/7/2021     Problem List  Patient Active Problem List   Diagnosis    Cerebrovascular accident (CVA) ([facility] )    Essential hypertension    Acute kidney injury superimposed on chronic kidney disease ([address] )    Left leg swelling    Hyperlipidemia    Numbness and tingling in left arm    History of CVA (cerebrovascular accident)    Obesity    Gouty arthropathy    Dyslipidemia    Vitamin D deficiency    Leg wound, left    Acute on chronic diastolic heart failure (HCC)    Acute diastolic CHF (congestive heart failure) (HCC)    Hyperphosphatemia    Acute on chronic diastolic CHF (congestive heart failure) (HCC)    Deep vein thrombosis (DVT) of left lower extremity ([facility])    Toxic metabolic encephalopathy    Aortic stenosis, moderate    Bradycardia    Mild persistent asthma without complication    Chronic respiratory failure with hypoxia and hypercapnia ([facility])    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    Anemia of chronic renal failure, stage 4 (severe) ([facility])    Hypertensive chronic kidney disease with stage 1 through stage 4 chronic kidney disease, or unspecified chronic kidney disease    Orthostatic hypotension    Acute tracheobronchitis    Mild persistent asthma with acute exacerbation    History of DVT (deep vein thrombosis)    Rectal bleeding    Chronic kidney disease, stage IV (severe) (HCC)    Iron deficiency    Metabolic alkalosis    Persistent proteinuria    Acute on chronic respiratory failure with hypoxia and hypercapnia (HCC)       Past Medical History  Past Medical History:   Diagnosis Date    Arthritis     CHF (congestive heart failure) ([facility] [address] ) 00/0953    diastolic     CO2 retention     DVT (deep vein thrombosis) in pregnancy     left LE in 2/2018 and 50 years ago    Hyperlipidemia     Hypertension     Pulmonary embolism ([address] ) 02/06/2018    Renal disorder ckd    Stroke [facility]) 2016    left hand weakness,paresthesia  Past Surgical History  Past Surgical History:   Procedure Laterality Date    ABDOMINAL SURGERY      ruputured bowel  had colostomy and ileostomy,reversed later on   BACK SURGERY      rods in back  done 30 years ago   CARPAL TUNNEL RELEASE Bilateral     CYST REMOVAL      back    HERNIA REPAIR      REPLACEMENT TOTAL KNEE BILATERAL           Current Medical Status  Pt is a 80 y o  female who presented to [address] [place] [address] with worsening shortness or breath and was treated with oxygen supplementation and BiPAP support  She was found to be in congestive heart failure and was diuresed  She has past medical history of chronic hypoxic respiratory failure, kyphoscoliosis, chronic diastolic heart failure and mild asthma  Currently she is feeling better after diuresis and is able to remain off BiPAP  She is on nasal cannula oxygen and she is saturating well  ST consult requested to further assess swallow function  Patient denies any history of dysphagia  She does admit to old CVA 2-3 years ago but denies any changes in speech/swallow  She admits to occasional globus sensation but denies any coughing or choking  She reports she has never seen a Speech Pathologist     Past medical history:   Please see H&P for details    Special Studies:  4/6 CT Chest: Right greater than left airspace opacities is consistent with pneumonia     There is layering moderate effusion on the right  Gallstone  Asymmetric right breast density  Although this could represent breast tissue, a neoplastic process is not excluded and mammographic follow-up is advised  The study was discussed with Dr Queenie Lucas on 4/7/2021 4/4 CT Head: Mild pulmonary vascular congestion  Focal right upper lobe groundglass opacity can be due to asymmetric pulmonary edema versus pneumonia  4/4 CXR: Mild pulmonary vascular congestion    Focal right upper lobe groundglass opacity can be due to asymmetric pulmonary edema versus pneumonia  Swallow Information   Current Risks for Dysphagia & Aspiration: change in respiratory status and hx of CVA     Current Symptoms/Concerns: change in respiratory status    Current Diet: regular diet and thin liquids      Baseline Diet: regular diet and thin liquids      Baseline Assessment   Behavior/Cognition: alert    Speech/Language Status: able to participate in conversation and able to follow commands    Patient Positioning: upright in recliner      Swallow Mechanism Exam   Facial: symmetrical  Labial: WFL  Lingual: WFL  Velum: symmetrical  Mandible: adequate ROM  Dentition: adequate  Vocal quality:hoarse- patient reports new deeper hoarse voice which has been progressively worsening since just prior to this admission   Volitional Cough: strong/productive   Resp: 3L NC    Consistencies Assessed and Performance   Consistencies Administered: thin liquids, soft solids, hard solids and mixed consistency  Specific materials administered included: peaches in juice, toasted engligh muffin, omelette ( with mushrooms and cheese), thin liquids    Oral Stage: WFL  Mastication was adequate with the materials administered today  Bolus formation and transfer were functional with nosignificant oral residue noted  No overt s/s reduced oral control  Pharyngeal Stage: [facility]  Swallowing initiation appeared prompt  Laryngeal rise was palpated and judged to be within functional limits  No coughing, throat clearing, change in vocal quality or respiratory status noted today  SPO2 in mid 80s prior to and throughout intake- no changes noted  Esophageal Concerns: none reported    Summary   Pts oropharyngeal swallow function appears generally WFL at this time with the materials administered today  Plan  Will continue with VBS later this morning to further assess  Goals TBD at that time        NICOL Ashraf S , [id number] [facility]  Speech Language Pathologist   Available via [address] [place] [address] #[phone]  [place] #[id number]

## 2021-04-07 NOTE — ASSESSMENT & PLAN NOTE
· CT scan performed yesterday was noted to show right greater than left airspace opacities is consistent with pneumonia  There is layering moderate effusion on the right  · Discussed with Pulmonary  · Check procalcitonin  Hold on antibiotics pending this result    · Patient is afebrile and has no leukocytosis

## 2021-04-07 NOTE — ASSESSMENT & PLAN NOTE
· Pulmonary following  · Continue Breo  · Currently on IV steroids will which were decreased from 40 mg q 6 hours to 40 mg q 12 hours yesterday  · Continue wean and transition to p o  Prednisone as able

## 2021-04-07 NOTE — ASSESSMENT & PLAN NOTE
-C/w Breo Ellipta 1 puff daily, Duo-Neb q 6 hrs, and albuterol nebulizers q4hrs PRN    -Cont Prednisone 40 mg / day   -C/w outpatient follow-up Pulmonology

## 2021-04-07 NOTE — RESPIRATORY THERAPY NOTE
Per RN, patient wanted a break from her Bipap  I tried to convince her to put it back on after her scheduled nebulizer but she out right refused  She only wore it for approximately 3 hours tonight  I stressed the importance of complying with it

## 2021-04-07 NOTE — PROGRESS NOTES
Progress Note - Pulmonary   Arabella Daniel 80 y o  female MRN: [id number]  Unit/Bed#: [address] [place] [address] Encounter: [id number]  Code Status: Level 3 - DNAR Keara Doran is a 80 y o  Past Medical History:   Diagnosis Date    Arthritis     CHF (congestive heart failure) ([facility] [address] ) 84/8736    diastolic     CO2 retention     DVT (deep vein thrombosis) in pregnancy     left LE in 2/2018 and 50 years ago    Hyperlipidemia     Hypertension     Pulmonary embolism ([facility] [address] ) 02/06/2018    Renal disorder     ckd    Stroke [facility]) 2016    left hand weakness,paresthesia  Assessment/Plan:   * Acute on chronic respiratory failure with hypoxia and hypercapnia (HCC)  Assessment & Plan  -Titrate O2 saturation to >90%  -Pt chronically on 2 L NC O2 baseline at home / currently on 3 L    -Pt started on BiPAP 4/5 by IM team, current settings 16/8 with 40% oxygen  Will monitor patient and adjust as needed  -Repeat ABG done  4/6 shows improvement compared to ABG 4/5   ABG:  Results from last 7 days   Lab Units 04/06/21  0218   PH ART  7 333*   PCO2 ART mm Hg 55 8*   PO2 ART mm Hg 103 2   HCO3 ART mmol/L 29 0*   BASE EXC ART mmol/L 2 2   ABG SOURCE  Radial, Right     VBG:  Results from last 7 days   Lab Units 04/06/21  0218   ABG SOURCE  Radial, Right     Consideration for causative respiratory failure is:  -AE/COPD/Asthma  > currently remains on steroids   -Trilogy NIV Non compliance: possible contributor  -CHF- judicious diuresis as per cardiology /nephrology   -? Aspiration (potential with altered mentation) Speech consulted and VBS performed 4/7 which was overall normal  Showed ongoing aspiration risk present but very low  Recommended regular diet with thin liquids  CT scan 4/6 pursued for better imaging of lung fields and infiltrates  Showed R>L airspace opacities consistent w/possible pneumonia v   Aspiration   Layering moderate effusion on R     -Discussed w/IM team, will check procalcitonin given possible underlying pneumonia  -for now to hold on Abx therapy          Acute on chronic diastolic heart failure ([address] )  Assessment & Plan  Wt Readings from Last 3 Encounters:   04/05/21 67 7 kg (149 lb 4 oz)   08/18/20 68 kg (150 lb)   03/16/20 68 kg (150 lb)   -following with Cardiology and Nephrology  -Pro BNP 10,884 on admission which is up from previous of 2,657  -C/w diuresis and rx as per Cardiology and Nephro with holds as appropriate for TIANA on CKD III  -Pt initially given Lasix 4/4 and 4/5 d/c'd d/t elevating Cr  -diuretics currently held    Mild persistent asthma with acute exacerbation  Assessment & Plan  -C/w Breo Ellipta 1 puff daily, Duo-Neb q 6 hrs, and albuterol nebulizers q4hrs PRN  -Currently IV Solu-Medrol 40 mg Q 12 hrs with plan to assess and wean as tolerated  Weaned from Q6 hrs 4/6    -C/w outpatient follow-up Pulmonology      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  Assessment & Plan  -FVC 49% predicted, FEV1 51% predicted, and FEV1/% predicted 4/2018 spirometry    Acute kidney injury superimposed on chronic kidney disease [facility])  Assessment & Plan  Lab Results   Component Value Date    EGFR 16 04/07/2021    EGFR 17 04/06/2021    EGFR 17 04/05/2021    CREATININE 2 58 (H) 04/07/2021    CREATININE 2 38 (H) 04/06/2021    CREATININE 2 39 (H) 04/05/2021       Mild persistent asthma without complication-resolved as of 4/7/2021  Assessment & Plan          _________________________________________________    Subjective: Pt seen and examined at bedside    Chief Complaint: "My breathing's okay"  Pt today is more lucid compared to previous visits  Awake, alert, and talkative about family and grandchildren  States her breathing is "okay" and has some occasional cough productive of light red-tinged sputum  Otherwise denies any changes in symptoms or concerns   Reports of noncompliance with BiPAP, RT notes that Pt refused BiPAP and only wore it for approx 3 hours last night despite emphasis of importance of compliance  Labs Reviewed: yes  Imaging Reviewed: yes   video barium swallow  21 CT chest Impression: right greater than left airspace opacities consistent w/pneumonia  Layering moderate effusion on the right  [place]  Asymmetric right breast density which could represent breast tissue, but not exclusive of neoplastic process  CT head : sinus disease as described but no acute intracranial abnormality  CXR  IMPRESSION: Mild pulmonary vascular congestion   Focal right upper lobe groundglass opacity can be due to asymmetric pulmonary edema versus pneumonia  Echo Reviewed: yes  21 EF 60-65%, moderate to severe pulmonary hypertension 70 mmHg     Consults Reviewed: yes  Collaborative Discussion: w/IM team Lalo Stewart PA-C    Vitals:   Temp:  [97 5 °F (36 4 °C)-98 7 °F (37 1 °C)] 98 1 °F (36 7 °C)  HR:  [63-86] 67  Resp:  [18-22] 20  BP: (137-149)/(59-76) 144/76  Weight (last 2 days)     Date/Time   Weight    21 0538   66 9 (147 5)    21 0600   67 7 (149 25)    21 0105   66 7 (147)            Vitals:    21 0538 21 0745 21 0746 21 0751   BP:  144/76 144/76    BP Location:   Right arm    Pulse:  66 66 67   Resp:   20 20   Temp:  98 1 °F (36 7 °C) 98 1 °F (36 7 °C)    TempSrc:   Oral    SpO2:  (!) 89% 91% 91%   Weight: 66 9 kg (147 lb 8 oz)      Height:         Temp (24hrs), Av 1 °F (36 7 °C), Min:97 5 °F (36 4 °C), Max:98 7 °F (37 1 °C)  Current: Temperature: 98 1 °F (36 7 °C)        SpO2: SpO2: 96 %, SpO2 Activity: SpO2 Activity: At Rest, SpO2 Device: O2 Device: Nasal cannula      IV Infusions:       Nutrition:        Diet Orders   (From admission, onward)             Start     Ordered    21 1532  Dietary nutrition supplements  Once     Question Answer Comment   Select Supplement: Ensure Enlive-Vanilla    Frequency Lunch        21 1532    21 0941  Room Service  Once     Question:  Type of Service  Answer:  Room Service - Appropriate with Assistance    04/05/21 0941    04/05/21 0046  Diet Cardiovascular; Cardiac  Diet effective now     Question Answer Comment   Diet Type Cardiovascular    Cardiac Cardiac    RD to adjust diet per protocol? Yes        04/05/21 0045                Ins/Outs:   I/O       04/05 0701 - 04/06 0700 04/06 0701 - 04/07 0700 04/07 0701 - 04/08 0700    P  O  180 180     I V  (mL/kg)  10 (0 1)     Total Intake(mL/kg) 180 (2 7) 190 (2 8)     Urine (mL/kg/hr)       Total Output       Net +180 +190                  Lines/Drains:  Invasive Devices     Peripheral Intravenous Line            Peripheral IV 04/04/21 Left Forearm 2 days                 Active medications:  Scheduled Meds:  Current Facility-Administered Medications   Medication Dose Route Frequency Provider Last Rate    acetaminophen  650 mg Oral Q6H PRN Enio Breech, CRNP      albuterol  2 5 mg Nebulization Q4H PRN Enio Breech, CRNP      amLODIPine  5 mg Oral Daily María Brown MD      apixaban  2 5 mg Oral BID Enio Breech, CRNP      calcium carbonate-vitamin D  1 tablet Oral BID Enio Breech, CRNP      cholecalciferol  2,000 Units Oral Daily Enio Breech, CRNP      co-enzyme Q-10  60 mg Oral Daily Enio Breech, CRNP      colesevelam  625 mg Oral BID With Meals Enio Breech, CRNP      ezetimibe  10 mg Oral Daily Enio Breech, CRNP      ferrous sulfate  325 mg Oral Every Other Day Enio Breech, CRNP      fluticasone-vilanterol  1 puff Inhalation Daily Enio Breech, CRNP      hydrALAZINE  5 mg Intravenous Q6H PRN Enio Breech, CRNP      ipratropium-albuterol  3 mL Nebulization Once Enio Breech, CRNP      ipratropium-albuterol  3 mL Nebulization Q6H Enio Breech, CRNP      methylPREDNISolone sodium succinate  40 mg Intravenous Q12H [facility], PAOsmanC      nebivolol  10 mg Oral Daily Enio Breech, CRNP      ondansetron  4 mg Intravenous Q6H PRN Renato Darwiner, CRNP      pantoprazole  40 mg Oral Early Morning MARGOT Thomas      psyllium  1 packet Oral Daily Anabell Ramirez MD      senna  1 tablet Oral HS PRN MARGOT Thomas       PRN Meds:acetaminophen, 650 mg, Q6H PRN  albuterol, 2 5 mg, Q4H PRN  hydrALAZINE, 5 mg, Q6H PRN  ondansetron, 4 mg, Q6H PRN  senna, 1 tablet, HS PRN      ____________________________________________________________________    Physical Exam  Constitutional:       Appearance: She is well-developed  Comments: Elderly female seated in chair, appears comfortable   HENT:      Head: Normocephalic and atraumatic  Eyes:      Conjunctiva/sclera: Conjunctivae normal       Pupils: Pupils are equal, round, and reactive to light  Neck:      Musculoskeletal: Normal range of motion and neck supple  Cardiovascular:      Rate and Rhythm: Normal rate and regular rhythm  Heart sounds: Normal heart sounds  Pulmonary:      Effort: Pulmonary effort is normal  No respiratory distress  Breath sounds: Examination of the left-upper field reveals decreased breath sounds  Examination of the left-middle field reveals decreased breath sounds  Examination of the left-lower field reveals decreased breath sounds  Decreased breath sounds present  No wheezing or rales  Comments: 90% on 3 L O2 NC  Decreased breath sounds on left  Chest:      Chest wall: No tenderness  Abdominal:      General: Bowel sounds are normal       Palpations: Abdomen is soft  Musculoskeletal: Normal range of motion  Comments: kyphosis   Skin:     General: Skin is warm and dry  Neurological:      Mental Status: She is alert and oriented to person, place, and time         ____________________________________________________________________    Invasive/non-invasive ventilation settings   Respiratory    Lab Data (Last 4 hours)    None         O2/Vent Data (Last 4 hours)    None                Laboratory and Diagnostics:  Results from last 7 days   Lab Units 04/06/21  0451 04/05/21  0607 04/04/21 2055   WBC Thousand/uL 5 93 8 02 7 32   HEMOGLOBIN g/dL 10 1* 10 3* 10 7*   HEMATOCRIT % 33 6* 36 2 36 1   PLATELETS Thousands/uL 196 183 212   NEUTROS PCT % 94* 89* 87*   MONOS PCT % 1* 6 6     Results from last 7 days   Lab Units 04/07/21  0546 04/06/21 0451 04/05/21  0607 04/04/21 2055   SODIUM mmol/L 140 139 135* 132*   POTASSIUM mmol/L 4 5 4 2 4 6 5 1   CHLORIDE mmol/L 102 101 99* 99*   CO2 mmol/L 33* 31 31 28   ANION GAP mmol/L 5 7 5 5   BUN mg/dL 73* 50* 44* 44*   CREATININE mg/dL 2 58* 2 38* 2 39* 2 33*   CALCIUM mg/dL 9 3 9 3 9 3 9 3   GLUCOSE RANDOM mg/dL 160* 130 137 157*   ALT U/L  --   --  39 38   AST U/L  --   --  21 23   ALK PHOS U/L  --   --  100 106   ALBUMIN g/dL  --   --  3 1* 3 0*   TOTAL BILIRUBIN mg/dL  --   --  0 43 0 46     Results from last 7 days   Lab Units 04/07/21  0546 04/06/21 0451 04/05/21  0607   MAGNESIUM mg/dL  --  2 0 2 4   PHOSPHORUS mg/dL 4 2*  --  5 9*      Results from last 7 days   Lab Units 04/04/21 2055   INR  1 31*   PTT seconds 24      Results from last 7 days   Lab Units 04/04/21 2055   TROPONIN I ng/mL <0 02         ABG:  Results from last 7 days   Lab Units 04/06/21 0218   PH ART  7 333*   PCO2 ART mm Hg 55 8*   PO2 ART mm Hg 103 2   HCO3 ART mmol/L 29 0*   BASE EXC ART mmol/L 2 2   ABG SOURCE  Radial, Right     VBG:  Results from last 7 days   Lab Units 04/06/21 0218   ABG SOURCE  Radial, Right           Micro        Imaging:   FL barium swallow video w speech   Final Result by SOFI, DOCUMENTATION (04/07 1708)      CT chest wo contrast   Final Result by Kellie Wyatt MD (04/07 2999)      Right greater than left airspace opacities is consistent with pneumonia  There is layering moderate effusion on the right  Gallstone  Asymmetric right breast density    Although this could represent breast tissue, a neoplastic process is not excluded and mammographic follow-up is advised  The study was discussed with Dr Ambrosio Velasquez on 4/7/2021         Workstation performed: [id number]         CT head without contrast   Final Result by Michael Johns DO (04/04 2341)      Sinus disease as described but no acute intracranial abnormality seen otherwise  Other findings as above  Workstation performed: [id number]         XR chest 1 view portable   Final Result by Doreen Gilbert MD (04/05 0915)      Mild pulmonary vascular congestion  Focal right upper lobe groundglass opacity can be due to asymmetric pulmonary edema versus pneumonia  Workstation performed: [id number]         US kidney and bladder    (Results Pending)       Micro:   Lab Results   Component Value Date    BLOODCX No Growth After 5 Days  03/29/2018    BLOODCX No Growth After 5 Days  03/29/2018    BLOODCX No Growth After 5 Days  08/27/2017    BLOODCX No Growth After 5 Days   08/27/2017    URINECX No Growth <1000 cfu/mL 03/29/2018    URINECX >100,000 cfu/ml Mixed Contaminants X6 05/09/2017    MRSACULTURE  03/29/2018     No Methicillin Resistant Staphlyococcus aureus (MRSA) isolated            Invalid input(s): Demi Hyatt

## 2021-04-07 NOTE — PHYSICAL THERAPY NOTE
PT EVALUATION     04/07/21 1115   Note Type   Note type Evaluation   Pain Assessment   Pain Assessment Tool Pain Assessment not indicated - pt denies pain   Home Living   Type of [address] One level;Stairs to enter with rails  (6 steps down to enter)   [address] [place] chair   [address] [facility],[address]  (Rollator)   Additional Comments Patient independent with Rollator prior to admission   Prior Function   Lives With Alone   Receives Help From Home health; Family   ADL Assistance Needs assistance   IADLs Needs assistance   Comments Patient with home health aide for 2 hours in the morning and evening family in the household at mid day every day   Restrictions/Precautions   Other Precautions Chair Alarm; Bed Alarm; Fall Risk;O2   General   Additional Pertinent History Chart reviewed, patient admitted with acute on chronic respiratory failure with hypoxia  Patient now presents as generally weak and deconditioned  Family/Caregiver Present No   Cognition   Overall Cognitive Status WFL   Arousal/Participation Cooperative   Attention Attends with cues to redirect   Orientation Level Oriented to person;Oriented to place;Oriented to situation   Following Commands Follows multistep commands with increased time or repetition   Comments Patient distracted at times   RLE Assessment   RLE Assessment   (ROM WFL, strength 3+/4-)   LLE Assessment   LLE Assessment   (ROM WFL, strength 3+/4-)   Coordination   Movements are Fluid and Coordinated 0   Transfers   Sit to Stand 4  Minimal assistance   Additional items Assist x 1   Stand to Sit 4  Minimal assistance   Additional items Assist x 1   Ambulation/Elevation   Gait Assistance 3  Moderate assist   Additional items Assist x 1;Verbal cues; Tactile cues   Assistive Device Rolling walker   Distance 15 ft with minerals base of support and shortened stride length   Balance   Static Sitting Fair   Dynamic Sitting Fair -   Static Standing Fair -   Dynamic Standing Fair -   Ambulatory Poor +   Activity Tolerance   Activity Tolerance Patient limited by fatigue   Nurse Made Aware yes   Assessment   Prognosis Good   Problem List Decreased strength;Decreased range of motion;Decreased endurance; Impaired balance;Decreased mobility; Decreased coordination   Assessment Patient seen for Physical Therapy evaluation  Patient admitted with Acute on chronic respiratory failure with hypoxia and hypercapnia ([facility] [address] )  Comorbidities affecting patient's physical performance include:chronic diastolic heart failure, chronic respiratory failure with hypoxia on 2 L of O2 continuously, hypertension, hyperlipidemia, chronic kidney disease stage 3, prior stroke, and previous DVT on Eliquis who presents with shortness of breath and confusion   Personal factors affecting patient at time of initial evaluation include: ambulating with assistive device, inability to ambulate household distances, inability to navigate community distances, inability to navigate level surfaces without external assistance, inability to perform dynamic tasks in community, limited home support, inability to perform physical activity, inability to perform ADLS and inability to perform IADLS   Prior to admission, patient was independent with functional mobility with rollator, requiring assist for ADLS, requiring assist for IADLS, ambulating household distance and home with family assist   Please find objective findings from Physical Therapy assessment regarding body systems outlined above with impairments and limitations including weakness, decreased ROM, impaired balance, decreased endurance, impaired coordination, gait deviations, decreased activity tolerance, decreased functional mobility tolerance, fall risk and SOB upon exertion  The Barthel Index was used as a functional outcome tool presenting with a score of 45 today indicating marked limitations of functional mobility and ADLS    Patient's clinical presentation is currently unstable/unpredictable as seen in patient's presentation of vital sign response, changing level of pain, increased fall risk, new onset of impairment of functional mobility, decreased endurance and new onset of weakness  Pt would benefit from continued Physical Therapy treatment to address deficits as defined above and maximize level of functional mobility  As demonstrated by objective findings, the assigned level of complexity for this evaluation is high  The patient's AM-[facility] Basic Mobility Inpatient Short Form Raw Score is 17, Standardized Score is 39 67  A standardized score less than 42 9 suggests the patient may benefit from discharge to post-acute rehabilitation services although patient has HHA and good family support and can return home with PT services as well  Please also refer to the recommendation of the Physical Therapist for s   Goals   Patient Goals To be able to walk better   STG Expiration Date 04/14/21   Short Term Goal #1 Transfers and gait with roller walker with supervision   Short Term Goal #2 Gait and orients to 80 ft, strength bilateral extremities 4/5   LTG Expiration Date 04/21/21   Long Term Goal #1 transfers and gait with roller walker independently   Long Term Goal #2 gait endurance to functional household distances   Plan   Treatment/Interventions ADL retraining;Functional transfer training;LE strengthening/ROM; Elevations; Therapeutic exercise; Endurance training   Recommendation   PT Discharge Recommendation Home with skilled therapy   AM-PAC Basic Mobility Inpatient   Turning in Bed Without Bedrails 3   Lying on Back to Sitting on Edge of Flat Bed 3   Moving Bed to Chair 3   Standing Up From Chair 3   Walk in Room 3   Climb 3-5 Stairs 2   Basic Mobility Inpatient Raw Score 17   Basic Mobility Standardized Score 39 67   Barthel Index   Feeding 5   Bathing 0   Grooming Score 0   Dressing Score 5   Bladder Score 10   Bowels Score 10   Toilet Use Score 5   Transfers (Bed/Chair) Score 10   Mobility (Level Surface) Score 0   Stairs Score 0   Barthel Index Score 39   Licensure   [place] License Number  Bree Morales PT [id number]     PT TREATMENT  Time In:1100  Time Out:1115  Total Time: 15min      S:  Patient without pain  O:  -gait with roller walker with minimal assist 1, 15 ft  -exercise completed sitting in bedside chair: LAqs, ankle pumps, hip flexion all 10 reps B LEs  A:  Patient will benefit from continued therapy with progression as tolerated  P:  Home with PT and continued HHA and family support    Jerri Moreno, PT

## 2021-04-07 NOTE — PROCEDURES
Speech-Language Pathology Video Barium Swallow Study        Patient Name: Eugene Lee    PWDRC'T Date: 4/7/2021     Problem List  Patient Active Problem List   Diagnosis    Cerebrovascular accident (CVA) ([facility] [address] )    Essential hypertension    Acute kidney injury superimposed on chronic kidney disease ([address] )    Left leg swelling    Hyperlipidemia    Numbness and tingling in left arm    History of CVA (cerebrovascular accident)    Obesity    Gouty arthropathy    Dyslipidemia    Vitamin D deficiency    Leg wound, left    Acute on chronic diastolic heart failure (HCC)    Acute diastolic CHF (congestive heart failure) (HCC)    Hyperphosphatemia    Acute on chronic diastolic CHF (congestive heart failure) (HCC)    Deep vein thrombosis (DVT) of left lower extremity (HCC)    Toxic metabolic encephalopathy    Aortic stenosis, moderate    Bradycardia    Mild persistent asthma without complication    Chronic respiratory failure with hypoxia and hypercapnia ([facility])    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    Anemia of chronic renal failure, stage 4 (severe) (HCC)    Hypertensive chronic kidney disease with stage 1 through stage 4 chronic kidney disease, or unspecified chronic kidney disease    Orthostatic hypotension    Acute tracheobronchitis    Mild persistent asthma with acute exacerbation    History of DVT (deep vein thrombosis)    Rectal bleeding    Chronic kidney disease, stage IV (severe) (HCC)    Iron deficiency    Metabolic alkalosis    Persistent proteinuria    Acute on chronic respiratory failure with hypoxia and hypercapnia (HCC)       Past Medical History  Past Medical History:   Diagnosis Date    Arthritis     CHF (congestive heart failure) ([facility] [address] ) 67/2749    diastolic     CO2 retention     DVT (deep vein thrombosis) in pregnancy     left LE in 2/2018 and 50 years ago    Hyperlipidemia     Hypertension     Pulmonary embolism ([address] ) 02/06/2018    Renal disorder     ckd    Stroke [facility]) 2016    left hand weakness,paresthesia  Past Surgical History  Past Surgical History:   Procedure Laterality Date    ABDOMINAL SURGERY      ruputured bowel  had colostomy and ileostomy,reversed later on   BACK SURGERY      rods in back  done 30 years ago   CARPAL TUNNEL RELEASE Bilateral     CYST REMOVAL      back    HERNIA REPAIR      REPLACEMENT TOTAL KNEE BILATERAL           General Information;  Pt is a 80 y o  female who was seen at bedside with recommendations for VBS to further assess swallow function  Previous VBS: none    Consistencies Assessed and Performance   Pt was seen in radiology for a Video Barium Swallow Study, seated in the upright position and viewed laterally with the following consistencies:      Administered: thin liquids, nectar thick, puree, soft solids and hard solids  Specific materials administered included    Consistencies administered: Barium laden applesauce, moistened cracker, hard cookie, nectar thick, thin liquids, 13mm barium pill  Liquids were administered by cup and straw      Results are as follows:     **Images are available for review on PACS    Oral stage:  Lip closure: adequate  Mastication: adequate  Bolus formation: adequate  Bolus control: minimally reduced  Transfer: adequate  Residue: none    Pharyngeal stage:  Swallow promptness: prompt  Spill to valleculae: mild across consistencies ( GWFL)  Spill to pyriforms: mild with larger presentations (GWFL)  Epiglottic inversion: adequate- although larger curled epiglottis is noted at times hitting PPW given presence of osteophytes   Laryngeal rise: adequatw  Pharyngeal constriction: adequate  Vallecular retention: min at times with solids ( GWFL) x1 episodes of mild ret on AE folds  Pyriform retention: none  PPW coating: trace at times with solids ( GWFL)  Osteophytes: present throughout cervical spine  CP prominence: not observed  Retropulsion from prominence: n/a  Transient penetration: observed with larger/consecutive presentations across liquids and x1 with larger presentation of puree  Epiglottic undercoat: trace with puree   Penetration: none  Aspiration: none  Strategies: patient benefited from smaller presentations and 2' swallows which she was noted to utilize independently  Screening of Esophageal stage:  Dysmotility: present  Retropulsion: mild at times but not reaching CPJ  Tertiary contractions: present  Reflux: not observed  Retention: present  Stricture: not observed  Hold up of barium tablet in lower 3rd of the esophagus which cleared with liquid wash    This is not at formal assessment of the esophagus and limited to small amounts in the upright position  Assessment Summary;  Pts oropharyngeal swallow function appears generally WFL at this time with the materials administered today  She does present with premature spill at times which is GWFL however she also is noted to have a larger curled epiglottis which is noted to hit PPW at times given the presence of osteophytes reducing airway closure  Despite this only transient penetration was observed with no gross/deep penetration or aspiration  She did have trace epiglottic undercoat noted x1 however independently cleared this was 2' swallows  Furthermore transient penetration was reduced/eliminated with smaller presentations which patient independently utilized  Ongoing aspiration risk is present but very low  She did present with retention in the esophagus with dysmotility and retrograde flow at times however this did not reach the level of the cricopharyngeal junction- 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  Education was provided to the patient on results of today's study and recommendations  Reciprocal comprehension was verbally expressed  Recommendations;   Recommend regular diet and thin liquids, with upright posture, slow rate of feeding, small bites/sips and alternating bites and sips    Recommended Form of Meds: whole with puree ( as requested per patient)    If a dedicated assessment of the esophagus is desired, consider esophagram/barium swallow  No further ST indicated at this time      NICOL Miller , [address] [facility]  Speech Language Pathologist   Available via [address] [place] [address] #[id number]  [place] #[id number]

## 2021-04-07 NOTE — OCCUPATIONAL THERAPY NOTE
Occupational Therapy Evaluation       04/07/21 1205   Note Type   Note type Evaluation   Restrictions/Precautions   Other Precautions Chair Alarm; Bed Alarm;O2;Fall Risk   Pain Assessment   Pain Assessment Tool 0-10   Pain Score No Pain   Home Living   Type of [address] level;Stairs to enter with rails  (1st floor apartment, 6/7 stairs down into apartment)   Bathroom Shower/Tub Walk-in shower   Bathroom Toilet Standard   [address] chair   Home Equipment Other (Comment)  (Rollator)   Additional Comments Patient to hospital with acute on chronic CHF   Prior Function   Level of [place]   (Ambulatory with rollator, needs assist ADLs)   Lives With Alone   Receives Help From Home health; Family   ADL Assistance Needs assistance   IADLs Needs assistance   Comments Patient lives alone, has HHA daily 2 hours in the morning and 2 hours in the evening for assist with ADLs including dressing and bathing, as well as iADLs and meal prep  Patient's daughter visits patient daily at lunch time to check on/assist patient as needed   Patient ambulates with rollator in the apartment    ADL   Eating Assistance 5  Supervision/Setup   Grooming Assistance 5  Supervision/Setup   UB Bathing Assistance 3  Moderate Assistance   LB Bathing Assistance 2  Maximal Assistance   UB Dressing Assistance 3  Moderate Assistance   LB Dressing Assistance 2  Maximal Assistance   Toileting Assistance  3  Moderate Assistance   Bed Mobility   Additional Comments Not assessed, patient received seated OOB in chair at start of session   Transfers   Sit to Stand 3  Moderate assistance  (Min-mod assist)   Additional items Assist x 1   Stand to Sit 4  Minimal assistance   Additional items Assist x 1   Functional Mobility   Functional Mobility 3  Moderate assistance   Additional Comments Patient ambulated few feet in room with RW and min-mod assist; on 3L O2 via nasal cannula   Balance   Static Sitting Fair   Dynamic Sitting Fair   Static Standing Fair -   Dynamic Standing Fair -   Activity Tolerance   Activity Tolerance Patient limited by fatigue   RUE Assessment   RUE Assessment WFL   RUE Strength   RUE Overall Strength Deficits  (Weakess, grossly 3+/5 throughout)   LUE Assessment   LUE Assessment WFL   LUE Strength   LUE Overall Strength Deficits  (Weakess, grossly 3+/5 throughout)   Cognition   Overall Cognitive Status WFL   Arousal/Participation Alert; Cooperative  (Anxious)   Attention Attends with cues to redirect   Orientation Level Oriented to person;Oriented to place;Oriented to situation   Following Commands Follows multistep commands with increased time or repetition   Comments Patient alert and awake, easily distracted and perseverating on not getting her Metamucil over the past few days , difficult to redirect at times   Assessment   Limitation Decreased ADL status; Decreased UE strength;Decreased Safe judgement during ADL;Decreased endurance;Decreased self-care trans;Decreased high-level ADLs   Prognosis Good   Assessment Patient evaluated by Occupational Therapy  Patient admitted with Acute on chronic respiratory failure with hypoxia and hypercapnia ([facility] [address] )  The patients occupational profile, medical and therapy history includes a extensive additional review of physical, cognitive, or psychosocial history related to current functional performance  Comorbidities affecting functional mobility and ADLS include: HLD, HTN, renal disorder, stroke, CHF, PE, DVT, arthritis, chronic respiratory failure    Prior to admission, patient was independent with functional mobility with rollator, requiring assist for ADLS, requiring assist for IADLS and home with family assist   The evaluation identifies the following performance deficits: weakness, impaired balance, decreased endurance, increased fall risk, new onset of impairment of functional mobility, decreased ADLS, decreased IADLS, decreased activity tolerance, decreased safety awareness, impaired judgement and decreased strength, that result in activity limitations and/or participation restrictions  This evaluation requires clinical decision making of high complexity, because the patient presents with comorbidites that affect occupational performance and required significant modification of tasks or assistance with consideration of multiple treatment options  The Barthel Index was used as a functional outcome tool presenting with a score of 45, indicating marked limitations of functional mobility and ADLS  The patient's raw score on the AM-PAC Daily Activity inpatient short form is 15, standardized score is 34 69, less than 39 4  Patients at this level are likely to benefit from DC to post-acute rehabilitation services  Please refer to the recommendation of the Occupational Therapist for safe DC planning  Despite AM-PAC score, patient at baseline requires assist with ADLs  Has assist from family and home health aides at baseline  Recommend patient return home with home OT services as well as continued assist from family and home health as patient was receiving prior to admission  Patient will benefit from skilled Occupational Therapy services to address above deficits and facilitate a safe return to prior level of function  Goals   Patient Goals "to get my rollator so I can walk better"   STG Time Frame   (1-7 days)   Short Term Goal  Goals established to promote patient goal of walking more:  Patient will increase standing tolerance to 5 minutes during ADL task to decrease assistance level and decrease fall risk; Patient will increase bed mobility to min assist in preparation for ADLS and transfers;  Patient will increase functional mobility to and from bathroom with rolling walker with min assist to increase performance with ADLS and to use a toilet; Patient will tolerate 5 minutes of UE ROM/strengthening to increase general activity tolerance and performance in ADLS/IADLS; Patient will improve functional activity tolerance to 5 minutes of sustained functional tasks to increase participation in basic self-care and decrease assistance level;  Patient will be able to to verbalize understanding and perform energy conservation/proper body mechanics during ADLS and functional mobility at least 75% of the time with minimal cueing to decrease signs of fatigue and increase stamina to return to prior level of function; Patient will increase static/dynamic sitting balance to fair+ to improve the ability to sit at edge of bed or on a chair for ADLS;  Patient will increase static/dynamic standing balance to fair to improve postural stability and decrease fall risk during standing ADLS and transfers  LTG Time Frame   (8-14 days)   Long Term Goal Patient will increase standing tolerance to 10 minutes during ADL task to decrease assistance level and decrease fall risk; Patient will increase bed mobility to supervision in preparation for ADLS and transfers;  Patient will increase functional mobility to and from bathroom with rolling walker with supervision to increase performance with ADLS and to use a toilet; Patient will tolerate 10 minutes of UE ROM/strengthening to increase general activity tolerance and performance in ADLS/IADLS; Patient will improve functional activity tolerance to 10 minutes of sustained functional tasks to increase participation in basic self-care and decrease assistance level;  Patient will be able to to verbalize understanding and perform energy conservation/proper body mechanics during ADLS and functional mobility at least 90% of the time with nocueing to decrease signs of fatigue and increase stamina to return to prior level of function; Patient will increase static/dynamic sitting balance to good to improve the ability to sit at edge of bed or on a chair for ADLS;  Patient will increase static/dynamic standing balance to fair+ to improve postural stability and decrease fall risk during standing ADLS and transfers  Functional Transfer Goals   Pt Will Perform All Functional Transfers   (STG min assist, LTG supervision)   ADL Goals   Pt Will Perform Eating   (LTG independent)   Pt Will Perform Grooming   (LTG independent)   Pt Will Perform Bathing   (STG mod assist, LTG min assist)   Pt Will Perform UE Dressing   (STG min assist, LTG supervision)   Pt Will Perform LE Dressing   (STG mod assist, LTG min assist)   Pt Will Perform Toileting   (STG min assist, LTG supervision)   Plan   Treatment Interventions ADL retraining;Functional transfer training;UE strengthening/ROM; Endurance training;Patient/family training;Equipment evaluation/education; Compensatory technique education;Continued evaluation; Energy conservation; Activityengagement   Goal Expiration Date 04/21/21   OT Frequency 3-5x/wk   Additional Treatment Session   Start Time 1150   End Time 1205   Treatment Assessment Patient ambulated short household distance to/from bathroom with RW and min assist  Ambulatory transfer to/from standard toilet with RW and min assist  Patient completed toilet hygiene after bowel movement with overall mod assist  Increased time in bathroom due to increased difficulty with bowel movement for patient  Hand hygiene standing to sink unsupported with supervision  Once back at recliner, stand to sit with min assist  Patient set up with lunch tray  At end of OT session patient seated in recliner chair with all needs met     Recommendation   OT Discharge Recommendation Home with skilled therapy  (Home OT, previous level of assist from family/home health)   AM-PAC Daily Activity Inpatient   Lower Body Dressing 2   Bathing 2   Toileting 2   Upper Body Dressing 2   Grooming 3   Eating 4   Daily Activity Raw Score 15   Daily Activity Standardized Score (Calc for Raw Score >=11) 34 69   AM-PAC Applied Cognition Inpatient   Following a Speech/Presentation 4   Understanding Ordinary Conversation 4   Taking Medications 4 Remembering Where Things Are Placed or Put Away 3   Remembering List of 4-5 Errands 3   Taking Care of Complicated Tasks 3   Applied Cognition Raw Score 21   Applied Cognition Standardized Score 44 3   Barthel Index   Feeding 5   Bathing 0   Grooming Score 0   Dressing Score 5   Bladder Score 10   Bowels Score 10   Toilet Use Score 5   Transfers (Bed/Chair) Score 10   Mobility (Level Surface) Score 0   Stairs Score 0   Barthel Index Score 39   Licensure   [place] License Number  Claudine Granados, OTR/L [id number]

## 2021-04-07 NOTE — ASSESSMENT & PLAN NOTE
Lab Results   Component Value Date    EGFR 16 04/07/2021    EGFR 17 04/06/2021    EGFR 17 04/05/2021    CREATININE 2 58 (H) 04/07/2021    CREATININE 2 38 (H) 04/06/2021    CREATININE 2 39 (H) 04/05/2021

## 2021-04-08 LAB
ALBUMIN SERPL BCP-MCNC: 2.6 G/DL (ref 3.5–5)
ALP SERPL-CCNC: 109 U/L (ref 46–116)
ALT SERPL W P-5'-P-CCNC: 112 U/L (ref 12–78)
ANION GAP SERPL CALCULATED.3IONS-SCNC: 5 MMOL/L (ref 4–13)
ARTERIAL PATENCY WRIST A: YES
AST SERPL W P-5'-P-CCNC: 70 U/L (ref 5–45)
BASE EXCESS BLDA CALC-SCNC: 3.2 MMOL/L
BASOPHILS # BLD AUTO: 0 THOUSANDS/ΜL (ref 0–0.1)
BASOPHILS NFR BLD AUTO: 0 % (ref 0–1)
BILIRUB SERPL-MCNC: 0.34 MG/DL (ref 0.2–1)
BODY TEMPERATURE: 97.5 DEGREES FEHRENHEIT
BUN SERPL-MCNC: 97 MG/DL (ref 5–25)
CALCIUM ALBUM COR SERPL-MCNC: 10.3 MG/DL (ref 8.3–10.1)
CALCIUM SERPL-MCNC: 9.2 MG/DL (ref 8.3–10.1)
CHLORIDE SERPL-SCNC: 104 MMOL/L (ref 100–108)
CO2 SERPL-SCNC: 31 MMOL/L (ref 21–32)
CREAT SERPL-MCNC: 2.51 MG/DL (ref 0.6–1.3)
EOSINOPHIL # BLD AUTO: 0 THOUSAND/ΜL (ref 0–0.61)
EOSINOPHIL NFR BLD AUTO: 0 % (ref 0–6)
ERYTHROCYTE [DISTWIDTH] IN BLOOD BY AUTOMATED COUNT: 13.1 % (ref 11.6–15.1)
GFR SERPL CREATININE-BSD FRML MDRD: 16 ML/MIN/1.73SQ M
GLUCOSE SERPL-MCNC: 164 MG/DL (ref 65–140)
HCO3 BLDA-SCNC: 30.5 MMOL/L (ref 22–28)
HCT VFR BLD AUTO: 34.4 % (ref 34.8–46.1)
HGB BLD-MCNC: 9.8 G/DL (ref 11.5–15.4)
IMM GRANULOCYTES # BLD AUTO: 0.06 THOUSAND/UL (ref 0–0.2)
IMM GRANULOCYTES NFR BLD AUTO: 1 % (ref 0–2)
LYMPHOCYTES # BLD AUTO: 0.22 THOUSANDS/ΜL (ref 0.6–4.47)
LYMPHOCYTES NFR BLD AUTO: 3 % (ref 14–44)
MCH RBC QN AUTO: 28.9 PG (ref 26.8–34.3)
MCHC RBC AUTO-ENTMCNC: 28.5 G/DL (ref 31.4–37.4)
MCV RBC AUTO: 102 FL (ref 82–98)
MONOCYTES # BLD AUTO: 0.18 THOUSAND/ΜL (ref 0.17–1.22)
MONOCYTES NFR BLD AUTO: 2 % (ref 4–12)
NASAL CANNULA: 2
NEUTROPHILS # BLD AUTO: 7.64 THOUSANDS/ΜL (ref 1.85–7.62)
NEUTS SEG NFR BLD AUTO: 94 % (ref 43–75)
NRBC BLD AUTO-RTO: 0 /100 WBCS
O2 CT BLDA-SCNC: 14.4 ML/DL (ref 16–23)
OXYHGB MFR BLDA: 95 % (ref 94–97)
PCO2 BLDA: 61.2 MM HG (ref 36–44)
PCO2 TEMP ADJ BLDA: 59.6 MM HG (ref 36–44)
PH BLD: 7.32 [PH] (ref 7.35–7.45)
PH BLDA: 7.32 [PH] (ref 7.35–7.45)
PHOSPHATE SERPL-MCNC: 4.9 MG/DL (ref 2.3–4.1)
PLATELET # BLD AUTO: 224 THOUSANDS/UL (ref 149–390)
PMV BLD AUTO: 10.6 FL (ref 8.9–12.7)
PO2 BLD: 73.9 MM HG (ref 75–129)
PO2 BLDA: 76.9 MM HG (ref 75–129)
POTASSIUM SERPL-SCNC: 4.9 MMOL/L (ref 3.5–5.3)
PROCALCITONIN SERPL-MCNC: 0.36 NG/ML
PROT SERPL-MCNC: 7.4 G/DL (ref 6.4–8.2)
RBC # BLD AUTO: 3.39 MILLION/UL (ref 3.81–5.12)
SODIUM SERPL-SCNC: 140 MMOL/L (ref 136–145)
SPECIMEN SOURCE: ABNORMAL
WBC # BLD AUTO: 8.1 THOUSAND/UL (ref 4.31–10.16)

## 2021-04-08 PROCEDURE — 94640 AIRWAY INHALATION TREATMENT: CPT

## 2021-04-08 PROCEDURE — 84100 ASSAY OF PHOSPHORUS: CPT | Performed by: INTERNAL MEDICINE

## 2021-04-08 PROCEDURE — 85025 COMPLETE CBC W/AUTO DIFF WBC: CPT | Performed by: PHYSICIAN ASSISTANT

## 2021-04-08 PROCEDURE — 94003 VENT MGMT INPAT SUBQ DAY: CPT

## 2021-04-08 PROCEDURE — 94760 N-INVAS EAR/PLS OXIMETRY 1: CPT

## 2021-04-08 PROCEDURE — 36600 WITHDRAWAL OF ARTERIAL BLOOD: CPT

## 2021-04-08 PROCEDURE — 99232 SBSQ HOSP IP/OBS MODERATE 35: CPT | Performed by: INTERNAL MEDICINE

## 2021-04-08 PROCEDURE — 80053 COMPREHEN METABOLIC PANEL: CPT | Performed by: PHYSICIAN ASSISTANT

## 2021-04-08 PROCEDURE — 99232 SBSQ HOSP IP/OBS MODERATE 35: CPT | Performed by: PHYSICIAN ASSISTANT

## 2021-04-08 PROCEDURE — 82805 BLOOD GASES W/O2 SATURATION: CPT | Performed by: PHYSICIAN ASSISTANT

## 2021-04-08 PROCEDURE — 84145 PROCALCITONIN (PCT): CPT | Performed by: PHYSICIAN ASSISTANT

## 2021-04-08 RX ORDER — TORSEMIDE 10 MG/1
10 TABLET ORAL
Status: DISCONTINUED | OUTPATIENT
Start: 2021-04-08 | End: 2021-04-08

## 2021-04-08 RX ORDER — TORSEMIDE 10 MG/1
10 TABLET ORAL DAILY
Status: DISCONTINUED | OUTPATIENT
Start: 2021-04-08 | End: 2021-04-10 | Stop reason: HOSPADM

## 2021-04-08 RX ORDER — PREDNISONE 20 MG/1
40 TABLET ORAL DAILY
Status: DISCONTINUED | OUTPATIENT
Start: 2021-04-09 | End: 2021-04-10 | Stop reason: HOSPADM

## 2021-04-08 RX ORDER — TORSEMIDE 10 MG/1
10 TABLET ORAL DAILY
Status: DISCONTINUED | OUTPATIENT
Start: 2021-04-08 | End: 2021-04-08

## 2021-04-08 RX ADMIN — COLESEVELAM HYDROCHLORIDE 1875 MG: 625 TABLET, FILM COATED ORAL at 08:46

## 2021-04-08 RX ADMIN — APIXABAN 2.5 MG: 2.5 TABLET, FILM COATED ORAL at 17:38

## 2021-04-08 RX ADMIN — IPRATROPIUM BROMIDE AND ALBUTEROL SULFATE 3 ML: 2.5; .5 SOLUTION RESPIRATORY (INHALATION) at 02:23

## 2021-04-08 RX ADMIN — METHYLPREDNISOLONE SODIUM SUCCINATE 40 MG: 40 INJECTION, POWDER, FOR SOLUTION INTRAMUSCULAR; INTRAVENOUS at 08:45

## 2021-04-08 RX ADMIN — Medication 2000 UNITS: at 08:45

## 2021-04-08 RX ADMIN — Medication 1 TABLET: at 17:38

## 2021-04-08 RX ADMIN — APIXABAN 2.5 MG: 2.5 TABLET, FILM COATED ORAL at 08:45

## 2021-04-08 RX ADMIN — Medication 1 TABLET: at 08:45

## 2021-04-08 RX ADMIN — NEBIVOLOL HYDROCHLORIDE 10 MG: 10 TABLET ORAL at 09:21

## 2021-04-08 RX ADMIN — IPRATROPIUM BROMIDE AND ALBUTEROL SULFATE 3 ML: 2.5; .5 SOLUTION RESPIRATORY (INHALATION) at 07:23

## 2021-04-08 RX ADMIN — EZETIMIBE 10 MG: 10 TABLET ORAL at 08:45

## 2021-04-08 RX ADMIN — PSYLLIUM HUSK 1 PACKET: 6 GRANULE ORAL at 09:24

## 2021-04-08 RX ADMIN — AMLODIPINE BESYLATE 5 MG: 5 TABLET ORAL at 08:45

## 2021-04-08 RX ADMIN — IPRATROPIUM BROMIDE AND ALBUTEROL SULFATE 3 ML: 2.5; .5 SOLUTION RESPIRATORY (INHALATION) at 19:19

## 2021-04-08 RX ADMIN — IPRATROPIUM BROMIDE AND ALBUTEROL SULFATE 3 ML: 2.5; .5 SOLUTION RESPIRATORY (INHALATION) at 14:34

## 2021-04-08 RX ADMIN — TORSEMIDE 10 MG: 10 TABLET ORAL at 09:21

## 2021-04-08 RX ADMIN — PANTOPRAZOLE SODIUM 40 MG: 40 TABLET, DELAYED RELEASE ORAL at 05:48

## 2021-04-08 RX ADMIN — FLUTICASONE FUROATE AND VILANTEROL TRIFENATATE 1 PUFF: 100; 25 POWDER RESPIRATORY (INHALATION) at 08:46

## 2021-04-08 RX ADMIN — COLESEVELAM HYDROCHLORIDE 1875 MG: 625 TABLET, FILM COATED ORAL at 17:38

## 2021-04-08 RX ADMIN — Medication 60 MG: at 09:21

## 2021-04-08 NOTE — ASSESSMENT & PLAN NOTE
Wt Readings from Last 3 Encounters:   04/08/21 67 3 kg (148 lb 6 4 oz)   08/18/20 68 kg (150 lb)   03/16/20 68 kg (150 lb)     · Presented with SOB, tachypnea, and hypoxia  · Pro BNP 10,884 which is up from previous of 2,657  · CXR, with evidence of vascular congestion  · Echo from March 2018: LVEF 55-60%, G2DD  · IV diuretics are currently on hold in the setting of acute kidney injury     · If develops worsening shortness of breath or pulmonary edema can give IV Lasix   · Resume torsemide 10mg daily (instad of BID) today

## 2021-04-08 NOTE — ASSESSMENT & PLAN NOTE
· Pulmonary following  · Continue Breo  · Currently on IV steroids, will d/c today and transition to p o   Prednisone tomorrow

## 2021-04-08 NOTE — ASSESSMENT & PLAN NOTE
· Presented on 6L NC, now stable on 3 L satting 97%, wean to BL 2 5L    · Likely multifactorial, 2/2 CHF, restrictive lung disease from kyphoscoliosis, lack of compliance with trilogy overnight  · ABG showed a pCO2 of 89 and a pH of 7 1 which improved with BiPAP use  · Tolerating BIPAP better, settings adjusted per RT   · CT chest showed bilateral opacities suggesting possible underlying pneumonia as well as a right-sided moderate pleural effusion    · No indication for thoracentesis   · PCT elevated but she is afebrile without leukocytosis, monitor off abx and repeat PCT  · Currently IV diuretics are on hold for TIANA   · Continue BiPAP while asleep and as needed

## 2021-04-08 NOTE — PROGRESS NOTES
Noman 45  Progress Note Darling Profit 5/12/1928, 80 y o  female MRN: [id number]  Unit/Bed#: [address] [place] [address] Encounter: [id number]  Primary Care Provider: Rafael Marie DO   Date and time admitted to hospital: 4/4/2021  8:24 PM    * Acute on chronic respiratory failure with hypoxia and hypercapnia (HCC)  Assessment & Plan  · Presented on 6L NC, now stable on 3 L satting 97%, wean to BL 2 5L    · Likely multifactorial, 2/2 CHF, restrictive lung disease from kyphoscoliosis, lack of compliance with trilogy overnight  · ABG showed a pCO2 of 89 and a pH of 7 1 which improved with BiPAP use  · Tolerating BIPAP better, settings adjusted per RT   · CT chest showed bilateral opacities suggesting possible underlying pneumonia as well as a right-sided moderate pleural effusion  · No indication for thoracentesis   · PCT elevated but she is afebrile without leukocytosis, monitor off abx and repeat PCT  · Currently IV diuretics are on hold for TIANA   · Continue BiPAP while asleep and as needed     Acute on chronic diastolic heart failure (HCC)  Assessment & Plan  Wt Readings from Last 3 Encounters:   04/08/21 67 3 kg (148 lb 6 4 oz)   08/18/20 68 kg (150 lb)   03/16/20 68 kg (150 lb)     · Presented with SOB, tachypnea, and hypoxia  · Pro BNP 10,884 which is up from previous of 2,657  · CXR, with evidence of vascular congestion  · Echo from March 2018: LVEF 55-60%, G2DD  · IV diuretics are currently on hold in the setting of acute kidney injury  · If develops worsening shortness of breath or pulmonary edema can give IV Lasix   · Resume torsemide 10mg daily (instad of BID) today     Acute kidney injury superimposed on chronic kidney disease [facility])  Assessment & Plan  · Nephrology following  · Baseline creatinine 1 7 to 2 1   · Creatinine increased to 2 6 on 4/7/21  · Question if this is acute kidney injury versus progression of underlying chronic kidney disease    · Continue to hold angiotensin blocker · Retroperitoneal ultrasound negative for hydronephrosis   · Continue to monitor I&Os and daily weights, PVR   · Resume torsemide 10mg today     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  Assessment & Plan  · Pulmonary following  · Continue Breo  · Currently on IV steroids, will d/c today and transition to p o  Prednisone tomorrow     Deep vein thrombosis (DVT) of left lower extremity (HCC)  Assessment & Plan  · Continue Eliquis    Essential hypertension  Assessment & Plan  · Continue Norvasc 5 mg daily and Bystolic 10 mg daily  · Hold Losartan due to TIANA   · Blood pressure currently stable in the 140s    Cerebrovascular accident (CVA) ([facility] [address] )  Assessment & Plan  · Continue statin and Eliquis    VTE Pharmacologic Prophylaxis:   Pharmacologic: Apixaban (Eliquis)  Mechanical VTE Prophylaxis in Place: Yes    Patient Centered Rounds: I have performed bedside rounds with nursing staff today  Discussions with Specialists or Other Care Team Provider: nursing, cm     Education and Discussions with Family / Patient: patient, called dtr lisa left VM on cell      Time Spent for Care: 30 minutes  More than 50% of total time spent on counseling and coordination of care as described above  Current Length of Stay: 4 day(s)    Current Patient Status: Inpatient   Certification Statement: The patient will continue to require additional inpatient hospital stay due to TIANA, wean to BL O2, safe d/c planning     Discharge Plan: likely d/c tomorrow     Code Status: Level 3 - DNAR and DNI    Subjective:   Pt seen and examined at bedside  Feels better  Tolerated bipap almost all night last night  Objective:     Vitals:   Temp (24hrs), Av °F (36 7 °C), Min:97 1 °F (36 2 °C), Max:99 °F (37 2 °C)    Temp:  [97 1 °F (36 2 °C)-99 °F (37 2 °C)] 97 3 °F (36 3 °C)  HR:  [52-85] 65  Resp:  [18-21] 18  BP: (128-170)/(61-87) 148/76  SpO2:  [94 %-98 %] 97 %  Body mass index is 28 98 kg/m²       Input and Output Summary (last 24 hours): Intake/Output Summary (Last 24 hours) at 4/8/2021 1306  Last data filed at 4/7/2021 2030  Gross per 24 hour   Intake 10 ml   Output --   Net 10 ml     Physical Exam:     Physical Exam  Constitutional:       Appearance: Normal appearance  HENT:      Head: Normocephalic and atraumatic  Ears:      Comments: [place]     Mouth/Throat:      Mouth: Mucous membranes are moist    Eyes:      Extraocular Movements: Extraocular movements intact  Neck:      Musculoskeletal: Normal range of motion and neck supple  Cardiovascular:      Rate and Rhythm: Normal rate and regular rhythm  Pulmonary:      Effort: Pulmonary effort is normal       Breath sounds: Rhonchi and rales present  Abdominal:      General: Abdomen is flat  Palpations: Abdomen is soft  Musculoskeletal: Normal range of motion  General: Swelling (chronic LLE swelling ) present  Skin:     General: Skin is warm and dry  Neurological:      General: No focal deficit present  Mental Status: She is alert  Psychiatric:         Mood and Affect: Mood normal          Behavior: Behavior normal        Additional Data:     Labs:    Results from last 7 days   Lab Units 04/08/21  0542   WBC Thousand/uL 8 10   HEMOGLOBIN g/dL 9 8*   HEMATOCRIT % 34 4*   PLATELETS Thousands/uL 224   NEUTROS PCT % 94*   LYMPHS PCT % 3*   MONOS PCT % 2*   EOS PCT % 0     Results from last 7 days   Lab Units 04/08/21  0542   SODIUM mmol/L 140   POTASSIUM mmol/L 4 9   CHLORIDE mmol/L 104   CO2 mmol/L 31   BUN mg/dL 97*   CREATININE mg/dL 2 51*   ANION GAP mmol/L 5   CALCIUM mg/dL 9 2   ALBUMIN g/dL 2 6*   TOTAL BILIRUBIN mg/dL 0 34   ALK PHOS U/L 109   ALT U/L 112*   AST U/L 70*   GLUCOSE RANDOM mg/dL 164*     Results from last 7 days   Lab Units 04/04/21  2055   INR  1 31*             Results from last 7 days   Lab Units 04/08/21  0542 04/07/21  1220   PROCALCITONIN ng/ml 0 36* 0 40*           * I Have Reviewed All Lab Data Listed Above    * Additional Pertinent Lab Tests Reviewed: [address] Admission Reviewed    Imaging:    Imaging Reports Reviewed Today Include: all  Imaging Personally Reviewed by Myself Includes:  none    Recent Cultures (last 7 days):           Last 24 Hours Medication List:   Current Facility-Administered Medications   Medication Dose Route Frequency Provider Last Rate    acetaminophen  650 mg Oral Q6H PRN Pedro Arthur, CRNP      albuterol  2 5 mg Nebulization Q4H PRN Pedro Arthur, CRNP      amLODIPine  5 mg Oral Daily Ulises Hidlago MD      apixaban  2 5 mg Oral BID Pedro Arthur, CRNP      calcium carbonate-vitamin D  1 tablet Oral BID Pedro Arthur, CRNP      cholecalciferol  2,000 Units Oral Daily Pedro Arthur, CRNP      co-enzyme Q-10  60 mg Oral Daily Pedro Arthur, CRNP      colesevelam  1,875 mg Oral BID With Meals Pedro Gibson, CRMITZI      ezetimibe  10 mg Oral Daily Pedro Arthur, CRNP      ferrous sulfate  325 mg Oral Every Other Day Pedro Arthur, CRNP      fluticasone-vilanterol  1 puff Inhalation Daily Pedro Gibson, CRNP      hydrALAZINE  5 mg Intravenous Q6H PRN Pedro Gibson, CRNP      ipratropium-albuterol  3 mL Nebulization Q6H Pedro Arthur, CRNP      methylPREDNISolone sodium succinate  40 mg Intravenous Q12H [facility]      nebivolol  10 mg Oral Daily Pedro Arthur, CRNP      ondansetron  4 mg Intravenous Q6H PRN Pedro Arthur, CRNP      pantoprazole  40 mg Oral Early Morning Pedro Arthur, CRNP      psyllium  1 packet Oral Daily Alycia Baker MD      senna  1 tablet Oral HS PRN Pedro Gibson, CRNP      torsemide  10 mg Oral Daily Ulises Hidalgo MD          Today, Patient Was Seen By: Stephanie Ramos PA-C    ** Please Note: Dictation voice to text software may have been used in the creation of this document   **

## 2021-04-08 NOTE — ASSESSMENT & PLAN NOTE
· Nephrology following  · Baseline creatinine 1 7 to 2 1   · Creatinine increased to 2 6 on 4/7/21  · Question if this is acute kidney injury versus progression of underlying chronic kidney disease    · Continue to hold angiotensin blocker   · Retroperitoneal ultrasound negative for hydronephrosis   · Continue to monitor I&Os and daily weights, PVR   · Resume torsemide 10mg today

## 2021-04-08 NOTE — PROGRESS NOTES
Progress Note - Pulmonary   Geovanna Cline 80 y o  female MRN: [id number]  Unit/Bed#: [address] [place] [address] Encounter: [id number]  Code Status: Level 3 - DNAR and Wei Hernandez is a 80 y o  Past Medical History:   Diagnosis Date    Arthritis     [facility] 72/0915    diastolic     CO2 retention     DVT (deep vein thrombosis) in pregnancy     left LE in 2/2018 and 50 years ago    Hyperlipidemia     Hypertension     Pulmonary embolism ([facility] ) 02/06/2018    Renal disorder     ckd    Respiratory failure, acute and chronic ([facility] )     Stroke ([facility] ) 2016    left hand weakness,paresthesia   Ventilator dependence (HCC)        Assessment/Plan:   * Acute on chronic respiratory failure with hypoxia and hypercapnia (HCC)  Assessment & Plan  -Titrate O2 saturation to >90%  -Pt chronically on 2 L NC O2 baseline at home / currently remains on 3 L    -Repeat ABG done  4/6 shows improvement compared to ABG 4/5 > will f/u ABG     ABG:  Results from last 7 days   Lab Units 04/06/21  0218   PH ART  7 333*   PCO2 ART mm Hg 55 8*   PO2 ART mm Hg 103 2   HCO3 ART mmol/L 29 0*   BASE EXC ART mmol/L 2 2   ABG SOURCE  Radial, Right     VBG:  Results from last 7 days   Lab Units 04/06/21  0218   ABG SOURCE  Radial, Right     Consideration for causative respiratory failure is:  -AE/COPD/Asthma  > currently remains on Prednisone 40 mg > would opt for plan to continue for 3 more days then D/C  -Trilogy NIV Non compliance: probable contributor  -CHF- judicious diuresis as per cardiology /nephrology   -? Aspiration (potential with altered mentation) Speech consulted and VBS performed 4/7 which was overall normal  Showed ongoing aspiration risk present but very low  Recommended regular diet with thin liquids  CT scan 4/6 pursued for better imaging of lung fields and infiltrates  Showed R>L airspace opacities consistent w/possible pneumonia v   Aspiration   Layering moderate effusion on R     -Discussed w/IM team, likely isolated aspiration etiology   -for now to hold on Abx therapy > improving off of abx > renal failure contributory  -  Results from last 7 days   Lab Units 04/08/21  0542 04/07/21  1220   PROCALCITONIN ng/ml 0 36* 0 40*              Acute on chronic diastolic heart failure (HCC)  Assessment & Plan  Wt Readings from Last 3 Encounters:   04/08/21 67 3 kg (148 lb 6 4 oz)   08/18/20 68 kg (150 lb)   03/16/20 68 kg (150 lb)   -following with Cardiology and Nephrology  -Pro BNP 10,884 on admission which is up from previous of 2,657  -C/w diuresis and rx as per Cardiology and Nephro with holds as appropriate for TIANA on CKD III  -Pt initially given Lasix 4/4 and 4/5 d/c'd d/t elevating Cr  -diuretics currently held    Mild persistent asthma with acute exacerbation  Assessment & Plan  -C/w Breo Ellipta 1 puff daily, Duo-Neb q 6 hrs, and albuterol nebulizers q4hrs PRN  -Cont Prednisone 40 mg / day   -C/w outpatient follow-up Pulmonology      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  Assessment & Plan  -FVC 49% predicted, FEV1 51% predicted, and FEV1/% predicted 4/2018 spirometry    Acute kidney injury superimposed on chronic kidney disease [facility])  Assessment & Plan  Lab Results   Component Value Date    EGFR 16 04/07/2021    EGFR 17 04/06/2021    EGFR 17 04/05/2021    CREATININE 2 58 (H) 04/07/2021    CREATININE 2 38 (H) 04/06/2021    CREATININE 2 39 (H) 04/05/2021       Mild persistent asthma without complication-resolved as of 4/7/2021  Assessment & Plan          _________________________________________________    Subjective: Pt seen and examined at bedside    Chief Complaint: "I feel fine"  Case discussed w/ Pulm Attending and both daughters at bedside  Discussed needing outpatient follow up imaging on CT scan for R sided lung opacities  Pt declines wanting to further w/u breast assymetry  Discussed in presence of daughters who are in agreement to respect mother's wishes     Tele Events:     Vitals: Temp:  [97 1 °F (36 2 °C)-99 °F (37 2 °C)] 97 5 °F (36 4 °C)  HR:  [52-85] 74  Resp:  [18-21] 20  BP: (128-170)/(61-87) 136/66  FiO2 (%):  [35-40] 35  Weight (last 2 days)     Date/Time   Weight    21 0545   67 3 (148 4)    21 0538   66 9 (147 5)            Vitals:    21 0725 21 0743 21 1120 21 1515   BP:  170/87 148/76 136/66   Pulse:  77 65 74   Resp:   18 20   Temp:  (!) 97 1 °F (36 2 °C) (!) 97 3 °F (36 3 °C) 97 5 °F (36 4 °C)   TempSrc:       SpO2: 96% 97% 97% 93%   Weight:       Height:         Temp (24hrs), Av °F (36 7 °C), Min:97 1 °F (36 2 °C), Max:99 °F (37 2 °C)  Current: Temperature: 97 5 °F (36 4 °C)        SpO2: SpO2: 93 %, SpO2 Activity: SpO2 Activity: At Rest, SpO2 Device: O2 Device: Nasal cannula      IV Infusions:       Nutrition:        Diet Orders   (From admission, onward)             Start     Ordered    21 0904  Diet Cardiovascular; Cardiac; Potassium 2 GM  Diet effective now     Question Answer Comment   Diet Type Cardiovascular    Cardiac Cardiac    Other Restriction(s): Potassium 2 GM    RD to adjust diet per protocol? Yes        21 0903    21 153  Dietary nutrition supplements  Once     Question Answer Comment   Select Supplement: Ensure Enlive-Vanilla    Frequency Lunch        21 1532    21 0941  Room Service  Once     Question:  Type of Service  Answer:  Room Service - Appropriate with Assistance    21                Ins/Outs:   I/O       701 -  -  07 -  07    P  O  180  200    I V  (mL/kg) 10 (0 1) 10 (0 1)     Total Intake(mL/kg) 190 (2 8) 10 (0 1) 200 (3)    Net +190 +10 +200                 Lines/Drains:  Invasive Devices     Peripheral Intravenous Line            Peripheral IV 21 Left Forearm 3 days                 Active medications:  Scheduled Meds:  Current Facility-Administered Medications   Medication Dose Route Frequency Provider Last Rate    acetaminophen  650 mg Oral Q6H PRN Juanita James, MARGOT      albuterol  2 5 mg Nebulization Q4H PRN Juanita James, MARGOT      amLODIPine  5 mg Oral Daily Esdras Juan MD      apixaban  2 5 mg Oral BID Juanita James, MARGOT      calcium carbonate-vitamin D  1 tablet Oral BID Juanita James, MARGOT      cholecalciferol  2,000 Units Oral Daily Juanita James, MARGOT      co-enzyme Q-10  60 mg Oral Daily Juanita James, MARGOT      colesevelam  1,875 mg Oral BID With Meals Juanita James, MARGOT      ezetimibe  10 mg Oral Daily Juanita James, MARGOT      ferrous sulfate  325 mg Oral Every Other Day Juanita James, MARGOT      fluticasone-vilanterol  1 puff Inhalation Daily Juanita James, MARGOT      hydrALAZINE  5 mg Intravenous Q6H PRN Juanita James, MARGOT      ipratropium-albuterol  3 mL Nebulization Q6H Juanita James, MARGOT      nebivolol  10 mg Oral Daily MARGOT Najera      ondansetron  4 mg Intravenous Q6H PRN Juanita James, MARGOT      pantoprazole  40 mg Oral Early Morning MARGOT Najera      [START ON 4/9/2021] predniSONE  40 mg Oral Daily Miguel A Rey PA-C      psyllium  1 packet Oral Daily Troy Valencia MD      senna  1 tablet Oral HS PRN MARGOT Najera      torsemide  10 mg Oral Daily Esdras Juan MD       PRN Meds:acetaminophen, 650 mg, Q6H PRN  albuterol, 2 5 mg, Q4H PRN  hydrALAZINE, 5 mg, Q6H PRN  ondansetron, 4 mg, Q6H PRN  senna, 1 tablet, HS PRN      ____________________________________________________________________    Physical Exam  Constitutional:       Appearance: She is well-developed  Comments: Elderly / frail   HENT:      Head: Normocephalic and atraumatic  Eyes:      Conjunctiva/sclera: Conjunctivae normal       Pupils: Pupils are equal, round, and reactive to light  Neck:      Musculoskeletal: Normal range of motion and neck supple  Cardiovascular:      Rate and Rhythm: Normal rate and regular rhythm  Heart sounds: Normal heart sounds  Pulmonary:      Effort: Pulmonary effort is normal  No respiratory distress  Breath sounds: Examination of the right-upper field reveals wheezing  Examination of the right-middle field reveals wheezing  Wheezing present  No rales  Comments: Occasional right sided wheezes / intermittent    Currently on 3 L   Chest:      Chest wall: No tenderness  Comments: + kyphosis  Abdominal:      General: Bowel sounds are normal       Palpations: Abdomen is soft  Musculoskeletal: Normal range of motion  Skin:     General: Skin is warm and dry  Neurological:      Mental Status: She is alert and oriented to person, place, and time        Comments: But frequently gets confused       ____________________________________________________________________    Invasive/non-invasive ventilation settings   Respiratory    Lab Data (Last 4 hours)    None         O2/Vent Data (Last 4 hours)    None                Laboratory and Diagnostics:  Results from last 7 days   Lab Units 04/08/21  0542 04/06/21  0451 04/05/21  0607 04/04/21 2055   WBC Thousand/uL 8 10 5 93 8 02 7 32   HEMOGLOBIN g/dL 9 8* 10 1* 10 3* 10 7*   HEMATOCRIT % 34 4* 33 6* 36 2 36 1   PLATELETS Thousands/uL 224 196 183 212   NEUTROS PCT % 94* 94* 89* 87*   MONOS PCT % 2* 1* 6 6     Results from last 7 days   Lab Units 04/08/21  0542 04/07/21  0546 04/06/21  0451 04/05/21  0607 04/04/21 2055   SODIUM mmol/L 140 140 139 135* 132*   POTASSIUM mmol/L 4 9 4 5 4 2 4 6 5 1   CHLORIDE mmol/L 104 102 101 99* 99*   CO2 mmol/L 31 33* 31 31 28   ANION GAP mmol/L 5 5 7 5 5   BUN mg/dL 97* 73* 50* 44* 44*   CREATININE mg/dL 2 51* 2 58* 2 38* 2 39* 2 33*   CALCIUM mg/dL 9 2 9 3 9 3 9 3 9 3   GLUCOSE RANDOM mg/dL 164* 160* 130 137 157*   ALT U/L 112*  --   --  39 38   AST U/L 70*  --   --  21 23   ALK PHOS U/L 109  --   --  100 106   ALBUMIN g/dL 2 6*  --   -- 3  1* 3 0*   TOTAL BILIRUBIN mg/dL 0 34  --   --  0 43 0 46     Results from last 7 days   Lab Units 04/08/21  0542 04/07/21  0546 04/06/21  0451 04/05/21  0607   MAGNESIUM mg/dL  --   --  2 0 2 4   PHOSPHORUS mg/dL 4 9* 4 2*  --  5 9*      Results from last 7 days   Lab Units 04/04/21 2055   INR  1 31*   PTT seconds 24      Results from last 7 days   Lab Units 04/04/21 2055   TROPONIN I ng/mL <0 02         ABG:  Results from last 7 days   Lab Units 04/06/21  0218   PH ART  7 333*   PCO2 ART mm Hg 55 8*   PO2 ART mm Hg 103 2   HCO3 ART mmol/L 29 0*   BASE EXC ART mmol/L 2 2   ABG SOURCE  Radial, Right     VBG:  Results from last 7 days   Lab Units 04/06/21  0218   ABG SOURCE  Radial, Right     Results from last 7 days   Lab Units 04/08/21  0542 04/07/21  1220   PROCALCITONIN ng/ml 0 36* 0 40*       Micro        Imaging:   US kidney and bladder   Final Result by Asuncion Pyle MD (04/07 1642)      No hydronephrosis  Bilateral cysts with echogenic kidneys consistent with chronic medical renal disease  Workstation performed: [id number]         FL barium swallow video w speech   Final Result by YOLIS FARAH (04/07 1056)      FL barium swallow video w speech   Final Result by  (04/07 1059)      CT chest wo contrast   Final Result by Cathy Gonzalez MD (04/07 2012)      Right greater than left airspace opacities is consistent with pneumonia  There is layering moderate effusion on the right  [place]  Asymmetric right breast density  Although this could represent breast tissue, a neoplastic process is not excluded and mammographic follow-up is advised  The study was discussed with Dr Tam Pennington on 4/7/2021         Workstation performed: [id number]         CT head without contrast   Final Result by Sheri Hartman DO (04/04 2341)      Sinus disease as described but no acute intracranial abnormality seen otherwise  Other findings as above                    Workstation performed: [id number]         XR chest 1 view portable   Final Result by Letty Dale MD (04/05 0915)      Mild pulmonary vascular congestion  Focal right upper lobe groundglass opacity can be due to asymmetric pulmonary edema versus pneumonia  Workstation performed: [id number]             Micro:   Lab Results   Component Value Date    BLOODCX No Growth After 5 Days  03/29/2018    BLOODCX No Growth After 5 Days  03/29/2018    BLOODCX No Growth After 5 Days  08/27/2017    BLOODCX No Growth After 5 Days   08/27/2017    URINECX No Growth <1000 cfu/mL 03/29/2018    URINECX >100,000 cfu/ml Mixed Contaminants X6 05/09/2017    MRSACULTURE  03/29/2018     No Methicillin Resistant Staphlyococcus aureus (MRSA) isolated            Invalid input(s): Isabela Yeager

## 2021-04-08 NOTE — PLAN OF CARE
Problem: Potential for Falls  Goal: Patient will remain free of falls  Description: INTERVENTIONS:  - Assess patient frequently for physical needs  -  Identify cognitive and physical deficits and behaviors that affect risk of falls  -  [place] fall precautions as indicated by assessment   - Educate patient/family on patient safety including physical limitations  - Instruct patient to call for assistance with activity based on assessment  - Modify environment to reduce risk of injury  - Consider OT/PT consult to assist with strengthening/mobility  Outcome: Progressing     Problem: Nutrition/Hydration-ADULT  Goal: Nutrient/Hydration intake appropriate for improving, restoring or maintaining nutritional needs  Description: Monitor and assess patient's nutrition/hydration status for malnutrition  Collaborate with interdisciplinary team and initiate plan and interventions as ordered  Monitor patient's weight and dietary intake as ordered or per policy  Utilize nutrition screening tool and intervene as necessary  Determine patient's food preferences and provide high-protein, high-caloric foods as appropriate       INTERVENTIONS:  - Monitor oral intake, urinary output, labs, and treatment plans  - Assess nutrition and hydration status and recommend course of action  - Evaluate amount of meals eaten  - Assist patient with eating if necessary   - Allow adequate time for meals  - Recommend/ encourage appropriate diets, oral nutritional supplements, and vitamin/mineral supplements  - Assess need for intravenous fluids  - Provide specific nutrition/hydration education as appropriate  - Include patient/family/caregiver in decisions related to nutrition  Outcome: Progressing     Problem: RESPIRATORY - ADULT  Goal: Achieves optimal ventilation and oxygenation  Description: INTERVENTIONS:  - Assess for changes in respiratory status  - Assess for changes in mentation and behavior  - Position to facilitate oxygenation and minimize respiratory effort  - Oxygen administered by appropriate delivery if ordered  - Initiate smoking cessation education as indicated  - Encourage broncho-pulmonary hygiene including cough, deep breathe, Incentive Spirometry  - Assess the need for suctioning and aspirate as needed  - Assess and instruct to report SOB or any respiratory difficulty  - Respiratory Therapy support as indicated  Outcome: Progressing     Problem: Prexisting or High Potential for Compromised Skin Integrity  Goal: Skin integrity is maintained or improved  Description: INTERVENTIONS:  - Identify patients at risk for skin breakdown  - Assess and monitor skin integrity  - Assess and monitor nutrition and hydration status  - Monitor labs   - Assess for incontinence   - Turn and reposition patient  - Assist with mobility/ambulation  - Relieve pressure over bony prominences  - Avoid friction and shearing  - Provide appropriate hygiene as needed including keeping skin clean and dry  - Evaluate need for skin moisturizer/barrier cream  - Collaborate with interdisciplinary team   - Patient/family teaching  - Consider wound care consult   Outcome: Progressing

## 2021-04-08 NOTE — CASE MANAGEMENT
JANAK left message for daughter Arpita Miller to discuss discharge plans and complete CM open  Will follow

## 2021-04-08 NOTE — PROGRESS NOTES
[address] [facility] NOTE   Marsha Lopez 80 y o  female MRN: [id number]  Unit/Bed#: [address] [place] [address] Encounter: [id number]  Reason for Consult: TIANA on CKD III    ASSESSMENT and PLAN:    80 y  o  female with PMHx of CKD III, HTN, dCHF, mild aortic stenosis, HPL, CVA, CAD, A fib, MGUS, DVT, who was admitted to [facility] after presenting with SOB on 4/4  A renal consultation is requested today for assistance in the management of TIANA     1) TIANA on CKD III     - outpatent Nephrologist Dr Vinny Garcia  - baseline creat 1 7-2 1 mg/dL  - losartan added on 2/1/2021 and creat on 3/5 was 2 mg/dL  - admission creat 2 3 mg/dL on 4/4 and remains stable on 4/6   - UA with 2+ protein, otherwise bland   - prior renal u/s in 2017 with L kidney 11 cm, R kidney 9 3 cm, echogenic kidneys  - pt was on toradol at home?  - pt initially received furosemide 40 IV X 2 on 4/5 and one time on 4/4    - 4/6 - Creat stable 2 4 mg/dL  -4/7-creatinine rising to 2 6 mg/dL  , phosphorus 4 2   - 4/8 - creat stable 2 5  phos 4 9  restarting torsemide       TIANA vs progression of kidney disease  May have had CRS  UA relatively bland with exception protein which is known      Renal u/s with no hydro  Diffusely echogenic kidneys     Plan:     - cont to hold ARB  - ideally restart torsemide but at 10 mg BID instead of daily (home dose) --> pt declines  Will start at 10 mg daily  - BMP in AM  - phos check in AM  - further plans for effusion per Primary and Pulm team if needed  - daily weights  - need strict I/O  - may need to accept higher creat for euvolemia  - low K diet     2) hypoxia-possibly multifactorial   pulm edema vs Pneumonia versus infectious versus other     - mild pulm vascular congestion   Focal right upper lobe groundglass opacity due to asymmetric pulm edema vs PNA  - pt uses 2 L oxygen at home on baseline  - f/u ECHO-EF 99-69%, grade 1 diastolic dysfunction, trace aortic valve regurgitation, IVC mildly dilated  - prior ECHO in 2018 with grade 2 dCHF, EF 55-60; mild aortic stenosis at that time  -CT scan of the chest on 04/06 with right greater than left airspace opacities consistent with pneumonia, layering of moderate effusion on the right, gallstone, asymmetric right breast density  - Cardiology on board  - Pulmonary on board  - pt weight not recorded 4/6    - UOP not strictly recorded  - BIPAP per Primary team  - COVID19 PCR neg  - 4/8 - restart diuretics     3) electrolytes     - hyponatremia - improved  - hyperphos - f/u phos in AM  - K rising - f/u in AM  - calcium borderline  Monitor for now     4) acid/base     - resp acidosis - improving with BIPAP     5) anemia     - stable  - no FAN due to CVA history     6) HTN     - on amlodipine and bystolic - avoid hypotension  - hold losartan  - SBP stable for now     7) MGUS     8) history of mild Aortic valve stenosis     As above     9) encephalopathy - per Primary team     10) asymmetric right breast density-per primary team    11) transaminitis - per Primary team    SUBJECTIVE / 24H INTERVAL HISTORY:    SBP labile 128-179  Afebrile  Weight 67 3 bed scale overall stable by 1 kg last few days  UOP not strictly recorded  Pt declines to restart BID torsemide  States 'it is too much'    OBJECTIVE:  Current Weight: Weight - Scale: 67 3 kg (148 lb 6 4 oz)  Vitals:    04/08/21 0225 04/08/21 0545 04/08/21 0725 04/08/21 0743   BP:    170/87   BP Location:       Pulse: (!) 54   77   Resp: 21      Temp:    (!) 97 1 °F (36 2 °C)   TempSrc:       SpO2: 98%  96% 97%   Weight:  67 3 kg (148 lb 6 4 oz)     Height:           Intake/Output Summary (Last 24 hours) at 4/8/2021 0858  Last data filed at 4/7/2021 2030  Gross per 24 hour   Intake 10 ml   Output --   Net 10 ml     General: NAD  Skin: no rash  Eyes: anicteric sclera  ENT: moist mucous membrane  Neck: supple  Chest: right lung poor air movement  Rales     CVS: s1s2, no murmur, no gallop, no rub  Abdomen: soft, nontender, nl sounds  Extremities: trace edema LE b/l  : no hattie  Neuro: AAOX3  Psych: normal affect    Medications:    Current Facility-Administered Medications:     acetaminophen (TYLENOL) tablet 650 mg, 650 mg, Oral, Q6H PRN, MARGOT Ramirez    albuterol inhalation solution 2 5 mg, 2 5 mg, Nebulization, Q4H PRN, MARGOT Ramirez, 2 5 mg at 04/05/21 0505    amLODIPine (NORVASC) tablet 5 mg, 5 mg, Oral, Daily, Wyatt Crowell MD, 5 mg at 04/08/21 0845    apixaban (ELIQUIS) tablet 2 5 mg, 2 5 mg, Oral, BID, MARGOT Guajardo, 2 5 mg at 04/08/21 0845    calcium carbonate-vitamin D (OSCAL-D) 500 mg-200 units per tablet 1 tablet, 1 tablet, Oral, BID, MARGOT Ramirez, 1 tablet at 04/08/21 0845    cholecalciferol (VITAMIN D3) tablet 2,000 Units, 2,000 Units, Oral, Daily, MARGOT Ramirez, 2,000 Units at 04/08/21 0845    co-enzyme Q-10 capsule 60 mg, 60 mg, Oral, Daily, MARGOT Pritchard, 60 mg at 04/07/21 1057    [facility]) tablet 1,875 mg, 1,875 mg, Oral, BID With Meals, MARGOT Ramirez, 1,875 mg at 04/08/21 0846    ezetimibe (ZETIA) tablet 10 mg, 10 mg, Oral, Daily, MARGOT Pritchard, 10 mg at 04/08/21 6558    ferrous sulfate tablet 325 mg, 325 mg, Oral, Every Other Day, MARGOT Ramirez, 325 mg at 04/07/21 1054    fluticasone-vilanterol (BREO ELLIPTA) 100-25 mcg/inh inhaler 1 puff, 1 puff, Inhalation, Daily, MARGOT Ramirez, 1 puff at 04/08/21 0846    hydrALAZINE (APRESOLINE) injection 5 mg, 5 mg, Intravenous, Q6H PRN, MARGOT Ramirez    ipratropium-albuterol (DUO-NEB) 0 5-2 5 mg/3 mL inhalation solution 3 mL, 3 mL, Nebulization, Q6H, MARGOT Ramirez, 3 mL at 04/08/21 4060    methylPREDNISolone sodium succinate (Solu-MEDROL) injection 40 mg, 40 mg, Intravenous, Q12H [facility], Joanie Swift PA-C, 40 mg at 04/08/21 0845    nebivolol (BYSTOLIC) tablet 10 mg, 10 mg, Oral, Daily, MARGOT Ramirez, 10 mg at 04/07/21 7653   ondansetron (ZOFRAN) injection 4 mg, 4 mg, Intravenous, Q6H PRN, MARGOT Morley    pantoprazole (PROTONIX) EC tablet 40 mg, 40 mg, Oral, Early Morning, MARGOT Morley, 40 mg at 04/08/21 0548    psyllium (METAMUCIL) 1 packet, 1 packet, Oral, Daily, Joy Khoury MD    senna (SENOKOT) tablet 8 6 mg, 1 tablet, Oral, HS PRN, MARGOT Morley    Laboratory Results:  Results from last 7 days   Lab Units 04/08/21  0542 04/07/21  0546 04/06/21  0451 04/05/21  0607 04/04/21  2055   WBC Thousand/uL 8 10  --  5 93 8 02 7 32   HEMOGLOBIN g/dL 9 8*  --  10 1* 10 3* 10 7*   HEMATOCRIT % 34 4*  --  33 6* 36 2 36 1   PLATELETS Thousands/uL 224  --  196 183 212   POTASSIUM mmol/L 4 9 4 5 4 2 4 6 5 1   CHLORIDE mmol/L 104 102 101 99* 99*   CO2 mmol/L 31 33* 31 31 28   BUN mg/dL 97* 73* 50* 44* 44*   CREATININE mg/dL 2 51* 2 58* 2 38* 2 39* 2 33*   CALCIUM mg/dL 9 2 9 3 9 3 9 3 9 3   MAGNESIUM mg/dL  --   --  2 0 2 4  --    PHOSPHORUS mg/dL 4 9* 4 2*  --  5 9*  --

## 2021-04-08 NOTE — CASE MANAGEMENT
LOS 4 days  Patient is a bundle  Patient is not a 30 day readmission  SW spoke with daughter Yuniel Eli to discuss discharge plans and complete CM open  Patient lives alone in a one floor home  Patient uses a walker, home 02 (2L) during the day and a Trilogy for night time through [address]  Daughter reports that patient has a HHA for 2 hours in the morning and 2 hours at night time to assist with ADL care  Patient used to have HHA through the [employer] program, and since then was approved for [employer] assistance through [employer] [place] insurance for 21 hours per week  Daughter reports that family is paying out of pocket for assistance as [employer] could not find any help at home since January  Daughter reports that patient is alert and oriented, able to take her medications and feed self however due to age and issues with arthritis, patient is physically declining  Daughter concerned regarding patient being home alone all day and throughout the night  Patient other daughter Shelton Ward visits patient during her lunch break  Daughter states that patient does become confused/forgetful when her CHF becomes exacerbated and 02 levels decrease  Daughters requesting that patient go to a [facility] with a possibility of LTC placement as they feel patient needs 24 hour care  SW provided list to family to review of [facility] as patient has [employer] as a tertiary insurance with Medicare as Primary  Daughters will review the list however requesting a referral to [facility] in [facility]  Daughters aware that patient may not be accepted due to [facility] insurance  SW sent referral for review  Ana Muhammad at [facility] central intake aware of referral and will run patient insurance and inform SW of decision  Patient PCP is Dr Gwen Navarro  [facility]  and has prescription insurance  Patient has a LW  Requested copy for chart  Denies hx of MH/DA issues       SW will follow up with daughters for inpatient rehab choices and follow up with [facility] [place] for current referral made

## 2021-04-09 LAB
ANION GAP SERPL CALCULATED.3IONS-SCNC: 4 MMOL/L (ref 4–13)
BUN SERPL-MCNC: 113 MG/DL (ref 5–25)
CALCIUM SERPL-MCNC: 9.2 MG/DL (ref 8.3–10.1)
CHLORIDE SERPL-SCNC: 105 MMOL/L (ref 100–108)
CO2 SERPL-SCNC: 34 MMOL/L (ref 21–32)
CREAT SERPL-MCNC: 2.49 MG/DL (ref 0.6–1.3)
GFR SERPL CREATININE-BSD FRML MDRD: 16 ML/MIN/1.73SQ M
GLUCOSE SERPL-MCNC: 130 MG/DL (ref 65–140)
PHOSPHATE SERPL-MCNC: 4.1 MG/DL (ref 2.3–4.1)
POTASSIUM SERPL-SCNC: 4.5 MMOL/L (ref 3.5–5.3)
PROCALCITONIN SERPL-MCNC: 0.33 NG/ML
SODIUM SERPL-SCNC: 143 MMOL/L (ref 136–145)

## 2021-04-09 PROCEDURE — 94003 VENT MGMT INPAT SUBQ DAY: CPT

## 2021-04-09 PROCEDURE — 99232 SBSQ HOSP IP/OBS MODERATE 35: CPT | Performed by: INTERNAL MEDICINE

## 2021-04-09 PROCEDURE — 80048 BASIC METABOLIC PNL TOTAL CA: CPT | Performed by: INTERNAL MEDICINE

## 2021-04-09 PROCEDURE — 94760 N-INVAS EAR/PLS OXIMETRY 1: CPT

## 2021-04-09 PROCEDURE — 84145 PROCALCITONIN (PCT): CPT | Performed by: PHYSICIAN ASSISTANT

## 2021-04-09 PROCEDURE — 84100 ASSAY OF PHOSPHORUS: CPT | Performed by: INTERNAL MEDICINE

## 2021-04-09 PROCEDURE — 94640 AIRWAY INHALATION TREATMENT: CPT

## 2021-04-09 RX ADMIN — Medication 1 TABLET: at 17:45

## 2021-04-09 RX ADMIN — PSYLLIUM HUSK 1 PACKET: 6 GRANULE ORAL at 10:46

## 2021-04-09 RX ADMIN — APIXABAN 2.5 MG: 2.5 TABLET, FILM COATED ORAL at 10:43

## 2021-04-09 RX ADMIN — TORSEMIDE 10 MG: 10 TABLET ORAL at 10:43

## 2021-04-09 RX ADMIN — AMLODIPINE BESYLATE 5 MG: 5 TABLET ORAL at 10:43

## 2021-04-09 RX ADMIN — COLESEVELAM HYDROCHLORIDE 1875 MG: 625 TABLET, FILM COATED ORAL at 17:45

## 2021-04-09 RX ADMIN — Medication 2000 UNITS: at 10:43

## 2021-04-09 RX ADMIN — IPRATROPIUM BROMIDE AND ALBUTEROL SULFATE 3 ML: 2.5; .5 SOLUTION RESPIRATORY (INHALATION) at 13:15

## 2021-04-09 RX ADMIN — Medication 1 TABLET: at 10:43

## 2021-04-09 RX ADMIN — Medication 60 MG: at 10:44

## 2021-04-09 RX ADMIN — EZETIMIBE 10 MG: 10 TABLET ORAL at 10:42

## 2021-04-09 RX ADMIN — COLESEVELAM HYDROCHLORIDE 1875 MG: 625 TABLET, FILM COATED ORAL at 10:45

## 2021-04-09 RX ADMIN — PANTOPRAZOLE SODIUM 40 MG: 40 TABLET, DELAYED RELEASE ORAL at 05:41

## 2021-04-09 RX ADMIN — FERROUS SULFATE TAB 325 MG (65 MG ELEMENTAL FE) 325 MG: 325 (65 FE) TAB at 10:42

## 2021-04-09 RX ADMIN — IPRATROPIUM BROMIDE AND ALBUTEROL SULFATE 3 ML: 2.5; .5 SOLUTION RESPIRATORY (INHALATION) at 02:49

## 2021-04-09 RX ADMIN — PREDNISONE 40 MG: 20 TABLET ORAL at 10:43

## 2021-04-09 RX ADMIN — APIXABAN 2.5 MG: 2.5 TABLET, FILM COATED ORAL at 17:45

## 2021-04-09 RX ADMIN — NEBIVOLOL HYDROCHLORIDE 10 MG: 10 TABLET ORAL at 10:46

## 2021-04-09 RX ADMIN — IPRATROPIUM BROMIDE AND ALBUTEROL SULFATE 3 ML: 2.5; .5 SOLUTION RESPIRATORY (INHALATION) at 07:29

## 2021-04-09 RX ADMIN — FLUTICASONE FUROATE AND VILANTEROL TRIFENATATE 1 PUFF: 100; 25 POWDER RESPIRATORY (INHALATION) at 10:45

## 2021-04-09 NOTE — PROGRESS NOTES
Progress Note - Pulmonary   Bernadette Ortiz 80 y o  female MRN: [id number]  Unit/Bed#: [address] [place] [address] Encounter: [id number]    Assessment/Plan:    Acute on chronic hypoxic hypercapnic respiratory failure   Baseline oxygen requirement is 2L NC  Titrate oxygen to maintain POX > or = 89%  OOB as tolerated  Continue Trilogy at HS  D/W case management to get new mask  Acute on chronic diastolic CHF with abnormal CXR with B/L airspace opacities and B/L pleural effusions R > L   Diuresis per primary team    Monitor I/O and daily weights  Has been monitored off antibiotics with improving procalcitonin  No fevers  Speech eval unremarkable  Repeat CXR in 2 weeks to evaluate pleural effusions  Mild persistent asthma with acute exacerbation   Continue with prednisone 40 mg PO daily x 5 days total    Continue Breo at D/C  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   Trilogy as above  Outpatient follow-up with Dr Yesi Yee as per D/C instructions  Plan is for likely D/C to [address] today as discussed with primary team     Chief Complaint:    "My Trilogy mask is broken "    Subjective: Roger Kim is sitting OOB in the chair this AM  She reports frustration since her Trilogy mask is broken  She wore Bipap overnight due to her broken mask  Otherwise, she reports her breathing is at baseline and she is eager to leave the hospital  She denies cough or chest pain  She denies any other complaints  Objective:    Vitals: Blood pressure 136/61, pulse (!) 129, temperature 97 6 °F (36 4 °C), resp  rate 20, height 5' (1 524 m), weight 66 5 kg (146 lb 9 7 oz), SpO2 (!) 89 %, not currently breastfeeding  2L NC,Body mass index is 28 63 kg/m²        Intake/Output Summary (Last 24 hours) at 4/9/2021 1241  Last data filed at 4/8/2021 1700  Gross per 24 hour   Intake 240 ml   Output 250 ml   Net -10 ml       Invasive Devices     Peripheral Intravenous Line            Peripheral IV 04/04/21 Left Forearm 4 days Physical Exam:     Physical Exam  Vitals signs reviewed  Constitutional:       General: She is not in acute distress  Appearance: She is well-developed  She is not toxic-appearing or diaphoretic  HENT:      Head: Normocephalic and atraumatic  Eyes:      General: No scleral icterus  Neck:      Musculoskeletal: Neck supple  Vascular: No JVD  Trachea: No tracheal deviation  Cardiovascular:      Rate and Rhythm: Normal rate and regular rhythm  Heart sounds: S1 normal and S2 normal  Murmur present  Systolic murmur present with a grade of 2/6  No friction rub  No gallop  Pulmonary:      Effort: Pulmonary effort is normal  No tachypnea, accessory muscle usage or respiratory distress  Breath sounds: Normal breath sounds  No stridor  No decreased breath sounds, wheezing, rhonchi or rales  Chest:      Chest wall: No tenderness  Abdominal:      General: Bowel sounds are normal  There is no distension  Palpations: Abdomen is soft  Tenderness: There is no abdominal tenderness  There is no guarding or rebound  Skin:     General: Skin is warm and dry  Findings: No rash  Neurological:      Mental Status: She is alert and oriented to person, place, and time  GCS: GCS eye subscore is 4  GCS verbal subscore is 5  GCS motor subscore is 6  Psychiatric:         Speech: Speech normal          Behavior: Behavior normal  Behavior is cooperative           Labs:   CMP:   Lab Results   Component Value Date    SODIUM 143 04/09/2021    K 4 5 04/09/2021     04/09/2021    CO2 34 (H) 04/09/2021     (H) 04/09/2021    CREATININE 2 49 (H) 04/09/2021    CALCIUM 9 2 04/09/2021    EGFR 16 04/09/2021     Procalcitonin pending    Imaging and other studies: None new

## 2021-04-09 NOTE — CASE MANAGEMENT
JANAK spoke with Walt Taylor from [facility] who states that she spoke with Daughters regarding insurances with a plan to [place] to [facility] is able to accept patient under Medicare + [facility] insurance while in process of switching to [place]  Plan for patient to discharge to [facility] (family preference) for STR to LTC when medically stable

## 2021-04-09 NOTE — ASSESSMENT & PLAN NOTE
· Presented on 6L NC, now stable on baseline 2L    · Likely multifactorial, 2/2 CHF, restrictive lung disease from kyphoscoliosis, lack of compliance with trilogy overnight  · ABG showed a pCO2 of 89 and a pH of 7 1 which improved with BiPAP use  · Tolerating trelegy better, settings adjusted per RT   · CT chest showed bilateral opacities suggesting possible underlying pneumonia as well as a right-sided moderate pleural effusion    · No indication for thoracentesis   · PCT elevated but she is afebrile without leukocytosis, monitor off abx since she is improved   · Continue trelegy while asleep and as needed

## 2021-04-09 NOTE — CASE MANAGEMENT
JANAK informed by respiratory [facility] that patient trilogy mask is broken  JANAK called Tank Lozoya at [facility] who will call JANAK back to see about providing patient with a new mask  Will follow

## 2021-04-09 NOTE — PROGRESS NOTES
[address] [facility] NOTE   Marsha Lopez 80 y o  female MRN: [id number]  Unit/Bed#: [address] [place] [address] Encounter: [id number]  Reason for Consult: TIANA on CKD III    ASSESSMENT and PLAN:    80 y  o  female with PMHx of CKD III, HTN, dCHF, mild aortic stenosis, HPL, CVA, CAD, A fib, MGUS, DVT, who was admitted to [facility] after presenting with SOB on 4/4  A renal consultation is requested today for assistance in the management of TIANA     1) TIANA on CKD III     - outpatent Nephrologist Dr Vinny Garcia  - baseline creat 1 7-2 1 mg/dL  - losartan added on 2/1/2021 and creat on 3/5 was 2 mg/dL  - admission creat 2 3 mg/dL on 4/4 and remains stable on 4/6   - UA with 2+ protein, otherwise bland   - prior renal u/s in 2017 with L kidney 11 cm, R kidney 9 3 cm, echogenic kidneys  - pt was on toradol at home?  - pt initially received furosemide 40 IV X 2 on 4/5 and one time on 4/4    - 4/6 - Creat stable 2 4 mg/dL  -4/7-creatinine rising to 2 6 mg/dL  , phosphorus 4 2   - 4/8 - creat stable 2 5  phos 4 9  restarting torsemide  -4/9-creatinine stable 2 49 mg/dL  Potassium is okay 4 5      TIANA vs progression of kidney disease  May have had CRS  UA relatively bland with exception protein which is known       Renal u/s with no hydro  Diffusely echogenic kidneys     Plan:     - cont to hold ARB  -  okay for torsemide 10 mg daily  May need to titrate to b i d  Dosing if the patient agrees  - BMP in AM  - further plans for effusion per Primary and Pulm team if needed  - daily weights  - need strict I/O  - may need to accept higher creat for euvolemia  - low K diet     2) hypoxia-possibly multifactorial   pulm edema vs Pneumonia versus infectious versus other     - mild pulm vascular congestion   Focal right upper lobe groundglass opacity due to asymmetric pulm edema vs PNA  - pt uses 2 L oxygen at home on baseline  - f/u ECHO-EF 30-98%, grade 1 diastolic dysfunction, trace aortic valve regurgitation, IVC mildly dilated  - prior ECHO in 2018 with grade 2 dCHF, EF 55-60; mild aortic stenosis at that time  -CT scan of the chest on 04/06 with right greater than left airspace opacities consistent with pneumonia, layering of moderate effusion on the right, gallstone, asymmetric right breast density  - Cardiology on board  - Pulmonary on board  - pt weight not recorded 4/6    - UOP not strictly recorded  - BIPAP per Primary team  - COVID19 PCR neg  - 4/8 - restart diuretics     3) electrolytes     - hyponatremia - improved  - hyperphos -improved  - K - stable  - calcium borderline  Monitor for now     4) acid/base     - resp acidosis - improving with BIPAP     5) anemia     - stable  - no FAN due to CVA history     6) HTN     - on amlodipine and bystolic - avoid hypotension  - hold losartan  - SBP stable for now     7) MGUS     8) history of mild Aortic valve stenosis     As above     9) encephalopathy - per Primary team     10) asymmetric right breast density-per primary team     11) transaminitis - per Primary team    12) azotemia-renal function is stable  Likely secondary to prednisone  SUBJECTIVE / 24H INTERVAL HISTORY:    Blood pressure is 814-190 systolic  Afebrile  Heart rate to 129 this morning  When the patient was seen, no complaints  Pulse ox was improving to 90s      OBJECTIVE:  Current Weight: Weight - Scale: 66 5 kg (146 lb 9 7 oz)  Vitals:    04/09/21 0249 04/09/21 0300 04/09/21 0600 04/09/21 0729   BP:       Pulse:  (!) 53  (!) 129   Resp:       Temp:       TempSrc:       SpO2: 98%   (!) 89%   Weight:   66 5 kg (146 lb 9 7 oz)    Height:           Intake/Output Summary (Last 24 hours) at 4/9/2021 0919  Last data filed at 4/8/2021 1700  Gross per 24 hour   Intake 480 ml   Output 250 ml   Net 230 ml     General: NAD  Skin: no rash  Eyes: anicteric sclera  ENT: moist mucous membrane  Neck: supple  Chest: CTA b/l, no ronchii, no wheeze, no rubs, but the exception of rales at the bases  CVS: s1s2, no murmur, no gallop, no rub  Abdomen: soft, nontender, nl sounds  Extremities: no significant edema LE b/l  : no kuhn  Neuro: AAOX3  Psych: normal affect    Medications:    Current Facility-Administered Medications:     acetaminophen (TYLENOL) tablet 650 mg, 650 mg, Oral, Q6H PRN, MARGOT Velasquez    albuterol inhalation solution 2 5 mg, 2 5 mg, Nebulization, Q4H PRN, MARGOT Velasquez, 2 5 mg at 04/05/21 0505    amLODIPine (NORVASC) tablet 5 mg, 5 mg, Oral, Daily, Nusrat Boyd MD, 5 mg at 04/08/21 0845    apixaban (ELIQUIS) tablet 2 5 mg, 2 5 mg, Oral, BID, MARGOT Pritchard, 2 5 mg at 04/08/21 1738    calcium carbonate-vitamin D (OSCAL-D) 500 mg-200 units per tablet 1 tablet, 1 tablet, Oral, BID, MARGOT Velasquez, 1 tablet at 04/08/21 1738    cholecalciferol (VITAMIN D3) tablet 2,000 Units, 2,000 Units, Oral, Daily, MARGOT Velasquez, 2,000 Units at 04/08/21 0845    co-enzyme Q-10 capsule 60 mg, 60 mg, Oral, Daily, MARGOT Velasquez, 60 mg at 04/08/21 9917    [facility]) tablet 1,875 mg, 1,875 mg, Oral, BID With Meals, MARGOT Velasquez, 1,875 mg at 04/08/21 1738    ezetimibe (ZETIA) tablet 10 mg, 10 mg, Oral, Daily, MARGOT Pritchard, 10 mg at 04/08/21 0845    ferrous sulfate tablet 325 mg, 325 mg, Oral, Every Other Day, MARGOT Velasquez, 325 mg at 04/07/21 1054    fluticasone-vilanterol (BREO ELLIPTA) 100-25 mcg/inh inhaler 1 puff, 1 puff, Inhalation, Daily, MARGOT Velasquez, 1 puff at 04/08/21 0846    hydrALAZINE (APRESOLINE) injection 5 mg, 5 mg, Intravenous, Q6H PRN, MARGOT Velasquez    ipratropium-albuterol (DUO-NEB) 0 5-2 5 mg/3 mL inhalation solution 3 mL, 3 mL, Nebulization, Q6H, MARGOT Velasquez, 3 mL at 04/09/21 0729    nebivolol (BYSTOLIC) tablet 10 mg, 10 mg, Oral, Daily, MARGOT Pritchard, 10 mg at 04/08/21 0921    ondansetron (ZOFRAN) injection 4 mg, 4 mg, Intravenous, Q6H PRN, Raj Dalton MARGOT Le    pantoprazole (PROTONIX) EC tablet 40 mg, 40 mg, Oral, Early Morning, MARGOT Wang, 40 mg at 04/09/21 0541    predniSONE tablet 40 mg, 40 mg, Oral, Daily, Miguel A Rey PA-C    psyllium (METAMUCIL) 1 packet, 1 packet, Oral, Daily, Chester Goodpasture, MD, 1 packet at 04/08/21 0924    senna (SENOKOT) tablet 8 6 mg, 1 tablet, Oral, HS PRN, MARGOT Wang    torsemide [facility]) tablet 10 mg, 10 mg, Oral, Daily, Erika Packer MD, 10 mg at 04/08/21 2390    Laboratory Results:  Results from last 7 days   Lab Units 04/09/21  0516 04/08/21  0542 04/07/21  0546 04/06/21  0451 04/05/21  0607 04/04/21  2055   WBC Thousand/uL  --  8 10  --  5 93 8 02 7 32   HEMOGLOBIN g/dL  --  9 8*  --  10 1* 10 3* 10 7*   HEMATOCRIT %  --  34 4*  --  33 6* 36 2 36 1   PLATELETS Thousands/uL  --  224  --  196 183 212   POTASSIUM mmol/L 4 5 4 9 4 5 4 2 4 6 5 1   CHLORIDE mmol/L 105 104 102 101 99* 99*   CO2 mmol/L 34* 31 33* 31 31 28   BUN mg/dL 113* 97* 73* 50* 44* 44*   CREATININE mg/dL 2 49* 2 51* 2 58* 2 38* 2 39* 2 33*   CALCIUM mg/dL 9 2 9 2 9 3 9 3 9 3 9 3   MAGNESIUM mg/dL  --   --   --  2 0 2 4  --    PHOSPHORUS mg/dL 4 1 4 9* 4 2*  --  5 9*  --

## 2021-04-09 NOTE — DISCHARGE INSTRUCTIONS
Acute Kidney Injury   AMBULATORY CARE:   Acute kidney injury (TIANA) is also called acute kidney failure, or acute renal failure  TIANA happens when your kidneys suddenly stop working correctly  Normally, the kidneys remove fluid, chemicals, and waste from your blood  These wastes are turned into urine by your kidneys  TIANA usually happens over hours or days  When you have TIANA, your kidneys do not remove the waste, chemicals, or extra fluid from your body  A normal amount of urine is not produced  TIANA is usually temporary, but it may become a chronic kidney condition  Causes of TIANA:   · Decreased blood flow to the kidney, such as from hypercalcemia (high blood calcium level) or severe heart disease     · A disease or condition that affects the kidneys, such as hypertension (high blood pressure) or diabetes     · A blockage in the kidney or ureter, such as a kidney or bladder stone, enlarged prostate, or tumor    Common symptoms include the following: You may not have any symptoms with early or mild TIANA  As TIANA progresses, you may have any of the following:  · Decrease in the amount of urine or no urination    · Swelling in your arms, legs, or feet     · Weakness, drowsiness, or no appetite    · Nausea, flank pain, muscle twitching or muscle cramps    · Itchy skin, or your, breath or body smells like urine    · Behavior changes, confusion, disorientation, or seizures    Call 911 if:   · You have sudden chest pain or trouble breathing  Seek care immediately if:   · Your symptoms get worse  Contact your healthcare provider if:   · Your symptoms return  · Your blood sugar or blood pressure level is not within the range your healthcare provider recommends  · You have questions or concerns about your condition or care  Treatment for TIANA  depends upon the cause of your acute kidney injury and how severe it is   Usually, TIANA will be monitored in the hospital  If you have mild TIANA, you may be able to go home to recover  Your healthcare providers will treat the cause of your TIANA  You may need IV fluids if your TIANA was caused by little or no fluid in your body  You may need dialysis to remove waste and extra fluid from your body  Nutrition:  Your healthcare provider may tell you to eat food low in sodium (salt), potassium, phosphorus, or protein  A dietitian can help you plan your meals  Drink liquids as directed: Your healthcare provider may recommend that you drink a certain amount of liquids  This will help your kidneys work better and decrease your risk for dehydration  Ask how much liquid to drink each day and which liquids are best for you  What you can do to manage and prevent TIANA:   · Monitor and manage other health conditions  such as diabetes, high blood pressure, or heart disease  These conditions increase your risk for acute kidney injury  Take your medicines for these conditions as directed  Also, monitor your blood sugar and blood pressure levels as directed  Contact your healthcare provider if your levels are not in the range he or she says it should be  · Talk to your healthcare provider before you take over-the-counter-medicine  NSAIDs, stomach medicine, or laxatives may harm your kidneys and increase your risk for acute kidney injury  If it is okay to take the medicine, follow the directions on the package  Do not take more than directed  · Tell healthcare providers you have had acute kidney injury  before you get contrast liquid for an x-ray or CT scan  Your healthcare provider may give you medicine to prevent kidney problems caused by the liquid  Follow up with your healthcare provider as directed:  Write down your questions so you remember to ask them during your visits  © Copyright [address] Information is for End User's use only and may not be sold, redistributed or otherwise used for commercial purposes   All illustrations and images included in CareNotes® are the copyrighted property of A D A M [employer] Inc  or [address]   The above information is an  only  It is not intended as medical advice for individual conditions or treatments  Talk to your doctor, nurse or pharmacist before following any medical regimen to see if it is safe and effective for you  Heart Failure   WHAT YOU NEED TO KNOW:   Heart failure (HF) is a condition that does not allow your heart to fill or pump properly  Not enough oxygen in your blood gets to your organs and tissues  Fluid may not move through your body properly  Fluid builds up and causes swelling and difficulty breathing  This is known as congestive heart failure  HF may start in the left or right ventricle  HF is often caused by damage or injury to your heart  The damage may be caused by other heart problems, diabetes, or high blood pressure  The damage may have also been caused by an infection  HF is a long-term condition that tends to get worse over time  It is important to manage your health to improve your quality of life  DISCHARGE INSTRUCTIONS:   Call your local emergency number (911 in the [address] [facility]) if:   · You have any of the following signs of a heart attack:      ? Squeezing, pressure, or pain in your chest     ? You may also have any of the following:      § Discomfort or pain in your back, neck, jaw, stomach, or arm     § Shortness of breath     § Nausea or vomiting     § Lightheadedness or a sudden cold sweat        Call your doctor if:   · Your heartbeat is fast, slow, or uneven all the time      · You have symptoms of worsening HF:      ? Shortness of breath at rest, at night, or that is getting worse in any way     ? Weight gain of 3 or more pounds (1 4 kg) in a day, or more than your healthcare provider says is okay     ? More swelling in your legs or ankles     ? Abdominal pain or swelling     ? More coughing     ? Loss of appetite     ?  Feeling tired all the time     · You feel hopeless or depressed, or you have lost interest in things you used to enjoy      · You often feel worried or afraid      · You have questions or concerns about your condition or care      Medicines:   · Medicines may be given to help regulate your heart rhythm and lower your blood pressure  You may also need medicines to help decrease extra fluids  Medicines, such as NSAIDs, may be stopped because they are causing your HF to become worse      · Take your medicine as directed  Contact your healthcare provider if you think your medicine is not helping or if you have side effects  Tell him or her if you are allergic to any medicine  Keep a list of the medicines, vitamins, and herbs you take  Include the amounts, and when and why you take them  Bring the list or the pill bottles to follow-up visits  Carry your medicine list with you in case of an emergency      Follow up with your doctor within 7 days and as directed: You may need to return for other tests  You may need home health care  A healthcare provider will monitor your vital signs, weight, and make sure your medicines are working  Write down your questions so you remember to ask them during your visits  Go to cardiac rehab if directed: Cardiac rehab is a program run by specialists who will help you safely strengthen your heart  In the program you will learn about exercise, relaxation, stress management, and heart-healthy nutrition  Manage HF:   · Do not smoke  Nicotine and other chemicals in cigarettes and cigars can cause lung and heart damage  Ask your healthcare provider for information if you currently smoke and need help to quit  E-cigarettes or smokeless tobacco still contain nicotine  Talk to your healthcare provider before you use these products      · Do not drink alcohol or use illegal drugs  Alcohol and drugs can increase your risk for high blood pressure, diabetes, and coronary artery disease      · Eat heart-healthy foods and limit sodium (salt)   Eat more fresh fruits and vegetables  Eat fewer canned and processed foods  Replace butter and margarine with heart-healthy oils such as olive oil and canola oil  Other heart-healthy foods include walnuts, whole-grain breads, low-fat dairy products, beans, and lean meats  Fatty fish such as salmon and tuna are also heart healthy  Ask how much salt you can eat each day           · Manage any chronic health conditions you have  These include high blood pressure, diabetes, obesity, high cholesterol, metabolic syndrome, and COPD  You will have fewer symptoms if you manage these health conditions  Follow your healthcare provider's recommendations and follow up with him or her regularly      · Drink liquids as directed  You may need to limit the amount of liquids you drink if you have fluid buildup  Ask how much liquid to drink each day and which liquids are best for you      · Maintain a healthy weight  Being overweight can increase your risk for high blood pressure, diabetes, and coronary artery disease  These conditions can make your symptoms worse  Ask your healthcare provider how much you should weigh  Ask him or her to help you create a weight loss plan if you are overweight      · Stay active  Activity can help keep your symptoms from getting worse  Walking is a type of physical activity that helps maintain your strength and improve your mood  Physical activity also helps you manage your weight  Work with your healthcare provider to create an exercise plan that is right for you           · Weigh yourself every morning  Use the same scale, in the same spot  Do this after you use the bathroom, but before you eat or drink  Wear the same type of clothing each time  Write down your weight and call your healthcare provider if you have a sudden weight gain  Swelling and weight gain are signs of fluid buildup           · Get vaccines as directed  Get a flu shot every year  You may also need the pneumonia vaccine   The flu and pneumonia can be severe for a person who has HF  Vaccines protect you from these infections      Join a support group: HF can be difficult to manage  It may be helpful to talk with others who have HF  You may learn how to better manage your condition or get emotional support  For more information:  · [address] P[address]y   Phone: 6- 982 - [phone]  Web Address: https://www strong [employer]/  [facility] [address] Information is for End User's use only and may not be sold, redistributed or otherwise used for commercial purposes  All illustrations and images included in CareNotes® are the copyrighted property of A D A M , Inc  or [address] [place] [address]  The above information is an  only  It is not intended as medical advice for individual conditions or treatments   Talk to your doctor, nurse or pharmacist before following any medical regimen to see if it is safe and effective for you

## 2021-04-09 NOTE — DISCHARGE SUMMARY
Noman 45  Discharge- [address] 69 5/12/1928, 80 y o  female MRN: [id number]  Unit/Bed#: [address] [place] [address] Encounter: [id number]  Primary Care Provider: Dwayne Jefferson DO   Date and time admitted to hospital: 4/4/2021  8:24 PM    * Acute on chronic respiratory failure with hypoxia and hypercapnia (HCC)  Assessment & Plan  · Presented on 6L NC, now stable on baseline 2L    · Likely multifactorial, 2/2 CHF, restrictive lung disease from kyphoscoliosis, lack of compliance with trilogy overnight  · ABG showed a pCO2 of 89 and a pH of 7 1 which improved with BiPAP use  · Tolerating trelegy better, settings adjusted per RT   · CT chest showed bilateral opacities suggesting possible underlying pneumonia as well as a right-sided moderate pleural effusion  · No indication for thoracentesis   · PCT elevated but she is afebrile without leukocytosis, monitor off abx since she is improved   · Continue trelegy while asleep and as needed     Acute on chronic diastolic heart failure (HCC)  Assessment & Plan  Wt Readings from Last 3 Encounters:   04/09/21 66 5 kg (146 lb 9 7 oz)   08/18/20 68 kg (150 lb)   03/16/20 68 kg (150 lb)     · Presented with SOB, tachypnea, and hypoxia  · Pro BNP 10,884 which is up from previous of 2,657  · CXR, with evidence of vascular congestion  · Echo from March 2018: LVEF 55-60%, G2DD  · IV lasix d/c and resumed torsemide 10mg daily (instead of BID) 4/8    Acute kidney injury superimposed on chronic kidney disease [facility])  Assessment & Plan  · Nephrology following  Question if this is acute kidney injury versus progression of underlying chronic kidney disease    · Baseline creatinine 1 7 to 2 1   · Creatinine increased to 2 6 on 4/7/21 and now stabilized 2 49  · Continue to hold ARB  · Retroperitoneal ultrasound negative for hydronephrosis   · Continue to monitor I&Os and daily weights, PVR   · Continue torsemide 10mg   · Repeat bmp on Monday     Abnormal breast finding  Assessment & Plan  · CT: Asymmetric right breast density  Although this could represent breast tissue, a neoplastic process is not excluded and mammographic follow-up is advised  · Patient and daughter aware     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  Assessment & Plan  · Pulmonary following  · Continue Breo  · Continue prednisone taper 40mg x 3 days, dec by 10mg q3 days    Deep vein thrombosis (DVT) of left lower extremity (HCC)  Assessment & Plan  · Continue Eliquis    Essential hypertension  Assessment & Plan  · Continue Norvasc 5 mg daily and Bystolic 10 mg daily  · Hold Losartan due to TIANA     Cerebrovascular accident (CVA) ([facility] [address] )  Assessment & Plan  · Continue statin and Eliquis    Discharging Physician / Practitioner: Lalo Meyers PA-C  PCP: Ama Bledsoe DO  Admission Date:   Admission Orders (From admission, onward)     Ordered        04/04/21 2352  Inpatient Admission  Once                   Discharge Date: 04/09/21    Resolved Problems  Date Reviewed: 4/9/2021          Resolved    Toxic metabolic encephalopathy 0/8/2552     Resolved by  Jaqueline Ferguson PA-C    Mild persistent asthma without complication 8/6/7250     Resolved by  Rogelio Rain PA-C    Hyponatremia 4/7/2021     Resolved by  Coby Castellano MD        Consultations During Hospital Stay:  · Cardiology  · Nephrology  · Pulmonology     Procedures Performed:   · Barium swallow 4/7/21    Significant Findings / Test Results:   · CXR 4/4- Mild pulmonary vascular congestion  Focal right upper lobe groundglass opacity can be due to asymmetric pulmonary edema versus pneumonia  · CTH- Sinus disease as described but no acute intracranial abnormality seen otherwise  · CT chest- Right greater than left airspace opacities is consistent with pneumonia  There is layering moderate effusion on the right  Gallstone  Asymmetric right breast density    Although this could represent breast tissue, a neoplastic process is not excluded and mammographic follow-up is advised  · US kidney and bladder- No hydronephrosis  Bilateral cysts with echogenic kidneys consistent with chronic medical renal disease  · ECHOCARDIOGRAM- LVEF 28-74%, grade 1 diastolic dysfunction mild to moderate aortic stenosis  · Acute on chronic hypoxic and hypercapnic respiratory failure  · Acute diastolic heart failure  · Acute kidney    Incidental Findings:   · Asymmetric right breast density  Although this could represent breast tissue, a neoplastic process is not excluded and mammographic follow-up is advised  Test Results Pending at Discharge (will require follow up): · None     Outpatient Tests Requested:  · Follow-up with PCP, Cardiology, pulmonology, Nephrology    Complications:  None     Reason for Admission: SOB     Hospital Course: Twyla Villareal is a 80 y o  female patient with past medical history significant for chronic respiratory failure on 2 L nasal cannula, chronic kidney disease, chronic diastolic heart failure, history of stroke, hypertension, history of DVT, restrictive lung disease, asthma, who originally presented to the hospital on 4/4/2021 due to shortness of breath  Patient was felt to have possible acute on chronic diastolic heart failure and started on IV Lasix Cardiology was contacted  She initially on 6 L nasal cannula pulmonology following  She had a acute kidney injury and worsen and Nephrology was consulted  She was started Solu-Medrol  She was successfully weaned to baseline 2 L nasal cannula and shortness of breath significantly  Diuretics were resumed at lower dose and losartan was held  Patient was tolerating her Trelegy while hospitalize outpatient compliance was encouraged  She was evaluated by PT and OT and recommended short-term rehab  Will likely need a follow-up to maintain normal volume  She need lab work and follow-up  Please see above list of diagnoses and related plan for additional information  Condition at Discharge: stable     Discharge Day Visit / Exam:     Subjective:  Patient seen and examined bedside  Very frustrated this morning about breakfast order as well as lack of sleep overnight  wants to be discharged  States she feels well  No shortness breath today  Denies chest pain  PATIENTS ANA LUISA NOT WORKING AND NURSING NOT ANSWER PHONE  O2 SATS 92% 2L AND HR IN 80S DURING EVAL  Vitals: Blood Pressure: 144/66 (04/08/21 2248)  Pulse: (!) 129 (04/09/21 0729)  Temperature: 97 6 °F (36 4 °C) (04/08/21 2248)  Temp Source: Oral (04/07/21 0746)  Respirations: 20 (04/08/21 2248)  Height: 5' (152 4 cm) (04/05/21 0105)  Weight - Scale: 66 5 kg (146 lb 9 7 oz) (04/09/21 0600)  SpO2: (!) 89 % (04/09/21 0729)    Exam:   Physical Exam  Constitutional:       Appearance: Normal appearance  HENT:      Head: Normocephalic and atraumatic  Mouth/Throat:      Mouth: Mucous membranes are moist    Eyes:      Extraocular Movements: Extraocular movements intact  Neck:      Musculoskeletal: Normal range of motion and neck supple  Cardiovascular:      Rate and Rhythm: Normal rate and regular rhythm  Pulmonary:      Effort: Pulmonary effort is normal       Breath sounds: Normal breath sounds  No wheezing or rhonchi  Comments: 2L satting 92% eating breakfast and talking   Abdominal:      General: Abdomen is flat  Palpations: Abdomen is soft  Musculoskeletal: Normal range of motion  General: No swelling  Skin:     General: Skin is warm and dry  Neurological:      General: No focal deficit present  Mental Status: She is alert  Psychiatric:         Mood and Affect: Mood normal          Behavior: Behavior normal        Discussion with Family: called dtrs    Discharge instructions/Information to patient and family:   See after visit summary for information provided to patient and family        Provisions for Follow-Up Care:  See after visit summary for information related to follow-up care and any pertinent home health orders  Disposition:     Lucretia Angulo (see below)    For Discharges to [facility] SNF:   · [facility] / Pierce Lorenz at [address] [place] [address] St - Not Applicable to this Patient    Planned Readmission: none     Discharge Statement:  I spent 45 minutes discharging the patient  This time was spent on the day of discharge  I had direct contact with the patient on the day of discharge  Greater than 50% of the total time was spent examining patient, answering all patient questions, arranging and discussing plan of care with patient as well as directly providing post-discharge instructions  Additional time then spent on discharge activities  Discharge Medications:  See after visit summary for reconciled discharge medications provided to patient and family        ** Please Note: This note has been constructed using a voice recognition system **

## 2021-04-09 NOTE — ASSESSMENT & PLAN NOTE
· Nephrology following  Question if this is acute kidney injury versus progression of underlying chronic kidney disease    · Baseline creatinine 1 7 to 2 1   · Creatinine increased to 2 6 on 4/7/21 and now stabilized 2 49  · Continue to hold ARB  · Retroperitoneal ultrasound negative for hydronephrosis   · Continue to monitor I&Os and daily weights, PVR   · Continue torsemide 10mg   · Repeat bmp on Monday

## 2021-04-09 NOTE — ASSESSMENT & PLAN NOTE
· Pulmonary following  · Continue Breo  · Continue prednisone taper 40mg x 3 days, dec by 10mg q3 days

## 2021-04-09 NOTE — ASSESSMENT & PLAN NOTE
Wt Readings from Last 3 Encounters:   04/09/21 66 5 kg (146 lb 9 7 oz)   08/18/20 68 kg (150 lb)   03/16/20 68 kg (150 lb)     · Presented with SOB, tachypnea, and hypoxia  · Pro BNP 10,884 which is up from previous of 2,657  · CXR, with evidence of vascular congestion  · Echo from March 2018: LVEF 55-60%, G2DD  · IV lasix d/c and resumed torsemide 10mg daily (instead of BID) 4/8

## 2021-04-09 NOTE — ASSESSMENT & PLAN NOTE
· CT: Asymmetric right breast density  Although this could represent breast tissue, a neoplastic process is not excluded and mammographic follow-up is advised    · Patient and daughter aware

## 2021-04-10 ENCOUNTER — TELEPHONE (OUTPATIENT)
Dept: OTHER | Facility: OTHER | Age: 86
End: 2021-04-10

## 2021-04-10 VITALS
SYSTOLIC BLOOD PRESSURE: 123 MMHG | BODY MASS INDEX: 28.39 KG/M2 | HEIGHT: 60 IN | DIASTOLIC BLOOD PRESSURE: 80 MMHG | WEIGHT: 144.6 LBS | TEMPERATURE: 98.3 F | OXYGEN SATURATION: 96 % | RESPIRATION RATE: 18 BRPM | HEART RATE: 64 BPM

## 2021-04-10 PROBLEM — I48.0 PAROXYSMAL ATRIAL FIBRILLATION (HCC): Status: ACTIVE | Noted: 2021-04-10

## 2021-04-10 LAB
ANION GAP SERPL CALCULATED.3IONS-SCNC: 5 MMOL/L (ref 4–13)
BUN SERPL-MCNC: 120 MG/DL (ref 5–25)
CALCIUM SERPL-MCNC: 9.4 MG/DL (ref 8.3–10.1)
CHLORIDE SERPL-SCNC: 105 MMOL/L (ref 100–108)
CO2 SERPL-SCNC: 34 MMOL/L (ref 21–32)
CREAT SERPL-MCNC: 2.47 MG/DL (ref 0.6–1.3)
GFR SERPL CREATININE-BSD FRML MDRD: 16 ML/MIN/1.73SQ M
GLUCOSE SERPL-MCNC: 139 MG/DL (ref 65–140)
POTASSIUM SERPL-SCNC: 4.5 MMOL/L (ref 3.5–5.3)
PROCALCITONIN SERPL-MCNC: 0.27 NG/ML
SODIUM SERPL-SCNC: 144 MMOL/L (ref 136–145)

## 2021-04-10 PROCEDURE — 94640 AIRWAY INHALATION TREATMENT: CPT

## 2021-04-10 PROCEDURE — 94760 N-INVAS EAR/PLS OXIMETRY 1: CPT

## 2021-04-10 PROCEDURE — 93005 ELECTROCARDIOGRAM TRACING: CPT

## 2021-04-10 PROCEDURE — 80048 BASIC METABOLIC PNL TOTAL CA: CPT | Performed by: INTERNAL MEDICINE

## 2021-04-10 PROCEDURE — 84145 PROCALCITONIN (PCT): CPT | Performed by: PHYSICIAN ASSISTANT

## 2021-04-10 PROCEDURE — 99239 HOSP IP/OBS DSCHRG MGMT >30: CPT | Performed by: PHYSICIAN ASSISTANT

## 2021-04-10 RX ORDER — PREDNISONE 10 MG/1
TABLET ORAL
Qty: 22 TABLET | Refills: 0 | Status: SHIPPED | OUTPATIENT
Start: 2021-04-10 | End: 2021-04-28

## 2021-04-10 RX ORDER — SENNOSIDES 8.6 MG
8.6 TABLET ORAL
Refills: 0
Start: 2021-04-10

## 2021-04-10 RX ADMIN — COLESEVELAM HYDROCHLORIDE 1875 MG: 625 TABLET, FILM COATED ORAL at 09:27

## 2021-04-10 RX ADMIN — NEBIVOLOL HYDROCHLORIDE 10 MG: 10 TABLET ORAL at 09:29

## 2021-04-10 RX ADMIN — IPRATROPIUM BROMIDE AND ALBUTEROL SULFATE 3 ML: 2.5; .5 SOLUTION RESPIRATORY (INHALATION) at 01:16

## 2021-04-10 RX ADMIN — AMLODIPINE BESYLATE 5 MG: 5 TABLET ORAL at 09:28

## 2021-04-10 RX ADMIN — Medication 1 TABLET: at 09:27

## 2021-04-10 RX ADMIN — PANTOPRAZOLE SODIUM 40 MG: 40 TABLET, DELAYED RELEASE ORAL at 06:04

## 2021-04-10 RX ADMIN — IPRATROPIUM BROMIDE AND ALBUTEROL SULFATE 3 ML: 2.5; .5 SOLUTION RESPIRATORY (INHALATION) at 07:54

## 2021-04-10 RX ADMIN — PREDNISONE 40 MG: 20 TABLET ORAL at 09:28

## 2021-04-10 RX ADMIN — EZETIMIBE 10 MG: 10 TABLET ORAL at 09:28

## 2021-04-10 RX ADMIN — TORSEMIDE 10 MG: 10 TABLET ORAL at 09:27

## 2021-04-10 RX ADMIN — Medication 60 MG: at 09:27

## 2021-04-10 RX ADMIN — PSYLLIUM HUSK 1 PACKET: 6 GRANULE ORAL at 09:28

## 2021-04-10 RX ADMIN — FLUTICASONE FUROATE AND VILANTEROL TRIFENATATE 1 PUFF: 100; 25 POWDER RESPIRATORY (INHALATION) at 09:28

## 2021-04-10 RX ADMIN — APIXABAN 2.5 MG: 2.5 TABLET, FILM COATED ORAL at 09:28

## 2021-04-10 RX ADMIN — Medication 2000 UNITS: at 09:27

## 2021-04-10 NOTE — DISCHARGE SUMMARY
Noman 45  Discharge- [address] 69 5/12/1928, 80 y o  female MRN: [id number]  Unit/Bed#: [address] [place] [address] Encounter: [id number]  Primary Care Provider: Darina Primrose, DO   Date and time admitted to hospital: 4/4/2021  8:24 PM    * Acute on chronic respiratory failure with hypoxia and hypercapnia (HCC)  Assessment & Plan  · Presented on 6L NC, now stable on baseline 2L    · Likely multifactorial, 2/2 CHF, restrictive lung disease from kyphoscoliosis, lack of compliance with trilogy overnight  · ABG initially showed a pCO2 of 89 and a pH of 7 1 which improved with BiPAP use  · Tolerating triligy better, settings adjusted per RT   · CT chest showed bilateral opacities suggesting possible underlying pneumonia as well as a right-sided moderate pleural effusion  · No indication for thoracentesis   · PCT elevated but she is afebrile without leukocytosis, monitor off abx since she is improved   · Continue triligy while asleep and as needed     Acute on chronic diastolic heart failure (HCC)  Assessment & Plan  Wt Readings from Last 3 Encounters:   04/10/21 65 6 kg (144 lb 9 6 oz)   08/18/20 68 kg (150 lb)   03/16/20 68 kg (150 lb)     · Presented with SOB, tachypnea, and hypoxia  · Pro BNP 10,884 which is up from previous of 2,657  · CXR, with evidence of vascular congestion  · Echo from March 2018: LVEF 55-60%, G2DD  · IV lasix d/c and resumed torsemide 10mg daily (instead of BID) 4/8  · Outpatient cards follow up     Acute kidney injury superimposed on chronic kidney disease [facility])  Assessment & Plan  · Nephrology following  Question if this is acute kidney injury versus progression of underlying chronic kidney disease    · Baseline creatinine 1 7 to 2 1   · Creatinine increased to 2 6 on 4/7/21 and now stabilized 2 49  · Continue to hold ARB  · Retroperitoneal ultrasound negative for hydronephrosis   · Continue to monitor I&Os and daily weights, PVR   · Continue torsemide 10mg   · Repeat bmp on Monday, outpatient renal f/u    Paroxysmal atrial fibrillation (HCC)  Assessment & Plan  · In and out of a fib/flutter   · Continue nebivolol and eliquis    Abnormal breast finding  Assessment & Plan  · CT: Asymmetric right breast density  Although this could represent breast tissue, a neoplastic process is not excluded and mammographic follow-up is advised  · Patient and daughter aware     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  Assessment & Plan  · Pulmonary following  · Continue Breo  · Continue prednisone taper 40mg x 3 days, dec by 10mg q3 days    Deep vein thrombosis (DVT) of left lower extremity (HCC)  Assessment & Plan  · Continue Eliquis    Essential hypertension  Assessment & Plan  · Continue Norvasc 5 mg daily and Bystolic 10 mg daily  · Hold Losartan due to TIANA     Cerebrovascular accident (CVA) ([facility] [address] )  Assessment & Plan  · Continue statin and Eliquis    Discharging Physician / Practitioner: Jerri Acosta PA-C  PCP: Cathy Granados DO  Admission Date:   Admission Orders (From admission, onward)     Ordered        04/04/21 2352  Inpatient Admission  Once                   Discharge Date: 04/10/21    Resolved Problems  Date Reviewed: 4/10/2021          Resolved    Toxic metabolic encephalopathy 1/3/2171     Resolved by  Gilberto Christina PA-C    Mild persistent asthma without complication 6/2/5679     Resolved by  Inna FridayELIZA    Hyponatremia 4/7/2021     Resolved by  James Robertson MD        Consultations During Hospital Stay:  · Pulmonology   · Cardiology  · Nephrology     Procedures Performed:   · Barium swallow 4/7/21    Significant Findings / Test Results:   · CXR 4/4- Mild pulmonary vascular congestion   Focal right upper lobe groundglass opacity can be due to asymmetric pulmonary edema versus pneumonia  · CTH- Sinus disease as described but no acute intracranial abnormality seen otherwise     · CT chest- Right greater than left airspace opacities is consistent with pneumonia  There is layering moderate effusion on the right  Gallstone  Asymmetric right breast density   Although this could represent breast tissue, a neoplastic process is not excluded and mammographic follow-up is advised  · US kidney and bladder- No hydronephrosis   Bilateral cysts with echogenic kidneys consistent with chronic medical renal disease  · ECHOCARDIOGRAM- LVEF 96-52%, grade 1 diastolic dysfunction mild to moderate aortic stenosis  · Acute on chronic hypoxic and hypercapnic respiratory failure  · Acute diastolic heart failure  · Acute kidney    Incidental Findings:   · Asymmetric right breast density   Although this could represent breast tissue, a neoplastic process is not excluded and mammographic follow-up is advised  Test Results Pending at Discharge (will require follow up): · None      Outpatient Tests Requested:  · Follow up with PCP, Cards, pulm, nephro  · Repeat BMP on Monday     Complications:  None     Reason for Admission: SOB    Hospital Course: Herson Vasquez is a 80 y o  female patient with past medical history significant for chronic respiratory failure on 2 L nasal cannula, chronic kidney disease, chronic diastolic heart failure, history of stroke, hypertension, history of DVT, restrictive lung disease, asthma, who originally presented to the hospital on 4/4/2021 due to shortness of breath  Patient was felt to have possible acute on chronic diastolic heart failure and started on IV Lasix Cardiology was contacted  She initially on 6 L nasal cannula pulmonology following  She had a acute kidney injury and worsen and Nephrology was consulted  She was started Solu-Medrol  She was successfully weaned to baseline 2 L nasal cannula and shortness of breath significantly  Diuretics were resumed at lower dose and losartan was held  Patient was tolerating her Trelegy while hospitalize outpatient compliance was encouraged  She was evaluated by PT and OT and recommended short-term rehab  Will likely need a follow-up to maintain normal volume  She need lab work and follow-up  Please see above list of diagnoses and related plan for additional information  Condition at Discharge: stable     Discharge Day Visit / Exam:     Subjective:  Pt walked to the bathroom and then to chair with my assistance  We got ready for breakfast and talked about discharge  States she slept well and wore her mask all night  Denies SOB or CP  During walking she maintains O2 sats 89-92% on 2L and HRs 68-70bpm      Vitals: Blood Pressure: 123/80 (04/10/21 0927)  Pulse: 64 (04/10/21 0927)  Temperature: 98 3 °F (36 8 °C) (04/09/21 2314)  Temp Source: Oral (04/09/21 1000)  Respirations: 18 (04/09/21 2314)  Height: 5' (152 4 cm) (04/05/21 0105)  Weight - Scale: 65 6 kg (144 lb 9 6 oz) (04/10/21 0600)  SpO2: (!) 84 % (04/10/21 0927)    Exam:   Physical Exam  Constitutional:       Appearance: Normal appearance  HENT:      Head: Normocephalic and atraumatic  Mouth/Throat:      Mouth: Mucous membranes are moist    Eyes:      Extraocular Movements: Extraocular movements intact  Neck:      Musculoskeletal: Normal range of motion and neck supple  Cardiovascular:      Rate and Rhythm: Normal rate and regular rhythm  Pulmonary:      Effort: Pulmonary effort is normal       Breath sounds: Normal breath sounds  Abdominal:      General: Abdomen is flat  Palpations: Abdomen is soft  Musculoskeletal: Normal range of motion  General: No swelling  Comments: Ambulating with walker, normal gait    Skin:     General: Skin is warm and dry  Neurological:      General: No focal deficit present  Mental Status: She is alert and oriented to person, place, and time     Psychiatric:         Mood and Affect: Mood normal          Behavior: Behavior normal        Discussion with Family: jocelynn gonzalez     Discharge instructions/Information to patient and family:   See after visit summary for information provided to patient and family  Provisions for Follow-Up Care:  See after visit summary for information related to follow-up care and any pertinent home health orders  Disposition:     Lucretia Angulo (see below)    For Discharges to [facility] SNF:   · [facility] / Aracely Coronel at [address] [place] [address] - Not Applicable to this Patient    Planned Readmission: None      Discharge Statement:  I spent 45 minutes discharging the patient  This time was spent on the day of discharge  I had direct contact with the patient on the day of discharge  Greater than 50% of the total time was spent examining patient, answering all patient questions, arranging and discussing plan of care with patient as well as directly providing post-discharge instructions  Additional time then spent on discharge activities  Discharge Medications:  See after visit summary for reconciled discharge medications provided to patient and family        ** Please Note: This note has been constructed using a voice recognition system **

## 2021-04-10 NOTE — ASSESSMENT & PLAN NOTE
· Presented on 6L NC, now stable on baseline 2L    · Likely multifactorial, 2/2 CHF, restrictive lung disease from kyphoscoliosis, lack of compliance with trilogy overnight  · ABG initially showed a pCO2 of 89 and a pH of 7 1 which improved with BiPAP use  · Tolerating triligy better, settings adjusted per RT   · CT chest showed bilateral opacities suggesting possible underlying pneumonia as well as a right-sided moderate pleural effusion    · No indication for thoracentesis   · PCT elevated but she is afebrile without leukocytosis, monitor off abx since she is improved   · Continue triligy while asleep and as needed

## 2021-04-10 NOTE — PLAN OF CARE
Problem: Potential for Falls  Goal: Patient will remain free of falls  Description: INTERVENTIONS:  - Assess patient frequently for physical needs  -  Identify cognitive and physical deficits and behaviors that affect risk of falls  -  [place] fall precautions as indicated by assessment   - Educate patient/family on patient safety including physical limitations  - Instruct patient to call for assistance with activity based on assessment  - Modify environment to reduce risk of injury  - Consider OT/PT consult to assist with strengthening/mobility  Outcome: Progressing     Problem: Nutrition/Hydration-ADULT  Goal: Nutrient/Hydration intake appropriate for improving, restoring or maintaining nutritional needs  Description: Monitor and assess patient's nutrition/hydration status for malnutrition  Collaborate with interdisciplinary team and initiate plan and interventions as ordered  Monitor patient's weight and dietary intake as ordered or per policy  Utilize nutrition screening tool and intervene as necessary  Determine patient's food preferences and provide high-protein, high-caloric foods as appropriate       INTERVENTIONS:  - Monitor oral intake, urinary output, labs, and treatment plans  - Assess nutrition and hydration status and recommend course of action  - Evaluate amount of meals eaten  - Assist patient with eating if necessary   - Allow adequate time for meals  - Recommend/ encourage appropriate diets, oral nutritional supplements, and vitamin/mineral supplements  - Assess need for intravenous fluids  - Provide specific nutrition/hydration education as appropriate  - Include patient/family/caregiver in decisions related to nutrition  Outcome: Progressing     Problem: RESPIRATORY - ADULT  Goal: Achieves optimal ventilation and oxygenation  Description: INTERVENTIONS:  - Assess for changes in respiratory status  - Assess for changes in mentation and behavior  - Position to facilitate oxygenation and minimize respiratory effort  - Oxygen administered by appropriate delivery if ordered  - Initiate smoking cessation education as indicated  - Encourage broncho-pulmonary hygiene including cough, deep breathe, Incentive Spirometry  - Assess the need for suctioning and aspirate as needed  - Assess and instruct to report SOB or any respiratory difficulty  - Respiratory Therapy support as indicated  Outcome: Progressing     Problem: Prexisting or High Potential for Compromised Skin Integrity  Goal: Skin integrity is maintained or improved  Description: INTERVENTIONS:  - Identify patients at risk for skin breakdown  - Assess and monitor skin integrity  - Assess and monitor nutrition and hydration status  - Monitor labs   - Assess for incontinence   - Turn and reposition patient  - Assist with mobility/ambulation  - Relieve pressure over bony prominences  - Avoid friction and shearing  - Provide appropriate hygiene as needed including keeping skin clean and dry  - Evaluate need for skin moisturizer/barrier cream  - Collaborate with interdisciplinary team   - Patient/family teaching  - Consider wound care consult   Outcome: Progressing

## 2021-04-10 NOTE — PLAN OF CARE
Problem: Potential for Falls  Goal: Patient will remain free of falls  Description: INTERVENTIONS:  - Assess patient frequently for physical needs  -  Identify cognitive and physical deficits and behaviors that affect risk of falls  -  [place] fall precautions as indicated by assessment   - Educate patient/family on patient safety including physical limitations  - Instruct patient to call for assistance with activity based on assessment  - Modify environment to reduce risk of injury  - Consider OT/PT consult to assist with strengthening/mobility  Outcome: Adequate for Discharge     Problem: Nutrition/Hydration-ADULT  Goal: Nutrient/Hydration intake appropriate for improving, restoring or maintaining nutritional needs  Description: Monitor and assess patient's nutrition/hydration status for malnutrition  Collaborate with interdisciplinary team and initiate plan and interventions as ordered  Monitor patient's weight and dietary intake as ordered or per policy  Utilize nutrition screening tool and intervene as necessary  Determine patient's food preferences and provide high-protein, high-caloric foods as appropriate       INTERVENTIONS:  - Monitor oral intake, urinary output, labs, and treatment plans  - Assess nutrition and hydration status and recommend course of action  - Evaluate amount of meals eaten  - Assist patient with eating if necessary   - Allow adequate time for meals  - Recommend/ encourage appropriate diets, oral nutritional supplements, and vitamin/mineral supplements  - Assess need for intravenous fluids  - Provide specific nutrition/hydration education as appropriate  - Include patient/family/caregiver in decisions related to nutrition  Outcome: Adequate for Discharge     Problem: RESPIRATORY - ADULT  Goal: Achieves optimal ventilation and oxygenation  Description: INTERVENTIONS:  - Assess for changes in respiratory status  - Assess for changes in mentation and behavior  - Position to facilitate oxygenation and minimize respiratory effort  - Oxygen administered by appropriate delivery if ordered  - Initiate smoking cessation education as indicated  - Encourage broncho-pulmonary hygiene including cough, deep breathe, Incentive Spirometry  - Assess the need for suctioning and aspirate as needed  - Assess and instruct to report SOB or any respiratory difficulty  - Respiratory Therapy support as indicated  Outcome: Adequate for Discharge     Problem: Prexisting or High Potential for Compromised Skin Integrity  Goal: Skin integrity is maintained or improved  Description: INTERVENTIONS:  - Identify patients at risk for skin breakdown  - Assess and monitor skin integrity  - Assess and monitor nutrition and hydration status  - Monitor labs   - Assess for incontinence   - Turn and reposition patient  - Assist with mobility/ambulation  - Relieve pressure over bony prominences  - Avoid friction and shearing  - Provide appropriate hygiene as needed including keeping skin clean and dry  - Evaluate need for skin moisturizer/barrier cream  - Collaborate with interdisciplinary team   - Patient/family teaching  - Consider wound care consult   Outcome: Adequate for Discharge

## 2021-04-10 NOTE — PROGRESS NOTES
Patient's home medication, Welchol, was given directly to transport team to be brought with patient to [facility]  Daughter Aliya Frias signed for medication and approved it being sent over  Patient's IV was removed and Miguelito device removed as well  Patient's personal belongings are being brought home by her family  No personal items are being sent with her to [facility]

## 2021-04-10 NOTE — CASE MANAGEMENT
Patient discharging today to go to [address] [place] [address] for STR to possible LTC  Transport arranged at 12:00 today  Daughter Georgia Fournier aware  Facility and RN aware

## 2021-04-10 NOTE — ASSESSMENT & PLAN NOTE
· Nephrology following  Question if this is acute kidney injury versus progression of underlying chronic kidney disease    · Baseline creatinine 1 7 to 2 1   · Creatinine increased to 2 6 on 4/7/21 and now stabilized 2 49  · Continue to hold ARB  · Retroperitoneal ultrasound negative for hydronephrosis   · Continue to monitor I&Os and daily weights, PVR   · Continue torsemide 10mg   · Repeat bmp on Monday, outpatient renal f/u

## 2021-04-10 NOTE — ASSESSMENT & PLAN NOTE
Wt Readings from Last 3 Encounters:   04/10/21 65 6 kg (144 lb 9 6 oz)   08/18/20 68 kg (150 lb)   03/16/20 68 kg (150 lb)     · Presented with SOB, tachypnea, and hypoxia  · Pro BNP 10,884 which is up from previous of 2,657  · CXR, with evidence of vascular congestion  · Echo from March 2018: LVEF 55-60%, G2DD  · IV lasix d/c and resumed torsemide 10mg daily (instead of BID) 4/8  · Outpatient cards follow up

## 2021-04-11 LAB
ATRIAL RATE: 278 BPM
ATRIAL RATE: 300 BPM
ATRIAL RATE: 94 BPM
QRS AXIS: 37 DEGREES
QRS AXIS: 47 DEGREES
QRS AXIS: 52 DEGREES
QRSD INTERVAL: 138 MS
QRSD INTERVAL: 140 MS
QRSD INTERVAL: 142 MS
QT INTERVAL: 356 MS
QT INTERVAL: 358 MS
QT INTERVAL: 362 MS
QTC INTERVAL: 481 MS
QTC INTERVAL: 491 MS
QTC INTERVAL: 496 MS
T WAVE AXIS: -37 DEGREES
T WAVE AXIS: -37 DEGREES
T WAVE AXIS: -40 DEGREES
VENTRICULAR RATE: 110 BPM
VENTRICULAR RATE: 113 BPM
VENTRICULAR RATE: 113 BPM

## 2021-04-11 PROCEDURE — 93010 ELECTROCARDIOGRAM REPORT: CPT | Performed by: INTERNAL MEDICINE

## 2021-04-12 ENCOUNTER — NURSING HOME VISIT (OUTPATIENT)
Dept: GERIATRICS | Facility: OTHER | Age: 86
End: 2021-04-12
Payer: MEDICARE

## 2021-04-12 DIAGNOSIS — N18.9 ACUTE KIDNEY INJURY SUPERIMPOSED ON CHRONIC KIDNEY DISEASE (HCC): ICD-10-CM

## 2021-04-12 DIAGNOSIS — I48.0 PAROXYSMAL ATRIAL FIBRILLATION (HCC): ICD-10-CM

## 2021-04-12 DIAGNOSIS — J96.21 ACUTE ON CHRONIC RESPIRATORY FAILURE WITH HYPOXIA AND HYPERCAPNIA (HCC): ICD-10-CM

## 2021-04-12 DIAGNOSIS — N17.9 ACUTE KIDNEY INJURY SUPERIMPOSED ON CHRONIC KIDNEY DISEASE (HCC): ICD-10-CM

## 2021-04-12 DIAGNOSIS — I50.33 ACUTE ON CHRONIC DIASTOLIC CHF (CONGESTIVE HEART FAILURE) (HCC): Primary | ICD-10-CM

## 2021-04-12 DIAGNOSIS — I10 ESSENTIAL HYPERTENSION: Chronic | ICD-10-CM

## 2021-04-12 DIAGNOSIS — R26.2 AMBULATORY DYSFUNCTION: ICD-10-CM

## 2021-04-12 DIAGNOSIS — J96.22 ACUTE ON CHRONIC RESPIRATORY FAILURE WITH HYPOXIA AND HYPERCAPNIA (HCC): ICD-10-CM

## 2021-04-12 PROCEDURE — 99306 1ST NF CARE HIGH MDM 50: CPT | Performed by: FAMILY MEDICINE

## 2021-04-12 NOTE — PROGRESS NOTES
[address] [facility] [place] [address] [facility], [address] [facility] 31  History and Physical    NAME: Mukesh Ty  AGE: 80 y o  SEX: female [id number]    DATE OF ENCOUNTER: 4/12/2021    Code status:  No CPR    Assessment and Plan     1  Acute on chronic diastolic CHF (congestive heart failure) (HCC)     - improving     - LE swelling present     - monitor daily weights, avoid nephrotoxic meds     - cont Torsemide 10 mg po qd    2  Acute on chronic respiratory failure with hypoxia and hypercapnia (HCC)     - stable     - on 2 lit of O2 via NC     - uses Trelegy at night     - cont Breo ellipta inh as ordered     - cont with prednisone taper    3  Acute kidney injury superimposed on chronic kidney disease (HCC)     - chronic stage IV     - on renal diet     - as per daughter she will not do HD     - monitor BUN/Cr (today BUN/Cr 139/2 5)     - repeat BMP ordered     - dietary consult for renal diet ordered    4  Essential hypertension/HLD     - controlled     - cont Amlodipine 5 mg po qd     - cont Ezetimibe 10 mg po qd      -cont Nebivolol 10 mg po qd     - cont Welchol 625 mg 3 tabs po bid    5  Paroxysmal atrial fibrillation (HCC)/h/o DVT     - cont Eliquis 2 5 mg po bid    6  Ambulatory dysfunction     - PT/OT ordered     - Fall precautions in place     - uses rollator at baseline  Follow up CBC, BMP ordered  All medications and routine orders were reviewed and updated as needed  Plan discussed with: patient and staff    Chief Complaint     Seen for admission at [place] [address], a 79 y/o female with past medical history of CKD, CHF, chronic resp failure on 2 lit O2 via NC, h/o CVA, HTN, h/o DVT and asthma got admitted to the hospital with SOB due to CHF exacerbation  She initially needed 6 lit of O2 and diuretics were adjusted  She had worsening TIANA ( and Cr 2 4), Nephrology on case, treated with solumedrol   She is on Trelegy machine  She was discharged to  for subacute rehab  She was seen and examined at bedside, stable  She found sitting in wheelchait next to her bed dozing off, easy to wake up, but goes back to sleep  She is not able to provide any history  Spoke to daughter, Mis Wagner over the phone  As per daughter, she lives alone in an apartment, gets [facility] everyday for ADL care, daughter cooks for her and visits her everyday  She uses rollator at home  She has chronic renal failure for the last 12 years, followed by Nephrology, maintains strict renal diet, doesnot want dialysis  She is on chronic 2 lit of O2 via nc at home, uses Trelegy machine at night, recently not using it regularly  Staff have no concerns at this time  HISTORY:  Past Medical History:   Diagnosis Date    Arthritis     CHF (congestive heart failure) ([facility] ) 33/7275    diastolic     CO2 retention     DVT (deep vein thrombosis) in pregnancy     left LE in 2/2018 and 50 years ago    Hyperlipidemia     Hypertension     Pulmonary embolism ([facility] ) 02/06/2018    Renal disorder     ckd    Respiratory failure, acute and chronic ([facility] )     Stroke ([facility] ) 2016    left hand weakness,paresthesia   Ventilator dependence ([facility] )      Family History   Problem Relation Age of Onset    COPD Brother      Social History     Socioeconomic History    Marital status:       Spouse name: Not on file    Number of children: Not on file    Years of education: Not on file    Highest education level: Not on file   Occupational History    Not on file   Social Needs    Financial resource strain: Not on file    Food insecurity     Worry: Not on file     Inability: Not on file    Transportation needs     Medical: Not on file     Non-medical: Not on file   Tobacco Use    Smoking status: Never Smoker    Smokeless tobacco: Never Used   Substance and Sexual Activity    Alcohol use: Never     Frequency: Never     Comment: one drink at [employer] Drug use: Never    Sexual activity: Not Currently   Lifestyle    Physical activity     Days per week: Not on file     Minutes per session: Not on file    Stress: Not on file   Relationships    Social connections     Talks on phone: Not on file     Gets together: Not on file     Attends Rastafarian service: Not on file     Active member of club or organization: Not on file     Attends meetings of clubs or organizations: Not on file     Relationship status: Not on file    Intimate partner violence     Fear of current or ex partner: Not on file     Emotionally abused: Not on file     Physically abused: Not on file     Forced sexual activity: Not on file   Other Topics Concern    Not on file   Social History Narrative    Lives alone  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  Allergies: Allergies   Allergen Reactions    Statins Myalgia    Lyrica [Pregabalin] Other (See Comments)     Client says it made her "nutty"       Review of Systems     Review of Systems   Unable to perform ROS: Other   As in HPI  Medications and orders     All medications reviewed and updated in Nursing Home EMR  Objective     Vitals: T: 96 8, P: 88, R: 16, BP: 138/76, 98% on RA, Wt: 147 9 lbs    Physical Exam  Vitals signs and nursing note reviewed  Constitutional:       General: She is not in acute distress  Appearance: She is well-developed  She is obese  She is not diaphoretic  HENT:      Head: Normocephalic and atraumatic  Nose: Nose normal       Mouth/Throat:      Mouth: Mucous membranes are dry  Pharynx: Oropharynx is clear  No oropharyngeal exudate  Eyes:      General: No scleral icterus  Right eye: No discharge  Left eye: No discharge  Extraocular Movements: Extraocular movements intact  Conjunctiva/sclera: Conjunctivae normal    Neck:      Musculoskeletal: Normal range of motion and neck supple  Cardiovascular:      Rate and Rhythm: Normal rate and regular rhythm        Heart sounds: Murmur present  No friction rub  No gallop  Pulmonary:      Effort: Pulmonary effort is normal  No respiratory distress  Breath sounds: Normal breath sounds  No wheezing or rales  Chest:      Chest wall: No tenderness  Abdominal:      General: Bowel sounds are normal       Palpations: Abdomen is soft  Tenderness: There is no abdominal tenderness  There is no guarding or rebound  Musculoskeletal:         General: No tenderness or deformity  Right lower leg: Edema present  Left lower leg: Edema present  Comments: Impaired ROM   Skin:     General: Skin is warm and dry  Neurological:      Mental Status: She is alert  Cranial Nerves: No cranial nerve deficit  Comments: Oriented to self  Minimally verbal   Psychiatric:         Mood and Affect: Mood normal          Behavior: Behavior normal          Pertinent Laboratory/Diagnostic Studies: The following labs/studies were reviewed please see chart or hospital paperwork for details  Ref Range & Units 4/10/21 0415    Sodium 136 - 145 mmol/L 144    Potassium 3 5 - 5 3 mmol/L 4 5    Chloride 100 - 108 mmol/L 105    CO2 21 - 32 mmol/L 34High     ANION GAP 4 - 13 mmol/L 5    BUN 5 - 25 mg/dL 120High     Creatinine 0 60 - 1 30 mg/dL 2  47High     Comment: Standardized to IDMS reference method   Glucose 65 - 140 mg/dL 139       Calcium 8 3 - 10 1 mg/dL 9 4    eGFR ml/min/1 73sq m 16      AST 5 - 45 U/L 70High        ALT 12 - 78 U/L 112High        Alkaline Phosphatase 46 - 116 U/L 109    Total Protein 6 4 - 8 2 g/dL 7 4    Albumin 3 5 - 5 0 g/dL 2 6Low     Total Bilirubin 0 20 - 1 00 mg/dL 0 34      Ref Range & Units 4/8/21 0542    WBC 4 31 - 10 16 Thousand/uL 8 10    RBC 3 81 - 5 12 Million/uL 3 39Low     Hemoglobin 11 5 - 15 4 g/dL 9 8Low     Hematocrit 34 8 - 46 1 % 34 4Low     MCV 82 - 98 fL 102High     MCH 26 8 - 34 3 pg 28 9    MCHC 31 4 - 37 4 g/dL 28 5Low     RDW 11 6 - 15 1 % 13 1    MPV 8 9 - 12 7 fL 10 6    Platelets 210 - 722 Thousands/uL 224    nRBC /100 WBCs 0    Comment: This is an appended report  Pam Mccormick results have been appended to a previously preliminary verified report  Neutrophils Relative 43 - 75 % 94High     Comment: Smear reviewed   Immat GRANS % 0 - 2 % 1    Lymphocytes Relative 14 - 44 % 3Low     Monocytes Relative 4 - 12 % 2Low     Eosinophils Relative 0 - 6 % 0    Basophils Relative 0 - 1 % 0    Neutrophils Absolute 1 85 - 7 62 Thousands/µL 7  64High     Immature Grans Absolute 0 00 - 0 20 Thousand/uL 0 06    Lymphocytes Absolute 0 60 - 4 47 Thousands/µL 0 22Low     Monocytes Absolute 0 17 - 1 22 Thousand/µL 0 18    Eosinophils Absolute 0 00 - 0 61 Thousand/µL 0 00    Basophils Absolute 0 00 - 0 10 Thousands/µL 0 00      - Counseling Documentation: patient was counseled regarding: prognosis

## 2021-04-14 ENCOUNTER — NURSING HOME VISIT (OUTPATIENT)
Dept: GERIATRICS | Facility: OTHER | Age: 86
End: 2021-04-14
Payer: MEDICARE

## 2021-04-14 DIAGNOSIS — D63.1 ANEMIA OF CHRONIC RENAL FAILURE, STAGE 4 (SEVERE) (HCC): ICD-10-CM

## 2021-04-14 DIAGNOSIS — I50.33 ACUTE ON CHRONIC DIASTOLIC CHF (CONGESTIVE HEART FAILURE) (HCC): ICD-10-CM

## 2021-04-14 DIAGNOSIS — J96.12 CHRONIC RESPIRATORY FAILURE WITH HYPOXIA AND HYPERCAPNIA (HCC): ICD-10-CM

## 2021-04-14 DIAGNOSIS — N18.9 ACUTE KIDNEY INJURY SUPERIMPOSED ON CHRONIC KIDNEY DISEASE (HCC): ICD-10-CM

## 2021-04-14 DIAGNOSIS — I10 ESSENTIAL HYPERTENSION: Primary | Chronic | ICD-10-CM

## 2021-04-14 DIAGNOSIS — I48.0 PAROXYSMAL ATRIAL FIBRILLATION (HCC): ICD-10-CM

## 2021-04-14 DIAGNOSIS — N18.4 ANEMIA OF CHRONIC RENAL FAILURE, STAGE 4 (SEVERE) (HCC): ICD-10-CM

## 2021-04-14 DIAGNOSIS — N17.9 ACUTE KIDNEY INJURY SUPERIMPOSED ON CHRONIC KIDNEY DISEASE (HCC): ICD-10-CM

## 2021-04-14 DIAGNOSIS — J96.11 CHRONIC RESPIRATORY FAILURE WITH HYPOXIA AND HYPERCAPNIA (HCC): ICD-10-CM

## 2021-04-14 PROCEDURE — 99309 SBSQ NF CARE MODERATE MDM 30: CPT | Performed by: PHYSICIAN ASSISTANT

## 2021-04-14 NOTE — ASSESSMENT & PLAN NOTE
- BP log reviewed, soft this morning   - continue amlodipine 5 mg po qd  - continue nebivolol 10 mg po qd  - continue to monitor BP at facility

## 2021-04-14 NOTE — ASSESSMENT & PLAN NOTE
Lab Results   Component Value Date    EGFR 16 04/10/2021    EGFR 16 04/09/2021    EGFR 16 04/08/2021    CREATININE 2 47 (H) 04/10/2021    CREATININE 2 49 (H) 04/09/2021    CREATININE 2 51 (H) 04/08/2021     - creatinine stable at 2 52 compared to hospital discharge of 2 49  - continue to follow with nephrology as an outpatient   - continue renal diet   - continue CoQ

## 2021-04-14 NOTE — PROGRESS NOTES
[facility]  [address]  [phone]  [place]   [address]        NAME: Celestina Rush  AGE: 80 y o  SEX: female    DATE OF ENCOUNTER: 4/14/2021    Assessment and Plan     Chronic respiratory failure with hypoxia and hypercapnia (HCC)  - continue O2 via NC to maintain O2 sat >/= 90%  - continue prednisone taper     Essential hypertension  - BP log reviewed, soft this morning   - continue amlodipine 5 mg po qd  - continue nebivolol 10 mg po qd  - continue to monitor BP at facility     Acute on chronic diastolic CHF (congestive heart failure) (HCC)  Wt Readings from Last 3 Encounters:   04/10/21 65 6 kg (144 lb 9 6 oz)   08/18/20 68 kg (150 lb)   03/16/20 68 kg (150 lb)     - continue torsemide 10 mg po qd  - continue beta blocker  - continue low sodium diet   - monitor daily weights at facility   - f/u with cardiology as an outpatient         Paroxysmal atrial fibrillation ([facility] )  - continue eliquis 2 5mg po bid  - continue beta blocker     Acute kidney injury superimposed on chronic kidney disease ([facility] )  Lab Results   Component Value Date    EGFR 16 04/10/2021    EGFR 16 04/09/2021    EGFR 16 04/08/2021    CREATININE 2 47 (H) 04/10/2021    CREATININE 2 49 (H) 04/09/2021    CREATININE 2 51 (H) 04/08/2021     - creatinine stable at 2 52 compared to hospital discharge of 2 49  - continue to follow with nephrology as an outpatient   - continue renal diet   - continue CoQ       All medications and routine orders were reviewed and updated as needed      Chief Complaint     SNF follow-up     History of Present Illness     MB is a 80 y o  F with multiple medical comorbidities including but not limited to chronic respiratory failure on 2 L nasal cannula, chronic kidney disease, chronic diastolic heart failure, history of stroke, hypertension, history of DVT, restrictive lung disease, asthma, interviewed and examined in collaboration with nursing for evaluation of acute and chronic medical comorbidities at SNF  Pt was recently hospitalized at [facility] due to shortness of breath  Pt was started on IV lasix during hospitalization and cardiology was consulted  Pt with TIANA during hospitalization and nephrology then consulted  Pt was started on Solu-medrol and diuretics resumed at lower dose during hospitalization  Pt's home losartan d/c during hospitalization  Today, pt notes doing well overall  pt denies pain at this time  Pt denies SOB worse than her normal  Pt notes she had difficulties eating at facility at first because she follows a very strict renal diet at home and felt that she was being served meals that included foods she does not usually eat at home  Pt notes her daughters have brought foods to her from home and she has been eating that food  Pt denies GI and urinary issues  Per facility chart, pt's urinary continence varies  Per facility chart, pt's last BM 4/13/21  No other acute concerns from nursing at this time  Per OT, pt is indpendent with eating  per OT, pt requires setup assistance with oral hygiene  Per OT, pt requires supervision with upper body dressing  Per OT, pt requires moderate assistance with toileting hygiene, showering, and lower body dressing  Per PT, pt able to ambulate 50 feet x 2 with RW at contact guard assistance  The patient's allergies, past medical, surgical, social and family history were reviewed and unchanged  Review of Systems     Review of Systems   Constitutional: Positive for activity change  Negative for appetite change, chills and fever  HENT: Negative for sore throat  Respiratory: Positive for shortness of breath  Negative for cough  Cardiovascular: Negative for chest pain and palpitations  Gastrointestinal: Negative for abdominal distention, abdominal pain, diarrhea, nausea and vomiting  Genitourinary: Negative for difficulty urinating and dysuria  Musculoskeletal: Positive for arthralgias and gait problem  Neurological: Positive for weakness  Negative for dizziness, light-headedness and headaches  Psychiatric/Behavioral: Negative for suicidal ideas  Objective     Vitals: 105/6- 96 8F- 88 bpm- 18- 98% on O2 via NC    Physical Exam  Vitals signs and nursing note reviewed  Constitutional:       General: She is not in acute distress  Appearance: She is not diaphoretic  HENT:      Nose: Nose normal  No congestion or rhinorrhea  Mouth/Throat:      Mouth: Mucous membranes are moist       Pharynx: Oropharynx is clear  No oropharyngeal exudate or posterior oropharyngeal erythema  Eyes:      General: No scleral icterus  Right eye: No discharge  Left eye: No discharge  Conjunctiva/sclera: Conjunctivae normal    Cardiovascular:      Rate and Rhythm: Normal rate and regular rhythm  Heart sounds: Murmur present  Pulmonary:      Effort: No respiratory distress  Breath sounds: No stridor  Rhonchi present  No wheezing or rales  Comments: O2 via NC   Abdominal:      General: Bowel sounds are normal  There is no distension  Tenderness: There is no abdominal tenderness  There is no guarding or rebound  Musculoskeletal:      Right lower leg: Edema present  Left lower leg: Edema present  Neurological:      Mental Status: She is alert and oriented to person, place, and time     Psychiatric:      Comments: Pleasant and cooperative during exam          Pertinent Laboratory/Diagnostic Studies:  6/81/07  BASIC METABOLIC PNL  GLUCOSE 110 mg/dL 65-99 H Final   mg/dL 7-25 HH Final  Results verified  CREATININE 2 52 mg/dL 0 40-1 10 H Final  SODIUM 146 mmol/L 135-145 H Final  POTASSIUM 4 8 mmol/L 3 5-5 2 Final  CHLORIDE 111 mmol/L 100-109 H Final  CARBON DIOXIDE 32 mmol/L 23-31 H Final  CALCIUM 9 6 mg/dL 8 5-10 1 Final  ANION GAP 3 3-11 Final  eGFR, NON  AM 16 >60 L Final  eGFR,  AMER 19 >60 L Final    Current Medications   Medications reviewed and updated see facility [facility] for details      Norma Perkins PA-C  4/14/2021 4:36 PM

## 2021-04-14 NOTE — ASSESSMENT & PLAN NOTE
Wt Readings from Last 3 Encounters:   04/10/21 65 6 kg (144 lb 9 6 oz)   08/18/20 68 kg (150 lb)   03/16/20 68 kg (150 lb)     - continue torsemide 10 mg po qd  - continue beta blocker  - continue low sodium diet   - monitor daily weights at facility   - f/u with cardiology as an outpatient

## 2021-04-15 ENCOUNTER — PATIENT OUTREACH (OUTPATIENT)
Dept: CASE MANAGEMENT | Facility: OTHER | Age: 86
End: 2021-04-15

## 2021-04-16 ENCOUNTER — TELEPHONE (OUTPATIENT)
Dept: NEPHROLOGY | Facility: CLINIC | Age: 86
End: 2021-04-16

## 2021-04-16 ENCOUNTER — NURSING HOME VISIT (OUTPATIENT)
Dept: GERIATRICS | Facility: OTHER | Age: 86
End: 2021-04-16
Payer: MEDICARE

## 2021-04-16 ENCOUNTER — TELEPHONE (OUTPATIENT)
Dept: OTHER | Facility: OTHER | Age: 86
End: 2021-04-16

## 2021-04-16 ENCOUNTER — DOCUMENTATION (OUTPATIENT)
Dept: NEPHROLOGY | Facility: CLINIC | Age: 86
End: 2021-04-16

## 2021-04-16 ENCOUNTER — HOSPITAL ENCOUNTER (INPATIENT)
Facility: HOSPITAL | Age: 86
LOS: 4 days | Discharge: HOME HEALTH | DRG: 683 | End: 2021-04-20
Attending: EMERGENCY MEDICINE | Admitting: INTERNAL MEDICINE
Payer: MEDICARE

## 2021-04-16 DIAGNOSIS — N18.4 CHRONIC KIDNEY DISEASE, STAGE IV (SEVERE) (HCC): Primary | ICD-10-CM

## 2021-04-16 DIAGNOSIS — N17.9 ACUTE KIDNEY INJURY SUPERIMPOSED ON CHRONIC KIDNEY DISEASE (HCC): ICD-10-CM

## 2021-04-16 DIAGNOSIS — N18.9 ACUTE KIDNEY INJURY SUPERIMPOSED ON CHRONIC KIDNEY DISEASE (HCC): ICD-10-CM

## 2021-04-16 DIAGNOSIS — N17.9 ACUTE KIDNEY INJURY SUPERIMPOSED ON CHRONIC KIDNEY DISEASE (HCC): Primary | ICD-10-CM

## 2021-04-16 DIAGNOSIS — N17.9 ACUTE KIDNEY INJURY (HCC): Primary | ICD-10-CM

## 2021-04-16 DIAGNOSIS — N18.9 ACUTE KIDNEY INJURY SUPERIMPOSED ON CHRONIC KIDNEY DISEASE (HCC): Primary | ICD-10-CM

## 2021-04-16 PROBLEM — J96.11 CHRONIC RESPIRATORY FAILURE WITH HYPOXIA (HCC): Status: ACTIVE | Noted: 2021-04-05

## 2021-04-16 PROBLEM — I50.32 CHRONIC DIASTOLIC CONGESTIVE HEART FAILURE (HCC): Status: ACTIVE | Noted: 2017-08-27

## 2021-04-16 LAB
ALBUMIN SERPL BCP-MCNC: 2.5 G/DL (ref 3.5–5)
ALP SERPL-CCNC: 83 U/L (ref 46–116)
ALT SERPL W P-5'-P-CCNC: 118 U/L (ref 12–78)
ANION GAP SERPL CALCULATED.3IONS-SCNC: 7 MMOL/L (ref 4–13)
ANISOCYTOSIS BLD QL SMEAR: PRESENT
AST SERPL W P-5'-P-CCNC: 17 U/L (ref 5–45)
BASE EX.OXY STD BLDV CALC-SCNC: 88.1 % (ref 60–80)
BASE EXCESS BLDV CALC-SCNC: -0.1 MMOL/L
BASO STIPL BLD QL SMEAR: PRESENT
BASOPHILS # BLD MANUAL: 0 THOUSAND/UL (ref 0–0.1)
BASOPHILS NFR MAR MANUAL: 0 % (ref 0–1)
BILIRUB SERPL-MCNC: 0.26 MG/DL (ref 0.2–1)
BUN SERPL-MCNC: 133 MG/DL (ref 5–25)
CALCIUM ALBUM COR SERPL-MCNC: 10 MG/DL (ref 8.3–10.1)
CALCIUM SERPL-MCNC: 8.8 MG/DL (ref 8.3–10.1)
CHLORIDE SERPL-SCNC: 104 MMOL/L (ref 100–108)
CO2 SERPL-SCNC: 30 MMOL/L (ref 21–32)
CREAT SERPL-MCNC: 2.79 MG/DL (ref 0.6–1.3)
EOSINOPHIL # BLD MANUAL: 0 THOUSAND/UL (ref 0–0.4)
EOSINOPHIL NFR BLD MANUAL: 0 % (ref 0–6)
ERYTHROCYTE [DISTWIDTH] IN BLOOD BY AUTOMATED COUNT: 12.9 % (ref 11.6–15.1)
EXT GLUCOSE BLD: 114
EXTERNAL BUN: 139
EXTERNAL CALCIUM: 8.8
EXTERNAL CHLORIDE: 107
EXTERNAL CO2: 26
EXTERNAL CREATININE: 2.63
EXTERNAL EGFR: 15
EXTERNAL POTASSIUM: 4.8
EXTERNAL SODIUM: 141
GFR SERPL CREATININE-BSD FRML MDRD: 14 ML/MIN/1.73SQ M
GLUCOSE SERPL-MCNC: 292 MG/DL (ref 65–140)
HCO3 BLDV-SCNC: 28.3 MMOL/L (ref 24–30)
HCT VFR BLD AUTO: 37.1 % (ref 34.8–46.1)
HGB BLD-MCNC: 10.6 G/DL (ref 11.5–15.4)
HYPERCHROMIA BLD QL SMEAR: PRESENT
LYMPHOCYTES # BLD AUTO: 0.3 THOUSAND/UL (ref 0.6–4.47)
LYMPHOCYTES # BLD AUTO: 4 % (ref 14–44)
MCH RBC QN AUTO: 28.8 PG (ref 26.8–34.3)
MCHC RBC AUTO-ENTMCNC: 28.6 G/DL (ref 31.4–37.4)
MCV RBC AUTO: 101 FL (ref 82–98)
METAMYELOCYTES NFR BLD MANUAL: 2 % (ref 0–1)
MONOCYTES # BLD AUTO: 0.07 THOUSAND/UL (ref 0–1.22)
MONOCYTES NFR BLD: 1 % (ref 4–12)
MYELOCYTES NFR BLD MANUAL: 1 % (ref 0–1)
NEUTROPHILS # BLD MANUAL: 6.88 THOUSAND/UL (ref 1.85–7.62)
NEUTS BAND NFR BLD MANUAL: 2 % (ref 0–8)
NEUTS SEG NFR BLD AUTO: 90 % (ref 43–75)
NRBC BLD AUTO-RTO: 0 /100 WBCS
O2 CT BLDV-SCNC: 14.1 ML/DL
PCO2 BLDV: 65.8 MM HG (ref 42–50)
PH BLDV: 7.25 [PH] (ref 7.3–7.4)
PLATELET # BLD AUTO: 186 THOUSANDS/UL (ref 149–390)
PLATELET BLD QL SMEAR: ADEQUATE
PMV BLD AUTO: 11.6 FL (ref 8.9–12.7)
PO2 BLDV: 57.3 MM HG (ref 35–45)
POLYCHROMASIA BLD QL SMEAR: PRESENT
POTASSIUM SERPL-SCNC: 4.6 MMOL/L (ref 3.5–5.3)
PROT SERPL-MCNC: 6.5 G/DL (ref 6.4–8.2)
RBC # BLD AUTO: 3.68 MILLION/UL (ref 3.81–5.12)
SODIUM SERPL-SCNC: 141 MMOL/L (ref 136–145)
TOTAL CELLS COUNTED SPEC: 100
WBC # BLD AUTO: 7.48 THOUSAND/UL (ref 4.31–10.16)

## 2021-04-16 PROCEDURE — 80053 COMPREHEN METABOLIC PANEL: CPT | Performed by: PHYSICIAN ASSISTANT

## 2021-04-16 PROCEDURE — 93005 ELECTROCARDIOGRAM TRACING: CPT

## 2021-04-16 PROCEDURE — 36415 COLL VENOUS BLD VENIPUNCTURE: CPT | Performed by: PHYSICIAN ASSISTANT

## 2021-04-16 PROCEDURE — 99309 SBSQ NF CARE MODERATE MDM 30: CPT | Performed by: PHYSICIAN ASSISTANT

## 2021-04-16 PROCEDURE — 99223 1ST HOSP IP/OBS HIGH 75: CPT | Performed by: INTERNAL MEDICINE

## 2021-04-16 PROCEDURE — 99285 EMERGENCY DEPT VISIT HI MDM: CPT | Performed by: PHYSICIAN ASSISTANT

## 2021-04-16 PROCEDURE — 85007 BL SMEAR W/DIFF WBC COUNT: CPT | Performed by: PHYSICIAN ASSISTANT

## 2021-04-16 PROCEDURE — 99284 EMERGENCY DEPT VISIT MOD MDM: CPT

## 2021-04-16 PROCEDURE — 82805 BLOOD GASES W/O2 SATURATION: CPT | Performed by: INTERNAL MEDICINE

## 2021-04-16 PROCEDURE — 85027 COMPLETE CBC AUTOMATED: CPT | Performed by: PHYSICIAN ASSISTANT

## 2021-04-16 RX ORDER — FLUTICASONE FUROATE AND VILANTEROL 100; 25 UG/1; UG/1
1 POWDER RESPIRATORY (INHALATION) DAILY
Status: DISCONTINUED | OUTPATIENT
Start: 2021-04-17 | End: 2021-04-20 | Stop reason: HOSPADM

## 2021-04-16 RX ORDER — SENNOSIDES 8.6 MG
8.6 TABLET ORAL
Status: DISCONTINUED | OUTPATIENT
Start: 2021-04-16 | End: 2021-04-20 | Stop reason: HOSPADM

## 2021-04-16 RX ORDER — COLESEVELAM 180 1/1
1875 TABLET ORAL 2 TIMES DAILY WITH MEALS
Status: DISCONTINUED | OUTPATIENT
Start: 2021-04-17 | End: 2021-04-17 | Stop reason: CLARIF

## 2021-04-16 RX ORDER — ACETAMINOPHEN 325 MG/1
650 TABLET ORAL EVERY 6 HOURS PRN
Status: DISCONTINUED | OUTPATIENT
Start: 2021-04-16 | End: 2021-04-20 | Stop reason: HOSPADM

## 2021-04-16 RX ORDER — B-COMPLEX WITH VITAMIN C
1 TABLET ORAL 2 TIMES DAILY
Status: DISCONTINUED | OUTPATIENT
Start: 2021-04-16 | End: 2021-04-20 | Stop reason: HOSPADM

## 2021-04-16 RX ORDER — FERROUS SULFATE 325(65) MG
325 TABLET ORAL EVERY OTHER DAY
Status: DISCONTINUED | OUTPATIENT
Start: 2021-04-18 | End: 2021-04-20 | Stop reason: HOSPADM

## 2021-04-16 RX ORDER — ONDANSETRON 2 MG/ML
4 INJECTION INTRAMUSCULAR; INTRAVENOUS EVERY 6 HOURS PRN
Status: DISCONTINUED | OUTPATIENT
Start: 2021-04-16 | End: 2021-04-20 | Stop reason: HOSPADM

## 2021-04-16 RX ORDER — NEBIVOLOL 10 MG/1
10 TABLET ORAL DAILY
Status: DISCONTINUED | OUTPATIENT
Start: 2021-04-17 | End: 2021-04-20 | Stop reason: HOSPADM

## 2021-04-16 RX ORDER — AMLODIPINE BESYLATE 5 MG/1
5 TABLET ORAL DAILY
Status: DISCONTINUED | OUTPATIENT
Start: 2021-04-17 | End: 2021-04-20 | Stop reason: HOSPADM

## 2021-04-16 RX ORDER — ASCORBIC ACID 500 MG
1000 TABLET ORAL DAILY
Status: DISCONTINUED | OUTPATIENT
Start: 2021-04-17 | End: 2021-04-20 | Stop reason: HOSPADM

## 2021-04-16 RX ORDER — SODIUM CHLORIDE 9 MG/ML
100 INJECTION, SOLUTION INTRAVENOUS CONTINUOUS
Status: DISCONTINUED | OUTPATIENT
Start: 2021-04-16 | End: 2021-04-16

## 2021-04-16 RX ORDER — EZETIMIBE 10 MG/1
10 TABLET ORAL DAILY
Status: DISCONTINUED | OUTPATIENT
Start: 2021-04-17 | End: 2021-04-20 | Stop reason: HOSPADM

## 2021-04-16 RX ORDER — SODIUM CHLORIDE 9 MG/ML
75 INJECTION, SOLUTION INTRAVENOUS CONTINUOUS
Status: DISCONTINUED | OUTPATIENT
Start: 2021-04-16 | End: 2021-04-17

## 2021-04-16 RX ORDER — PANTOPRAZOLE SODIUM 40 MG/1
40 TABLET, DELAYED RELEASE ORAL
Status: DISCONTINUED | OUTPATIENT
Start: 2021-04-17 | End: 2021-04-20 | Stop reason: HOSPADM

## 2021-04-16 RX ORDER — MELATONIN
2000 DAILY
Status: DISCONTINUED | OUTPATIENT
Start: 2021-04-17 | End: 2021-04-20 | Stop reason: HOSPADM

## 2021-04-16 RX ADMIN — SODIUM CHLORIDE 75 ML/HR: 0.9 INJECTION, SOLUTION INTRAVENOUS at 21:12

## 2021-04-16 RX ADMIN — APIXABAN 2.5 MG: 2.5 TABLET, FILM COATED ORAL at 21:22

## 2021-04-16 RX ADMIN — Medication 1 TABLET: at 21:22

## 2021-04-16 NOTE — ASSESSMENT & PLAN NOTE
· Patient with baseline requirement of 2 L via nasal cannula    · Patient follows with Dr Ivette Johnson  · Continue trilogy  · Continue Breo  · Monitor SpO2  · Incentive spirometry

## 2021-04-16 NOTE — ASSESSMENT & PLAN NOTE
· /66  · Continue Norvasc and Bystolic  · Hold torsemide to to worsening creatinine level  · Monitor BP

## 2021-04-16 NOTE — PROGRESS NOTES
[facility]  [address]  [phone]  [place]   Code 31         NAME: Serena Dodson  AGE: 80 y o  SEX: female    DATE OF ENCOUNTER: 4/16/2021    Assessment and Plan     Acute kidney injury superimposed on chronic kidney disease [facility])  Lab Results   Component Value Date    EGFR 15 04/16/2021    EGFR 16 04/10/2021    EGFR 16 04/09/2021    CREATININE 2 63 04/16/2021    CREATININE 2 47 (H) 04/10/2021    CREATININE 2 49 (H) 04/09/2021     - pt sent to ed for further evaluation       All medications and routine orders were reviewed and updated as needed  Chief Complaint     "Send my mom to the ER" -- pt's daughter     History of Present Illness     MB is a 80 y o  F with multiple medical comorbidities including but not limited to chronic respiratory failure on 2 L nasal cannula, chronic kidney disease, chronic diastolic heart failure, history of stroke, hypertension, history of DVT, restrictive lung disease, asthma, interviewed and examined in collaboration with nursing for evaluation of acute and chronic medical comorbidities at SNF especially due to abnormal renal function        Pt was recently hospitalized at [facility] due to shortness of breath  Pt was started on IV lasix during hospitalization and cardiology was consulted  Pt with TIANA during hospitalization and nephrology then consulted  Pt was started on Solu-medrol and diuretics resumed at lower dose during hospitalization  Pt's home losartan d/c during hospitalization  Of note, at discharge, pt's , creatinine 2 47      Pt's family has been unhappy with facility and was adamant about pt being d/c from facility ASAP  From a therapy standpoint, therapy cleared pt for discharge as long as she was discharged home with daughter and 24/7 supervision for a discharge date of 4/17/21  BMP was followed at facility due to TIANA pathway  On 4/15/21, , creatinine 2 98   At that time, MD at facility ordered 3L 1/2 NSS with D5W @ 70 mL/hour to be started  Unfortunately, IV infiltrated overnight on 4/15/21 at which time pt had not received a full liter of fluid  On-call provider notified by facility and recommended to try to replace IV in the other arm and if unable to, call advanced PICC to start IV ASAP  BMP for 4/16 had already been ordered and showed some improvement today with , creatinine 2 63  Called pt's daughter, POA, to discuss concerns on discharging pt from a medical standpoint due to kidney function unable to be followed as closely with discharge home, concern for ARF, and concern for CHF in conjunction with IVF management  Pt daughter, Tyrone Gabriel, was very adamant about pt being d/c home on 4/17/21 and noted understanding of concern d/c pt from a medical standpoint  Pt's daughter, MARISOL, stated that she would comply with follow-up BMP rx at d/c from nursing facility and noted that she would make an appointment with nephrology for next week  This conversation between provider and POA was an at length conversation  A few hours later, nephrology called facility stating that they had received a call from the pt's other daughter and requested pt's most recent labs to be sent to office  Nursing faxed over lab results at that time  Nephrology staff called nursing and stated that pt may be in acute renal failure and should not be discharged from facility until some resolve  Nursing staff informed nephrology staff that this provider was in agreement with this; however, pt's daughter, MARISOL, was adamant about pt returning home with daughter  Per nephrology note in Epic, nephrology recommended pt's torsemide to be held and to have repeat labs on Monday and then for pt to follow-up with nephrology next week an outpatient  However, pt's other daughter then called facility and reported that nephrology stated that pt should be sent to ED  Pt's other daughter was very hollis on phone and demanded pt be sent to ED immediately   Pt then sent to ED per family request       When pt was interviewed and evaluated this morning, pt denied pain  Pt notes continued concerns with the facility not being careful with her renal diet and giving her foods that she has been told not to eat at home  Pt denies GI issues  Pt feels she is making less urine than when she arrived at facility  Pt tearful, noting that she wants to go home stating, "I can't be in this place another night "        The patient's allergies, past medical, surgical, social and family history were reviewed and unchanged  Review of Systems     Review of Systems   Constitutional: Positive for activity change  Negative for chills and fever  Appetite change: "they keep giving me things that I can't eat"   HENT: Negative for sore throat  Respiratory: Negative for cough and shortness of breath  Cardiovascular: Negative for chest pain and palpitations  Gastrointestinal: Negative for abdominal distention, abdominal pain, diarrhea, nausea and vomiting  Genitourinary: Positive for decreased urine volume  Negative for difficulty urinating and dysuria  Musculoskeletal: Positive for arthralgias and gait problem  Neurological: Positive for weakness  Negative for dizziness, light-headedness and headaches  Psychiatric/Behavioral: Negative for suicidal ideas  Objective     Vitals: 126/76- 98F- 88 bpm- 18 -98% on O2 via NC     Physical Exam  Vitals signs and nursing note reviewed  Constitutional:       General: She is not in acute distress  Appearance: She is not diaphoretic  HENT:      Head: Normocephalic and atraumatic  Ears:      Comments: + [place]      Nose: Nose normal  No congestion  Mouth/Throat:      Mouth: Mucous membranes are dry  Pharynx: Oropharynx is clear  No oropharyngeal exudate or posterior oropharyngeal erythema  Eyes:      General: No scleral icterus  Right eye: No discharge  Left eye: No discharge        Conjunctiva/sclera: Conjunctivae normal    Cardiovascular:      Rate and Rhythm: Normal rate and regular rhythm  Heart sounds: Murmur present  No friction rub  No gallop  Pulmonary:      Effort: No respiratory distress  Breath sounds: No stridor  No wheezing or rales  Comments: O2 via NC in place   Abdominal:      General: Bowel sounds are normal  There is no distension  Palpations: Abdomen is soft  Tenderness: There is no abdominal tenderness  There is no guarding or rebound  Musculoskeletal:      Right lower leg: No edema  Left lower leg: No edema  Neurological:      Mental Status: She is alert and oriented to person, place, and time  Psychiatric:      Comments: Tearful, notes wanting to go home, compliant with exam          Pertinent Laboratory/Diagnostic Studies:  0/63/80  BASIC METABOLIC PNL  GLUCOSE 887 mg/dL 65-99 H Final   mg/dL 7-25 HH Final  CREATININE 2 63 mg/dL 0 40-1 10 H Final  SODIUM 141 mmol/L 135-145 Final  POTASSIUM 4 8 mmol/L 3 5-5 2 Final  CHLORIDE 107 mmol/L 100-109 Final  CARBON DIOXIDE 26 mmol/L 23-31 Final  CALCIUM 8 8 mg/dL 8 5-10 1 Final  ANION GAP 8 3-11 Final  eGFR, NON  AM 15 >60 L Final  eGFR,  AMER 17 >60 L [place]    Current Medications   Medications reviewed and updated see facility [facility] for details      Kenzie Crook PA-C  4/16/2021 1:47 PM

## 2021-04-16 NOTE — H&P
[address] 5/12/1928, 80 y o  female MRN: [id number]  Unit/Bed#: FT 02 Encounter: [id number]  Primary Care Provider: Robert Martini DO   Date and time admitted to hospital: 4/16/2021  2:10 PM    * Acute kidney injury superimposed on chronic kidney disease [facility])  Assessment & Plan  Recent Labs     04/16/21 04/16/21  1716   CREATININE 2 63 2 79*   EGFR 15 14     Estimated Creatinine Clearance: 10 9 mL/min (A) (by C-G formula based on SCr of 2 79 mg/dL (H))  · Baseline creatinine 1 7-2 1  · Patient was recently admitted with CHF exacerbation  At that time she was also having elevated creatinine numbers  Per Nephrology note, TIANA could indicate progression of kidney disease  · Will hold torsemide today due to elevated creatinine  · Nephrology consult  · Monitor BMP  · Avoid nephrotoxins  · Avoid hypotension      Chronic respiratory failure with hypoxia (HCC)  Assessment & Plan  · Patient with baseline requirement of 2 L via nasal cannula  · Patient follows with Dr Nilam Garcia  · Continue trilogy  · Continue Breo  · Monitor SpO2  · Incentive spirometry    History of DVT (deep vein thrombosis)  Assessment & Plan  · Continue Eliquis    Chronic diastolic congestive heart failure [facility])  Assessment & Plan  Wt Readings from Last 3 Encounters:   04/16/21 66 kg (145 lb 8 1 oz)   04/10/21 65 6 kg (144 lb 9 6 oz)   08/18/20 68 kg (150 lb)     · Not in acute exacerbation  Patient appears euvolemic  · Echo from 04/05 with evidence of normal systolic function with EF 60% and G1DD  · Patient recently admitted with CHF exacerbation  · She is on Torsemide which will be held due to worsening creatinine level  · Monitor I/Os  · Monitor weight          Dyslipidemia  Assessment & Plan  · Continue ezetimibe    Essential hypertension  Assessment & Plan  · /66  · Continue Norvasc and Bystolic  · Hold torsemide to to worsening creatinine level  · Monitor BP      VTE Prophylaxis: Apixaban (Eliquis)  / sequential compression device   Code Status:  Level 3-DNA are DNI  POLST: POLST form is not discussed and not completed at this time  Anticipated Length of Stay:  Patient will be admitted on an Inpatient basis with an anticipated length of stay of  > 2 midnights  Justification for Hospital Stay: TIANA on CKD requiring nephrology consult and inpatient treatment  Chief Complaint:   Abnormal kidney function    History of Present Illness: Sho Santana is a 80 y o  female with a past medical history of CKD, hyperlipidemia, hypertension, HFpEF, who comes to the emergency department at [facility] after abnormal lab workup at rehab facility  Patient was recently admitted from 04/04/2021 to 04/10/2021 at [address] [place] [address] with acute on chronic respiratory failure and acute on chronic CHF  Echo done well admitted showed normal systolic function with EF of 60% and grade 1 diastolic dysfunction  Patient was discharged to rehab facility at [facility] on torsemide 10 mg daily  During hospitalization, patient's creatinine increased to 2 6 with a baseline of 1 7-2 1  Creatinine appear to stabilized at 2 49  Patient was follow-up closely at rehab facility with regular BMPs which showed creatinine trending up  For that reason, it was decided to transfer patient to the emergency department  Patient denies any worsening shortness of breath, chest pain, fever, chills, nausea, vomiting  Per patient's daughter, at the facility they "attempted to administer 3 L of normal saline overnight for treatment of TIANA", however the IV infiltrated and they had to discontinue treatment  On arrival to the ED, vital signs were stable  BMP remarkable for creatinine of 2 79 with   Hemoglobin at 10 6  Per nursing staff, it has been difficult to obtain IV access for which a vascular consult for midline has been placed    Patient will be admitted to the medicine service for management of TIANA on CKD and nephrology consult  Of note, per patient's daughter, they do not wish for the patient to return to rehab facility and on discharge they will like to take her home  Review of Systems:     Review of Systems   Constitutional: Negative for appetite change, chills, diaphoresis and fever  HENT: Negative  Respiratory: Negative for cough, shortness of breath and wheezing  Cardiovascular: Negative for chest pain and palpitations  Gastrointestinal: Negative for abdominal pain, constipation, diarrhea, nausea and vomiting  Genitourinary: Negative for dysuria  Musculoskeletal: Positive for gait problem  Neurological: Negative for dizziness and light-headedness  Past Medical and Surgical History:     Past Medical History:   Diagnosis Date    Arthritis     CHF (congestive heart failure) ([facility] ) 10/0720    diastolic     CO2 retention     DVT (deep vein thrombosis) in pregnancy     left LE in 2/2018 and 50 years ago    Hyperlipidemia     Hypertension     Pulmonary embolism ([facility] ) 02/06/2018    Renal disorder     ckd    Respiratory failure, acute and chronic ([facility] )     Stroke ([place] [address] ) 2016    left hand weakness,paresthesia   Ventilator dependence [facility])        Past Surgical History:   Procedure Laterality Date    ABDOMINAL SURGERY      ruputured bowel  had colostomy and ileostomy,reversed later on   BACK SURGERY      rods in back  done 30 years ago   CARPAL TUNNEL RELEASE Bilateral     CYST REMOVAL      back    HERNIA REPAIR      REPLACEMENT TOTAL KNEE BILATERAL         Meds/Allergies:    Prior to Admission medications    Medication Sig Start Date End Date Taking?  Authorizing Provider   acetaminophen (TYLENOL) 325 mg tablet Take 650 mg by mouth every 6 (six) hours as needed for mild pain   Yes Historical Provider, MD   amLODIPine (NORVASC) 5 mg tablet Take 1 tablet (5 mg total) by mouth daily 3/14/20  Yes Braulio Oquendo MD   Ascorbic Acid (VITAMIN C PO) Take 1 tablet by mouth daily   Yes Historical Provider, MD   calcium-vitamin D (OSCAL 500 + D) 500 mg-200 units per tablet Take 1 tablet by mouth 2 (two) times a day  Yes Historical Provider, MD   cholecalciferol (VITAMIN D3) 1,000 units tablet Take 2,000 Units by mouth daily   Yes Historical Provider, MD   co-enzyme Q-10 50 MG capsule Take 200 mg by mouth daily     Yes Historical Provider, MD   Eliquis 2 5 MG TAKE 1 TABLET TWO TIMES A DAY 3/8/21  Yes Rissa Ochoa MD   ezetimibe (ZETIA) 10 mg tablet Take 1 tablet (10 mg total) by mouth daily 8/19/20  Yes Silvio Alvarez MD   ferrous sulfate 325 (65 Fe) mg tablet Take 325 mg by mouth every other day   Yes Historical Provider, MD   nebivolol (Bystolic) 10 mg tablet Take 1 tablet (10 mg total) by mouth daily 8/25/20  Yes Silvio Alvarez MD   pantoprazole (PROTONIX) 40 mg tablet Take 40 mg by mouth daily as needed    Yes Historical Provider, MD   predniSONE 10 mg tablet Take 40mg Sunday, and decrease to 30mg x 3 days starting Monday, then 20mg x 3 days, 10mg x 3 days 4/10/21  Yes Miguel A Rey PA-C   psyllium (METAMUCIL) packet Take 1 packet by mouth daily 4/11/21  Yes Miguel A Rey PA-C   senna (SENOKOT) 8 6 mg Take 1 tablet (8 6 mg total) by mouth daily at bedtime as needed for constipation 4/10/21  Yes Miguel A Rey PA-C   torsemide (DEMADEX) 10 mg tablet Take 10 mg by mouth daily    Yes Historical Provider, MD   Welchol 625 MG tablet TAKE 3 TABLETS TWICE DAILY WITH MEALS AS DIRECTED 12/28/20  Yes Alicia West PA-C   fluticasone-vilanterol (BREO ELLIPTA) 100-25 mcg/inh inhaler Inhale 1 puff daily Rinse mouth after use  10/23/20 4/5/21  Eren Kelly, DO   Multiple Vitamins-Minerals (OCUVITE PO) Take 1 tablet by mouth daily      Historical Provider, MD     I have reviewed home medications with patient family member  Allergies:    Allergies   Allergen Reactions    Statins Myalgia    Lyrica [Pregabalin] Other (See Comments)     Client says it made her "nutty"       Social History:     Marital Status:    Occupation: Retired  Patient Pre-hospital Living Situation: [facility] at [facility]  Family wants to take her home on discharge  Patient Pre-hospital Level of Mobility: Unknown  Patient Pre-hospital Diet Restrictions: Renal diet  Substance Use History:   Social History     Substance and Sexual Activity   Alcohol Use Never    Frequency: Never    Comment: one drink at weddings      Social History     Tobacco Use   Smoking Status Never Smoker   Smokeless Tobacco Never Used     Social History     Substance and Sexual Activity   Drug Use Never       Family History:    Family History   Problem Relation Age of Onset    COPD Brother        Physical Exam:     Vitals:   Blood Pressure: 107/66 (04/16/21 1430)  Pulse: 60 (04/16/21 1430)  Temperature: 98 4 °F (36 9 °C) (04/16/21 1415)  Temp Source: Oral (04/16/21 1415)  Respirations: 16 (04/16/21 1430)  Height: 5' (152 4 cm) (04/16/21 1413)  Weight - Scale: 66 kg (145 lb 8 1 oz) (04/16/21 1413)  SpO2: 99 % (04/16/21 1430)    Physical Exam  Vitals signs and nursing note reviewed  Constitutional:       General: She is not in acute distress  Appearance: She is well-developed  She is not toxic-appearing or diaphoretic  Comments: 2L NC (baseline)   HENT:      Head: Normocephalic and atraumatic  Eyes:      General: No scleral icterus  Right eye: No discharge  Left eye: No discharge  Conjunctiva/sclera: Conjunctivae normal       Pupils: Pupils are equal, round, and reactive to light  Neck:      Musculoskeletal: Normal range of motion and neck supple  Vascular: No JVD  Trachea: No tracheal deviation  Cardiovascular:      Rate and Rhythm: Normal rate and regular rhythm  Pulses: Normal pulses  Heart sounds: Normal heart sounds  No murmur  No friction rub  No gallop  Pulmonary:      Effort: Pulmonary effort is normal  No respiratory distress        Breath sounds: Normal breath sounds  No stridor  No wheezing or rales  Abdominal:      General: Bowel sounds are normal  There is no distension  Palpations: Abdomen is soft  Tenderness: There is no abdominal tenderness  There is no guarding  Musculoskeletal: Normal range of motion  Right lower leg: No edema  Left lower leg: No edema  Skin:     General: Skin is warm  Capillary Refill: Capillary refill takes less than 2 seconds  Coloration: Skin is not jaundiced  Findings: Bruising (RUE bruising at the site of IV infiltration at facility) present  Neurological:      Mental Status: She is alert and oriented to person, place, and time  Cranial Nerves: No cranial nerve deficit  Psychiatric:         Mood and Affect: Mood normal          Behavior: Behavior normal          Thought Content: Thought content normal          Judgment: Judgment normal              Additional Data:     Lab Results: I have personally reviewed pertinent reports  Results from last 7 days   Lab Units 04/16/21  1716   WBC Thousand/uL 7 48   HEMOGLOBIN g/dL 10 6*   HEMATOCRIT % 37 1   PLATELETS Thousands/uL 186   LYMPHO PCT % 4*   MONO PCT % 1*   EOS PCT % 0     Results from last 7 days   Lab Units 04/16/21  1716   POTASSIUM mmol/L 4 6   CHLORIDE mmol/L 104   CO2 mmol/L 30   BUN mg/dL 133*   CREATININE mg/dL 2 79*   CALCIUM mg/dL 8 8   ALK PHOS U/L 83   ALT U/L 118*   AST U/L 17           Imaging: I have personally reviewed pertinent reports  Xr Chest 1 View Portable    Result Date: 4/5/2021  Narrative: CHEST INDICATION:   sob  COMPARISON:  Chest radiograph dated 12/7/2018  EXAM PERFORMED/VIEWS:  XR CHEST PORTABLE FINDINGS: Cardiomediastinal silhouette is enlarged but unchanged from prior exam  Mild pulmonary vascular congestion  Focal right upper lobe groundglass opacity can be due to asymmetric pulmonary edema versus pneumonia  No pneumothorax or pleural effusion  Thoracolumbar rods are again noted  Impression: Mild pulmonary vascular congestion  Focal right upper lobe groundglass opacity can be due to asymmetric pulmonary edema versus pneumonia  Workstation performed: [id number]     Ct Head Without Contrast    Result Date: 4/4/2021  Narrative: CT BRAIN - WITHOUT CONTRAST INDICATION:   Altered mental status altered mental status  COMPARISON:  CT head dated 3/6/2018 TECHNIQUE:  CT examination of the brain was performed  In addition to axial images, sagittal and coronal 2D reformatted images were created and submitted for interpretation  Radiation dose length product (DLP) for this visit:  913 67 mGy-cm   This examination, like all CT scans performed in the [facility], was performed utilizing techniques to minimize radiation dose exposure, including the use of iterative  reconstruction and automated exposure control  IMAGE QUALITY:  Diagnostic  FINDINGS: PARENCHYMA: Decreased attenuation is noted in periventricular and subcortical white matter demonstrating an appearance that is statistically most likely to represent moderate microangiopathic change  Mineralization of bilateral basal ganglia No CT signs of acute territorial infarction  No intracranial mass, mass effect or midline shift  No acute parenchymal hemorrhage  Gray-white differentiation appears maintained  Mild parenchymal atrophy  VENTRICLES AND EXTRA-AXIAL SPACES:  Ventricles and extra-axial CSF spaces are prominent commensurate with the degree of volume loss  No hydrocephalus  No acute extra-axial hemorrhage  VISUALIZED ORBITS AND PARANASAL SINUSES:  Small amount of fluid in the sphenoid sinus, otherwise grossly unremarkable  CALVARIUM AND EXTRACRANIAL SOFT TISSUES:  Normal      Impression: Sinus disease as described but no acute intracranial abnormality seen otherwise  Other findings as above   Workstation performed: [id number]     Ct Chest Wo Contrast    Result Date: 4/7/2021  Narrative: CT CHEST WITHOUT IV CONTRAST INDICATION:   Shortness of breath evaluate pulmonary infiltrates  COMPARISON:  Chest x-ray from 4/4/2021 TECHNIQUE: CT examination of the chest was performed without intravenous contrast   Axial, sagittal, and coronal 2D reformatted images were created from the source data and submitted for interpretation  Motion artifacts are present  Radiation dose length product (DLP) for this visit:  232 74 mGy-cm   This examination, like all CT scans performed in the [facility], was performed utilizing techniques to minimize radiation dose exposure, including the use of iterative  reconstruction and automated exposure control  FINDINGS: LUNGS:  Right upper lobe airspace opacities are noted with smaller airspace opacities in the left upper lobe  A few patchy airspace opacities are also noted in the right middle lobe  The lower lobes are relatively spared  There is no tracheal or endobronchial lesion  PLEURA:  There is a moderate sized right effusion with adjacent atelectasis identified as well as small left effusion HEART/GREAT VESSELS:  Coronary calcification is present  MEDIASTINUM AND JOSE MIGUEL:  Unremarkable  CHEST WALL AND LOWER NECK:   Asymmetric density is noted in the right breast compared to the left  VISUALIZED STRUCTURES IN THE UPPER ABDOMEN:  Gallstone is evident  OSSEOUS STRUCTURES:  No acute fracture or destructive osseous lesion  Orthopedic hardware in the spine causes streak artifact limiting the study  Impression: Right greater than left airspace opacities is consistent with pneumonia  There is layering moderate effusion on the right  Gallstone  Asymmetric right breast density  Although this could represent breast tissue, a neoplastic process is not excluded and mammographic follow-up is advised   The study was discussed with Dr Daniela Gill on 4/7/2021 Workstation performed: [id number]     Fl Barium Swallow Video W Speech    Result Date: 4/7/2021  Narrative: A video barium swallow study was performed by the Department of Speech Pathology  Please refer to the report for the official interpretation  The images are stored for archival purposes only  Study images were not formally reviewed by the Radiology Department  Us Kidney And Bladder    Result Date: 4/7/2021  Narrative: RENAL ULTRASOUND INDICATION:   TIANA  COMPARISON: 8/28/2017 TECHNIQUE:   Ultrasound of the retroperitoneum was performed with a curvilinear transducer utilizing volumetric sweeps and still imaging techniques  FINDINGS: KIDNEYS: Symmetric and normal size  Right kidney:  9 1 x 4 9 x 4 9 cm  Left kidney:  9 4 x 4 3 x 5 0 cm  Right kidney The renal parenchyma is diffusely echogenic consistent with medical renal disease  No suspicious masses detected  There are renal cysts measuring up to 3 cm  No hydronephrosis  No shadowing calculi  No perinephric fluid collections  Left kidney The renal parenchyma is diffusely echogenic consistent with medical renal disease  No suspicious masses detected  Multiple small cysts are present measuring up to 2 7 cm  No hydronephrosis  No shadowing calculi  No perinephric fluid collections  URETERS: Nonvisualized  BLADDER: Normally distended  No focal thickening or mass lesions  Right jet only was visualized  Impression: No hydronephrosis  Bilateral cysts with echogenic kidneys consistent with chronic medical renal disease  Workstation performed: [id number]       EKG, Pathology, and Other Studies Reviewed on Admission:   · EKG: Pending    [facility] / Care Everywhere Records Reviewed: Yes     ** Please Note: This note has been constructed using a voice recognition system   **

## 2021-04-16 NOTE — ASSESSMENT & PLAN NOTE
Lab Results   Component Value Date    EGFR 15 04/16/2021    EGFR 16 04/10/2021    EGFR 16 04/09/2021    CREATININE 2 63 04/16/2021    CREATININE 2 47 (H) 04/10/2021    CREATININE 2 49 (H) 04/09/2021     - pt sent to ed for further evaluation

## 2021-04-16 NOTE — ED NOTES
Patient placed on commode, had large, soft BM  Is c/o sensation of incomplete evacuation        Anum Osorio RN  04/16/21 1919

## 2021-04-16 NOTE — ASSESSMENT & PLAN NOTE
Recent Labs     04/16/21 04/16/21  1716   CREATININE 2 63 2 79*   EGFR 15 14     Estimated Creatinine Clearance: 10 9 mL/min (A) (by C-G formula based on SCr of 2 79 mg/dL (H))  · Baseline creatinine 1 7-2 1  · Patient was recently admitted with CHF exacerbation  At that time she was also having elevated creatinine numbers  Per Nephrology note, TIANA could indicate progression of kidney disease    · Will hold torsemide today due to elevated creatinine  · Nephrology consult  · Monitor BMP  · Avoid nephrotoxins  · Avoid hypotension

## 2021-04-16 NOTE — TELEPHONE ENCOUNTER
[phone]/[id number] from [place]/Pt is Geovanna Osoriojessie  1928/Pt's IV was infiltrated and now her arm is swollen and there is no where else to place the IV  Chanelle Vazquez was paged at [phone]    Please allow 20-30 mins for the provider to call you back  If you do not hear from the provider, please call us back

## 2021-04-16 NOTE — TELEPHONE ENCOUNTER
Pt's daughter Alicia Frazier called, pt in the nursing home,  Creatinine today 2 63, yesterday 2 98, pt got a bag of fluids yesterday, scheduled for more today  Nurse at the nursing home confirmed that pt is on Torsemide 10 mg daily  Per Dr Macey Pak to hold torsemide, pt to have repeat stat labs on Monday  Pt will need appt with us some time next week  Pt will be going home tomorrow  Daughter agreeable to take her Monday to [facility] for repeat labs, they will call to schedule appt, they will need to coordinate with aide  Orders in the system

## 2021-04-16 NOTE — ASSESSMENT & PLAN NOTE
Wt Readings from Last 3 Encounters:   04/16/21 66 kg (145 lb 8 1 oz)   04/10/21 65 6 kg (144 lb 9 6 oz)   08/18/20 68 kg (150 lb)     · Not in acute exacerbation  Patient appears euvolemic  · Echo from 04/05 with evidence of normal systolic function with EF 60% and G1DD  · Patient recently admitted with CHF exacerbation  · She is on Torsemide which will be held due to worsening creatinine level  · Monitor I/Os  · Monitor weight

## 2021-04-17 LAB
ANION GAP SERPL CALCULATED.3IONS-SCNC: 3 MMOL/L (ref 4–13)
ATRIAL RATE: 131 BPM
BUN SERPL-MCNC: 126 MG/DL (ref 5–25)
CALCIUM SERPL-MCNC: 8.7 MG/DL (ref 8.3–10.1)
CHLORIDE SERPL-SCNC: 112 MMOL/L (ref 100–108)
CO2 SERPL-SCNC: 30 MMOL/L (ref 21–32)
CREAT SERPL-MCNC: 2.58 MG/DL (ref 0.6–1.3)
ERYTHROCYTE [DISTWIDTH] IN BLOOD BY AUTOMATED COUNT: 12.7 % (ref 11.6–15.1)
GFR SERPL CREATININE-BSD FRML MDRD: 16 ML/MIN/1.73SQ M
GLUCOSE SERPL-MCNC: 130 MG/DL (ref 65–140)
HCT VFR BLD AUTO: 34.9 % (ref 34.8–46.1)
HGB BLD-MCNC: 10.6 G/DL (ref 11.5–15.4)
MAGNESIUM SERPL-MCNC: 2.5 MG/DL (ref 1.6–2.6)
MCH RBC QN AUTO: 29.8 PG (ref 26.8–34.3)
MCHC RBC AUTO-ENTMCNC: 30.4 G/DL (ref 31.4–37.4)
MCV RBC AUTO: 98 FL (ref 82–98)
PLATELET # BLD AUTO: 174 THOUSANDS/UL (ref 149–390)
PMV BLD AUTO: 11.1 FL (ref 8.9–12.7)
POTASSIUM SERPL-SCNC: 4.7 MMOL/L (ref 3.5–5.3)
QRS AXIS: -1 DEGREES
QRSD INTERVAL: 164 MS
QT INTERVAL: 416 MS
QTC INTERVAL: 512 MS
RBC # BLD AUTO: 3.56 MILLION/UL (ref 3.81–5.12)
SODIUM SERPL-SCNC: 145 MMOL/L (ref 136–145)
T WAVE AXIS: -23 DEGREES
VENTRICULAR RATE: 91 BPM
WBC # BLD AUTO: 8.83 THOUSAND/UL (ref 4.31–10.16)

## 2021-04-17 PROCEDURE — 94760 N-INVAS EAR/PLS OXIMETRY 1: CPT

## 2021-04-17 PROCEDURE — 93010 ELECTROCARDIOGRAM REPORT: CPT | Performed by: INTERNAL MEDICINE

## 2021-04-17 PROCEDURE — 85027 COMPLETE CBC AUTOMATED: CPT | Performed by: INTERNAL MEDICINE

## 2021-04-17 PROCEDURE — 83735 ASSAY OF MAGNESIUM: CPT | Performed by: INTERNAL MEDICINE

## 2021-04-17 PROCEDURE — 80048 BASIC METABOLIC PNL TOTAL CA: CPT | Performed by: INTERNAL MEDICINE

## 2021-04-17 PROCEDURE — 97163 PT EVAL HIGH COMPLEX 45 MIN: CPT | Performed by: PHYSICAL THERAPIST

## 2021-04-17 PROCEDURE — 99232 SBSQ HOSP IP/OBS MODERATE 35: CPT | Performed by: HOSPITALIST

## 2021-04-17 PROCEDURE — 99222 1ST HOSP IP/OBS MODERATE 55: CPT | Performed by: INTERNAL MEDICINE

## 2021-04-17 RX ORDER — CHOLESTYRAMINE LIGHT 4 G/5.7G
4 POWDER, FOR SUSPENSION ORAL 2 TIMES DAILY WITH MEALS
Status: DISCONTINUED | OUTPATIENT
Start: 2021-04-17 | End: 2021-04-20 | Stop reason: HOSPADM

## 2021-04-17 RX ORDER — POLYETHYLENE GLYCOL 3350 17 G/17G
17 POWDER, FOR SOLUTION ORAL DAILY PRN
Status: DISCONTINUED | OUTPATIENT
Start: 2021-04-17 | End: 2021-04-20 | Stop reason: HOSPADM

## 2021-04-17 RX ORDER — SODIUM CHLORIDE 9 MG/ML
75 INJECTION, SOLUTION INTRAVENOUS CONTINUOUS
Status: DISCONTINUED | OUTPATIENT
Start: 2021-04-17 | End: 2021-04-19

## 2021-04-17 RX ADMIN — Medication 1 PACKET: at 08:21

## 2021-04-17 RX ADMIN — FLUTICASONE FUROATE AND VILANTEROL TRIFENATATE 1 PUFF: 100; 25 POWDER RESPIRATORY (INHALATION) at 10:57

## 2021-04-17 RX ADMIN — APIXABAN 2.5 MG: 2.5 TABLET, FILM COATED ORAL at 08:21

## 2021-04-17 RX ADMIN — Medication 1 TABLET: at 17:23

## 2021-04-17 RX ADMIN — SENNOSIDES 8.6 MG: 8.6 TABLET, FILM COATED ORAL at 13:13

## 2021-04-17 RX ADMIN — AMLODIPINE BESYLATE 5 MG: 5 TABLET ORAL at 08:21

## 2021-04-17 RX ADMIN — APIXABAN 2.5 MG: 2.5 TABLET, FILM COATED ORAL at 17:23

## 2021-04-17 RX ADMIN — PANTOPRAZOLE SODIUM 40 MG: 40 TABLET, DELAYED RELEASE ORAL at 06:29

## 2021-04-17 RX ADMIN — OXYCODONE HYDROCHLORIDE AND ACETAMINOPHEN 1000 MG: 500 TABLET ORAL at 08:22

## 2021-04-17 RX ADMIN — SODIUM CHLORIDE 75 ML/HR: 0.9 INJECTION, SOLUTION INTRAVENOUS at 23:48

## 2021-04-17 RX ADMIN — SODIUM CHLORIDE 75 ML/HR: 0.9 INJECTION, SOLUTION INTRAVENOUS at 13:14

## 2021-04-17 RX ADMIN — CHOLESTYRAMINE 4 G: 4 POWDER, FOR SUSPENSION ORAL at 17:23

## 2021-04-17 RX ADMIN — Medication 2000 UNITS: at 08:22

## 2021-04-17 RX ADMIN — EZETIMIBE 10 MG: 10 TABLET ORAL at 08:21

## 2021-04-17 RX ADMIN — NEBIVOLOL HYDROCHLORIDE 10 MG: 10 TABLET ORAL at 08:22

## 2021-04-17 RX ADMIN — POLYETHYLENE GLYCOL 3350 17 G: 17 POWDER, FOR SOLUTION ORAL at 13:13

## 2021-04-17 RX ADMIN — Medication 1 TABLET: at 08:22

## 2021-04-17 NOTE — ED NOTES
HR noted to be fluctuating-going as high as 124 and back down to 90s  Patient placed on monitor, noted to be having frequent PVCs  VSS  Will notify HERMAN Velasco RN  04/16/21 [id number]

## 2021-04-17 NOTE — ASSESSMENT & PLAN NOTE
· Patient with baseline requirement of 2 L via nasal cannula    · Patient follows with Dr Ced Agosto  · Continue trilogy  · Continue Breo  · Monitor SpO2  · Incentive spirometry

## 2021-04-17 NOTE — PROGRESS NOTES
[facility]  Progress Note - [address] 5/12/1928, 80 y o  female MRN: [id number]  Unit/Bed#: W -01 Encounter: [id number]  Primary Care Provider: Adrián Sotelo DO   Date and time admitted to hospital: 4/16/2021  2:10 PM    * Acute kidney injury superimposed on chronic kidney disease [facility])  Assessment & Plan  Recent Labs     04/16/21 04/16/21  1716 04/17/21  0507   CREATININE 2 63  --  2 79* 2 58*   EGFR 15   < > 14 16    < > = values in this interval not displayed  Estimated Creatinine Clearance: 11 9 mL/min (A) (by C-G formula based on SCr of 2 58 mg/dL (H))  · Baseline creatinine 1 7-2 1  · Patient was recently admitted with CHF exacerbation  At that time she was also having elevated creatinine numbers  Per Nephrology note, TIANA could indicate progression of kidney disease  · Consider holding torsemide today as  · Nephrology consult  · Monitor BMP  · Avoid nephrotoxins  · Avoid hypotension      Chronic respiratory failure with hypoxia ([address] )  Assessment & Plan  · Patient with baseline requirement of 2 L via nasal cannula  · Patient follows with Dr Yesi Yee  · Continue trilogy  · Continue Breo  · Monitor SpO2  · Incentive spirometry    History of DVT (deep vein thrombosis)  Assessment & Plan  · Continue Eliquis    Chronic diastolic congestive heart failure (HCC)  Assessment & Plan  Wt Readings from Last 3 Encounters:   04/16/21 66 8 kg (147 lb 4 3 oz)   04/10/21 65 6 kg (144 lb 9 6 oz)   08/18/20 68 kg (150 lb)     · Not in acute exacerbation  Patient appears euvolemic  · Echo from 04/05 with evidence of normal systolic function with EF 60% and G1DD  · Patient recently admitted with CHF exacerbation  · She is on Torsemide which will be held due to worsening creatinine level  · Monitor I/Os  · Monitor weight          Dyslipidemia  Assessment & Plan  · Continue ezetimibe    Essential hypertension  Assessment & Plan  · /70  · Continue Norvasc and Bystolic  · Hold torsemide to to worsening creatinine level  · Monitor BP          VTE Pharmacologic Prophylaxis:   Pharmacologic: Apixaban (Eliquis)  Mechanical VTE Prophylaxis in Place: Yes    Discussions with Specialists or Other Care Team Provider:  Nephrology    Education and Discussions with Family / Patient:  Discussed with patient  Will discuss with patient's family    Current Length of Stay: 1 day(s)    Current Patient Status: Inpatient     Discharge Plan / Estimated Discharge Date: 48 hours    Code Status: Level 3 - DNAR and DNI      Subjective:   Patient does not have any symptoms at this time  Patient has no acute overnight events  Patient is currently saturating 96% on 2 L of oxygen  Patient does not have SOB  Objective:     Vitals:   Temp (24hrs), Av 1 °F (36 7 °C), Min:97 9 °F (36 6 °C), Max:98 4 °F (36 9 °C)    Temp:  [97 9 °F (36 6 °C)-98 4 °F (36 9 °C)] 98 1 °F (36 7 °C)  HR:  [] 121  Resp:  [16-20] 20  BP: (107-140)/(59-98) 135/69  SpO2:  [98 %-100 %] 98 %  Body mass index is 28 76 kg/m²  Input and Output Summary (last 24 hours):     No intake or output data in the 24 hours ending 21 0647    Physical Exam:     Physical Exam  Vitals signs reviewed  Constitutional:       General: She is not in acute distress  Appearance: Normal appearance  HENT:      Head: Normocephalic and atraumatic  Nose: Nose normal  No congestion or rhinorrhea  Mouth/Throat:      Mouth: Mucous membranes are moist       Pharynx: Oropharynx is clear  No oropharyngeal exudate or posterior oropharyngeal erythema  Eyes:      General: No scleral icterus  Right eye: No discharge  Left eye: No discharge  Extraocular Movements: Extraocular movements intact  Conjunctiva/sclera: Conjunctivae normal       Pupils: Pupils are equal, round, and reactive to light  Cardiovascular:      Rate and Rhythm: Normal rate and regular rhythm  Pulses: Normal pulses  Heart sounds: Normal heart sounds  No murmur  No gallop  Comments: No JVD  Pulmonary:      Effort: Pulmonary effort is normal  No respiratory distress  Breath sounds: No wheezing or rales  Abdominal:      General: Abdomen is flat  Bowel sounds are normal  There is no distension  Palpations: Abdomen is soft  Tenderness: There is no abdominal tenderness  Musculoskeletal:         General: Swelling present  No tenderness or deformity  Comments: Trace edema   Skin:     General: Skin is warm and dry  Capillary Refill: Capillary refill takes less than 2 seconds  Coloration: Skin is not pale  Findings: No bruising, erythema or rash  Neurological:      General: No focal deficit present  Mental Status: She is alert and oriented to person, place, and time  Cranial Nerves: No cranial nerve deficit  Motor: No weakness  Psychiatric:         Mood and Affect: Mood normal          Behavior: Behavior normal              Additional Data:     Labs:    Results from last 7 days   Lab Units 04/17/21  0507 04/16/21  1716   WBC Thousand/uL 8 83 7 48   HEMOGLOBIN g/dL 10 6* 10 6*   HEMATOCRIT % 34 9 37 1   PLATELETS Thousands/uL 174 186   LYMPHO PCT %  --  4*   MONO PCT %  --  1*   EOS PCT %  --  0     Results from last 7 days   Lab Units 04/17/21  0507 04/16/21  1716   POTASSIUM mmol/L 4 7 4 6   CHLORIDE mmol/L 112* 104   CO2 mmol/L 30 30   BUN mg/dL 126* 133*   CREATININE mg/dL 2 58* 2 79*   CALCIUM mg/dL 8 7 8 8   ALK PHOS U/L  --  83   ALT U/L  --  118*   AST U/L  --  17           * I Have Reviewed All Lab Data Listed Above  * Additional Pertinent Lab Tests Reviewed:  [address] Admission Reviewed    Imaging:    Imaging Reports Reviewed Today Include:   Imaging Personally Reviewed by Myself Includes:      Recent Cultures (last 7 days):           Last 24 Hours Medication List:   Current Facility-Administered Medications   Medication Dose Route Frequency Provider Last Rate    acetaminophen  650 mg Oral Q6H PRN Coralee Habermann, MD      amLODIPine  5 mg Oral Daily Coralee Habermann, MD      apixaban  2 5 mg Oral BID Coralee Habermann, MD      ascorbic acid  1,000 mg Oral Daily Coralee Habermann, MD      calcium carbonate-vitamin D  1 tablet Oral BID Coralee Habermann, MD      cholecalciferol  2,000 Units Oral Daily Coralee Habermann, MD      colesevelam  1,875 mg Oral BID With Meals Coralee Habermann, MD      ezetimibe  10 mg Oral Daily Coralee Habermann, MD  Sarrah Matador ON 4/18/2021] ferrous sulfate  325 mg Oral Every Other Day Coralee Habermann, MD      fluticasone-vilanterol  1 puff Inhalation Daily Coralee Habermann, MD      nebivolol  10 mg Oral Daily Coralee Habermann, MD      ondansetron  4 mg Intravenous Q6H PRN Coralee Habermann, MD      pantoprazole  40 mg Oral Early Morning Coralee Habermann, MD      psyllium  1 packet Oral Daily Coralee Habermann, MD      senna  8 6 mg Oral HS PRN Coralee Habermann, MD      sodium chloride  75 mL/hr Intravenous Continuous Coralee Habermann, MD 75 mL/hr (04/16/21 2112)        Today, Patient Was Seen By: Svetlana Moore MD    ** Please Note: This note has been constructed using a voice recognition system   **

## 2021-04-17 NOTE — PLAN OF CARE
Problem: Prexisting or High Potential for Compromised Skin Integrity  Goal: Skin integrity is maintained or improved  Description: INTERVENTIONS:  - Identify patients at risk for skin breakdown  - Assess and monitor skin integrity  - Assess and monitor nutrition and hydration status  - Monitor labs   - Assess for incontinence   - Turn and reposition patient  - Assist with mobility/ambulation  - Relieve pressure over bony prominences  - Avoid friction and shearing  - Provide appropriate hygiene as needed including keeping skin clean and dry  - Evaluate need for skin moisturizer/barrier cream  - Collaborate with interdisciplinary team   - Patient/family teaching  - Consider wound care consult   Outcome: Progressing     Problem: PAIN - ADULT  Goal: Verbalizes/displays adequate comfort level or baseline comfort level  Description: Interventions:  - Encourage patient to monitor pain and request assistance  - Assess pain using appropriate pain scale  - Administer analgesics based on type and severity of pain and evaluate response  - Implement non-pharmacological measures as appropriate and evaluate response  - Consider cultural and social influences on pain and pain management  - Notify physician/advanced practitioner if interventions unsuccessful or patient reports new pain  Outcome: Progressing     Problem: INFECTION - ADULT  Goal: Absence or prevention of progression during hospitalization  Description: INTERVENTIONS:  - Assess and monitor for signs and symptoms of infection  - Monitor lab/diagnostic results  - Monitor all insertion sites, i e  indwelling lines, tubes, and drains  - Monitor endotracheal if appropriate and nasal secretions for changes in amount and color  - [place] appropriate cooling/warming therapies per order  - Administer medications as ordered  - Instruct and encourage patient and family to use good hand hygiene technique  - Identify and instruct in appropriate isolation precautions for identified infection/condition  Outcome: Progressing     Problem: SAFETY ADULT  Goal: Patient will remain free of falls  Description: INTERVENTIONS:  - Assess patient frequently for physical needs  -  Identify cognitive and physical deficits and behaviors that affect risk of falls    -  [place] fall precautions as indicated by assessment   - Educate patient/family on patient safety including physical limitations  - Instruct patient to call for assistance with activity based on assessment  - Modify environment to reduce risk of injury  - Consider OT/PT consult to assist with strengthening/mobility  Outcome: Progressing  Goal: Maintain or return to baseline ADL function  Description: INTERVENTIONS:  -  Assess patient's ability to carry out ADLs; assess patient's baseline for ADL function and identify physical deficits which impact ability to perform ADLs (bathing, care of mouth/teeth, toileting, grooming, dressing, etc )  - Assess/evaluate cause of self-care deficits   - Assess range of motion  - Assess patient's mobility; develop plan if impaired  - Assess patient's need for assistive devices and provide as appropriate  - Encourage maximum independence but intervene and supervise when necessary  - Involve family in performance of ADLs  - Assess for home care needs following discharge   - Consider OT consult to assist with ADL evaluation and planning for discharge  - Provide patient education as appropriate  Outcome: Progressing  Goal: Maintain or return mobility status to optimal level  Description: INTERVENTIONS:  - Assess patient's baseline mobility status (ambulation, transfers, stairs, etc )    - Identify cognitive and physical deficits and behaviors that affect mobility  - Identify mobility aids required to assist with transfers and/or ambulation (gait belt, sit-to-stand, lift, walker, cane, etc )  - [place] fall precautions as indicated by assessment  - Record patient progress and toleration of activity level on Mobility SBAR; progress patient to next Phase/Stage  - Instruct patient to call for assistance with activity based on assessment  - Consider rehabilitation consult to assist with strengthening/weightbearing, etc   Outcome: Progressing     Problem: DISCHARGE PLANNING  Goal: Discharge to home or other facility with appropriate resources  Description: INTERVENTIONS:  - Identify barriers to discharge w/patient and caregiver  - Arrange for needed discharge resources and transportation as appropriate  - Identify discharge learning needs (meds, wound care, etc )  - Arrange for interpretive services to assist at discharge as needed  - Refer to Case Management Department for coordinating discharge planning if the patient needs post-hospital services based on physician/advanced practitioner order or complex needs related to functional status, cognitive ability, or social support system  Outcome: Progressing     Problem: Knowledge Deficit  Goal: Patient/family/caregiver demonstrates understanding of disease process, treatment plan, medications, and discharge instructions  Description: Complete learning assessment and assess knowledge base    Interventions:  - Provide teaching at level of understanding  - Provide teaching via preferred learning methods  Outcome: Progressing     Problem: METABOLIC, FLUID AND ELECTROLYTES - ADULT  Goal: Electrolytes maintained within normal limits  Description: INTERVENTIONS:  - Monitor labs and assess patient for signs and symptoms of electrolyte imbalances  - Administer electrolyte replacement as ordered  - Monitor response to electrolyte replacements, including repeat lab results as appropriate  - Instruct patient on fluid and nutrition as appropriate  Outcome: Progressing  Goal: Fluid balance maintained  Description: INTERVENTIONS:  - Monitor labs   - Monitor I/O and WT  - Instruct patient on fluid and nutrition as appropriate  - Assess for signs & symptoms of volume excess or deficit  Outcome: Progressing  Goal: Glucose maintained within target range  Description: INTERVENTIONS:  - Monitor Blood Glucose as ordered  - Assess for signs and symptoms of hyperglycemia and hypoglycemia  - Administer ordered medications to maintain glucose within target range  - Assess nutritional intake and initiate nutrition service referral as needed  Outcome: Progressing

## 2021-04-17 NOTE — CONSULTS
2           Consultation - Nephrology   Marsha Lopez 80 y o  female MRN: [id number]  Unit/Bed#: W -01 Encounter: [id number]      Assessment/Plan     Assessment / Plan:  1  Renal    Patient has acute renal failure with baseline creatinine ranges 1 7-2 1  Apparently she has not been eating at her care facility could she has a bring her the wrong things and that direct   When asked her what she is talking about she is talking about potatoes and thing she was told not to eat  As result some labs revealed a creatinine increased  Apparently there was an attempt give IV fluids but she had an IV infiltration she was recommended to come over by her physicians  On admission her creatinine was noted to be 2 6  The patient likely has mild volume depletion  Of note the patient has azotemia but has been on steroids as an outpatient  Recent renal ultrasound from previous admission from 4/7/21 showed no hydronephrosis  Normal saline 70 cc/hour  BMP a m  Monitor on oral intake  History of Present Illness   Physician Requesting Consult: Rupa Padilla MD  Reason for Consult / Principal Problem:  Acute on chronic kidney disease  Hx and PE limited by:   HPI: Marsha Lopez is a 80y o  year old female who presents after being told by her physicians as an outpatient to go over to the hospital   The patient is chronic kidney disease baseline creatinine is 1 7-2 1 she follows a partner of mine  Apparently she had been eating an outpatient lab showed an increased creatinine they attempted to give her IV fluid she had an infiltration of the IV and was told to go to the hospital   On arrival had a creatinine slightly above the higher end of her baseline range were asked to see her for acute renal failure  History obtained from chart review and the patient and her daughter at the bedside  Consults    Review of Systems   Constitutional: Negative for chills, fatigue and fever          Patient says she has not been eating cause a give her the wrong type of food  HENT: Negative  Eyes: Negative  Respiratory: Negative  Negative for cough, chest tightness, shortness of breath and wheezing  Cardiovascular: Negative  Negative for chest pain, palpitations and leg swelling  Gastrointestinal: Negative  Negative for abdominal distention, abdominal pain, diarrhea, nausea and vomiting  Genitourinary: Negative  Neurological: Negative for dizziness, syncope, light-headedness and headaches  Psychiatric/Behavioral: Negative  Negative for agitation, behavioral problems, confusion and decreased concentration         Historical Information   Patient Active Problem List   Diagnosis    [facility]    Essential hypertension    Acute kidney injury superimposed on chronic kidney disease ([facility]    Left leg swelling    Hyperlipidemia    Numbness and tingling in left arm    History of CVA (cerebrovascular accident)    Obesity    Gouty arthropathy    Dyslipidemia    Vitamin D deficiency    Leg wound, left    Acute on chronic diastolic heart failure (HCC)    Chronic diastolic [facility]ive [facility] failure ([facility])    Hyperphosphatemia    Acute on chronic diastolic CHF (congestive heart failure) ([facility])    Deep vein thrombosis (DVT) of left lower extremity ([facility])    Aortic stenosis, moderate    Bradycardia    Chronic respiratory failure with hypoxia and hypercapnia ([facility])    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    Anemia of chronic renal failure, stage 4 (severe) ([facility])    Hypertensive chronic kidney disease with stage 1 through stage 4 chronic kidney disease, or unspecified chronic kidney disease    Orthostatic hypotension    Acute tracheobronchitis    Mild persistent asthma with acute exacerbation    History of DVT (deep vein thrombosis)    Rectal bleeding    Chronic kidney disease, stage IV (severe) ([facility])    Iron deficiency    Metabolic alkalosis    Persistent proteinuria    Chronic respiratory failure with hypoxia (HCC)    Pleural effusion    Opacity of lung on imaging study    Abnormal breast finding    Paroxysmal atrial fibrillation (HCC)     Past Medical History:   Diagnosis Date    Arthritis     CHF [facility]congest[facility] failure) [facility] 91/4663    diastolic     CO2 retention     DVT (deep vein thrombosis) in pregnancy     left LE in 2/2018 and 50 years ago    Hyperlipidemia     Hypertension     Pulmonary embolism ([facility] ) 02/06/2018    Renal disorder     ckd    Respiratory failure, acute and chronic ([facility] )     Stroke ([facility] ) 2016    left hand weakness,paresthesia   Ventilator dependence [facility])      Past Surgical History:   Procedure Laterality Date    ABDOMINAL SURGERY      ruputured bowel  had colostomy and ileostomy,reversed later on   BACK SURGERY      rods in back  done 30 years ago       CARPAL TUNNEL RELEASE Bilateral     CYST REMOVAL      back    HERNIA REPAIR      REPLACEMENT TOTAL KNEE BILATERAL       Social History   Social History     Substance and Sexual Activity   Alcohol Use Never    Frequency: Never    Comment: one drink at weddings      Social History     Substance and Sexual Activity   Drug Use Never     Social History     Tobacco Use   Smoking Status Never Smoker   Smokeless Tobacco Never Used     Family History   Problem Relation Age of Onset    COPD Brother        Meds/Allergies   current meds:   Current Facility-Administered Medications   Medication Dose Route Frequency    acetaminophen (TYLENOL) tablet 650 mg  650 mg Oral Q6H PRN    amLODIPine (NORVASC) tablet 5 mg  5 mg Oral Daily    apixaban (ELIQUIS) tablet 2 5 mg  2 5 mg Oral BID    ascorbic acid (VITAMIN C) tablet 1,000 mg  1,000 mg Oral Daily    calcium carbonate-vitamin D (OSCAL-D) 500 mg-200 units per tablet 1 tablet  1 tablet Oral BID    cholecalciferol (VITAMIN D3) tablet 2,000 Units  2,000 Units Oral Daily    cholestyramine sugar free (QUESTRAN LIGHT) packet 4 g  4 g Oral BID With Meals    ezetimibe (ZETIA) tablet 10 mg  10 mg Oral Daily    [START ON 4/18/2021] ferrous sulfate tablet 325 mg  325 mg Oral Every Other Day    fluticasone-vilanterol (BREO ELLIPTA) 100-25 mcg/inh inhaler 1 puff  1 puff Inhalation Daily    nebivolol (BYSTOLIC) tablet 10 mg  10 mg Oral Daily    ondansetron (ZOFRAN) injection 4 mg  4 mg Intravenous Q6H PRN    pantoprazole (PROTONIX) EC tablet 40 mg  40 mg Oral Early Morning    polyethylene glycol (MIRALAX) packet 17 g  17 g Oral Daily PRN    psyllium (METAMUCIL) 1 packet  1 packet Oral Daily    senna (SENOKOT) tablet 8 6 mg  8 6 mg Oral HS PRN     Allergies   Allergen Reactions    Statins Myalgia    Lyrica [Pregabalin] Other (See Comments)     Client says it made her "nutty"       Objective     Intake/Output Summary (Last 24 hours) at 4/17/2021 1307  Last data filed at 4/17/2021 1244  Gross per 24 hour   Intake 1600 ml   Output 400 ml   Net 1200 ml     Body mass index is 28 76 kg/m²  Invasive Devices:        PHYSICAL EXAM:  /73   Pulse 72   Temp 98 °F (36 7 °C)   Resp 18   Ht 5' (1 524 m)   Wt 66 8 kg (147 lb 4 3 oz)   SpO2 97%   BMI 28 76 kg/m²     Physical Exam  Constitutional:       Appearance: She is not ill-appearing or diaphoretic  HENT:      Head: Normocephalic and atraumatic  Mouth/Throat:      Mouth: Mucous membranes are dry  Eyes:      General: No scleral icterus  Extraocular Movements: Extraocular movements intact  Neck:      Musculoskeletal: Normal range of motion and neck supple  Cardiovascular:      Rate and Rhythm: Normal rate and regular rhythm  Heart sounds: No friction rub  No gallop  Comments: No edema  Pulmonary:      Effort: Pulmonary effort is normal  No respiratory distress  Breath sounds: Normal breath sounds  No wheezing or rhonchi  Abdominal:      General: Bowel sounds are normal  There is no distension  Palpations: Abdomen is soft  Tenderness:  There is no abdominal tenderness  There is no rebound  Neurological:      Mental Status: She is alert and oriented to person, place, and time  Mental status is at baseline  Psychiatric:         Mood and Affect: Mood normal          Behavior: Behavior normal          Thought Content:  Thought content normal          Judgment: Judgment normal            Current Weight: Weight - Scale: 66 8 kg (147 lb 4 3 oz)  First Weight: Weight - Scale: 66 kg (145 lb 8 1 oz)    Lab Results:    Results from last 7 days   Lab Units 04/17/21  0507   WBC Thousand/uL 8 83   HEMOGLOBIN g/dL 10 6*   HEMATOCRIT % 34 9   PLATELETS Thousands/uL 174     Results from last 7 days   Lab Units 04/17/21  0507   POTASSIUM mmol/L 4 7   CHLORIDE mmol/L 112*   CO2 mmol/L 30   BUN mg/dL 126*   CREATININE mg/dL 2 58*   CALCIUM mg/dL 8 7     Results from last 7 days   Lab Units 04/17/21  0507 04/16/21  1716   POTASSIUM mmol/L 4 7 4 6   CHLORIDE mmol/L 112* 104   CO2 mmol/L 30 30   BUN mg/dL 126* 133*   CREATININE mg/dL 2 58* 2 79*   CALCIUM mg/dL 8 7 8 8   ALK PHOS U/L  --  83   ALT U/L  --  118*   AST U/L  --  17

## 2021-04-17 NOTE — ASSESSMENT & PLAN NOTE
Wt Readings from Last 3 Encounters:   04/17/21 66 8 kg (147 lb 4 3 oz)   04/10/21 65 6 kg (144 lb 9 6 oz)   08/18/20 68 kg (150 lb)     · Not in acute exacerbation  Patient appears euvolemic  · Echo from 04/05 with evidence of normal systolic function with EF 60% and G1DD  · Patient recently admitted with CHF exacerbation  · Torsemide currently on hold due to TIANA on CKD  · Monitor I/Os  · Monitor weight

## 2021-04-17 NOTE — ASSESSMENT & PLAN NOTE
· /70  · Continue Norvasc and Bystolic  · Hold torsemide to to worsening creatinine level  · Monitor BP

## 2021-04-17 NOTE — ASSESSMENT & PLAN NOTE
· Patient with baseline requirement of 2 L via nasal cannula    · Patient follows with Dr Fatoumata Ordaz  · Continue trilogy  · Continue Breo  · Monitor SpO2  · Incentive spirometry

## 2021-04-17 NOTE — PLAN OF CARE
Problem: PHYSICAL THERAPY ADULT  Goal: Performs mobility at highest level of function for planned discharge setting  See evaluation for individualized goals  Description: Treatment/Interventions: Functional transfer training, LE strengthening/ROM, Elevations, Therapeutic exercise, Endurance training, Patient/family training, Bed mobility, Gait training, Spoke to nursing, Spoke to case management          See flowsheet documentation for full assessment, interventions and recommendations  Kylie Weldon, PT     Note: Prognosis: Good  Problem List: Decreased strength, Decreased endurance, Impaired balance, Decreased mobility, Impaired hearing     Barriers to Discharge: Decreased caregiver support  Barriers to Discharge Comments: pt lives alone, and does not have 24/7 care      PT Discharge Recommendation: Post acute rehabilitation services(rehab vs HHPT pending progress )     PT - OK to Discharge: Yes    See flowsheet documentation for full assessment

## 2021-04-17 NOTE — ED ATTENDING ATTESTATION
4/16/2021  IKristyn MD, saw and evaluated the patient  I have discussed the patient with the resident/non-physician practitioner and agree with the resident's/non-physician practitioner's findings, Plan of Care, and MDM as documented in the resident's/non-physician practitioner's note, except where noted  All available labs and Radiology studies were reviewed  I was present for key portions of any procedure(s) performed by the resident/non-physician practitioner and I was immediately available to provide assistance  At this point I agree with the current assessment done in the Emergency Department    I have conducted an independent evaluation of this patient a history and physical is as follows:    ED Course  ED Course as of Apr 17 1057 Fri Apr 16, 2021   1646 - er md note- all albs reviewed and lab trends reviewed by er md            Critical Care Time  Procedures

## 2021-04-17 NOTE — ASSESSMENT & PLAN NOTE
Recent Labs     04/16/21 04/16/21  1716 04/17/21  0507   CREATININE 2 63  --  2 79* 2 58*   EGFR 15   < > 14 16    < > = values in this interval not displayed  Estimated Creatinine Clearance: 11 9 mL/min (A) (by C-G formula based on SCr of 2 58 mg/dL (H))  · Baseline creatinine 1 7-2 1  · Patient was recently admitted with CHF exacerbation  At that time she was also having elevated creatinine numbers  Per Nephrology note, TIANA could indicate progression of kidney disease    · Consider holding torsemide today as well  · Will discontinue fluids  · Encourage po hydration  · Nephrology consult  · Monitor BMP  · Avoid nephrotoxins  · Avoid hypotension

## 2021-04-17 NOTE — ASSESSMENT & PLAN NOTE
Wt Readings from Last 3 Encounters:   04/16/21 66 8 kg (147 lb 4 3 oz)   04/10/21 65 6 kg (144 lb 9 6 oz)   08/18/20 68 kg (150 lb)     · Not in acute exacerbation  Patient appears euvolemic  · Echo from 04/05 with evidence of normal systolic function with EF 60% and G1DD  · Patient recently admitted with CHF exacerbation  · She is on Torsemide which will be held due to worsening creatinine level  · Monitor I/Os  · Monitor weight

## 2021-04-17 NOTE — ED PROVIDER NOTES
History  Chief Complaint   Patient presents with    Abnormal Lab     Pt arrives via EMS with report that her BUN and Creatinine were elevated  Pt was recently discharged from hospital with elevated [facility] family request  EMS reports that pt has been getting fluids but the labs are still elevated  This is a 49-year-old female with past medical history significant for chronic kidney disease with superimposed acute kidney injury, hyperlipidemia, hypertension, atrial fibrillation, and diastolic heart failure presenting to the emergency department for increased BUN and creatinine  The patient was recently discharged from the hospital and sent for therapy at [address] [place] [address]  The patient notes her frustration with [facility] as they were not giving her proper renal diet food and her medications on time  The patient had been intermittently receiving torsemide and IV fluid over the past few days; however, the patient's IV infiltrated and she did not receive all of her IV fluid recently  The patient had repeat lab draw which showed worsening increased BUN and creatinine and was advised to come here to the emergency department  Electrolytes normal   The patient is denying any pain at this time  The patient is chronically on 2 L nasal cannula to support her respiratory needs  The patient denies any fever, chills, chest pain, shortness of breath, visual acuity changes, nausea, vomiting, diarrhea, constipation, abdominal pain, and lower extremity pain/edema  The patient does note she typically has a leaky bladder but of late has not been leaking quite as much  The patient denies any hematuria or foul-smelling urine  The patient denies other complaints at this time        History provided by:  Patient   used: No    Medical Problem  Severity:  Moderate  Onset quality:  Gradual  Timing:  Constant  Progression:  Worsening  Chronicity:  Recurrent  Associated symptoms: no abdominal pain, no chest pain, no cough, no diarrhea, no fatigue, no fever, no headaches, no loss of consciousness, no nausea, no rash, no shortness of breath, no sore throat, no vomiting and no wheezing      Prior to Admission Medications   Prescriptions Last Dose Informant Patient Reported? Taking? Ascorbic Acid (VITAMIN C PO) 4/16/2021 at Unknown time Child Yes Yes   Sig: Take 1 tablet by mouth daily   Eliquis 2 5 MG 4/16/2021 at Unknown time  No Yes   Sig: TAKE 1 TABLET TWO TIMES A DAY   Multiple Vitamins-Minerals (OCUVITE PO) Not Taking at Unknown time Child Yes No   Sig: Take 1 tablet by mouth daily     Welchol 625 MG tablet 4/16/2021 at Unknown time  No Yes   Sig: TAKE 3 TABLETS TWICE DAILY WITH MEALS AS DIRECTED   acetaminophen (TYLENOL) 325 mg tablet 4/15/2021 at Unknown time Child Yes Yes   Sig: Take 650 mg by mouth every 6 (six) hours as needed for mild pain   amLODIPine (NORVASC) 5 mg tablet 4/16/2021 at Unknown time Child No Yes   Sig: Take 1 tablet (5 mg total) by mouth daily   calcium-vitamin D (OSCAL 500 + D) 500 mg-200 units per tablet 4/16/2021 at Unknown time Child Yes Yes   Sig: Take 1 tablet by mouth 2 (two) times a day     cholecalciferol (VITAMIN D3) 1,000 units tablet 4/16/2021 at Unknown time Child Yes Yes   Sig: Take 2,000 Units by mouth daily   co-enzyme Q-10 50 MG capsule 4/16/2021 at Unknown time Child Yes Yes   Sig: Take 200 mg by mouth daily     ezetimibe (ZETIA) 10 mg tablet 4/16/2021 at Unknown time  No Yes   Sig: Take 1 tablet (10 mg total) by mouth daily   ferrous sulfate 325 (65 Fe) mg tablet 4/16/2021 at Unknown time  Yes Yes   Sig: Take 325 mg by mouth every other day   fluticasone-vilanterol (BREO ELLIPTA) 100-25 mcg/inh inhaler   No No   Sig: Inhale 1 puff daily Rinse mouth after use    nebivolol (Bystolic) 10 mg tablet 1/61/2947 at Unknown time  No Yes   Sig: Take 1 tablet (10 mg total) by mouth daily   pantoprazole (PROTONIX) 40 mg tablet 4/16/2021 at Unknown time Child Yes Yes Sig: Take 40 mg by mouth daily as needed    predniSONE 10 mg tablet 4/16/2021 at Unknown time  No Yes   Sig: Take 40mg Sunday, and decrease to 30mg x 3 days starting Monday, then 20mg x 3 days, 10mg x 3 days   psyllium (METAMUCIL) packet 4/16/2021 at Unknown time  No Yes   Sig: Take 1 packet by mouth daily   senna (SENOKOT) 8 6 mg 4/16/2021 at Unknown time  No Yes   Sig: Take 1 tablet (8 6 mg total) by mouth daily at bedtime as needed for constipation   torsemide (DEMADEX) 10 mg tablet 4/15/2021 at Unknown time Child Yes Yes   Sig: Take 10 mg by mouth daily       Facility-Administered Medications: None     Past Medical History:   Diagnosis Date    Arthritis     CHF (congestive heart failure) ([facility] [address] ) 31/2763    diastolic     CO2 retention     DVT (deep vein thrombosis) in pregnancy     left LE in 2/2018 and 50 years ago    Hyperlipidemia     Hypertension     Pulmonary embolism ([facility] [address] ) 02/06/2018    Renal disorder     ckd    Respiratory failure, acute and chronic ([facility] [address] )     Stroke ([facility] [address] ) 2016    left hand weakness,paresthesia   Ventilator dependence [facility])      Past Surgical History:   Procedure Laterality Date    ABDOMINAL SURGERY      ruputured bowel  had colostomy and ileostomy,reversed later on   BACK SURGERY      rods in back  done 30 years ago   CARPAL TUNNEL RELEASE Bilateral     CYST REMOVAL      back    HERNIA REPAIR      REPLACEMENT TOTAL KNEE BILATERAL       Family History   Problem Relation Age of Onset    COPD Brother      I have reviewed and agree with the history as documented      E-Cigarette/Vaping    E-Cigarette Use Never User      E-Cigarette/Vaping Substances    Nicotine No     THC No     CBD No     Flavoring No     Other No     Unknown No      Social History     Tobacco Use    Smoking status: Never Smoker    Smokeless tobacco: Never Used   Substance Use Topics    Alcohol use: Never     Frequency: Never     Comment: one drink at [employer] Drug use: Never     Review of Systems   Constitutional: Negative for chills, fatigue and fever  HENT: Negative for sore throat  Eyes: Negative for visual disturbance  Respiratory: Negative for cough, chest tightness, shortness of breath and wheezing  Cardiovascular: Negative for chest pain, palpitations and leg swelling  Gastrointestinal: Negative for abdominal pain, diarrhea, nausea and vomiting  Genitourinary: Negative for dysuria, frequency, hematuria and urgency  Skin: Negative for color change, pallor, rash and wound  Neurological: Negative for dizziness, seizures, loss of consciousness, syncope, weakness, light-headedness, numbness and headaches  Psychiatric/Behavioral: Negative for confusion  All other systems reviewed and are negative  Physical Exam  Physical Exam  Vitals signs and nursing note reviewed  Constitutional:       General: She is not in acute distress  Appearance: Normal appearance  She is normal weight  She is not ill-appearing, toxic-appearing or diaphoretic  HENT:      Head: Normocephalic and atraumatic  Nose: Nose normal       Mouth/Throat:      Mouth: Mucous membranes are moist    Eyes:      General: No scleral icterus  Right eye: No discharge  Left eye: No discharge  Extraocular Movements: Extraocular movements intact  Conjunctiva/sclera: Conjunctivae normal    Cardiovascular:      Rate and Rhythm: Normal rate and regular rhythm  Pulses: Normal pulses  Heart sounds: Normal heart sounds  No murmur  No friction rub  No gallop  Comments: 2+ radial pulses bilaterally  Pulmonary:      Effort: Pulmonary effort is normal  No respiratory distress  Breath sounds: Normal breath sounds  No stridor  No wheezing, rhonchi or rales  Comments: Clear to auscultation bilaterally without any adventitious breath sounds  Patient currently on 3 L nasal cannula saturating 98%  Chest:      Chest wall: No tenderness  Abdominal:      Tenderness:  There is no right CVA tenderness or left CVA tenderness  Comments: Soft, nondistended, nontender, and without organomegaly   Musculoskeletal: Normal range of motion  Right lower leg: No edema  Left lower leg: No edema  Skin:     General: Skin is warm  Coloration: Skin is not jaundiced or pale  Neurological:      General: No focal deficit present  Mental Status: She is alert and oriented to person, place, and time  Mental status is at baseline        Comments: Patient is alert oriented to person, place, and time  Normal sensation of bilateral upper and lower extremities  5/5 strength in bilateral upper and lower extremities  Normal finger-to-nose examination  Patient able to smile, frown, raise eyebrows, and puff out cheeks symmetrically in bilaterally without difficulty  Overall, a normal neurologic examination for this patient         Vital Signs  ED Triage Vitals   Temperature Pulse Respirations Blood Pressure SpO2   04/16/21 1415 04/16/21 1413 04/16/21 1413 04/16/21 1413 04/16/21 1413   98 4 °F (36 9 °C) 92 18 108/59 100 %      Temp Source Heart Rate Source Patient Position - Orthostatic VS BP Location FiO2 (%)   04/16/21 1415 04/16/21 1413 04/16/21 1413 04/16/21 1413 --   Oral Monitor Lying Right arm       Pain Score       --                Vitals:    04/16/21 1500 04/16/21 2108 04/16/21 2210 04/16/21 2249   BP: 111/61  134/98 140/70   Pulse: 100 (!) 121  98   Patient Position - Orthostatic VS:   Lying Lying     Visual Acuity    ED Medications  Medications   amLODIPine (NORVASC) tablet 5 mg (has no administration in time range)   ascorbic acid (VITAMIN C) tablet 1,000 mg (has no administration in time range)   calcium carbonate-vitamin D (OSCAL-D) 500 mg-200 units per tablet 1 tablet (1 tablet Oral Given 4/16/21 2122)   cholecalciferol (VITAMIN D3) tablet 2,000 Units (has no administration in time range)   apixaban (ELIQUIS) tablet 2 5 mg (2 5 mg Oral Given 4/16/21 2122)   ezetimibe (Angus [place]) tablet 10 mg (has no administration in time range)   ferrous sulfate tablet 325 mg (has no administration in time range)   fluticasone-vilanterol (BREO ELLIPTA) 100-25 mcg/inh inhaler 1 puff (has no administration in time range)   nebivolol (BYSTOLIC) tablet 10 mg (has no administration in time range)   pantoprazole (PROTONIX) EC tablet 40 mg (has no administration in time range)   psyllium (METAMUCIL) 1 packet (has no administration in time range)   senna (SENOKOT) tablet 8 6 mg (has no administration in time range)   [facility]) tablet 1,875 mg (has no administration in time range)   sodium chloride 0 9 % infusion (75 mL/hr Intravenous New Bag 4/16/21 2112)   acetaminophen (TYLENOL) tablet 650 mg (has no administration in time range)   ondansetron (ZOFRAN) injection 4 mg (has no administration in time range)     Diagnostic Studies  Results Reviewed     Procedure Component Value Units Date/Time    Blood gas, venous [[id number]]  (Abnormal) Collected: 04/16/21 2053    Lab Status: Final result Specimen: Blood from Arm, Right Updated: 04/16/21 2118     pH, Jair 7 251     pCO2, Jair 65 8 mm Hg      pO2, Jair 57 3 mm Hg      HCO3, Jair 28 3 mmol/L      Base Excess, Jair -0 1 mmol/L      O2 Content, Jair 14 1 ml/dL      O2 HGB, VENOUS 88 1 %     CBC and differential [[id number]]  (Abnormal) Collected: 04/16/21 1716    Lab Status: Final result Specimen: Blood from Arm, Right Updated: 04/16/21 1823     WBC 7 48 Thousand/uL      RBC 3 68 Million/uL      Hemoglobin 10 6 g/dL      Hematocrit 37 1 %       fL      MCH 28 8 pg      MCHC 28 6 g/dL      RDW 12 9 %      MPV 11 6 fL      Platelets 370 Thousands/uL      nRBC 0 /100 WBCs     Narrative: This is an appended report  These results have been appended to a previously verified report      Manual Differential(PHLEBS Do Not Order) [[id number]]  (Abnormal) Collected: 04/16/21 1716    Lab Status: Final result Specimen: Blood from Arm, Right Updated: 04/16/21 1823 Segmented % 90 %      Bands % 2 %      Lymphocytes % 4 %      Monocytes % 1 %      Eosinophils, % 0 %      Basophils % 0 %      Metamyelocytes% 2 %      Myelocytes % 1 %      Absolute Neutrophils 6 88 Thousand/uL      Lymphocytes Absolute 0 30 Thousand/uL      Monocytes Absolute 0 07 Thousand/uL      Eosinophils Absolute 0 00 Thousand/uL      Basophils Absolute 0 00 Thousand/uL      Total Counted 100     Anisocytosis Present     Basophilic Stippling Present     Hypochromia Present     Polychromasia Present     Platelet Estimate Adequate    Comprehensive metabolic panel [[id number]]  (Abnormal) Collected: 04/16/21 1716    Lab Status: Final result Specimen: Blood from Arm, Right Updated: 04/16/21 1748     Sodium 141 mmol/L      Potassium 4 6 mmol/L      Chloride 104 mmol/L      CO2 30 mmol/L      ANION GAP 7 mmol/L       mg/dL      Creatinine 2 79 mg/dL      Glucose 292 mg/dL      Calcium 8 8 mg/dL      Corrected Calcium 10 0 mg/dL      AST 17 U/L       U/L      Alkaline Phosphatase 83 U/L      Total Protein 6 5 g/dL      Albumin 2 5 g/dL      Total Bilirubin 0 26 mg/dL      eGFR 14 ml/min/1 73sq m     Narrative:      [place] guidelines for Chronic Kidney Disease (CKD):     Stage 1 with normal or high GFR (GFR > 90 mL/min/1 73 square meters)    Stage 2 Mild CKD (GFR = 60-89 mL/min/1 73 square meters)    Stage 3A Moderate CKD (GFR = 45-59 mL/min/1 73 square meters)    Stage 3B Moderate CKD (GFR = 30-44 mL/min/1 73 square meters)    Stage 4 Severe CKD (GFR = 15-29 mL/min/1 73 square meters)    Stage 5 End Stage CKD (GFR <15 mL/min/1 73 square meters)  Note: GFR calculation is accurate only with a steady state creatinine    UA w Reflex to Microscopic w Reflex to Culture [[id number]]     Lab Status: No result Specimen: Urine              No orders to display          Procedures  Procedures     ED Course  ED Course as of Apr 16 2314 Fri Apr 16, 2021 1749 Creatinine(!): 2 79 1749 BUN(!): 133                                     MDM  Number of Diagnoses or Management Options  Acute kidney injury [facility]): new and requires workup  Diagnosis management comments: This is a 80-year-old female with past medical history significant for CKD, TIANA, hyperlipidemia, hypertension, atrial fibrillation, and diastolic heart failure presenting to the emergency department today for abnormal labs  The patient's BUN and creatinine are both elevated  The patient was discharged from the hospital recently and was receiving torsemide and IV fluids intermittently  Lab workup in the emergency department significant for , creatinine 2 79 (baseline 1 7-2 1), and glucose at 292  IV fluids ordered for the patient  Given the severity of the patient's uremia, decision was made to admit the patient inpatient for further medical management  The patient was admitted inpatient status to the service of Dr Daphne Dominique  The patient is stable for admission at this time  All questions answered to the patient and her daughter's satisfaction  The patient and her daughter verify their understanding and agreed to the plan at this time           Amount and/or Complexity of Data Reviewed  Clinical lab tests: ordered and reviewed  Discuss the patient with other providers: yes    Patient Progress  Patient progress: stable      Disposition  Final diagnoses:   Acute kidney injury ([facility] [address] )     Time reflects when diagnosis was documented in both MDM as applicable and the Disposition within this note     Time User Action Codes Description Comment    4/16/2021  6:44 PM Juan Francisco Gardner Add [N17 9] Acute kidney injury ([facility] [address] )     4/16/2021  7:11 PM Brianda Anthony Add [N17 9,  N18 9] Acute kidney injury superimposed on chronic kidney disease [facility])       ED Disposition     ED Disposition Condition Date/Time Comment    Admit Stable Fri Apr 16, 2021  6:43 PM Case was discussed with Dr Daphne Dominique and the patient's admission status was agreed to be Admission Status: inpatient status to the service of Dr [facility]   Follow-up Information    None         Patient's Medications   Discharge Prescriptions    No medications on file     No discharge procedures on file      PDMP Review     None        ED Provider  Electronically Signed by     Matt Marin PA-C  04/16/21 4603

## 2021-04-17 NOTE — PHYSICAL THERAPY NOTE
PHYSICAL THERAPY EVALUATION  NAME:  Pepe Day  DATE: 04/17/21    AGE:   80 y o    Mrn:   [id number]  ADMIT DX:  Abnormal laboratory test result [R89 9]  Acute kidney injury [facility]) [N17 9]  Acute kidney injury superimposed on chronic kidney disease ([facility] ) [N17 9, N18 9]  Problem List:   Patient Active Problem List   Diagnosis    Cerebrovascular accident (CVA) ([facility] )    Essential hypertension    Acute kidney injury superimposed on chronic kidney disease ([facility] )    Left leg swelling    Hyperlipidemia    Numbness and tingling in left arm    History of CVA (cerebrovascular accident)    Obesity    Gouty arthropathy    Dyslipidemia    Vitamin D deficiency    Leg wound, left    Acute on chronic diastolic heart failure (HCC)    Chronic diastolic congestive heart failure ([facility])    Hyperphosphatemia    Acute on chronic diastolic CHF (congestive heart failure) (HCC)    Deep vein thrombosis (DVT) of left lower extremity (HCC)    Aortic stenosis, moderate    Bradycardia    Chronic respiratory failure with hypoxia and hypercapnia ([facility])    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    Anemia of chronic renal failure, stage 4 (severe) (HCC)    Hypertensive chronic kidney disease with stage 1 through stage 4 chronic kidney disease, or unspecified chronic kidney disease    Orthostatic hypotension    Acute tracheobronchitis    Mild persistent asthma with acute exacerbation    History of DVT (deep vein thrombosis)    Rectal bleeding    Chronic kidney disease, stage IV (severe) (HCC)    Iron deficiency    Metabolic alkalosis    Persistent proteinuria    Chronic respiratory failure with hypoxia (HCC)    Pleural effusion    Opacity of lung on imaging study    Abnormal breast finding    [facility]oxysmal atrial fibrillation ([facility] )    Azotemia         Length Of Stay: 1  Performed at least 2 patient identifiers during session: Name and ID [email or web address]  PHYSICAL THERAPY EVALUATION :   04/17/21 1217   PT Last Visit PT Visit Date 04/17/21   Note Type   Note type Evaluation   Pain Assessment   Pain Assessment Tool 0-10   Pain Score No Pain   Home Living   Type of Home Apartment  (1 level apt, 6 BELLA with rails)   Home Layout One level   Bathroom Shower/Tub Tub/shower unit   Bathroom Toilet Standard   Bathroom Equipment Grab bars in shower; Shower chair;Hand-held shower;Grab bars around toilet   Home Equipment Grab bars  (shower chair, rollator )   Additional Comments pt lives along in a one level apt with 6STE and rails  All ADLs on one level    Prior Function   Level of [place] Needs assistance with IADLs; Needs assistance with ADLs and functional mobility   Lives With Alone   Receives Help From Family;Home health   ADL Assistance Needs assistance   IADLs Needs assistance   Falls in the last 6 months 0   Comments pt lives alone with home health aid 2 hours in a m  and 2 hours in p m  and daughter Wendy García) comes mid day to check in  Needs A for all ADL/IADLs  -   Restrictions/Precautions   Weight Bearing Precautions Per Order No   Other Precautions Chair Alarm; Bed Alarm;O2;Fall Risk;Hard of hearing   General   Family/Caregiver Present Yes   Cognition   Overall Cognitive Status WFL   Orientation Level Oriented X4   Following Commands Follows one step commands with increased time or repetition   RUE Assessment   RUE Assessment WFL   LUE Assessment   LUE Assessment WFL   RLE Assessment   RLE Assessment WFL   LLE Assessment   LLE Assessment WFL   Bed Mobility   Supine to Sit 4  Minimal assistance   Additional items Assist x 1;Bedrails; Increased time required;Verbal cues   Transfers   Sit to Stand 5  Supervision  (close S)   Additional items Assist x 1;Verbal cues   Stand to Sit 5  Supervision  (close S)   Additional items Assist x 1; Armrests; Increased time required;Verbal cues   Ambulation/Elevation   Gait pattern Forward Flexion; Foward flexed; Short stride; Excessively slow   Gait Assistance 4  Minimal assist  (CG ) Assistive Device Rolling walker   Distance 15 feet    Balance   Static Sitting Fair +   Static Standing Fair   Ambulatory Fair -   Endurance Deficit   Endurance Deficit Yes   Activity Tolerance   Medical Staff Made Aware yes   Nurse Made Aware yes, RN harjeet    Assessment   Prognosis Good   Problem List Decreased strength;Decreased endurance; Impaired balance;Decreased mobility; Impaired hearing   Barriers to Discharge Decreased caregiver support   Barriers to Discharge Comments pt lives alone, and does not have 24/7 care    Goals   Patient Goals to never go back to [facility] and avoid rehab   STG Expiration Date 04/27/21   Plan   Treatment/Interventions Functional transfer training;LE strengthening/ROM; Elevations; Therapeutic exercise; Endurance training;Patient/family training;Bed mobility;Gait training;Spoke to nursing;Spoke to case management   PT Frequency 5x/wk   Recommendation   PT Discharge Recommendation Post acute rehabilitation services  (rehab vs HHPT pending progress )   PT - OK to Discharge Yes   Additional Comments to appropriate setting when medically cleared    AM-PAC Basic Mobility Inpatient   Turning in Bed Without Bedrails 4   Lying on Back to Sitting on Edge of Flat Bed 3   Moving Bed to Chair 3   Standing Up From Chair 3   Walk in Room 3   Climb 3-5 Stairs 2   Basic Mobility Inpatient Raw Score 18   Basic Mobility Standardized Score 41 05   Barthel Index   Feeding 5   Bathing 0   Grooming Score 0   Dressing Score 0   Bladder Score 5   Bowels Score 10   Toilet Use Score 0   Transfers (Bed/Chair) Score 10   Mobility (Level Surface) Score 0   Stairs Score 0   Barthel Index Score 30     (Please find full objective findings from PT assessment regarding body systems outlined above)  Assessment: Pt is a 80 y o  female seen for PT evaluation s/p admit to [facility] on 4/16/2021 w/ Acute kidney injury superimposed on chronic kidney disease ([facility] )  Order placed for PT    Upon evaluation: Pt requires supervision assistance for bed mobility, supervision assistance for transfers and Noah/CG assistance for ambulation with RW   Pt's clinical presentation is currently evolving given the functional mobility deficits above, especially (but not limited to) weakness, decreased ROM, decreased endurance, gait deviations, decreased activity tolerance and decreased functional mobility tolerance, coupled with fall risks including impaired balance, and combined with medical complications of CKD, CHF, TIANA, HTN, hx CVA,   Pt to benefit from continued skilled PT tx while in hospital and upon DC to address deficits as defined above and maximize level of functional mobility  Recommend ther ex next 1-2 sessions  Goals: Pt will: Perform bed mobility tasks with modified I to prepare for transfers and reposition in bed  Perform transfers with modified I to increase Indep in home alone environment  Perform ambulation with RW for >150 feet with  modified I  to decrease risk for falls  Stair goal to be addressed when appropriate      The patient's AM-PAC Basic Mobility Inpatient Short Form Raw Score is 18  , Standardized Score is 41 5     A standardized score less than 42 9 suggests the patient may benefit from discharge to post-acute rehabilitation services, however please refer to therapist recommendation for discharge planning given other factors that may influence destination  Comorbidities affecting pt's physical performance at time of assessment include: HTN and hyperlipidemia, CKD, CHF,   has a past medical history of Arthritis, CHF (congestive heart failure) ([address] ) (08/2017), CO2 retention, DVT (deep vein thrombosis) in pregnancy, Hyperlipidemia, Hypertension, Pulmonary embolism ([address] ) (02/06/2018), Renal disorder, Respiratory failure, acute and chronic ([address] ), Stroke ([address] ) (2016), and Ventilator dependence ([facility] )  ,  has a past surgical history that includes Abdominal surgery;  Hernia repair; Carpal tunnel release (Bilateral); Cyst Removal; Back surgery; and Replacement total knee bilateral  Personal factors affecting pt at time of IE include: steps to enter environment, limited home support, advanced age, inability to perform IADLs, inability to perform ADLs and inability to ambulate household distances

## 2021-04-17 NOTE — ED NOTES
Called [facility], offered report  Was asked to defer until after shift change report  Will call back        Veverly ODILIA Escalante  04/16/21 2530

## 2021-04-17 NOTE — ASSESSMENT & PLAN NOTE
Recent Labs     04/16/21  1716 04/17/21  0507 04/18/21  0441   CREATININE 2 79* 2 58* 2 44*   EGFR 14 16 17     Estimated Creatinine Clearance: 13 mL/min (A) (by C-G formula based on SCr of 2 44 mg/dL (H))  · Baseline creatinine 1 7-2 1  · Patient was recently admitted with CHF exacerbation  At that time she was also having elevated creatinine numbers  Per Nephrology note, TIANA could indicate progression of kidney disease  · Creatinine at the time of discharge from prior admission was 2 4, per Nephrology note at that time, this might be patient's new baseline  · Nephrology following  Appreciate input    · Plan:  · Continue to hold torsemide  · Continue IV fluids pending nephrology input  · Encourage po hydration  · Monitor BMP  · Avoid nephrotoxins  · Avoid hypotension

## 2021-04-18 LAB
ANION GAP SERPL CALCULATED.3IONS-SCNC: 4 MMOL/L (ref 4–13)
BUN SERPL-MCNC: 102 MG/DL (ref 5–25)
CALCIUM SERPL-MCNC: 8.5 MG/DL (ref 8.3–10.1)
CHLORIDE SERPL-SCNC: 111 MMOL/L (ref 100–108)
CO2 SERPL-SCNC: 30 MMOL/L (ref 21–32)
CREAT SERPL-MCNC: 2.44 MG/DL (ref 0.6–1.3)
GFR SERPL CREATININE-BSD FRML MDRD: 17 ML/MIN/1.73SQ M
GLUCOSE SERPL-MCNC: 93 MG/DL (ref 65–140)
POTASSIUM SERPL-SCNC: 4.6 MMOL/L (ref 3.5–5.3)
SODIUM SERPL-SCNC: 145 MMOL/L (ref 136–145)

## 2021-04-18 PROCEDURE — 97110 THERAPEUTIC EXERCISES: CPT

## 2021-04-18 PROCEDURE — 94660 CPAP INITIATION&MGMT: CPT

## 2021-04-18 PROCEDURE — 99232 SBSQ HOSP IP/OBS MODERATE 35: CPT | Performed by: INTERNAL MEDICINE

## 2021-04-18 PROCEDURE — 94760 N-INVAS EAR/PLS OXIMETRY 1: CPT

## 2021-04-18 PROCEDURE — 80048 BASIC METABOLIC PNL TOTAL CA: CPT | Performed by: INTERNAL MEDICINE

## 2021-04-18 PROCEDURE — 97116 GAIT TRAINING THERAPY: CPT

## 2021-04-18 PROCEDURE — 99232 SBSQ HOSP IP/OBS MODERATE 35: CPT | Performed by: HOSPITALIST

## 2021-04-18 RX ADMIN — CHOLESTYRAMINE 4 G: 4 POWDER, FOR SUSPENSION ORAL at 17:41

## 2021-04-18 RX ADMIN — PANTOPRAZOLE SODIUM 40 MG: 40 TABLET, DELAYED RELEASE ORAL at 05:19

## 2021-04-18 RX ADMIN — Medication 1 PACKET: at 10:32

## 2021-04-18 RX ADMIN — Medication 2000 UNITS: at 10:32

## 2021-04-18 RX ADMIN — ACETAMINOPHEN 650 MG: 325 TABLET, FILM COATED ORAL at 13:06

## 2021-04-18 RX ADMIN — APIXABAN 2.5 MG: 2.5 TABLET, FILM COATED ORAL at 17:41

## 2021-04-18 RX ADMIN — Medication 1 TABLET: at 17:44

## 2021-04-18 RX ADMIN — FERROUS SULFATE TAB 325 MG (65 MG ELEMENTAL FE) 325 MG: 325 (65 FE) TAB at 10:32

## 2021-04-18 RX ADMIN — CHOLESTYRAMINE 4 G: 4 POWDER, FOR SUSPENSION ORAL at 10:32

## 2021-04-18 RX ADMIN — AMLODIPINE BESYLATE 5 MG: 5 TABLET ORAL at 10:31

## 2021-04-18 RX ADMIN — NEBIVOLOL HYDROCHLORIDE 10 MG: 10 TABLET ORAL at 10:32

## 2021-04-18 RX ADMIN — APIXABAN 2.5 MG: 2.5 TABLET, FILM COATED ORAL at 10:32

## 2021-04-18 RX ADMIN — OXYCODONE HYDROCHLORIDE AND ACETAMINOPHEN 1000 MG: 500 TABLET ORAL at 10:32

## 2021-04-18 RX ADMIN — EZETIMIBE 10 MG: 10 TABLET ORAL at 10:31

## 2021-04-18 RX ADMIN — Medication 1 TABLET: at 10:32

## 2021-04-18 RX ADMIN — FLUTICASONE FUROATE AND VILANTEROL TRIFENATATE 1 PUFF: 100; 25 POWDER RESPIRATORY (INHALATION) at 10:33

## 2021-04-18 NOTE — PHYSICAL THERAPY NOTE
PHYSICAL THERAPY NOTE          Patient Name: Quinton Ferrera  RADMARY'T Date: 4/18/2021 04/18/21 1317   PT Last Visit   PT Visit Date 04/18/21   Note Type   Note Type Treatment   Pain Assessment   Pain Assessment Tool 0-10   Pain Score No Pain   Patient's Stated Pain Goal No pain   Multiple Pain Sites No   Restrictions/Precautions   Weight Bearing Precautions Per Order No   Other Precautions Chair Alarm; Bed Alarm; Fall Risk;Hard of hearing   General   Chart Reviewed Yes   Family/Caregiver Present Yes   Cognition   Overall Cognitive Status WFL   Arousal/Participation Alert; Responsive; Cooperative   Attention Attends with cues to redirect   Orientation Level Oriented X4   Following Commands Follows one step commands without difficulty   Subjective   Subjective pt stated she wants to go home    Bed Mobility   Supine to Sit 4  Minimal assistance   Additional items Assist x 1;Bedrails;HOB elevated; Increased time required;LE management   Transfers   Sit to Stand 5  Supervision   Additional items Increased time required;Verbal cues   Stand to Sit 5  Supervision   Additional items Increased time required;Verbal cues   Ambulation/Elevation   Gait pattern Foward flexed; Short stride; Excessively slow   Gait Assistance 4  Minimal assist   Additional items Assist x 1;Verbal cues   Assistive Device Rolling walker   Distance 20'x1    Balance   Static Sitting Fair +   Static Standing Fair   Ambulatory Fair -   Endurance Deficit   Endurance Deficit Yes   Endurance Deficit Description limits activity tolerance and functional mobility    Activity Tolerance   Nurse Made Aware Spoke to RN Sha Republic    Exercises   Quad Sets Supine;10 reps;Bilateral   Hip Adduction Sitting;10 reps;Bilateral   Knee AROM Long Arc Quad Sitting;10 reps;Bilateral   Ankle Pumps Sitting;10 reps;Bilateral   Marching Sitting;10 reps;Bilateral   Balance training  static standing balance with RW 3 min with no LOB   Assessment   Prognosis Good   Problem List Decreased strength;Decreased endurance; Impaired balance;Decreased mobility; Impaired hearing   Assessment pt began tx session lying supine in the bed and was agreeable to participate in PT intervention  pt required min Ax1 while performing a supine<>sit transfer to EOB  pt was able to tolerate dynamic sitting balance EOB while performing TW with no increase in pain and no LOB  pt was able to complete a sit<>stand transfer with supervision and VC's for hand placement while ascending to the RW  therapist provided pt with education on mobility techniques including transfers and gait with RW  pt showed good recall and was able to ambulate safely 15'x1 with RW and no VC's needed for safety  pt was able to perform a stand<>sit transfer with supervision and proper technique  while descending into chair pt used hands to safely lower herself into the chair  pt would benefit from continued focus on OOB mobility and and progression of ambulation as appropriate  post tx pt OOB in reclinner with call bell in hand and family present throughout tx session   Barriers to Discharge Decreased caregiver support   Barriers to Discharge Comments pt lives alone and does not have 24/7 care    Goals   Patient Goals to never go back to old [facility] Expiration Date 04/27/21   Plan   Treatment/Interventions Functional transfer training;LE strengthening/ROM; Elevations; Therapeutic exercise; Endurance training;Bed mobility; Patient/family training;Equipment eval/education;Gait training   PT Frequency 5x/wk   Recommendation   PT Discharge Recommendation Post acute rehabilitation services   PT - OK to Discharge Yes   AM-PAC Basic Mobility Inpatient   Turning in Bed Without Bedrails 4   Lying on Back to Sitting on Edge of Flat Bed 3   Moving Bed to Chair 3   Standing Up From Chair 3   Walk in Room 3   Climb 3-5 Stairs 2   Basic Mobility Inpatient Raw Score 18   Basic Mobility Standardized Score 41 05        04/18/21 1317   PT Last Visit   PT Visit Date 04/18/21   Note Type   Note Type Treatment   Pain Assessment   Pain Assessment Tool 0-10   Pain Score No Pain   Patient's Stated Pain Goal No pain   Multiple Pain Sites No   Restrictions/Precautions   Weight Bearing Precautions Per Order No   Other Precautions Chair Alarm; Bed Alarm; Fall Risk;Hard of hearing   General   Chart Reviewed Yes   Family/Caregiver Present Yes   Cognition   Overall Cognitive Status WFL   Arousal/Participation Alert; Responsive; Cooperative   Attention Attends with cues to redirect   Orientation Level Oriented X4   Following Commands Follows one step commands without difficulty   Subjective   Subjective pt stated she wants to go home    Bed Mobility   Supine to Sit 4  Minimal assistance   Additional items Assist x 1;Bedrails;HOB elevated; Increased time required;LE management   Transfers   Sit to Stand 5  Supervision   Additional items Increased time required;Verbal cues   Stand to Sit 5  Supervision   Additional items Increased time required;Verbal cues   Ambulation/Elevation   Gait pattern Foward flexed; Short stride; Excessively slow   Gait Assistance 4  Minimal assist   Additional items Assist x 1;Verbal cues   Assistive Device Rolling walker   Distance 20'x1    Balance   Static Sitting Fair +   Static Standing Fair   Ambulatory Fair -   Endurance Deficit   Endurance Deficit Yes   Endurance Deficit Description limits activity tolerance and functional mobility    Activity Tolerance   Nurse Made Aware Spoke to [facility]    Exercises   Quad Sets Supine;10 reps;Bilateral   Hip Adduction Sitting;10 reps;Bilateral   Knee AROM Long Arc Quad Sitting;10 reps;Bilateral   Ankle Pumps Sitting;10 reps;Bilateral   Marching Sitting;10 reps;Bilateral   Balance training  static standing balance with RW 3 min with no LOB   Assessment   Prognosis Good   Problem List Decreased strength;Decreased endurance; Impaired balance;Decreased mobility; Impaired hearing   Assessment pt began tx session lying supine in the bed and was agreeable to participate in PT intervention  pt required min Ax1 while performing a supine<>sit transfer to EOB  pt was able to tolerate dynamic sitting balance EOB while performing TW with no increase in pain and no LOB  pt was able to complete a sit<>stand transfer with supervision and VC's for hand placement while ascending to the RW  therapist provided pt with education on mobility techniques including transfers and gait with RW  pt showed good recall and was able to ambulate safely 15'x1 with RW and no VC's needed for safety  pt was able to perform a stand<>sit transfer with supervision and proper technique  while descending into chair pt used hands to safely lower herself into the chair  pt would benefit from continued focus on OOB mobility and and progression of ambulation as appropriate  post tx pt OOB in reclinner with call bell in hand and family present throughout tx session   Barriers to Discharge Decreased caregiver support   Barriers to Discharge Comments pt lives alone and does not have 24/7 care    Goals   Patient Goals to never go back to old [facility] Expiration Date 04/27/21   Plan   Treatment/Interventions Functional transfer training;LE strengthening/ROM; Elevations; Therapeutic exercise; Endurance training;Bed mobility; Patient/family training;Equipment eval/education;Gait training   PT Frequency 5x/wk   Recommendation   PT Discharge Recommendation Post acute rehabilitation services   PT - OK to Discharge Yes   AM-PAC Basic Mobility Inpatient   Turning in Bed Without Bedrails 4   Lying on Back to Sitting on Edge of Flat Bed 3   Moving Bed to Chair 3   Standing Up From Chair 3   Walk in Room 3   Climb 3-5 Stairs 2   Basic Mobility Inpatient Raw Score 18   Basic Mobility Standardized Score 41 05       Aubree Canales, ANA

## 2021-04-18 NOTE — PLAN OF CARE
Problem: PHYSICAL THERAPY ADULT  Goal: Performs mobility at highest level of function for planned discharge setting  See evaluation for individualized goals  Description: Treatment/Interventions: Functional transfer training, LE strengthening/ROM, Elevations, Therapeutic exercise, Endurance training, Patient/family training, Bed mobility, Gait training, Spoke to nursing, Spoke to case management          See flowsheet documentation for full assessment, interventions and recommendations  Sharyl Aase, PT     Outcome: Progressing  Note: Prognosis: Good  Problem List: Decreased strength, Decreased endurance, Impaired balance, Decreased mobility, Impaired hearing  Assessment: pt began tx session lying supine in the bed and was agreeable to participate in PT intervention  pt required min Ax1 while performing a supine<>sit transfer to EOB  pt was able to tolerate dynamic sitting balance EOB while performing TW with no increase in pain and no LOB  pt was able to complete a sit<>stand transfer with supervision and VC's for hand placement while ascending to the RW  therapist provided pt with education on mobility techniques including transfers and gait with RW  pt showed good recall and was able to ambulate safely 15'x1 with RW and no VC's needed for safety  pt was able to perform a stand<>sit transfer with supervision and proper technique  while descending into chair pt used hands to safely lower herself into the chair  pt would benefit from continued focus on OOB mobility and and progression of ambulation as appropriate  post tx pt OOB in reclinner with call bell in hand and family present throughout tx session  Barriers to Discharge: Decreased caregiver support  Barriers to Discharge Comments: pt lives alone and does not have 24/7 care      PT Discharge Recommendation: Post acute rehabilitation services     PT - OK to Discharge: Yes    See flowsheet documentation for full assessment

## 2021-04-18 NOTE — RESPIRATORY THERAPY NOTE
Pt placed on BIPAP 16/8/30% as noted in her previous admission record  Pt unable to tolerate, attempted to titrate for pt comfort, but pt refused to wear  Pt had stated that she only wears it about about 2 hours as she cannot tolerate it longer  Remains on 2L NC with SPO2 97%

## 2021-04-18 NOTE — QUICK NOTE
Patient's family updated at bedside  Diagnosis and treatment plan discussed in detail  Per family, they do not want patient to be discharged to a rehab facility  They will be taking her home once she is medically cleared

## 2021-04-18 NOTE — PROGRESS NOTES
[facility]  Progress Note - [address] 69 5/12/1928, 80 y o  female MRN: [id number]  Unit/Bed#: W -01 Encounter: [id number]  Primary Care Provider: Oskar Lozano DO   Date and time admitted to hospital: 4/16/2021  2:10 PM      * Acute kidney injury superimposed on chronic kidney disease [facility])  Assessment & Plan  Recent Labs     04/16/21  1716 04/17/21  0507 04/18/21  0441   CREATININE 2 79* 2 58* 2 44*   EGFR 14 16 17     Estimated Creatinine Clearance: 13 mL/min (A) (by C-G formula based on SCr of 2 44 mg/dL (H))  · Baseline creatinine 1 7-2 1  · Patient was recently admitted with CHF exacerbation  At that time she was also having elevated creatinine numbers  Per Nephrology note, TIANA could indicate progression of kidney disease  · Creatinine at the time of discharge from prior admission was 2 4, per Nephrology note at that time, this might be patient's new baseline  · Nephrology following  Appreciate input  · Plan:  · Continue to hold torsemide  · Continue IV fluids pending nephrology input  · Encourage po hydration  · Monitor BMP  · Avoid nephrotoxins  · Avoid hypotension      Chronic respiratory failure with hypoxia (HCC)  Assessment & Plan  · Patient with baseline requirement of 2 L via nasal cannula  · Patient follows with Dr Raquel Sahni  · Continue trilogy  · Continue Breo  · Monitor SpO2  · Incentive spirometry    History of DVT (deep vein thrombosis)  Assessment & Plan  · Continue Eliquis    Chronic diastolic congestive heart failure (HCC)  Assessment & Plan  Wt Readings from Last 3 Encounters:   04/17/21 66 8 kg (147 lb 4 3 oz)   04/10/21 65 6 kg (144 lb 9 6 oz)   08/18/20 68 kg (150 lb)     · Not in acute exacerbation  Patient appears euvolemic  · Echo from 04/05 with evidence of normal systolic function with EF 60% and G1DD  · Patient recently admitted with CHF exacerbation    · Torsemide currently on hold due to TIANA on CKD  · Monitor I/Os  · Monitor weight  Dyslipidemia  Assessment & Plan  · Continue ezetimibe    Essential hypertension  Assessment & Plan  · /83  · Continue Norvasc and Bystolic  · Hold torsemide due to TIANA on CKD  · Monitor BP          VTE Pharmacologic Prophylaxis:   Pharmacologic: Apixaban (Eliquis)  Mechanical VTE Prophylaxis in Place: Yes    Discussions with Specialists or Other Care Team Provider: Asia Talley  attending physician, nursing staff and case management  Education and Discussions with Family / Patient: Patient updated at bedside  All questions have been answered  Patient's daughter will be updated via phone call    Current Length of Stay: 2 day(s)    Current Patient Status: Inpatient     Discharge Plan / Estimated Discharge Date:  Likely 24 hours  Code Status: Level 3 - DNAR and DNI      Subjective:   Patient seen and examined at bedside  Patient states she feels well, however she does have some complaints about the menu for breakfast   Patient denies any worsening shortness of breath, nausea, vomiting  Objective:     Vitals:   Temp (24hrs), Av 3 °F (36 3 °C), Min:97 2 °F (36 2 °C), Max:97 4 °F (36 3 °C)    Temp:  [97 2 °F (36 2 °C)-97 4 °F (36 3 °C)] 97 3 °F (36 3 °C)  HR:  [63-81] 69  Resp:  [16-18] 16  BP: (116-133)/(70-83) 116/83  SpO2:  [96 %-98 %] 98 %  Body mass index is 30 91 kg/m²  Input and Output Summary (last 24 hours): Intake/Output Summary (Last 24 hours) at 2021 1215  Last data filed at 2021 1038  Gross per 24 hour   Intake 1736 25 ml   Output 1050 ml   Net 686 25 ml       Physical Exam:     Physical Exam  Vitals signs and nursing note reviewed  Constitutional:       General: She is not in acute distress  Appearance: She is well-developed  She is not toxic-appearing or diaphoretic  Comments: 2L NC (baseline)   HENT:      Head: Normocephalic and atraumatic  Eyes:      General: No scleral icterus  Right eye: No discharge  Left eye: No discharge  Conjunctiva/sclera: Conjunctivae normal       Pupils: Pupils are equal, round, and reactive to light  Neck:      Musculoskeletal: Normal range of motion and neck supple  Vascular: No JVD  Trachea: No tracheal deviation  Cardiovascular:      Rate and Rhythm: Normal rate and regular rhythm  Pulses: Normal pulses  Heart sounds: Normal heart sounds  No murmur  No friction rub  No gallop  Pulmonary:      Effort: Pulmonary effort is normal  No respiratory distress  Breath sounds: Normal breath sounds  No stridor  No wheezing or rales  Abdominal:      General: Bowel sounds are normal  There is no distension  Palpations: Abdomen is soft  Tenderness: There is no abdominal tenderness  There is no guarding  Musculoskeletal: Normal range of motion  Right lower leg: No edema  Left lower leg: No edema  Skin:     General: Skin is warm  Capillary Refill: Capillary refill takes less than 2 seconds  Coloration: Skin is not jaundiced  Neurological:      Mental Status: She is alert and oriented to person, place, and time  Cranial Nerves: No cranial nerve deficit  Psychiatric:         Mood and Affect: Mood normal          Behavior: Behavior normal          Thought Content:  Thought content normal          Judgment: Judgment normal              Additional Data:     Labs:    Results from last 7 days   Lab Units 04/17/21  0507 04/16/21  1716   WBC Thousand/uL 8 83 7 48   HEMOGLOBIN g/dL 10 6* 10 6*   HEMATOCRIT % 34 9 37 1   PLATELETS Thousands/uL 174 186   LYMPHO PCT %  --  4*   MONO PCT %  --  1*   EOS PCT %  --  0     Results from last 7 days   Lab Units 04/18/21  0441  04/16/21  1716   POTASSIUM mmol/L 4 6   < > 4 6   CHLORIDE mmol/L 111*   < > 104   CO2 mmol/L 30   < > 30   BUN mg/dL 102*   < > 133*   CREATININE mg/dL 2 44*   < > 2 79*   CALCIUM mg/dL 8 5   < > 8 8   ALK PHOS U/L  --   --  83   ALT U/L  --   --  118*   AST U/L  --   --  17    < > = values in this interval not displayed  * I Have Reviewed All Lab Data Listed Above  * Additional Pertinent Lab Tests Reviewed: Georgesingemil 66 Admission Reviewed    Imaging:    Imaging Reports Reviewed Today Include:  No new imaging reports to review today  Imaging Personally Reviewed by Myself Includes:  None  Recent Cultures (last 7 days):           Last 24 Hours Medication List:   Current Facility-Administered Medications   Medication Dose Route Frequency Provider Last Rate    acetaminophen  650 mg Oral Q6H PRN Kale Vidal MD      amLODIPine  5 mg Oral Daily Kale Vidal MD      apixaban  2 5 mg Oral BID Kale Vidal MD      ascorbic acid  1,000 mg Oral Daily Kale Vidal MD      calcium carbonate-vitamin D  1 tablet Oral BID Kale Vidal MD      cholecalciferol  2,000 Units Oral Daily Kale Vidal MD      cholestyramine sugar free  4 g Oral BID With Meals Khushboo Copeland MD      ezetimibe  10 mg Oral Daily Kale Vidal MD      ferrous sulfate  325 mg Oral Every Other Day Kale Vidal MD      fluticasone-vilanterol  1 puff Inhalation Daily Kale Vidal MD      nebivolol  10 mg Oral Daily Kale Vidal MD      ondansetron  4 mg Intravenous Q6H PRN Kale Vidal MD      pantoprazole  40 mg Oral Early Morning Kale Vidal MD      polyethylene glycol  17 g Oral Daily PRN Khushboo Copeland MD      psyllium  1 packet Oral Daily Kale Vidal MD      senna  8 6 mg Oral HS PRN Kale Vidal MD      sodium chloride  75 mL/hr Intravenous Continuous Tashia Quiñonez MD 75 mL/hr (04/17/21 6452)        Today, Patient Was Seen By: Kale Vidal MD    ** Please Note: This note has been constructed using a voice recognition system   **

## 2021-04-18 NOTE — PROGRESS NOTES
NEPHROLOGY PROGRESS NOTE    Neal Castro 80 y o  female MRN: [id number]  Unit/Bed#: W -01 Encounter: [id number]  Reason for Consult:  Acute on chronic kidney disease    Patient was sleeping when I went into the room I awakened her and she was stable clinically  We little bit of constipation but no acute complaints breathing comfortably  She was in no distress  ASSESSMENT/PLAN:  1  Renal    Patient acute on chronic kidney disease baseline creatinine ranges 1 7-2 1  She was sent in could she had been eating and labs showed an increased creatinine but she really was not that acutely ill at all  She was not eating could she said they were giving her the wrong things  With some IV fluids overnight creatinine starting to decline  Will continue slow IV fluids for 24 hours  Otherwise patient is stable for discharge she is going to her daughter's home  That decision can be up to the primary service  IV fluids while in-house   ONOFRE lorenzo  SUBJECTIVE:  Review of Systems   Constitution: Negative for chills, decreased appetite, fever and night sweats  HENT: Negative  Eyes: Negative  Cardiovascular: Negative  Negative for chest pain, dyspnea on exertion, orthopnea and palpitations  Respiratory: Negative  Negative for cough, shortness of breath, sputum production and wheezing  Gastrointestinal: Negative for abdominal pain, diarrhea, nausea and vomiting  Genitourinary: Negative  Neurological: Negative for dizziness, focal weakness, headaches and light-headedness  Psychiatric/Behavioral: Negative for altered mental status, depression, hallucinations and hypervigilance  OBJECTIVE:  Current Weight: Weight - Scale: 71 8 kg (158 lb 4 6 oz)  Vitals:Temp (24hrs), Av 3 °F (36 3 °C), Min:97 2 °F (36 2 °C), Max:97 4 °F (36 3 °C)  Current: Temperature: (!) 97 3 °F (36 3 °C)   Blood pressure 116/83, pulse 69, temperature (!) 97 3 °F (36 3 °C), resp   rate 16, height 5' (1 524 m), weight 71 8 kg (158 lb 4 6 oz), SpO2 98 %, not currently breastfeeding  , Body mass index is 30 91 kg/m²  Intake/Output Summary (Last 24 hours) at 4/18/2021 1224  Last data filed at 4/18/2021 1038  Gross per 24 hour   Intake 1736 25 ml   Output 1050 ml   Net 686 25 ml       Physical Exam: /83   Pulse 69   Temp (!) 97 3 °F (36 3 °C)   Resp 16   Ht 5' (1 524 m)   Wt 71 8 kg (158 lb 4 6 oz)   SpO2 98%   BMI 30 91 kg/m²   Physical Exam  Constitutional:       General: She is not in acute distress  Appearance: She is not toxic-appearing or diaphoretic  HENT:      Head: Normocephalic  Mouth/Throat:      Mouth: Mucous membranes are dry  Eyes:      General: No scleral icterus  Extraocular Movements: Extraocular movements intact  Neck:      Musculoskeletal: Normal range of motion and neck supple  Cardiovascular:      Rate and Rhythm: Normal rate and regular rhythm  Heart sounds: No friction rub  No gallop  Pulmonary:      Effort: Pulmonary effort is normal  No respiratory distress  Breath sounds: Normal breath sounds  No wheezing, rhonchi or rales  Abdominal:      General: Bowel sounds are normal  There is no distension  Palpations: Abdomen is soft  Tenderness: There is no abdominal tenderness  There is no rebound  Neurological:      Mental Status: She is alert and oriented to person, place, and time  Mental status is at baseline  Psychiatric:         Mood and Affect: Mood normal          Behavior: Behavior normal          Thought Content:  Thought content normal          Judgment: Judgment normal          Medications:    Current Facility-Administered Medications:     acetaminophen (TYLENOL) tablet 650 mg, 650 mg, Oral, Q6H PRN, Nilda Wade MD    amLODIPine (NORVASC) tablet 5 mg, 5 mg, Oral, Daily, Nilda Wade MD, 5 mg at 04/18/21 1031    apixaban (ELIQUIS) tablet 2 5 mg, 2 5 mg, Oral, BID, Nilda Wade MD, 2 5 mg at 04/18/21 1032    ascorbic acid (VITAMIN C) tablet 1,000 mg, 1,000 mg, Oral, Daily, Kyleigh Tan MD, 1,000 mg at 04/18/21 1032    calcium carbonate-vitamin D (OSCAL-D) 500 mg-200 units per tablet 1 tablet, 1 tablet, Oral, BID, Kyleigh Tan MD, 1 tablet at 04/18/21 1032    cholecalciferol (VITAMIN D3) tablet 2,000 Units, 2,000 Units, Oral, Daily, Kyleigh Tan MD, 2,000 Units at 04/18/21 1032    cholestyramine sugar free (QUESTRAN LIGHT) packet 4 g, 4 g, Oral, BID With Meals, Tashi Morris MD, 4 g at 04/18/21 1032    ezetimibe (ZETIA) tablet 10 mg, 10 mg, Oral, Daily, Kyleigh Tan MD, 10 mg at 04/18/21 1031    ferrous sulfate tablet 325 mg, 325 mg, Oral, Every Other Day, Kyleigh Tan MD, 325 mg at 04/18/21 1032    fluticasone-vilanterol (BREO ELLIPTA) 100-25 mcg/inh inhaler 1 puff, 1 puff, Inhalation, Daily, Kyleigh Tan MD, 1 puff at 04/18/21 1033    nebivolol (BYSTOLIC) tablet 10 mg, 10 mg, Oral, Daily, Kyleigh Tan MD, 10 mg at 04/18/21 1032    ondansetron (ZOFRAN) injection 4 mg, 4 mg, Intravenous, Q6H PRN, Kyleigh Tan MD    pantoprazole (PROTONIX) EC tablet 40 mg, 40 mg, Oral, Early Morning, Kyleigh Tan MD, 40 mg at 04/18/21 0519    polyethylene glycol (MIRALAX) packet 17 g, 17 g, Oral, Daily PRN, Tashi Morris MD, 17 g at 04/17/21 1313    psyllium (METAMUCIL) 1 packet, 1 packet, Oral, Daily, Kylegih Tan MD, 1 packet at 04/18/21 1032    senna (SENOKOT) tablet 8 6 mg, 8 6 mg, Oral, HS PRN, Kyleigh Tan MD, 8 6 mg at 04/17/21 1313    sodium chloride 0 9 % infusion, 75 mL/hr, Intravenous, Continuous, Anthony Cole MD, Last Rate: 75 mL/hr at 04/17/21 2348, 75 mL/hr at 04/17/21 2348    Laboratory Results:  Lab Results   Component Value Date    WBC 8 83 04/17/2021    HGB 10 6 (L) 04/17/2021    HCT 34 9 04/17/2021    MCV 98 04/17/2021     04/17/2021     Lab Results   Component Value Date    SODIUM 145 04/18/2021    K 4 6 04/18/2021     (H) 04/18/2021    CO2 30 04/18/2021     (H) 04/18/2021    CREATININE 2 44 (H) 04/18/2021    GLUC 93 04/18/2021    CALCIUM 8 5 04/18/2021     Lab Results   Component Value Date    CALCIUM 8 5 04/18/2021    PHOS 4 1 04/09/2021     No results found for: LABPROT

## 2021-04-19 ENCOUNTER — PATIENT OUTREACH (OUTPATIENT)
Dept: CASE MANAGEMENT | Facility: OTHER | Age: 86
End: 2021-04-19

## 2021-04-19 LAB
ANION GAP SERPL CALCULATED.3IONS-SCNC: 5 MMOL/L (ref 4–13)
BUN SERPL-MCNC: 87 MG/DL (ref 5–25)
CALCIUM SERPL-MCNC: 7.7 MG/DL (ref 8.3–10.1)
CHLORIDE SERPL-SCNC: 112 MMOL/L (ref 100–108)
CO2 SERPL-SCNC: 28 MMOL/L (ref 21–32)
CREAT SERPL-MCNC: 2.15 MG/DL (ref 0.6–1.3)
GFR SERPL CREATININE-BSD FRML MDRD: 19 ML/MIN/1.73SQ M
GLUCOSE SERPL-MCNC: 117 MG/DL (ref 65–140)
POTASSIUM SERPL-SCNC: 4 MMOL/L (ref 3.5–5.3)
SODIUM SERPL-SCNC: 145 MMOL/L (ref 136–145)

## 2021-04-19 PROCEDURE — 99232 SBSQ HOSP IP/OBS MODERATE 35: CPT | Performed by: HOSPITALIST

## 2021-04-19 PROCEDURE — 99232 SBSQ HOSP IP/OBS MODERATE 35: CPT | Performed by: PHYSICIAN ASSISTANT

## 2021-04-19 PROCEDURE — 80048 BASIC METABOLIC PNL TOTAL CA: CPT | Performed by: INTERNAL MEDICINE

## 2021-04-19 PROCEDURE — 97110 THERAPEUTIC EXERCISES: CPT

## 2021-04-19 PROCEDURE — 97530 THERAPEUTIC ACTIVITIES: CPT

## 2021-04-19 PROCEDURE — 97116 GAIT TRAINING THERAPY: CPT

## 2021-04-19 RX ADMIN — AMLODIPINE BESYLATE 5 MG: 5 TABLET ORAL at 08:57

## 2021-04-19 RX ADMIN — Medication 1 TABLET: at 17:56

## 2021-04-19 RX ADMIN — NEBIVOLOL HYDROCHLORIDE 10 MG: 10 TABLET ORAL at 08:57

## 2021-04-19 RX ADMIN — Medication 2000 UNITS: at 08:56

## 2021-04-19 RX ADMIN — OXYCODONE HYDROCHLORIDE AND ACETAMINOPHEN 1000 MG: 500 TABLET ORAL at 08:57

## 2021-04-19 RX ADMIN — APIXABAN 2.5 MG: 2.5 TABLET, FILM COATED ORAL at 17:57

## 2021-04-19 RX ADMIN — POLYETHYLENE GLYCOL 3350 17 G: 17 POWDER, FOR SOLUTION ORAL at 05:24

## 2021-04-19 RX ADMIN — SODIUM CHLORIDE 75 ML/HR: 0.9 INJECTION, SOLUTION INTRAVENOUS at 04:30

## 2021-04-19 RX ADMIN — CHOLESTYRAMINE 4 G: 4 POWDER, FOR SUSPENSION ORAL at 08:56

## 2021-04-19 RX ADMIN — EZETIMIBE 10 MG: 10 TABLET ORAL at 08:56

## 2021-04-19 RX ADMIN — CHOLESTYRAMINE 4 G: 4 POWDER, FOR SUSPENSION ORAL at 16:05

## 2021-04-19 RX ADMIN — Medication 1 PACKET: at 08:56

## 2021-04-19 RX ADMIN — PANTOPRAZOLE SODIUM 40 MG: 40 TABLET, DELAYED RELEASE ORAL at 05:19

## 2021-04-19 RX ADMIN — Medication 1 TABLET: at 08:57

## 2021-04-19 RX ADMIN — APIXABAN 2.5 MG: 2.5 TABLET, FILM COATED ORAL at 08:57

## 2021-04-19 RX ADMIN — FLUTICASONE FUROATE AND VILANTEROL TRIFENATATE 1 PUFF: 100; 25 POWDER RESPIRATORY (INHALATION) at 08:57

## 2021-04-19 NOTE — PLAN OF CARE
Problem: Prexisting or High Potential for Compromised Skin Integrity  Goal: Skin integrity is maintained or improved  Description: INTERVENTIONS:  - Identify patients at risk for skin breakdown  - Assess and monitor skin integrity  - Assess and monitor nutrition and hydration status  - Monitor labs   - Assess for incontinence   - Turn and reposition patient  - Assist with mobility/ambulation  - Relieve pressure over bony prominences  - Avoid friction and shearing  - Provide appropriate hygiene as needed including keeping skin clean and dry  - Evaluate need for skin moisturizer/barrier cream  - Collaborate with interdisciplinary team   - Patient/family teaching  - Consider wound care consult   Outcome: Progressing     Problem: SAFETY ADULT  Goal: Patient will remain free of falls  Description: INTERVENTIONS:  - Assess patient frequently for physical needs  -  Identify cognitive and physical deficits and behaviors that affect risk of falls    -  [place] fall precautions as indicated by assessment   - Educate patient/family on patient safety including physical limitations  - Instruct patient to call for assistance with activity based on assessment  - Modify environment to reduce risk of injury  - Consider OT/PT consult to assist with strengthening/mobility  Outcome: Progressing  Goal: Maintain or return to baseline ADL function  Description: INTERVENTIONS:  -  Assess patient's ability to carry out ADLs; assess patient's baseline for ADL function and identify physical deficits which impact ability to perform ADLs (bathing, care of mouth/teeth, toileting, grooming, dressing, etc )  - Assess/evaluate cause of self-care deficits   - Assess range of motion  - Assess patient's mobility; develop plan if impaired  - Assess patient's need for assistive devices and provide as appropriate  - Encourage maximum independence but intervene and supervise when necessary  - Involve family in performance of ADLs  - Assess for home care needs following discharge   - Consider OT consult to assist with ADL evaluation and planning for discharge  - Provide patient education as appropriate  Outcome: Progressing  Goal: Maintain or return mobility status to optimal level  Description: INTERVENTIONS:  - Assess patient's baseline mobility status (ambulation, transfers, stairs, etc )    - Identify cognitive and physical deficits and behaviors that affect mobility  - Identify mobility aids required to assist with transfers and/or ambulation (gait belt, sit-to-stand, lift, walker, cane, etc )  - [place] fall precautions as indicated by assessment  - Record patient progress and toleration of activity level on Mobility SBAR; progress patient to next Phase/Stage  - Instruct patient to call for assistance with activity based on assessment  - Consider rehabilitation consult to assist with strengthening/weightbearing, etc   Outcome: Progressing     Problem: Knowledge Deficit  Goal: Patient/family/caregiver demonstrates understanding of disease process, treatment plan, medications, and discharge instructions  Description: Complete learning assessment and assess knowledge base    Interventions:  - Provide teaching at level of understanding  - Provide teaching via preferred learning methods  Outcome: Progressing     Problem: METABOLIC, FLUID AND ELECTROLYTES - ADULT  Goal: Electrolytes maintained within normal limits  Description: INTERVENTIONS:  - Monitor labs and assess patient for signs and symptoms of electrolyte imbalances  - Administer electrolyte replacement as ordered  - Monitor response to electrolyte replacements, including repeat lab results as appropriate  - Instruct patient on fluid and nutrition as appropriate  Outcome: Progressing  Goal: Fluid balance maintained  Description: INTERVENTIONS:  - Monitor labs   - Monitor I/O and WT  - Instruct patient on fluid and nutrition as appropriate  - Assess for signs & symptoms of volume excess or deficit  Outcome: Progressing  Goal: Glucose maintained within target range  Description: INTERVENTIONS:  - Monitor Blood Glucose as ordered  - Assess for signs and symptoms of hyperglycemia and hypoglycemia  - Administer ordered medications to maintain glucose within target range  - Assess nutritional intake and initiate nutrition service referral as needed  Outcome: Progressing     Problem: Nutrition/Hydration-ADULT  Goal: Nutrient/Hydration intake appropriate for improving, restoring or maintaining nutritional needs  Description: Monitor and assess patient's nutrition/hydration status for malnutrition  Collaborate with interdisciplinary team and initiate plan and interventions as ordered  Monitor patient's weight and dietary intake as ordered or per policy  Utilize nutrition screening tool and intervene as necessary  Determine patient's food preferences and provide high-protein, high-caloric foods as appropriate       INTERVENTIONS:  - Monitor oral intake, urinary output, labs, and treatment plans  - Assess nutrition and hydration status and recommend course of action  - Evaluate amount of meals eaten  - Assist patient with eating if necessary   - Allow adequate time for meals  - Recommend/ encourage appropriate diets, oral nutritional supplements, and vitamin/mineral supplements  - Order, calculate, and assess calorie counts as needed  - Recommend, monitor, and adjust tube feedings and TPN/PPN based on assessed needs  - Assess need for intravenous fluids  - Provide specific nutrition/hydration education as appropriate  - Include patient/family/caregiver in decisions related to nutrition  Outcome: Progressing

## 2021-04-19 NOTE — PROGRESS NOTES
[facility]  Progress Note - [address] 5/12/1928, 80 y o  female MRN: [id number]  Unit/Bed#: W -01 Encounter: [id number]  Primary Care Provider: Cruzito Wright DO   Date and time admitted to hospital: 4/16/2021  2:10 PM    * Acute kidney injury superimposed on chronic kidney disease [facility])  Assessment & Plan  Recent Labs     04/17/21  0507 04/18/21  0441 04/19/21  0501   CREATININE 2 58* 2 44* 2 15*   EGFR 16 17 19     Estimated Creatinine Clearance: 14 8 mL/min (A) (by C-G formula based on SCr of 2 15 mg/dL (H))  · Baseline creatinine 1 7-2 1  · Patient was recently admitted with CHF exacerbation  At that time she was also having elevated creatinine numbers  Per Nephrology note, TIANA could indicate progression of kidney disease  · Creatinine at the time of discharge from prior admission was 2 4, per Nephrology note at that time, this might be patient's new baseline  · Nephrology  was consulted for patient  TIANA now resolved after IV hydration  · Plan:  · Resolved  ·   Will resume torsemide tomorrow per Nephro recommendations  ·  follow-up with nephrology in 1 week outpatient  Patient has an appoint with Dr Anne Her scheduled  Chronic respiratory failure with hypoxia (HCC)  Assessment & Plan  · Patient with baseline requirement of 2 L via nasal cannula  · Patient follows with Dr Clara Hdz  · Continue trilogy  · Continue Breo  ·  SpO2 at 97% on 2 L  O2 via NC  ·   Patient stable for discharge  Chronic diastolic congestive heart failure [facility])  Assessment & Plan  Wt Readings from Last 3 Encounters:   04/19/21 71 8 kg (158 lb 4 6 oz)   04/10/21 65 6 kg (144 lb 9 6 oz)   08/18/20 68 kg (150 lb)     · Not in acute exacerbation  Patient appears euvolemic  · Echo from 04/05 with evidence of normal systolic function with EF 60% and G1DD  · Patient recently admitted with CHF exacerbation    ·   Resume Torsemide tomorrow per Nephro recommendations        History of DVT (deep vein thrombosis)  Assessment & Plan  · Continue Eliquis    Dyslipidemia  Assessment & Plan  · Continue ezetimibe    Essential hypertension  Assessment & Plan  Blood Pressure: 129/74  · Continue Norvasc and Bystolic  · Resume torsemide tomorrow since TIANA is resolved  VTE Pharmacologic Prophylaxis:   Pharmacologic: Apixaban (Eliquis)  Mechanical VTE Prophylaxis in Place: Yes    Discussions with Specialists or Other Care Team Provider:   Nephrology team   Physical therapy team   Geriatric Medicine  Education and Discussions with Family / Patient:   Daughter Shirley Coe  Current Length of Stay: 3 day(s)    Current Patient Status: Inpatient     Discharge Plan / Estimated Discharge Date: 24 hours  Code Status: Level 3 - DNAR and DNI      Subjective:     Patient was awake and sitting up in bed  She has no complaints  She stated that she could not tolerate the BiPAP overnight  She denies having difficulty breathing  Patient denies HA, CP, respiratory distress, abdominal pain, N/V/D, fever or chills  Patient is back at baseline and is agreeable to being discharged  Per daughter, patient will be discharged home and not to acute rehab as recommended per PT evaluation  However, daughter states that she is unable to  patient today therefore patient will be discharged tomorrow  Objective:     Vitals:   Temp (24hrs), Av 7 °F (36 5 °C), Min:97 5 °F (36 4 °C), Max:97 9 °F (36 6 °C)    Temp:  [97 5 °F (36 4 °C)-97 9 °F (36 6 °C)] 97 6 °F (36 4 °C)  HR:  [] 73  Resp:  [16-18] 16  BP: (117-129)/(73-75) 129/74  SpO2:  [97 %-100 %] 98 %  Body mass index is 30 91 kg/m²  Input and Output Summary (last 24 hours): Intake/Output Summary (Last 24 hours) at 2021 1101  Last data filed at 2021 1033  Gross per 24 hour   Intake 2858 75 ml   Output 1377 ml   Net 1481 75 ml       Physical Exam:     Physical Exam  Vitals signs and nursing note reviewed     Constitutional:       General: She is not in acute distress  Appearance: Normal appearance  She is not ill-appearing or toxic-appearing  HENT:      Head: Normocephalic and atraumatic  Nose: Nose normal       Comments: Nasal cannula in situ  Eyes:      General: No scleral icterus  Right eye: No discharge  Left eye: No discharge  Pupils: Pupils are equal, round, and reactive to light  Neck:      Musculoskeletal: Normal range of motion and neck supple  No neck rigidity or muscular tenderness  Cardiovascular:      Rate and Rhythm: Normal rate and regular rhythm  Pulses: Normal pulses  Heart sounds: Normal heart sounds  No murmur  No friction rub  No gallop  Pulmonary:      Effort: Pulmonary effort is normal  No respiratory distress  Breath sounds: No stridor  Wheezing (Chronic end inspiratory ) present  No rhonchi or rales  Abdominal:      General: Bowel sounds are normal       Palpations: Abdomen is soft  Tenderness: There is no abdominal tenderness  There is no guarding or rebound  Musculoskeletal:         General: No swelling, tenderness or deformity  Right lower leg: No edema  Left lower leg: No edema  Skin:     General: Skin is warm and dry  Capillary Refill: Capillary refill takes less than 2 seconds  Coloration: Skin is not jaundiced  Findings: No lesion or rash  Neurological:      General: No focal deficit present  Mental Status: She is alert and oriented to person, place, and time  Mental status is at baseline  Motor: No weakness  Psychiatric:         Mood and Affect: Mood normal          Behavior: Behavior normal          Thought Content:  Thought content normal          Judgment: Judgment normal        Additional Data:     Labs:    Results from last 7 days   Lab Units 04/17/21  0507 04/16/21  1716   WBC Thousand/uL 8 83 7 48   HEMOGLOBIN g/dL 10 6* 10 6*   HEMATOCRIT % 34 9 37 1   PLATELETS Thousands/uL 174 186   LYMPHO PCT %  --  4*   MONO PCT %  --  1*   EOS PCT %  --  0     Results from last 7 days   Lab Units 04/19/21  0501  04/16/21  1716   POTASSIUM mmol/L 4 0   < > 4 6   CHLORIDE mmol/L 112*   < > 104   CO2 mmol/L 28   < > 30   BUN mg/dL 87*   < > 133*   CREATININE mg/dL 2 15*   < > 2 79*   CALCIUM mg/dL 7 7*   < > 8 8   ALK PHOS U/L  --   --  83   ALT U/L  --   --  118*   AST U/L  --   --  17    < > = values in this interval not displayed  * I Have Reviewed All Lab Data Listed Above  * Additional Pertinent Lab Tests Reviewed:  All Labs Within Last 24 Hours Reviewed    Imaging:    Imaging Reports Reviewed Today Include: None  Imaging Personally Reviewed by Myself Includes:  None    Recent Cultures (last 7 days):           Last 24 Hours Medication List:   Current Facility-Administered Medications   Medication Dose Route Frequency Provider Last Rate    acetaminophen  650 mg Oral Q6H PRN Alex Stephens MD      amLODIPine  5 mg Oral Daily Alex Stephens MD      apixaban  2 5 mg Oral BID Alex Stephens MD      ascorbic acid  1,000 mg Oral Daily Alex Stephens MD      calcium carbonate-vitamin D  1 tablet Oral BID Alex Stephens MD      cholecalciferol  2,000 Units Oral Daily Alex Stephens MD      cholestyramine sugar free  4 g Oral BID With Meals Екатерина Beth MD      ezetimibe  10 mg Oral Daily Alex Stephens MD      ferrous sulfate  325 mg Oral Every Other Day Alxe Stephens MD      fluticasone-vilanterol  1 puff Inhalation Daily Alex Stephens MD      nebivolol  10 mg Oral Daily Alex Stephens MD      ondansetron  4 mg Intravenous Q6H PRN Alex Stephens MD      pantoprazole  40 mg Oral Early Morning Alex Stephens MD      polyethylene glycol  17 g Oral Daily PRN Екатерина Beth MD      psyllium  1 packet Oral Daily Alex Stephens MD      senna  8 6 mg Oral HS PRN Alex Stephens MD      sodium chloride  75 mL/hr Intravenous Continuous Devan Daley MD 75 mL/hr (04/19/21 0430)        Today, Patient Was Seen By: Pasha Morris MD Yinka    ** Please Note: This note has been constructed using a voice recognition system   **

## 2021-04-19 NOTE — DISCHARGE INSTR - AVS FIRST PAGE
Dear Sho Santana,     It was our pleasure to care for you here at [facility]  It is our hope that we were always able to exceed the expected standards for your care during your stay  You were hospitalized due to ***  You were cared for on the *** floor by Bhavesh Kelley MD under the service of Dontae Tenorio MD with the [facility] who covers for your primary care physician (PCP), Oskar Lozano DO, while you were hospitalized  If you have any questions or concerns related to this hospitalization, you may contact us at [phone]  For follow up as well as any medication refills, we recommend that you follow up with your primary care physician  A registered nurse will reach out to you by phone within a few days after your discharge to answer any additional questions that you may have after going home  However, at this time we provide for you here, the most important instructions / recommendations at discharge:     · Notable Medication Adjustments -   · None  · Testing Required after Discharge -   · None  · Important follow up information -   · Please follow up with the Nephrologist in one week  · Please follow up with your Primary Care Provider in one week  · Other Instructions -   Practice social distancing  Adequate hand washing hygiene  Wear mask in public at all times  · Please review this entire after visit summary as additional general instructions including medication list, appointments, activity, diet, any pertinent wound care, and other additional recommendations from your care team that may be provided for you        Sincerely,     Bhavesh Kelley MD

## 2021-04-19 NOTE — DISCHARGE INSTRUCTIONS
Acute Kidney Injury   WHAT YOU NEED TO KNOW:   Acute kidney injury (TIANA) is also called acute kidney failure, or acute renal failure  TIANA happens when your kidneys suddenly stop working correctly  Normally, the kidneys remove fluid, chemicals, and waste from your blood  These wastes are turned into urine by your kidneys  TIANA usually happens over hours or days  When you have TIANA, your kidneys do not remove the waste, chemicals, or extra fluid from your body  A normal amount of urine is not produced  TIANA is usually temporary, it can take days to months to recover  TIANA can also become a chronic kidney condition  DISCHARGE INSTRUCTIONS:   Call 911 if:   · You have sudden chest pain or trouble breathing  Seek care immediately if:   · Your symptoms get worse  Contact your healthcare provider if:   · Your symptoms return  · Your blood sugar or blood pressure level is not within the range your healthcare provider recommends  · You have questions or concerns about your condition or care  Nutrition:  Your healthcare provider may tell you to eat food low in sodium (salt), potassium, phosphorus, or protein  A dietitian can help you plan your meals  Drink liquids as directed: Your healthcare provider may recommend that you drink a certain amount of liquids  This will help your kidneys work better and decrease your risk for dehydration  Ask how much liquid to drink each day and which liquids are best for you  Prevent acute kidney injury:   · Manage other health conditions  such as diabetes, high blood pressure, or heart disease  These conditions increase your risk for acute kidney injury  Take your medicines for these conditions as directed  Also, monitor your blood sugar and blood pressure levels as directed  Contact your healthcare provider if your levels are not in the range he or she says it should be  · Talk to your healthcare provider before you take over-the-counter-medicine    NSAIDs, stomach medicine, or laxatives may harm your kidneys and increase your risk for acute kidney injury  If it is okay to take the medicine, follow the directions on the package  Do not take more than directed  · Tell healthcare providers you have had acute kidney injury  before you get contrast liquid for an x-ray or CT scan  Your healthcare provider may give you medicine to prevent kidney problems caused by the liquid  Follow up with your healthcare provider as directed: You will need to return for more tests to make sure your kidneys are working properly  You may also be referred to a kidney specialist  Write down your questions so you remember to ask them during your visits  © Copyright [address] Information is for End User's use only and may not be sold, redistributed or otherwise used for commercial purposes  All illustrations and images included in CareNotes® are the copyrighted property of [employer]  or [facility]   The above information is an  only  It is not intended as medical advice for individual conditions or treatments  Talk to your doctor, nurse or pharmacist before following any medical regimen to see if it is safe and effective for you

## 2021-04-19 NOTE — PROGRESS NOTES
NEPHROLOGY PROGRESS NOTE   Edu Fitch 80 y o  female MRN: [id number]  Unit/Bed#: W -01 Encounter: [id number]  Reason for Consult: TIANA/CKD    ASSESSMENT/PLAN:  1  Acute Kidney Injury, POA- secondary to prerenal azotemia  - creatinine improved from peak of 2 8 with IVF  - now resolved and creatinine is back to baseline  - stop IVF  2  Chronic Kidney Disease stage IV- Baseline creatinine 1 7-2 1  Follows with Dr Darryle Ishihara  3  Volume Status- appears euvolemic  - would recommend restarting diuretics on discharge  - note patient was on NSS during this admission due to TIANA  4  Hypernatremia- encourage oral intake  5  Hypertension- BP acceptable  6  Chronic Respiratory Failure with Hypoxia- follows with Dr Rachel Carlson outpatient    Disposition:  Okay for discharge from renal standpoint  Waiting for hospital bed to be delivered  SUBJECTIVE:  Patient feeling well overall  Denies SOB  States legs are about normal for her  Daughter at bedside      OBJECTIVE:  Current Weight: Weight - Scale: 71 8 kg (158 lb 4 6 oz)  Vitals:    04/19/21 0600 04/19/21 0743 04/19/21 1516 04/19/21 1516   BP:  129/74 128/73 128/73   BP Location:       Pulse:  73 81 84   Resp:  16     Temp:  97 6 °F (36 4 °C) 97 8 °F (36 6 °C) 97 8 °F (36 6 °C)   TempSrc:       SpO2:  98% 96% 98%   Weight: 71 8 kg (158 lb 4 6 oz)      Height:           Intake/Output Summary (Last 24 hours) at 4/19/2021 1615  Last data filed at 4/19/2021 1033  Gross per 24 hour   Intake 2618 75 ml   Output 1000 ml   Net 1618 75 ml     General: NAD  Skin: no rash  Eyes: anicteric  ENMT: mm moist  Neck: no masses  Respiratory: CTAB  Cardiac: RRR  Extremities: + L>R LE edema  GI: soft nt nd  Neuro: alert awake  Psych: mood and affect appropriate    Medications:    Current Facility-Administered Medications:     acetaminophen (TYLENOL) tablet 650 mg, 650 mg, Oral, Q6H PRN, Gabriela Camarillo MD, 650 mg at 04/18/21 1306    amLODIPine (NORVASC) tablet 5 mg, 5 mg, Oral, Daily, Estella Carlisle MD, 5 mg at 04/19/21 0857    apixaban (ELIQUIS) tablet 2 5 mg, 2 5 mg, Oral, BID, Estella Carlisle MD, 2 5 mg at 04/19/21 0857    ascorbic acid (VITAMIN C) tablet 1,000 mg, 1,000 mg, Oral, Daily, Estella Carlisle MD, 1,000 mg at 04/19/21 0857    calcium carbonate-vitamin D (OSCAL-D) 500 mg-200 units per tablet 1 tablet, 1 tablet, Oral, BID, Estella Carlisle MD, 1 tablet at 04/19/21 0857    cholecalciferol (VITAMIN D3) tablet 2,000 Units, 2,000 Units, Oral, Daily, Estella Carlisle MD, 2,000 Units at 04/19/21 0856    cholestyramine sugar free (QUESTRAN LIGHT) packet 4 g, 4 g, Oral, BID With Meals, Prema Mars MD, 4 g at 04/19/21 1605    ezetimibe (ZETIA) tablet 10 mg, 10 mg, Oral, Daily, Estella Carlisle MD, 10 mg at 04/19/21 0856    ferrous sulfate tablet 325 mg, 325 mg, Oral, Every Other Day, Estella Carlisle MD, 325 mg at 04/18/21 1032    fluticasone-vilanterol (BREO ELLIPTA) 100-25 mcg/inh inhaler 1 puff, 1 puff, Inhalation, Daily, Estella Carlisle MD, 1 puff at 04/19/21 0857    nebivolol (BYSTOLIC) tablet 10 mg, 10 mg, Oral, Daily, Estella Carlisle MD, 10 mg at 04/19/21 0857    ondansetron (ZOFRAN) injection 4 mg, 4 mg, Intravenous, Q6H PRN, Estella Carlisle MD    pantoprazole (PROTONIX) EC tablet 40 mg, 40 mg, Oral, Early Morning, Estella Carlisle MD, 40 mg at 04/19/21 0519    polyethylene glycol (MIRALAX) packet 17 g, 17 g, Oral, Daily PRN, Prema Mars MD, 17 g at 04/19/21 0524    psyllium (METAMUCIL) 1 packet, 1 packet, Oral, Daily, Estella Carlisle MD, 1 packet at 04/19/21 0856    senna (SENOKOT) tablet 8 6 mg, 8 6 mg, Oral, HS PRN, Estella Carlisle MD, 8 6 mg at 04/17/21 1313    sodium chloride 0 9 % infusion, 75 mL/hr, Intravenous, Continuous, Ngoc Mar MD, Last Rate: 75 mL/hr at 04/19/21 0430, 75 mL/hr at 04/19/21 0430    Laboratory Results:  Results from last 7 days   Lab Units 04/19/21  0501 04/18/21  0441 04/17/21  0507 04/16/21  1716 04/16/21   WBC Thousand/uL  --   --  8 83 7 48  -- HEMOGLOBIN g/dL  --   --  10 6* 10 6*  --    HEMATOCRIT %  --   --  34 9 37 1  --    PLATELETS Thousands/uL  --   --  174 186  --    POTASSIUM mmol/L 4 0 4 6 4 7 4 6 4 8   CHLORIDE mmol/L 112* 111* 112* 104 107   CO2 mmol/L 28 30 30 30 26   BUN mg/dL 87* 102* 126* 133* 139   CREATININE mg/dL 2 15* 2 44* 2 58* 2 79* 2 63   CALCIUM mg/dL 7 7* 8 5 8 7 8 8 8 8   MAGNESIUM mg/dL  --   --  2 5  --   --      I have personally reviewed the blood work as stated above and in my note  I have personally reviewed case management note and SLIM note

## 2021-04-19 NOTE — RESPIRATORY THERAPY NOTE
Pt placed on BIPAP AVAP, trying to titrate for pt comfort  Pt refusing at first, willng to give it a try, but very worried and anxious about being able to get mask off quickly as she feels wearing BIPAP makes her choke, and she is a afraid she will choke to death  Reassured pt, showed her how to take mask off, and left her nasal cannula on her lap next to call bell  Re-explained to pt the reason she needs to wear BIPAP, pt still not happy wearing BIPAP, but will try wearing for a couple of hours

## 2021-04-19 NOTE — PHYSICAL THERAPY NOTE
PHYSICAL THERAPY NOTE          Patient Name: Twyla Villareal  XLIOL'W Date: 4/19/2021 04/19/21 1300   PT Last Visit   PT Visit Date 04/19/21   Note Type   Note Type Treatment   Pain Assessment   Pain Assessment Tool 0-10   Pain Score No Pain   Pain Location/Orientation Location: Shoulder   Pain Onset/Description Descriptor: Aching   Effect of Pain on Daily Activities limits functional mobility and activity tolerance    Patient's Stated Pain Goal No pain   Hospital Pain Intervention(s) Repositioned; Ambulation/increased activity; Emotional support; Rest   Multiple Pain Sites No   Restrictions/Precautions   Weight Bearing Precautions Per Order No   Other Precautions Chair Alarm; Bed Alarm; Fall Risk;Hard of hearing   General   Chart Reviewed Yes   Response to Previous Treatment Patient with no complaints from previous session  Family/Caregiver Present Yes   Cognition   Overall Cognitive Status WFL   Arousal/Participation Responsive; Alert; Cooperative   Attention Within functional limits   Orientation Level Oriented X4   Following Commands Follows one step commands without difficulty   Subjective   Subjective pt stated she wants to go home    Bed Mobility   Rolling R Unable to assess   Rolling L Unable to assess   Supine to Sit Unable to assess   Sit to Supine Unable to assess   Additional Comments pu OOB seated in the recliner pre/post tx    Transfers   Sit to Stand 5  Supervision   Additional items Increased time required   Stand to Sit 5  Supervision   Additional items Increased time required;Verbal cues   Ambulation/Elevation   Gait pattern Foward flexed; Short stride; Excessively slow   Gait Assistance 4  Minimal assist   Additional items Assist x 1;Verbal cues   Assistive Device Rolling walker   Distance 20'x1    Balance   Static Sitting Fair +   Static Standing Fair   Ambulatory Fair -   Endurance Deficit   Endurance Deficit Yes Endurance Deficit Description limits activy and functional mobility   Activity Tolerance   Nurse Made Aware Spoke to RN    Exercises   Hip Adduction Sitting;15 reps;Bilateral   Knee AROM Long Arc Quad Sitting;15 reps;Bilateral   Ankle Pumps Sitting;15 reps;Bilateral   Marching Sitting;15 reps;Bilateral   Balance training  static standing balance with RW 3 min w/o LOB    Assessment   Prognosis Good   Problem List Decreased strength;Decreased endurance; Impaired balance;Decreased mobility; Impaired hearing   Assessment pt began tx session OOB seated in the recliner and was agreeable to participate in PT intervention  pt provided with education for proper mobility techniques including transfers and gait with RW  pt was able to complete all TE seated in recliner with no increase in pain  pt was able to transfer sit<>stand with supervison to RW  pt able to tolerate 3 min of static standing balance with no LOB before ambulating 20'x1 with RW with min Ax1 with VC's for RW placement  pt progressing with skilled PT performing all ambulationwith min Ax1 using RW w/o LOB  pt transfers from bed, recliner and toilet with supervision  pt would benefit from continued focus on OOB mobility with focus on progression of ambulation  post tx pt OOB sitting in the recliner with chair alarm activated    Barriers to Discharge Decreased caregiver support   Barriers to Discharge Comments pt lives alone and does not have 24/7 care    Goals   Patient Goals to never go back to [facility]    [facility] Expiration Date 04/27/21   Plan   Treatment/Interventions Functional transfer training;LE strengthening/ROM; Elevations; Therapeutic exercise; Endurance training;Patient/family training;Equipment eval/education; Bed mobility;Gait training   PT Frequency 5x/wk   Recommendation   PT Discharge Recommendation Post acute rehabilitation services   PT - OK to Discharge Yes   AM-PAC Basic Mobility Inpatient   Turning in Bed Without Bedrails 4   Lying on Back to Sitting on Edge of Flat Bed 3   Moving Bed to Chair 3   Standing Up From Chair 3   Walk in Room 3   Climb 3-5 Stairs 2   Basic Mobility Inpatient Raw Score 18   Basic Mobility Standardized Score 41 05       Pierce Ovalles, PTA

## 2021-04-19 NOTE — ASSESSMENT & PLAN NOTE
Wt Readings from Last 3 Encounters:   04/19/21 71 8 kg (158 lb 4 6 oz)   04/10/21 65 6 kg (144 lb 9 6 oz)   08/18/20 68 kg (150 lb)     · Not in acute exacerbation  Patient appears euvolemic  · Echo from 04/05 with evidence of normal systolic function with EF 60% and G1DD  · Patient recently admitted with CHF exacerbation    ·   Resume Torsemide tomorrow per Nephro recommendations

## 2021-04-19 NOTE — PROGRESS NOTES
RENAL FOLLOW UP NOTE: td    ASSESSMENT AND PLAN:  1   CKD stage 4 :  · Etiology:  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 of TIANA  · Baseline creatinine:  More Recently:  1 76-2 11  · Current creatinine:  2 39 above usual level, need to be maintained higher to keep out of congestive heart failure  · Urine protein creatinine ratio:  1 88 g which is above goal?  Etiology  Candida Hook age and comorbidities I would treat this conservatively   Discussed with the patient and her niece who agree with the conservative approach with no further investigation such as lab tests/serologies or kidney biopsy  Repeat  Recommendations:  · Treat hypertension-please see below  · Treat dyslipidemia-please see below  · Maintain proteinuria less than 1 g or as low as possible     2   Volume:   · Recent CHF  · Current status:  2+ lower extremity edema of the left lower extremity to the thighs/mid thigh; 1+ of the right lower extremity especially the ankle  · Recommendations:  · I would increase torsemide to 20 b i d   · Recheck labs in 1 week  3   Hypertension:    · Home blood pressure readings:   ·  none at this time     · Goal blood pressure:  Less than 125/75 given proteinuria  Recommendations:  · Push nonmedical regimen including weight loss, isotonic exercise and avoidance of salt; patient counseled as such  · Medication changes today:    ·   increase torsemide to 20 b i d   4   Electrolytes:   · Potassium acceptable  · Metabolic alkalosis secondary to contraction alkalosis from diuretics:  Check VBG  5   Mineral bone disorder:  · Calcium/magnesium/phosphorus:  all acceptable  · PTH intact:  23 which is acceptable  · Vitamin-D: 52  6   Dyslipidemia:  · Goal LDL:  Less than 70 ideally given CAD  · Current lipid profile:  LDL  69/HDL 60/triglycerides 115  Recommendations:  At goal no changes   Cannot take statins   Continue Kiara Montoya   7   Anemia:  Related to CKD and chronic disease  · Hemoglobin slightly lower at 10 4  · Iron studies:  Saturation 17%  Oral iron over-the-counter on a daily basis as tolerated  8   Other problems:  · Status post  CVA  · CAD/atrial fibrillation:  Followed by Dr Estevan Murphy  · History of MGUS followed by Dr Matthew Garcia  · Mild thrombocytopenia at 123 K  · Admission 4/5-4/11/21 secondary to shortness of breath:  Felt related to pulmonary edema versus pneumonia versus infectious, chest x-ray demonstrated mild vascular congestion:  Treated for both infection/volume overload  In peaked at 2 8 was down to 2 15 as of 04/19/2021  She was treated with some IV fluids but that was stopped  Diuretics were re-initiated  Patient will be followed by Pulmonary closely as an outpatient  · Patient was readmitted 4/16 because of AK I in discharge 04/20  Patient initially was attempted to have IV fluids in rehab facility however that failed, creatinine was 2 79 on admission with BUN of 133, hemoglobin 10 6  AK I was treated with IV fluids  I had a lengthy discussion with the patient's daughter patient herself regarding goals of care  They are leaning towards palliative care possibly even hospice not sure about that yet will have a family discussion  I have offered advanced care meeting and they are agreeable to that and not will set this up  In addition it is very clear that the patient would not want any form of kidney dialysis and would rather die peacefully  Finally, she may be leaning towards avoiding going to the hospital any time in the future and therefore if it comes to that point she May want hospice instilled at that time and remain at home  Again they will discuss this is a family  PATIENT INSTRUCTIONS:    Patient Instructions   1  Medication changes today:   Increase torsemide to 20 mg twice a day with a goal of losing at least 4-6 lb over the next several days    Once the swelling has improved you can cut back to torsemide 20 mg in the morning with an additional 20 mg in the evening as needed for leg swelling or 2-3 lb weight gain   Please call if the swelling does not get any better or shortness of breath worsens    2  Please go for non fasting  lab work 1 week after the above medication change    3  We will plan for an advanced care meeting in the next few weeks  At that time we will also have my advanced practitioner evaluate the patient possibly back to back appointments    4  Nonfasting lab work in 1 month     5  Follow-up in 3  months   Please bring in 1 week a blood pressure readings morning evening, sitting and standing is outlined above  · TAKE THE MORNING READINGS BEFORE ANY MEDICATIONS AND WHEN YOU ARE RELAXED FOR SEVERAL MINUTES  · TAKE THE EVENING READINGS BETWEEN 7PM AND 10PM AT LEAST 30 MINUTES BEFORE OR AFTER DINNER/EATING/COFFEE INTAKE OR ALCOHOL INTAKE, AND CERTAINLY BEFORE ANY BLOOD PRESSURE MEDICATIONS  AGAIN, PLEASE MAKE SURE YOU ARE RELAXED FOR SEVERAL MINUTES BEFORE TAKING HER BLOOD PRESSURE READINGS  · PLEASE INCLUDE HEART RATE WITH YOUR BLOOD PRESSURE READINGS  · When taking standing readings, keep your arm supported at heart level and not dangling  · Make sure you are sitting with your back supported and feet on the ground and do not cross your legs or feet  · Make sure you have not taken any coffee or caffeine products or exercised or smoke cigarettes at least 30 minutes before taking your blood pressure     Please go for fasting lab work 1-2 weeks prior to your appointment      6   General instructions:   AVOID SALT BUT NOT ADDING AN READING LABELS TO MAKE SURE THERE IS LOW-SALT IN THE FOOD THAT YOU ARE EATING  o Goal is less than 2 g of sodium intake or less than 5 g of sodium chloride intake per day     Avoid nonsteroidal anti-inflammatory drugs such as Naprosyn, ibuprofen, Aleve, Advil, Celebrex, Meloxicam (Mobic) etc   You can use Tylenol as needed if you do not have any liver condition to be concerned about     Avoid medications such as Sudafed or decongestants and antihistamines that contained the D component which is the decongestant  You can take antihistamines without the decongestant or D component   Try to avoid medications such as pantoprazole or  Protonix/Nexium or Esomeprazole)/Prilosec or omeprazole/Prevacid or lansoprazole/AcipHex or Rabeprazole  If you are able to, use Pepcid as this is safer for your kidneys  Subjective: The patient was hospitalized as outlined above it impression and plan with congestive heart failure possibly pneumonia  Readmitted with AK I felt prerenal the time  The patient overall slightly better since hospitalization but still dyspnea on exertion but no chest pain, still has some coughing on occasion  Now with lower extremity swelling:  She has gained 7 lb in about 4 5 days  No fevers, chills  Slight decreased appetite, does take Ensure does eat well at time; energy is low  No hematuria, dysuria, voiding symptoms or foamy urine  No gastrointestinal symptoms  No headaches, dizziness or lightheadedness  Blood pressure medications:  · Torsemide now 20 mg daily  · Bystolic 10 mg daily in the morning  · Amlodipine 5 mg daily a m     ROS:  See HPI, otherwise review of systems as completely reviewed with the patient are negative    Past Medical History:   Diagnosis Date    Arthritis     CHF (congestive heart failure) ([facility] [address] ) 51/0677    diastolic     CO2 retention     DVT (deep vein thrombosis) in pregnancy     left LE in 2/2018 and 50 years ago    Hyperlipidemia     Hypertension     Pulmonary embolism ([facility] [address] ) 02/06/2018    Renal disorder     ckd    Respiratory failure, acute and chronic ([facility] [address] )     Stroke ([facility] [address] ) 2016    left hand weakness,paresthesia   Ventilator dependence [facility])      Past Surgical History:   Procedure Laterality Date    ABDOMINAL SURGERY      ruputured bowel  had colostomy and ileostomy,reversed later on   BACK SURGERY      rods in back  done 30 years ago       CARPAL TUNNEL RELEASE Bilateral     CYST REMOVAL      back    HERNIA REPAIR      REPLACEMENT TOTAL KNEE BILATERAL       Family History   Problem Relation Age of Onset    COPD Brother       reports that she has never smoked  She has never used smokeless tobacco  She reports that she does not drink alcohol or use drugs  I COMPLETELY REVIEWED THE PAST MEDICAL HISTORY/PAST SURGICAL HISTORY/SOCIAL HISTORY/FAMILY HISTORY/AND MEDICATIONS  AND UPDATED ALL    Objective:     Vitals:   BP sitting on right:162/72 with a heart rate of 80 and irregular  Unable to stand      Weight (last 2 days)     Date/Time   Weight    04/28/21 1439   76 5 (168 7)            Wt Readings from Last 3 Encounters:   04/28/21 76 5 kg (168 lb 11 2 oz)   04/20/21 72 kg (158 lb 11 7 oz)   04/10/21 65 6 kg (144 lb 9 6 oz)       Body mass index is 32 95 kg/m²      Physical Exam: General:  No acute distress/weak appearing in a wheelchair with oxygen  Skin:  No acute rash  Eyes:  No scleral icterus, noninjected, no discharge from eyes  ENT:  Moist mucous membranes  Neck:  Supple, no jugular venous distention, trachea is midline, no lymphadenopathy and no thyromegaly  Back   No CVAT  Chest:  Decreased breath sounds at both bases with dullness to percussion at both bases; slight inspiratory/expiratory rhonchi , good respiratory effort  CVS:  Irregularly irregular rhythm without a rub, or gallops or murmurs  Abdomen:  Soft and nontender with normal bowel sounds  Extremities:  No cyanosis and no edema, moderate arthritic changes, normal range of motion  Neuro:  Grossly intact  Psych:  Alert, oriented x3 and appropriate      Medications:    Current Outpatient Medications:     acetaminophen (TYLENOL) 325 mg tablet, Take 650 mg by mouth every 6 (six) hours as needed for mild pain, Disp: , Rfl:     amLODIPine (NORVASC) 5 mg tablet, Take 1 tablet (5 mg total) by mouth daily, Disp: 90 tablet, Rfl: 2    Ascorbic Acid (VITAMIN C PO), Take 1 tablet by mouth daily, Disp: , Rfl:     calcium-vitamin D (OSCAL 500 + D) 500 mg-200 units per tablet, Take 1 tablet by mouth 2 (two) times a day , Disp: , Rfl:     cholecalciferol (VITAMIN D3) 1,000 units tablet, Take 2,000 Units by mouth daily, Disp: , Rfl:     co-enzyme Q-10 50 MG capsule, Take 200 mg by mouth daily  , Disp: , Rfl:     Eliquis 2 5 MG, TAKE 1 TABLET TWO TIMES A DAY, Disp: 60 tablet, Rfl: 5    ezetimibe (ZETIA) 10 mg tablet, Take 1 tablet (10 mg total) by mouth daily, Disp: 90 tablet, Rfl: 3    fluticasone-vilanterol (BREO ELLIPTA) 100-25 mcg/inh inhaler, Inhale 1 puff daily Rinse mouth after use , Disp: 1 Inhaler, Rfl: 8    Multiple Vitamins-Minerals (OCUVITE PO), Take 1 tablet by mouth daily  , Disp: , Rfl:     nebivolol (Bystolic) 10 mg tablet, Take 1 tablet (10 mg total) by mouth daily, Disp: 90 tablet, Rfl: 3    pantoprazole (PROTONIX) 40 mg tablet, Take 40 mg by mouth daily as needed , Disp: , Rfl:     psyllium (METAMUCIL) packet, Take 1 packet by mouth daily, Disp:  , Rfl: 0    senna (SENOKOT) 8 6 mg, Take 1 tablet (8 6 mg total) by mouth daily at bedtime as needed for constipation (Patient taking differently: Take 8 6 mg by mouth daily ), Disp:  , Rfl: 0    torsemide (DEMADEX) 10 mg tablet, Take 2 tablets (20 mg total) by mouth 2 (two) times a day, Disp: 120 tablet, Rfl: 5    Lab, Imaging and other studies: I have personally reviewed pertinent labs    Laboratory Results:  Results for orders placed or performed in visit on 04/27/21   Magnesium   Result Value Ref Range    Magnesium, Serum 2 0 1 5 - 2 5 mg/dL   Phosphorus   Result Value Ref Range    Phosphorus, Serum 4 0 2 1 - 4 3 mg/dL   TIBC   Result Value Ref Range    Iron, Serum 55 45 - 160 mcg/dL    Total Iron Binding Capacity (TIBC) 316 250 - 450 mcg/dL (calc)    Iron Saturation 17 16 - 45 % (calc)   Basic metabolic panel   Result Value Ref Range    Glucose, Random 173 (H) 65 - 99 mg/dL    BUN 52 (H) 7 - 25 mg/dL    Creatinine 2 39 (H) 0 60 - 0 88 mg/dL eGFR Non  17 (L) > OR = 60 mL/min/1 73m2    eGFR  20 (L) > OR = 60 mL/min/1 73m2    SL AMB BUN/CREATININE RATIO 22 6 - 22 (calc)    Sodium 144 135 - 146 mmol/L    Potassium 4 3 3 5 - 5 3 mmol/L    Chloride 102 98 - 110 mmol/L    CO2 36 (H) 20 - 32 mmol/L    Calcium 8 6 8 6 - 10 4 mg/dL   CBC and differential   Result Value Ref Range    White Blood Cell Count 4 5 3 8 - 10 8 Thousand/uL    Red Blood Cell Count 3 52 (L) 3 80 - 5 10 Million/uL    Hemoglobin 10 4 (L) 11 7 - 15 5 g/dL    HCT 34 0 (L) 35 0 - 45 0 %    MCV 96 6 80 0 - 100 0 fL    MCH 29 5 27 0 - 33 0 pg    MCHC 30 6 (L) 32 0 - 36 0 g/dL    RDW 12 5 11 0 - 15 0 %    Platelet Count 139 (L) 140 - 400 Thousand/uL    SL AMB MPV 12 0 7 5 - 12 5 fL    Neutrophils (Absolute) 3,398 1,500 - 7,800 cells/uL    Lymphocytes (Absolute) 518 (L) 850 - 3,900 cells/uL    Monocytes (Absolute) 428 200 - 950 cells/uL    Eosinophils (Absolute) 149 15 - 500 cells/uL    Basophils ABS 9 0 - 200 cells/uL    Neutrophils 75 5 %    Lymphocytes 11 5 %    Monocytes 9 5 %    Eosinophils 3 3 %    Basophils PCT 0 2 %       Results from last 7 days   Lab Units 04/27/21  1054   WHITE BLOOD CELL COUNT  Thousand/uL 4 5   HEMOGLOBIN  g/dL 10 4*   HEMATOCRIT  % 34 0*   PLATELETS  Thousand/uL 123*   POTASSIUM mmol/L 4 3   CHLORIDE mmol/L 102   CO2 mmol/L 36*   BUN mg/dL 52*   CREATININE mg/dL 2 39*   CALCIUM mg/dL 8 6   MAGNESIUM mg/dL 2 0         Radiology review:   chest X-ray    Ultrasound      Portions of the record may have been created with voice recognition software  Occasional wrong word or "sound a like" substitutions may have occurred due to the inherent limitations of voice recognition software  Read the chart carefully and recognize, using context, where substitutions have occurred

## 2021-04-19 NOTE — PLAN OF CARE
Problem: PHYSICAL THERAPY ADULT  Goal: Performs mobility at highest level of function for planned discharge setting  See evaluation for individualized goals  Description: Treatment/Interventions: Functional transfer training, LE strengthening/ROM, Elevations, Therapeutic exercise, Endurance training, Patient/family training, Bed mobility, Gait training, Spoke to nursing, Spoke to case management          See flowsheet documentation for full assessment, interventions and recommendations  Socorro Deal PT     Outcome: Progressing  Note: Prognosis: Good  Problem List: Decreased strength, Decreased endurance, Impaired balance, Decreased mobility, Impaired hearing  Assessment: pt began tx session OOB seated in the recliner and was agreeable to participate in PT intervention  pt provided with education for proper mobility techniques including transfers and gait with RW  pt was able to complete all TE seated in recliner with no increase in pain  pt was able to transfer sit<>stand with supervison to RW  pt able to tolerate 3 min of static standing balance with no LOB before ambulating 20'x1 with RW with min Ax1 with VC's for RW placement  pt progressing with skilled PT performing all ambulationwith min Ax1 using RW w/o LOB  pt transfers from bed, recliner and toilet with supervision  pt would benefit from continued focus on OOB mobility with focus on progression of ambulation  post tx pt OOB sitting in the recliner with chair alarm activated   Barriers to Discharge: Decreased caregiver support  Barriers to Discharge Comments: pt lives alone and does not have 24/7 care      PT Discharge Recommendation: Post acute rehabilitation services     PT - OK to Discharge: Yes    See flowsheet documentation for full assessment

## 2021-04-19 NOTE — RESPIRATORY THERAPY NOTE
Pt found with BIPAP off and P 2L NC reapplied  Pt stated to me" I'm sorry, I just can't wear that"   Pt remains on 2L NC

## 2021-04-19 NOTE — DISCHARGE SUMMARY
[facility]  Discharge- [address] 5/12/1928, 80 y o  female MRN: [id number]  Unit/Bed#: W -01 Encounter: [id number]  Primary Care Provider: Marisa Stephens DO   Date and time admitted to hospital: 4/16/2021  2:10 PM    * Acute kidney injury superimposed on chronic kidney disease [facility])  Assessment & Plan  Recent Labs     04/18/21  0441 04/19/21  0501 04/20/21  0525   CREATININE 2 44* 2 15* 2 05*   EGFR 17 19 21     Estimated Creatinine Clearance: 15 5 mL/min (A) (by C-G formula based on SCr of 2 05 mg/dL (H))  · Baseline creatinine 1 7-2 1  · Patient was recently admitted with CHF exacerbation  At that time she was also having elevated creatinine numbers  Per Nephrology note, TIANA could indicate progression of kidney disease  · Creatinine at the time of discharge from prior admission was 2 4, per Nephrology note at that time, this might be patient's new baseline  · Nephrology  was consulted for patient  TIANA now resolved after IV hydration  · Plan:  · Resolved  ·   Will resume torsemide tomorrow per Nephro recommendations  ·  follow-up with nephrology in 1 week outpatient  Patient has an appoint with Dr Roni Martinez scheduled  Chronic respiratory failure with hypoxia (HCC)  Assessment & Plan  · Patient with baseline requirement of 2 L via nasal cannula  · Patient follows with Dr Maureen Houston  · Continue trilogy  · Continue Breo  ·  SpO2 at 97% on 2 L  O2 via NC  ·   Patient stable for discharge  Chronic diastolic congestive heart failure (HCC)  Assessment & Plan  Wt Readings from Last 3 Encounters:   04/20/21 72 kg (158 lb 11 7 oz)   04/10/21 65 6 kg (144 lb 9 6 oz)   08/18/20 68 kg (150 lb)     · Not in acute exacerbation  Patient appears euvolemic  · Echo from 04/05 with evidence of normal systolic function with EF 60% and G1DD  · Patient recently admitted with CHF exacerbation    ·   Resume Torsemide tomorrow per Nephro recommendations        History of DVT (deep vein thrombosis)  Assessment & Plan  · Continue Eliquis    Dyslipidemia  Assessment & Plan  · Continue ezetimibe    Essential hypertension  Assessment & Plan  Blood Pressure: 127/73  · Continue Norvasc and Bystolic  · Resume torsemide tomorrow since TIANA is resolved  Discharging Resident Physician: Clarance Hodgkins, MD  Attending: Ryan Willis MD  PCP: Cruzito Wright DO  Admission Date: 4/16/2021  Discharge Date: 04/20/21    Disposition:     Home    Reason for Admission: TIANA and Acute Respiratory Failure with Hypoxia    Consultations During Hospital Stay:  · Nephrology     Procedures Performed:     · None    Significant Findings / Test Results:     · None    Incidental Findings:   · None    Test Results Pending at Discharge (will require follow up): · None     Outpatient Tests Requested:  · None    Complications:  None    Hospital Course: Arabella Daniel is a 80 y o  female patient with   PMH of CKD, HLD, HTN, HFpEF  On 2 L O2 outpatientwho originally presented to the hospital on 4/16/2021 due to  TIANA noted by rehab facility  Facility attempted to administer the 3 L of normal saline overnight for treatment however the IV infiltrated and patient was referred to the ED for treatment  In the ED patient was afebrile and hemodynamically stable  BMP was remarkable for elevated creatinine of 2 79 (baseline 1 7-2 1) and BUN of 133  HGB was 10 6  During hospitalization patient remained in stable condition however did have mild respiratory distress at night and required a BiPAP to stabilize her however, she did not tolerate the BiPAP for more than 30 minutes  She was able to improve and her oxygen was weaned back to her baseline of 2 L via NC  TIANA was treated with IVF and holding nephrotoxic medication  She was discharged in stable condition back to her daughter's house and did not return to the rehab facility      Condition at Discharge: stable     Discharge Day Visit / Exam:     Subjective:    Vitals: Blood Pressure: 127/73 (04/20/21 0736)  Pulse: 92 (04/20/21 0736)  Temperature: 98 °F (36 7 °C) (04/20/21 0736)  Temp Source: Oral (04/18/21 2146)  Respirations: 18 (04/20/21 0736)  Height: 5' (152 4 cm) (04/17/21 1312)  Weight - Scale: 72 kg (158 lb 11 7 oz) (04/20/21 0600)  SpO2: 97 % (04/20/21 0736)    Exam:   Physical Exam  Vitals signs and nursing note reviewed  Constitutional:       General: She is not in acute distress  Appearance: Normal appearance  She is not ill-appearing or toxic-appearing  HENT:      Head: Normocephalic and atraumatic  Nose: Nose normal    Eyes:      General: No scleral icterus  Right eye: No discharge  Left eye: No discharge  Pupils: Pupils are equal, round, and reactive to light  Neck:      Musculoskeletal: Normal range of motion and neck supple  No neck rigidity or muscular tenderness  Cardiovascular:      Rate and Rhythm: Normal rate and regular rhythm  Pulses: Normal pulses  Heart sounds: Normal heart sounds  No murmur  No friction rub  No gallop  Pulmonary:      Effort: Pulmonary effort is normal  No respiratory distress  Breath sounds: Normal breath sounds  No stridor  No wheezing, rhonchi or rales  Abdominal:      General: Bowel sounds are normal       Palpations: Abdomen is soft  Tenderness: There is no abdominal tenderness  There is no guarding or rebound  Musculoskeletal:         General: No swelling, tenderness or deformity  Right lower leg: No edema  Left lower leg: No edema  Skin:     General: Skin is warm and dry  Capillary Refill: Capillary refill takes less than 2 seconds  Coloration: Skin is not jaundiced  Findings: No lesion or rash  Neurological:      General: No focal deficit present  Mental Status: She is alert and oriented to person, place, and time  Mental status is at baseline  Motor: No weakness     Psychiatric:         Mood and Affect: Mood normal  Behavior: Behavior normal          Thought Content: Thought content normal          Judgment: Judgment normal          Discussion with Family: Daughter Diana Martinez  Discharge instructions/Information to patient and family:   See after visit summary for information provided to patient and family  Provisions for Follow-Up Care:  See after visit summary for information related to follow-up care and any pertinent home health orders  Planned Readmission: None     Discharge Medications:  See after visit summary for reconciled discharge medications provided to patient and family        ** Please Note: This note has been constructed using a voice recognition system **

## 2021-04-19 NOTE — ASSESSMENT & PLAN NOTE
· Patient with baseline requirement of 2 L via nasal cannula  · Patient follows with Dr Julián Gallego  · Continue trilogy  · Continue Breo  ·  SpO2 at 97% on 2 L  O2 via NC  ·   Patient stable for discharge  needed assist

## 2021-04-19 NOTE — ASSESSMENT & PLAN NOTE
Recent Labs     04/17/21  0507 04/18/21  0441 04/19/21  0501   CREATININE 2 58* 2 44* 2 15*   EGFR 16 17 19     Estimated Creatinine Clearance: 14 8 mL/min (A) (by C-G formula based on SCr of 2 15 mg/dL (H))  · Baseline creatinine 1 7-2 1  · Patient was recently admitted with CHF exacerbation  At that time she was also having elevated creatinine numbers  Per Nephrology note, TIANA could indicate progression of kidney disease  · Creatinine at the time of discharge from prior admission was 2 4, per Nephrology note at that time, this might be patient's new baseline  · Nephrology  was consulted for patient  TIANA now resolved after IV hydration  · Plan:  · Resolved  ·   Will resume torsemide tomorrow per Nephro recommendations  ·  follow-up with nephrology in 1 week outpatient  Patient has an appoint with Dr Ileana Fitzgerald scheduled

## 2021-04-19 NOTE — CASE MANAGEMENT
AMBROSE met with Pt and her daughter Berto Tony [phone] per her request to discuss the Pt's d/c plans  Berto Tony confirmed CM previous discussion with Pt's other daughter Dewayne NAVARRETE regarding the Pt to be d/c to home, instead of back to [facility] with the request of [place] [id number] services and a hospital bed  AMBROSE reviewed the [place] [id number] referral that was made with the acceptance of [facility] and [facility]  Berto Tony reported the family's first choice will be [facility]  AMBROSE made ANGEL aware of the Pt's family request of their agency for services  [facility]  was also notified the Pt went with a different [place] [address] agency  AMBROSE made a DME referral to [address] [place] [address] per the Pt's family's request of DME provider, as they had started the referral prior to the Pt's hospitalization  Berto Tony requested all communication goes through her with the Pt's d/c planning as her sister Dewayne NAVARRETE will be at work

## 2021-04-19 NOTE — ASSESSMENT & PLAN NOTE
Blood Pressure: 129/74  · Continue Norvasc and Bystolic  · Resume torsemide tomorrow since TIANA is resolved

## 2021-04-19 NOTE — CASE MANAGEMENT
PT IS A 30 DAYS READMISSION  PT IS IDENTIFIED AS A BUNDLE WITH LOS OF 13-15  READMISSION RISK SCORE OF 30  Pt admitted 4/4 for CHF at which time she was d/c to [facility] for rehab  Pt readmitted from [facility] for acute kidney injury  CM was in contact with Pt's daughter Binu Rajput [phone] with an introduction and explanation of role  Binu Atiya reported she doesn't want the Pt to return to [facility] upon d/c, instead, she wants the Pt to be d/c to her home at [address] with [facility] for therapy  [facility] residing in a ranch style home with 3 steps to enter  [facility] reported the Pt have some confusion, uses a rollator, requires assistance with ADLs which would be provided by her  Pt denied having any hx of mental health or drug/alcohol placements  Pt was reported having a living will with [facility] as the assigned POA  Pt was reporting using [facility] and has Jorene Shaggy for PCP needs  [facility] not having a [facility] agency [facility] preference and gave permission for a blanket referral for an accepting agency that will reviewed with [facility] for freedom of choice  CM reviewed d/c planning process including the following: identifying help at home, patient preference for d/c planning needs, Discharge Lounge, [facility] to [facility] program, availability of treatment team to discuss questions or concerns patient and/or family may have regarding understanding medications and recognizing signs and symptoms once discharged  CM also encouraged patient to follow up with all recommended appointments after discharge  Patient advised of importance for patient and family to participate in managing patients medical well being

## 2021-04-20 ENCOUNTER — PATIENT OUTREACH (OUTPATIENT)
Dept: CASE MANAGEMENT | Facility: OTHER | Age: 86
End: 2021-04-20

## 2021-04-20 ENCOUNTER — TELEPHONE (OUTPATIENT)
Dept: NEPHROLOGY | Facility: CLINIC | Age: 86
End: 2021-04-20

## 2021-04-20 VITALS
OXYGEN SATURATION: 97 % | DIASTOLIC BLOOD PRESSURE: 73 MMHG | SYSTOLIC BLOOD PRESSURE: 127 MMHG | TEMPERATURE: 98 F | HEIGHT: 60 IN | RESPIRATION RATE: 18 BRPM | BODY MASS INDEX: 31.16 KG/M2 | HEART RATE: 92 BPM | WEIGHT: 158.73 LBS

## 2021-04-20 DIAGNOSIS — D63.1 ANEMIA OF CHRONIC RENAL FAILURE, STAGE 4 (SEVERE) (HCC): ICD-10-CM

## 2021-04-20 DIAGNOSIS — I12.9 HYPERTENSIVE CHRONIC KIDNEY DISEASE WITH STAGE 1 THROUGH STAGE 4 CHRONIC KIDNEY DISEASE, OR UNSPECIFIED CHRONIC KIDNEY DISEASE: ICD-10-CM

## 2021-04-20 DIAGNOSIS — N18.4 ANEMIA OF CHRONIC RENAL FAILURE, STAGE 4 (SEVERE) (HCC): ICD-10-CM

## 2021-04-20 DIAGNOSIS — N18.4 CHRONIC KIDNEY DISEASE, STAGE IV (SEVERE) (HCC): Primary | ICD-10-CM

## 2021-04-20 LAB
ANION GAP SERPL CALCULATED.3IONS-SCNC: 8 MMOL/L (ref 4–13)
BUN SERPL-MCNC: 68 MG/DL (ref 5–25)
CALCIUM SERPL-MCNC: 8.2 MG/DL (ref 8.3–10.1)
CHLORIDE SERPL-SCNC: 112 MMOL/L (ref 100–108)
CO2 SERPL-SCNC: 24 MMOL/L (ref 21–32)
CREAT SERPL-MCNC: 2.05 MG/DL (ref 0.6–1.3)
GFR SERPL CREATININE-BSD FRML MDRD: 21 ML/MIN/1.73SQ M
GLUCOSE SERPL-MCNC: 111 MG/DL (ref 65–140)
POTASSIUM SERPL-SCNC: 4.3 MMOL/L (ref 3.5–5.3)
SODIUM SERPL-SCNC: 144 MMOL/L (ref 136–145)

## 2021-04-20 PROCEDURE — 92610 EVALUATE SWALLOWING FUNCTION: CPT

## 2021-04-20 PROCEDURE — 99239 HOSP IP/OBS DSCHRG MGMT >30: CPT | Performed by: INTERNAL MEDICINE

## 2021-04-20 PROCEDURE — 80048 BASIC METABOLIC PNL TOTAL CA: CPT | Performed by: PHYSICIAN ASSISTANT

## 2021-04-20 PROCEDURE — 99232 SBSQ HOSP IP/OBS MODERATE 35: CPT | Performed by: PHYSICIAN ASSISTANT

## 2021-04-20 PROCEDURE — 97167 OT EVAL HIGH COMPLEX 60 MIN: CPT

## 2021-04-20 RX ADMIN — PANTOPRAZOLE SODIUM 40 MG: 40 TABLET, DELAYED RELEASE ORAL at 05:36

## 2021-04-20 RX ADMIN — Medication 1 TABLET: at 08:11

## 2021-04-20 RX ADMIN — CHOLESTYRAMINE 4 G: 4 POWDER, FOR SUSPENSION ORAL at 08:11

## 2021-04-20 RX ADMIN — FLUTICASONE FUROATE AND VILANTEROL TRIFENATATE 1 PUFF: 100; 25 POWDER RESPIRATORY (INHALATION) at 08:13

## 2021-04-20 RX ADMIN — AMLODIPINE BESYLATE 5 MG: 5 TABLET ORAL at 08:11

## 2021-04-20 RX ADMIN — NEBIVOLOL HYDROCHLORIDE 10 MG: 10 TABLET ORAL at 08:11

## 2021-04-20 RX ADMIN — APIXABAN 2.5 MG: 2.5 TABLET, FILM COATED ORAL at 08:11

## 2021-04-20 RX ADMIN — FERROUS SULFATE TAB 325 MG (65 MG ELEMENTAL FE) 325 MG: 325 (65 FE) TAB at 08:11

## 2021-04-20 RX ADMIN — Medication 2000 UNITS: at 08:11

## 2021-04-20 RX ADMIN — Medication 1 PACKET: at 08:11

## 2021-04-20 RX ADMIN — EZETIMIBE 10 MG: 10 TABLET ORAL at 08:12

## 2021-04-20 RX ADMIN — OXYCODONE HYDROCHLORIDE AND ACETAMINOPHEN 1000 MG: 500 TABLET ORAL at 08:11

## 2021-04-20 NOTE — PROGRESS NOTES
NEPHROLOGY PROGRESS NOTE   Edu Fitch 80 y o  female MRN: [id number]  Unit/Bed#: W -01 Encounter: [id number]  Reason for Consult: TIANA/CKD    ASSESSMENT/PLAN:  1  Acute Kidney Injury, POA- secondary to prerenal azotemia  - creatinine improved from peak of 2 8 with IVF  - now resolved and creatinine is back to baseline  - stopped IVF 4/19  2  Chronic Kidney Disease stage IV- Baseline creatinine 1 7-2 1  Follows with Dr Darryle Ishihara  3  Volume Status- appears euvolemic  - would recommend restarting diuretics on discharge  - note patient was on NSS during this admission due to TIANA  4  Hypernatremia- encourage oral intake  5  Hypertension- BP acceptable  6  Chronic Respiratory Failure with Hypoxia- follows with Dr Rachel Carlson outpatient    Disposition:  Okay for discharge from a renal standpoint  Follow up with Dr Darryle Ishihara on 4/28/21 for scheduled appointment  SUBJECTIVE:  Patient denies SOB and acute complaints      OBJECTIVE:  Current Weight: Weight - Scale: 72 kg (158 lb 11 7 oz)  Vitals:    04/19/21 1516 04/19/21 2148 04/20/21 0600 04/20/21 0736   BP: 128/73 129/72  127/73   Pulse: 84 74  92   Resp:    18   Temp: 97 8 °F (36 6 °C) 97 9 °F (36 6 °C)  98 °F (36 7 °C)   TempSrc:       SpO2: 98% 98%  97%   Weight:   72 kg (158 lb 11 7 oz)    Height:           Intake/Output Summary (Last 24 hours) at 4/20/2021 1215  Last data filed at 4/20/2021 0900  Gross per 24 hour   Intake 1420 ml   Output 950 ml   Net 470 ml     General: NAD  Skin: no rash  Eyes: anicteric  ENMT: mm moist  Neck: no masses  Respiratory: slightly coarse  Cardiac: RRR  Extremities: L>R LE edema  GI: soft nt nd  Neuro: alert awake  Psych: mood and affect appropriate    Medications:    Current Facility-Administered Medications:     acetaminophen (TYLENOL) tablet 650 mg, 650 mg, Oral, Q6H PRN, Gabriela Camarillo MD, 650 mg at 04/18/21 1306    amLODIPine (NORVASC) tablet 5 mg, 5 mg, Oral, Daily, Gabriela Camarillo MD, 5 mg at 04/20/21 8120    apixaban [facility]) tablet 2 5 mg, 2 5 mg, Oral, BID, Kathie Barreto MD, 2 5 mg at 04/20/21 6401    ascorbic acid (VITAMIN C) tablet 1,000 mg, 1,000 mg, Oral, Daily, Kathie Barreto MD, 1,000 mg at 04/20/21 8978    calcium carbonate-vitamin D (OSCAL-D) 500 mg-200 units per tablet 1 tablet, 1 tablet, Oral, BID, Kathie Barreto MD, 1 tablet at 04/20/21 6067    cholecalciferol (VITAMIN D3) tablet 2,000 Units, 2,000 Units, Oral, Daily, Kathie Barreto MD, 2,000 Units at 04/20/21 1285    cholestyramine sugar free (QUESTRAN LIGHT) packet 4 g, 4 g, Oral, BID With Meals, Severiano Ghosh MD, 4 g at 04/20/21 8090    ezetimibe (ZETIA) tablet 10 mg, 10 mg, Oral, Daily, Kathie Barreto MD, 10 mg at 04/20/21 2350    ferrous sulfate tablet 325 mg, 325 mg, Oral, Every Other Day, Kathie Barreto MD, 325 mg at 04/20/21 0811    fluticasone-vilanterol (BREO ELLIPTA) 100-25 mcg/inh inhaler 1 puff, 1 puff, Inhalation, Daily, Kathie Barreto MD, 1 puff at 04/20/21 0813    nebivolol (BYSTOLIC) tablet 10 mg, 10 mg, Oral, Daily, Kathie Barreto MD, 10 mg at 04/20/21 0811    ondansetron (ZOFRAN) injection 4 mg, 4 mg, Intravenous, Q6H PRN, Kathie Barreto MD    pantoprazole (PROTONIX) EC tablet 40 mg, 40 mg, Oral, Early Morning, Kathie Barreto MD, 40 mg at 04/20/21 0536    polyethylene glycol (MIRALAX) packet 17 g, 17 g, Oral, Daily PRN, Severiano Ghosh MD, 17 g at 04/19/21 0524    psyllium (METAMUCIL) 1 packet, 1 packet, Oral, Daily, Kathie Barreto MD, 1 packet at 04/20/21 0811    senna (SENOKOT) tablet 8 6 mg, 8 6 mg, Oral, HS PRN, Kathie Barreto MD, 8 6 mg at 04/17/21 1313    Laboratory Results:  Results from last 7 days   Lab Units 04/20/21  0525 04/19/21  0501 04/18/21  0441 04/17/21  0507 04/16/21  1716 04/16/21   WBC Thousand/uL  --   --   --  8 83 7 48  --    HEMOGLOBIN g/dL  --   --   --  10 6* 10 6*  --    HEMATOCRIT %  --   --   --  34 9 37 1  --    PLATELETS Thousands/uL  --   --   --  174 186  --    POTASSIUM mmol/L 4 3 4 0 4 6 4 7 4 6 4  8   CHLORIDE mmol/L 112* 112* 111* 112* 104 107   CO2 mmol/L 24 28 30 30 30 26   BUN mg/dL 68* 87* 102* 126* 133* 139   CREATININE mg/dL 2 05* 2 15* 2 44* 2 58* 2 79* 2 63   CALCIUM mg/dL 8 2* 7 7* 8 5 8 7 8 8 8 8   MAGNESIUM mg/dL  --   --   --  2 5  --   --      I have personally reviewed the blood work as stated above and in my note

## 2021-04-20 NOTE — OCCUPATIONAL THERAPY NOTE
Occupational Therapy Evaluation     Patient Name: Baron Westfall  MNBGN'M Date: 4/20/2021  Problem List  Principal Problem:    Acute kidney injury superimposed on chronic kidney disease ([facility] [address] )  Active Problems:    Essential hypertension    Dyslipidemia    Chronic diastolic congestive heart failure ([facility] [address] )    History of DVT (deep vein thrombosis)    Chronic respiratory failure with hypoxia ([facility] [address] )    Azotemia    Past Medical History  Past Medical History:   Diagnosis Date    Arthritis     CHF (congestive heart failure) ([facility] [address] ) 08/0045    diastolic     CO2 retention     DVT (deep vein thrombosis) in pregnancy     left LE in 2/2018 and 50 years ago    Hyperlipidemia     Hypertension     Pulmonary embolism ([facility] [address] ) 02/06/2018    Renal disorder     ckd    Respiratory failure, acute and chronic ([facility] [address] )     Stroke ([facility] [address] ) 2016    left hand weakness,paresthesia   Ventilator dependence [facility])      Past Surgical History  Past Surgical History:   Procedure Laterality Date    ABDOMINAL SURGERY      ruputured bowel  had colostomy and ileostomy,reversed later on   BACK SURGERY      rods in back  done 30 years ago   CARPAL TUNNEL RELEASE Bilateral     CYST REMOVAL      back    HERNIA REPAIR      REPLACEMENT TOTAL KNEE BILATERAL          04/20/21 1133   OT Last Visit   OT Visit Date 04/20/21  (Tuesday)   Note Type   Note type Evaluation   Restrictions/Precautions   Weight Bearing Precautions Per Order No   Other Precautions Cognitive; Chair Alarm; Bed Alarm;Multiple lines;O2;Fall Risk;Hard of hearing  (O2 via NC, purewick catheter)   Pain Assessment   Pain Assessment Tool 0-10   Pain Score No Pain   Home Living   Type of Home Apartment   Home Layout One level   Bathroom Shower/Tub Tub/shower unit   Bathroom Toilet Standard   Bathroom Equipment Grab bars in shower; Shower chair;Hand-held shower;Grab bars around toilet   Bathroom Accessibility Accessible via walker   [address]; [employer]; Other (Comment)  (rollator)   Additional Comments Pt lives alone in apt PTA and has HHA and supportive daughter  Pt reports plans to stay w/ daughter upon DC  Per CM note, pt's daughters home is 1 [address]   Prior Function   Level of [place] Needs assistance with IADLs   Lives With Alone   Receives Help From Family;Home health   ADL Assistance Needs assistance   IADLs Needs assistance   Falls in the last 6 months 0   Vocational Retired   Comments Pt reports HHA and supportive daughter  Lifestyle   Autonomy Pt reports living alone at baseline and needs assistance w/ IADL and some ADL   Reciprocal Relationships Pt reports supportive family and plans to stay w/ daughter upon DC   Service to Others Pt reports retired   Intrinsic Gratification Pt reports enjoying watching Lets Make a Deal and [place] is Right   ADL   Where Assessed Edge of bed  (vs OOB In chair post eval)   Eating Assistance 6  Modified independent   Eating Deficit Setup   Grooming Assistance 5  Supervision/Setup  (+ time, seated at EOB after set- up)   Grooming Deficit Setup;Supervision/safety; Increased time to complete   UB Bathing Assistance Unable to assess  (anticipate S base on fxal obs skills seated after set- up)   LB Bathing Assistance Unable to assess   UB Dressing Assistance 5  Supervision/Setup   UB Dressing Deficit Setup;Supervision/safety; Increased time to complete  (to manage robe around back, over shoulders)   LB Dressing Assistance Unable to assess  (anticipate pt will required A based on fxal obs skills)   Toileting Assistance    (purewick catheter in place)   Additional Comments on 2L O2 via NC   Bed Mobility   Supine to Sit 4  Minimal assistance   Additional items Assist x 1;HOB elevated; Bedrails; Increased time required;Verbal cues   Sit to Supine Unable to assess   Additional Comments Pt seated OOB in chair post eval w/ needs met, call bell in reach and chair alarm activated   Transfers   Sit to Stand 5  Supervision   Additional items Assist x 1;Verbal cues; Increased time required  (instruction for had placement, prefer to pull up on RW)   Stand to Sit 5  Supervision   Additional items Assist x 1; Increased time required;Verbal cues  (instruction for hand placement)   Functional Mobility   Functional Mobility 4  Minimal assistance  (CG/ steadying)   Additional Comments short distances w/ in room using RW   Additional items Rolling walker   Balance   Static Sitting Fair +   Static Standing Fair   Ambulatory   (fair - <> poor + using RW)   Activity Tolerance   Activity Tolerance Patient limited by fatigue   Medical Staff Made Aware spoke to Patricia BOGGS   Nurse Made Aware per RN Holly Hunter appropriate to see pt   RUE Assessment   RUE Assessment   (observed during ADL tasks)   RUE Strength   RUE Overall Strength Within Functional Limits - able to perform ADL tasks with strength   LUE Assessment   LUE Assessment   (observed during ADL tasks)   LUE Strength   LUE Overall Strength Within Functional Limits - able to perform ADL tasks with strength   Hand Function   Gross Motor Coordination Functional   Fine Motor Coordination Functional   Sensation   Light Touch Not tested   Sharp/Dull Not tested   Additional Comments Responded to light touch   Cognition   Overall Cognitive Status Impaired   Arousal/Participation Alert; Cooperative   Attention Attends with cues to redirect  (+ [place])   Orientation Level Oriented to person;Oriented to place;Oriented to situation   Memory   (+ time, cues at times)   Following Commands Follows one step commands with increased time or repetition  (hard of hearing)   Comments Identified pt by full name and birthdate  Pt able to participate in conversation w/ + time and cues at times due to hard of hearing  Enjoyed talking about her grandchildren / family  Able to follow directions w/ + time   Assessment   Limitation Decreased ADL status; Decreased endurance;Decreased self-care trans;Decreased high-level ADLs; Decreased UE strength Assessment Pt is a 81yo female [facility]20[facility]  Pt presents w/ acute kidney injury superimposed on chronic kidney disease and significant PMH impacting her occupational performance including HTN, hx DVT, hx stroke (w/ residual L UE weakness), PE, back sx, B TKA, B carpal tunnel release  Pt recently admitted to [facility] to rehab on 4/10/2021  Pt presented from rehab at [facility] due to abnormal labs  At baseline, pt lives alone in apt, and has HHA and supportive daughter  Pt needs assistance w/ dressing, bathing, and IADL performance using rollator for functional mobility  Personal factors impacting performance include advanced age, lives alone, difficulty completing ADL/ IADL, difficulty managing steps  Upon eval, pt required min A to complete bed mobility supine to sit  Pt required S to complete grooming  Pt required S and + time after set- up to complete UBD excluding fasteners  Pt required required min A (CG/ steadying) for short distance functional mobility using RW on 2L O2  Pt presents w/ decreased activity tolerance, decreased endurance, decreased standing tolerance, limited insight into deficits, limited recall / memory impacting her I w/ dressing, bathing, oral hygiene, functional mobility, functional transfers, activity engagement, clothing mgmt, food prep/clean up  Pt completing ADL below baseline level of I and would benefit from OT while in acute care to address deficits  The patient's raw score on the AM-PAC Daily Activity inpatient short form is 20, standardized score is 42 03, greater than 39 4  Patients at this level are likely to benefit from discharge to home  Please refer to the recommendation of the Occupational Therapist for safe discharge planning  Goals   Patient Goals Pt stated that she would like to go home w/ her daughter  Plan   Treatment Interventions ADL retraining;Functional transfer training; Endurance training;Patient/family training;Equipment evaluation/education;Continued evaluation; Energy conservation; Activityengagement   Goal Expiration Date 04/27/21   OT Frequency 2-3x/wk   Recommendation   OT Discharge Recommendation   (Home OT w/ consistent A/ S @ daughter vs rehab)   AM-PAC Daily Activity Inpatient   Lower Body Dressing 3   Bathing 3   Toileting 3   Upper Body Dressing 3   Grooming 4   Eating 4   Daily Activity Raw Score 20   Daily Activity Standardized Score (Calc for Raw Score >=11) 42 03   AM-PAC Applied Cognition Inpatient   Following a Speech/Presentation 2   Understanding Ordinary Conversation 3   Taking Medications 3   Remembering Where Things Are Placed or Put Away 3   Remembering List of 4-5 Errands 3   Taking Care of Complicated Tasks 2   Applied Cognition Raw Score 16   Applied Cognition Standardized Score 35 03   Barthel Index   Feeding 10   Bathing 0   Grooming Score 5   Dressing Score 5   Bladder Score 5   Bowels Score 10   Toilet Use Score 5   Transfers (Bed/Chair) Score 10   Mobility (Level Surface) Score 0   Stairs Score 0   Barthel Index Score 50      Pt goals to be met by 4/26/2021:  -Pt will complete bed mobility supine <> sit w/ S to max I w/ ADL performance    -Pt will complete functional transfers using AD, DME as needed to bed, chair, and toilet w/ S to max I w/ ADL performance    -Pt will complete LBD w/ min A using LHAE as needed to max I w/ ADL performance    -Pt will complete UBD w/ mod I after set- up    -Pt will consistently engage in functional mobility to / from bathroom using AD w/ S to max I w/ ADL performance    -Pt will demonstrate good attention and understanding of EC tech to max I w/ ADL performance    -Pt will demonstrate good attention and participation in continued evaluation to assess functional cognitive skills to assist in DC planning    -Pt will demonstrate improved functional standing tolerance for at least 5 minutes w/ at least fair + balance while engaged in ADL tasks standing at sink to max I w/ ADL performance and functional mobility       Bhavin Yarbrough, OTR/L

## 2021-04-20 NOTE — CASE MANAGEMENT
Pt is cleared for discharge today  Pt is setup with [facility]  [facility] and [facility] has been [facility] for hospital bed and expected to be delivered today between 3pm-6pm  Pt's daughter Xu Pizarro made aware  IMM reviewed with Daughter Xu Pizarro  Daughter agree with discharge determination  Pt's daughter will be transporting pt home today

## 2021-04-20 NOTE — PLAN OF CARE
Problem: Prexisting or High Potential for Compromised Skin Integrity  Goal: Skin integrity is maintained or improved  Description: INTERVENTIONS:  - Identify patients at risk for skin breakdown  - Assess and monitor skin integrity  - Assess and monitor nutrition and hydration status  - Monitor labs   - Assess for incontinence   - Turn and reposition patient  - Assist with mobility/ambulation  - Relieve pressure over bony prominences  - Avoid friction and shearing  - Provide appropriate hygiene as needed including keeping skin clean and dry  - Evaluate need for skin moisturizer/barrier cream  - Collaborate with interdisciplinary team   - Patient/family teaching  - Consider wound care consult   Outcome: Progressing     Problem: PAIN - ADULT  Goal: Verbalizes/displays adequate comfort level or baseline comfort level  Description: Interventions:  - Encourage patient to monitor pain and request assistance  - Assess pain using appropriate pain scale  - Administer analgesics based on type and severity of pain and evaluate response  - Implement non-pharmacological measures as appropriate and evaluate response  - Consider cultural and social influences on pain and pain management  - Notify physician/advanced practitioner if interventions unsuccessful or patient reports new pain  Outcome: Progressing     Problem: INFECTION - ADULT  Goal: Absence or prevention of progression during hospitalization  Description: INTERVENTIONS:  - Assess and monitor for signs and symptoms of infection  - Monitor lab/diagnostic results  - Monitor all insertion sites, i e  indwelling lines, tubes, and drains  - Monitor endotracheal if appropriate and nasal secretions for changes in amount and color  - [place] appropriate cooling/warming therapies per order  - Administer medications as ordered  - Instruct and encourage patient and family to use good hand hygiene technique  - Identify and instruct in appropriate isolation precautions for identified infection/condition  Outcome: Progressing     Problem: SAFETY ADULT  Goal: Patient will remain free of falls  Description: INTERVENTIONS:  - Assess patient frequently for physical needs  -  Identify cognitive and physical deficits and behaviors that affect risk of falls    -  Bruce fall precautions as indicated by assessment   - Educate patient/family on patient safety including physical limitations  - Instruct patient to call for assistance with activity based on assessment  - Modify environment to reduce risk of injury  - Consider OT/PT consult to assist with strengthening/mobility  Outcome: Progressing  Goal: Maintain or return to baseline ADL function  Description: INTERVENTIONS:  -  Assess patient's ability to carry out ADLs; assess patient's baseline for ADL function and identify physical deficits which impact ability to perform ADLs (bathing, care of mouth/teeth, toileting, grooming, dressing, etc )  - Assess/evaluate cause of self-care deficits   - Assess range of motion  - Assess patient's mobility; develop plan if impaired  - Assess patient's need for assistive devices and provide as appropriate  - Encourage maximum independence but intervene and supervise when necessary  - Involve family in performance of ADLs  - Assess for home care needs following discharge   - Consider OT consult to assist with ADL evaluation and planning for discharge  - Provide patient education as appropriate  Outcome: Progressing  Goal: Maintain or return mobility status to optimal level  Description: INTERVENTIONS:  - Assess patient's baseline mobility status (ambulation, transfers, stairs, etc )    - Identify cognitive and physical deficits and behaviors that affect mobility  - Identify mobility aids required to assist with transfers and/or ambulation (gait belt, sit-to-stand, lift, walker, cane, etc )  - Bruce fall precautions as indicated by assessment  - Record patient progress and toleration of activity level on Mobility SBAR; progress patient to next Phase/Stage  - Instruct patient to call for assistance with activity based on assessment  - Consider rehabilitation consult to assist with strengthening/weightbearing, etc   Outcome: Progressing     Problem: DISCHARGE PLANNING  Goal: Discharge to home or other facility with appropriate resources  Description: INTERVENTIONS:  - Identify barriers to discharge w/patient and caregiver  - Arrange for needed discharge resources and transportation as appropriate  - Identify discharge learning needs (meds, wound care, etc )  - Arrange for interpretive services to assist at discharge as needed  - Refer to Case Management Department for coordinating discharge planning if the patient needs post-hospital services based on physician/advanced practitioner order or complex needs related to functional status, cognitive ability, or social support system  Outcome: Progressing     Problem: Knowledge Deficit  Goal: Patient/family/caregiver demonstrates understanding of disease process, treatment plan, medications, and discharge instructions  Description: Complete learning assessment and assess knowledge base    Interventions:  - Provide teaching at level of understanding  - Provide teaching via preferred learning methods  Outcome: Progressing     Problem: METABOLIC, FLUID AND ELECTROLYTES - ADULT  Goal: Electrolytes maintained within normal limits  Description: INTERVENTIONS:  - Monitor labs and assess patient for signs and symptoms of electrolyte imbalances  - Administer electrolyte replacement as ordered  - Monitor response to electrolyte replacements, including repeat lab results as appropriate  - Instruct patient on fluid and nutrition as appropriate  Outcome: Progressing  Goal: Fluid balance maintained  Description: INTERVENTIONS:  - Monitor labs   - Monitor I/O and WT  - Instruct patient on fluid and nutrition as appropriate  - Assess for signs & symptoms of volume excess or deficit  Outcome: Progressing  Goal: Glucose maintained within target range  Description: INTERVENTIONS:  - Monitor Blood Glucose as ordered  - Assess for signs and symptoms of hyperglycemia and hypoglycemia  - Administer ordered medications to maintain glucose within target range  - Assess nutritional intake and initiate nutrition service referral as needed  Outcome: Progressing     Problem: Nutrition/Hydration-ADULT  Goal: Nutrient/Hydration intake appropriate for improving, restoring or maintaining nutritional needs  Description: Monitor and assess patient's nutrition/hydration status for malnutrition  Collaborate with interdisciplinary team and initiate plan and interventions as ordered  Monitor patient's weight and dietary intake as ordered or per policy  Utilize nutrition screening tool and intervene as necessary  Determine patient's food preferences and provide high-protein, high-caloric foods as appropriate       INTERVENTIONS:  - Monitor oral intake, urinary output, labs, and treatment plans  - Assess nutrition and hydration status and recommend course of action  - Evaluate amount of meals eaten  - Assist patient with eating if necessary   - Allow adequate time for meals  - Recommend/ encourage appropriate diets, oral nutritional supplements, and vitamin/mineral supplements  - Order, calculate, and assess calorie counts as needed  - Recommend, monitor, and adjust tube feedings and TPN/PPN based on assessed needs  - Assess need for intravenous fluids  - Provide specific nutrition/hydration education as appropriate  - Include patient/family/caregiver in decisions related to nutrition  Outcome: Progressing

## 2021-04-20 NOTE — ASSESSMENT & PLAN NOTE
Wt Readings from Last 3 Encounters:   04/20/21 72 kg (158 lb 11 7 oz)   04/10/21 65 6 kg (144 lb 9 6 oz)   08/18/20 68 kg (150 lb)     · Not in acute exacerbation  Patient appears euvolemic  · Echo from 04/05 with evidence of normal systolic function with EF 60% and G1DD  · Patient recently admitted with CHF exacerbation    ·   Resume Torsemide tomorrow per Nephro recommendations

## 2021-04-20 NOTE — PLAN OF CARE
Problem: OCCUPATIONAL THERAPY ADULT  Goal: Performs self-care activities at highest level of function for planned discharge setting  See evaluation for individualized goals  Description: Treatment Interventions: ADL retraining, Functional transfer training, Endurance training, Patient/family training, Equipment evaluation/education, Continued evaluation, Energy conservation, Activityengagement          See flowsheet documentation for full assessment, interventions and recommendations  Note: Limitation: Decreased ADL status, Decreased endurance, Decreased self-care trans, Decreased high-level ADLs, Decreased UE strength     Assessment: Pt is a 79yo female admitted to [facility] on 4/16/2021  Pt presents w/ acute kidney injury superimposed on chronic kidney disease and significant PMH impacting her occupational performance including HTN, hx DVT, hx stroke (w/ residual L UE weakness), PE, back sx, B TKA, B carpal tunnel release  Pt recently admitted to [facility] to rehab on 4/10/2021  Pt presented from rehab at [facility] due to abnormal labs  At baseline, pt lives alone in [place], and has HHA and supportive daughter  Pt needs assistance w/ dressing, bathing, and IADL performance using rollator for functional mobility  Personal factors impacting performance include advanced age, lives alone, difficulty completing ADL/ IADL, difficulty managing steps  Upon eval, pt required min A to complete bed mobility supine to sit  Pt required S to complete grooming  Pt required S and + time after set- up to complete UBD excluding fasteners  Pt required required min A (CG/ steadying) for short distance functional mobility using RW on 2L O2   Pt presents w/ decreased activity tolerance, decreased endurance, decreased standing tolerance, limited insight into deficits, limited recall / memory impacting her I w/ dressing, bathing, oral hygiene, functional mobility, functional transfers, activity engagement, clothing mgmt, food prep/clean up  Pt completing ADL below baseline level of I and would benefit from OT while in acute care to address deficits  The patient's raw score on the AM-PAC Daily Activity inpatient short form is 20, standardized score is 42 03, greater than 39 4  Patients at this level are likely to benefit from discharge to home  Please refer to the recommendation of the Occupational Therapist for safe discharge planning       OT Discharge Recommendation: (Home OT w/ consistent A/ S @ daughter vs rehab)

## 2021-04-20 NOTE — SPEECH THERAPY NOTE
Speech-Language Pathology Bedside Swallow Evaluation        Patient Name: Herson Vasquez    PKAIC'V Date: 4/20/2021     Problem List  Principal Problem:    Acute kidney injury superimposed on chronic kidney disease ([facility] )  Active Problems:    Essential hypertension    Dyslipidemia    Chronic diastolic congestive heart failure ([facility] )    History of DVT (deep vein thrombosis)    Chronic respiratory failure with hypoxia ([facility] )    Azotemia         Past Medical History  Past Medical History:   Diagnosis Date    Arthritis     CHF (congestive heart failure) ([facility] ) 04/2981    diastolic     CO2 retention     DVT (deep vein thrombosis) in pregnancy     left LE in 2/2018 and 50 years ago    Hyperlipidemia     Hypertension     Pulmonary embolism ([facility] ) 02/06/2018    Renal disorder     ckd    Respiratory failure, acute and chronic ([facility] )     Stroke [facility] ) 2016    left hand weakness,paresthesia   Ventilator dependence [facility])        Past Surgical History  Past Surgical History:   Procedure Laterality Date    ABDOMINAL SURGERY      ruputured bowel  had colostomy and ileostomy,reversed later on   BACK SURGERY      rods in back  done 30 years ago   CARPAL TUNNEL RELEASE Bilateral     CYST REMOVAL      back    HERNIA REPAIR      REPLACEMENT TOTAL KNEE BILATERAL         Summary    Suspect oral and pharyngeal stages of swallowing WFL, as per recent vbs completed 4/7/21  Pt did have throat clearing after puree and occas cough after belching, ? Related to esophageal issues  Recommendations:   Diet: regular diet and thin liquids   Meds: whole with puree -per pt request   Frequent Oral care: 2x/day  Aspiration precautions and Reflux precautions   Other Recommendations/ considerations: consider follow up w/ GI if symptoms persist        Current Medical Status  Pt is a 80 y o  female who presented to [address] [place] [address]  with acute kidney injury superimposed on chronic kidney disease    Patient was recently admitted from 04/04/2021 to 04/10/2021 at [facility] with acute on chronic respiratory failure and acute on chronic CHF  Echo done well admitted showed normal systolic function with EF of 60% and grade 1 diastolic dysfunction  Patient was discharged to rehab facility at [place] on torsemide 10 mg daily  During hospitalization, patient's creatinine increased to 2 6 with a baseline of 1 7-2 1  Creatinine appear to stabilized at 2 49  Patient was follow-up closely at rehab facility with regular BMPs which showed creatinine trending up  For that reason, it was decided to transfer patient to the emergency department  Patient denies any worsening shortness of breath, chest pain, fever, chills, nausea, vomiting  Per patient's daughter, at the facility they "attempted to administer 3 L of normal saline overnight for treatment of TIANA", however the IV infiltrated and they had to discontinue treatment  Past medical history:   Please see H&P for details    Special Studies:  VBS 4/7/21 Pts oropharyngeal swallow function appears generally WFL at this time with the materials administered today  She does present with premature spill at times which is GWFL however she also is noted to have a larger curled epiglottis which is noted to hit PPW at times given the presence of osteophytes reducing airway closure  Despite this only transient penetration was observed with no gross/deep penetration or aspiration  She did have trace epiglottic undercoat noted x1 however independently cleared this was 2' swallows  Furthermore transient penetration was reduced/eliminated with smaller presentations which patient independently utilized  Ongoing aspiration risk is present but very low  She did present with retention in the esophagus with dysmotility and retrograde flow at times however this did not reach the level of the cricopharyngeal junction- 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       Social/Education/Vocational Hx:  Pt lives with family; was recently at [facility] for STR, but returning home w/ family  Swallow Information   Current Risks for Dysphagia & Aspiration: generalized weakness  Current Symptoms/Concerns: coughing w/ meds in applesauce  pt stated she was not sitting upright and was in bed, she usually sits at the table  Current Diet: regular diet and thin liquids   Baseline Diet: regular diet and thin liquids  Takes pills- whole in puree     Baseline Assessment   Behavior/Cognition: alert  Speech/Language Status: able to participate in conversation and able to follow commands  Patient Positioning: upright in chair     Swallow Mechanism Exam   Facial: symmetrical  Labial: WFL  Lingual: WFL  Velum: unable to visualize  Mandible: adequate ROM  Dentition: adequate  Vocal quality:clear/adequate   Volitional Cough: strong/productive   Respiratory: NC    Consistencies Assessed and Performance   Consistencies Administered: thin liquids, puree and soft solids    Oral Stage: pt was able to self feed, pt able to bite toast, functional mastication and manipulation of toast  Pt able to drink from cup and straw w/ good oral control and transfer  Pharyngeal Stage: swallow initiation was timely w/ complete laryngeal excursion; some throat clearing noted w/ puree and water by straw  Also belching followed by a cough noted after sips of water by straw  No s/s aspiration noted after cup sips of thin liquids and toast  At end of session, pt w/ expectorating of clear mucous  Esophageal Concerns: belching noted after drinking liquids, occas cough noted after belching         Results Reviewed with: patient and RN   Dysphagia Goals: none at this time

## 2021-04-20 NOTE — PLAN OF CARE
Problem: Prexisting or High Potential for Compromised Skin Integrity  Goal: Skin integrity is maintained or improved  Description: INTERVENTIONS:  - Identify patients at risk for skin breakdown  - Assess and monitor skin integrity  - Assess and monitor nutrition and hydration status  - Monitor labs   - Assess for incontinence   - Turn and reposition patient  - Assist with mobility/ambulation  - Relieve pressure over bony prominences  - Avoid friction and shearing  - Provide appropriate hygiene as needed including keeping skin clean and dry  - Evaluate need for skin moisturizer/barrier cream  - Collaborate with interdisciplinary team   - Patient/family teaching  - Consider wound care consult   Outcome: Progressing     Problem: PAIN - ADULT  Goal: Verbalizes/displays adequate comfort level or baseline comfort level  Description: Interventions:  - Encourage patient to monitor pain and request assistance  - Assess pain using appropriate pain scale  - Administer analgesics based on type and severity of pain and evaluate response  - Implement non-pharmacological measures as appropriate and evaluate response  - Consider cultural and social influences on pain and pain management  - Notify physician/advanced practitioner if interventions unsuccessful or patient reports new pain  Outcome: Progressing     Problem: INFECTION - ADULT  Goal: Absence or prevention of progression during hospitalization  Description: INTERVENTIONS:  - Assess and monitor for signs and symptoms of infection  - Monitor lab/diagnostic results  - Monitor all insertion sites, i e  indwelling lines, tubes, and drains  - Monitor endotracheal if appropriate and nasal secretions for changes in amount and color  - [place] appropriate cooling/warming therapies per order  - Administer medications as ordered  - Instruct and encourage patient and family to use good hand hygiene technique  - Identify and instruct in appropriate isolation precautions for identified infection/condition  Outcome: Progressing     Problem: SAFETY ADULT  Goal: Patient will remain free of falls  Description: INTERVENTIONS:  - Assess patient frequently for physical needs  -  Identify cognitive and physical deficits and behaviors that affect risk of falls    -  [place] fall precautions as indicated by assessment   - Educate patient/family on patient safety including physical limitations  - Instruct patient to call for assistance with activity based on assessment  - Modify environment to reduce risk of injury  - Consider OT/PT consult to assist with strengthening/mobility  Outcome: Progressing  Goal: Maintain or return to baseline ADL function  Description: INTERVENTIONS:  -  Assess patient's ability to carry out ADLs; assess patient's baseline for ADL function and identify physical deficits which impact ability to perform ADLs (bathing, care of mouth/teeth, toileting, grooming, dressing, etc )  - Assess/evaluate cause of self-care deficits   - Assess range of motion  - Assess patient's mobility; develop plan if impaired  - Assess patient's need for assistive devices and provide as appropriate  - Encourage maximum independence but intervene and supervise when necessary  - Involve family in performance of ADLs  - Assess for home care needs following discharge   - Consider OT consult to assist with ADL evaluation and planning for discharge  - Provide patient education as appropriate  Outcome: Progressing  Goal: Maintain or return mobility status to optimal level  Description: INTERVENTIONS:  - Assess patient's baseline mobility status (ambulation, transfers, stairs, etc )    - Identify cognitive and physical deficits and behaviors that affect mobility  - Identify mobility aids required to assist with transfers and/or ambulation (gait belt, sit-to-stand, lift, walker, cane, etc )  - [place] fall precautions as indicated by assessment  - Record patient progress and toleration of activity level on Mobility SBAR; progress patient to next Phase/Stage  - Instruct patient to call for assistance with activity based on assessment  - Consider rehabilitation consult to assist with strengthening/weightbearing, etc   Outcome: Progressing     Problem: DISCHARGE PLANNING  Goal: Discharge to home or other facility with appropriate resources  Description: INTERVENTIONS:  - Identify barriers to discharge w/patient and caregiver  - Arrange for needed discharge resources and transportation as appropriate  - Identify discharge learning needs (meds, wound care, etc )  - Arrange for interpretive services to assist at discharge as needed  - Refer to Case Management Department for coordinating discharge planning if the patient needs post-hospital services based on physician/advanced practitioner order or complex needs related to functional status, cognitive ability, or social support system  Outcome: Progressing     Problem: Knowledge Deficit  Goal: Patient/family/caregiver demonstrates understanding of disease process, treatment plan, medications, and discharge instructions  Description: Complete learning assessment and assess knowledge base    Interventions:  - Provide teaching at level of understanding  - Provide teaching via preferred learning methods  Outcome: Progressing     Problem: METABOLIC, FLUID AND ELECTROLYTES - ADULT  Goal: Electrolytes maintained within normal limits  Description: INTERVENTIONS:  - Monitor labs and assess patient for signs and symptoms of electrolyte imbalances  - Administer electrolyte replacement as ordered  - Monitor response to electrolyte replacements, including repeat lab results as appropriate  - Instruct patient on fluid and nutrition as appropriate  Outcome: Progressing  Goal: Fluid balance maintained  Description: INTERVENTIONS:  - Monitor labs   - Monitor I/O and WT  - Instruct patient on fluid and nutrition as appropriate  - Assess for signs & symptoms of volume excess or deficit  Outcome: Progressing  Goal: Glucose maintained within target range  Description: INTERVENTIONS:  - Monitor Blood Glucose as ordered  - Assess for signs and symptoms of hyperglycemia and hypoglycemia  - Administer ordered medications to maintain glucose within target range  - Assess nutritional intake and initiate nutrition service referral as needed  Outcome: Progressing     Problem: Nutrition/Hydration-ADULT  Goal: Nutrient/Hydration intake appropriate for improving, restoring or maintaining nutritional needs  Description: Monitor and assess patient's nutrition/hydration status for malnutrition  Collaborate with interdisciplinary team and initiate plan and interventions as ordered  Monitor patient's weight and dietary intake as ordered or per policy  Utilize nutrition screening tool and intervene as necessary  Determine patient's food preferences and provide high-protein, high-caloric foods as appropriate       INTERVENTIONS:  - Monitor oral intake, urinary output, labs, and treatment plans  - Assess nutrition and hydration status and recommend course of action  - Evaluate amount of meals eaten  - Assist patient with eating if necessary   - Allow adequate time for meals  - Recommend/ encourage appropriate diets, oral nutritional supplements, and vitamin/mineral supplements  - Order, calculate, and assess calorie counts as needed  - Recommend, monitor, and adjust tube feedings and TPN/PPN based on assessed needs  - Assess need for intravenous fluids  - Provide specific nutrition/hydration education as appropriate  - Include patient/family/caregiver in decisions related to nutrition  Outcome: Progressing

## 2021-04-20 NOTE — ASSESSMENT & PLAN NOTE
Blood Pressure: 127/73  · Continue Norvasc and Bystolic  · Resume torsemide tomorrow since TIANA is resolved

## 2021-04-20 NOTE — TELEPHONE ENCOUNTER
----- Message from Sandra Baldwin MD sent at 4/19/2021  4:59 PM EDT -----  Once the patient is discharged I would order a BMP/magnesium/phosphorus/CBC with iron studies/urine protein creatinine ratio a few days later possibly even late this week or early next week prior to her appointment    Thank you

## 2021-04-20 NOTE — ASSESSMENT & PLAN NOTE
Recent Labs     04/18/21  0441 04/19/21  0501 04/20/21  0525   CREATININE 2 44* 2 15* 2 05*   EGFR 17 19 21     Estimated Creatinine Clearance: 15 5 mL/min (A) (by C-G formula based on SCr of 2 05 mg/dL (H))  · Baseline creatinine 1 7-2 1  · Patient was recently admitted with CHF exacerbation  At that time she was also having elevated creatinine numbers  Per Nephrology note, TIANA could indicate progression of kidney disease  · Creatinine at the time of discharge from prior admission was 2 4, per Nephrology note at that time, this might be patient's new baseline  · Nephrology  was consulted for patient  TIANA now resolved after IV hydration  · Plan:  · Resolved  ·   Will resume torsemide tomorrow per Nephro recommendations  ·  follow-up with nephrology in 1 week outpatient  Patient has an appoint with Dr Ayesha Mares scheduled

## 2021-04-21 ENCOUNTER — DOCUMENTATION (OUTPATIENT)
Dept: SOCIAL WORK | Facility: HOSPITAL | Age: 86
End: 2021-04-21

## 2021-04-21 ENCOUNTER — PATIENT OUTREACH (OUTPATIENT)
Dept: CASE MANAGEMENT | Facility: HOSPITAL | Age: 86
End: 2021-04-21

## 2021-04-21 LAB
DME PARACHUTE DELIVERY DATE ACTUAL: NORMAL
DME PARACHUTE DELIVERY DATE EXPECTED: NORMAL
DME PARACHUTE DELIVERY DATE REQUESTED: NORMAL
DME PARACHUTE ITEM DESCRIPTION: NORMAL
DME PARACHUTE ORDER STATUS: NORMAL
DME PARACHUTE SUPPLIER NAME: NORMAL
DME PARACHUTE SUPPLIER PHONE: NORMAL

## 2021-04-21 NOTE — PROGRESS NOTES
Inbasket notification received for patient enrolled in Medicare CHF Bundle; discharged home with VNA provided by [facility]'s  Chart review completed  Outside records through [facility] requested and refreshed  Patient hospitalized   4/16/2021 - 4/20/2021at [facility] for Acute kidney injury superimposed on chronic kidney disease  Patient presented from SNF after facility attempt to resolve TIANA unsuccessful  4/4/2021 - 4/10/2021 [facility] for Acute on chronic respiratory failure with hypoxia and hypercapnia  Patient presented with SOB  Throughout hospital course, kidney function worsened and nephrology was consulted  She was started o Solu-MEDROL and weaned from 6L O2 to 2L  Patient discharged to [facility] for STR  Review of IP CM note indicates patient lives alone in ranch style house with 3 steps to enter  Patient approved for 21 hours HHA support through [employer]  program  Patient has two daughters, who seemingly do not agree about where patient should live:  Home vs  LTC placement  Past Medical History  ANDREA B, CHF, HTN, CKD4, DVT, chronic respiratory failure, rectal bleeding, asthma, CVA, and aortic stenosis  See problem list for complete list of medical diagnosis  Future appointments:  4/28   Nephro  5/13   Cardiology    Patient scheduled to open today with  VNA services for PT and OT only according to [facility]  Unsuccessful attempt at reaching patient  Phone just rang and rang, No VM option  CM contacted patient's daughter Harley Armendariz to introduce self, explain the role of the OP CM and purpose of this phone call is to assess patient's general wellbeing or for any assistance needed with follow-up care  [facility] program explained  Harley Armendariz consented future outreach of CM  Daughter expressed extreme disappointment in skilled facility  States patient was placed in room and not allowed out of bed    States facility did not follow CKD diet explaining patient was admitted to facility on a thursday and the nutritionist did not come in until Tuesday  Daughter reports that patient now so debilitated from the stay she is having a difficult time carding for herself  Patient is wearing O2 at 2L but is non compliant with the Trilogy at bedtime  Daughter reports she monitors O2 saturation via a pulse ox  Chaz Mtz reports visiting nurse came today and OT twice weekly  Daughter reports patient receives medicaid through [place] and daughter states patient may need medicaid through [place]  CM offered to request MSW referral through VNA  Daughter reports Dr Soraya Nash will make home visit Friday am       Education provided regarding CHF and red flags reviewed with patient  CM emphasized patient to call doctor for 1) weight gain of 3 pounds in day or 5 pounds in 1 week, 2) edema to BLE or abdomen and 3) SOB that does not resolve with rest  Daughter verbalized understanding  Red flags for CKD discussed with patient  CM instructed patient to report to doctor any changes in voiding frequency, edema, SOB, bad or bitter taste in mouth, and nausea/vomitting or loss of appetite  CM encouraged importance of taking medications as prescribed, avoidance of NSAIDs and monitoring blood pressure  Daughter verbalized same adding patient has been following renal guidelines for the last 10 years  Call back number provided to daughter with encouragement to call as needed  This CM will continue to monitor through chart review, [employer] and VNA

## 2021-04-21 NOTE — TELEPHONE ENCOUNTER
I spoke to Bryan Ramirez the pt daughter, she requested we send labs to [facility] draw

## 2021-04-21 NOTE — PROGRESS NOTES
TT sent to  VNA PT requesting referral for VNA MSW     Therapist wrote back agreeing to place referral

## 2021-04-28 ENCOUNTER — PATIENT OUTREACH (OUTPATIENT)
Dept: CASE MANAGEMENT | Facility: HOSPITAL | Age: 86
End: 2021-04-28

## 2021-04-28 ENCOUNTER — OFFICE VISIT (OUTPATIENT)
Dept: NEPHROLOGY | Facility: CLINIC | Age: 86
End: 2021-04-28
Payer: MEDICARE

## 2021-04-28 VITALS — BODY MASS INDEX: 33.12 KG/M2 | HEIGHT: 60 IN | WEIGHT: 168.7 LBS

## 2021-04-28 DIAGNOSIS — E55.9 VITAMIN D DEFICIENCY: Chronic | ICD-10-CM

## 2021-04-28 DIAGNOSIS — E83.39 HYPERPHOSPHATEMIA: ICD-10-CM

## 2021-04-28 DIAGNOSIS — E78.5 DYSLIPIDEMIA: Chronic | ICD-10-CM

## 2021-04-28 DIAGNOSIS — R80.1 PERSISTENT PROTEINURIA: ICD-10-CM

## 2021-04-28 DIAGNOSIS — I50.33 ACUTE ON CHRONIC DIASTOLIC HEART FAILURE (HCC): ICD-10-CM

## 2021-04-28 DIAGNOSIS — N17.9 ACUTE KIDNEY INJURY SUPERIMPOSED ON CHRONIC KIDNEY DISEASE (HCC): ICD-10-CM

## 2021-04-28 DIAGNOSIS — E61.1 IRON DEFICIENCY: ICD-10-CM

## 2021-04-28 DIAGNOSIS — D63.1 ANEMIA OF CHRONIC RENAL FAILURE, STAGE 4 (SEVERE) (HCC): ICD-10-CM

## 2021-04-28 DIAGNOSIS — N18.4 ANEMIA OF CHRONIC RENAL FAILURE, STAGE 4 (SEVERE) (HCC): ICD-10-CM

## 2021-04-28 DIAGNOSIS — N18.9 ACUTE KIDNEY INJURY SUPERIMPOSED ON CHRONIC KIDNEY DISEASE (HCC): ICD-10-CM

## 2021-04-28 DIAGNOSIS — I12.9 HYPERTENSIVE CHRONIC KIDNEY DISEASE WITH STAGE 1 THROUGH STAGE 4 CHRONIC KIDNEY DISEASE, OR UNSPECIFIED CHRONIC KIDNEY DISEASE: Primary | ICD-10-CM

## 2021-04-28 DIAGNOSIS — N18.4 CHRONIC KIDNEY DISEASE, STAGE IV (SEVERE) (HCC): ICD-10-CM

## 2021-04-28 LAB
BASOPHILS # BLD AUTO: 9 CELLS/UL (ref 0–200)
BASOPHILS NFR BLD AUTO: 0.2 %
BUN SERPL-MCNC: 52 MG/DL (ref 7–25)
BUN/CREAT SERPL: 22 (CALC) (ref 6–22)
CALCIUM SERPL-MCNC: 8.6 MG/DL (ref 8.6–10.4)
CHLORIDE SERPL-SCNC: 102 MMOL/L (ref 98–110)
CO2 SERPL-SCNC: 36 MMOL/L (ref 20–32)
CREAT SERPL-MCNC: 2.39 MG/DL (ref 0.6–0.88)
EOSINOPHIL # BLD AUTO: 149 CELLS/UL (ref 15–500)
EOSINOPHIL NFR BLD AUTO: 3.3 %
ERYTHROCYTE [DISTWIDTH] IN BLOOD BY AUTOMATED COUNT: 12.5 % (ref 11–15)
GLUCOSE SERPL-MCNC: 173 MG/DL (ref 65–99)
HCT VFR BLD AUTO: 34 % (ref 35–45)
HGB BLD-MCNC: 10.4 G/DL (ref 11.7–15.5)
IRON SATN MFR SERPL: 17 % (CALC) (ref 16–45)
IRON SERPL-MCNC: 55 MCG/DL (ref 45–160)
LYMPHOCYTES # BLD AUTO: 518 CELLS/UL (ref 850–3900)
LYMPHOCYTES NFR BLD AUTO: 11.5 %
MAGNESIUM SERPL-MCNC: 2 MG/DL (ref 1.5–2.5)
MCH RBC QN AUTO: 29.5 PG (ref 27–33)
MCHC RBC AUTO-ENTMCNC: 30.6 G/DL (ref 32–36)
MCV RBC AUTO: 96.6 FL (ref 80–100)
MONOCYTES # BLD AUTO: 428 CELLS/UL (ref 200–950)
MONOCYTES NFR BLD AUTO: 9.5 %
NEUTROPHILS # BLD AUTO: 3398 CELLS/UL (ref 1500–7800)
NEUTROPHILS NFR BLD AUTO: 75.5 %
PHOSPHATE SERPL-MCNC: 4 MG/DL (ref 2.1–4.3)
PLATELET # BLD AUTO: 123 THOUSAND/UL (ref 140–400)
PMV BLD REES-ECKER: 12 FL (ref 7.5–12.5)
POTASSIUM SERPL-SCNC: 4.3 MMOL/L (ref 3.5–5.3)
RBC # BLD AUTO: 3.52 MILLION/UL (ref 3.8–5.1)
SL AMB EGFR AFRICAN AMERICAN: 20 ML/MIN/1.73M2
SL AMB EGFR NON AFRICAN AMERICAN: 17 ML/MIN/1.73M2
SODIUM SERPL-SCNC: 144 MMOL/L (ref 135–146)
TIBC SERPL-MCNC: 316 MCG/DL (CALC) (ref 250–450)
WBC # BLD AUTO: 4.5 THOUSAND/UL (ref 3.8–10.8)

## 2021-04-28 PROCEDURE — 99215 OFFICE O/P EST HI 40 MIN: CPT | Performed by: INTERNAL MEDICINE

## 2021-04-28 RX ORDER — TORSEMIDE 10 MG/1
20 TABLET ORAL 2 TIMES DAILY
Qty: 120 TABLET | Refills: 5 | Status: SHIPPED | OUTPATIENT
Start: 2021-04-28

## 2021-04-28 NOTE — LETTER
April 28, 2021     Emelia Wood, [address]    Patient: Miguel Purvis   YOB: 1928   Date of Visit: 4/28/2021       Dear Dr Maylin Quiroga: Thank you for referring Miguel Purvis to me for evaluation  Below are my notes for this consultation  If you have questions, please do not hesitate to call me  I look forward to following your patient along with you  Sincerely,        Deepthi Cuenca MD        CC: DO Yvette Antunez MD Flavio Crosby, MD Jeneen Grieves, MD  4/28/2021  3:50 PM  Sign when Signing Visit  RENAL FOLLOW UP NOTE: td    ASSESSMENT AND PLAN:  Jami Leblanc Half :  · Etiology:  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 of TIANA  · Baseline creatinine:  More Recently:  1 76-2 11  · Current creatinine:  2 39 above usual level, need to be maintained higher to keep out of congestive heart failure  · Urine protein creatinine ratio:  1 88 g which is above goal?  Etiology  Gaby Gunn age and comorbidities I would treat this conservatively   Discussed with the patient and her niece who agree with the conservative approach with no further investigation such as lab tests/serologies or kidney biopsy    Repeat  Recommendations:  · Treat hypertension-please see below  · Treat dyslipidemia-please see below  · Maintain proteinuria less than 1 g or as low as possible     2   Volume:   · Recent CHF  · Current status:  2+ lower extremity edema of the left lower extremity to the thighs/mid thigh; 1+ of the right lower extremity especially the ankle  · Recommendations:  · I would increase torsemide to 20 b i d   · Recheck labs in 1 week  3   Hypertension:    · Home blood pressure readings:   ·  none at this time     · Goal blood pressure:  Less than 125/75 given proteinuria  Recommendations:  · Push nonmedical regimen including weight loss, isotonic exercise and avoidance of salt; patient counseled as such  · Medication changes today:    ·   increase torsemide to 20 b i d   4   Electrolytes:   · Potassium acceptable  · Metabolic alkalosis secondary to contraction alkalosis from diuretics:  Check VBG  5   Mineral bone disorder:  · Calcium/magnesium/phosphorus:  all acceptable  · PTH intact:  23 which is acceptable  · Vitamin-D: 52  6   Dyslipidemia:  · Goal LDL:  Less than 70 ideally given CAD  · Current lipid profile:  LDL  69/HDL 60/triglycerides 115  Recommendations:  At goal no changes   Cannot take statins   Continue Welchol and Zetia  Dara Soulier 7   Anemia:  Related to CKD and chronic disease  · Hemoglobin slightly lower at 10 4  · Iron studies:  Saturation 17%  Oral iron over-the-counter on a daily basis as tolerated  8   Other problems:  · Status post  CVA  · CAD/atrial fibrillation:  Followed by Dr Sosa Chew  · History of MGUS followed by Dr Ольга Samano  · Mild thrombocytopenia at 123 [address]  · Admission 4/5-4/11/21 secondary to shortness of breath:  Felt related to pulmonary edema versus pneumonia versus infectious, chest x-ray demonstrated mild vascular congestion:  Treated for both infection/volume overload  In peaked at 2 8 was down to 2 15 as of 04/19/2021  She was treated with some IV fluids but that was stopped  Diuretics were re-initiated  Patient will be followed by Pulmonary closely as an outpatient  · Patient was readmitted 4/16 because of AK I in discharge 04/20  Patient initially was attempted to have IV fluids in rehab facility however that failed, creatinine was 2 79 on admission with BUN of 133, hemoglobin 10 6  AK I was treated with IV fluids  I had a lengthy discussion with the patient's daughter patient herself regarding goals of care  They are leaning towards palliative care possibly even hospice not sure about that yet will have a family discussion  I have offered advanced care meeting and they are agreeable to that and not will set this up    In addition it is very clear that the patient would not want any form of kidney dialysis and would rather die peacefully  Finally, she may be leaning towards avoiding going to the hospital any time in the future and therefore if it comes to that point she May want hospice instilled at that time and remain at home  Again they will discuss this is a family  PATIENT INSTRUCTIONS:    Patient Instructions   1  Medication changes today:   Increase torsemide to 20 mg twice a day with a goal of losing at least 4-6 lb over the next several days  Once the swelling has improved you can cut back to torsemide 20 mg in the morning with an additional 20 mg in the evening as needed for leg swelling or 2-3 lb weight gain   Please call if the swelling does not get any better or shortness of breath worsens    2  Please go for non fasting  lab work 1 week after the above medication change    3  We will plan for an advanced care meeting in the next few weeks  At that time we will also have my advanced practitioner evaluate the patient possibly back to back appointments    4  Nonfasting lab work in 1 month     5  Follow-up in 3  months   Please bring in 1 week a blood pressure readings morning evening, sitting and standing is outlined above  · TAKE THE MORNING READINGS BEFORE ANY MEDICATIONS AND WHEN YOU ARE RELAXED FOR SEVERAL MINUTES  · TAKE THE EVENING READINGS BETWEEN 7PM AND 10PM AT LEAST 30 MINUTES BEFORE OR AFTER DINNER/EATING/COFFEE INTAKE OR ALCOHOL INTAKE, AND CERTAINLY BEFORE ANY BLOOD PRESSURE MEDICATIONS    AGAIN, PLEASE MAKE SURE YOU ARE RELAXED FOR SEVERAL MINUTES BEFORE TAKING HER BLOOD PRESSURE READINGS  · PLEASE INCLUDE HEART RATE WITH YOUR BLOOD PRESSURE READINGS  · When taking standing readings, keep your arm supported at heart level and not dangling  · Make sure you are sitting with your back supported and feet on the ground and do not cross your legs or feet  · Make sure you have not taken any coffee or caffeine products or exercised or smoke cigarettes at least 30 minutes before taking your blood pressure     Please go for fasting lab work 1-2 weeks prior to your appointment      6  General instructions:   AVOID SALT BUT NOT ADDING AN READING LABELS TO MAKE SURE THERE IS LOW-SALT IN THE FOOD THAT YOU ARE EATING  o Goal is less than 2 g of sodium intake or less than 5 g of sodium chloride intake per day     Avoid nonsteroidal anti-inflammatory drugs such as Naprosyn, ibuprofen, Aleve, Advil, Celebrex, Meloxicam (Mobic) etc   You can use Tylenol as needed if you do not have any liver condition to be concerned about     Avoid medications such as Sudafed or decongestants and antihistamines that contained the D component which is the decongestant  You can take antihistamines without the decongestant or D component   Try to avoid medications such as pantoprazole or  Protonix/Nexium or Esomeprazole)/Prilosec or omeprazole/Prevacid or lansoprazole/AcipHex or Rabeprazole  If you are able to, use Pepcid as this is safer for your kidneys  Subjective: The patient was hospitalized as outlined above it impression and plan with congestive heart failure possibly pneumonia  Readmitted with AK I felt prerenal the time  The patient overall slightly better since hospitalization but still dyspnea on exertion but no chest pain, still has some coughing on occasion    Now with lower extremity swelling:  She has gained 7 lb in about 4 5 days  No fevers, chills  Slight decreased appetite, does take Ensure does eat well at time; energy is low  No hematuria, dysuria, voiding symptoms or foamy urine  No gastrointestinal symptoms  No headaches, dizziness or lightheadedness  Blood pressure medications:  · Torsemide now 20 mg daily  · Bystolic 10 mg daily in the morning  · Amlodipine 5 mg daily a m     ROS:  See HPI, otherwise review of systems as completely reviewed with the patient are negative    Past Medical History:   Diagnosis Date    Arthritis     CHF (congestive heart failure) ([facility] ) 09/9362    diastolic     CO2 retention     DVT (deep vein thrombosis) in pregnancy     left LE in 2/2018 and 50 years ago    Hyperlipidemia     Hypertension     [facility] embol[facility] ([facility] ) 02/06/2018    Renal disorder     ckd    Respiratory failure, acute and chronic ([facility] )     Stroke ([facility] ) 2016    left hand weakness,paresthesia   Ventilator dependence [facility])      Past Surgical History:   Procedure Laterality Date    ABDOMINAL SURGERY      ruputured bowel  had colostomy and ileostomy,reversed later on   BACK SURGERY      rods in back  done 30 years ago   CARPAL TUNNEL RELEASE Bilateral     CYST REMOVAL      back    HERNIA REPAIR      REPLACEMENT TOTAL KNEE BILATERAL       Family History   Problem Relation Age of Onset    COPD Brother       reports that she has never smoked  She has never used smokeless tobacco  She reports that she does not drink alcohol or use drugs  I COMPLETELY REVIEWED THE PAST MEDICAL HISTORY/PAST SURGICAL HISTORY/SOCIAL HISTORY/FAMILY HISTORY/AND MEDICATIONS  AND UPDATED ALL    Objective:     Vitals:   BP sitting on right:162/72 with a heart rate of 80 and irregular  Unable to stand      Weight (last 2 days)     Date/Time   Weight    04/28/21 1439   76 5 (168 7)            Wt Readings from Last 3 Encounters:   04/28/21 76 5 kg (168 lb 11 2 oz)   04/20/21 72 kg (158 lb 11 7 oz)   04/10/21 65 6 kg (144 lb 9 6 oz)       Body mass index is 32 95 kg/m²      Physical Exam: General:  No acute distress/weak appearing in a wheelchair with oxygen  Skin:  No acute rash  Eyes:  No scleral icterus, noninjected, no discharge from eyes  ENT:  Moist mucous membranes  Neck:  Supple, no jugular venous distention, trachea is midline, no lymphadenopathy and no thyromegaly  Back   No CVAT  Chest:  Decreased breath sounds at both bases with dullness to percussion at both bases; slight inspiratory/expiratory rhonchi , good respiratory effort  CVS:  Irregularly irregular rhythm without a rub, or gallops or murmurs  Abdomen:  Soft and nontender with normal bowel sounds  Extremities:  No cyanosis and no edema, moderate arthritic changes, normal range of motion  Neuro:  Grossly intact  Psych:  Alert, oriented x3 and appropriate      Medications:    Current Outpatient Medications:     acetaminophen (TYLENOL) 325 mg tablet, Take 650 mg by mouth every 6 (six) hours as needed for mild pain, Disp: , Rfl:     amLODIPine (NORVASC) 5 mg tablet, Take 1 tablet (5 mg total) by mouth daily, Disp: 90 tablet, Rfl: 2    Ascorbic Acid (VITAMIN C PO), Take 1 tablet by mouth daily, Disp: , Rfl:     calcium-vitamin D (OSCAL 500 + D) 500 mg-200 units per tablet, Take 1 tablet by mouth 2 (two) times a day , Disp: , Rfl:     cholecalciferol (VITAMIN D3) 1,000 units tablet, Take 2,000 Units by mouth daily, Disp: , Rfl:     co-enzyme Q-10 50 MG capsule, Take 200 mg by mouth daily  , Disp: , Rfl:     Eliquis 2 5 MG, TAKE 1 TABLET TWO TIMES A DAY, Disp: 60 tablet, Rfl: 5    ezetimibe (ZETIA) 10 mg tablet, Take 1 tablet (10 mg total) by mouth daily, Disp: 90 tablet, Rfl: 3    fluticasone-vilanterol (BREO ELLIPTA) 100-25 mcg/inh inhaler, Inhale 1 puff daily Rinse mouth after use , Disp: 1 Inhaler, Rfl: 8    Multiple Vitamins-Minerals (OCUVITE PO), Take 1 tablet by mouth daily  , Disp: , Rfl:     nebivolol (Bystolic) 10 mg tablet, Take 1 tablet (10 mg total) by mouth daily, Disp: 90 tablet, Rfl: 3    pantoprazole (PROTONIX) 40 mg tablet, Take 40 mg by mouth daily as needed , Disp: , Rfl:     psyllium (METAMUCIL) packet, Take 1 packet by mouth daily, Disp:  , Rfl: 0    senna (SENOKOT) 8 6 mg, Take 1 tablet (8 6 mg total) by mouth daily at bedtime as needed for constipation (Patient taking differently: Take 8 6 mg by mouth daily ), Disp:  , Rfl: 0    torsemide (DEMADEX) 10 mg tablet, Take 2 tablets (20 mg total) by mouth 2 (two) times a day, Disp: 120 tablet, Rfl: 5    Lab, Imaging and other studies: I have personally reviewed pertinent labs  Laboratory Results:  Results for orders placed or performed in visit on 04/27/21   Magnesium   Result Value Ref Range    Magnesium, Serum 2 0 1 5 - 2 5 mg/dL   Phosphorus   Result Value Ref Range    Phosphorus, Serum 4 0 2 1 - 4 3 mg/dL   TIBC   Result Value Ref Range    Iron, Serum 55 45 - 160 mcg/dL    Total Iron Binding Capacity (TIBC) 316 250 - 450 mcg/dL (calc)    Iron Saturation 17 16 - 45 % (calc)   Basic metabolic panel   Result Value Ref Range    Glucose, Random 173 (H) 65 - 99 mg/dL    BUN 52 (H) 7 - 25 mg/dL    Creatinine 2 39 (H) 0 60 - 0 88 mg/dL    eGFR Non  17 (L) > OR = 60 mL/min/1 73m2    eGFR  20 (L) > OR = 60 mL/min/1 73m2    SL AMB BUN/CREATININE RATIO 22 6 - 22 (calc)    Sodium 144 135 - 146 mmol/L    Potassium 4 3 3 5 - 5 3 mmol/L    Chloride 102 98 - 110 mmol/L    CO2 36 (H) 20 - 32 mmol/L    Calcium 8 6 8 6 - 10 4 mg/dL   CBC and differential   Result Value Ref Range    White Blood Cell Count 4 5 3 8 - 10 8 Thousand/uL    Red Blood Cell Count 3 52 (L) 3 80 - 5 10 Million/uL    Hemoglobin 10 4 (L) 11 7 - 15 5 g/dL    HCT 34 0 (L) 35 0 - 45 0 %    MCV 96 6 80 0 - 100 0 fL    MCH 29 5 27 0 - 33 0 pg    MCHC 30 6 (L) 32 0 - 36 0 g/dL    RDW 12 5 11 0 - 15 0 %    Platelet Count 911 (L) 140 - 400 Thousand/uL    SL AMB MPV 12 0 7 5 - 12 5 fL    Neutrophils (Absolute) 3,398 1,500 - 7,800 cells/uL    Lymphocytes (Absolute) 518 (L) 850 - 3,900 cells/uL    Monocytes (Absolute) 428 200 - 950 cells/uL    Eosinophils (Absolute) 149 15 - 500 cells/uL    Basophils ABS 9 0 - 200 cells/uL    Neutrophils 75 5 %    Lymphocytes 11 5 %    Monocytes 9 5 %    Eosinophils 3 3 %    Basophils PCT 0 2 %       Results from last 7 days   Lab Units 04/27/21  1054   WHITE BLOOD CELL COUNT  Thousand/uL 4 5   HEMOGLOBIN  g/dL 10 4*   HEMATOCRIT  % 34 0*   PLATELETS   Thousand/uL 123*   POTASSIUM mmol/L 4 3   CHLORIDE mmol/L 102   CO2 mmol/L 36*   BUN mg/dL 52* CREATININE mg/dL 2 39*   CALCIUM mg/dL 8 6   MAGNESIUM mg/dL 2 0         Radiology review:   chest X-ray    Ultrasound      Portions of the record may have been created with voice recognition software  Occasional wrong word or "sound a like" substitutions may have occurred due to the inherent limitations of voice recognition software  Read the chart carefully and recognize, using context, where substitutions have occurred

## 2021-04-28 NOTE — PROGRESS NOTES
Chart review completed  Outside records through [facility] requested and refreshed  MSW note reviewed in [address] in which outreach was completed two patients daughter Gus Stephens who at time of call refused visit and need for assistance with Medicaid paperwork  Additionally it was noted by Gus Stephens  that patient still has her apartment in [place] and they have not officially closed her waiver services in [place], therefore daughter is not in a position yet to apply for waiver in [place]  MSW provided the contact information for RAMIN BENJAMIN along with her contact information for future outreach as needed  Future appointments:  4/28  SL Nephro  5/13  Cardiology    CM spoke with patients daughter Richiecharisse Fournier today who believes therapy in the home has been helping patient  Daughter reports therapy has encouraged patient to walk more other than trips to the bathroom  daughter acknowledges that other sister will be taking patient to nephrology appointment later today  Georgia Fournier states patient is compliant with renal diet and checking her weight  States she weighed 158 pounds yesterday which is down 3 pounds from previous weight  Daughter does admit that feet are edematous and states  "feet are like sausages "   She is hoping the doctor today increases her diuretic  CM emphasized patient to call doctor for 1) weight gain of 3 pounds in day or 5 pounds in 1 week, 2) edema to BLE or abdomen and 3) SOB that does not resolve with rest  Georgia Fournier verbalized same  Georgia Fournier reports that she and her sister are paying aide's to come for 4 5 hours in the am, her sister comes for the afternoon and then Georgia Fournier is with patient when she gets home from work  Georgia Fournier questioned if success was made for  regarding [place] and CM informed Georgia Fournier that MSW reached out to sister Gus Stephens who declined visit or need for assistance with services at this time       This CM will continue to monitor via chart review throughout bundle episode

## 2021-04-28 NOTE — PATIENT INSTRUCTIONS
1  Medication changes today:   Increase torsemide to 20 mg twice a day with a goal of losing at least 4-6 lb over the next several days  Once the swelling has improved you can cut back to torsemide 20 mg in the morning with an additional 20 mg in the evening as needed for leg swelling or 2-3 lb weight gain   Please call if the swelling does not get any better or shortness of breath worsens    2  Please go for non fasting  lab work 1 week after the above medication change    3  We will plan for an advanced care meeting in the next few weeks  At that time we will also have my advanced practitioner evaluate the patient possibly back to back appointments    4  Nonfasting lab work in 1 month     5  Follow-up in 3  months   Please bring in 1 week a blood pressure readings morning evening, sitting and standing is outlined above  · TAKE THE MORNING READINGS BEFORE ANY MEDICATIONS AND WHEN YOU ARE RELAXED FOR SEVERAL MINUTES  · TAKE THE EVENING READINGS BETWEEN 7PM AND 10PM AT LEAST 30 MINUTES BEFORE OR AFTER DINNER/EATING/COFFEE INTAKE OR ALCOHOL INTAKE, AND CERTAINLY BEFORE ANY BLOOD PRESSURE MEDICATIONS  AGAIN, PLEASE MAKE SURE YOU ARE RELAXED FOR SEVERAL MINUTES BEFORE TAKING HER BLOOD PRESSURE READINGS  · PLEASE INCLUDE HEART RATE WITH YOUR BLOOD PRESSURE READINGS  · When taking standing readings, keep your arm supported at heart level and not dangling  · Make sure you are sitting with your back supported and feet on the ground and do not cross your legs or feet  · Make sure you have not taken any coffee or caffeine products or exercised or smoke cigarettes at least 30 minutes before taking your blood pressure     Please go for fasting lab work 1-2 weeks prior to your appointment      6   General instructions:   AVOID SALT BUT NOT ADDING AN READING LABELS TO MAKE SURE THERE IS LOW-SALT IN THE FOOD THAT YOU ARE EATING  o Goal is less than 2 g of sodium intake or less than 5 g of sodium chloride intake per day     Avoid nonsteroidal anti-inflammatory drugs such as Naprosyn, ibuprofen, Aleve, Advil, Celebrex, Meloxicam (Mobic) etc   You can use Tylenol as needed if you do not have any liver condition to be concerned about     Avoid medications such as Sudafed or decongestants and antihistamines that contained the D component which is the decongestant  You can take antihistamines without the decongestant or D component   Try to avoid medications such as pantoprazole or  Protonix/Nexium or Esomeprazole)/Prilosec or omeprazole/Prevacid or lansoprazole/AcipHex or Rabeprazole  If you are able to, use Pepcid as this is safer for your kidneys

## 2021-04-30 ENCOUNTER — TELEPHONE (OUTPATIENT)
Dept: NEPHROLOGY | Facility: CLINIC | Age: 86
End: 2021-04-30

## 2021-04-30 ENCOUNTER — TELEPHONE (OUTPATIENT)
Dept: OTHER | Facility: HOSPITAL | Age: 86
End: 2021-04-30

## 2021-04-30 DIAGNOSIS — I50.32 CHRONIC DIASTOLIC CONGESTIVE HEART FAILURE (HCC): ICD-10-CM

## 2021-04-30 DIAGNOSIS — I95.1 ORTHOSTATIC HYPOTENSION: ICD-10-CM

## 2021-04-30 DIAGNOSIS — N18.4 CHRONIC KIDNEY DISEASE, STAGE IV (SEVERE) (HCC): ICD-10-CM

## 2021-04-30 DIAGNOSIS — I63.9 CEREBROVASCULAR ACCIDENT (CVA), UNSPECIFIED MECHANISM (HCC): Primary | Chronic | ICD-10-CM

## 2021-04-30 DIAGNOSIS — I50.33 ACUTE ON CHRONIC DIASTOLIC CHF (CONGESTIVE HEART FAILURE) (HCC): ICD-10-CM

## 2021-04-30 DIAGNOSIS — N18.4 ANEMIA OF CHRONIC RENAL FAILURE, STAGE 4 (SEVERE) (HCC): ICD-10-CM

## 2021-04-30 DIAGNOSIS — D63.1 ANEMIA OF CHRONIC RENAL FAILURE, STAGE 4 (SEVERE) (HCC): ICD-10-CM

## 2021-04-30 NOTE — TELEPHONE ENCOUNTER
At the request of Dr Roni BRITO referral placed for skilled nursing follow-up  Follow-up blood work next week  Obtain BMP and magnesium  Spoke with DARYL/[facility]an[facility]  Fax orders over [facility]  Radha Henley from [facility] said would be acceptable to fax orders over on Monday 5/3

## 2021-05-03 ENCOUNTER — LAB REQUISITION (OUTPATIENT)
Dept: LAB | Facility: HOSPITAL | Age: 86
End: 2021-05-03
Payer: MEDICARE

## 2021-05-03 ENCOUNTER — DOCUMENTATION (OUTPATIENT)
Dept: NEPHROLOGY | Facility: CLINIC | Age: 86
End: 2021-05-03

## 2021-05-03 DIAGNOSIS — N18.4 CHRONIC KIDNEY DISEASE, STAGE IV (SEVERE) (HCC): ICD-10-CM

## 2021-05-03 DIAGNOSIS — I12.9 HYPERTENSIVE CHRONIC KIDNEY DISEASE WITH STAGE 1 THROUGH STAGE 4 CHRONIC KIDNEY DISEASE, OR UNSPECIFIED CHRONIC KIDNEY DISEASE: Primary | ICD-10-CM

## 2021-05-03 DIAGNOSIS — N18.4 CHRONIC KIDNEY DISEASE, STAGE 4 (SEVERE) (HCC): ICD-10-CM

## 2021-05-03 DIAGNOSIS — I50.32 CHRONIC DIASTOLIC (CONGESTIVE) HEART FAILURE (HCC): ICD-10-CM

## 2021-05-03 DIAGNOSIS — I50.33 ACUTE ON CHRONIC DIASTOLIC (CONGESTIVE) HEART FAILURE (HCC): ICD-10-CM

## 2021-05-03 LAB
ANION GAP SERPL CALCULATED.3IONS-SCNC: 4 MMOL/L (ref 4–13)
BUN SERPL-MCNC: 62 MG/DL (ref 5–25)
CALCIUM SERPL-MCNC: 8.5 MG/DL (ref 8.3–10.1)
CHLORIDE SERPL-SCNC: 101 MMOL/L (ref 100–108)
CO2 SERPL-SCNC: 42 MMOL/L (ref 21–32)
CREAT SERPL-MCNC: 2.66 MG/DL (ref 0.6–1.3)
GFR SERPL CREATININE-BSD FRML MDRD: 15 ML/MIN/1.73SQ M
GLUCOSE SERPL-MCNC: 176 MG/DL (ref 65–140)
MAGNESIUM SERPL-MCNC: 1.9 MG/DL (ref 1.6–2.6)
POTASSIUM SERPL-SCNC: 3.4 MMOL/L (ref 3.5–5.3)
SODIUM SERPL-SCNC: 147 MMOL/L (ref 136–145)

## 2021-05-03 PROCEDURE — 80048 BASIC METABOLIC PNL TOTAL CA: CPT | Performed by: INTERNAL MEDICINE

## 2021-05-03 PROCEDURE — 83735 ASSAY OF MAGNESIUM: CPT | Performed by: INTERNAL MEDICINE

## 2021-05-03 NOTE — TELEPHONE ENCOUNTER
I would definitely stop amlodipine given low blood pressure  Monitor symptoms at let us know if there are any worse

## 2021-05-03 NOTE — TELEPHONE ENCOUNTER
Pt daughter advised to D/C Amlodipine as pt BP is too low  Continue to monitor and advise if any further issues

## 2021-05-03 NOTE — TELEPHONE ENCOUNTER
----- Message from Beatris Hatchet, MD sent at 5/3/2021 12:43 PM EDT -----  Creatinine supple or but more, and potassium is low  Recommend  1  As outlined stop amlodipine  2  High potassium diet  3  Begin spironolactone 25 mg daily  4  Change torsemide back to 20 mg daily as long as the swelling is not very significant and breathing is stable  Then they can use an as needed extra 20 mg in the evening  5   Repeat a basic metabolic profile 1 week    Call earlier if anything changes

## 2021-05-03 NOTE — TELEPHONE ENCOUNTER
Visiting nurse Carisa Alert called  Today's visit, BP  100/50  P: 100 BPM   Had loose BM today  Right arm is twitchy, which is new  BMP, mag done today by VN

## 2021-05-05 ENCOUNTER — PATIENT OUTREACH (OUTPATIENT)
Dept: CASE MANAGEMENT | Facility: HOSPITAL | Age: 86
End: 2021-05-05

## 2021-05-05 RX ORDER — SPIRONOLACTONE 25 MG/1
25 TABLET ORAL DAILY
Qty: 30 TABLET | Refills: 5 | Status: SHIPPED | OUTPATIENT
Start: 2021-05-05

## 2021-05-05 RX ORDER — TORSEMIDE 20 MG/1
20 TABLET ORAL DAILY
Qty: 45 TABLET | Refills: 3 | Status: SHIPPED | OUTPATIENT
Start: 2021-05-05

## 2021-05-05 NOTE — PROGRESS NOTES
Chart review completed  Outside records through [facility] requested and refreshed  Patient presented 4/28 to nephrologist   Provider increased torsemide to 20mg twice daily with goal of losing 4-6 pounds in next several days: ordered non fasting lab work and plan advanced care meeting in next few weeks  Patient to have f/u appointment in 3 months and was asked to bring 1 week a blood pressure readings morning evening, sitting and standing  Family to discuss palliative care possibly even hospice as patient does not want dialysis  Future appointments:  5/13  Cardiology    CM spoke with patient's daughter Jad Alcala today who reports patient went to kidney specialist last week and daughter became emotional stating that patient will likely go on hospice  CM offered emotional support while listening to daughter express her feelings  She reports the SW came yesterday, met with her sister Hyun Garcia and explained the hospice program    She and sister will talk with patient regarding possible changes to treatment team and medications according to hospice program   Jad Alcala reports that patient lost "water weight" and is  down about 6-7 pounds  She reports patient normal weight is about 145 pounds and she weighed 154 yesterday adding she was 158 last week  She states the edema is ashok to BLE is better but not resolved  Jad Alcala reports an "attitude change" in patient and explained that when she provides reminders for exercises or treatments the patient just shrugs her shoulders as if "she has given up "      Jad Alcala also with many questions about her insurance, medicaid (in [place]), aide's and costs of treatments  Jad Alcala does not believe sister Hyun Garcia discussed these with the    CM will request that [facility] MSW contact her to address questions         This CM will continue to monitor electronically via chart review, outreach and [facility]

## 2021-05-13 ENCOUNTER — PATIENT OUTREACH (OUTPATIENT)
Dept: CASE MANAGEMENT | Facility: HOSPITAL | Age: 86
End: 2021-05-13

## 2021-05-13 ENCOUNTER — LAB REQUISITION (OUTPATIENT)
Dept: LAB | Facility: HOSPITAL | Age: 86
End: 2021-05-13
Payer: MEDICARE

## 2021-05-13 ENCOUNTER — TELEPHONE (OUTPATIENT)
Dept: NEPHROLOGY | Facility: CLINIC | Age: 86
End: 2021-05-13

## 2021-05-13 DIAGNOSIS — N18.9 CHRONIC KIDNEY DISEASE, UNSPECIFIED: ICD-10-CM

## 2021-05-13 DIAGNOSIS — I12.9 HYPERTENSIVE CHRONIC KIDNEY DISEASE WITH STAGE 1 THROUGH STAGE 4 CHRONIC KIDNEY DISEASE, OR UNSPECIFIED CHRONIC KIDNEY DISEASE: ICD-10-CM

## 2021-05-13 DIAGNOSIS — N18.4 CHRONIC KIDNEY DISEASE, STAGE IV (SEVERE) (HCC): Primary | ICD-10-CM

## 2021-05-13 LAB
ANION GAP SERPL CALCULATED.3IONS-SCNC: 3 MMOL/L (ref 4–13)
BUN SERPL-MCNC: 64 MG/DL (ref 5–25)
CALCIUM SERPL-MCNC: 9.2 MG/DL (ref 8.3–10.1)
CHLORIDE SERPL-SCNC: 102 MMOL/L (ref 100–108)
CO2 SERPL-SCNC: 41 MMOL/L (ref 21–32)
CREAT SERPL-MCNC: 2.68 MG/DL (ref 0.6–1.3)
GFR SERPL CREATININE-BSD FRML MDRD: 15 ML/MIN/1.73SQ M
GLUCOSE SERPL-MCNC: 100 MG/DL (ref 65–140)
POTASSIUM SERPL-SCNC: 5.1 MMOL/L (ref 3.5–5.3)
SODIUM SERPL-SCNC: 146 MMOL/L (ref 136–145)

## 2021-05-13 PROCEDURE — 80048 BASIC METABOLIC PNL TOTAL CA: CPT | Performed by: INTERNAL MEDICINE

## 2021-05-13 NOTE — TELEPHONE ENCOUNTER
Mickey Avila advised to take an extra Torsemide 20 mg  For the next two days for weight gain  Resume normal dose thereafter  If additional weight gain or if weight does not come off, advised to contact the office per Dr Eduard Ríos

## 2021-05-13 NOTE — TELEPHONE ENCOUNTER
----- Message from Lise Morocho MD sent at 5/13/2021  1:47 PM EDT -----  Please stop spironolactone as the potassium is now 5 1  Repeat a basic metabolic profile 1-2 weeks  No other medication changes  Thank you

## 2021-05-13 NOTE — TELEPHONE ENCOUNTER
Visiting Nurse called to report weight gain  Pt is feeling ok  Edema unchanged  BP this AM before meds 158/107, after breakfast 131/84 and when nurse checked at 11:00 was 122/62  Was patient's birthday so she had some dietary indiscretions which may account for weight per t he nurse  Today 162 4 lbs, yesterday 159 6 lbs, Monday and Tuesday 155 5 lbs  Nurse just wanted to make you aware  Patient is asymptomatic  Edema unchanged

## 2021-05-13 NOTE — TELEPHONE ENCOUNTER
I spoke to the patients daughter she is aware to stop spironolactone at this time   Repeat labs have been mailed out  daughter wanted to make you aware that her weight is up to  162lbs, but no swelling at this time   Appetite has increased and doing well at this time

## 2021-05-13 NOTE — TELEPHONE ENCOUNTER
I would have her take an extra 20 mg of torsemide in the morning for the next 2 days until her weight comes down  Then resume usual dose which I believe is 20 mg twice a day  Obviously salt restriction as much as possible  If she does not lose weight or gains weight or develops more shortness of breath let us know immediately

## 2021-05-13 NOTE — PROGRESS NOTES
Chart review completed  Outside records through [facility] requested and refreshed  Review of Kaleb/ raúl indicates patient evaluated 5/10 for PALS program   Patients daughters not completely in agreement with each other regarding PALS or Hospice care  Patient is due to have BMP drawn in which further discussion will occur once lab results are known  Consultation note indicates patient has had enough and does not wish to go back to the hospital any more  Both daughters have spoke with MSW and Hospice liaison    Per notes, patient is PALS appropriate but would benefit more with Hospice / benefit    This CM will continue to monitor electronically via chart review, outreach and [facility]

## 2021-05-19 ENCOUNTER — PATIENT OUTREACH (OUTPATIENT)
Dept: CASE MANAGEMENT | Facility: HOSPITAL | Age: 86
End: 2021-05-19

## 2021-05-19 NOTE — PROGRESS NOTES
In basket received from OP CM Heart Failure Coordinator Kody Shall indicating patient in PALS program   Merle Pickard to continue monitoring through PALS and for duration of bundle episode  CM removed self from care team and updated patient care coordination note

## 2021-05-21 DIAGNOSIS — E55.9 VITAMIN D DEFICIENCY: Chronic | ICD-10-CM

## 2021-05-21 DIAGNOSIS — E78.5 DYSLIPIDEMIA: Chronic | ICD-10-CM

## 2021-05-21 DIAGNOSIS — N18.4 ANEMIA OF CHRONIC RENAL FAILURE, STAGE 4 (SEVERE) (HCC): ICD-10-CM

## 2021-05-21 DIAGNOSIS — N18.4 CHRONIC KIDNEY DISEASE, STAGE IV (SEVERE) (HCC): ICD-10-CM

## 2021-05-21 DIAGNOSIS — E83.39 HYPERPHOSPHATEMIA: ICD-10-CM

## 2021-05-21 DIAGNOSIS — E61.1 IRON DEFICIENCY: ICD-10-CM

## 2021-05-21 DIAGNOSIS — E87.3 METABOLIC ALKALOSIS: ICD-10-CM

## 2021-05-21 DIAGNOSIS — D63.1 ANEMIA OF CHRONIC RENAL FAILURE, STAGE 4 (SEVERE) (HCC): ICD-10-CM

## 2021-05-21 DIAGNOSIS — R80.1 PERSISTENT PROTEINURIA: ICD-10-CM

## 2021-05-21 DIAGNOSIS — I12.9 HYPERTENSIVE CHRONIC KIDNEY DISEASE WITH STAGE 1 THROUGH STAGE 4 CHRONIC KIDNEY DISEASE, OR UNSPECIFIED CHRONIC KIDNEY DISEASE: ICD-10-CM

## 2021-05-21 DIAGNOSIS — M79.89 LEFT LEG SWELLING: ICD-10-CM

## 2021-05-21 RX ORDER — LOSARTAN POTASSIUM 25 MG/1
25 TABLET ORAL DAILY
Qty: 30 TABLET | Refills: 5 | OUTPATIENT
Start: 2021-05-21

## 2021-05-24 ENCOUNTER — TELEPHONE (OUTPATIENT)
Dept: CARDIOLOGY CLINIC | Facility: CLINIC | Age: 86
End: 2021-05-24

## 2021-05-24 NOTE — TELEPHONE ENCOUNTER
Spoke with Nguyen Morales [facility], will try and get out to patient tomorrow  Is scheduled for Thursday

## 2021-05-24 NOTE — TELEPHONE ENCOUNTER
I have an opening in my schedule at 11:00 a m  tomorrow and at 10:00 a m  and 3:00 p m  on Wednesday  I am not here Thursday  Can she be squeezed in to see me, as I have been the one following her either tomorrow or on Wednesday?

## 2021-05-24 NOTE — TELEPHONE ENCOUNTER
Called and spoke with Levon Mullins regarding Dr Hanna Merrill recommendation  Js Sanchez states it her mother is on PALS program and it would be very taxing on her to come and and why can't the VNA do the blood pressure, heart rate check tomorrow  VNA will be called

## 2021-05-24 NOTE — TELEPHONE ENCOUNTER
Due patient's status dgt was requesting blood pressure and heart rate check to be done by VNA tomorrow if possible  Will relay vitals upon f/u call to office

## 2021-05-24 NOTE — TELEPHONE ENCOUNTER
Nguyen @ Home health [facility] called to report patients heart rate has been climbing the last 2 1/2 weeks  Ofilia  reports it was in low teens to 120, today 130 at rest       Ofilia  reports patient denies SOB/chest/pain/palpations/lightheadednes or dizziness  Nguyen reports patient is more fatigued than usual  Patient is taking Bystolic 48HG daily  Patient's last office visit was 8/18/2020

## 2021-05-26 NOTE — TELEPHONE ENCOUNTER
Received call from [facility] with VNA  [facility] reports she spoke with daughter on morning of 5/25/21 and she reports patients BP before medication was 151/108, , rechecked after medication /68,   In afternoon dgt reports /64,   Nguyen reports patient is asymptomatic  [facility] states she will be talking with family for a different level or care for patient

## 2021-05-26 NOTE — TELEPHONE ENCOUNTER
This patient ideally should be seen in the office to see if her cardiac rhythm is no longer sinus rhythm  She may be in atrial fibrillation, which needs to be documented with an EKG  She also needs to have her blood pressure documented in a more systematic fashion, using our blood pressure data sheet  Please get her into the office as soon as possible

## 2021-05-26 NOTE — TELEPHONE ENCOUNTER
Pts dtr made an apt to see Dr Angelo Juarez on 6/1  She is concerned about her waiting that long if she is in Afib  Please advise your thoughts  Thank you      /80 to 150/110  Pulse 154, 114, 130

## 2021-05-27 ENCOUNTER — LAB REQUISITION (OUTPATIENT)
Dept: LAB | Facility: HOSPITAL | Age: 86
End: 2021-05-27
Payer: MEDICARE

## 2021-05-27 ENCOUNTER — TELEPHONE (OUTPATIENT)
Dept: NEPHROLOGY | Facility: CLINIC | Age: 86
End: 2021-05-27

## 2021-05-27 DIAGNOSIS — I50.32 CHRONIC DIASTOLIC (CONGESTIVE) HEART FAILURE (HCC): ICD-10-CM

## 2021-05-27 LAB
ANION GAP SERPL CALCULATED.3IONS-SCNC: 6 MMOL/L (ref 4–13)
BUN SERPL-MCNC: 61 MG/DL (ref 5–25)
CALCIUM SERPL-MCNC: 8.9 MG/DL (ref 8.3–10.1)
CHLORIDE SERPL-SCNC: 100 MMOL/L (ref 100–108)
CO2 SERPL-SCNC: 40 MMOL/L (ref 21–32)
CREAT SERPL-MCNC: 2.74 MG/DL (ref 0.6–1.3)
GFR SERPL CREATININE-BSD FRML MDRD: 14 ML/MIN/1.73SQ M
GLUCOSE SERPL-MCNC: 141 MG/DL (ref 65–140)
POTASSIUM SERPL-SCNC: 4.7 MMOL/L (ref 3.5–5.3)
SODIUM SERPL-SCNC: 146 MMOL/L (ref 136–145)

## 2021-05-27 PROCEDURE — 80048 BASIC METABOLIC PNL TOTAL CA: CPT | Performed by: INTERNAL MEDICINE

## 2021-05-27 NOTE — TELEPHONE ENCOUNTER
----- Message from Vahe Ely MD sent at 5/27/2021  3:03 PM EDT -----  As long as the breathing and swelling is doing well I would make no changes on the labs at this time continue current diuretic regimen with the torsemide

## 2021-05-28 ENCOUNTER — DOCUMENTATION (OUTPATIENT)
Dept: CARDIOLOGY CLINIC | Facility: CLINIC | Age: 86
End: 2021-05-28

## 2021-05-28 NOTE — TELEPHONE ENCOUNTER
I spoke to the patients daughter, she is aware of lab results and at this time has not noticed any swelling  Patient does have ocasional SOB while walking around the house ; cardiology has ordered an EKG for completeness and has suggested hospice as well  Per daughter this discussion will be this week and possibly not occur as patients mind is not with it and forgets conversations and will  No go through with hospice, per patient " I am fine where I am now "

## 2021-05-28 NOTE — PROGRESS NOTES
Daughter Remedios Harry called and canceled her appt today  As per Dr Eduard Ríos pt will be on palliative care  She does not wish to travel at this time  If you have any questions please call her daughter Adriana Avila

## 2021-06-01 ENCOUNTER — TELEPHONE (OUTPATIENT)
Dept: NEPHROLOGY | Facility: CLINIC | Age: 86
End: 2021-06-01

## 2021-06-01 NOTE — TELEPHONE ENCOUNTER
Lyndon Sy from [facility] states that the patient and her family are ready for hospice care  They are wondering if you could put the referral in or this

## 2021-06-01 NOTE — TELEPHONE ENCOUNTER
Sabiha Braswell from home health aware  She stated that she called us first because she believes that patient hasn't seen her PCP in about 3 months  She is going to reach out to the PCP's office

## 2021-06-05 DIAGNOSIS — M41.9 RESTRICTIVE LUNG DISEASE DUE TO KYPHOSCOLIOSIS: Primary | ICD-10-CM

## 2021-06-05 DIAGNOSIS — Z51.5 HOSPICE CARE PATIENT: ICD-10-CM

## 2021-06-05 DIAGNOSIS — J98.4 RESTRICTIVE LUNG DISEASE DUE TO KYPHOSCOLIOSIS: Primary | ICD-10-CM

## 2021-06-05 RX ORDER — OXYCODONE HYDROCHLORIDE 5 MG/1
5 TABLET ORAL EVERY 4 HOURS PRN
Qty: 24 TABLET | Refills: 0 | Status: SHIPPED | OUTPATIENT
Start: 2021-06-05 | End: 2021-06-09

## 2021-06-05 RX ORDER — LORAZEPAM 0.5 MG/1
0.5 TABLET ORAL EVERY 4 HOURS PRN
Qty: 16 TABLET | Refills: 0 | Status: SHIPPED | OUTPATIENT
Start: 2021-06-05 | End: 2021-06-09

## 2021-06-21 ENCOUNTER — PATIENT OUTREACH (OUTPATIENT)
Dept: CASE MANAGEMENT | Facility: HOSPITAL | Age: 86
End: 2021-06-21

## 2021-06-22 ENCOUNTER — EPISODE CHANGES (OUTPATIENT)
Dept: CASE MANAGEMENT | Facility: HOSPITAL | Age: 86
End: 2021-06-22

## 2022-03-24 NOTE — TELEPHONE ENCOUNTER
Our office has been trying to fax [facility] a home draw request on 1/2 and 1/3 multiple times for patient, but the fax is not going through  I left a message with them asking for an alternative fax number but no one has called back yet  I will keep trying to fax the order  None

## 2022-03-29 NOTE — OCCUPATIONAL THERAPY NOTE
OT EVALUATION     12/10/18 1127   Note Type   Note type Eval only   Restrictions/Precautions   Other Precautions Chair Alarm; Bed Alarm; Fall Risk;O2;Pain   Pain Assessment   Pain Assessment 0-10   Pain Score 3   Pain Location Shoulder   Home Living   Type of Home Apartment   Home Layout One level  (6 BELLA)   Bathroom Equipment Shower chair   Home Equipment Walker  (rollator, O2)   Additional Comments pt reports right shoulder injury and was told to only to use right arm for eating and toileting   Prior Function   Level of [place] Needs assistance with ADLs and functional mobility; Needs assistance with IADLs   Lives With Alone   Receives Help From Home health; Family  (HHA 2x daily)   ADL Assistance Needs assistance   IADLs Needs assistance   Lifestyle   Intrinsic Gratification spending time with family for [place]   Subjective   Subjective "I want to be better and home to spend [place] with my family"   ADL   Eating Assistance 4  Minimal Assistance   Grooming Assistance 3  Moderate Assistance   Grooming Deficit Brushing hair   UB Bathing Assistance 3  Moderate Assistance   LB Bathing Assistance 3  Moderate Assistance   [address] 3  Moderate Assistance   LB Dressing Assistance 3  Moderate Assistance   Toileting Assistance  4  Minimal Assistance   Transfers   Sit to Stand 4  Minimal assistance   Stand to Sit 4  Minimal assistance   Stand pivot 4  Minimal assistance   Functional Mobility   Functional Mobility 4  Minimal assistance   Additional Comments 20 feet   Additional items Rolling walker   Balance   Static Sitting Fair +   Dynamic Sitting Fair   Static Standing Fair -   Dynamic Standing Poor +   Activity Tolerance   Activity Tolerance Patient limited by fatigue   Nurse Made Aware yes, remove Purewick catheter and replaced when done with Eval   RUE Assessment   RUE Assessment (elbow WFL, not assessed due to pts request )   LUE Assessment   LUE Assessment WFL  (grossly 3+/5)  Sensation   Light Touch Partial deficits in the LUE  (from CVA per pt)      Cognition   Overall Cognitive Status WFL   Arousal/Participation Cooperative   Orientation Level Oriented X4   Following Commands Follows multistep commands with increased time or repetition   Comments pt is Circle   Assessment   Limitation Decreased ADL status; Decreased UE strength;Decreased Safe judgement during ADL;Decreased endurance;Decreased self-care trans;Decreased high-level ADLs  (decreased balance and mobility )   Prognosis Good   Assessment Patient evaluated by Occupational Therapy  Patient admitted with Acute encephalopathy  The patients occupational profile, medical and therapy history includes a extensive additional review of physical, cognitive, or psychosocial history related to current functional performance  Comorbidities affecting functional mobility and ADLS include: CVA, DVT, hypertension and PE  Prior to admission, patient was independent with functional mobility with walker, requiring assist for ADLS and requiring assist for IADLS  The evaluation identifies the following performance deficits: weakness, decreased ROM, impaired balance, decreased endurance, increased fall risk, new onset of impairment of functional mobility, decreased ADLS, decreased IADLS, pain, decreased activity tolerance, decreased safety awareness, impaired judgement, SOB upon exertion and decreased strength, that result in activity limitations and/or participation restrictions  This evaluation requires clinical decision making of high complexity, because the patient presents with comorbidites that affect occupational performance and required significant modification of tasks or assistance with consideration of multiple treatment options  The Barthel Index was used as a functional outcome tool presenting with a score of 40, indicating marked limitations of functional mobility and ADLS    Patient will benefit from skilled Occupational Therapy services to address above deficits and facilitate a safe return to prior level of function  Goals   Patient Goals get stronger, go home   STG Time Frame (1-7 days)   Short Term Goal  Goals established to promote patient goal of going home and getting stronger:  Patient will increase standing tolerance to 3 minutes during ADL task to decrease assistance level and decrease fall risk; Patient will increase bed mobility to supervision in preparation for ADLS and transfers; Patient will increase functional mobility to and from bathroom with rolling walker with supervision to increase performance with ADLS and to use a toilet;  Patient will improve functional activity tolerance to 10 minutes of sustained functional tasks to increase participation in basic self-care and decrease assistance level;  Patient will be able to to verbalize understanding and perform energy conservation/proper body mechanics during ADLS and functional mobility at least 75% of the time with minimal cueing to decrease signs of fatigue and increase stamina to return to prior level of function; Patient will increase dynamic sitting balance to fair+ to improve the ability to sit at edge of bed or on a chair for ADLS;  Patient will increase dynamic standing balance to fair to improve postural stability and decrease fall risk during standing ADLS and transfers  LTG Time Frame (8-14 days)   Long Term Goal Goals established to promote patient goal of going home and getting stronger:  Patient will increase standing tolerance to 6 minutes during ADL task to decrease assistance level and decrease fall risk; Patient will increase bed mobility to independent in preparation for ADLS and transfers;  Patient will increase functional mobility to and from bathroom with rolling walker independently to increase performance with ADLS and to use a toilet;  Patient will improve functional activity tolerance to 20 minutes of sustained functional tasks to increase participation in basic self-care and decrease assistance level;  Patient will be able to to verbalize understanding and perform energy conservation/proper body mechanics during ADLS and functional mobility at least 90% of the time without cueing to decrease signs of fatigue and increase stamina to return to prior level of function; Patient will increase dynamic sitting balance to good to improve the ability to sit at edge of bed or on a chair for ADLS;  Patient will increase dynamic standing balance to fair+ to improve postural stability and decrease fall risk during standing ADLS and transfers  Functional Transfer Goals   Pt Will Perform All Functional Transfers (STG supervision LTG independent )   ADL Goals   Pt Will Perform Grooming (STG min assist LTG independent )   Pt Will Perform Bathing (STG min assist LTG supervision )   Pt Will Perform UE Dressing (STG min assist LTG supervision )   Pt Will Perform LE Dressing (STG min assist LTG supervision )   Pt Will Perform Toileting (STG supervision LTG independent )   Plan   Treatment Interventions ADL retraining;Functional transfer training;UE strengthening/ROM; Endurance training;Patient/family training;Equipment evaluation/education; Activityengagement; Compensatory technique education   Goal Expiration Date 12/24/18   Treatment Day 0   OT Frequency 3-5x/wk   Recommendation   OT Discharge Recommendation (home with continued services/family assist )   Barthel Index   Feeding 5   Bathing 0   Grooming Score 0   Dressing Score 5   Bladder Score 0   Bowels Score 10   Toilet Use Score 5   Transfers (Bed/Chair) Score 10   Mobility (Level Surface) Score 0   Stairs Score 5   Barthel Index Score 40   Licensure   [place] License Number  Michell Machado MS OTR/L [id number] The patient is a 7y4m Female complaining of dental pain/injury.

## 2023-01-16 NOTE — TELEPHONE ENCOUNTER
Amalia Ray placed order in epic for the VNA/labs  
Patient's daughter called and stated she spoke to Dr Dot Cortés about putting in a script for visiting nursing  Patient's daughter called over at visiting nursing and they stated in order to schedule an appointment they needed a script  Patient's daughter requested for a script to visiting nursing to be put in for the patient 
Tanvir Madera from visiting nurses called  They would like a referral for visiting nurses and labs sent to them today  I explained to them that Dr Prosper Villatoro is off today that we would be able to send everything on Monday  Tanvir Madera asked if an advanced practitioner or another physician was available to place the orders  She said everything is in Dr Prosper Villatoro note from 4/28/21  [facility]'s phone number is [phone] and their fax is [phone] 
Yes - the patient is able to be screened

## 2023-08-11 NOTE — PLAN OF CARE
Problem: RESPIRATORY - ADULT  Goal: Achieves optimal ventilation and oxygenation  INTERVENTIONS:  - Assess for changes in respiratory status  - Assess for changes in mentation and behavior  - Position to facilitate oxygenation and minimize respiratory effort  - Oxygen administration by appropriate delivery method based on oxygen saturation (per order) or ABGs  - Initiate smoking cessation education as indicated  - Encourage broncho-pulmonary hygiene including cough, deep breathe, Incentive Spirometry  - Assess the need for suctioning and aspirate as needed  - Assess and instruct to report SOB or any respiratory difficulty  - Respiratory Therapy support as indicated  Outcome: Progressing Yes - the patient is able to be screened

## 2024-01-09 NOTE — TELEPHONE ENCOUNTER
Needs filled ASAP 
Const: not in acute distress  Eyes: no conjunctival injection  HEENT: Head NCAT, Moist MM.  Neck: Trachea midline.   CVS: +S1/S2, Peripheral pulses 2+ and equal in all extremities.  RESP: Unlabored respiratory effort. Clear to auscultation bilaterally.  GI: Nontender/Nondistended, No CVA tenderness b/l.   MSK: Normocephalic/Atraumatic, No Lower Extremities edema b/l.   Skin: Intact.   Neuro: Motor & Sensation grossly intact.  Psych: Awake, Alert, & Cooperative

## 2024-12-12 NOTE — ED NOTES
Spoke with [facility], dont need to collect ABORh, not needed at this time        Chandan Bonner, RN  03/16/20 [id number] MILD